# Patient Record
Sex: FEMALE | Race: WHITE | Employment: OTHER | URBAN - NONMETROPOLITAN AREA
[De-identification: names, ages, dates, MRNs, and addresses within clinical notes are randomized per-mention and may not be internally consistent; named-entity substitution may affect disease eponyms.]

---

## 2017-02-09 ENCOUNTER — HOSPITAL ENCOUNTER (OUTPATIENT)
Dept: LAB | Age: 82
Discharge: HOME | End: 2017-02-09
Attending: FAMILY MEDICINE | Admitting: FAMILY MEDICINE

## 2017-02-09 LAB
ALBUMIN SERPL-MCNC: 3.8 GM/DL (ref 3.4–5)
ALP BLD-CCNC: 58 IU/L (ref 40–129)
ALT SERPL-CCNC: 25 U/L (ref 10–40)
ANION GAP SERPL CALCULATED.3IONS-SCNC: 8 MMOL/L (ref 4–16)
AST SERPL-CCNC: 26 IU/L (ref 15–37)
BASOPHILS ABSOLUTE: 0.1 K/CU MM
BASOPHILS RELATIVE PERCENT: 0.6 % (ref 0–1)
BILIRUB SERPL-MCNC: 0.3 MG/DL (ref 0–1)
BILIRUBIN DIRECT: 0.2 MG/DL (ref 0–0.3)
BILIRUBIN, INDIRECT: 0.1 MG/DL (ref 0–0.7)
BUN BLDV-MCNC: 17 MG/DL (ref 6–23)
CALCIUM SERPL-MCNC: 9.5 MG/DL (ref 8.3–10.6)
CHLORIDE BLD-SCNC: 102 MMOL/L (ref 99–110)
CHOLESTEROL: 255 MG/DL
CO2: 30 MMOL/L (ref 21–32)
CREAT SERPL-MCNC: 1.1 MG/DL (ref 0.6–1.1)
CREATININE URINE: 137.2 MG/DL (ref 28–217)
DIFFERENTIAL TYPE: ABNORMAL
EOSINOPHILS ABSOLUTE: 0.3 K/CU MM
EOSINOPHILS RELATIVE PERCENT: 2.6 % (ref 0–3)
ESTIMATED AVERAGE GLUCOSE: 151 MG/DL
GFR AFRICAN AMERICAN: 58 ML/MIN/1.73M2
GFR NON-AFRICAN AMERICAN: 48 ML/MIN/1.73M2
GLUCOSE BLD-MCNC: 141 MG/DL (ref 70–140)
HBA1C MFR BLD: 6.9 % (ref 4.2–6.3)
HCT VFR BLD CALC: 41.3 % (ref 37–47)
HDLC SERPL-MCNC: 43 MG/DL
HEMOGLOBIN: 13 GM/DL (ref 12.5–16)
IMMATURE NEUTROPHIL %: 0.3 % (ref 0–0.43)
LDL CHOLESTEROL DIRECT: 144 MG/DL
LYMPHOCYTES ABSOLUTE: 2.1 K/CU MM
LYMPHOCYTES RELATIVE PERCENT: 21.5 % (ref 24–44)
MCH RBC QN AUTO: 29.1 PG (ref 27–31)
MCHC RBC AUTO-ENTMCNC: 31.5 % (ref 32–36)
MCV RBC AUTO: 92.4 FL (ref 78–100)
MICROALBUMIN/CREAT 24H UR: 5.1 MG/DL
MICROALBUMIN/CREAT UR-RTO: 37.2 MG/G CREAT (ref 0–30)
MONOCYTES ABSOLUTE: 0.6 K/CU MM
MONOCYTES RELATIVE PERCENT: 5.6 % (ref 0–4)
PDW BLD-RTO: 13.5 % (ref 11.7–14.9)
PLATELET # BLD: 191 K/CU MM (ref 140–440)
PMV BLD AUTO: 10.6 FL (ref 7.5–11.1)
POTASSIUM SERPL-SCNC: 4.2 MMOL/L (ref 3.5–5.1)
RBC # BLD: 4.47 M/CU MM (ref 4.2–5.4)
SEGMENTED NEUTROPHILS ABSOLUTE COUNT: 6.8 K/CU MM
SEGMENTED NEUTROPHILS RELATIVE PERCENT: 69.4 % (ref 36–66)
SODIUM BLD-SCNC: 140 MMOL/L (ref 135–145)
T3 FREE: 2.6 PG/ML (ref 2.3–4.2)
T4 FREE: 1.48 NG/DL (ref 0.9–1.8)
TOTAL IMMATURE NEUTOROPHIL: 0.03 K/CU MM
TOTAL PROTEIN: 7 GM/DL (ref 6.4–8.2)
TRIGL SERPL-MCNC: 413 MG/DL
TSH HIGH SENSITIVITY: 0.41 UIU/ML (ref 0.27–4.2)
WBC # BLD: 9.8 K/CU MM (ref 4–10.5)

## 2017-03-10 ENCOUNTER — HOSPITAL ENCOUNTER (OUTPATIENT)
Dept: LAB | Age: 82
Discharge: HOME | End: 2017-03-10
Attending: FAMILY MEDICINE | Admitting: FAMILY MEDICINE

## 2017-03-10 LAB
ALBUMIN SERPL-MCNC: 3.8 GM/DL (ref 3.4–5)
ALP BLD-CCNC: 53 IU/L (ref 40–129)
ALT SERPL-CCNC: 17 U/L (ref 10–40)
AMYLASE: 56 U/L (ref 25–115)
ANION GAP SERPL CALCULATED.3IONS-SCNC: 4 MMOL/L (ref 4–16)
AST SERPL-CCNC: 20 IU/L (ref 15–37)
BACTERIA: ABNORMAL /HPF
BASOPHILS ABSOLUTE: 0.1 K/CU MM
BASOPHILS RELATIVE PERCENT: 0.5 % (ref 0–1)
BILIRUB SERPL-MCNC: 0.3 MG/DL (ref 0–1)
BILIRUBIN DIRECT: 0.2 MG/DL (ref 0–0.3)
BILIRUBIN URINE: NEGATIVE MG/DL
BILIRUBIN, INDIRECT: 0.1 MG/DL (ref 0–0.7)
BLOOD, URINE: NEGATIVE
BUN BLDV-MCNC: 18 MG/DL (ref 6–23)
CALCIUM SERPL-MCNC: 9.1 MG/DL (ref 8.3–10.6)
CAST TYPE: ABNORMAL /HPF
CHLORIDE BLD-SCNC: 97 MMOL/L (ref 99–110)
CLARITY: CLEAR
CO2: 35 MMOL/L (ref 21–32)
COLOR: YELLOW
CREAT SERPL-MCNC: 1 MG/DL (ref 0.6–1.1)
CRYSTAL TYPE: ABNORMAL /HPF
DIFFERENTIAL TYPE: ABNORMAL
EOSINOPHILS ABSOLUTE: 0.2 K/CU MM
EOSINOPHILS RELATIVE PERCENT: 2.2 % (ref 0–3)
EPITHELIAL CELLS, UA: ABNORMAL /HPF
ESTIMATED AVERAGE GLUCOSE: 166 MG/DL
GFR AFRICAN AMERICAN: >60 ML/MIN/1.73M2
GFR NON-AFRICAN AMERICAN: 53 ML/MIN/1.73M2
GLUCOSE BLD-MCNC: 183 MG/DL (ref 70–140)
GLUCOSE, URINE: NEGATIVE MG/DL
HBA1C MFR BLD: 7.4 % (ref 4.2–6.3)
HCT VFR BLD CALC: 41.2 % (ref 37–47)
HEMOGLOBIN: 12.8 GM/DL (ref 12.5–16)
IMMATURE NEUTROPHIL %: 0.4 % (ref 0–0.43)
KETONES, URINE: NEGATIVE MG/DL
LEUKOCYTE ESTERASE, URINE: NEGATIVE
LIPASE: 146 IU/L (ref 13–60)
LYMPHOCYTES ABSOLUTE: 1.7 K/CU MM
LYMPHOCYTES RELATIVE PERCENT: 16.6 % (ref 24–44)
MCH RBC QN AUTO: 28.9 PG (ref 27–31)
MCHC RBC AUTO-ENTMCNC: 31.1 % (ref 32–36)
MCV RBC AUTO: 93 FL (ref 78–100)
MONOCYTES ABSOLUTE: 0.7 K/CU MM
MONOCYTES RELATIVE PERCENT: 6.6 % (ref 0–4)
MUCUS: NEGATIVE HPF
NITRITE URINE, QUANTITATIVE: NEGATIVE
PDW BLD-RTO: 14.7 % (ref 11.7–14.9)
PH, URINE: 5.5 (ref 5–8)
PLATELET # BLD: 181 K/CU MM (ref 140–440)
PMV BLD AUTO: 11.2 FL (ref 7.5–11.1)
POTASSIUM SERPL-SCNC: 4.3 MMOL/L (ref 3.5–5.1)
PROTEIN UA: NEGATIVE MG/DL
RBC # BLD: 4.43 M/CU MM (ref 4.2–5.4)
RBC URINE: ABNORMAL /HPF (ref 0–6)
SEGMENTED NEUTROPHILS ABSOLUTE COUNT: 7.4 K/CU MM
SEGMENTED NEUTROPHILS RELATIVE PERCENT: 73.7 % (ref 36–66)
SODIUM BLD-SCNC: 136 MMOL/L (ref 135–145)
SPECIFIC GRAVITY UA: 1 (ref 1–1.03)
TOTAL IMMATURE NEUTOROPHIL: 0.04 K/CU MM
TOTAL PROTEIN: 7.2 GM/DL (ref 6.4–8.2)
UROBILINOGEN, URINE: 0.2 EU/DL (ref 0.2–1)
VOLUME, (UVOL): 12 ML (ref 10–12)
WBC # BLD: 10 K/CU MM (ref 4–10.5)
WBC UA: ABNORMAL /HPF (ref 0–5)

## 2017-03-12 LAB
CULTURE: NORMAL
REPORT STATUS: NORMAL
REQUEST PROBLEM: NORMAL
SPECIMEN: NORMAL
TOTAL COLONY COUNT: NORMAL

## 2017-03-22 ENCOUNTER — OFFICE VISIT (OUTPATIENT)
Dept: CARDIOLOGY CLINIC | Age: 82
End: 2017-03-22

## 2017-03-22 ENCOUNTER — TELEPHONE (OUTPATIENT)
Dept: CARDIOLOGY CLINIC | Age: 82
End: 2017-03-22

## 2017-03-22 VITALS
WEIGHT: 176 LBS | SYSTOLIC BLOOD PRESSURE: 160 MMHG | HEART RATE: 68 BPM | BODY MASS INDEX: 34.55 KG/M2 | DIASTOLIC BLOOD PRESSURE: 78 MMHG | HEIGHT: 60 IN

## 2017-03-22 DIAGNOSIS — E78.5 DYSLIPIDEMIA: ICD-10-CM

## 2017-03-22 DIAGNOSIS — I10 ESSENTIAL HYPERTENSION: ICD-10-CM

## 2017-03-22 DIAGNOSIS — R06.02 SHORTNESS OF BREATH: Primary | ICD-10-CM

## 2017-03-22 PROCEDURE — 99214 OFFICE O/P EST MOD 30 MIN: CPT | Performed by: INTERNAL MEDICINE

## 2017-03-22 RX ORDER — METRONIDAZOLE 7.5 MG/G
GEL VAGINAL
Refills: 1 | COMMUNITY
Start: 2017-02-14 | End: 2021-04-06

## 2017-03-22 RX ORDER — LEVOTHYROXINE SODIUM 137 UG/1
137 TABLET ORAL DAILY
Refills: 3 | Status: ON HOLD | COMMUNITY
Start: 2017-03-13

## 2017-03-22 RX ORDER — FLUTICASONE FUROATE AND VILANTEROL 200; 25 UG/1; UG/1
POWDER RESPIRATORY (INHALATION)
COMMUNITY
End: 2017-08-23 | Stop reason: ALTCHOICE

## 2017-03-22 RX ORDER — ICOSAPENT ETHYL 1000 MG/1
2 CAPSULE ORAL 2 TIMES DAILY
Qty: 60 CAPSULE | Refills: 3 | Status: SHIPPED | OUTPATIENT
Start: 2017-03-22 | End: 2017-07-11 | Stop reason: SDUPTHER

## 2017-03-22 RX ORDER — ATORVASTATIN CALCIUM 80 MG/1
80 TABLET, FILM COATED ORAL NIGHTLY
COMMUNITY
End: 2017-08-23

## 2017-03-22 RX ORDER — ATORVASTATIN CALCIUM 80 MG/1
10 TABLET, FILM COATED ORAL DAILY
Qty: 90 TABLET | Refills: 3 | Status: SHIPPED | OUTPATIENT
Start: 2017-03-22 | End: 2017-03-22 | Stop reason: DRUGHIGH

## 2017-03-22 RX ORDER — HYDROCHLOROTHIAZIDE 25 MG/1
25 TABLET ORAL DAILY
Qty: 30 TABLET | Refills: 3 | Status: SHIPPED | OUTPATIENT
Start: 2017-03-22 | End: 2017-08-22 | Stop reason: SDUPTHER

## 2017-03-29 ENCOUNTER — PROCEDURE VISIT (OUTPATIENT)
Dept: CARDIOLOGY CLINIC | Age: 82
End: 2017-03-29

## 2017-03-29 DIAGNOSIS — Z13.6 SCREENING FOR AAA (ABDOMINAL AORTIC ANEURYSM): Primary | ICD-10-CM

## 2017-03-29 DIAGNOSIS — R06.02 SOB (SHORTNESS OF BREATH): Primary | ICD-10-CM

## 2017-03-29 DIAGNOSIS — R60.9 EDEMA, UNSPECIFIED TYPE: Primary | ICD-10-CM

## 2017-03-29 DIAGNOSIS — I10 ESSENTIAL HYPERTENSION: ICD-10-CM

## 2017-03-29 DIAGNOSIS — R06.02 SHORTNESS OF BREATH: ICD-10-CM

## 2017-03-29 LAB
LV EF: 58 %
LVEF MODALITY: NORMAL

## 2017-03-29 PROCEDURE — 93306 TTE W/DOPPLER COMPLETE: CPT | Performed by: INTERNAL MEDICINE

## 2017-03-30 ENCOUNTER — PROCEDURE VISIT (OUTPATIENT)
Dept: CARDIOLOGY CLINIC | Age: 82
End: 2017-03-30

## 2017-03-30 DIAGNOSIS — I10 ESSENTIAL HYPERTENSION: ICD-10-CM

## 2017-03-30 DIAGNOSIS — R06.02 SHORTNESS OF BREATH: ICD-10-CM

## 2017-03-30 LAB
LV EF: 88 %
LVEF MODALITY: NORMAL

## 2017-03-30 PROCEDURE — 93017 CV STRESS TEST TRACING ONLY: CPT | Performed by: INTERNAL MEDICINE

## 2017-03-30 PROCEDURE — 93018 CV STRESS TEST I&R ONLY: CPT | Performed by: INTERNAL MEDICINE

## 2017-03-30 PROCEDURE — A9500 TC99M SESTAMIBI: HCPCS | Performed by: INTERNAL MEDICINE

## 2017-03-30 PROCEDURE — 78452 HT MUSCLE IMAGE SPECT MULT: CPT | Performed by: INTERNAL MEDICINE

## 2017-03-30 PROCEDURE — 93016 CV STRESS TEST SUPVJ ONLY: CPT | Performed by: INTERNAL MEDICINE

## 2017-03-31 ENCOUNTER — TELEPHONE (OUTPATIENT)
Dept: CARDIOLOGY CLINIC | Age: 82
End: 2017-03-31

## 2017-04-03 ENCOUNTER — TELEPHONE (OUTPATIENT)
Dept: CARDIOLOGY CLINIC | Age: 82
End: 2017-04-03

## 2017-05-01 ENCOUNTER — TELEPHONE (OUTPATIENT)
Dept: CARDIOLOGY CLINIC | Age: 82
End: 2017-05-01

## 2017-06-21 ENCOUNTER — TELEPHONE (OUTPATIENT)
Dept: CARDIOLOGY CLINIC | Age: 82
End: 2017-06-21

## 2017-06-21 ENCOUNTER — OFFICE VISIT (OUTPATIENT)
Dept: CARDIOLOGY CLINIC | Age: 82
End: 2017-06-21

## 2017-06-21 VITALS
SYSTOLIC BLOOD PRESSURE: 134 MMHG | HEART RATE: 68 BPM | WEIGHT: 172 LBS | BODY MASS INDEX: 33.77 KG/M2 | HEIGHT: 60 IN | RESPIRATION RATE: 16 BRPM | DIASTOLIC BLOOD PRESSURE: 66 MMHG

## 2017-06-21 DIAGNOSIS — E78.5 DYSLIPIDEMIA: Primary | ICD-10-CM

## 2017-06-21 PROCEDURE — 99214 OFFICE O/P EST MOD 30 MIN: CPT | Performed by: INTERNAL MEDICINE

## 2017-06-21 RX ORDER — DIPHENHYDRAMINE HCL 25 MG
25 CAPSULE ORAL NIGHTLY PRN
COMMUNITY
End: 2021-04-06

## 2017-06-21 RX ORDER — ROPINIROLE 2 MG/1
3 TABLET, FILM COATED ORAL NIGHTLY
Status: ON HOLD | COMMUNITY
End: 2018-03-15 | Stop reason: ALTCHOICE

## 2017-06-22 ENCOUNTER — TELEPHONE (OUTPATIENT)
Dept: CARDIOLOGY CLINIC | Age: 82
End: 2017-06-22

## 2017-06-22 RX ORDER — ICOSAPENT ETHYL 1000 MG/1
2 CAPSULE ORAL 2 TIMES DAILY
Qty: 64 CAPSULE | Refills: 0 | COMMUNITY
Start: 2017-06-22 | End: 2017-08-23 | Stop reason: SDUPTHER

## 2017-07-11 DIAGNOSIS — E78.5 DYSLIPIDEMIA: ICD-10-CM

## 2017-07-12 RX ORDER — ICOSAPENT ETHYL 1000 MG/1
2 CAPSULE ORAL 2 TIMES DAILY
Qty: 88 CAPSULE | Refills: 0 | COMMUNITY
Start: 2017-07-12 | End: 2017-11-29

## 2017-08-15 ENCOUNTER — HOSPITAL ENCOUNTER (OUTPATIENT)
Dept: LAB | Age: 82
Discharge: HOME | End: 2017-08-15
Attending: INTERNAL MEDICINE | Admitting: INTERNAL MEDICINE

## 2017-08-15 LAB
ALBUMIN SERPL-MCNC: 4.1 GM/DL (ref 3.4–5)
ALP BLD-CCNC: 50 IU/L (ref 40–129)
ALT SERPL-CCNC: 26 U/L (ref 10–40)
AST SERPL-CCNC: 27 IU/L (ref 15–37)
BILIRUB SERPL-MCNC: 0.5 MG/DL (ref 0–1)
BILIRUBIN DIRECT: 0.2 MG/DL (ref 0–0.3)
BILIRUBIN, INDIRECT: 0.3 MG/DL (ref 0–0.7)
CHOLESTEROL, FASTING: 140 MG/DL
HDLC SERPL-MCNC: 39 MG/DL
LDL CHOLESTEROL DIRECT: 61 MG/DL
TOTAL PROTEIN: 7.4 GM/DL (ref 6.4–8.2)
TRIGLYCERIDE, FASTING: 218 MG/DL

## 2017-08-22 DIAGNOSIS — I10 ESSENTIAL HYPERTENSION: ICD-10-CM

## 2017-08-22 DIAGNOSIS — R06.02 SHORTNESS OF BREATH: ICD-10-CM

## 2017-08-22 RX ORDER — HYDROCHLOROTHIAZIDE 25 MG/1
25 TABLET ORAL DAILY
Qty: 30 TABLET | Refills: 11 | Status: SHIPPED | OUTPATIENT
Start: 2017-08-22 | End: 2018-09-20 | Stop reason: SDUPTHER

## 2017-08-23 ENCOUNTER — OFFICE VISIT (OUTPATIENT)
Dept: CARDIOLOGY CLINIC | Age: 82
End: 2017-08-23

## 2017-08-23 VITALS
HEIGHT: 60 IN | RESPIRATION RATE: 14 BRPM | SYSTOLIC BLOOD PRESSURE: 126 MMHG | HEART RATE: 56 BPM | BODY MASS INDEX: 33.96 KG/M2 | DIASTOLIC BLOOD PRESSURE: 66 MMHG | WEIGHT: 173 LBS

## 2017-08-23 DIAGNOSIS — E78.1 PURE HYPERGLYCERIDEMIA: Primary | ICD-10-CM

## 2017-08-23 PROCEDURE — 99214 OFFICE O/P EST MOD 30 MIN: CPT | Performed by: INTERNAL MEDICINE

## 2017-08-23 RX ORDER — ATORVASTATIN CALCIUM 80 MG/1
10 TABLET, FILM COATED ORAL DAILY
Qty: 90 TABLET | Refills: 3 | Status: ON HOLD | OUTPATIENT
Start: 2017-08-23 | End: 2018-03-15 | Stop reason: CLARIF

## 2017-08-23 RX ORDER — GEMFIBROZIL 600 MG/1
600 TABLET, FILM COATED ORAL
Qty: 60 TABLET | Refills: 3 | Status: SHIPPED | OUTPATIENT
Start: 2017-08-23 | End: 2017-10-09 | Stop reason: SINTOL

## 2017-10-09 ENCOUNTER — TELEPHONE (OUTPATIENT)
Dept: CARDIOLOGY CLINIC | Age: 82
End: 2017-10-09

## 2017-10-09 RX ORDER — CHLORAL HYDRATE 500 MG
1000 CAPSULE ORAL NIGHTLY
COMMUNITY
End: 2017-11-29

## 2017-11-14 ENCOUNTER — HOSPITAL ENCOUNTER (OUTPATIENT)
Dept: OTHER | Age: 82
Discharge: HOME | End: 2017-11-14
Attending: NURSE PRACTITIONER | Admitting: NURSE PRACTITIONER

## 2017-11-14 ENCOUNTER — OFFICE VISIT (OUTPATIENT)
Dept: OTHER | Age: 82
End: 2017-11-14

## 2017-11-14 VITALS
WEIGHT: 162 LBS | BODY MASS INDEX: 31.64 KG/M2 | TEMPERATURE: 98 F | OXYGEN SATURATION: 93 % | SYSTOLIC BLOOD PRESSURE: 134 MMHG | HEART RATE: 60 BPM | DIASTOLIC BLOOD PRESSURE: 56 MMHG

## 2017-11-14 DIAGNOSIS — R05.9 COUGH: ICD-10-CM

## 2017-11-14 DIAGNOSIS — R09.82 POST-NASAL DRIP: ICD-10-CM

## 2017-11-14 DIAGNOSIS — J06.9 VIRAL UPPER RESPIRATORY TRACT INFECTION: Primary | ICD-10-CM

## 2017-11-14 PROCEDURE — 99202 OFFICE O/P NEW SF 15 MIN: CPT | Performed by: NURSE PRACTITIONER

## 2017-11-14 RX ORDER — FLUTICASONE PROPIONATE 50 MCG
2 SPRAY, SUSPENSION (ML) NASAL DAILY
Qty: 1 BOTTLE | Refills: 3 | Status: SHIPPED | OUTPATIENT
Start: 2017-11-14 | End: 2019-05-01 | Stop reason: ALTCHOICE

## 2017-11-14 ASSESSMENT — ENCOUNTER SYMPTOMS
STRIDOR: 0
SINUS PAIN: 0
WHEEZING: 1
CHOKING: 0
SWOLLEN GLANDS: 0
DIARRHEA: 0
NAUSEA: 0
RHINORRHEA: 1
CHEST TIGHTNESS: 0
SINUS PRESSURE: 0
ABDOMINAL PAIN: 0
SORE THROAT: 0
EYES NEGATIVE: 1
SHORTNESS OF BREATH: 1
VOMITING: 0
COUGH: 1

## 2017-11-14 NOTE — PATIENT INSTRUCTIONS
if:  · You have severe trouble breathing. Call your doctor now or seek immediate medical care if:  · You cough up blood. · You have new or worse trouble breathing. · You have a new or higher fever. · You have a new rash. Watch closely for changes in your health, and be sure to contact your doctor if:  · You cough more deeply or more often, especially if you notice more mucus or a change in the color of your mucus. · You have new symptoms, such as a sore throat, an earache, or sinus pain. · You do not get better as expected. Where can you learn more? Go to [email or web address] and sign in to your Send[employer] account. Enter D279 in the [employer] box to learn more about \"Cough: Care Instructions. \"     If you do not have an account, please click on the \"Sign Up Now\" link. Current as of: March 25, 2017  Content Version: 11.3  © 0133-5042 [employer]. Care instructions adapted under license by [facility] ([facility]). If you have questions about a medical condition or this instruction, always ask your healthcare professional. [place] [address] any warranty or liability for your use of this information. Patient Education        Saline Nasal Washes: Care Instructions  Your Care Instructions  Saline nasal washes help keep the nasal passages open by washing out thick or dried mucus. This simple remedy can help relieve symptoms of allergies, sinusitis, and colds. It also can make the nose feel more comfortable by keeping the mucous membranes moist. You may notice a little burning sensation in your nose the first few times you use the solution, but this usually gets better in a few days. Follow-up care is a key part of your treatment and safety. Be sure to make and go to all appointments, and call your doctor if you are having problems. It's also a good idea to know your test results and keep a list of the medicines you take.   How can you care for yourself at may suggest using a corticosteroid nasal spray for your allergy symptoms or sinus problems. These sprays reduce the swelling inside the nose and sinuses. Unlike decongestant nasal sprays, steroid sprays won't lead to more swelling when you stop taking them. These sprays start working in a few days, but it may take several weeks before you get the full effect. Most side effects are minor. The most common complaint is a burning feeling in the nose right after the spray is used. Some people get nosebleeds. Follow-up care is a key part of your treatment and safety. Be sure to make and go to all appointments, and call your doctor if you are having problems. It's also a good idea to know your test results and keep a list of the medicines you take. How can you care for yourself at home? Here are some tips for using these sprays:  · You may need to prime the sprayer before you use it. This means spraying it into the air a few times to make sure you get the right amount of medicine. Follow the directions on the label. · Blow your nose before you spray. This will help clear out your nostrils. · Gently sniff the medicine into your nose as you spray. Don't snort, or the medicine will go all the way into your throat where it won't do much good. · Aim the nozzle straight toward the outer wall of your nostril. This will help keep the medicine from irritating the inner walls of your nose, especially your septum (the wall that separates your left and right nostrils). · Don't blow your nose for 10 minutes or so after you spray. And try not to sneeze. · Be safe with medicines. Use this medicine exactly as prescribed. Call your doctor if you think you are having a problem with your medicine. · Clean your sprayer once a week. Read the label to learn how. When should you call for help? Watch closely for changes in your health, and be sure to contact your doctor if you have any problems. Where can you learn more?   Go to

## 2017-11-14 NOTE — PROGRESS NOTES
Pt takes 2 tabs, daily      losartan (COZAAR) 100 MG tablet Take 100 mg by mouth daily      cloNIDine (CATAPRES) 0.2 MG tablet Take 0.2 mg by mouth 2 times daily      insulin glargine (LANTUS) 100 UNIT/ML injection vial Inject into the skin nightly 12-20 units depending on blood sugar      Multiple Minerals-Vitamins (CALCIUM-MAGNESIUM-ZINC-D3 PO) Take by mouth      metoprolol (LOPRESSOR) 50 MG tablet Take 50 mg by mouth 2 times daily 3 pills in the AM : 150 mg dose  2 pills in the PM: 100 mg dose       No current facility-administered medications on file prior to visit. Past Medical, Family, and Social History have been reviewed today. Review of Systems   Constitutional: Positive for appetite change (decreased) and fatigue. Negative for chills, diaphoresis, fever and weight loss. HENT: Positive for congestion, postnasal drip and rhinorrhea. Negative for ear pain, sinus pain, sinus pressure, sneezing and sore throat. Eyes: Negative. Respiratory: Positive for cough, shortness of breath and wheezing. Negative for choking, chest tightness and stridor. Cardiovascular: Negative for chest pain and palpitations. Gastrointestinal: Negative for abdominal pain, diarrhea, nausea and vomiting. Genitourinary: Negative for dysuria. Musculoskeletal: Negative for neck pain. Skin: Negative for rash. Neurological: Negative for dizziness, light-headedness and headaches (resolved). OBJECTIVE:     BP (!) 134/56 (Site: Right Arm, Position: Sitting, Cuff Size: Large Adult)   Pulse 60   Temp 98 °F (36.7 °C) (Oral)   Wt 162 lb (73.5 kg)   SpO2 93%   BMI 31.64 kg/m²     Physical Exam   Constitutional: She is oriented to person, place, and time. She appears well-developed and well-nourished. HENT:   Head: Normocephalic. Right Ear: External ear normal.   Left Ear: External ear normal.   Nose: Mucosal edema and rhinorrhea present.  Right sinus exhibits no maxillary sinus tenderness and no frontal sinus tenderness. Left sinus exhibits no maxillary sinus tenderness and no frontal sinus tenderness. Mouth/Throat: Uvula is midline and mucous membranes are normal.       Eyes: Conjunctivae are normal. Pupils are equal, round, and reactive to light. Cardiovascular: Normal rate, regular rhythm and normal heart sounds. Pulmonary/Chest: Effort normal and breath sounds normal.   Abdominal: Bowel sounds are normal.   Lymphadenopathy:     She has no cervical adenopathy. Neurological: She is alert and oriented to person, place, and time. Skin: Skin is warm and dry. No results found for requested labs within last 30 days. Hemoglobin A1C (%)   Date Value   03/10/2017 7.4 (H)       Lab Results   Component Value Date    WBC 10.0 03/10/2017    WBC 9.8 02/09/2017    WBC 9.2 12/09/2016    HGB 12.8 03/10/2017    HGB 13.0 02/09/2017    HGB 11.8 12/09/2016    HCT 41.2 03/10/2017    HCT 41.3 02/09/2017    HCT 35.7 12/09/2016    MCV 93.0 03/10/2017    MCV 92.4 02/09/2017    MCV 97.3 12/09/2016     03/10/2017     02/09/2017     12/09/2016    SEGSABS 7.4 03/10/2017    SEGSABS 6.8 02/09/2017    SEGSABS 4.6 12/09/2016    LYMPHSABS 1.7 03/10/2017    MONOSABS 0.7 03/10/2017    EOSABS 0.2 03/10/2017    BASOSABS 0.1 03/10/2017     Lab Results   Component Value Date    TSHHS 0.414 02/09/2017     Lab Results   Component Value Date    LABALBU 4.1 08/15/2017    BILITOT 0.5 08/15/2017    BILIDIR 0.2 08/15/2017    IBILI 0.3 08/15/2017    AST 27 08/15/2017    ALT 26 08/15/2017    ALKPHOS 50 08/15/2017      ASSESSMENT AND PLAN:     1. Viral upper respiratory tract infection  - Encourage clear fluids without caffeine  - Smaller, more frequent meals to ensure hydration.   - Saline nasal spray, cool mist humidifier, prop head at night for congestion.   - Tylenol as needed for fever, pain. - Counseled on signs of increased work of breathing.   - RTO if sxs increase or no improvement.    - fluticasone (FLONASE) 50 MCG/ACT nasal spray; 2 sprays by Nasal route daily  Dispense: 1 Bottle; Refill: 3  - Pseudoephedrine-DM-GG 30- MG CAPS; Take 1 tablet by mouth 2 times daily as needed (cough or congestion)  Dispense: 10 capsule; Refill: 0    2. Cough  - As above  - Recommended picking up pulse oximeter    3. Post-nasal drip  - As above  - fluticasone (FLONASE) 50 MCG/ACT nasal spray; 2 sprays by Nasal route daily  Dispense: 1 Bottle; Refill: 3      Return if symptoms worsen or fail to improve.

## 2017-11-16 ENCOUNTER — TELEPHONE (OUTPATIENT)
Dept: OTHER | Age: 82
End: 2017-11-16

## 2017-11-29 ENCOUNTER — OFFICE VISIT (OUTPATIENT)
Dept: CARDIOLOGY CLINIC | Age: 82
End: 2017-11-29

## 2017-11-29 VITALS
SYSTOLIC BLOOD PRESSURE: 124 MMHG | DIASTOLIC BLOOD PRESSURE: 62 MMHG | WEIGHT: 161 LBS | BODY MASS INDEX: 31.61 KG/M2 | HEIGHT: 60 IN | HEART RATE: 64 BPM

## 2017-11-29 DIAGNOSIS — I10 ESSENTIAL HYPERTENSION: Primary | ICD-10-CM

## 2017-11-29 PROCEDURE — 4040F PNEUMOC VAC/ADMIN/RCVD: CPT | Performed by: INTERNAL MEDICINE

## 2017-11-29 PROCEDURE — 1036F TOBACCO NON-USER: CPT | Performed by: INTERNAL MEDICINE

## 2017-11-29 PROCEDURE — G8400 PT W/DXA NO RESULTS DOC: HCPCS | Performed by: INTERNAL MEDICINE

## 2017-11-29 PROCEDURE — G8482 FLU IMMUNIZE ORDER/ADMIN: HCPCS | Performed by: INTERNAL MEDICINE

## 2017-11-29 PROCEDURE — 99214 OFFICE O/P EST MOD 30 MIN: CPT | Performed by: INTERNAL MEDICINE

## 2017-11-29 PROCEDURE — G8428 CUR MEDS NOT DOCUMENT: HCPCS | Performed by: INTERNAL MEDICINE

## 2017-11-29 PROCEDURE — 1123F ACP DISCUSS/DSCN MKR DOCD: CPT | Performed by: INTERNAL MEDICINE

## 2017-11-29 PROCEDURE — 1090F PRES/ABSN URINE INCON ASSESS: CPT | Performed by: INTERNAL MEDICINE

## 2017-11-29 PROCEDURE — G8417 CALC BMI ABV UP PARAM F/U: HCPCS | Performed by: INTERNAL MEDICINE

## 2017-11-29 NOTE — PROGRESS NOTES
Macarena Stovall MD        OFFICE  FOLLOWUP NOTE    Chief complaints: patient is here for management of DM, HTN, DYSLPIDEMIA, leg swelling      Subjective: patient feels better, no chest pain, no shortness of breath, no dizziness, no palpitations    HPI Cecelia Segal is a 80 y. o.year old who  has a past medical history of Diabetes mellitus ([address] H/O cardiovascular stress test; H/O Doppler ultrasound (Abdomimal Aorta); H/O echocardiogram; Hx of echocardiogram; Hyperlipidemia; Hypertension; Sleep apnea; and Thyroid disease. and presents for management of DM, HTN, DYSLPIDEMIA, leg swelling which are well controlled,she is recovering from bronchitis, patient is not taking vascepa as it was expensive, she is taking lipitor      Current Outpatient Prescriptions   Medication Sig Dispense Refill    Insulin Glargine (BASAGLAR KWIKPEN SC) Inject 15 Units into the skin nightly      fluticasone (FLONASE) 50 MCG/ACT nasal spray 2 sprays by Nasal route daily 1 Bottle 3    albuterol sulfate HFA (PROVENTIL HFA) 108 (90 Base) MCG/ACT inhaler Inhale 2 puffs into the lungs every 4 hours as needed for Wheezing or Shortness of Breath With spacer (and mask if indicated). Thanks.  1 Inhaler 1    naproxen (NAPROSYN) 500 MG tablet Take 500 mg by mouth 2 times daily as needed for Pain      gabapentin (NEURONTIN) 100 MG capsule Take 100 mg by mouth nightly as needed      atorvastatin (LIPITOR) 80 MG tablet Take 0.5 tablets by mouth daily 90 tablet 3    hydrochlorothiazide (HYDRODIURIL) 25 MG tablet Take 1 tablet by mouth daily 30 tablet 11    Icosapent Ethyl (VASCEPA) 1 g CAPS capsule Take 2 capsules by mouth 2 times daily 88 capsule 0    rOPINIRole (REQUIP) 2 MG tablet Take 3 mg by mouth nightly       diphenhydrAMINE (BENADRYL) 25 MG capsule Take 25 mg by mouth nightly as needed for Itching      metroNIDAZOLE (METROGEL) 0.75 % vaginal gel   1    levothyroxine (SYNTHROID) 137 MCG tablet daily   3    Insulin Pen Needle (PEN NEEDLES 31GX5/16\") 31G X 8 MM MISC       losartan (COZAAR) 100 MG tablet Take 100 mg by mouth daily      cloNIDine (CATAPRES) 0.2 MG tablet Take 0.2 mg by mouth 2 times daily      insulin glargine (LANTUS) 100 UNIT/ML injection vial Inject into the skin nightly 12-20 units depending on blood sugar      metoprolol (LOPRESSOR) 50 MG tablet Take 50 mg by mouth 2 times daily 3 pills in the AM : 150 mg dose  2 pills in the PM: 100 mg dose       No current facility-administered medications for this visit. Allergies: Lopid [gemfibrozil]; Bystolic [nebivolol hcl]; Cefdinir; Coreg [carvedilol]; Crestor [rosuvastatin]; Fenofibrate; Glucophage [metformin]; Metronidazole; Norvasc [amlodipine besylate]; Floyce Nance [olmesartan-amlodipine-hctz]; and Zocor [simvastatin]  Past Medical History:   Diagnosis Date    Diabetes mellitus ([facility] [address]     H/O cardiovascular stress test 03/30/2017    normal,EF 88%.  H/O Doppler ultrasound (Abdomimal Aorta) 03/29/2017    No evidence of abdominal aortic aneurysm.  H/O echocardiogram 7/2/14    Normal left ventricular size, wall motion and systolic function. Mildle elevated pulmonary artery systolic pressure. Mild MR and TR and trace AR EF 55 to 60%    Hx of echocardiogram 03/29/2017    EF 55-60%. Grade I diastolic dysfunction. Mildly dilated left atrium. No evidence of pericardial effusion.      Hyperlipidemia     Hypertension     Sleep apnea     Thyroid disease      Past Surgical History:   Procedure Laterality Date    CATARACT REMOVAL      CHOLECYSTECTOMY      DILATION AND CURETTAGE OF UTERUS      OTHER SURGICAL HISTORY      eye lift     Family History   Problem Relation Age of Onset   Ilana Liang Pacemaker Sister     Arrhythmia Sister      Social History   Substance Use Topics    Smoking status: Never Smoker    Smokeless tobacco: Never Used    Alcohol use No      [unfilled]  Review of Systems:   · Constitutional: No Fever or Weight Loss   · Eyes: No Decreased Vision  · ENT: No Headaches, Hearing Loss or Vertigo  · Cardiovascular: No chest pain, dyspnea on exertion, palpitations or loss of consciousness  · Respiratory: No cough or wheezing    · Gastrointestinal: No abdominal pain, appetite loss, blood in stools, constipation, diarrhea or heartburn  · Genitourinary: No dysuria, trouble voiding, or hematuria  · Musculoskeletal:  No gait disturbance, weakness or joint complaints  · Integumentary: No rash or pruritis  · Neurological: No TIA or stroke symptoms  · Psychiatric: No anxiety or depression  · Endocrine: No malaise, fatigue or temperature intolerance  · Hematologic/Lymphatic: No bleeding problems, blood clots or swollen lymph nodes  · Allergic/Immunologic: No nasal congestion or hives  All systems negative except as marked. Objective:  /62 (Site: Right Arm)   Pulse 64   Ht 5' (1.524 m)   Wt 161 lb (73 kg)   BMI 31.44 kg/m²   Wt Readings from Last 3 Encounters:   11/29/17 161 lb (73 kg)   11/14/17 162 lb (73.5 kg)   11/11/17 165 lb (74.8 kg)     Body mass index is 31.44 kg/m². GENERAL - Alert, oriented, pleasant, in no apparent distress,normal grooming  HEENT  pupils are reactive to light and accomodation, cornea intact, conjunctive normal color, ears are normal in exam,throat exam in normal, teeth, gum and palate are normal, oral mucosa is normal without any notation of pallor or cyanosis  Neck - Supple. No jugular venous distention noted. No carotid bruits, no apical -carotid delay  Respiratory:  Normal breath sounds, No respiratory distress, No wheezing, No chest tenderness. ,no use of accessory muscles, diaphragm movement is normal  Cardiovascular: (PMI) apex non displaced,no lifts no thrills, no s3,no s4, Normal heart rate, Normal rhythm, No murmurs, No rubs, No gallops.  Carotid arteries pulse and amplitude are normal no bruit, no abdominal bruit noted ( normal abdominal aorta ausculation), femoral arteries pulse and amplitude are normal no bruit, pedal pulses are normal  Femoral pulses have normal amplitude, no bruits   Extremities - No cyanosis, clubbing, or significant edema, no varicose veins    Abdomen  No masses, tenderness, or organomegaly, no hepato-splenomegally, no bruits  Musculoskeletal  No significant edema, no kyphosis or scoliosis, no deformity in any extremity noted, muscle strength and tone are normal  Skin: no ulcer,no scar,no stasis dermatitis   Neurologic  alert oriented times 3,Cranial nerves II through XII are grossly intact. There were no gross focal neurologic abnormalities. All sensory and motor nerves examined and were normal  Psychiatric: normal mood and affect    Lab Results   Component Value Date    CKTOTAL 74 02/11/2011    CKMB 1.5 02/11/2011    TROPONINI 0.012 02/11/2011     BNP:    Lab Results   Component Value Date    BNP 75.3 02/11/2011     PT/INR:  No results found for: PTINR  Lab Results   Component Value Date    LABA1C 7.4 (H) 03/10/2017    LABA1C 6.9 (H) 02/09/2017     Lab Results   Component Value Date    CHOL 255 (H) 02/09/2017    TRIG 413 (H) 02/09/2017    HDL 39 (L) 08/15/2017    LDLDIRECT 61 08/15/2017     Lab Results   Component Value Date    ALT 26 08/15/2017    AST 27 08/15/2017     TSH:  No results found for: TSH    Impression:  Liu Mendoza is a 80 y. o.year old who  has a past medical history of Diabetes mellitus ([facility] [address] H/O cardiovascular stress test; H/O Doppler ultrasound (Abdomimal Aorta); H/O echocardiogram; Hx of echocardiogram; Hyperlipidemia; Hypertension; Sleep apnea; and Thyroid disease. and presents with     Plan:  1. Leg swelling:resolved, normal echo and PA pressures, normal venous doppler except for mildly increased venous reflux on GSV, WILL RECOMMEND compression socks and leg elevation. 2. Shortness of breath: resolved, normal  stress test and echo  3. DM: stable continue insulin  4.  Dyslipidemia: controlled on vascepa, latest lipid check was promising, since samples of vasepa is out, will prescribe lopid, patient

## 2017-12-06 ENCOUNTER — HOSPITAL ENCOUNTER (OUTPATIENT)
Dept: LAB | Age: 82
Discharge: HOME | End: 2017-12-06
Attending: FAMILY MEDICINE | Admitting: FAMILY MEDICINE

## 2017-12-06 LAB
ALBUMIN SERPL-MCNC: 3.6 GM/DL (ref 3.4–5)
ALP BLD-CCNC: 49 IU/L (ref 40–129)
ALT SERPL-CCNC: 25 U/L (ref 10–40)
ANION GAP SERPL CALCULATED.3IONS-SCNC: 13 MMOL/L (ref 4–16)
AST SERPL-CCNC: 25 IU/L (ref 15–37)
BILIRUB SERPL-MCNC: 0.5 MG/DL (ref 0–1)
BILIRUBIN DIRECT: 0.2 MG/DL (ref 0–0.3)
BILIRUBIN, INDIRECT: 0.3 MG/DL (ref 0–0.7)
BUN BLDV-MCNC: 25 MG/DL (ref 6–23)
CALCIUM SERPL-MCNC: 8.9 MG/DL (ref 8.3–10.6)
CHLORIDE BLD-SCNC: 100 MMOL/L (ref 99–110)
CHOLESTEROL, FASTING: 122 MG/DL
CO2: 26 MMOL/L (ref 21–32)
CREAT SERPL-MCNC: 1.1 MG/DL (ref 0.6–1.1)
CREATININE URINE: 100 MG/DL (ref 28–217)
ESTIMATED AVERAGE GLUCOSE: 160 MG/DL
GFR AFRICAN AMERICAN: 57 ML/MIN/1.73M2
GFR NON-AFRICAN AMERICAN: 47 ML/MIN/1.73M2
GLUCOSE FASTING: 121 MG/DL (ref 70–99)
HBA1C MFR BLD: 7.2 % (ref 4.2–6.3)
HDLC SERPL-MCNC: 41 MG/DL
LDL CHOLESTEROL DIRECT: 53 MG/DL
MICROALBUMIN/CREAT 24H UR: 1.6 MG/DL
MICROALBUMIN/CREAT UR-RTO: 16 MG/G CREAT (ref 0–30)
POTASSIUM SERPL-SCNC: 4 MMOL/L (ref 3.5–5.1)
SODIUM BLD-SCNC: 139 MMOL/L (ref 135–145)
T3 FREE: 2.3 PG/ML (ref 2.3–4.2)
T4 FREE: 1.15 NG/DL (ref 0.9–1.8)
TOTAL PROTEIN: 7.1 GM/DL (ref 6.4–8.2)
TRIGLYCERIDE, FASTING: 167 MG/DL
TSH HIGH SENSITIVITY: 2.26 UIU/ML (ref 0.27–4.2)

## 2018-03-14 PROBLEM — Z79.4 LONG-TERM INSULIN USE IN TYPE 2 DIABETES (HCC): Chronic | Status: ACTIVE | Noted: 2018-03-14

## 2018-03-14 PROBLEM — E11.9 LONG-TERM INSULIN USE IN TYPE 2 DIABETES (HCC): Chronic | Status: ACTIVE | Noted: 2018-03-14

## 2018-03-14 PROBLEM — I10 ESSENTIAL HYPERTENSION: Chronic | Status: ACTIVE | Noted: 2018-03-14

## 2018-03-14 PROBLEM — R07.2 PRECORDIAL CHEST PAIN: Status: ACTIVE | Noted: 2018-03-14

## 2018-03-15 PROBLEM — R07.2 PRECORDIAL CHEST PAIN: Status: RESOLVED | Noted: 2018-03-14 | Resolved: 2018-03-15

## 2018-03-22 ENCOUNTER — HOSPITAL ENCOUNTER (OUTPATIENT)
Dept: CARDIAC REHAB | Age: 83
Discharge: HOME | End: 2018-03-22
Attending: FAMILY MEDICINE | Admitting: FAMILY MEDICINE

## 2018-03-22 LAB
LV EF: 70 %
LVEF MODALITY: NORMAL

## 2018-03-22 RX ADMIN — Medication 10 MILLICURIE: at 11:00

## 2018-03-22 RX ADMIN — Medication 30 MILLICURIE: at 12:25

## 2018-03-28 ENCOUNTER — OFFICE VISIT (OUTPATIENT)
Dept: CARDIOLOGY CLINIC | Age: 83
End: 2018-03-28

## 2018-03-28 VITALS — SYSTOLIC BLOOD PRESSURE: 118 MMHG | DIASTOLIC BLOOD PRESSURE: 72 MMHG | HEART RATE: 60 BPM

## 2018-03-28 DIAGNOSIS — I10 ESSENTIAL HYPERTENSION: Primary | ICD-10-CM

## 2018-03-28 PROCEDURE — 1090F PRES/ABSN URINE INCON ASSESS: CPT | Performed by: INTERNAL MEDICINE

## 2018-03-28 PROCEDURE — G8427 DOCREV CUR MEDS BY ELIG CLIN: HCPCS | Performed by: INTERNAL MEDICINE

## 2018-03-28 PROCEDURE — 4040F PNEUMOC VAC/ADMIN/RCVD: CPT | Performed by: INTERNAL MEDICINE

## 2018-03-28 PROCEDURE — G8482 FLU IMMUNIZE ORDER/ADMIN: HCPCS | Performed by: INTERNAL MEDICINE

## 2018-03-28 PROCEDURE — 1036F TOBACCO NON-USER: CPT | Performed by: INTERNAL MEDICINE

## 2018-03-28 PROCEDURE — 1123F ACP DISCUSS/DSCN MKR DOCD: CPT | Performed by: INTERNAL MEDICINE

## 2018-03-28 PROCEDURE — 99214 OFFICE O/P EST MOD 30 MIN: CPT | Performed by: INTERNAL MEDICINE

## 2018-03-28 PROCEDURE — G8400 PT W/DXA NO RESULTS DOC: HCPCS | Performed by: INTERNAL MEDICINE

## 2018-03-28 PROCEDURE — G8417 CALC BMI ABV UP PARAM F/U: HCPCS | Performed by: INTERNAL MEDICINE

## 2018-03-28 NOTE — PROGRESS NOTES
tenderness, or organomegaly, no hepato-splenomegally, no bruits  Musculoskeletal  No significant edema, no kyphosis or scoliosis, no deformity in any extremity noted, muscle strength and tone are normal  Skin: no ulcer,no scar,no stasis dermatitis   Neurologic  alert oriented times 3,Cranial nerves II through XII are grossly intact. There were no gross focal neurologic abnormalities. All sensory and motor nerves examined and were normal  Psychiatric: normal mood and affect    Lab Results   Component Value Date    CKTOTAL 74 02/11/2011    CKMB 1.5 02/11/2011    TROPONINI 0.012 02/11/2011     BNP:    Lab Results   Component Value Date    BNP 75.3 02/11/2011     PT/INR:  No results found for: PTINR  Lab Results   Component Value Date    LABA1C 7.0 (H) 03/14/2018    LABA1C 7.2 (H) 12/06/2017     Lab Results   Component Value Date    CHOL 255 (H) 02/09/2017    TRIG 413 (H) 02/09/2017    HDL 41 12/06/2017    LDLDIRECT 53 12/06/2017     Lab Results   Component Value Date    ALT 31 03/14/2018    AST 31 03/14/2018     TSH:  No results found for: TSH    Impression:  Dayna Garza is a 80 y. o.year old who  has a past medical history of Diabetes mellitus ([address] H/O cardiovascular stress test; H/O Doppler ultrasound (Abdomimal Aorta); H/O echocardiogram; Hx of echocardiogram; Hyperlipidemia; Hypertension; Sleep apnea; and Thyroid disease. and presents with     Plan:  1. Leg swelling:resolved, normal echo and PA pressures, normal venous doppler except for mildly increased venous reflux on GSV, WILL RECOMMEND compression socks and leg elevation. 2. Shortness of breath: resolved, normal  stress test and echo  3. DM: stable continue insulin  4. Dyslipidemia: controlled on vascepa, latest lipid check was promising, since samples of vasepa is out, will prescribe lopid, patient takes 40 mg po lipitor, continue it  5. HTN:stable, continue HCTZ 25mg po daily, continue metoprolol, clonidine and losartan  6.  Health maintenance: exerise and diet  All labs, medications and tests reviewed, continue all other medications of all above medical condition listed as is.     @[email or web address]@

## 2018-08-30 ENCOUNTER — TELEPHONE (OUTPATIENT)
Dept: CARDIOLOGY CLINIC | Age: 83
End: 2018-08-30

## 2018-09-20 DIAGNOSIS — R06.02 SHORTNESS OF BREATH: ICD-10-CM

## 2018-09-20 DIAGNOSIS — I10 ESSENTIAL HYPERTENSION: ICD-10-CM

## 2018-09-20 RX ORDER — HYDROCHLOROTHIAZIDE 25 MG/1
25 TABLET ORAL DAILY
Qty: 30 TABLET | Refills: 11 | Status: SHIPPED | OUTPATIENT
Start: 2018-09-20 | End: 2021-04-06

## 2018-10-31 ENCOUNTER — OFFICE VISIT (OUTPATIENT)
Dept: CARDIOLOGY CLINIC | Age: 83
End: 2018-10-31
Payer: MEDICARE

## 2018-10-31 VITALS
RESPIRATION RATE: 16 BRPM | BODY MASS INDEX: 30.23 KG/M2 | HEART RATE: 56 BPM | WEIGHT: 154 LBS | SYSTOLIC BLOOD PRESSURE: 138 MMHG | DIASTOLIC BLOOD PRESSURE: 70 MMHG | HEIGHT: 60 IN

## 2018-10-31 DIAGNOSIS — E08.00 DIABETES MELLITUS DUE TO UNDERLYING CONDITION WITH HYPEROSMOLARITY WITHOUT COMA, WITHOUT LONG-TERM CURRENT USE OF INSULIN (HCC): ICD-10-CM

## 2018-10-31 DIAGNOSIS — E78.5 HYPERLIPIDEMIA, UNSPECIFIED HYPERLIPIDEMIA TYPE: Primary | ICD-10-CM

## 2018-10-31 PROCEDURE — 99214 OFFICE O/P EST MOD 30 MIN: CPT | Performed by: INTERNAL MEDICINE

## 2018-10-31 PROCEDURE — G8427 DOCREV CUR MEDS BY ELIG CLIN: HCPCS | Performed by: INTERNAL MEDICINE

## 2018-10-31 PROCEDURE — 1123F ACP DISCUSS/DSCN MKR DOCD: CPT | Performed by: INTERNAL MEDICINE

## 2018-10-31 PROCEDURE — 1101F PT FALLS ASSESS-DOCD LE1/YR: CPT | Performed by: INTERNAL MEDICINE

## 2018-10-31 PROCEDURE — 1036F TOBACCO NON-USER: CPT | Performed by: INTERNAL MEDICINE

## 2018-10-31 PROCEDURE — G8417 CALC BMI ABV UP PARAM F/U: HCPCS | Performed by: INTERNAL MEDICINE

## 2018-10-31 PROCEDURE — 1090F PRES/ABSN URINE INCON ASSESS: CPT | Performed by: INTERNAL MEDICINE

## 2018-10-31 PROCEDURE — G8400 PT W/DXA NO RESULTS DOC: HCPCS | Performed by: INTERNAL MEDICINE

## 2018-10-31 PROCEDURE — 4040F PNEUMOC VAC/ADMIN/RCVD: CPT | Performed by: INTERNAL MEDICINE

## 2018-10-31 PROCEDURE — G8484 FLU IMMUNIZE NO ADMIN: HCPCS | Performed by: INTERNAL MEDICINE

## 2018-10-31 RX ORDER — BIOTIN 1 MG
TABLET ORAL
COMMUNITY
End: 2021-04-13

## 2018-10-31 RX ORDER — OXYBUTYNIN CHLORIDE 5 MG/1
5 TABLET ORAL 2 TIMES DAILY
Status: ON HOLD | COMMUNITY

## 2018-10-31 NOTE — PROGRESS NOTES
100 mg dose       No current facility-administered medications for this visit. Allergies: Lopid [gemfibrozil]; Bystolic [nebivolol hcl]; Cefdinir; Coreg [carvedilol]; Crestor [rosuvastatin]; Fenofibrate; Glucophage [metformin]; Metronidazole; Norvasc [amlodipine besylate]; Feliberto Stains [olmesartan-amlodipine-hctz]; and Zocor [simvastatin]  Past Medical History:   Diagnosis Date    Diabetes mellitus ([facility] [address]     H/O cardiovascular stress test 3/22/18,03/30/2017    ECG portion of the stress test is negative for ischemia EF >70% Normal stress myocardial perfusion.  H/O Doppler ultrasound (Abdomimal Aorta) 03/29/2017    No evidence of abdominal aortic aneurysm.  H/O echocardiogram 7/2/14    Normal left ventricular size, wall motion and systolic function. Mildle elevated pulmonary artery systolic pressure. Mild MR and TR and trace AR EF 55 to 60%    Hx of echocardiogram 03/29/2017    EF 55-60%. Grade I diastolic dysfunction. Mildly dilated left atrium. No evidence of pericardial effusion.      Hyperlipidemia     Hypertension     Sleep apnea     Thyroid disease      Past Surgical History:   Procedure Laterality Date    CATARACT REMOVAL      CHOLECYSTECTOMY      DILATION AND CURETTAGE OF UTERUS      OTHER SURGICAL HISTORY      eye lift     Family History   Problem Relation Age of Onset   Marylu Hamman Pacemaker Sister     Arrhythmia Sister      Social History   Substance Use Topics    Smoking status: Never Smoker    Smokeless tobacco: Never Used    Alcohol use 0.6 oz/week     1 Glasses of wine per week      Comment: rarely      [unfilled]  Review of Systems:   · Constitutional: No Fever or Weight Loss   · Eyes: No Decreased Vision  · ENT: No Headaches, Hearing Loss or Vertigo  · Cardiovascular: No chest pain, dyspnea on exertion, palpitations or loss of consciousness  · Respiratory: No cough or wheezing    · Gastrointestinal: No abdominal pain, appetite loss, blood in stools, constipation, diarrhea or

## 2018-12-12 ENCOUNTER — HOSPITAL ENCOUNTER (OUTPATIENT)
Dept: MAMMOGRAPHY | Age: 83
Discharge: HOME | End: 2018-12-12
Payer: MEDICARE

## 2018-12-12 DIAGNOSIS — Z12.31 SCREENING MAMMOGRAM, ENCOUNTER FOR: ICD-10-CM

## 2018-12-12 PROCEDURE — 77067 SCR MAMMO BI INCL CAD: CPT

## 2019-05-01 ENCOUNTER — OFFICE VISIT (OUTPATIENT)
Dept: CARDIOLOGY CLINIC | Age: 84
End: 2019-05-01
Payer: MEDICARE

## 2019-05-01 VITALS
SYSTOLIC BLOOD PRESSURE: 148 MMHG | RESPIRATION RATE: 16 BRPM | HEIGHT: 60 IN | BODY MASS INDEX: 31.02 KG/M2 | HEART RATE: 60 BPM | DIASTOLIC BLOOD PRESSURE: 74 MMHG | WEIGHT: 158 LBS

## 2019-05-01 DIAGNOSIS — E78.5 HYPERLIPIDEMIA, UNSPECIFIED HYPERLIPIDEMIA TYPE: Primary | ICD-10-CM

## 2019-05-01 PROCEDURE — 1090F PRES/ABSN URINE INCON ASSESS: CPT | Performed by: INTERNAL MEDICINE

## 2019-05-01 PROCEDURE — 1036F TOBACCO NON-USER: CPT | Performed by: INTERNAL MEDICINE

## 2019-05-01 PROCEDURE — G8400 PT W/DXA NO RESULTS DOC: HCPCS | Performed by: INTERNAL MEDICINE

## 2019-05-01 PROCEDURE — G8427 DOCREV CUR MEDS BY ELIG CLIN: HCPCS | Performed by: INTERNAL MEDICINE

## 2019-05-01 PROCEDURE — 1123F ACP DISCUSS/DSCN MKR DOCD: CPT | Performed by: INTERNAL MEDICINE

## 2019-05-01 PROCEDURE — 4040F PNEUMOC VAC/ADMIN/RCVD: CPT | Performed by: INTERNAL MEDICINE

## 2019-05-01 PROCEDURE — G8417 CALC BMI ABV UP PARAM F/U: HCPCS | Performed by: INTERNAL MEDICINE

## 2019-05-01 PROCEDURE — 99214 OFFICE O/P EST MOD 30 MIN: CPT | Performed by: INTERNAL MEDICINE

## 2019-05-01 RX ORDER — ESTRADIOL 0.1 MG/G
CREAM VAGINAL
Refills: 2 | COMMUNITY
Start: 2019-02-07 | End: 2021-04-06

## 2019-05-01 RX ORDER — INSULIN GLARGINE 100 [IU]/ML
INJECTION, SOLUTION SUBCUTANEOUS
Status: ON HOLD | COMMUNITY
Start: 2019-01-31

## 2019-05-01 NOTE — PROGRESS NOTES
Negra Stern MD        OFFICE  FOLLOWUP NOTE    Chief complaints: patient is here for management of DM, HTN, DYSLPIDEMIA, leg swelling, hypothyroidism      Subjective: patient feels better, no chest pain, no shortness of breath, no dizziness, no palpitations    MINO Segal is a 80 y. o.year old who  has a past medical history of Diabetes mellitus ([address] H/O cardiovascular stress test, H/O Doppler ultrasound (Abdomimal Aorta), H/O echocardiogram, Hx of echocardiogram, Hyperlipidemia, Hypertension, Sleep apnea, and Thyroid disease.  and presents for management of DM, HTN, DYSLPIDEMIA, leg swelling, hypothyroidism which are well controlled    She does not have PMD,    Current Outpatient Medications   Medication Sig Dispense Refill    LANTUS SOLOSTAR 100 UNIT/ML injection pen       Calcium-Magnesium-Vitamin D (CALCIUM 1200+D3 PO) Take by mouth daily      Biotin 1000 MCG TABS Take by mouth      Cholecalciferol (VITAMIN D PO) Take by mouth      oxybutynin (DITROPAN) 5 MG tablet Take 5 mg by mouth 2 times daily      hydrochlorothiazide (HYDRODIURIL) 25 MG tablet TAKE 1 TABLET BY MOUTH DAILY 30 tablet 11    atorvastatin (LIPITOR) 20 MG tablet Take 20 mg by mouth daily      rOPINIRole (REQUIP) 3 MG tablet Take 3 mg by mouth nightly      b complex vitamins capsule Take 1 capsule by mouth daily      gabapentin (NEURONTIN) 100 MG capsule Take 100 mg by mouth nightly as needed      diphenhydrAMINE (BENADRYL) 25 MG capsule Take 25 mg by mouth nightly as needed for Itching      levothyroxine (SYNTHROID) 137 MCG tablet Take 137 mcg by mouth daily   3    Insulin Pen Needle (PEN NEEDLES 31GX5/16\") 31G X 8 MM MISC       losartan (COZAAR) 100 MG tablet Take 100 mg by mouth daily      metoprolol (LOPRESSOR) 50 MG tablet 3 pills in the AM : 150 mg dose  2 pills in the PM: 100 mg dose      estradiol (ESTRACE) 0.1 MG/GM vaginal cream   2    vitamin E 1000 units capsule Take 1,000 Units by mouth daily pain, appetite loss, blood in stools, constipation, diarrhea or heartburn  · Genitourinary: No dysuria, trouble voiding, or hematuria  · Musculoskeletal:  No gait disturbance, weakness or joint complaints  · Integumentary: No rash or pruritis  · Neurological: No TIA or stroke symptoms  · Psychiatric: No anxiety or depression  · Endocrine: No malaise, fatigue or temperature intolerance  · Hematologic/Lymphatic: No bleeding problems, blood clots or swollen lymph nodes  · Allergic/Immunologic: No nasal congestion or hives  All systems negative except as marked. Objective:  BP (!) 148/74   Pulse 60   Resp 16   Ht 5' (1.524 m)   Wt 158 lb (71.7 kg)   BMI 30.86 kg/m²   Wt Readings from Last 3 Encounters:   05/01/19 158 lb (71.7 kg)   10/31/18 154 lb (69.9 kg)   03/14/18 156 lb (70.8 kg)     Body mass index is 30.86 kg/m². GENERAL - Alert, oriented, pleasant, in no apparent distress,normal grooming  HEENT - pupils are reactive to light and accomodation, cornea intact, conjunctive normal color, ears are normal in exam,throat exam in normal, teeth, gum and palate are normal, oral mucosa is normal without any notation of pallor or cyanosis  Neck - Supple. No jugular venous distention noted. No carotid bruits, no apical -carotid delay  Respiratory:  Normal breath sounds, No respiratory distress, No wheezing, No chest tenderness. ,no use of accessory muscles, diaphragm movement is normal  Cardiovascular: (PMI) apex non displaced,no lifts no thrills, no s3,no s4, Normal heart rate, Normal rhythm, No murmurs, No rubs, No gallops.  Carotid arteries pulse and amplitude are normal no bruit, no abdominal bruit noted ( normal abdominal aorta ausculation), femoral arteries pulse and amplitude are normal no bruit, pedal pulses are normal  Femoral pulses have normal amplitude, no bruits   Extremities - No cyanosis, clubbing, or significant edema, no varicose veins    Abdomen - No masses, tenderness, or organomegaly, no hepato-splenomegally, no bruits  Musculoskeletal - No significant edema, no kyphosis or scoliosis, no deformity in any extremity noted, muscle strength and tone are normal  Skin: no ulcer,no scar,no stasis dermatitis   Neurologic - alert oriented times 3,Cranial nerves II through XII are grossly intact. There were no gross focal neurologic abnormalities. All sensory and motor nerves examined and were normal  Psychiatric: normal mood and affect    Lab Results   Component Value Date    CKTOTAL 74 02/11/2011    CKMB 1.5 02/11/2011    TROPONINI 0.012 02/11/2011     BNP:    Lab Results   Component Value Date    BNP 75.3 02/11/2011     PT/INR:  No results found for: PTINR  Lab Results   Component Value Date    LABA1C 7.0 (H) 03/14/2018    LABA1C 7.2 (H) 12/06/2017     Lab Results   Component Value Date    CHOL 255 (H) 02/09/2017    TRIG 413 (H) 02/09/2017    HDL 41 12/06/2017    LDLDIRECT 53 12/06/2017     Lab Results   Component Value Date    ALT 31 03/14/2018    AST 31 03/14/2018     TSH:  No results found for: TSH    Impression:  Saima Davies is a 80 y. o.year old who  has a past medical history of Diabetes mellitus ([address] H/O cardiovascular stress test, H/O Doppler ultrasound (Abdomimal Aorta), H/O echocardiogram, Hx of echocardiogram, Hyperlipidemia, Hypertension, Sleep apnea, and Thyroid disease. and presents with     Plan:  1. DM: stable continue insulin  2. Dyslipidemia: check lipids, she used to be on  patient takes 40 mg po lipitor, continue it  3. HTN:stable, continue HCTZ 25mg po daily, continue metoprolol, clonidine and losartan  4. Leg swelling: resolved on compression socks  5.  Recommend to get a PMD

## 2019-05-02 ENCOUNTER — HOSPITAL ENCOUNTER (OUTPATIENT)
Age: 84
Discharge: HOME | End: 2019-05-02
Payer: MEDICARE

## 2019-05-02 DIAGNOSIS — E78.5 HYPERLIPIDEMIA, UNSPECIFIED HYPERLIPIDEMIA TYPE: ICD-10-CM

## 2019-05-02 LAB
ALBUMIN SERPL-MCNC: 4 GM/DL (ref 3.4–5)
ALP BLD-CCNC: 43 IU/L (ref 40–129)
ALT SERPL-CCNC: 25 U/L (ref 10–40)
AST SERPL-CCNC: 21 IU/L (ref 15–37)
BILIRUB SERPL-MCNC: 0.5 MG/DL (ref 0–1)
BILIRUBIN DIRECT: 0.2 MG/DL (ref 0–0.3)
BILIRUBIN, INDIRECT: 0.3 MG/DL (ref 0–0.7)
CHOLESTEROL: 138 MG/DL
HDLC SERPL-MCNC: 54 MG/DL
LDL CHOLESTEROL DIRECT: 78 MG/DL
TOTAL PROTEIN: 6.7 GM/DL (ref 6.4–8.2)
TRIGL SERPL-MCNC: 152 MG/DL

## 2019-05-02 PROCEDURE — 83721 ASSAY OF BLOOD LIPOPROTEIN: CPT

## 2019-05-02 PROCEDURE — 80076 HEPATIC FUNCTION PANEL: CPT

## 2019-05-02 PROCEDURE — 36415 COLL VENOUS BLD VENIPUNCTURE: CPT

## 2019-05-02 PROCEDURE — 80061 LIPID PANEL: CPT

## 2019-11-06 ENCOUNTER — OFFICE VISIT (OUTPATIENT)
Dept: CARDIOLOGY CLINIC | Age: 84
End: 2019-11-06
Payer: MEDICARE

## 2019-11-06 VITALS
SYSTOLIC BLOOD PRESSURE: 122 MMHG | WEIGHT: 153.6 LBS | HEART RATE: 53 BPM | BODY MASS INDEX: 30.15 KG/M2 | DIASTOLIC BLOOD PRESSURE: 78 MMHG | HEIGHT: 60 IN

## 2019-11-06 DIAGNOSIS — I10 ESSENTIAL HYPERTENSION: Primary | ICD-10-CM

## 2019-11-06 PROCEDURE — 1123F ACP DISCUSS/DSCN MKR DOCD: CPT | Performed by: INTERNAL MEDICINE

## 2019-11-06 PROCEDURE — 4040F PNEUMOC VAC/ADMIN/RCVD: CPT | Performed by: INTERNAL MEDICINE

## 2019-11-06 PROCEDURE — G8400 PT W/DXA NO RESULTS DOC: HCPCS | Performed by: INTERNAL MEDICINE

## 2019-11-06 PROCEDURE — 1090F PRES/ABSN URINE INCON ASSESS: CPT | Performed by: INTERNAL MEDICINE

## 2019-11-06 PROCEDURE — 99214 OFFICE O/P EST MOD 30 MIN: CPT | Performed by: INTERNAL MEDICINE

## 2019-11-06 PROCEDURE — G8417 CALC BMI ABV UP PARAM F/U: HCPCS | Performed by: INTERNAL MEDICINE

## 2019-11-06 PROCEDURE — 1036F TOBACCO NON-USER: CPT | Performed by: INTERNAL MEDICINE

## 2019-11-06 PROCEDURE — G8427 DOCREV CUR MEDS BY ELIG CLIN: HCPCS | Performed by: INTERNAL MEDICINE

## 2019-11-06 PROCEDURE — G8482 FLU IMMUNIZE ORDER/ADMIN: HCPCS | Performed by: INTERNAL MEDICINE

## 2019-12-23 ENCOUNTER — HOSPITAL ENCOUNTER (OUTPATIENT)
Dept: MAMMOGRAPHY | Age: 84
Discharge: HOME | End: 2019-12-23
Payer: MEDICARE

## 2019-12-23 DIAGNOSIS — Z12.31 VISIT FOR SCREENING MAMMOGRAM: ICD-10-CM

## 2019-12-23 DIAGNOSIS — M81.0 SENILE OSTEOPOROSIS: ICD-10-CM

## 2019-12-23 PROCEDURE — 77067 SCR MAMMO BI INCL CAD: CPT

## 2019-12-23 PROCEDURE — 77080 DXA BONE DENSITY AXIAL: CPT

## 2020-11-11 ENCOUNTER — TELEMEDICINE (OUTPATIENT)
Dept: CARDIOLOGY CLINIC | Age: 85
End: 2020-11-11
Payer: MEDICARE

## 2020-11-11 PROCEDURE — 99441 PR PHYS/QHP TELEPHONE EVALUATION 5-10 MIN: CPT | Performed by: INTERNAL MEDICINE

## 2020-11-11 RX ORDER — UREA 10 %
100 LOTION (ML) TOPICAL DAILY
Status: ON HOLD | COMMUNITY

## 2020-11-11 RX ORDER — VIT C/B6/B5/MAGNESIUM/HERB 173 50-5-6-5MG
CAPSULE ORAL
COMMUNITY
End: 2021-04-06

## 2020-11-11 NOTE — PROGRESS NOTES
Jennifer Williamson is a 80 y.o. female evaluated via telephone on 11/11/2020. Consent:  She and/or health care decision maker is aware that that she may receive a bill for this telephone service, depending on her insurance coverage, and has provided verbal consent to proceed: Yes      Documentation:  I communicated with the patient and/or health care decision maker about . Details of this discussion including any medical advice provided:       I affirm this is a Patient Initiated Episode with a Patient who has not had a related appointment within my department in the past 7 days or scheduled within the next 24 hours. Patient identification was verified at the start of the visit: Yes    Total Time: minutes: 5-10 minutes    Note: not billable if this call serves to triage the patient into an appointment for the relevant concern      [address] [place], [place] [address], MD    TELEHEALTH EVALUATION -- Audio/Visual (During [facility]-12 public health emergency)      Chief complaints: patient is here for management of DM, HTN, DYSLPIDEMIA, leg swelling, hypothyroidism    Subjective: patient feels better, no chest pain, no shortness of breath, no dizziness, no palpitations    HPI Rey Lechuga is a 80 y. o.year old who  has a past medical history of Diabetes mellitus ([address] H/O cardiovascular stress test, H/O Doppler ultrasound (Abdomimal Aorta), H/O echocardiogram, Hx of echocardiogram, Hyperlipidemia, Hypertension, Sleep apnea, and Thyroid disease.  and presents for management of DM, HTN, DYSLPIDEMIA, leg swelling, hypothyroidism which are well controlled      Current Outpatient Medications   Medication Sig Dispense Refill    Turmeric 500 MG CAPS Take by mouth      vitamin B-12 (CYANOCOBALAMIN) 100 MCG tablet Take 100 mcg by mouth daily      LANTUS SOLOSTAR 100 UNIT/ML injection pen       Calcium-Magnesium-Vitamin D (CALCIUM 1200+D3 PO) Take by mouth daily      Biotin 1000 MCG TABS Take by mouth      Cholecalciferol (VITAMIN D PO) Take by mouth      oxybutynin (DITROPAN) 5 MG tablet Take 5 mg by mouth 2 times daily      hydrochlorothiazide (HYDRODIURIL) 25 MG tablet TAKE 1 TABLET BY MOUTH DAILY 30 tablet 11    atorvastatin (LIPITOR) 20 MG tablet Take 20 mg by mouth daily      rOPINIRole (REQUIP) 3 MG tablet Take 3 mg by mouth nightly      b complex vitamins capsule Take 1 capsule by mouth daily      vitamin E 1000 units capsule Take 1,000 Units by mouth daily      gabapentin (NEURONTIN) 100 MG capsule Take 100 mg by mouth nightly as needed      levothyroxine (SYNTHROID) 137 MCG tablet Take 137 mcg by mouth daily   3    Insulin Pen Needle (PEN NEEDLES 31GX5/16\") 31G X 8 MM MISC       losartan (COZAAR) 100 MG tablet Take 100 mg by mouth daily      metoprolol (LOPRESSOR) 50 MG tablet 3 pills in the AM : 150 mg dose  2 pills in the PM: 100 mg dose      estradiol (ESTRACE) 0.1 MG/GM vaginal cream   2    diphenhydrAMINE (BENADRYL) 25 MG capsule Take 25 mg by mouth nightly as needed for Itching      metroNIDAZOLE (METROGEL) 0.75 % vaginal gel   1     No current facility-administered medications for this visit. Allergies: Lopid [gemfibrozil]; Bystolic [nebivolol hcl]; Cefdinir; Coreg [carvedilol]; Crestor [rosuvastatin]; Fenofibrate; Glucophage [metformin]; Metronidazole; Norvasc [amlodipine besylate]; Niesha Lowe [olmesartan-amlodipine-hctz]; and Zocor [simvastatin]  Past Medical History:   Diagnosis Date    Diabetes mellitus ([facility]     H/O cardiovascular stress test 3/22/18,03/30/2017    ECG portion of the stress test is negative for ischemia EF >70% Normal stress myocardial perfusion.  H/O Doppler ultrasound (Abdomimal Aorta) 03/29/2017    No evidence of abdominal aortic aneurysm.  H/O echocardiogram 7/2/14    Normal left ventricular size, wall motion and systolic function. Mildle elevated pulmonary artery systolic pressure.  Mild MR and TR and trace AR EF 55 to 60%    Hx of echocardiogram conducted with patient's (and/or legal guardian's) consent, to reduce the patient's risk of exposure to COVID-19 and provide necessary medical care. The patient (and/or legal guardian) has also been advised to contact this office for worsening conditions or problems, and seek emergency medical treatment and/or call 911 if deemed necessary. Services were provided through a video synchronous discussion virtually to substitute for in-person clinic visit. Patient and provider were located at their individual homes. 1.   I confirm that this visit was completed in a telehealth setting ,using synchronous audiovisual technology for real time patient interaction . The patient identity with name and date of birth was confirmed . This evaluation of patient was done by telehealth in the setting of [address] , which precluded assurance of safe in person visit at the time of service. The patient consented to and accepts potential risks associated with telemedical evaluation and care was taken to assess susanne presence of any medical issues that would be more  appropriate for expedited in -person care. Pursuant to the emergency declaration under the [facility], [facility] waiver authority and the [employer] Act, this Virtual  Visit was conducted, with patient's consent, to reduce the patient's risk of exposure to COVID-19 and provide continuity of care for an established patient. Services were provided through a video synchronous discussion virtually to substitute for in-person clinic visit. 2. I Affirm this is a Patient Initiated Episode with an Established Patient who has not had a related appointment within my department in the past 7 days or scheduled within the next 24 hours. --Bridgett Arthur MD on 11/11/2020 at 12:00 PM    An electronic signature was used to authenticate this note.

## 2021-01-20 ENCOUNTER — HOSPITAL ENCOUNTER (OUTPATIENT)
Dept: MAMMOGRAPHY | Age: 86
Discharge: HOME | End: 2021-01-20
Payer: MEDICARE

## 2021-01-20 DIAGNOSIS — Z12.31 ENCOUNTER FOR SCREENING MAMMOGRAM FOR MALIGNANT NEOPLASM OF BREAST: ICD-10-CM

## 2021-01-20 PROCEDURE — 77067 SCR MAMMO BI INCL CAD: CPT

## 2021-04-06 ENCOUNTER — TELEPHONE (OUTPATIENT)
Dept: CARDIOLOGY CLINIC | Age: 86
End: 2021-04-06

## 2021-04-06 ENCOUNTER — HOSPITAL ENCOUNTER (OUTPATIENT)
Age: 86
Discharge: HOME | End: 2021-04-06
Payer: MEDICARE

## 2021-04-06 ENCOUNTER — OFFICE VISIT (OUTPATIENT)
Dept: CARDIOLOGY CLINIC | Age: 86
End: 2021-04-06
Payer: MEDICARE

## 2021-04-06 ENCOUNTER — HOSPITAL ENCOUNTER (OUTPATIENT)
Dept: GENERAL RADIOLOGY | Age: 86
Discharge: HOME | End: 2021-04-06
Payer: MEDICARE

## 2021-04-06 VITALS
HEART RATE: 83 BPM | SYSTOLIC BLOOD PRESSURE: 130 MMHG | HEIGHT: 60 IN | BODY MASS INDEX: 31.73 KG/M2 | DIASTOLIC BLOOD PRESSURE: 78 MMHG | WEIGHT: 161.6 LBS

## 2021-04-06 DIAGNOSIS — R94.31 ABNORMAL EKG: ICD-10-CM

## 2021-04-06 DIAGNOSIS — Z01.818 PRE-OPERATIVE CLEARANCE: ICD-10-CM

## 2021-04-06 DIAGNOSIS — I10 ESSENTIAL HYPERTENSION: Primary | Chronic | ICD-10-CM

## 2021-04-06 DIAGNOSIS — I10 ESSENTIAL HYPERTENSION, MALIGNANT: ICD-10-CM

## 2021-04-06 DIAGNOSIS — E78.5 DYSLIPIDEMIA: ICD-10-CM

## 2021-04-06 PROCEDURE — G8427 DOCREV CUR MEDS BY ELIG CLIN: HCPCS | Performed by: NURSE PRACTITIONER

## 2021-04-06 PROCEDURE — 93000 ELECTROCARDIOGRAM COMPLETE: CPT | Performed by: NURSE PRACTITIONER

## 2021-04-06 PROCEDURE — 1123F ACP DISCUSS/DSCN MKR DOCD: CPT | Performed by: NURSE PRACTITIONER

## 2021-04-06 PROCEDURE — 4040F PNEUMOC VAC/ADMIN/RCVD: CPT | Performed by: NURSE PRACTITIONER

## 2021-04-06 PROCEDURE — 1090F PRES/ABSN URINE INCON ASSESS: CPT | Performed by: NURSE PRACTITIONER

## 2021-04-06 PROCEDURE — G8417 CALC BMI ABV UP PARAM F/U: HCPCS | Performed by: NURSE PRACTITIONER

## 2021-04-06 PROCEDURE — 99214 OFFICE O/P EST MOD 30 MIN: CPT | Performed by: NURSE PRACTITIONER

## 2021-04-06 PROCEDURE — 71046 X-RAY EXAM CHEST 2 VIEWS: CPT

## 2021-04-06 PROCEDURE — 1036F TOBACCO NON-USER: CPT | Performed by: NURSE PRACTITIONER

## 2021-04-06 RX ORDER — TIZANIDINE 2 MG/1
2 TABLET ORAL EVERY 6 HOURS PRN
Status: ON HOLD | COMMUNITY
End: 2022-05-22

## 2021-04-06 NOTE — LETTER
CLINICAL STAFF DOCUMENTATION    Miki thomson      Fritz Ramires  1934  [id number]    Have you had any Chest Pain that is not new? - No       DO EKG IF: Patient has a Heart Rate above 100 or below 40     CAD (Coronary Artery Disease) patient should have one on file every 6 months        Have you had any Shortness of Breath - No      Have you had any dizziness - No        Have you had any palpitations that are not new? - No        Do you have any edema - swelling in No          Do you have a surgery or procedure scheduled in the near future - Yes  If Yes- DO EKG  If Yes - Who is the surgery with? Raquel Gardner in [facility]?    Phone number of physician   Fax number of physician   Type of surgery carpal tunnel   GIVE THIS INFORMATION TO MADIE STOKES     Ask patient if they want to sign up for MyChart if they are not already signed up     Check to see if we have an E-MAIL on file for the patient     Check medication list thoroughly!!! AND RECONCILE OUTSIDE MEDICATIONS  If dose has changed change the entire order not just the [place] At check out add to every patient's \"wrap up\" the following dot phrase AFTERHOURSEDUCATION and ensure we explain this to our patients

## 2021-04-06 NOTE — PATIENT INSTRUCTIONS
Please hold on to these instructions the  will call you within 1-9 business days when we receive authorization from your insurance. Nuclear Stress Test    WHAT TO EXPECT:   ? You will need to confirm the test or it could be cancelled. ? This test will take approximately 2 hours: 1 hour in the AM &    1 hour in the PM. You will be given a time by the Technologist after the first part is completed to come back. ? You will be given a medication, through an IV in the hand, this will safely simulate exercise. This IV is also needed to inject the radioactive isotope unless you are able toe walk the treadmill. ? You will receive an injection in the AM & PM before the pictures. ? Using a special camera, you will have one set of pictures of your heart taken in the AM and a set of pictures in the PM.     PREPARATION FOR TEST:  ? Eat a light breakfast such as water or juice and toast.  ? If you are DIABETIC: Eat a normal breakfast with NO CAFFEINE and take your insulin as normal.   ? AVOID ALL FOODS & DRINKS containing CAFFEINE 12 HOURS PRIOR TO THE TEST: Including coffee, Tea, [place] and other soft drinks even those labeled  caffeine free or decaffeinated.  ? HOLD THESE MEDICATIONS Persantine & Theophylline (Theodur)  24 hours prior & bring your inhaler with you.    The physician will specify if the following Beta blockers need to be held for 24 hours prior to test: Lopressor (metoprolol)

## 2021-04-06 NOTE — ASSESSMENT & PLAN NOTE
-Previous EKG in 2018 showed normal sinus rhythm. 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  Will get stress test.

## 2021-04-06 NOTE — TELEPHONE ENCOUNTER
Cardiologist: Dr. Lucía Miller  Surgeon: Dr. Cordon December  Surgery: Right open carpal tunnel release- right long finger trigger release  Anesthesia: MAC  Date: 4/7/2021  FAX# [phone]  # [phone]    Last OV 4/6/2021 w/ Miki      Essential hypertension  -Stable, continue hydrochlorothiazide 25 mg daily, losartan 100 mg daily, Lopressor 50 mg twice daily     Dyslipidemia  --At or near goal Yes    -She is to continue current medications (Lipitor) Hepatic function panel WNL. No abdominal pain. No myalgias.      -The nature of cardiac risk has been fully discussed with this patient. I have made her aware of her LDL target goal given her cardiovascular risk analysis. I have discussed the appropriate diet. The need for lifelong compliance in order to reduce risk is stressed. A regular exercise program is recommended to help achieve and maintain normal body weight, fitness and improve lipid balance. A written copy of a low fat, low cholesterol diet has been given to the patient.         Abnormal EKG  -Previous EKG in 2018 showed normal sinus rhythm. 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 Will get stress test.      Pre-operative clearance  -new EKG changes, will get stress test.       NM-ordered.     NM- 3/28/2018    Echo- 3/29/2017

## 2021-04-06 NOTE — PROGRESS NOTES
[facility] [place] [facility]  [address]  Phone: [phone]    Fax [phone]                  Nagi García MD, Iza Almeida MD, Agustin Batres MD, Jodi Pallas, MD Rosilyn Kiang, MD Bettie Saltness, MD Dashawn Aquino, APRN      Dimitrios Hammer, APRN  Suraj Wong, APRN     Triny Guerin, APRN        Cardiology Progress Note      4/6/2021    RE: Cait Winston  (1934)                             Primary cardiologist: Dr. Manasa Villalobos       Subjective:  CC:   1. Essential hypertension    2. Dyslipidemia    3. Abnormal EKG    4. Pre-operative clearance        HPI: Cait Winston, who is a  80y.o. year old female with a past medical history as listed below. Patient presents to the office for follow up on abnormal EKG, HTN, pre-operative clearance, and hyperlipidemia. Patient is  an active female who walks regularly. Patient is  compliant with medications. Patient denies any chest pain, shortness of breath, dizziness, syncope, or palpitations. Past Medical History:   Diagnosis Date    Diabetes mellitus ([address]     H/O cardiovascular stress test 3/22/18,03/30/2017    ECG portion of the stress test is negative for ischemia EF >70% Normal stress myocardial perfusion.  H/O Doppler ultrasound (Abdomimal Aorta) 03/29/2017    No evidence of abdominal aortic aneurysm.  H/O echocardiogram 7/2/14    Normal left ventricular size, wall motion and systolic function. Mildle elevated pulmonary artery systolic pressure. Mild MR and TR and trace AR EF 55 to 60%    Hx of echocardiogram 03/29/2017    EF 55-60%. Grade I diastolic dysfunction. Mildly dilated left atrium. No evidence of pericardial effusion.      Hyperlipidemia     Hypertension     Sleep apnea     Thyroid disease        Current Outpatient Medications   Medication Sig Dispense Refill    tiZANidine (ZANAFLEX) 2 MG tablet Take 2 mg by mouth every 6 hours as needed      vitamin B-12 (CYANOCOBALAMIN) 100 MCG tablet Take 100 mcg by mouth daily      LANTUS SOLOSTAR 100 UNIT/ML injection pen       Calcium-Magnesium-Vitamin D (CALCIUM 1200+D3 PO) Take by mouth daily      Biotin 1000 MCG TABS Take by mouth      Cholecalciferol (VITAMIN D PO) Take by mouth      oxybutynin (DITROPAN) 5 MG tablet Take 5 mg by mouth 2 times daily      atorvastatin (LIPITOR) 20 MG tablet Take 20 mg by mouth daily      rOPINIRole (REQUIP) 3 MG tablet Take 3 mg by mouth nightly      b complex vitamins capsule Take 1 capsule by mouth daily      vitamin E 1000 units capsule Take 1,000 Units by mouth daily      gabapentin (NEURONTIN) 100 MG capsule Take 100 mg by mouth nightly as needed      levothyroxine (SYNTHROID) 137 MCG tablet Take 137 mcg by mouth daily   3    Insulin Pen Needle (PEN NEEDLES 31GX5/16\") 31G X 8 MM MISC       losartan (COZAAR) 100 MG tablet Take 100 mg by mouth daily      metoprolol (LOPRESSOR) 50 MG tablet 3 pills in the AM : 150 mg dose  2 pills in the PM: 100 mg dose       No current facility-administered medications for this visit. Review of Systems:  Review of Systems   All other systems reviewed and are negative. Objective:      Physical Exam:  /78   Pulse 83   Ht 5' (1.524 m)   Wt 161 lb 9.6 oz (73.3 kg)   BMI 31.56 kg/m²   Wt Readings from Last 3 Encounters:   04/06/21 161 lb 9.6 oz (73.3 kg)   11/06/19 153 lb 9.6 oz (69.7 kg)   05/01/19 158 lb (71.7 kg)     Body mass index is 31.56 kg/m². Physical exam:  Physical Exam   Constitutional: She is oriented to person, place, and time. She appears well-developed and well-nourished. HENT:   Head: Normocephalic. Eyes: Pupils are equal, round, and reactive to light. Conjunctivae are normal.   Neck: Normal range of motion. Cardiovascular: Normal rate and regular rhythm. Pulmonary/Chest: Effort normal and breath sounds normal.   Abdominal: Soft.  Bowel sounds are normal.   Musculoskeletal: Normal range of motion. Neurological: She is alert and oriented to person, place, and time. Skin: Skin is warm and dry. Vitals reviewed. DATA:  Lab Results   Component Value Date    CKTOTAL 74 02/11/2011    CKMB 1.5 02/11/2011    TROPONINI 0.012 02/11/2011     BNP:    Lab Results   Component Value Date    BNP 75.3 02/11/2011     PT/INR:  No results found for: PTINR  Lab Results   Component Value Date    LABA1C 7.0 (H) 03/14/2018    LABA1C 7.2 (H) 12/06/2017     Lab Results   Component Value Date    CHOL 138 05/02/2019    TRIG 152 (H) 05/02/2019    HDL 54 05/02/2019    LDLDIRECT 78 05/02/2019     Lab Results   Component Value Date    ALT 25 05/02/2019    AST 21 05/02/2019     TSH:  No results found for: TSH    Vitals:    04/06/21 0953   BP: 130/78   Pulse: 83       Echo:2017   Left ventricle is normal.   Ejection fraction is visually estimated at 55-60%. Grade I diastolic dysfunction. Mildly dilated left atrium. No evidence of any pericardial effusion. No evidence of pleural effusion. Stress Test:2018  No ischemia       The ASCVD Risk score (Jono De Luna., et al., 2013) failed to calculate for the following reasons: The 2013 ASCVD risk score is only valid for ages 36 to 78      Assessment/ Plan:     Essential hypertension  -Stable, continue hydrochlorothiazide 25 mg daily, losartan 100 mg daily, Lopressor 50 mg twice daily    Dyslipidemia  --At or near goal Yes    -She is to continue current medications (Lipitor) Hepatic function panel WNL. No abdominal pain. No myalgias.     -The nature of cardiac risk has been fully discussed with this patient. I have made her aware of her LDL target goal given her cardiovascular risk analysis. I have discussed the appropriate diet. The need for lifelong compliance in order to reduce risk is stressed. A regular exercise program is recommended to help achieve and maintain normal body weight, fitness and improve lipid balance.  A written copy of a low fat, low cholesterol diet has been given to the patient. Abnormal EKG  -Previous EKG in 2018 showed normal sinus rhythm. 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 Will get stress test.     Pre-operative clearance  -new EKG changes, will get stress test.     Carpal tunnel surgery will be done by Dr. Anshul Stewart       Patient seen, interviewed and examined. Testing was reviewed. Patient is encouraged to exercise even a brisk walk for 30 minutes at least 3 to 4 times a week. Lifestyle and risk factor modificatons discussed. Various goals are discussed and questions answered. Continue current medications. Appropriate prescriptions are addressed. Questions answered and patient verbalizes understanding. Call for any problems, questions, or concerns. Pt is to follow up in 1 months for Cardiac management    Electronically signed by Lyndsey Monteiro.  LAURE Reyez CNP on 4/6/2021 at 10:54 AM

## 2021-04-07 ENCOUNTER — PROCEDURE VISIT (OUTPATIENT)
Dept: CARDIOLOGY CLINIC | Age: 86
End: 2021-04-07
Payer: MEDICARE

## 2021-04-07 DIAGNOSIS — I10 ESSENTIAL HYPERTENSION: Chronic | ICD-10-CM

## 2021-04-07 DIAGNOSIS — R94.31 ABNORMAL EKG: ICD-10-CM

## 2021-04-07 DIAGNOSIS — Z01.818 PRE-OPERATIVE CLEARANCE: ICD-10-CM

## 2021-04-07 DIAGNOSIS — E78.5 DYSLIPIDEMIA: ICD-10-CM

## 2021-04-07 LAB
LV EF: 90 %
LVEF MODALITY: NORMAL

## 2021-04-07 PROCEDURE — 93017 CV STRESS TEST TRACING ONLY: CPT | Performed by: INTERNAL MEDICINE

## 2021-04-07 PROCEDURE — A9500 TC99M SESTAMIBI: HCPCS | Performed by: INTERNAL MEDICINE

## 2021-04-07 PROCEDURE — 78452 HT MUSCLE IMAGE SPECT MULT: CPT | Performed by: INTERNAL MEDICINE

## 2021-04-07 PROCEDURE — 93016 CV STRESS TEST SUPVJ ONLY: CPT | Performed by: INTERNAL MEDICINE

## 2021-04-07 PROCEDURE — 93018 CV STRESS TEST I&R ONLY: CPT | Performed by: INTERNAL MEDICINE

## 2021-04-09 ENCOUNTER — TELEPHONE (OUTPATIENT)
Dept: CARDIOLOGY CLINIC | Age: 86
End: 2021-04-09

## 2021-04-09 NOTE — TELEPHONE ENCOUNTER
Patient notified of results.  Appointment made to discuss 4/13/21 @10:40 pm     Conclusions   Marcia Gonzales physician Dr. Manju Arredondo .   Isabel Gain stress test, normal EDV, Myocardial perfusion scan shows small    size, moderate intensity, reversible perfusion defect in anterior wall.        Recommendation    office visit to discuss results        Signatures

## 2021-04-13 ENCOUNTER — OFFICE VISIT (OUTPATIENT)
Dept: CARDIOLOGY CLINIC | Age: 86
End: 2021-04-13
Payer: MEDICARE

## 2021-04-13 VITALS
HEIGHT: 60 IN | DIASTOLIC BLOOD PRESSURE: 82 MMHG | WEIGHT: 162.8 LBS | SYSTOLIC BLOOD PRESSURE: 136 MMHG | BODY MASS INDEX: 31.96 KG/M2 | HEART RATE: 72 BPM

## 2021-04-13 DIAGNOSIS — Z01.818 PRE-OPERATIVE CLEARANCE: Primary | ICD-10-CM

## 2021-04-13 PROCEDURE — 4040F PNEUMOC VAC/ADMIN/RCVD: CPT | Performed by: INTERNAL MEDICINE

## 2021-04-13 PROCEDURE — G8427 DOCREV CUR MEDS BY ELIG CLIN: HCPCS | Performed by: INTERNAL MEDICINE

## 2021-04-13 PROCEDURE — 1036F TOBACCO NON-USER: CPT | Performed by: INTERNAL MEDICINE

## 2021-04-13 PROCEDURE — G8417 CALC BMI ABV UP PARAM F/U: HCPCS | Performed by: INTERNAL MEDICINE

## 2021-04-13 PROCEDURE — 1123F ACP DISCUSS/DSCN MKR DOCD: CPT | Performed by: INTERNAL MEDICINE

## 2021-04-13 PROCEDURE — 99214 OFFICE O/P EST MOD 30 MIN: CPT | Performed by: INTERNAL MEDICINE

## 2021-04-13 PROCEDURE — 1090F PRES/ABSN URINE INCON ASSESS: CPT | Performed by: INTERNAL MEDICINE

## 2021-04-13 RX ORDER — LEVOCETIRIZINE DIHYDROCHLORIDE 5 MG/1
1 TABLET, FILM COATED ORAL DAILY
COMMUNITY
Start: 2021-03-27 | End: 2022-07-08

## 2021-04-13 RX ORDER — LANOLIN ALCOHOL/MO/W.PET/CERES
3 CREAM (GRAM) TOPICAL NIGHTLY PRN
COMMUNITY

## 2021-04-13 RX ORDER — ALBUTEROL SULFATE 90 UG/1
AEROSOL, METERED RESPIRATORY (INHALATION) PRN
COMMUNITY
Start: 2021-02-24 | End: 2022-07-08

## 2021-04-13 NOTE — PATIENT INSTRUCTIONS
**It is YOUR responsibilty to bring medication bottles and/or updated medication list to [address] [place] [address]. This will allow us to better serve you and all your healthcare needs**      Please be informed that if you contact our office outside of normal business hours the physician on call cannot help with any scheduling or rescheduling issues, procedure instruction questions or any type of medication issue. We advise you for any urgent/emergency that you go to the nearest emergency room!     PLEASE CALL OUR OFFICE DURING NORMAL BUSINESS HOURS    Monday - Friday   8 am to 5 pm    [place]: [address]: [phone]    [place]:  [phone]

## 2021-04-13 NOTE — PROGRESS NOTES
Joesph Gusman MD        OFFICE  FOLLOWUP NOTE    Chief complaints: patient is here for management of  DM, HTN, DYSLPIDEMIA, leg swelling, hypothyroidism      preop for wrist sx: stress test suggested anterior wall ischemia, which could be breast tissue artifact    Subjective: patient feels better, no chest pain, no shortness of breath, no dizziness, no palpitations    MINO Delvalle is a 80 y. o.year old who  has a past medical history of Diabetes mellitus ([address] H/O cardiovascular stress test, H/O cardiovascular stress test, H/O Doppler ultrasound (Abdomimal Aorta), H/O echocardiogram, Hx of echocardiogram, Hyperlipidemia, Hypertension, Sleep apnea, and Thyroid disease.  and presents for management of  DM, HTN, DYSLPIDEMIA, leg swelling, hypothyroidism which are well controlled    Her  is very upset that her wrist sx is being post poned,     Current Outpatient Medications   Medication Sig Dispense Refill    melatonin 3 MG TABS tablet Take 3 mg by mouth nightly as needed      levocetirizine (XYZAL) 5 MG tablet 1 tablet daily      magnesium oxide (MAG-OX) 400 (241.3 Mg) MG TABS tablet daily      albuterol sulfate  (90 Base) MCG/ACT inhaler as needed      tiZANidine (ZANAFLEX) 2 MG tablet Take 2 mg by mouth every 6 hours as needed      vitamin B-12 (CYANOCOBALAMIN) 100 MCG tablet Take 100 mcg by mouth daily      LANTUS SOLOSTAR 100 UNIT/ML injection pen       Calcium-Magnesium-Vitamin D (CALCIUM 1200+D3 PO) Take by mouth daily      Cholecalciferol (VITAMIN D PO) Take by mouth      oxybutynin (DITROPAN) 5 MG tablet Take 5 mg by mouth 2 times daily      atorvastatin (LIPITOR) 20 MG tablet Take 20 mg by mouth daily      rOPINIRole (REQUIP) 3 MG tablet Take 3 mg by mouth nightly      gabapentin (NEURONTIN) 100 MG capsule Take 100 mg by mouth nightly as needed      levothyroxine (SYNTHROID) 137 MCG tablet Take 137 mcg by mouth daily   3    losartan (COZAAR) 100 MG tablet Take 100 mg by mouth daily      metoprolol (LOPRESSOR) 50 MG tablet 3 pills in the AM : 150 mg dose  2 pills in the PM: 100 mg dose      Insulin Pen Needle (PEN NEEDLES 31GX5/16\") 31G X 8 MM MISC        No current facility-administered medications for this visit. Allergies: Lopid [gemfibrozil], Bystolic [nebivolol hcl], Cefdinir, Coreg [carvedilol], Crestor [rosuvastatin], Fenofibrate, Glucophage [metformin], Hydrochlorothiazide, Metronidazole, Norvasc [amlodipine besylate], Tribenzor [olmesartan-amlodipine-hctz], and Zocor [simvastatin]  Past Medical History:   Diagnosis Date    Diabetes mellitus ([address]    [facility] H/O cardiovascular stress test 3/22/18,03/30/2017    ECG portion of the stress test is negative for ischemia EF >70% Normal stress myocardial perfusion.  H/O cardiovascular stress test 04/07/2021    abnormal stress    H/O Doppler ultrasound (Abdomimal Aorta) 03/29/2017    No evidence of abdominal aortic aneurysm.  H/O echocardiogram 07/02/2014    Normal left ventricular size, wall motion and systolic function. [place] elevated pulmonary artery systolic pressure. Mild MR and TR and trace AR EF 55 to 60%    Hx of echocardiogram 03/29/2017    EF 55-60%. Grade I diastolic dysfunction. Mildly dilated left atrium. No evidence of pericardial effusion.      Hyperlipidemia     Hypertension     Sleep apnea     Thyroid disease      Past Surgical History:   Procedure Laterality Date    CATARACT REMOVAL      CHOLECYSTECTOMY      DILATION AND CURETTAGE OF UTERUS      OTHER SURGICAL HISTORY      eye lift     Family History   Problem Relation Age of Onset   [facility] Pacemaker Sister     Arrhythmia Sister      Social History     Tobacco Use    Smoking status: Never Smoker    Smokeless tobacco: Never Used   Substance Use Topics    Alcohol use: Not Currently     Alcohol/week: 1.0 standard drinks     Types: 1 Glasses of wine per week     Comment: rarely      [unfilled]  Review of Systems:   · Constitutional: No Fever or Weight Loss   · Eyes: No Decreased Vision  · ENT: No Headaches, Hearing Loss or Vertigo  · Cardiovascular: No chest pain, dyspnea on exertion, palpitations or loss of consciousness  · Respiratory: No cough or wheezing    · Gastrointestinal: No abdominal pain, appetite loss, blood in stools, constipation, diarrhea or heartburn  · Genitourinary: No dysuria, trouble voiding, or hematuria  · Musculoskeletal:  No gait disturbance, weakness or joint complaints  · Integumentary: No rash or pruritis  · Neurological: No TIA or stroke symptoms  · Psychiatric: No anxiety or depression  · Endocrine: No malaise, fatigue or temperature intolerance  · Hematologic/Lymphatic: No bleeding problems, blood clots or swollen lymph nodes  · Allergic/Immunologic: No nasal congestion or hives  All systems negative except as marked. Objective:  /82 (Site: Left Upper Arm, Position: Sitting, Cuff Size: Medium Adult)   Pulse 72   Ht 5' (1.524 m)   Wt 162 lb 12.8 oz (73.8 kg)   BMI 31.79 kg/m²   Wt Readings from Last 3 Encounters:   04/13/21 162 lb 12.8 oz (73.8 kg)   04/06/21 161 lb 9.6 oz (73.3 kg)   11/06/19 153 lb 9.6 oz (69.7 kg)     Body mass index is 31.79 kg/m². GENERAL - Alert, oriented, pleasant, in no apparent distress,normal grooming  HEENT - pupils are intact, cornea intact, conjunctive normal color, ears are normal in exam,  Neck - Supple. No jugular venous distention noted. No carotid bruits, no apical -carotid delay  Respiratory:  Normal breath sounds, No respiratory distress, No wheezing, No chest tenderness. ,no use of accessory muscles, diaphragm movement is normal  Cardiovascular: (PMI) apex non displaced,no lifts no thrills, no s3,no s4, Normal heart rate, Normal rhythm, No murmurs, No rubs, No gallops.  Carotid arteries pulse and amplitude are normal no bruit, no abdominal bruit noted ( normal abdominal aorta ausculation),   Extremities - No cyanosis, clubbing, or significant edema, no varicose veins    Abdomen - No masses, tenderness, or organomegaly, no hepato-splenomegally, no bruits  Musculoskeletal - No significant edema, no kyphosis or scoliosis, no deformity in any extremity noted, muscle strength and tone are normal  Skin: no ulcer,no scar,no stasis dermatitis   Neurologic - alert oriented times 3,Cranial nerves II through XII are grossly intact. There were no gross focal neurologic abnormalities. Psychiatric: normal mood and affect    Lab Results   Component Value Date    CKTOTAL 74 02/11/2011    CKMB 1.5 02/11/2011    TROPONINI 0.012 02/11/2011     BNP:    Lab Results   Component Value Date    BNP 75.3 02/11/2011     PT/INR:  No results found for: PTINR  Lab Results   Component Value Date    LABA1C 7.0 (H) 03/14/2018    LABA1C 7.2 (H) 12/06/2017     Lab Results   Component Value Date    CHOL 138 05/02/2019    TRIG 152 (H) 05/02/2019    HDL 54 05/02/2019    LDLDIRECT 78 05/02/2019     Lab Results   Component Value Date    ALT 25 05/02/2019    AST 21 05/02/2019     TSH:  No results found for: TSH    Impression:  Adelia is a 80 y. o.year old who  has a past medical history of Diabetes mellitus ([address] H/O cardiovascular stress test, H/O cardiovascular stress test, H/O Doppler ultrasound (Abdomimal Aorta), H/O echocardiogram, Hx of echocardiogram, Hyperlipidemia, Hypertension, Sleep apnea, and Thyroid disease. and presents with     Plan:  1. preop for carple tunnel, neck sx,had stress test which was abnormal which could be breast tissue artfact, will clear her for sx at this time. 2. DM: stable continue insulin  3. Dyslipidemia: checked lipids, patient takes 20 mg po lipitor now, its well controlled, continue it  4. HTN:stable, continue HCTZ 25mg po daily, continue metoprolol, clonidine and losartan  5. Leg swelling: resolved on compression socks, she uses once in a while  All labs, medications and tests reviewed, continue all other medications of all above medical condition listed as is.     [unfilled]

## 2021-04-13 NOTE — LETTER
Fredia Sam Dr. Elwood Essex  1934  [id number]    Have you had any Chest Pain that is not new? - No       Have you had any Shortness of Breath - Yes  If Yes - When on exertion    Have you had any dizziness - Yes, ongoing, occasional, unchanged  If Yes DO ORTHOSTATIC BP - when do you feel dizzy with position change   How long does it last .3  seconds     Have you had any palpitations that are not new? - No     Is the patient on any of the following medications - NONE  If Yes DO EKG - Needs done every 6 months    Do you have any edema - swelling in feet and ankles  If Yes - CHECK TO SEE IF THE EDEMA IS PITTING  How long have they been having edema - Years  If Yes - Have they worn compression stockings Yes  If they have worn compression stockings years Years    Vein \"LEG PROBLEM Questionnaire\"  1. Do you have prominent leg veins? No   2. Do you have any skin discoloration? No  3. Do you have any healed or active sores? No  4. Do you have swelling of the legs? No  5. Do you have a family history of varicose veins? No  6. Does your profession involve pro-longed        standing or heavy lifting? No  7. Have you been fighting overweight problems? Yes  8. Do you have restless legs? Yes  9. Do you have any night time cramps? No  10. Do you have any of the following in your legs:        NONE     When did you have your last labs drawn Maybe 2 mo ago  Where did you have them done Dr. Haris Orellana  What doctor ordered DR. Monroy    If we do not have these labs you are retrieve these labs for these providers! Do you have a surgery or procedure scheduled in the near future - Yes , 2 separate procedure  If Yes- DO EKG  If Yes - Who is the surgery with? Dr. Collette Bowers, MD  Phone number of physician [phone]  Fax number of physician ? Type of surgery Carpal tunnel and Right middle trigger finger release.     Dr. Genoveva Grijalva  [address] [facility] @ [employer] [facility] [facility].       Neck surgery  GIVE THIS INFORMATION TO MADIE STOKES     Ask patient if they want to sign up for MyChart if they are not already signed up     Check to see if we have an E-MAIL on file for the patient     Check medication list thoroughly!!! AND RECONCILE OUTSIDE MEDICATIONS  If dose has changed change the entire order not just the MG  BE SURE TO ASK PATIENT IF THEY NEED MEDICATION REFILLS     At check out add to every patient's \"wrap up\" the following dot phrase AFTERHOURSEDUCATION and ensure we explain this to our patients

## 2021-04-21 ENCOUNTER — TELEPHONE (OUTPATIENT)
Dept: CARDIOLOGY CLINIC | Age: 86
End: 2021-04-21

## 2021-04-26 ENCOUNTER — TELEPHONE (OUTPATIENT)
Dept: CARDIOLOGY CLINIC | Age: 86
End: 2021-04-26

## 2021-05-05 DIAGNOSIS — Z01.818 PRE-OP TESTING: Primary | ICD-10-CM

## 2021-05-06 PROBLEM — Z01.818 PRE-OPERATIVE CLEARANCE: Status: RESOLVED | Noted: 2021-04-06 | Resolved: 2021-05-06

## 2021-05-14 ENCOUNTER — HOSPITAL ENCOUNTER (OUTPATIENT)
Dept: GENERAL RADIOLOGY | Age: 86
Discharge: HOME | End: 2021-05-14
Payer: MEDICARE

## 2021-05-14 ENCOUNTER — HOSPITAL ENCOUNTER (OUTPATIENT)
Age: 86
Discharge: HOME | End: 2021-05-14
Payer: MEDICARE

## 2021-05-14 ENCOUNTER — HOSPITAL ENCOUNTER (OUTPATIENT)
Age: 86
Setting detail: SPECIMEN
Discharge: HOME | End: 2021-05-14
Payer: MEDICARE

## 2021-05-14 DIAGNOSIS — Z01.810 PRE-OPERATIVE CARDIOVASCULAR EXAMINATION: ICD-10-CM

## 2021-05-14 LAB
ABO/RH: NORMAL
ANION GAP SERPL CALCULATED.3IONS-SCNC: 3 MMOL/L (ref 4–16)
ANTIBODY SCREEN: NEGATIVE
APTT: 29.5 SECONDS (ref 25.1–37.1)
BUN BLDV-MCNC: 22 MG/DL (ref 6–23)
CALCIUM SERPL-MCNC: 9.4 MG/DL (ref 8.3–10.6)
CHLORIDE BLD-SCNC: 94 MMOL/L (ref 99–110)
CO2: 32 MMOL/L (ref 21–32)
COMMENT: NORMAL
CREAT SERPL-MCNC: 1 MG/DL (ref 0.6–1.1)
GFR AFRICAN AMERICAN: >60 ML/MIN/1.73M2
GFR NON-AFRICAN AMERICAN: 53 ML/MIN/1.73M2
GLUCOSE BLD-MCNC: 120 MG/DL (ref 70–99)
HCT VFR BLD CALC: 41.7 % (ref 37–47)
HEMOGLOBIN: 13.6 GM/DL (ref 12.5–16)
INR BLD: 1.09 INDEX
MCH RBC QN AUTO: 30.3 PG (ref 27–31)
MCHC RBC AUTO-ENTMCNC: 32.6 % (ref 32–36)
MCV RBC AUTO: 92.9 FL (ref 78–100)
PDW BLD-RTO: 12.6 % (ref 11.7–14.9)
PLATELET # BLD: 191 K/CU MM (ref 140–440)
PMV BLD AUTO: 10.2 FL (ref 7.5–11.1)
POTASSIUM SERPL-SCNC: 4.5 MMOL/L (ref 3.5–5.1)
PROTHROMBIN TIME: 12.4 SECONDS (ref 11.7–14.5)
RBC # BLD: 4.49 M/CU MM (ref 4.2–5.4)
SARS-COV-2: NOT DETECTED
SODIUM BLD-SCNC: 129 MMOL/L (ref 135–145)
SOURCE: NORMAL
WBC # BLD: 9 K/CU MM (ref 4–10.5)

## 2021-05-14 PROCEDURE — 36415 COLL VENOUS BLD VENIPUNCTURE: CPT

## 2021-05-14 PROCEDURE — 86850 RBC ANTIBODY SCREEN: CPT

## 2021-05-14 PROCEDURE — 86900 BLOOD TYPING SEROLOGIC ABO: CPT

## 2021-05-14 PROCEDURE — 85730 THROMBOPLASTIN TIME PARTIAL: CPT

## 2021-05-14 PROCEDURE — 86901 BLOOD TYPING SEROLOGIC RH(D): CPT

## 2021-05-14 PROCEDURE — 85027 COMPLETE CBC AUTOMATED: CPT

## 2021-05-14 PROCEDURE — U0005 INFEC AGEN DETEC AMPLI PROBE: HCPCS

## 2021-05-14 PROCEDURE — 80048 BASIC METABOLIC PNL TOTAL CA: CPT

## 2021-05-14 PROCEDURE — 85610 PROTHROMBIN TIME: CPT

## 2021-05-14 PROCEDURE — 71046 X-RAY EXAM CHEST 2 VIEWS: CPT

## 2021-05-14 PROCEDURE — U0003 INFECTIOUS AGENT DETECTION BY NUCLEIC ACID (DNA OR RNA); SEVERE ACUTE RESPIRATORY SYNDROME CORONAVIRUS 2 (SARS-COV-2) (CORONAVIRUS DISEASE [COVID-19]), AMPLIFIED PROBE TECHNIQUE, MAKING USE OF HIGH THROUGHPUT TECHNOLOGIES AS DESCRIBED BY CMS-2020-01-R: HCPCS

## 2021-05-18 ENCOUNTER — HOSPITAL ENCOUNTER (OUTPATIENT)
Dept: CARDIAC CATH/INVASIVE PROCEDURES | Age: 86
Discharge: HOME | End: 2021-05-18
Attending: INTERNAL MEDICINE | Admitting: INTERNAL MEDICINE
Payer: MEDICARE

## 2021-05-18 VITALS
DIASTOLIC BLOOD PRESSURE: 65 MMHG | OXYGEN SATURATION: 98 % | TEMPERATURE: 96.2 F | HEART RATE: 48 BPM | WEIGHT: 159 LBS | SYSTOLIC BLOOD PRESSURE: 157 MMHG | BODY MASS INDEX: 31.22 KG/M2 | RESPIRATION RATE: 15 BRPM | HEIGHT: 60 IN

## 2021-05-18 LAB — GLUCOSE BLD-MCNC: 94 MG/DL (ref 70–99)

## 2021-05-18 PROCEDURE — 93458 L HRT ARTERY/VENTRICLE ANGIO: CPT | Performed by: INTERNAL MEDICINE

## 2021-05-18 PROCEDURE — C1769 GUIDE WIRE: HCPCS

## 2021-05-18 PROCEDURE — C1894 INTRO/SHEATH, NON-LASER: HCPCS

## 2021-05-18 PROCEDURE — 6360000002 HC RX W HCPCS

## 2021-05-18 PROCEDURE — 82962 GLUCOSE BLOOD TEST: CPT

## 2021-05-18 PROCEDURE — 2580000003 HC RX 258: Performed by: INTERNAL MEDICINE

## 2021-05-18 PROCEDURE — 2709999900 HC NON-CHARGEABLE SUPPLY

## 2021-05-18 PROCEDURE — 6370000000 HC RX 637 (ALT 250 FOR IP): Performed by: INTERNAL MEDICINE

## 2021-05-18 PROCEDURE — 93458 L HRT ARTERY/VENTRICLE ANGIO: CPT

## 2021-05-18 RX ORDER — SODIUM CHLORIDE 9 MG/ML
INJECTION, SOLUTION INTRAVENOUS CONTINUOUS
Status: DISCONTINUED | OUTPATIENT
Start: 2021-05-18 | End: 2021-05-18 | Stop reason: HOSPADM

## 2021-05-18 RX ORDER — DIAZEPAM 5 MG/1
5 TABLET ORAL ONCE
Status: COMPLETED | OUTPATIENT
Start: 2021-05-18 | End: 2021-05-18

## 2021-05-18 RX ORDER — DIPHENHYDRAMINE HCL 25 MG
25 TABLET ORAL ONCE
Status: COMPLETED | OUTPATIENT
Start: 2021-05-18 | End: 2021-05-18

## 2021-05-18 RX ADMIN — DIAZEPAM 5 MG: 5 TABLET ORAL at 12:53

## 2021-05-18 RX ADMIN — SODIUM CHLORIDE: 9 INJECTION, SOLUTION INTRAVENOUS at 12:53

## 2021-05-18 RX ADMIN — DIPHENHYDRAMINE HYDROCHLORIDE 25 MG: 25 TABLET ORAL at 12:53

## 2021-05-18 NOTE — H&P
Chief complaints: patient is here for management of  DM, HTN, DYSLPIDEMIA, leg swelling, hypothyroidism        preop for wrist sx: stress test suggested anterior wall ischemia, which could be breast tissue artifact     Subjective: patient feels better, no chest pain, no shortness of breath, no dizziness, no palpitations     HPI Ren Harrell is a 80 y. o.year old who  has a past medical history of Diabetes mellitus ([address] H/O cardiovascular stress test, H/O cardiovascular stress test, H/O Doppler ultrasound (Abdomimal Aorta), H/O echocardiogram, Hx of echocardiogram, Hyperlipidemia, Hypertension, Sleep apnea, and Thyroid disease.  and presents for management of  DM, HTN, DYSLPIDEMIA, leg swelling, hypothyroidism which are well controlled     Her  is very upset that her wrist sx is being post poned,      Current Facility-Administered Medications          Current Outpatient Medications   Medication Sig Dispense Refill    melatonin 3 MG TABS tablet Take 3 mg by mouth nightly as needed        levocetirizine (XYZAL) 5 MG tablet 1 tablet daily        magnesium oxide (MAG-OX) 400 (241.3 Mg) MG TABS tablet daily        albuterol sulfate  (90 Base) MCG/ACT inhaler as needed        tiZANidine (ZANAFLEX) 2 MG tablet Take 2 mg by mouth every 6 hours as needed        vitamin B-12 (CYANOCOBALAMIN) 100 MCG tablet Take 100 mcg by mouth daily        LANTUS SOLOSTAR 100 UNIT/ML injection pen          Calcium-Magnesium-Vitamin D (CALCIUM 1200+D3 PO) Take by mouth daily        Cholecalciferol (VITAMIN D PO) Take by mouth        oxybutynin (DITROPAN) 5 MG tablet Take 5 mg by mouth 2 times daily        atorvastatin (LIPITOR) 20 MG tablet Take 20 mg by mouth daily        rOPINIRole (REQUIP) 3 MG tablet Take 3 mg by mouth nightly        gabapentin (NEURONTIN) 100 MG capsule Take 100 mg by mouth nightly as needed        levothyroxine (SYNTHROID) 137 MCG tablet Take 137 mcg by mouth daily    3    losartan (COZAAR) 100 MG Alcohol/week: 1.0 standard drinks       Types: 1 Glasses of wine per week       Comment: rarely      [unfilled]  Review of Systems:   · Constitutional: No Fever or Weight Loss   · Eyes: No Decreased Vision  · ENT: No Headaches, Hearing Loss or Vertigo  · Cardiovascular: No chest pain, dyspnea on exertion, palpitations or loss of consciousness  · Respiratory: No cough or wheezing    · Gastrointestinal: No abdominal pain, appetite loss, blood in stools, constipation, diarrhea or heartburn  · Genitourinary: No dysuria, trouble voiding, or hematuria  · Musculoskeletal:  No gait disturbance, weakness or joint complaints  · Integumentary: No rash or pruritis  · Neurological: No TIA or stroke symptoms  · Psychiatric: No anxiety or depression  · Endocrine: No malaise, fatigue or temperature intolerance  · Hematologic/Lymphatic: No bleeding problems, blood clots or swollen lymph nodes  · Allergic/Immunologic: No nasal congestion or hives  All systems negative except as marked. Objective:  /82 (Site: Left Upper Arm, Position: Sitting, Cuff Size: Medium Adult)   Pulse 72   Ht 5' (1.524 m)   Wt 162 lb 12.8 oz (73.8 kg)   BMI 31.79 kg/m²       Wt Readings from Last 3 Encounters:   04/13/21 162 lb 12.8 oz (73.8 kg)   04/06/21 161 lb 9.6 oz (73.3 kg)   11/06/19 153 lb 9.6 oz (69.7 kg)      Body mass index is 31.79 kg/m². GENERAL - Alert, oriented, pleasant, in no apparent distress,normal grooming  HEENT - pupils are intact, cornea intact, conjunctive normal color, ears are normal in exam,  Neck - Supple. No jugular venous distention noted. No carotid bruits, no apical -carotid delay  Respiratory:  Normal breath sounds, No respiratory distress, No wheezing, No chest tenderness. ,no use of accessory muscles, diaphragm movement is normal  Cardiovascular: (PMI) apex non displaced,no lifts no thrills, no s3,no s4, Normal heart rate, Normal rhythm, No murmurs, No rubs, No gallops.  Carotid arteries pulse and amplitude are normal no bruit, no abdominal bruit noted ( normal abdominal aorta ausculation),   Extremities - No cyanosis, clubbing, or significant edema, no varicose veins    Abdomen - No masses, tenderness, or organomegaly, no hepato-splenomegally, no bruits  Musculoskeletal - No significant edema, no kyphosis or scoliosis, no deformity in any extremity noted, muscle strength and tone are normal  Skin: no ulcer,no scar,no stasis dermatitis   Neurologic - alert oriented times 3,Cranial nerves II through XII are grossly intact. There were no gross focal neurologic abnormalities. Psychiatric: normal mood and affect           Lab Results   Component Value Date     CKTOTAL 74 02/11/2011     CKMB 1.5 02/11/2011     TROPONINI 0.012 02/11/2011      BNP:          Lab Results   Component Value Date     BNP 75.3 02/11/2011      PT/INR:  No results found for: PTINR        Lab Results   Component Value Date     LABA1C 7.0 (H) 03/14/2018     LABA1C 7.2 (H) 12/06/2017            Lab Results   Component Value Date     CHOL 138 05/02/2019     TRIG 152 (H) 05/02/2019     HDL 54 05/02/2019     LDLDIRECT 78 05/02/2019            Lab Results   Component Value Date     ALT 25 05/02/2019     AST 21 05/02/2019      TSH:  No results found for: TSH     Impression:  Tennille Mora is a 80 y. o.year old who  has a past medical history of Diabetes mellitus ([address] H/O cardiovascular stress test, H/O cardiovascular stress test, H/O Doppler ultrasound (Abdomimal Aorta), H/O echocardiogram, Hx of echocardiogram, Hyperlipidemia, Hypertension, Sleep apnea, and Thyroid disease. and presents with      Plan:  1. preop for carple tunnel, neck sx,had stress test which was abnormal which could be breast tissue artfact, will clear her for sx at this time. 2. DM: stable continue insulin  3. Dyslipidemia: checked lipids, patient takes 20 mg po lipitor now, its well controlled, continue it  4.  HTN:stable, continue HCTZ 25mg po daily, continue metoprolol, clonidine and losartan  5.  Leg swelling: resolved on compression socks, she uses once in a while  All labs, medications and tests reviewed, continue all other medications of all above

## 2021-05-18 NOTE — FLOWSHEET NOTE
TR Band removed at this time. Right radial site free of bleeding/hematoma. Tegaderm applied to site. Arm board remains secured with kerlex as reminder to pt to minimize use of right arm.

## 2021-05-27 ENCOUNTER — OFFICE VISIT (OUTPATIENT)
Dept: CARDIOLOGY CLINIC | Age: 86
End: 2021-05-27
Payer: MEDICARE

## 2021-05-27 VITALS
HEIGHT: 60 IN | BODY MASS INDEX: 31.53 KG/M2 | WEIGHT: 160.6 LBS | DIASTOLIC BLOOD PRESSURE: 86 MMHG | SYSTOLIC BLOOD PRESSURE: 138 MMHG | HEART RATE: 73 BPM

## 2021-05-27 DIAGNOSIS — Z01.818 PRE-OP TESTING: Primary | ICD-10-CM

## 2021-05-27 PROCEDURE — 1123F ACP DISCUSS/DSCN MKR DOCD: CPT | Performed by: INTERNAL MEDICINE

## 2021-05-27 PROCEDURE — 1090F PRES/ABSN URINE INCON ASSESS: CPT | Performed by: INTERNAL MEDICINE

## 2021-05-27 PROCEDURE — 99214 OFFICE O/P EST MOD 30 MIN: CPT | Performed by: INTERNAL MEDICINE

## 2021-05-27 PROCEDURE — 1036F TOBACCO NON-USER: CPT | Performed by: INTERNAL MEDICINE

## 2021-05-27 PROCEDURE — G8417 CALC BMI ABV UP PARAM F/U: HCPCS | Performed by: INTERNAL MEDICINE

## 2021-05-27 PROCEDURE — G8427 DOCREV CUR MEDS BY ELIG CLIN: HCPCS | Performed by: INTERNAL MEDICINE

## 2021-05-27 PROCEDURE — 4040F PNEUMOC VAC/ADMIN/RCVD: CPT | Performed by: INTERNAL MEDICINE

## 2021-05-27 NOTE — LETTER
Lisset Mayers Aw  1934  [id number]    Have you had any Chest Pain that is not new? - No       DO EKG IF: Patient has a Heart Rate above 100 or below 40     CAD (Coronary Artery Disease) patient should have one on file every 6 months        Have you had any Shortness of Breath - No    Have you had any dizziness - No       Sitting wait 5 minutes do supine (laying down) wait 5 minutes then do standing - log each in \"vitals\" area in Epic   Be sure to ask what symptoms they are having if they get dizzy while completing ortho stats such as: room spinning, nausea, etc.    Have you had any palpitations that are not new? - No    Do you have any edema - swelling in No        Vein \"LEG PROBLEM Questionnaire\"  1. Do you have prominent leg veins? No   2. Do you have any skin discoloration? No  3. Do you have any healed or active sores? No  4. Do you have swelling of the legs? No  5. Do you have a family history of varicose veins? No  6. Does your profession involve pro-longed        standing or heavy lifting? No  7. Have you been fighting overweight problems? No  8. Do you have restless legs? No  9. Do you have any night time cramps? No  10. Do you have any of the following in your legs:        NONE     When did you have your last labs draw  Where did you have them done   What doctor ordered     If we do not have these labs you are retrieve these labs for these providers!     Do you have a surgery or procedure scheduled in the near future - Yes   Carpal Tunnel

## 2021-05-27 NOTE — PROGRESS NOTES
Erin Matamoros MD        OFFICE  FOLLOWUP NOTE    Chief complaints: patient is here for management of DM, HTN, DYSLPIDEMIA, leg swelling, hypothyroidism  F/u on C, had small diagonal branch stenosis    Subjective: patient feels better, no chest pain, no shortness of breath, no dizziness, no palpitations    HPI Gumaro Stanley is a 80 y. o.year old who  has a past medical history of Diabetes mellitus ([address] H/O cardiovascular stress test, H/O cardiovascular stress test, H/O Doppler ultrasound (Abdomimal Aorta), H/O echocardiogram, Hx of echocardiogram, Hyperlipidemia, Hypertension, Sleep apnea, and Thyroid disease.  and presents for management of DM, HTN, DYSLPIDEMIA, leg swelling, hypothyroidism which are well controlled      Current Outpatient Medications   Medication Sig Dispense Refill    melatonin 3 MG TABS tablet Take 3 mg by mouth nightly as needed      levocetirizine (XYZAL) 5 MG tablet 1 tablet daily      magnesium oxide (MAG-OX) 400 (241.3 Mg) MG TABS tablet daily      albuterol sulfate  (90 Base) MCG/ACT inhaler as needed      tiZANidine (ZANAFLEX) 2 MG tablet Take 2 mg by mouth every 6 hours as needed      vitamin B-12 (CYANOCOBALAMIN) 100 MCG tablet Take 100 mcg by mouth daily      LANTUS SOLOSTAR 100 UNIT/ML injection pen       Calcium-Magnesium-Vitamin D (CALCIUM 1200+D3 PO) Take by mouth daily      Cholecalciferol (VITAMIN D PO) Take by mouth      oxybutynin (DITROPAN) 5 MG tablet Take 5 mg by mouth 2 times daily      atorvastatin (LIPITOR) 20 MG tablet Take 20 mg by mouth daily      rOPINIRole (REQUIP) 3 MG tablet Take 3 mg by mouth nightly      gabapentin (NEURONTIN) 100 MG capsule Take 100 mg by mouth nightly as needed      levothyroxine (SYNTHROID) 137 MCG tablet Take 137 mcg by mouth daily   3    Insulin Pen Needle (PEN NEEDLES 31GX5/16\") 31G X 8 MM MISC       losartan (COZAAR) 100 MG tablet Take 100 mg by mouth daily      metoprolol (LOPRESSOR) 50 MG tablet 3 pills in the AM : 150 mg dose  2 pills in the PM: 100 mg dose       No current facility-administered medications for this visit. Allergies: Lopid [gemfibrozil], Bystolic [nebivolol hcl], Cefdinir, Coreg [carvedilol], Crestor [rosuvastatin], Fenofibrate, Glucophage [metformin], Hydrochlorothiazide, Metronidazole, Norvasc [amlodipine besylate], Tribenzor [olmesartan-amlodipine-hctz], and Zocor [simvastatin]  Past Medical History:   Diagnosis Date    Diabetes mellitus ([facility] [address]    [place] H/O cardiovascular stress test 3/22/18,03/30/2017    ECG portion of the stress test is negative for ischemia EF >70% Normal stress myocardial perfusion.  H/O cardiovascular stress test 04/07/2021    abnormal stress    H/O Doppler ultrasound (Abdomimal Aorta) 03/29/2017    No evidence of abdominal aortic aneurysm.  H/O echocardiogram 07/02/2014    Normal left ventricular size, wall motion and systolic function. [place] elevated pulmonary artery systolic pressure. Mild MR and TR and trace AR EF 55 to 60%    Hx of echocardiogram 03/29/2017    EF 55-60%. Grade I diastolic dysfunction. Mildly dilated left atrium. No evidence of pericardial effusion.      Hyperlipidemia     Hypertension     Sleep apnea     Thyroid disease      Past Surgical History:   Procedure Laterality Date    CATARACT REMOVAL      CHOLECYSTECTOMY      DILATION AND CURETTAGE OF UTERUS      OTHER SURGICAL HISTORY      eye lift     Family History   Problem Relation Age of Onset   [place] Pacemaker Sister     Arrhythmia Sister      Social History     Tobacco Use    Smoking status: Never Smoker    Smokeless tobacco: Never Used   Substance Use Topics    Alcohol use: Not Currently     Alcohol/week: 1.0 standard drinks     Types: 1 Glasses of wine per week     Comment: rarely      [unfilled]  Review of Systems:   · Constitutional: No Fever or Weight Loss   · Eyes: No Decreased Vision  · ENT: No Headaches, Hearing Loss or Vertigo  · Cardiovascular: No chest pain, dyspnea on exertion, palpitations or loss of consciousness  · Respiratory: No cough or wheezing    · Gastrointestinal: No abdominal pain, appetite loss, blood in stools, constipation, diarrhea or heartburn  · Genitourinary: No dysuria, trouble voiding, or hematuria  · Musculoskeletal:  No gait disturbance, weakness or joint complaints  · Integumentary: No rash or pruritis  · Neurological: No TIA or stroke symptoms  · Psychiatric: No anxiety or depression  · Endocrine: No malaise, fatigue or temperature intolerance  · Hematologic/Lymphatic: No bleeding problems, blood clots or swollen lymph nodes  · Allergic/Immunologic: No nasal congestion or hives  All systems negative except as marked. Objective:  /86   Pulse 73   Ht 5' (1.524 m)   Wt 160 lb 9.6 oz (72.8 kg)   BMI 31.37 kg/m²   Wt Readings from Last 3 Encounters:   05/27/21 160 lb 9.6 oz (72.8 kg)   05/18/21 159 lb (72.1 kg)   04/13/21 162 lb 12.8 oz (73.8 kg)     Body mass index is 31.37 kg/m². GENERAL - Alert, oriented, pleasant, in no apparent distress,normal grooming  HEENT - pupils are intact, cornea intact, conjunctive normal color, ears are normal in exam,  Neck - Supple. No jugular venous distention noted. No carotid bruits, no apical -carotid delay  Respiratory:  Normal breath sounds, No respiratory distress, No wheezing, No chest tenderness. ,no use of accessory muscles, diaphragm movement is normal  Cardiovascular: (PMI) apex non displaced,no lifts no thrills, no s3,no s4, Normal heart rate, Normal rhythm, No murmurs, No rubs, No gallops.  Carotid arteries pulse and amplitude are normal no bruit, no abdominal bruit noted ( normal abdominal aorta ausculation),   Extremities - No cyanosis, clubbing, or significant edema, no varicose veins    Abdomen - No masses, tenderness, or organomegaly, no hepato-splenomegally, no bruits  Musculoskeletal - No significant edema, no kyphosis or scoliosis, no deformity in any extremity noted, muscle strength and tone are normal  Skin: no ulcer,no scar,no stasis dermatitis   Neurologic - alert oriented times 3,Cranial nerves II through XII are grossly intact. There were no gross focal neurologic abnormalities. Psychiatric: normal mood and affect    Lab Results   Component Value Date    CKTOTAL 74 02/11/2011    CKMB 1.5 02/11/2011    TROPONINI 0.012 02/11/2011     BNP:    Lab Results   Component Value Date    BNP 75.3 02/11/2011     PT/INR:  No results found for: PTINR  Lab Results   Component Value Date    LABA1C 7.0 (H) 03/14/2018    LABA1C 7.2 (H) 12/06/2017     Lab Results   Component Value Date    CHOL 138 05/02/2019    TRIG 152 (H) 05/02/2019    HDL 54 05/02/2019    LDLDIRECT 78 05/02/2019     Lab Results   Component Value Date    ALT 25 05/02/2019    AST 21 05/02/2019     TSH:  No results found for: TSH    Impression:  Adelia is a 80 y. o.year old who  has a past medical history of Diabetes mellitus ([address] H/O cardiovascular stress test, H/O cardiovascular stress test, H/O Doppler ultrasound (Abdomimal Aorta), H/O echocardiogram, Hx of echocardiogram, Hyperlipidemia, Hypertension, Sleep apnea, and Thyroid disease. and presents with     Plan:  1. preop for carple tunnel, neck sx,cleared for sx, had [facility] which showed normal LAD, has small diagonal branch stenosis which is medically treated,  2. DM: stable continue insulin  3. Dyslipidemia: checked lipids, patient takes 20 mg po lipitor now, its well controlled, continue it  4. HTN:stable, continue HCTZ 25mg po daily, continue metoprolol, clonidine and losartan  5. Leg swelling: resolved on compression socks, she uses once in a while  All labs, medications and tests reviewed, continue all other medications of all above medical condition listed as is.

## 2021-12-02 ENCOUNTER — TELEPHONE (OUTPATIENT)
Dept: CARDIOLOGY CLINIC | Age: 86
End: 2021-12-02

## 2021-12-02 ENCOUNTER — OFFICE VISIT (OUTPATIENT)
Dept: CARDIOLOGY CLINIC | Age: 86
End: 2021-12-02
Payer: MEDICARE

## 2021-12-02 VITALS
HEIGHT: 60 IN | BODY MASS INDEX: 29.57 KG/M2 | SYSTOLIC BLOOD PRESSURE: 138 MMHG | HEART RATE: 68 BPM | DIASTOLIC BLOOD PRESSURE: 78 MMHG | WEIGHT: 150.6 LBS

## 2021-12-02 DIAGNOSIS — I10 ESSENTIAL HYPERTENSION: Primary | ICD-10-CM

## 2021-12-02 PROCEDURE — G8484 FLU IMMUNIZE NO ADMIN: HCPCS | Performed by: INTERNAL MEDICINE

## 2021-12-02 PROCEDURE — 4040F PNEUMOC VAC/ADMIN/RCVD: CPT | Performed by: INTERNAL MEDICINE

## 2021-12-02 PROCEDURE — 1036F TOBACCO NON-USER: CPT | Performed by: INTERNAL MEDICINE

## 2021-12-02 PROCEDURE — 1090F PRES/ABSN URINE INCON ASSESS: CPT | Performed by: INTERNAL MEDICINE

## 2021-12-02 PROCEDURE — 99214 OFFICE O/P EST MOD 30 MIN: CPT | Performed by: INTERNAL MEDICINE

## 2021-12-02 PROCEDURE — G8427 DOCREV CUR MEDS BY ELIG CLIN: HCPCS | Performed by: INTERNAL MEDICINE

## 2021-12-02 PROCEDURE — 1123F ACP DISCUSS/DSCN MKR DOCD: CPT | Performed by: INTERNAL MEDICINE

## 2021-12-02 PROCEDURE — G8417 CALC BMI ABV UP PARAM F/U: HCPCS | Performed by: INTERNAL MEDICINE

## 2021-12-02 RX ORDER — LOPERAMIDE HYDROCHLORIDE 2 MG/1
2 TABLET ORAL PRN
Status: ON HOLD | COMMUNITY
End: 2022-06-23 | Stop reason: HOSPADM

## 2021-12-02 NOTE — LETTER
Dong Das  1934  [id number]    Have you had any Chest Pain that is not new? - No       DO EKG IF: Patient has a Heart Rate above 100 or below 40     CAD (Coronary Artery Disease) patient should have one on file every 6 months        Have you had any Shortness of Breath - No      Have you had any dizziness - No       Sitting wait 5 minutes do supine (laying down) wait 5 minutes then do standing - log each in \"vitals\" area in Epic   Be sure to ask what symptoms they are having if they get dizzy while completing ortho stats such as: room spinning, nausea, etc.    Have you had any palpitations that are not new?  - No    Do you have any edema - swelling in feet  And ankles  If Yes - CHECK TO SEE IF THE EDEMA IS PITTING  How long have they been having edema - Years  If Yes - Have they worn compression stockings Yes  If they have worn compression stockings 2 Years    Do you have a surgery or procedure scheduled in the near future - No

## 2021-12-02 NOTE — PROGRESS NOTES
Lillian Pantoja MD        OFFICE  FOLLOWUP NOTE    Chief complaints: patient is here for management of DM, HTN, DYSLPIDEMIA, leg swelling, hypothyroidism    Subjective: patient feels better, no chest pain, no shortness of breath, no dizziness, no palpitations    HPI Ebenezer Monreal is a 80 y. o.year old who  has a past medical history of Diabetes mellitus ([address] H/O cardiac catheterization, H/O cardiovascular stress test, H/O cardiovascular stress test, H/O Doppler ultrasound (Abdomimal Aorta), H/O echocardiogram, Hx of echocardiogram, Hyperlipidemia, Hypertension, Sleep apnea, and Thyroid disease.  and presents for management of DM, HTN, DYSLPIDEMIA, leg swelling, hypothyroidism which are well controlled      Current Outpatient Medications   Medication Sig Dispense Refill    loperamide (IMODIUM A-D) 2 MG tablet Take 2 mg by mouth as needed for Diarrhea      melatonin 3 MG TABS tablet Take 3 mg by mouth nightly as needed      levocetirizine (XYZAL) 5 MG tablet 1 tablet daily      magnesium oxide (MAG-OX) 400 (241.3 Mg) MG TABS tablet daily      albuterol sulfate  (90 Base) MCG/ACT inhaler as needed      vitamin B-12 (CYANOCOBALAMIN) 100 MCG tablet Take 100 mcg by mouth daily      LANTUS SOLOSTAR 100 UNIT/ML injection pen       Calcium-Magnesium-Vitamin D (CALCIUM 1200+D3 PO) Take by mouth daily      Cholecalciferol (VITAMIN D PO) Take by mouth      oxybutynin (DITROPAN) 5 MG tablet Take 5 mg by mouth 2 times daily      atorvastatin (LIPITOR) 20 MG tablet Take 20 mg by mouth daily      rOPINIRole (REQUIP) 3 MG tablet Take 3 mg by mouth nightly      levothyroxine (SYNTHROID) 137 MCG tablet Take 137 mcg by mouth daily   3    Insulin Pen Needle (PEN NEEDLES 31GX5/16\") 31G X 8 MM MISC       losartan (COZAAR) 100 MG tablet Take 100 mg by mouth daily      metoprolol (LOPRESSOR) 50 MG tablet 3 pills in the AM : 150 mg dose  2 pills in the PM: 100 mg dose      tiZANidine (ZANAFLEX) 2 MG tablet Take 2 mg by mouth every 6 hours as needed (Patient not taking: Reported on 12/2/2021)      gabapentin (NEURONTIN) 100 MG capsule Take 100 mg by mouth nightly as needed (Patient not taking: Reported on 12/2/2021)       No current facility-administered medications for this visit. Allergies: Lopid [gemfibrozil], Bystolic [nebivolol hcl], Cefdinir, Coreg [carvedilol], Crestor [rosuvastatin], Fenofibrate, Glucophage [metformin], Hydrochlorothiazide, Metronidazole, Norvasc [amlodipine besylate], Tribenzor [olmesartan-amlodipine-hctz], and Zocor [simvastatin]  Past Medical History:   Diagnosis Date    Diabetes mellitus ([facility] [address]     H/O cardiac catheterization 05/18/2021    no significant CAD in main vessels, has severe stenosis in small  branch diagonal vessel    H/O cardiovascular stress test 3/22/18,03/30/2017    ECG portion of the stress test is negative for ischemia EF >70% Normal stress myocardial perfusion.  H/O cardiovascular stress test 04/07/2021    abnormal stress    H/O Doppler ultrasound (Abdomimal Aorta) 03/29/2017    No evidence of abdominal aortic aneurysm.  H/O echocardiogram 07/02/2014    Normal left ventricular size, wall motion and systolic function. [place] elevated pulmonary artery systolic pressure. Mild MR and TR and trace AR EF 55 to 60%    Hx of echocardiogram 03/29/2017    EF 55-60%. Grade I diastolic dysfunction. Mildly dilated left atrium. No evidence of pericardial effusion.      Hyperlipidemia     Hypertension     Sleep apnea     Thyroid disease      Past Surgical History:   Procedure Laterality Date    CATARACT REMOVAL      CHOLECYSTECTOMY      DILATION AND CURETTAGE OF UTERUS      OTHER SURGICAL HISTORY      eye lift     Family History   Problem Relation Age of Onset   [place] Pacemaker Sister     Arrhythmia Sister      Social History     Tobacco Use    Smoking status: Never Smoker    Smokeless tobacco: Never Used   Substance Use Topics    Alcohol use: Not Currently Alcohol/week: 1.0 standard drink     Types: 1 Glasses of wine per week     Comment: rarely      [unfilled]  Review of Systems:   · Constitutional: No Fever or Weight Loss   · Eyes: No Decreased Vision  · ENT: No Headaches, Hearing Loss or Vertigo  · Cardiovascular: No chest pain, dyspnea on exertion, palpitations or loss of consciousness  · Respiratory: No cough or wheezing    · Gastrointestinal: No abdominal pain, appetite loss, blood in stools, constipation, diarrhea or heartburn  · Genitourinary: No dysuria, trouble voiding, or hematuria  · Musculoskeletal:  No gait disturbance, weakness or joint complaints  · Integumentary: No rash or pruritis  · Neurological: No TIA or stroke symptoms  · Psychiatric: No anxiety or depression  · Endocrine: No malaise, fatigue or temperature intolerance  · Hematologic/Lymphatic: No bleeding problems, blood clots or swollen lymph nodes  · Allergic/Immunologic: No nasal congestion or hives  All systems negative except as marked. Objective:  /78 (Site: Right Upper Arm, Position: Sitting, Cuff Size: Medium Adult)   Pulse 68   Ht 5' (1.524 m)   Wt 150 lb 9.6 oz (68.3 kg)   BMI 29.41 kg/m²   Wt Readings from Last 3 Encounters:   12/02/21 150 lb 9.6 oz (68.3 kg)   05/27/21 160 lb 9.6 oz (72.8 kg)   05/18/21 159 lb (72.1 kg)     Body mass index is 29.41 kg/m². GENERAL - Alert, oriented, pleasant, in no apparent distress,normal grooming  HEENT - pupils are intact, cornea intact, conjunctive normal color, ears are normal in exam,  Neck - Supple. No jugular venous distention noted. No carotid bruits, no apical -carotid delay  Respiratory:  Normal breath sounds, No respiratory distress, No wheezing, No chest tenderness. ,no use of accessory muscles, diaphragm movement is normal  Cardiovascular: (PMI) apex non displaced,no lifts no thrills, no s3,no s4, Normal heart rate, Normal rhythm, No murmurs, No rubs, No gallops.  Carotid arteries pulse and amplitude are normal no bruit, no abdominal bruit noted ( normal abdominal aorta ausculation),   Extremities - No cyanosis, clubbing, or significant edema, no varicose veins    Abdomen - No masses, tenderness, or organomegaly, no hepato-splenomegally, no bruits  Musculoskeletal - No significant edema, no kyphosis or scoliosis, no deformity in any extremity noted, muscle strength and tone are normal  Skin: no ulcer,no scar,no stasis dermatitis   Neurologic - alert oriented times 3,Cranial nerves II through XII are grossly intact. There were no gross focal neurologic abnormalities. Psychiatric: normal mood and affect    Lab Results   Component Value Date    CKTOTAL 74 02/11/2011    CKMB 1.5 02/11/2011    TROPONINI 0.012 02/11/2011     BNP:    Lab Results   Component Value Date    BNP 75.3 02/11/2011     PT/INR:  No results found for: PTINR  Lab Results   Component Value Date    LABA1C 7.0 (H) 03/14/2018    LABA1C 7.2 (H) 12/06/2017     Lab Results   Component Value Date    CHOL 138 05/02/2019    TRIG 152 (H) 05/02/2019    HDL 54 05/02/2019    LDLDIRECT 78 05/02/2019     Lab Results   Component Value Date    ALT 25 05/02/2019    AST 21 05/02/2019     TSH:  No results found for: TSH    Impression:  Kathryn Natarajan is a 80 y. o.year old who  has a past medical history of Diabetes mellitus ([address] H/O cardiac catheterization, H/O cardiovascular stress test, H/O cardiovascular stress test, H/O Doppler ultrasound (Abdomimal Aorta), H/O echocardiogram, Hx of echocardiogram, Hyperlipidemia, Hypertension, Sleep apnea, and Thyroid disease. and presents with     Plan:  1. Post op for carple tunnel,no cardiac complications, prior to the sx she had [facility] which showed normal LAD, has small diagonal branch stenosis which is medically treated,  2. DM: stable continue insulin  3. Dyslipidemia: checked lipids, patient takes 20 mg po lipitor now, its well controlled, continue it  4.  HTN:stable, off HCTZ 25mg po daily, continue metoprolol, off clonidine and continue losartan  5. Leg swelling: resolved on compression socks  6. She lost 10 lbs. All labs, medications and tests reviewed, continue all other medications of all above medical condition listed as is.     @[email or web address]@

## 2022-03-02 ENCOUNTER — HOSPITAL ENCOUNTER (OUTPATIENT)
Dept: MAMMOGRAPHY | Age: 87
Discharge: HOME | End: 2022-03-02
Payer: MEDICARE

## 2022-03-02 DIAGNOSIS — Z12.31 ENCOUNTER FOR SCREENING MAMMOGRAM FOR MALIGNANT NEOPLASM OF BREAST: ICD-10-CM

## 2022-03-02 PROCEDURE — 77067 SCR MAMMO BI INCL CAD: CPT

## 2022-05-17 ENCOUNTER — HOSPITAL ENCOUNTER (OUTPATIENT)
Dept: CT IMAGING | Age: 87
Discharge: HOME | End: 2022-05-17
Payer: MEDICARE

## 2022-05-17 DIAGNOSIS — K57.92 DIVERTICULITIS OF INTESTINE WITHOUT PERFORATION OR ABSCESS WITHOUT BLEEDING, UNSPECIFIED PART OF INTESTINAL TRACT: ICD-10-CM

## 2022-05-17 PROCEDURE — 74176 CT ABD & PELVIS W/O CONTRAST: CPT

## 2022-05-21 ENCOUNTER — APPOINTMENT (OUTPATIENT)
Dept: CT IMAGING | Age: 87
End: 2022-05-21
Payer: MEDICARE

## 2022-05-21 ENCOUNTER — HOSPITAL ENCOUNTER (EMERGENCY)
Age: 87
Discharge: ANOTHER HOSPITAL | End: 2022-05-22
Attending: EMERGENCY MEDICINE
Payer: MEDICARE

## 2022-05-21 DIAGNOSIS — K63.89 COLONIC MASS: Primary | ICD-10-CM

## 2022-05-21 DIAGNOSIS — R19.7 DIARRHEA, UNSPECIFIED TYPE: ICD-10-CM

## 2022-05-21 LAB
ALBUMIN SERPL-MCNC: 3.7 GM/DL (ref 3.4–5)
ALP BLD-CCNC: 48 IU/L (ref 40–129)
ALT SERPL-CCNC: 17 U/L (ref 10–40)
ANION GAP SERPL CALCULATED.3IONS-SCNC: 9 MMOL/L (ref 4–16)
AST SERPL-CCNC: 23 IU/L (ref 15–37)
BASOPHILS ABSOLUTE: 0 K/CU MM
BASOPHILS RELATIVE PERCENT: 0.3 % (ref 0–1)
BILIRUB SERPL-MCNC: 0.4 MG/DL (ref 0–1)
BUN BLDV-MCNC: 14 MG/DL (ref 6–23)
CALCIUM SERPL-MCNC: 9.5 MG/DL (ref 8.3–10.6)
CHLORIDE BLD-SCNC: 95 MMOL/L (ref 99–110)
CO2: 26 MMOL/L (ref 21–32)
CREAT SERPL-MCNC: 0.9 MG/DL (ref 0.6–1.1)
DIFFERENTIAL TYPE: ABNORMAL
EOSINOPHILS ABSOLUTE: 0.1 K/CU MM
EOSINOPHILS RELATIVE PERCENT: 1 % (ref 0–3)
GFR AFRICAN AMERICAN: >60 ML/MIN/1.73M2
GFR NON-AFRICAN AMERICAN: 59 ML/MIN/1.73M2
GLUCOSE BLD-MCNC: 132 MG/DL (ref 70–99)
HCT VFR BLD CALC: 37.4 % (ref 37–47)
HEMOGLOBIN: 12.2 GM/DL (ref 12.5–16)
IMMATURE NEUTROPHIL %: 0.3 % (ref 0–0.43)
LIPASE: 31 IU/L (ref 13–60)
LYMPHOCYTES ABSOLUTE: 1.6 K/CU MM
LYMPHOCYTES RELATIVE PERCENT: 12.8 % (ref 24–44)
MCH RBC QN AUTO: 29.2 PG (ref 27–31)
MCHC RBC AUTO-ENTMCNC: 32.6 % (ref 32–36)
MCV RBC AUTO: 89.5 FL (ref 78–100)
MONOCYTES ABSOLUTE: 0.7 K/CU MM
MONOCYTES RELATIVE PERCENT: 5.3 % (ref 0–4)
PDW BLD-RTO: 13 % (ref 11.7–14.9)
PLATELET # BLD: 252 K/CU MM (ref 140–440)
PMV BLD AUTO: 10.1 FL (ref 7.5–11.1)
POTASSIUM SERPL-SCNC: 5 MMOL/L (ref 3.5–5.1)
RBC # BLD: 4.18 M/CU MM (ref 4.2–5.4)
SEGMENTED NEUTROPHILS ABSOLUTE COUNT: 10.3 K/CU MM
SEGMENTED NEUTROPHILS RELATIVE PERCENT: 80.3 % (ref 36–66)
SODIUM BLD-SCNC: 130 MMOL/L (ref 135–145)
TOTAL IMMATURE NEUTOROPHIL: 0.04 K/CU MM
TOTAL PROTEIN: 6.8 GM/DL (ref 6.4–8.2)
WBC # BLD: 12.9 K/CU MM (ref 4–10.5)

## 2022-05-21 PROCEDURE — 83690 ASSAY OF LIPASE: CPT

## 2022-05-21 PROCEDURE — 99285 EMERGENCY DEPT VISIT HI MDM: CPT

## 2022-05-21 PROCEDURE — 6360000002 HC RX W HCPCS: Performed by: EMERGENCY MEDICINE

## 2022-05-21 PROCEDURE — 6360000004 HC RX CONTRAST MEDICATION: Performed by: EMERGENCY MEDICINE

## 2022-05-21 PROCEDURE — 74177 CT ABD & PELVIS W/CONTRAST: CPT

## 2022-05-21 PROCEDURE — 80053 COMPREHEN METABOLIC PANEL: CPT

## 2022-05-21 PROCEDURE — 96365 THER/PROPH/DIAG IV INF INIT: CPT

## 2022-05-21 PROCEDURE — 96375 TX/PRO/DX INJ NEW DRUG ADDON: CPT

## 2022-05-21 PROCEDURE — 85025 COMPLETE CBC W/AUTO DIFF WBC: CPT

## 2022-05-21 RX ORDER — MORPHINE SULFATE 2 MG/ML
2 INJECTION, SOLUTION INTRAMUSCULAR; INTRAVENOUS ONCE
Status: COMPLETED | OUTPATIENT
Start: 2022-05-21 | End: 2022-05-21

## 2022-05-21 RX ORDER — ONDANSETRON 2 MG/ML
4 INJECTION INTRAMUSCULAR; INTRAVENOUS ONCE
Status: COMPLETED | OUTPATIENT
Start: 2022-05-21 | End: 2022-05-21

## 2022-05-21 RX ORDER — PROMETHAZINE HYDROCHLORIDE 25 MG/1
25 TABLET ORAL EVERY 6 HOURS PRN
Status: ON HOLD | COMMUNITY
End: 2022-06-23 | Stop reason: HOSPADM

## 2022-05-21 RX ADMIN — IOPAMIDOL 100 ML: 755 INJECTION, SOLUTION INTRAVENOUS at 22:16

## 2022-05-21 RX ADMIN — MORPHINE SULFATE 2 MG: 2 INJECTION, SOLUTION INTRAMUSCULAR; INTRAVENOUS at 21:24

## 2022-05-21 RX ADMIN — ONDANSETRON 4 MG: 2 INJECTION INTRAMUSCULAR; INTRAVENOUS at 21:24

## 2022-05-21 ASSESSMENT — PAIN SCALES - GENERAL: PAINLEVEL_OUTOF10: 10

## 2022-05-22 ENCOUNTER — APPOINTMENT (OUTPATIENT)
Dept: ULTRASOUND IMAGING | Age: 87
DRG: 329 | End: 2022-05-22
Attending: INTERNAL MEDICINE
Payer: MEDICARE

## 2022-05-22 ENCOUNTER — HOSPITAL ENCOUNTER (INPATIENT)
Age: 87
LOS: 18 days | Discharge: NURSING, REHAB OR LONG-TERM CARE | DRG: 329 | End: 2022-06-09
Attending: INTERNAL MEDICINE | Admitting: STUDENT IN AN ORGANIZED HEALTH CARE EDUCATION/TRAINING PROGRAM
Payer: MEDICARE

## 2022-05-22 VITALS
SYSTOLIC BLOOD PRESSURE: 145 MMHG | TEMPERATURE: 98.2 F | OXYGEN SATURATION: 97 % | HEIGHT: 60 IN | DIASTOLIC BLOOD PRESSURE: 65 MMHG | WEIGHT: 145 LBS | HEART RATE: 72 BPM | RESPIRATION RATE: 18 BRPM | BODY MASS INDEX: 28.47 KG/M2

## 2022-05-22 DIAGNOSIS — R93.89 ABNORMAL FINDING ON IMAGING: ICD-10-CM

## 2022-05-22 DIAGNOSIS — C18.0 CECAL CANCER (HCC): ICD-10-CM

## 2022-05-22 PROBLEM — K63.89 CECUM MASS: Status: ACTIVE | Noted: 2022-05-22

## 2022-05-22 LAB
ALBUMIN SERPL-MCNC: 3.3 GM/DL (ref 3.4–5)
ALP BLD-CCNC: 43 IU/L (ref 40–128)
ALT SERPL-CCNC: 17 U/L (ref 10–40)
ANION GAP SERPL CALCULATED.3IONS-SCNC: 10 MMOL/L (ref 4–16)
AST SERPL-CCNC: 26 IU/L (ref 15–37)
BACTERIA: NEGATIVE /HPF
BASE EXCESS: 2 (ref 0–2.4)
BASOPHILS ABSOLUTE: 0 K/CU MM
BASOPHILS RELATIVE PERCENT: 0.5 % (ref 0–1)
BILIRUB SERPL-MCNC: 0.4 MG/DL (ref 0–1)
BILIRUBIN URINE: NEGATIVE MG/DL
BLOOD, URINE: NEGATIVE
BUN BLDV-MCNC: 11 MG/DL (ref 6–23)
CALCIUM SERPL-MCNC: 8.5 MG/DL (ref 8.3–10.6)
CARBON MONOXIDE, BLOOD: 2.1 % (ref 0–5)
CEA: 3.7 NG/ML
CHLORIDE BLD-SCNC: 98 MMOL/L (ref 99–110)
CLARITY: CLEAR
CO2 CONTENT: 25 MMOL/L (ref 19–24)
CO2: 23 MMOL/L (ref 21–32)
COLOR: YELLOW
COMMENT: ABNORMAL
CREAT SERPL-MCNC: 0.8 MG/DL (ref 0.6–1.1)
DIFFERENTIAL TYPE: ABNORMAL
EOSINOPHILS ABSOLUTE: 0.2 K/CU MM
EOSINOPHILS RELATIVE PERCENT: 2.3 % (ref 0–3)
ESTIMATED AVERAGE GLUCOSE: 123 MG/DL
GFR AFRICAN AMERICAN: >60 ML/MIN/1.73M2
GFR NON-AFRICAN AMERICAN: >60 ML/MIN/1.73M2
GLUCOSE BLD-MCNC: 103 MG/DL (ref 70–99)
GLUCOSE BLD-MCNC: 115 MG/DL (ref 70–99)
GLUCOSE BLD-MCNC: 134 MG/DL (ref 70–99)
GLUCOSE BLD-MCNC: 142 MG/DL (ref 70–99)
GLUCOSE BLD-MCNC: 52 MG/DL (ref 70–99)
GLUCOSE BLD-MCNC: 66 MG/DL (ref 70–99)
GLUCOSE BLD-MCNC: 73 MG/DL (ref 70–99)
GLUCOSE BLD-MCNC: 77 MG/DL (ref 70–99)
GLUCOSE BLD-MCNC: 78 MG/DL (ref 70–99)
GLUCOSE BLD-MCNC: 83 MG/DL (ref 70–99)
GLUCOSE BLD-MCNC: 93 MG/DL (ref 70–99)
GLUCOSE, URINE: NEGATIVE MG/DL
HBA1C MFR BLD: 5.9 % (ref 4.2–6.3)
HCO3 ARTERIAL: 23.7 MMOL/L (ref 18–23)
HCT VFR BLD CALC: 36.9 % (ref 37–47)
HEMOGLOBIN: 11.8 GM/DL (ref 12.5–16)
IMMATURE NEUTROPHIL %: 0.4 % (ref 0–0.43)
KETONES, URINE: NEGATIVE MG/DL
LEUKOCYTE ESTERASE, URINE: NEGATIVE
LYMPHOCYTES ABSOLUTE: 2 K/CU MM
LYMPHOCYTES RELATIVE PERCENT: 24.1 % (ref 24–44)
MCH RBC QN AUTO: 29.4 PG (ref 27–31)
MCHC RBC AUTO-ENTMCNC: 32 % (ref 32–36)
MCV RBC AUTO: 91.8 FL (ref 78–100)
METHEMOGLOBIN ARTERIAL: 1 %
MONOCYTES ABSOLUTE: 0.7 K/CU MM
MONOCYTES RELATIVE PERCENT: 8.3 % (ref 0–4)
NITRITE URINE, QUANTITATIVE: NEGATIVE
NUCLEATED RBC %: 0 %
O2 SATURATION: 94.1 % (ref 96–97)
PCO2 ARTERIAL: 42 MMHG (ref 32–45)
PDW BLD-RTO: 13 % (ref 11.7–14.9)
PH BLOOD: 7.36 (ref 7.34–7.45)
PH, URINE: 6.5 (ref 5–8)
PLATELET # BLD: 202 K/CU MM (ref 140–440)
PMV BLD AUTO: 10.3 FL (ref 7.5–11.1)
PO2 ARTERIAL: 78 MMHG (ref 75–100)
POTASSIUM SERPL-SCNC: 4.5 MMOL/L (ref 3.5–5.1)
PROTEIN UA: NEGATIVE MG/DL
RBC # BLD: 4.02 M/CU MM (ref 4.2–5.4)
RBC URINE: NORMAL /HPF (ref 0–6)
SEGMENTED NEUTROPHILS ABSOLUTE COUNT: 5.3 K/CU MM
SEGMENTED NEUTROPHILS RELATIVE PERCENT: 64.4 % (ref 36–66)
SODIUM BLD-SCNC: 131 MMOL/L (ref 135–145)
SPECIFIC GRAVITY UA: <1.005 (ref 1–1.03)
SQUAMOUS EPITHELIAL: <1 /HPF
TOTAL IMMATURE NEUTOROPHIL: 0.03 K/CU MM
TOTAL NUCLEATED RBC: 0 K/CU MM
TOTAL PROTEIN: 5.5 GM/DL (ref 6.4–8.2)
TRICHOMONAS: NORMAL /HPF
UROBILINOGEN, URINE: 0.2 MG/DL (ref 0.2–1)
WBC # BLD: 8.2 K/CU MM (ref 4–10.5)
WBC UA: 2 /HPF (ref 0–5)

## 2022-05-22 PROCEDURE — 99222 1ST HOSP IP/OBS MODERATE 55: CPT | Performed by: INTERNAL MEDICINE

## 2022-05-22 PROCEDURE — 82378 CARCINOEMBRYONIC ANTIGEN: CPT

## 2022-05-22 PROCEDURE — 6370000000 HC RX 637 (ALT 250 FOR IP): Performed by: STUDENT IN AN ORGANIZED HEALTH CARE EDUCATION/TRAINING PROGRAM

## 2022-05-22 PROCEDURE — 93975 VASCULAR STUDY: CPT

## 2022-05-22 PROCEDURE — 99222 1ST HOSP IP/OBS MODERATE 55: CPT | Performed by: SURGERY

## 2022-05-22 PROCEDURE — 76856 US EXAM PELVIC COMPLETE: CPT

## 2022-05-22 PROCEDURE — 81001 URINALYSIS AUTO W/SCOPE: CPT

## 2022-05-22 PROCEDURE — 87507 IADNA-DNA/RNA PROBE TQ 12-25: CPT

## 2022-05-22 PROCEDURE — 1200000000 HC SEMI PRIVATE

## 2022-05-22 PROCEDURE — 85025 COMPLETE CBC W/AUTO DIFF WBC: CPT

## 2022-05-22 PROCEDURE — 94761 N-INVAS EAR/PLS OXIMETRY MLT: CPT

## 2022-05-22 PROCEDURE — 83036 HEMOGLOBIN GLYCOSYLATED A1C: CPT

## 2022-05-22 PROCEDURE — 6360000002 HC RX W HCPCS: Performed by: STUDENT IN AN ORGANIZED HEALTH CARE EDUCATION/TRAINING PROGRAM

## 2022-05-22 PROCEDURE — 2580000003 HC RX 258: Performed by: EMERGENCY MEDICINE

## 2022-05-22 PROCEDURE — 2580000003 HC RX 258: Performed by: INTERNAL MEDICINE

## 2022-05-22 PROCEDURE — 80053 COMPREHEN METABOLIC PANEL: CPT

## 2022-05-22 PROCEDURE — 82962 GLUCOSE BLOOD TEST: CPT

## 2022-05-22 PROCEDURE — 2580000003 HC RX 258: Performed by: STUDENT IN AN ORGANIZED HEALTH CARE EDUCATION/TRAINING PROGRAM

## 2022-05-22 PROCEDURE — 82803 BLOOD GASES ANY COMBINATION: CPT

## 2022-05-22 PROCEDURE — 36415 COLL VENOUS BLD VENIPUNCTURE: CPT

## 2022-05-22 PROCEDURE — 6360000002 HC RX W HCPCS: Performed by: EMERGENCY MEDICINE

## 2022-05-22 PROCEDURE — 36600 WITHDRAWAL OF ARTERIAL BLOOD: CPT

## 2022-05-22 RX ORDER — ACETAMINOPHEN 650 MG/1
650 SUPPOSITORY RECTAL EVERY 6 HOURS PRN
Status: DISCONTINUED | OUTPATIENT
Start: 2022-05-22 | End: 2022-06-09 | Stop reason: HOSPADM

## 2022-05-22 RX ORDER — CETIRIZINE HYDROCHLORIDE 10 MG/1
10 TABLET ORAL DAILY
Status: DISCONTINUED | OUTPATIENT
Start: 2022-05-22 | End: 2022-06-09 | Stop reason: HOSPADM

## 2022-05-22 RX ORDER — ALBUTEROL SULFATE 90 UG/1
2 AEROSOL, METERED RESPIRATORY (INHALATION) EVERY 6 HOURS PRN
Status: DISCONTINUED | OUTPATIENT
Start: 2022-05-22 | End: 2022-06-09 | Stop reason: HOSPADM

## 2022-05-22 RX ORDER — ONDANSETRON 2 MG/ML
4 INJECTION INTRAMUSCULAR; INTRAVENOUS EVERY 6 HOURS PRN
Status: DISCONTINUED | OUTPATIENT
Start: 2022-05-22 | End: 2022-06-09 | Stop reason: HOSPADM

## 2022-05-22 RX ORDER — ONDANSETRON 4 MG/1
4 TABLET, ORALLY DISINTEGRATING ORAL EVERY 8 HOURS PRN
Status: DISCONTINUED | OUTPATIENT
Start: 2022-05-22 | End: 2022-06-09 | Stop reason: HOSPADM

## 2022-05-22 RX ORDER — UBIDECARENONE 75 MG
100 CAPSULE ORAL DAILY
Status: DISCONTINUED | OUTPATIENT
Start: 2022-05-22 | End: 2022-06-09 | Stop reason: HOSPADM

## 2022-05-22 RX ORDER — POLYETHYLENE GLYCOL 3350 17 G/17G
17 POWDER, FOR SOLUTION ORAL DAILY PRN
Status: DISCONTINUED | OUTPATIENT
Start: 2022-05-22 | End: 2022-06-09 | Stop reason: HOSPADM

## 2022-05-22 RX ORDER — OXYBUTYNIN CHLORIDE 5 MG/1
5 TABLET ORAL 2 TIMES DAILY
Status: DISCONTINUED | OUTPATIENT
Start: 2022-05-22 | End: 2022-06-09 | Stop reason: HOSPADM

## 2022-05-22 RX ORDER — METOPROLOL TARTRATE 50 MG/1
100 TABLET, FILM COATED ORAL NIGHTLY
Status: DISCONTINUED | OUTPATIENT
Start: 2022-05-22 | End: 2022-06-09 | Stop reason: HOSPADM

## 2022-05-22 RX ORDER — ATORVASTATIN CALCIUM 10 MG/1
20 TABLET, FILM COATED ORAL DAILY
Status: DISCONTINUED | OUTPATIENT
Start: 2022-05-22 | End: 2022-06-09 | Stop reason: HOSPADM

## 2022-05-22 RX ORDER — LANOLIN ALCOHOL/MO/W.PET/CERES
200 CREAM (GRAM) TOPICAL DAILY
Status: DISCONTINUED | OUTPATIENT
Start: 2022-05-22 | End: 2022-05-25

## 2022-05-22 RX ORDER — VITAMIN B COMPLEX
1000 TABLET ORAL DAILY
Status: DISCONTINUED | OUTPATIENT
Start: 2022-05-22 | End: 2022-06-09 | Stop reason: HOSPADM

## 2022-05-22 RX ORDER — METOPROLOL TARTRATE 50 MG/1
150 TABLET, FILM COATED ORAL DAILY
Status: DISCONTINUED | OUTPATIENT
Start: 2022-05-22 | End: 2022-06-09 | Stop reason: HOSPADM

## 2022-05-22 RX ORDER — SODIUM CHLORIDE 9 MG/ML
INJECTION, SOLUTION INTRAVENOUS CONTINUOUS
Status: DISCONTINUED | OUTPATIENT
Start: 2022-05-22 | End: 2022-06-03

## 2022-05-22 RX ORDER — INSULIN LISPRO 100 [IU]/ML
0-6 INJECTION, SOLUTION INTRAVENOUS; SUBCUTANEOUS EVERY 4 HOURS
Status: DISCONTINUED | OUTPATIENT
Start: 2022-05-22 | End: 2022-06-09 | Stop reason: HOSPADM

## 2022-05-22 RX ORDER — SODIUM CHLORIDE 0.9 % (FLUSH) 0.9 %
5-40 SYRINGE (ML) INJECTION EVERY 12 HOURS SCHEDULED
Status: DISCONTINUED | OUTPATIENT
Start: 2022-05-22 | End: 2022-06-09 | Stop reason: HOSPADM

## 2022-05-22 RX ORDER — ROPINIROLE 1 MG/1
3 TABLET, FILM COATED ORAL NIGHTLY
Status: DISCONTINUED | OUTPATIENT
Start: 2022-05-22 | End: 2022-06-09 | Stop reason: HOSPADM

## 2022-05-22 RX ORDER — HEPARIN SODIUM 5000 [USP'U]/ML
5000 INJECTION, SOLUTION INTRAVENOUS; SUBCUTANEOUS EVERY 8 HOURS SCHEDULED
Status: DISCONTINUED | OUTPATIENT
Start: 2022-05-22 | End: 2022-05-24

## 2022-05-22 RX ORDER — INSULIN GLARGINE 100 [IU]/ML
15 INJECTION, SOLUTION SUBCUTANEOUS NIGHTLY
Status: DISCONTINUED | OUTPATIENT
Start: 2022-05-22 | End: 2022-05-31

## 2022-05-22 RX ORDER — ACETAMINOPHEN 325 MG/1
650 TABLET ORAL EVERY 6 HOURS PRN
Status: DISCONTINUED | OUTPATIENT
Start: 2022-05-22 | End: 2022-06-09 | Stop reason: HOSPADM

## 2022-05-22 RX ORDER — LOSARTAN POTASSIUM 100 MG/1
100 TABLET ORAL DAILY
Status: DISCONTINUED | OUTPATIENT
Start: 2022-05-22 | End: 2022-06-09 | Stop reason: HOSPADM

## 2022-05-22 RX ORDER — SODIUM CHLORIDE 9 MG/ML
INJECTION, SOLUTION INTRAVENOUS CONTINUOUS
Status: ACTIVE | OUTPATIENT
Start: 2022-05-22 | End: 2022-05-22

## 2022-05-22 RX ORDER — DEXTROSE MONOHYDRATE 50 MG/ML
100 INJECTION, SOLUTION INTRAVENOUS PRN
Status: DISCONTINUED | OUTPATIENT
Start: 2022-05-22 | End: 2022-06-09 | Stop reason: HOSPADM

## 2022-05-22 RX ORDER — SODIUM CHLORIDE 0.9 % (FLUSH) 0.9 %
5-40 SYRINGE (ML) INJECTION PRN
Status: DISCONTINUED | OUTPATIENT
Start: 2022-05-22 | End: 2022-06-09 | Stop reason: HOSPADM

## 2022-05-22 RX ORDER — SODIUM CHLORIDE 9 MG/ML
INJECTION, SOLUTION INTRAVENOUS PRN
Status: DISCONTINUED | OUTPATIENT
Start: 2022-05-22 | End: 2022-06-09 | Stop reason: HOSPADM

## 2022-05-22 RX ADMIN — OXYBUTYNIN CHLORIDE 5 MG: 5 TABLET ORAL at 09:00

## 2022-05-22 RX ADMIN — Medication 200 MG: at 09:00

## 2022-05-22 RX ADMIN — PIPERACILLIN AND TAZOBACTAM 4500 MG: 4; .5 INJECTION, POWDER, FOR SOLUTION INTRAVENOUS at 08:15

## 2022-05-22 RX ADMIN — LEVOTHYROXINE SODIUM 137 MCG: 25 TABLET ORAL at 06:18

## 2022-05-22 RX ADMIN — PIPERACILLIN AND TAZOBACTAM 4500 MG: 4; .5 INJECTION, POWDER, FOR SOLUTION INTRAVENOUS at 15:41

## 2022-05-22 RX ADMIN — SODIUM CHLORIDE, PRESERVATIVE FREE 10 ML: 5 INJECTION INTRAVENOUS at 04:18

## 2022-05-22 RX ADMIN — OXYBUTYNIN CHLORIDE 5 MG: 5 TABLET ORAL at 21:32

## 2022-05-22 RX ADMIN — Medication 1000 UNITS: at 09:00

## 2022-05-22 RX ADMIN — METOPROLOL TARTRATE 150 MG: 50 TABLET, FILM COATED ORAL at 08:59

## 2022-05-22 RX ADMIN — SODIUM CHLORIDE: 9 INJECTION, SOLUTION INTRAVENOUS at 18:24

## 2022-05-22 RX ADMIN — DEXTROSE MONOHYDRATE 125 ML: 100 INJECTION, SOLUTION INTRAVENOUS at 21:41

## 2022-05-22 RX ADMIN — LOSARTAN POTASSIUM 100 MG: 100 TABLET, FILM COATED ORAL at 09:00

## 2022-05-22 RX ADMIN — ROPINIROLE HYDROCHLORIDE 3 MG: 1 TABLET, FILM COATED ORAL at 21:32

## 2022-05-22 RX ADMIN — DEXTROSE MONOHYDRATE 125 ML: 100 INJECTION, SOLUTION INTRAVENOUS at 21:28

## 2022-05-22 RX ADMIN — Medication 100 MCG: at 09:00

## 2022-05-22 RX ADMIN — ATORVASTATIN CALCIUM 20 MG: 10 TABLET, FILM COATED ORAL at 08:59

## 2022-05-22 RX ADMIN — HEPARIN SODIUM 5000 UNITS: 5000 INJECTION INTRAVENOUS; SUBCUTANEOUS at 21:33

## 2022-05-22 RX ADMIN — HEPARIN SODIUM 5000 UNITS: 5000 INJECTION INTRAVENOUS; SUBCUTANEOUS at 14:14

## 2022-05-22 RX ADMIN — SODIUM CHLORIDE: 9 INJECTION, SOLUTION INTRAVENOUS at 04:16

## 2022-05-22 RX ADMIN — METOPROLOL TARTRATE 100 MG: 50 TABLET, FILM COATED ORAL at 21:32

## 2022-05-22 RX ADMIN — HEPARIN SODIUM 5000 UNITS: 5000 INJECTION INTRAVENOUS; SUBCUTANEOUS at 06:19

## 2022-05-22 RX ADMIN — CETIRIZINE HYDROCHLORIDE 10 MG: 10 TABLET, FILM COATED ORAL at 09:00

## 2022-05-22 RX ADMIN — PIPERACILLIN AND TAZOBACTAM 4500 MG: 4; .5 INJECTION, POWDER, FOR SOLUTION INTRAVENOUS at 00:00

## 2022-05-22 RX ADMIN — DEXTROSE MONOHYDRATE 100 ML/HR: 50 INJECTION, SOLUTION INTRAVENOUS at 21:56

## 2022-05-22 ASSESSMENT — ENCOUNTER SYMPTOMS
SORE THROAT: 0
ABDOMINAL DISTENTION: 1
EYE DISCHARGE: 0
RESPIRATORY NEGATIVE: 1
NAUSEA: 1
ANAL BLEEDING: 0
BLOOD IN STOOL: 0
ABDOMINAL PAIN: 1
CHEST TIGHTNESS: 0
RHINORRHEA: 0
ABDOMINAL PAIN: 0
EYE REDNESS: 0
ABDOMINAL PAIN: 1
DIARRHEA: 1
BACK PAIN: 0
VOMITING: 1
WHEEZING: 0
EYES NEGATIVE: 1
CONSTIPATION: 0
SHORTNESS OF BREATH: 0
COUGH: 0
COLOR CHANGE: 0

## 2022-05-22 ASSESSMENT — PAIN SCALES - GENERAL: PAINLEVEL_OUTOF10: 3

## 2022-05-22 NOTE — ED PROVIDER NOTES
The history is provided by the patient and a relative. Abdominal Pain    Patient reports emerged part with chief complaint of ongoing abdominal pain. The patient states that today she has become more bloated and she has been having intermittent episodes of loose watery diarrhea. The patient has just completed a course of antibiotics which included Cipro and Flagyl for diverticulitis. The patient had a confirmed diverticulitis on the CT scan without IV contrast.  The patient had completed the course of antibiotics but she continued to have this bloating sensation and then today the diarrhea started. Patient describes her abdominal pain as a bloating cramping type sensation which has been gradually worsening throughout the day. The family was brought her into the emergency department in order to be evaluated. Patient has been able to drink but she has not been eating very much because she has not felt hungry. The patient has not had any fevers or chills she has had nausea but no vomiting  Review of Systems   Constitutional: Negative. HENT: Negative. Eyes: Negative. Respiratory: Negative. Cardiovascular: Negative. Gastrointestinal: Positive for abdominal pain. Genitourinary: Negative. Musculoskeletal: Negative. Skin: Negative. Neurological: Negative. All other systems reviewed and are negative.       Family History   Problem Relation Age of Onset   Colón Pacemaker Sister    Colón Arrhythmia Sister     Breast Cancer Sister         pre menopausal    Ovarian Cancer Neg Hx      Social History     Socioeconomic History    Marital status:      Spouse name: Not on file    Number of children: Not on file    Years of education: Not on file    Highest education level: Not on file   Occupational History    Not on file   Tobacco Use    Smoking status: Never Smoker    Smokeless tobacco: Never Used   Vaping Use    Vaping Use: Never used   Substance and Sexual Activity    Alcohol use: Not Currently     Alcohol/week: 1.0 standard drink     Types: 1 Glasses of wine per week     Comment: rarely    Drug use: No    Sexual activity: Not on file     Comment:    Other Topics Concern    Not on file   Social History Narrative    Not on file     Social Determinants of Health     Financial Resource Strain:     Difficulty of Paying Living Expenses: Not on file   Food Insecurity:     Worried About Running Out of Food in the Last Year: Not on file    Varsha of Food in the Last Year: Not on file   Transportation Needs:     Lack of Transportation (Medical): Not on file    Lack of Transportation (Non-Medical):  Not on file   Physical Activity:     Days of Exercise per Week: Not on file    Minutes of Exercise per Session: Not on file   Stress:     Feeling of Stress : Not on file   Social Connections:     Frequency of Communication with Friends and Family: Not on file    Frequency of Social Gatherings with Friends and Family: Not on file    Attends Hoahaoism Services: Not on file    Active Member of [address] [place] [address] or Organizations: Not on file    Attends Club or Organization Meetings: Not on file    Marital Status: Not on file   Intimate Partner Violence:     Fear of Current or Ex-Partner: Not on file    Emotionally Abused: Not on file    Physically Abused: Not on file    Sexually Abused: Not on file   Housing Stability:     Unable to Pay for Housing in the Last Year: Not on file    Number of [place] in the Last Year: Not on file    Unstable Housing in the Last Year: Not on file     Past Surgical History:   Procedure Laterality Date    BREAST BIOPSY Right     approx age 76, benign    CATARACT REMOVAL      CHOLECYSTECTOMY      DILATION AND CURETTAGE OF UTERUS      OTHER SURGICAL HISTORY      eye lift     Past Medical History:   Diagnosis Date    Diabetes mellitus ([facility] [address]     H/O cardiac catheterization 05/18/2021    no significant CAD in main vessels, has severe stenosis in small branch diagonal vessel    H/O cardiovascular stress test 3/22/18,03/30/2017    ECG portion of the stress test is negative for ischemia EF >70% Normal stress myocardial perfusion.  H/O cardiovascular stress test 04/07/2021    abnormal stress    H/O Doppler ultrasound (Abdomimal Aorta) 03/29/2017    No evidence of abdominal aortic aneurysm.  H/O echocardiogram 07/02/2014    Normal left ventricular size, wall motion and systolic function. [place]le elevated pulmonary artery systolic pressure. Mild MR and TR and trace AR EF 55 to 60%    Hx of echocardiogram 03/29/2017    EF 55-60%. Grade I diastolic dysfunction. Mildly dilated left atrium. No evidence of pericardial effusion.  Hyperlipidemia     Hypertension     Sleep apnea     Thyroid disease      Allergies   Allergen Reactions    Lopid [Gemfibrozil] Shortness Of Breath and Other (See Comments)     Cough    Bystolic [Nebivolol Hcl]     Cefdinir     Coreg [Carvedilol]     Crestor [Rosuvastatin]     Fenofibrate     Glucophage [Metformin]     Hydrochlorothiazide Other (See Comments)    Metronidazole     Norvasc [Amlodipine Besylate]     Tribenzor [Olmesartan-Amlodipine-Hctz]      Pt states that her chest felt funny and achy when she took the medication    Zocor [Simvastatin]      Prior to Admission medications    Medication Sig Start Date End Date Taking?  Authorizing Provider   promethazine (PHENERGAN) 25 MG tablet Take 25 mg by mouth every 6 hours as needed for Nausea   Yes Historical Provider, MD   loperamide (IMODIUM A-D) 2 MG tablet Take 2 mg by mouth as needed for Diarrhea    Historical Provider, MD   melatonin 3 MG TABS tablet Take 3 mg by mouth nightly as needed    Historical Provider, MD   levocetirizine (XYZAL) 5 MG tablet 1 tablet daily 3/27/21   Historical Provider, MD   magnesium oxide (MAG-OX) 400 (241.3 Mg) MG TABS tablet daily 2/24/21   Historical Provider, MD   albuterol sulfate  (90 Base) MCG/ACT inhaler as needed 2/24/21 Historical Provider, MD   tiZANidine (ZANAFLEX) 2 MG tablet Take 2 mg by mouth every 6 hours as needed  Patient not taking: Reported on 12/2/2021    Historical Provider, MD   vitamin B-12 (CYANOCOBALAMIN) 100 MCG tablet Take 100 mcg by mouth daily    Historical Provider, MD   LANRITAUS SOLOSTAR 100 UNIT/ML injection pen  1/31/19   Historical Provider, MD   Calcium-Magnesium-Vitamin D (CALCIUM 1200+D3 PO) Take by mouth daily    Historical Provider, MD   Cholecalciferol (VITAMIN D PO) Take by mouth    Historical Provider, MD   oxybutynin (DITROPAN) 5 MG tablet Take 5 mg by mouth 2 times daily    Historical Provider, MD   atorvastatin (LIPITOR) 20 MG tablet Take 20 mg by mouth daily    Historical Provider, MD   rOPINIRole (REQUIP) 3 MG tablet Take 3 mg by mouth nightly    Historical Provider, MD   gabapentin (NEURONTIN) 100 MG capsule Take 100 mg by mouth nightly as needed  Patient not taking: Reported on 12/2/2021    Historical Provider, MD   levothyroxine (SYNTHROID) 137 MCG tablet Take 137 mcg by mouth daily  3/13/17   Historical Provider, MD   Insulin Pen Needle (PEN NEEDLES 31GX5/16\") 31G X 8 MM MISC  3/11/16   Historical Provider, MD   losartan (COZAAR) 100 MG tablet Take 100 mg by mouth daily    Historical Provider, MD   metoprolol (LOPRESSOR) 50 MG tablet 3 pills in the AM : 150 mg dose  2 pills in the PM: 100 mg dose    Historical Provider, MD       BP (!) 145/65   Pulse 72   Temp 98.2 °F (36.8 °C) (Oral)   Resp 18   Ht 5' (1.524 m)   Wt 145 lb (65.8 kg)   SpO2 99%   BMI 28.32 kg/m²     Physical Exam  Vitals and nursing note reviewed. Constitutional:       Appearance: She is well-developed. HENT:      Head: Normocephalic and atraumatic. Right Ear: External ear normal.      Left Ear: External ear normal.      Nose: Nose normal.   Eyes:      Conjunctiva/sclera: Conjunctivae normal.      Pupils: Pupils are equal, round, and reactive to light.    Cardiovascular:      Rate and Rhythm: Normal rate and regular rhythm. Heart sounds: Normal heart sounds. Pulmonary:      Effort: Pulmonary effort is normal.      Breath sounds: Normal breath sounds. Abdominal:      General: Bowel sounds are normal. There is distension. Palpations: Abdomen is soft. Tenderness: There is generalized abdominal tenderness. Musculoskeletal:         General: Normal range of motion. Cervical back: Normal range of motion and neck supple. Skin:     General: Skin is warm and dry. Neurological:      Mental Status: She is alert and oriented to person, place, and time. GCS: GCS eye subscore is 4. GCS verbal subscore is 5. GCS motor subscore is 6. Psychiatric:         Behavior: Behavior normal.         Thought Content: Thought content normal.         Judgment: Judgment normal.         MDM:    Labs Reviewed   CBC WITH AUTO DIFFERENTIAL - Abnormal; Notable for the following components:       Result Value    WBC 12.9 (*)     RBC 4.18 (*)     Hemoglobin 12.2 (*)     Segs Relative 80.3 (*)     Lymphocytes % 12.8 (*)     Monocytes % 5.3 (*)     All other components within normal limits   COMPREHENSIVE METABOLIC PANEL - Abnormal; Notable for the following components:    Sodium 130 (*)     Chloride 95 (*)     Glucose 132 (*)     GFR Non- 59 (*)     All other components within normal limits   LIPASE   URINALYSIS       CT ABDOMEN PELVIS W IV CONTRAST   Preliminary Result   1. Diffuse small bowel fluid-filled distention likely secondary to a mass at   the ileocecal valve. A pill is also lodged within the ileocecal valve which   also may be causing a component of obstruction. 2. Cystic lesion associated with the right adnexa measuring 2.5 x 1.7 cm. Finding is of unclear etiology and clinical significance. Differential   considerations include abscess from prior diverticulitis, metastatic deposit,   or ovarian cyst.   3. Severe atherosclerosis.               Patient CT scan with IV contrast shows a diffuse small bowel which is fluid-filled there is some distention. There was identified what appears to be a mass at the ileocecal valve. There was also an incidental finding of a pill which seems to be lodged at the ileocecal valve as well. This could also be contributing to a component of a very mild obstruction. In addition, there was a cystic lesion associated within the right adnexa since the patient had been treated for diverticulitis this could represent an abscess formation from the previous diverticulitis it could also be a metastatic deposit from what appears to be the mass at the ileocecal valve or it could be an ovarian cyst..  In any event the patient was given Zosyn for possible infection. She does have a mildly elevated white blood cell count of 12.5 with a left shift. Remainder the patient's blood work is unremarkable. I have called and discussed the patient's case with the hospitalist Dr. Sage Gonzalez who has accepted the patient in transfer. In addition, I have contacted and discussed patient's case with Dr. Tejas De Leon who is on-call for surgery. Patient most likely will need to undergo colonoscopy and may have gastrointestinal consultation from this inpatient standpoint. Patient overall is hemodynamically stable      Final Impression    1. Colonic mass    2.  Diarrhea, unspecified type              [address] Sherwin Montaño DO  05/22/22 [id number]

## 2022-05-22 NOTE — PROGRESS NOTES
Patient hard to rouse, Pulse is 57,  Diastolic bp 47, PS Dr Katiana Garcia, New orders for 0.9 NS to restart at 75ml/hr, ABG X one.

## 2022-05-22 NOTE — CONSULTS
[address] [facility]    PATIENT: Ledy Hernandez 1934, 80 y.o., female  MRN: [id number]    Physician: Mauricio Treviño MD    Date: 5/22/22    Reason for Evaluation & Chief Complaint:  Cecal mass    Requesting Provider: Joesph Bryant DO    History Obtained From:  patient, electronic medical record    HISTORY OF PRESENT ILLNESS:    Autumn Wills is a 80 y.o. female presenting with abdominal pain, nausea, and vomiting. She was transferred from [facility] ED with imaging concerning for a cecal mass. She has been treated recently for abdominal pain and suspected diverticulitis, but was not improving after getting antibiotics which prompted her to return to the ED. She denies any personal or family history of colon cancer. Her sister did have breast cancer in her 42's. She has had colonoscopies in the past which were normal, but thinks her last one was more than 10 years ago. She denies any previous problems with ovarian cysts or adnexal lesions. Location: right abdomen  Quality, Severity: moderate  · Pain is described as aching, dull and pressure-like  Timing, Duration: a few weeks or more, but worse in the last few days  Context, Modifying Factors: denies  Associated Signs & Symptoms: nausea and vomited once, diarrhea      Past Medical History:    Past Medical History:   Diagnosis Date    Diabetes mellitus ([facility] [address]     H/O cardiac catheterization 05/18/2021    no significant CAD in main vessels, has severe stenosis in small  branch diagonal vessel    H/O cardiovascular stress test 3/22/18,03/30/2017    ECG portion of the stress test is negative for ischemia EF >70% Normal stress myocardial perfusion.  H/O cardiovascular stress test 04/07/2021    abnormal stress    H/O Doppler ultrasound (Abdomimal Aorta) 03/29/2017    No evidence of abdominal aortic aneurysm.     H/O echocardiogram 07/02/2014    Normal left ventricular size, wall motion and systolic function. Mildle elevated pulmonary artery systolic pressure. Mild MR and TR and trace AR EF 55 to 60%    Hx of echocardiogram 03/29/2017    EF 55-60%. Grade I diastolic dysfunction. Mildly dilated left atrium. No evidence of pericardial effusion.      Hyperlipidemia     Hypertension     Sleep apnea     Thyroid disease        Past Surgical History:    Past Surgical History:   Procedure Laterality Date    BREAST BIOPSY Right     approx age 76, benign    CATARACT REMOVAL      CHOLECYSTECTOMY      DILATION AND CURETTAGE OF UTERUS      OTHER SURGICAL HISTORY      eye lift       Current Medications:   Current Facility-Administered Medications   Medication Dose Route Frequency Provider Last Rate Last Admin    albuterol sulfate  (90 Base) MCG/ACT inhaler 2 puff  2 puff Inhalation Q6H PRN Marissa Cargo, DO        atorvastatin (LIPITOR) tablet 20 mg  20 mg Oral Daily Marissa Cargo, DO        Vitamin D (CHOLECALCIFEROL) tablet 1,000 Units  1,000 Units Oral Daily Marissa Cargo, DO        insulin glargine (LANTUS) injection vial 15 Units  15 Units SubCUTAneous Nightly Alfred Manuteressa, DO        cetirizine (ZYRTEC) tablet 10 mg  10 mg Oral Daily Marissa Cargo, DO        levothyroxine (SYNTHROID) tablet 137 mcg  137 mcg Oral Daily Marissa Cargo, DO   137 mcg at 05/22/22 0618    losartan (COZAAR) tablet 100 mg  100 mg Oral Daily Marissa Cargo, DO        magnesium oxide (MAG-OX) tablet 200 mg  200 mg Oral Daily Marissa Cargo, DO        metoprolol tartrate (LOPRESSOR) tablet 150 mg  150 mg Oral Daily Marissa Cargo, DO        metoprolol tartrate (LOPRESSOR) tablet 100 mg  100 mg Oral Nightly Marissa Cargo, DO        oxybutynin (DITROPAN) tablet 5 mg  5 mg Oral BID Marissa Cargo, DO        rOPINIRole (REQUIP) tablet 3 mg  3 mg Oral Nightly Alfred Manuele, DO        vitamin B-12 (CYANOCOBALAMIN) tablet 100 mcg  100 mcg Oral Daily Marissa Cargo, DO        glucose chewable tablet 16 g  4 tablet Oral PRN Charls , DO        dextrose bolus 10% 125 mL  125 mL IntraVENous PRN Charls , DO        Or    dextrose bolus 10% 250 mL  250 mL IntraVENous PRN Charls , DO        glucagon (rDNA) injection 1 mg  1 mg IntraMUSCular PRN Charls , DO        dextrose 5 % solution  100 mL/hr IntraVENous PRN Charls , DO        insulin lispro (HUMALOG) injection vial 0-6 Units  0-6 Units SubCUTAneous Q4H Charls , DO        sodium chloride flush 0.9 % injection 5-40 mL  5-40 mL IntraVENous 2 times per day Charls , DO        sodium chloride flush 0.9 % injection 5-40 mL  5-40 mL IntraVENous PRN Alfred Manuele, DO   10 mL at 05/22/22 0418    0.9 % sodium chloride infusion   IntraVENous PRN Charls , DO        ondansetron (ZOFRAN-ODT) disintegrating tablet 4 mg  4 mg Oral Q8H PRN Charls , DO        Or    ondansetron (ZOFRAN) injection 4 mg  4 mg IntraVENous Q6H PRN Charls , DO        polyethylene glycol (GLYCOLAX) packet 17 g  17 g Oral Daily PRN Charls , DO        acetaminophen (TYLENOL) tablet 650 mg  650 mg Oral Q6H PRN Charls , DO        Or    acetaminophen (TYLENOL) suppository 650 mg  650 mg Rectal Q6H PRN Charls , DO        piperacillin-tazobactam (ZOSYN) 4,500 mg in dextrose 5 % 100 mL IVPB (mini-bag)  4,500 mg IntraVENous Q8H Alfred Rojas, DO 25 mL/hr at 05/22/22 0815 4,500 mg at 05/22/22 0815    0.9 % sodium chloride infusion   IntraVENous Continuous Charls , DO 75 mL/hr at 05/22/22 0416 New Bag at 05/22/22 0416    heparin (porcine) injection 5,000 Units  5,000 Units SubCUTAneous 3 times per day Charls , DO   5,000 Units at 05/22/22 6919       Allergies:  Lopid [gemfibrozil], Bystolic [nebivolol hcl], Cefdinir, Coreg [carvedilol], Crestor [rosuvastatin], Fenofibrate, Glucophage [metformin], Hydrochlorothiazide, Metronidazole, Norvasc [amlodipine besylate], Tribenzor [olmesartan-amlodipine-hctz], and Zocor [simvastatin]    Social History:   Social History     Socioeconomic History    Marital status:      Spouse name: None    Number of children: None    Years of education: None    Highest education level: None   Occupational History    None   Tobacco Use    Smoking status: Never Smoker    Smokeless tobacco: Never Used   Vaping Use    Vaping Use: Never used   Substance and Sexual Activity    Alcohol use: Not Currently     Alcohol/week: 1.0 standard drink     Types: 1 Glasses of wine per week     Comment: rarely    Drug use: No    Sexual activity: None     Comment:    Other Topics Concern    None   Social History Narrative    None     Social Determinants of Health     Financial Resource Strain:     Difficulty of Paying Living Expenses: Not on file   Food Insecurity:     Worried About Running Out of Food in the Last Year: Not on file    Varsha of Food in the Last Year: Not on file   Transportation Needs:     Lack of Transportation (Medical): Not on file    Lack of Transportation (Non-Medical):  Not on file   Physical Activity:     Days of Exercise per Week: Not on file    Minutes of Exercise per Session: Not on file   Stress:     Feeling of Stress : Not on file   Social Connections:     Frequency of Communication with Friends and Family: Not on file    Frequency of Social Gatherings with Friends and Family: Not on file    Attends [employer] Services: Not on file    Active Member of [address] [place] [address] or Organizations: Not on file    Attends Club or Organization Meetings: Not on file    Marital Status: Not on file   Intimate Partner Violence:     Fear of Current or Ex-Partner: Not on file    Emotionally Abused: Not on file    Physically Abused: Not on file    Sexually Abused: Not on file   Housing Stability:     Unable to Pay for Housing in the Last Year: Not on file    Number of [place] in the Last Year: Not on file    Unstable Housing in the Last Year: Not on file       Family History:   Family History   Problem Relation Age of Onset   Dossie Sis Pacemaker Sister     Arrhythmia Sister     Breast Cancer Sister         pre menopausal    Ovarian Cancer Neg Hx        REVIEW OF SYSTEMS:    Review of Systems   Constitutional: Negative for chills and fever. HENT: Negative for congestion and sore throat. Eyes: Negative for discharge and redness. Respiratory: Negative for chest tightness and shortness of breath. Cardiovascular: Negative for chest pain and palpitations. Gastrointestinal: Positive for abdominal distention, abdominal pain, diarrhea, nausea and vomiting. Negative for constipation. Genitourinary: Negative for dysuria and flank pain. Musculoskeletal: Positive for arthralgias. Negative for myalgias. Skin: Negative for color change and rash. Neurological: Negative for dizziness and numbness. Psychiatric/Behavioral: Negative for confusion. The patient is not nervous/anxious. I have reviewed the patient's information pertinent to this visit, including medical history, family history, social history and review of systems. PHYSICAL EXAM:    Vitals:    05/22/22 0218   BP: (!) 123/99   Pulse: 56   Resp: 17   Temp: 98 °F (36.7 °C)   TempSrc: Oral   SpO2: 96%   Weight: 146 lb 6.2 oz (66.4 kg)   Height: 5' (1.524 m)     Physical Exam  Constitutional:       General: She is not in acute distress. Appearance: She is well-developed. She is not diaphoretic. HENT:      Head: Normocephalic and atraumatic. Mouth/Throat:      Mouth: Mucous membranes are dry. Eyes:      General:         Right eye: No discharge. Left eye: No discharge. Pupils: Pupils are equal, round, and reactive to light. Neck:      Trachea: No tracheal deviation. Cardiovascular:      Rate and Rhythm: Normal rate and regular rhythm. Pulmonary:      Effort: Pulmonary effort is normal. No respiratory distress. Breath sounds: No wheezing.    Abdominal:      General: There is no distension. Palpations: Abdomen is soft. Tenderness: There is abdominal tenderness (right abdomen). There is no guarding or rebound. Musculoskeletal:         General: No tenderness or deformity. Cervical back: Neck supple. Skin:     General: Skin is warm and dry. Findings: No rash. Neurological:      Mental Status: She is alert and oriented to person, place, and time. Psychiatric:         Behavior: Behavior normal.         DATA:    Lab Results   Component Value Date    WBC 12.9 (H) 05/21/2022    HGB 12.2 (L) 05/21/2022    HCT 37.4 05/21/2022     05/21/2022     (L) 05/21/2022    K 5.0 05/21/2022    CL 95 (L) 05/21/2022    CO2 26 05/21/2022    BUN 14 05/21/2022    CREATININE 0.9 05/21/2022    GLUCOSE 132 (H) 05/21/2022    CALCIUM 9.5 05/21/2022    PROT 6.8 05/21/2022    BILITOT 0.4 05/21/2022    AST 23 05/21/2022    ALT 17 05/21/2022    ALKPHOS 48 05/21/2022    AMYLASE 56 03/10/2017    LIPASE 31 05/21/2022    INR 1.09 05/14/2021    GLUF 121 (H) 12/06/2017    LABA1C 7.0 (H) 03/14/2018       Imaging:   CT ABDOMEN PELVIS WO CONTRAST Additional Contrast? None    Result Date: 5/18/2022  EXAMINATION: CT OF THE ABDOMEN AND PELVIS WITHOUT CONTRAST 5/17/2022 10:12 am TECHNIQUE: CT of the abdomen and pelvis was performed without the administration of intravenous contrast. Multiplanar reformatted images are provided for review. Automated exposure control, iterative reconstruction, and/or weight based adjustment of the mA/kV was utilized to reduce the radiation dose to as low as reasonably achievable. COMPARISON: 04/27/2009. HISTORY: ORDERING SYSTEM PROVIDED HISTORY: Diverticulitis of intestine without perforation or abscess without bleeding, unspecified part of intestinal tract TECHNOLOGIST PROVIDED HISTORY: Additional Contrast?->None FINDINGS: Lower Chest: The lung bases are clear. Organs: The gallbladder is not seen and likely has been surgically removed.  The liver, spleen, pancreas and adrenal glands appear unremarkable for a non contrasted study. The kidneys demonstrate no calcifications. No hydronephrosis is seen. No ureteral or bladder calculi are seen. GI/Bowel: Evaluation of the bowel is limited as no enteric contrast was given. No dilated loops of bowel are seen. No appreciable diverticular disease is seen. The appendix is not dilated. Pelvis: No pelvic masses or fluid collections are seen. The uterus appears unremarkable. Peritoneum/Retroperitoneum: The abdominal aorta is not aneurysmal.  There are shotty mesenteric and retroperitoneal lymph nodes but no lymphadenopathy is seen. There is hazy appearance to the mesentery adjacent to small bowel loops in the right lower quadrant. Bones/Soft Tissues: No acute bony abnormalities are noted. There are degenerative changes involving the spine. 1. Hazy appearance to the mesentery adjacent to small bowel loops in the right lower quadrant. Question if this may be secondary to an adjacent enteritis. There are no CT findings to suggest appendicitis or diverticulitis. 2. No obstructive uropathy. CT ABDOMEN PELVIS W IV CONTRAST    Result Date: 5/22/2022  EXAMINATION: CT OF THE ABDOMEN AND PELVIS WITH CONTRAST 5/21/2022 10:08 pm TECHNIQUE: CT of the abdomen and pelvis was performed with the administration of intravenous contrast. Multiplanar reformatted images are provided for review. Automated exposure control, iterative reconstruction, and/or weight based adjustment of the mA/kV was utilized to reduce the radiation dose to as low as reasonably achievable. COMPARISON: May 17, 2022. HISTORY: ORDERING SYSTEM PROVIDED HISTORY: LLQ pain. recently treated for diverticulitis TECHNOLOGIST PROVIDED HISTORY: IV contrast only. Thank you. Reason for exam:->LLQ pain. recently treated for diverticulitis Reason for exam:->IV contrast only. Thank you. Reason for Exam: LLQ ABD PAIN FINDINGS: Lower Chest: Clear lung bases.  Organs: Status post cholecystectomy. No intra or extrahepatic biliary dilatation. The liver parenchyma, spleen, pancreas, adrenal glands, and kidneys demonstrate no acute abnormality. Bilateral ureters are normal in course and caliber. No ureteral calculi. GI/Bowel: The stomach is normal.  The small bowel is fluid-filled and distended throughout its course measuring up to 2.9 cm. No discrete transition point is seen. At the cecum and ileocecal valve, there is abnormal wall thickening with likely an underlying mass measuring approximately 5.2 x 2.9 cm (series 2, image 8). The remaining colon is mostly decompressed. Diverticulosis is seen. Pelvis: Normal bladder. Uterus is unremarkable. Cystic lesion is seen in the region of the right adnexa measuring 2.5 x 1.7 cm. Left ovary is normal. Peritoneum/Retroperitoneum: No free fluid or free air. Small amount of free fluid is seen in the right hemiabdomen. No free air. No pathologic lymphadenopathy. Aorta and its branches are patent and normal in course. Severe atherosclerosis is seen. Bones/Soft Tissues: No acute or aggressive osseous lesion. 1. Diffuse small bowel fluid-filled distention likely secondary to a mass at the ileocecal valve. A pill is also lodged within the ileocecal valve which also may be causing a component of obstruction. 2. Cystic lesion associated with the right adnexa measuring 2.5 x 1.7 cm. Finding is of unclear etiology and clinical significance. Differential considerations include abscess from prior diverticulitis, metastatic deposit, or ovarian cyst. 3. Severe atherosclerosis. Pertinent laboratory and imaging studies were personally reviewed if available.     IMPRESSION:    Shira Cobos is a 80 y.o. 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 PLAN:  · Discussed findings and options with Rubén Mina and her family at bedside. 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 Will order a CEA. · NPO for now. If vomiting, may need NG tube. If she is not obstructed, will continue with GI evaluation. If she develops more signs of high grade bowel obstruction, we may proceed with operative intervention sooner. Will monitor her progress. · Cystic lesion associated with the right adnexa measuring 2.5 x 1.7 cm - transvaginal US ordered.    · Will continue to follow  · Thank you for the consultation and the opportunity to care for Rubén Mina    Electronically signed by Gonzalo Zazueta MD, 5/22/2022, 8:30 AM

## 2022-05-22 NOTE — H&P
History and Physical      Name:  Dick Fink /Age/Sex: 1934  (80 y.o. female)   MRN & CSN:  [id number] & [id number] Encounter Date/Time: 2022 2:29 AM EDT   Location:  [id number] PCP: Stefani Aj DO       Hospital Day: 1    Assessment and Plan:   Dick Fink is a 80 y.o. female with a pmh of CKD stage IIIa, diverticulitis, IDDM 2 with peripheral neuropathy, HTN, HLD, G1DD, KEVIN, hypothyroidism, and vitamin B12 deficiency, who presents with Cecum mass    Hospital Problems           Last Modified POA    * (Principal) Cecum mass 2022 Yes        Cecal mass  Partial obstruction of ileocecal valve  Abdominal pain  Admit to inpatient services  CT abdomen pelvis with IV contrast from OSH ED: Diffuse small bowel fluid-filled distention likely 2/2 a mass at the ileocecal valve. A pill was also lodged within the ileocecal valve which also may be causing a component of obstruction. Recent diverticulitis with completion of antibiotics 3 days ago with continued abdominal pain  OSH ED started Zosyn, we can continue this  N.p.o. except sips with meds  OSH ED consulted general surgery, appreciate recommendations  Consult GI, appreciate recommendations. Likely needs endoscopy  GI PCR  IVF with NS at 75 mL an hour for 10 hours  A.m. labs    CKD stage IIIa  Hyponatremia  Patient with apparent CKD stage IIIa. Received contrast at OSH ED. Creatinine stable. Sodium 130. Received IVF at OSH ED and we will continue with IVF as above  Consideration for nephrology consult if no improvement with IVF or development of DOUGLAS on CKD. IDDM 2 with peripheral neuropathy  Continue home basal insulin, Diet NPO Exceptions are: Sips of Water with Meds diet, low SSI + BG checks ACHS, hypoglycemic protocol, and target -180    Right adnexal lesion  On CT at OSH ED cystic lesion associated with the right adnexa measuring 2.5 x 1.7 cm was noted. Unclear etiology and clinical significance.   Differential includes abscess from prior diverticulitis, metastatic deposit, or ovarian cyst  General surgery consult as above  Recommend continued surveillance    Other chronic medical conditions:   HTN  HLD  G1DD  KEVIN  Hypothyroidism  Vitamin B12 deficiency    Diet No diet orders on file   DVT Prophylaxis [] Lovenox, []  Heparin, [] SCDs, [] Ambulation,  [] Eliquis, [] Xarelto   Code Status Prior     History from:     patient    History of Present Illness:     Chief Complaint: Cecum albaro Wood is a 80 y.o. female with pmh of CKD stage IIIa, diverticulitis, IDDM 2 with peripheral neuropathy, HTN, HLD, G1DD, KEVIN, hypothyroidism, and vitamin B12 deficiency, who presents with complaints of abdominal pain. The pain is described as \"tight\", and at maximal intensity is 10/10 in intensity. Pain is located in the diffusely without radiation. Onset was 1 month ago. Last two days increase in symptoms. Symptoms have been gradually worsening since. Aggravating factors: none. Alleviating factors: none. Associated symptoms: anorexia, chills, diarrhea, nausea and vomiting. Patient denies fevers, melena, or hematochezia. Patient recently completed antibiotics 3 days ago for diverticulitis. Otherwise patient denies headache, chest pain, shortness of breath, cough, hematemesis, dysuria, frequency, or urgency. Discussed case with OSH ED provider. Review of Systems:   Review of Systems   Constitutional: Positive for appetite change and chills. Negative for fatigue and fever. HENT: Negative for congestion, rhinorrhea and sore throat. Eyes: Negative for visual disturbance. Respiratory: Negative for cough, chest tightness, shortness of breath and wheezing. Cardiovascular: Negative for chest pain and palpitations. Gastrointestinal: Positive for diarrhea, nausea and vomiting. Negative for abdominal pain, anal bleeding and blood in stool. Genitourinary: Negative for dysuria, frequency and urgency.    Musculoskeletal: Negative for arthralgias and back pain. Skin: Negative for color change, pallor and rash. Neurological: Negative for dizziness, seizures, syncope, speech difficulty, weakness, numbness and headaches. Psychiatric/Behavioral: Negative for agitation and suicidal ideas. Objective:   No intake or output data in the 24 hours ending 05/22/22 0229   Vitals:   Vitals:    05/22/22 0218   BP: (!) 123/99   Pulse: 56   Resp: 17   Temp: 98 °F (36.7 °C)   SpO2: 96%   Weight: 146 lb 6.2 oz (66.4 kg)   Height: 5' (1.524 m)       Medications Prior to Admission     Prior to Admission medications    Medication Sig Start Date End Date Taking?  Authorizing Provider   promethazine (PHENERGAN) 25 MG tablet Take 25 mg by mouth every 6 hours as needed for Nausea    Historical Provider, MD   loperamide (IMODIUM A-D) 2 MG tablet Take 2 mg by mouth as needed for Diarrhea    Historical Provider, MD   melatonin 3 MG TABS tablet Take 3 mg by mouth nightly as needed    Historical Provider, MD   levocetirizine (XYZAL) 5 MG tablet 1 tablet daily 3/27/21   Historical Provider, MD   magnesium oxide (MAG-OX) 400 (241.3 Mg) MG TABS tablet daily 2/24/21   Historical Provider, MD   albuterol sulfate  (90 Base) MCG/ACT inhaler as needed 2/24/21   Historical Provider, MD   tiZANidine (ZANAFLEX) 2 MG tablet Take 2 mg by mouth every 6 hours as needed  Patient not taking: Reported on 12/2/2021    Historical Provider, MD   vitamin B-12 (CYANOCOBALAMIN) 100 MCG tablet Take 100 mcg by mouth daily    Historical Provider, MD   LANTUS SOLOSTAR 100 UNIT/ML injection pen  1/31/19   Historical Provider, MD   Calcium-Magnesium-Vitamin D (CALCIUM 1200+D3 PO) Take by mouth daily    Historical Provider, MD   Cholecalciferol (VITAMIN D PO) Take by mouth    Historical Provider, MD   oxybutynin (DITROPAN) 5 MG tablet Take 5 mg by mouth 2 times daily    Historical Provider, MD   atorvastatin (LIPITOR) 20 MG tablet Take 20 mg by mouth daily    Historical Provider, MD   rOPINIRole (REQUIP) 3 MG tablet Take 3 mg by mouth nightly    Historical Provider, MD   gabapentin (NEURONTIN) 100 MG capsule Take 100 mg by mouth nightly as needed  Patient not taking: Reported on 12/2/2021    Historical Provider, MD   levothyroxine (SYNTHROID) 137 MCG tablet Take 137 mcg by mouth daily  3/13/17   Historical Provider, MD   Insulin Pen Needle (PEN NEEDLES 31GX5/16\") 31G X 8 MM MISC  3/11/16   Historical Provider, MD   losartan (COZAAR) 100 MG tablet Take 100 mg by mouth daily    Historical Provider, MD   metoprolol (LOPRESSOR) 50 MG tablet 3 pills in the AM : 150 mg dose  2 pills in the PM: 100 mg dose    Historical Provider, MD       Physical Exam:    Physical Exam  Vitals and nursing note reviewed. Constitutional:       General: She is awake. She is not in acute distress. Appearance: Normal appearance. She is overweight. She is not ill-appearing, toxic-appearing or diaphoretic. Interventions: She is not intubated. HENT:      Head: Atraumatic. Right Ear: External ear normal.      Left Ear: External ear normal.      Nose: Nose normal. No rhinorrhea. Mouth/Throat:      Mouth: Mucous membranes are moist.   Eyes:      Conjunctiva/sclera: Conjunctivae normal.   Cardiovascular:      Rate and Rhythm: Normal rate and regular rhythm. Pulses: Normal pulses. Pulmonary:      Effort: Pulmonary effort is normal. No tachypnea or respiratory distress. She is not intubated. Breath sounds: No wheezing, rhonchi or rales. Abdominal:      General: Bowel sounds are normal. There is no distension. Palpations: Abdomen is soft. Tenderness: There is generalized abdominal tenderness and tenderness in the right lower quadrant, suprapubic area and left lower quadrant. There is no guarding or rebound. Musculoskeletal:         General: Normal range of motion. Cervical back: Neck supple. Right lower leg: No edema. Left lower leg: No edema.    Skin: General: Skin is warm and dry. Capillary Refill: Capillary refill takes less than 2 seconds. Neurological:      Mental Status: She is alert and oriented to person, place, and time. Mental status is at baseline. Cranial Nerves: No dysarthria or facial asymmetry. Motor: No tremor or seizure activity. Psychiatric:         Attention and Perception: She is attentive. Mood and Affect: Mood is not anxious. Speech: She is communicative. Speech is not slurred. Behavior: Behavior is cooperative. Past Medical History:   PMHx   Past Medical History:   Diagnosis Date    Diabetes mellitus ([facility] [address]     H/O cardiac catheterization 05/18/2021    no significant CAD in main vessels, has severe stenosis in small  branch diagonal vessel    H/O cardiovascular stress test 3/22/18,03/30/2017    ECG portion of the stress test is negative for ischemia EF >70% Normal stress myocardial perfusion.  H/O cardiovascular stress test 04/07/2021    abnormal stress    H/O Doppler ultrasound (Abdomimal Aorta) 03/29/2017    No evidence of abdominal aortic aneurysm.  H/O echocardiogram 07/02/2014    Normal left ventricular size, wall motion and systolic function. Mildle elevated pulmonary artery systolic pressure. Mild MR and TR and trace AR EF 55 to 60%    Hx of echocardiogram 03/29/2017    EF 55-60%. Grade I diastolic dysfunction. Mildly dilated left atrium. No evidence of pericardial effusion.  Hyperlipidemia     Hypertension     Sleep apnea     Thyroid disease      PSHX:  has a past surgical history that includes Cholecystectomy; Dilation and curettage of uterus; Cataract removal; other surgical history; and Breast biopsy (Right). Allergies:    Allergies   Allergen Reactions    Lopid [Gemfibrozil] Shortness Of Breath and Other (See Comments)     Cough    Bystolic [Nebivolol Hcl]     Cefdinir     Coreg [Carvedilol]     Crestor [Rosuvastatin]     Fenofibrate     Glucophage [Metformin]     Hydrochlorothiazide Other (See Comments)    Metronidazole     Norvasc [Amlodipine Besylate]     Tribenzor [Olmesartan-Amlodipine-Hctz]      Pt states that her chest felt funny and achy when she took the medication    Zocor [Simvastatin]      Fam HX: Reviewed family history includes Arrhythmia in her sister; Breast Cancer in her sister; Pacemaker in her sister. Soc HX:   Social History     Socioeconomic History    Marital status:      Spouse name: Not on file    Number of children: Not on file    Years of education: Not on file    Highest education level: Not on file   Occupational History    Not on file   Tobacco Use    Smoking status: Never Smoker    Smokeless tobacco: Never Used   Vaping Use    Vaping Use: Never used   Substance and Sexual Activity    Alcohol use: Not Currently     Alcohol/week: 1.0 standard drink     Types: 1 Glasses of wine per week     Comment: rarely    Drug use: No    Sexual activity: Not on file     Comment:    Other Topics Concern    Not on file   Social History Narrative    Not on file     Social Determinants of Health     Financial Resource Strain:     Difficulty of Paying Living Expenses: Not on file   Food Insecurity:     Worried About Running Out of Food in the Last Year: Not on file    Varsha of Food in the Last Year: Not on file   Transportation Needs:     Lack of Transportation (Medical): Not on file    Lack of Transportation (Non-Medical):  Not on file   Physical Activity:     Days of Exercise per Week: Not on file    Minutes of Exercise per Session: Not on file   Stress:     Feeling of Stress : Not on file   Social Connections:     Frequency of Communication with Friends and Family: Not on file    Frequency of Social Gatherings with Friends and Family: Not on file    Attends Jehovah's witness Services: Not on file    Active Member of Clubs or Organizations: Not on file    Attends Club or Organization Meetings: Not on file   [place] Marital Status: Not on file   Intimate Partner Violence:     Fear of Current or Ex-Partner: Not on file    Emotionally Abused: Not on file    Physically Abused: Not on file    Sexually Abused: Not on file   Housing Stability:     Unable to Pay for Housing in the Last Year: Not on file    Number of [place] in the Last Year: Not on file    Unstable Housing in the Last Year: Not on file       Medications:   Medications:    Infusions:   PRN Meds:     Labs      CBC:   Recent Labs     05/21/22  2100   WBC 12.9*   HGB 12.2*        BMP:    Recent Labs     05/21/22  2100   *   K 5.0   CL 95*   CO2 26   BUN 14   CREATININE 0.9   GLUCOSE 132*     Hepatic:   Recent Labs     05/21/22  2100   AST 23   ALT 17   BILITOT 0.4   ALKPHOS 48     Lipids:   Lab Results   Component Value Date    CHOL 138 05/02/2019    HDL 54 05/02/2019    TRIG 152 05/02/2019     Hemoglobin A1C:   Lab Results   Component Value Date    LABA1C 7.0 03/14/2018     TSH: No results found for: TSH  Troponin:   Lab Results   Component Value Date    TROPONINT <0.010 03/14/2018    TROPONINT <0.010 03/14/2018    TROPONINT <0.010 03/14/2018     Lactic Acid: No results for input(s): LACTA in the last 72 hours. BNP: No results for input(s): PROBNP in the last 72 hours.   UA:  Lab Results   Component Value Date    NITRU NEGATIVE 03/10/2017    COLORU YELLOW 03/10/2017    WBCUA 0 TO 2 03/10/2017    RBCUA NO CELLS SEEN 03/10/2017    MUCUS NEGATIVE 03/10/2017    BACTERIA RARE 03/10/2017    CLARITYU CLEAR 03/10/2017    SPECGRAV 1.005 03/10/2017    LEUKOCYTESUR NEGATIVE 03/10/2017    UROBILINOGEN 0.2 03/10/2017    BILIRUBINUR NEGATIVE 03/10/2017    BLOODU NEGATIVE 03/10/2017    KETUA NEGATIVE 03/10/2017     Urine Cultures: No results found for: Toshia Fluke  Blood Cultures: No results found for: BC  No results found for: BLOODCULT2  Organism:   Lab Results   Component Value Date    [facility] ENC 03/05/2015    [facility] ECOL 03/05/2015       Imaging/Diagnostics Last 24 Hours   CT ABDOMEN PELVIS W IV CONTRAST    Result Date: 5/21/2022  1. Diffuse small bowel fluid-filled distention likely secondary to a mass at the ileocecal valve. A pill is also lodged within the ileocecal valve which also may be causing a component of obstruction. 2. Cystic lesion associated with the right adnexa measuring 2.5 x 1.7 cm. Finding is of unclear etiology and clinical significance. Differential considerations include abscess from prior diverticulitis, metastatic deposit, or ovarian cyst. 3. Severe atherosclerosis. Personally reviewed lab work and imaging. This dictation was created with voice recognition software. While attempts have been made to review the dictation as it is transcribed, on occasion the spoken word can be misinterpreted by the technology leading to omissions or inappropriate words, phrases or sentences.      Electronically signed by Elizabeth Dugan DO on 5/22/2022 at 2:29 AM

## 2022-05-22 NOTE — PLAN OF CARE
Problem: Discharge Planning  Goal: Discharge to home or other facility with appropriate resources  Outcome: Progressing  Flowsheets (Taken 5/22/2022 0218 by Miguel Brooks RN)  Discharge to home or other facility with appropriate resources:   Identify barriers to discharge with patient and caregiver   Arrange for needed discharge resources and transportation as appropriate     Problem: Safety - Adult  Goal: Free from fall injury  Outcome: Progressing

## 2022-05-22 NOTE — CONSULTS
Department of Internal Medicine  Gastroenterology Consult Note  Foreign Ram MD      Reason for Consult: Cecal mass. Primary Care Physician:  Stefani Aj DO    History Obtained From:  patient, electronic medical record    HISTORY OF PRESENT ILLNESS:              The patient is a 80 y.o.  female who presented with abdominal distension, nausea, vomiting. She had recently been treated for diverticulitis, with some resolution of her symptoms on cipro and flagyl. Symptoms recurred and she went to the ED where a ct of the abdomen and pelvis showed diffuse fluid filled distention in small bowel secondary to a mass at the ileocecal valve. ? Was raised about a pill being lodged at the ileocecal valve. She was placed on IV antibiotics and her family reports that the vomiting has stopped and her abdominal distension is less. Past Medical History:        Diagnosis Date    Diabetes mellitus ([address]     H/O cardiac catheterization 05/18/2021    no significant CAD in main vessels, has severe stenosis in small  branch diagonal vessel    H/O cardiovascular stress test 3/22/18,03/30/2017    ECG portion of the stress test is negative for ischemia EF >70% Normal stress myocardial perfusion.  H/O cardiovascular stress test 04/07/2021    abnormal stress    H/O Doppler ultrasound (Abdomimal Aorta) 03/29/2017    No evidence of abdominal aortic aneurysm.  H/O echocardiogram 07/02/2014    Normal left ventricular size, wall motion and systolic function. Mildle elevated pulmonary artery systolic pressure. Mild MR and TR and trace AR EF 55 to 60%    Hx of echocardiogram 03/29/2017    EF 55-60%. Grade I diastolic dysfunction. Mildly dilated left atrium. No evidence of pericardial effusion.      Hyperlipidemia     Hypertension     Sleep apnea     Thyroid disease        Past Surgical History:        Procedure Laterality Date    BREAST BIOPSY Right     approx age 76, benign    CATARACT REMOVAL      CHOLECYSTECTOMY      DILATION AND CURETTAGE OF UTERUS      OTHER SURGICAL HISTORY      eye lift       Medications Prior to Admission:    Prior to Admission medications    Medication Sig Start Date End Date Taking? Authorizing Provider   promethazine (PHENERGAN) 25 MG tablet Take 25 mg by mouth every 6 hours as needed for Nausea    Historical Provider, MD   loperamide (IMODIUM A-D) 2 MG tablet Take 2 mg by mouth as needed for Diarrhea    Historical Provider, MD   melatonin 3 MG TABS tablet Take 3 mg by mouth nightly as needed    Historical Provider, MD   levocetirizine (XYZAL) 5 MG tablet 1 tablet daily 3/27/21   Historical Provider, MD   magnesium oxide (MAG-OX) 400 (241.3 Mg) MG TABS tablet daily 2/24/21   Historical Provider, MD   albuterol sulfate  (90 Base) MCG/ACT inhaler as needed 2/24/21   Historical Provider, MD   vitamin B-12 (CYANOCOBALAMIN) 100 MCG tablet Take 100 mcg by mouth daily    Historical Provider, MD   LANT SOLOSTAR 100 UNIT/ML injection pen  1/31/19   Historical Provider, MD   Calcium-Magnesium-Vitamin D (CALCIUM 1200+D3 PO) Take by mouth daily    Historical Provider, MD   Cholecalciferol (VITAMIN D PO) Take by mouth    Historical Provider, MD   oxybutynin (DITROPAN) 5 MG tablet Take 5 mg by mouth 2 times daily    Historical Provider, MD   atorvastatin (LIPITOR) 20 MG tablet Take 20 mg by mouth daily    Historical Provider, MD   rOPINIRole (REQUIP) 3 MG tablet Take 3 mg by mouth nightly    Historical Provider, MD   levothyroxine (SYNTHROID) 137 MCG tablet Take 137 mcg by mouth daily  3/13/17   Historical Provider, MD   Insulin Pen Needle (PEN NEEDLES 31GX5/16\") 31G X 8 MM MISC  3/11/16   Historical Provider, MD   losartan (COZAAR) 100 MG tablet Take 100 mg by mouth daily    Historical Provider, MD   metoprolol (LOPRESSOR) 50 MG tablet 3 pills in the AM : 150 mg dose  2 pills in the PM: 100 mg dose    Historical Provider, MD       Allergies:     Allergies   Allergen Reactions    Lopid [Gemfibrozil] Shortness Of Breath and Other (See Comments)     Cough    Bystolic [Nebivolol Hcl]     Cefdinir     Coreg [Carvedilol]     Crestor [Rosuvastatin]     Fenofibrate     Glucophage [Metformin]     Hydrochlorothiazide Other (See Comments)    Metronidazole     Norvasc [Amlodipine Besylate]     Tribenzor [Olmesartan-Amlodipine-Hctz]      Pt states that her chest felt funny and achy when she took the medication    Zocor [Simvastatin]    . Social History:    TOBACCO:  No  ETOH:  No    Family History:   Family History   Problem Relation Age of Onset   [facility] Pacemaker Sister     Arrhythmia Sister     Breast Cancer Sister         pre menopausal    Ovarian Cancer Neg Hx        REVIEW OF SYSTEMS: (POSITIVES WILL BE UNDERLINED)  CONSTITUTIONAL:    Weight change,fatigue, fever, chills  EYES:  Diplopia, change in vision  EARS:  hearing loss, tinnitus, vertigo  NOSE:   epistaxis  MOUTH/THROAT:     hoarseness, sore throat. RESPIRATORY:  SOB,  cough, sputum, hemoptysis  CARDIOVASCULAR : chest pain,palpitations, dyspnea exertion, orthopnea, paroxysmal nocturnal dyspnea, pedal edema. GASTROINTESTINAL:  See HPI  GENITOURINARY:   dysuria, hematuria, . HEMATOLOGIC/LYMPHATIC:   Anemia, bleeding tendencies.   MUSCULOSKELETAL:    myalgias,  joint pain  NEUROLOGICAL:   Loss of Consciousness, paresthesias, anesthesias, focal weakness  SKIN :   History of dermatitis, rashes  PSYCHIATRIC:  depression, , anxiety past psychosis  ENDOCRINE:  History of diabetes, thyroid disease  ALL/IMM : allergies, rashes    PHYSICAL EXAM:    Vitals:  BP (!) 143/60   Pulse 54   Temp 97.7 °F (36.5 °C) (Oral)   Resp 18   Ht 5' (1.524 m)   Wt 146 lb 6.2 oz (66.4 kg)   SpO2 99%   BMI 28.59 kg/m²   CONSTITUTIONAL: alert, cooperative, no apparent distress,   EYES:  pupils equal, round and reactive to light and sclera clear  ENT:  normocepalic, without obvious abnormality  NECK:  supple, symmetrical, trachea midline  HEMATOLOGIC/LYMPHATICS:  no cervical lymphadenopathy and no supraclavicular lymphadenopathy  LUNGS:  clear to auscultation  CARDIOVASCULAR:  regular rate and rhythm and no murmur noted  ABDOMEN: Slightly distended, non tender hypoactive bowel sounds. . No organomegaly or masses  NEUROLOGIC: no focal deficit detected  SKIN:  no lesions  EXTREMITIES: no clubbing, cyanosis, or edema    IMPRESSION:  1) Suspect mass is inflammatory/infectious, sbo seems to be resolving on antibiotics. Given age of patient and recent hx of diverticulitis colonoscopy is contraindicated for the next several weeks and there would be a substantially higher rate of perforation. RECOMMENDATIONS:  1) Continue IV antibiotics, keep npo  2) Surgical consult.          Electronically signed by Timmy Saunders MD on 5/22/2022 at 12:23 PM

## 2022-05-23 LAB
GLUCOSE BLD-MCNC: 100 MG/DL (ref 70–99)
GLUCOSE BLD-MCNC: 130 MG/DL (ref 70–99)
GLUCOSE BLD-MCNC: 147 MG/DL (ref 70–99)
GLUCOSE BLD-MCNC: 64 MG/DL (ref 70–99)
GLUCOSE BLD-MCNC: 73 MG/DL (ref 70–99)
GLUCOSE BLD-MCNC: 81 MG/DL (ref 70–99)
GLUCOSE BLD-MCNC: 85 MG/DL (ref 70–99)
GLUCOSE BLD-MCNC: 92 MG/DL (ref 70–99)
GLUCOSE BLD-MCNC: 94 MG/DL (ref 70–99)
GLUCOSE BLD-MCNC: 96 MG/DL (ref 70–99)
GLUCOSE BLD-MCNC: 97 MG/DL (ref 70–99)

## 2022-05-23 PROCEDURE — 99232 SBSQ HOSP IP/OBS MODERATE 35: CPT | Performed by: SURGERY

## 2022-05-23 PROCEDURE — 82962 GLUCOSE BLOOD TEST: CPT

## 2022-05-23 PROCEDURE — 2580000003 HC RX 258: Performed by: STUDENT IN AN ORGANIZED HEALTH CARE EDUCATION/TRAINING PROGRAM

## 2022-05-23 PROCEDURE — 6370000000 HC RX 637 (ALT 250 FOR IP): Performed by: STUDENT IN AN ORGANIZED HEALTH CARE EDUCATION/TRAINING PROGRAM

## 2022-05-23 PROCEDURE — 6360000002 HC RX W HCPCS: Performed by: STUDENT IN AN ORGANIZED HEALTH CARE EDUCATION/TRAINING PROGRAM

## 2022-05-23 PROCEDURE — 94761 N-INVAS EAR/PLS OXIMETRY MLT: CPT

## 2022-05-23 PROCEDURE — 2580000003 HC RX 258: Performed by: INTERNAL MEDICINE

## 2022-05-23 PROCEDURE — 1200000000 HC SEMI PRIVATE

## 2022-05-23 RX ADMIN — OXYBUTYNIN CHLORIDE 5 MG: 5 TABLET ORAL at 09:34

## 2022-05-23 RX ADMIN — OXYBUTYNIN CHLORIDE 5 MG: 5 TABLET ORAL at 21:17

## 2022-05-23 RX ADMIN — PIPERACILLIN AND TAZOBACTAM 4500 MG: 4; .5 INJECTION, POWDER, FOR SOLUTION INTRAVENOUS at 00:19

## 2022-05-23 RX ADMIN — PIPERACILLIN AND TAZOBACTAM 4500 MG: 4; .5 INJECTION, POWDER, FOR SOLUTION INTRAVENOUS at 09:43

## 2022-05-23 RX ADMIN — SODIUM CHLORIDE, PRESERVATIVE FREE 10 ML: 5 INJECTION INTRAVENOUS at 09:38

## 2022-05-23 RX ADMIN — INSULIN LISPRO 1 UNITS: 100 INJECTION, SOLUTION INTRAVENOUS; SUBCUTANEOUS at 21:17

## 2022-05-23 RX ADMIN — PIPERACILLIN AND TAZOBACTAM 4500 MG: 4; .5 INJECTION, POWDER, FOR SOLUTION INTRAVENOUS at 16:27

## 2022-05-23 RX ADMIN — Medication 200 MG: at 09:35

## 2022-05-23 RX ADMIN — LEVOTHYROXINE SODIUM 137 MCG: 25 TABLET ORAL at 05:31

## 2022-05-23 RX ADMIN — LOSARTAN POTASSIUM 100 MG: 100 TABLET, FILM COATED ORAL at 09:34

## 2022-05-23 RX ADMIN — HEPARIN SODIUM 5000 UNITS: 5000 INJECTION INTRAVENOUS; SUBCUTANEOUS at 05:31

## 2022-05-23 RX ADMIN — SODIUM CHLORIDE: 9 INJECTION, SOLUTION INTRAVENOUS at 21:37

## 2022-05-23 RX ADMIN — ROPINIROLE HYDROCHLORIDE 3 MG: 1 TABLET, FILM COATED ORAL at 21:16

## 2022-05-23 RX ADMIN — CETIRIZINE HYDROCHLORIDE 10 MG: 10 TABLET, FILM COATED ORAL at 09:37

## 2022-05-23 RX ADMIN — ATORVASTATIN CALCIUM 20 MG: 10 TABLET, FILM COATED ORAL at 09:35

## 2022-05-23 RX ADMIN — HEPARIN SODIUM 5000 UNITS: 5000 INJECTION INTRAVENOUS; SUBCUTANEOUS at 21:15

## 2022-05-23 RX ADMIN — INSULIN GLARGINE 15 UNITS: 100 INJECTION, SOLUTION SUBCUTANEOUS at 21:28

## 2022-05-23 RX ADMIN — HEPARIN SODIUM 5000 UNITS: 5000 INJECTION INTRAVENOUS; SUBCUTANEOUS at 14:03

## 2022-05-23 RX ADMIN — Medication 100 MCG: at 09:37

## 2022-05-23 RX ADMIN — Medication 1000 UNITS: at 09:36

## 2022-05-23 ASSESSMENT — PAIN SCALES - GENERAL: PAINLEVEL_OUTOF10: 3

## 2022-05-23 NOTE — PROGRESS NOTES
[facility] HOSPITALIST PROGRESS NOTE      PCP: Massiel Morrell DO    Date of Admission: 5/22/2022    Subjective: Feels hungry     Brief Hospital summary patient is an 80-year-old female with history of CKD stage IIIa, diabetes mellitus, hypothyroidism, sleep apnea and vitamin B12 deficiency who was admitted with abdominal pain. CT abdomen at outside hospital showed diffuse small bowel distention with apparent large ileocecal valve. Recently was treated with diverticulitis  Zosyn continued, surgery and GI consulted    Vitals signs:  Afebrile, heart rate 50s to 70s range, blood pressure 135/48, on room air    Medications: Lipitor, sertraline, heparin, Lantus, levothyroxine, losartan, metoprolol, Zosyn, ropinirole  , Normal saline at 75 mL/h    Antibiotics: Zosyn day 2    Fluid status: Not documented    Labs:   Labs from yesterday reviewed, creatinine normal, leukocytosis improved    Imaging:   CT abdomen  Diffuse small bowel fluid-filled distention likely secondary to a mass at   the ileocecal valve.  A pill is also lodged within the ileocecal valve which   also may be causing a component of obstruction. 2. Cystic lesion associated with the right adnexa measuring 2.5 x 1.7 cm. Finding is of unclear etiology and clinical significance.  Differential   considerations include abscess from prior diverticulitis, metastatic deposit,   or ovarian cyst.   3. Severe atherosclerosis. Ultrasound abdomen showed 2.7 cm complex right adnexal mass, differential includes complex ovarian cyst versus tubo-ovarian abscess versus pericolonic abscess.         Assessment/Plan:     Cecal mass  Partial small bowel obstruction:  Admitted with abdominal pain, CT abdomen showed small bowel obstruction with a likely cecal mass  Ultrasound pelvis shows right adnexal mass, complex ovarian cyst versus tubo-ovarian abscess versus pericolonic abscess  MRI abdomen ordered  Continue Zosyn  Appreciate GI and surgery consultation  Gentle hydration  Currently n.p.o. Advance diet per surgery    Hyponatremia  Sodium 130 on admission,  Improved with hydration  Continue gentle hydration      Diabetes mellitus type 2  Glucose 73-1 42 range, sliding scale insulin      Restless leg syndrome  Continue ropinirole    Hypothyroidism  Continue Synthroid    Essential hypertension  Continue losartan and metoprolol, as needed hydralazine    Mood disorder  Continue sertraline    Hyperlipidemia  Statin      DVT prophlaxis:   Heparin      Physical Exam Performed:       BP (!) 135/48   Pulse 78   Temp 98.8 °F (37.1 °C) (Oral)   Resp 16   Ht 5' (1.524 m)   Wt 147 lb 11.3 oz (67 kg)   SpO2 96%   BMI 28.85 kg/m²     Physical Exam  Constitutional:       General: She is not in acute distress. Appearance: Normal appearance. HENT:      Head: Normocephalic and atraumatic. Right Ear: External ear normal.      Left Ear: External ear normal.   Eyes:      Extraocular Movements: Extraocular movements intact. Pupils: Pupils are equal, round, and reactive to light. Cardiovascular:      Rate and Rhythm: Normal rate and regular rhythm. Heart sounds: No murmur heard. Pulmonary:      Effort: Pulmonary effort is normal. No respiratory distress. Breath sounds: Normal breath sounds. No wheezing. Abdominal:      General: Bowel sounds are normal. There is no distension. Palpations: Abdomen is soft. Tenderness: There is no abdominal tenderness. There is no guarding. Musculoskeletal:         General: No swelling. Cervical back: Normal range of motion. Skin:     General: Skin is warm. Neurological:      General: No focal deficit present. Mental Status: She is alert and oriented to person, place, and time. Cranial Nerves: No cranial nerve deficit.    Psychiatric:         Mood and Affect: Mood normal.         Labs:   Recent Labs     05/21/22  2100 05/22/22  0908   WBC 12.9* 8.2   HGB 12.2* 11.8*   HCT 37.4 36.9*    202     Recent Labs     05/21/22  2100 05/22/22  0908   * 131*   K 5.0 4.5   CL 95* 98*   CO2 26 23   BUN 14 11   CREATININE 0.9 0.8   CALCIUM 9.5 8.5     Recent Labs     05/21/22  2100 05/22/22  0908   AST 23 26   ALT 17 17   BILITOT 0.4 0.4   ALKPHOS 48 43     No results for input(s): INR in the last 72 hours. No results for input(s): Monty Dilan in the last 72 hours. Urinalysis:      Lab Results   Component Value Date    NITRU NEGATIVE 05/22/2022    WBCUA 2 05/22/2022    BACTERIA NEGATIVE 05/22/2022    RBCUA NONE SEEN 05/22/2022    BLOODU NEGATIVE 05/22/2022    SPECGRAV <1.005 05/22/2022       Radiology:  US PELVIS COMPLETE   Preliminary Result   1. 2.7 cm complex hypoechoic lesion in the right adnexa/ovary correlating to   recent CT. Differential considerations include a complex cystic ovarian   lesion, tubo-ovarian abscess or pericolonic abscess given adjacent colon. Recommend a short-term follow-up to ensure complete resolution. If findings   persist, a follow-up MRI may be warranted. 2. Nonvisualization of the left ovary or endometrium. 3. Probable 2.7 cm intramural fibroid. US DUP ABD PEL RETRO SCROT COMPLETE   Preliminary Result   1. 2.7 cm complex hypoechoic lesion in the right adnexa/ovary correlating to   recent CT. Differential considerations include a complex cystic ovarian   lesion, tubo-ovarian abscess or pericolonic abscess given adjacent colon. Recommend a short-term follow-up to ensure complete resolution. If findings   persist, a follow-up MRI may be warranted. 2. Nonvisualization of the left ovary or endometrium. 3. Probable 2.7 cm intramural fibroid.          MRI PELVIS W CONTRAST    (Results Pending)   MRI ABDOMEN W CONTRAST    (Results Pending)           Lucía Rubin MD  5/23/2022 7:33 AM

## 2022-05-23 NOTE — CARE COORDINATION
Met c pt to initiate discharge planning. Pt states she lives at home with spouse, remains ind with ADLs including driving. Pt has pcp/ active insurance and uses no DME. Pt states she has good support available if needed- goal is to return home and does not anticipate needs at this time. Encouraged pt to walk/ sit in chair as much as possible to maintain strength and ability. Advised CM available if needs arise.

## 2022-05-23 NOTE — PROGRESS NOTES
[facility]    PATIENT: Reg Alcala, 80 y.o., female, MRN: [id number]    Hospital Day:  LOS: 1 day     Reg Alcala is a 80 y.o. female with abdominal pain, imaging concerning for a mass at the ileocecal valve, and a right adnexal lesion              Subjective:  Chief Complaint: abdominal pain  Pain: controlled  BM: yes   Diet: ADULT DIET; Clear Liquid  Activity: as tolerated    Stable overnight. Her pain is some better, but still sore. Denies N/V. Having BMs    Objective:    Vitals: BP (!) 136/52   Pulse 56   Temp 98.1 °F (36.7 °C)   Resp 16   Ht 5' (1.524 m)   Wt 147 lb 11.3 oz (67 kg)   SpO2 97%   BMI 28.85 kg/m²   Vital Signs (Last 24 Hours)  Temp  Av.1 °F (36.7 °C)  Min: 97.3 °F (36.3 °C)  Max: 98.8 °F (37.1 °C)  Pulse  Av.3  Min: 51  Max: 78  BP  Min: 131/39  Max: 152/48  Resp  Av  Min: 16  Max: 16  SpO2  Av.8 %  Min: 96 %  Max: 98 %  Wt Readings from Last 3 Encounters:   22 147 lb 11.3 oz (67 kg)   22 145 lb (65.8 kg)   21 150 lb 9.6 oz (68.3 kg)       I/O: No intake/output data recorded.     IV Fluids: dextrose Last Rate: 100 mL/hr (22)    sodium chloride    sodium chloride Last Rate: 75 mL/hr at 22 1824    Scheduled Meds: atorvastatin, 20 mg, Oral, Daily    Vitamin D, 1,000 Units, Oral, Daily    insulin glargine, 15 Units, SubCUTAneous, Nightly    cetirizine, 10 mg, Oral, Daily    levothyroxine, 137 mcg, Oral, Daily    losartan, 100 mg, Oral, Daily    magnesium oxide, 200 mg, Oral, Daily    metoprolol tartrate, 150 mg, Oral, Daily    metoprolol tartrate, 100 mg, Oral, Nightly    oxybutynin, 5 mg, Oral, BID    rOPINIRole, 3 mg, Oral, Nightly    vitamin B-12, 100 mcg, Oral, Daily    insulin lispro, 0-6 Units, SubCUTAneous, Q4H    sodium chloride flush, 5-40 mL, IntraVENous, 2 times per day    piperacillin-tazobactam, 4,500 mg, IntraVENous, Q8H    heparin (porcine), 5,000 Units, SubCUTAneous, 3 times per day    Physical Exam:  General Appearance:   Alert, cooperative, no distress    Head:   Normocephalic, atraumatic    Lungs:    Equal chest rise, respirations unlabored   Heart:   Regular rate and rhythm    Abdomen:    Soft, mildly tender lower abdomen, no rebound or guarding    Extremities:  No cyanosis or edema    Neurologic:  Nonfocal, grossly intact        Labs/Imaging Results:   Recent Results (from the past 24 hour(s))   POCT Glucose    Collection Time: 05/22/22  5:11 PM   Result Value Ref Range    POC Glucose 78 70 - 99 MG/DL   Blood gas, arterial    Collection Time: 05/22/22  6:30 PM   Result Value Ref Range    pH, Bld 7.36 7.34 - 7.45    pCO2, Arterial 42.0 32 - 45 MMHG    pO2, Arterial 78 75 - 100 MMHG    Base Excess 2 0 - 2.4    HCO3, Arterial 23.7 (H) 18 - 23 MMOL/L    CO2 Content 25.0 (H) 19 - 24 MMOL/L    O2 Sat 94.1 (L) 96 - 97 %    Carbon Monoxide, Blood 2.1 0 - 5 %    Methemoglobin, Arterial 1.0 <1.5 %    Comment ROOM AIR    POCT Glucose    Collection Time: 05/22/22  9:11 PM   Result Value Ref Range    POC Glucose 52 (L) 70 - 99 MG/DL   POCT Glucose    Collection Time: 05/22/22  9:39 PM   Result Value Ref Range    POC Glucose 66 (L) 70 - 99 MG/DL   POCT Glucose    Collection Time: 05/22/22  9:51 PM   Result Value Ref Range    POC Glucose 83 70 - 99 MG/DL   POCT Glucose    Collection Time: 05/22/22 10:22 PM   Result Value Ref Range    POC Glucose 142 (H) 70 - 99 MG/DL   POCT Glucose    Collection Time: 05/22/22 11:35 PM   Result Value Ref Range    POC Glucose 134 (H) 70 - 99 MG/DL   POCT Glucose    Collection Time: 05/23/22 12:27 AM   Result Value Ref Range    POC Glucose 130 (H) 70 - 99 MG/DL   POCT Glucose    Collection Time: 05/23/22  1:49 AM   Result Value Ref Range    POC Glucose 100 (H) 70 - 99 MG/DL   POCT Glucose    Collection Time: 05/23/22  2:51 AM   Result Value Ref Range    POC Glucose 81 70 - 99 MG/DL   POCT Glucose    Collection Time: 05/23/22  4:07 AM Result Value Ref Range    POC Glucose 97 70 - 99 MG/DL   POCT Glucose    Collection Time: 05/23/22  5:35 AM   Result Value Ref Range    POC Glucose 94 70 - 99 MG/DL   POCT Glucose    Collection Time: 05/23/22  6:34 AM   Result Value Ref Range    POC Glucose 64 (L) 70 - 99 MG/DL   POCT Glucose    Collection Time: 05/23/22  6:39 AM   Result Value Ref Range    POC Glucose 73 70 - 99 MG/DL   POCT Glucose    Collection Time: 05/23/22  7:51 AM   Result Value Ref Range    POC Glucose 85 70 - 99 MG/DL   POCT Glucose    Collection Time: 05/23/22 11:46 AM   Result Value Ref Range    POC Glucose 92 70 - 99 MG/DL       Assessment:  Shira Cobos is a 80 y.o. female with abdominal pain, imaging concerning for a mass at the ileocecal valve, and a right adnexal lesion     Principal Problem:    Cecum mass  Resolved Problems:    * No resolved hospital problems. *      Plan:  · Discussed findings and options with Shira Cobos and her family at bedside. 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 Will order a CEA - mildly elevated at 3.5. · Currently does not appear to be obstructed. Will monitor her progress.    · Cystic lesion associated with the right adnexa measuring 2.5 x 1.7 cm - MRI of the abdomen and pelvis pending  · Appreciate GI recommendations - tentatively colonoscopy later this week   · Will continue to follow  · Thank you for the consultation and the opportunity to care for Shira Cobos      Electronically signed: Jose M Cortes MD 5/23/2022 3:19 PM

## 2022-05-23 NOTE — PROGRESS NOTES
[facility]        Progress Note       2022  12:12 PM    Patient:    Reg Alcala  : 1934   80 y.o. MRN: [id number]  Admitted: 2022  2:15 AM ATT: [place] MD Temo   [id number]  AdmitDx: Cecum mass [K63.89]  PCP: Jerica Aj DO    SUBJECTIVE:  Chart reviewed, events noted  Patient feeling better. Syill some generalized abdominal pain, but improved. Liquid BM this morning. NPO. No nausea or vomiting  in the past 24 hours. Family at bedside.    ROS:  The positive ROS will be identified in bold     CONSTITUTIONAL:  Neg  Weight loss, fatigue, fever  MOUTH/THROAT:  Neg  Bleeding gums, hoarseness or sore throat  RESPIRATORY:   Neg SOB, wheeze, cough, hemoptysis or bronchitis  CARDIOVASCULAR:  Neg Chest pain, palpitations, dyspnea on exertion, edema  GASTROINTESTINAL:  SEE HPI  HEMATOLOGIC/LYMPHATIC:  Neg  Anemia, bleeding tendency  MUSCULOSKELETAL: Neg  New myalgias, joint pain, swelling or stiffness  NEUROLOGICAL:  Neg  Loss of Consciousness, memory loss, forgetfulness, periods of confusion, difficulty concentrating, seizures, insomnia, aphasia    SKIN:  Neg No itching, rashes, or sores  PSYCHIATRIC:  Neg Depression, personality changes, anxiety    OBJECTIVE:      BP (!) 131/39   Pulse 51   Temp 97.3 °F (36.3 °C) (Oral)   Resp 16   Ht 5' (1.524 m)   Wt 147 lb 11.3 oz (67 kg)   SpO2 96%   BMI 28.85 kg/m²     NAD, appears comfortable, sitting up in bed  Lips and mucous membranes pink and moist  RRR, Nl s1s2  Lungs CTA bilaterally, respirations even and unlabored   Abdomen soft, ND, generalized tenderness, no HSM, bowel sounds normal  Skin pink, warm, dry and CR brisk   No edema bilateral lower extremities   AAOx3      CBC: Recent Labs     22  2100 22  0908   WBC 12.9* 8.2   HGB 12.2* 11.8*    202     BMP:    Recent Labs     22  2100 22  0908   * 131*   K 5.0 4.5   CL 95* 98*   CO2 26 23   BUN 14 11   CREATININE 0.9 0.8   GLUCOSE 132* 93     Magnesium: No results found for: MG  Hepatic:   Recent Labs     05/21/22  2100 05/22/22  0908   AST 23 26   ALT 17 17   BILITOT 0.4 0.4   ALKPHOS 48 43     INR: No results for input(s): INR in the last 72 hours. No intake or output data in the 24 hours ending 05/23/22 1212      Medications    Scheduled Medications:    atorvastatin  20 mg Oral Daily    Vitamin D  1,000 Units Oral Daily    insulin glargine  15 Units SubCUTAneous Nightly    cetirizine  10 mg Oral Daily    levothyroxine  137 mcg Oral Daily    losartan  100 mg Oral Daily    magnesium oxide  200 mg Oral Daily    metoprolol tartrate  150 mg Oral Daily    metoprolol tartrate  100 mg Oral Nightly    oxybutynin  5 mg Oral BID    rOPINIRole  3 mg Oral Nightly    vitamin B-12  100 mcg Oral Daily    insulin lispro  0-6 Units SubCUTAneous Q4H    sodium chloride flush  5-40 mL IntraVENous 2 times per day    piperacillin-tazobactam  4,500 mg IntraVENous Q8H    heparin (porcine)  5,000 Units SubCUTAneous 3 times per day     PRN Medications: albuterol sulfate HFA, glucose, dextrose bolus **OR** dextrose bolus, glucagon (rDNA), dextrose, sodium chloride flush, sodium chloride, ondansetron **OR** ondansetron, polyethylene glycol, acetaminophen **OR** acetaminophen  Infusions:    dextrose 100 mL/hr (05/22/22 2156)    sodium chloride      sodium chloride 75 mL/hr at 05/22/22 1824         Patient Active Problem List   Diagnosis Code    Long-term insulin use in type 2 diabetes (HCC) E11.9, Z79.4    Essential hypertension I10    Dyslipidemia E78.5    Abnormal EKG R94.31    Abnormal stress test R94.39    Cecum mass K63.89        ASSESSMENT AND RECOMMENDATIONS:   Ct abdomen:  1. Diffuse small bowel fluid-filled distention likely secondary to a mass at   the ileocecal valve.  A pill is also lodged within the ileocecal valve which   also may be causing a component of obstruction.    2. Cystic lesion associated with the right adnexa measuring 2.5 x 1.7 cm. Finding is of unclear etiology and clinical significance.  Differential   considerations include abscess from prior diverticulitis, metastatic deposit,   or ovarian cyst.   3. Severe atherosclerosis.         Cecal mass:  From imaging  Recent diverticulitis, just finished antibiotics 5/16/2022  Abdominal pain improved, nausea and vomiting resolved     Recommend:   Start clear liquid diet today  KUB tomorrow, check on  Possible obstruction component from pill  Plan for Colonoscopy Wednesday or Thursday to evaluate cecal mass, and obtain biopsy    Discussed plan of care with patient and family     Patient clinical, biochemical, and radiological information discussed with Dr. Sue Powell. He agrees with the assessment and plan. Suzanne Barone, LAURE - CNP, CNP  5/23/2022  12:12 PM     Abnormal CT  Cecal mass  Subacute bowel obstruction improving  Will plan colonoscopy soon      I have seen and examined this patient personally, and independently of the nurse practitioner. The plan was developed mutually at the time of the visit with the patient. Nicanor Coto and myself have spoken with patient, nursing staff and provided written and verbal instructions .     The above note has been reviewed and I agree with the Assessment,  Diagnosis, and Treatment plan as suggested by [facility]

## 2022-05-24 ENCOUNTER — ANESTHESIA EVENT (OUTPATIENT)
Dept: ENDOSCOPY | Age: 87
DRG: 329 | End: 2022-05-24
Payer: MEDICARE

## 2022-05-24 LAB
ALBUMIN SERPL-MCNC: 3.3 GM/DL (ref 3.4–5)
ALP BLD-CCNC: 42 IU/L (ref 40–128)
ALT SERPL-CCNC: 12 U/L (ref 10–40)
ANION GAP SERPL CALCULATED.3IONS-SCNC: 12 MMOL/L (ref 4–16)
AST SERPL-CCNC: 18 IU/L (ref 15–37)
BILIRUB SERPL-MCNC: 0.3 MG/DL (ref 0–1)
BUN BLDV-MCNC: 5 MG/DL (ref 6–23)
CALCIUM SERPL-MCNC: 8.5 MG/DL (ref 8.3–10.6)
CHLORIDE BLD-SCNC: 102 MMOL/L (ref 99–110)
CO2: 21 MMOL/L (ref 21–32)
CREAT SERPL-MCNC: 0.7 MG/DL (ref 0.6–1.1)
GFR AFRICAN AMERICAN: >60 ML/MIN/1.73M2
GFR NON-AFRICAN AMERICAN: >60 ML/MIN/1.73M2
GLUCOSE BLD-MCNC: 102 MG/DL (ref 70–99)
GLUCOSE BLD-MCNC: 108 MG/DL (ref 70–99)
GLUCOSE BLD-MCNC: 78 MG/DL (ref 70–99)
GLUCOSE BLD-MCNC: 84 MG/DL (ref 70–99)
GLUCOSE BLD-MCNC: 84 MG/DL (ref 70–99)
GLUCOSE BLD-MCNC: 93 MG/DL (ref 70–99)
GLUCOSE BLD-MCNC: 95 MG/DL (ref 70–99)
GLUCOSE BLD-MCNC: 98 MG/DL (ref 70–99)
HCT VFR BLD CALC: 36.6 % (ref 37–47)
HEMOGLOBIN: 11.7 GM/DL (ref 12.5–16)
MCH RBC QN AUTO: 29.3 PG (ref 27–31)
MCHC RBC AUTO-ENTMCNC: 32 % (ref 32–36)
MCV RBC AUTO: 91.5 FL (ref 78–100)
PDW BLD-RTO: 12.8 % (ref 11.7–14.9)
PLATELET # BLD: 206 K/CU MM (ref 140–440)
PMV BLD AUTO: 10 FL (ref 7.5–11.1)
POTASSIUM SERPL-SCNC: 3.8 MMOL/L (ref 3.5–5.1)
RBC # BLD: 4 M/CU MM (ref 4.2–5.4)
SODIUM BLD-SCNC: 135 MMOL/L (ref 135–145)
TOTAL PROTEIN: 5.5 GM/DL (ref 6.4–8.2)
WBC # BLD: 5.5 K/CU MM (ref 4–10.5)

## 2022-05-24 PROCEDURE — 82962 GLUCOSE BLOOD TEST: CPT

## 2022-05-24 PROCEDURE — 85027 COMPLETE CBC AUTOMATED: CPT

## 2022-05-24 PROCEDURE — 2580000003 HC RX 258: Performed by: STUDENT IN AN ORGANIZED HEALTH CARE EDUCATION/TRAINING PROGRAM

## 2022-05-24 PROCEDURE — 6360000002 HC RX W HCPCS: Performed by: HOSPITALIST

## 2022-05-24 PROCEDURE — 6360000002 HC RX W HCPCS: Performed by: STUDENT IN AN ORGANIZED HEALTH CARE EDUCATION/TRAINING PROGRAM

## 2022-05-24 PROCEDURE — 36415 COLL VENOUS BLD VENIPUNCTURE: CPT

## 2022-05-24 PROCEDURE — 2580000003 HC RX 258: Performed by: INTERNAL MEDICINE

## 2022-05-24 PROCEDURE — 99232 SBSQ HOSP IP/OBS MODERATE 35: CPT | Performed by: SURGERY

## 2022-05-24 PROCEDURE — 94761 N-INVAS EAR/PLS OXIMETRY MLT: CPT

## 2022-05-24 PROCEDURE — 6370000000 HC RX 637 (ALT 250 FOR IP): Performed by: STUDENT IN AN ORGANIZED HEALTH CARE EDUCATION/TRAINING PROGRAM

## 2022-05-24 PROCEDURE — 6370000000 HC RX 637 (ALT 250 FOR IP): Performed by: NURSE PRACTITIONER

## 2022-05-24 PROCEDURE — 1200000000 HC SEMI PRIVATE

## 2022-05-24 PROCEDURE — 80053 COMPREHEN METABOLIC PANEL: CPT

## 2022-05-24 RX ORDER — ENOXAPARIN SODIUM 100 MG/ML
40 INJECTION SUBCUTANEOUS DAILY
Status: DISCONTINUED | OUTPATIENT
Start: 2022-05-24 | End: 2022-05-27

## 2022-05-24 RX ORDER — PROMETHAZINE HYDROCHLORIDE 12.5 MG/1
12.5 TABLET ORAL EVERY 6 HOURS PRN
Status: DISCONTINUED | OUTPATIENT
Start: 2022-05-24 | End: 2022-06-09 | Stop reason: HOSPADM

## 2022-05-24 RX ADMIN — OXYBUTYNIN CHLORIDE 5 MG: 5 TABLET ORAL at 20:49

## 2022-05-24 RX ADMIN — BISACODYL 10 MG: 5 TABLET, COATED ORAL at 13:50

## 2022-05-24 RX ADMIN — ATORVASTATIN CALCIUM 20 MG: 10 TABLET, FILM COATED ORAL at 09:01

## 2022-05-24 RX ADMIN — BISACODYL 10 MG: 5 TABLET, COATED ORAL at 16:44

## 2022-05-24 RX ADMIN — PIPERACILLIN AND TAZOBACTAM 4500 MG: 4; .5 INJECTION, POWDER, FOR SOLUTION INTRAVENOUS at 09:00

## 2022-05-24 RX ADMIN — PIPERACILLIN AND TAZOBACTAM 4500 MG: 4; .5 INJECTION, POWDER, FOR SOLUTION INTRAVENOUS at 16:48

## 2022-05-24 RX ADMIN — PROMETHAZINE HYDROCHLORIDE 12.5 MG: 12.5 TABLET ORAL at 09:01

## 2022-05-24 RX ADMIN — HEPARIN SODIUM 5000 UNITS: 5000 INJECTION INTRAVENOUS; SUBCUTANEOUS at 05:35

## 2022-05-24 RX ADMIN — PIPERACILLIN AND TAZOBACTAM 4500 MG: 4; .5 INJECTION, POWDER, FOR SOLUTION INTRAVENOUS at 00:23

## 2022-05-24 RX ADMIN — LOSARTAN POTASSIUM 100 MG: 100 TABLET, FILM COATED ORAL at 09:01

## 2022-05-24 RX ADMIN — OXYBUTYNIN CHLORIDE 5 MG: 5 TABLET ORAL at 09:01

## 2022-05-24 RX ADMIN — Medication 100 MCG: at 09:03

## 2022-05-24 RX ADMIN — SODIUM CHLORIDE: 9 INJECTION, SOLUTION INTRAVENOUS at 13:56

## 2022-05-24 RX ADMIN — CETIRIZINE HYDROCHLORIDE 10 MG: 10 TABLET, FILM COATED ORAL at 09:01

## 2022-05-24 RX ADMIN — BISACODYL 10 MG: 5 TABLET, COATED ORAL at 09:01

## 2022-05-24 RX ADMIN — METOPROLOL TARTRATE 150 MG: 50 TABLET, FILM COATED ORAL at 09:01

## 2022-05-24 RX ADMIN — ENOXAPARIN SODIUM 40 MG: 100 INJECTION SUBCUTANEOUS at 09:02

## 2022-05-24 RX ADMIN — Medication 1000 UNITS: at 09:02

## 2022-05-24 RX ADMIN — ROPINIROLE HYDROCHLORIDE 3 MG: 1 TABLET, FILM COATED ORAL at 20:49

## 2022-05-24 RX ADMIN — LEVOTHYROXINE SODIUM 137 MCG: 25 TABLET ORAL at 05:35

## 2022-05-24 RX ADMIN — POLYETHYLENE GLYCOL 3350, SODIUM SULFATE ANHYDROUS, SODIUM BICARBONATE, SODIUM CHLORIDE, POTASSIUM CHLORIDE 4000 ML: 236; 22.74; 6.74; 5.86; 2.97 POWDER, FOR SOLUTION ORAL at 12:03

## 2022-05-24 RX ADMIN — Medication 200 MG: at 09:01

## 2022-05-24 RX ADMIN — METOPROLOL TARTRATE 100 MG: 50 TABLET, FILM COATED ORAL at 20:49

## 2022-05-24 ASSESSMENT — PAIN SCALES - GENERAL: PAINLEVEL_OUTOF10: 0

## 2022-05-24 NOTE — PROGRESS NOTES
[facility]    PATIENT: Shira Cobos, 80 y.o., female, MRN: [id number]    Hospital Day:  LOS: 2 days     Shira Cobos is a 80 y.o. female with abdominal pain, imaging concerning for a mass at the ileocecal valve, and a right adnexal lesion              Subjective:  Chief Complaint: abdominal pain  Pain: controlled  BM: yes   Diet: ADULT DIET; Clear Liquid  Activity: as tolerated    Stable overnight. Her pain is some better, but still sore. Denies N/V.  Having BMs    Objective:    Vitals: BP (!) 140/56   Pulse 62   Temp 98 °F (36.7 °C) (Oral)   Resp 16   Ht 5' (1.524 m)   Wt 143 lb (64.9 kg)   SpO2 98%   BMI 27.93 kg/m²   Vital Signs (Last 24 Hours)  Temp  Av °F (36.7 °C)  Min: 97.3 °F (36.3 °C)  Max: 98.4 °F (36.9 °C)  Pulse  Av.4  Min: 51  Max: 62  BP  Min: 131/39  Max: 140/56  Resp  Av.7  Min: 16  Max: 18  SpO2  Av.5 %  Min: 96 %  Max: 99 %  Wt Readings from Last 3 Encounters:   22 143 lb (64.9 kg)   22 145 lb (65.8 kg)   21 150 lb 9.6 oz (68.3 kg)       I/O:  1901 -  0700  In: 60 [P.O.:60]  Out: -     IV Fluids: dextrose Last Rate: 100 mL/hr (22)    sodium chloride    sodium chloride Last Rate: 75 mL/hr at 22    Scheduled Meds: atorvastatin, 20 mg, Oral, Daily    Vitamin D, 1,000 Units, Oral, Daily    insulin glargine, 15 Units, SubCUTAneous, Nightly    cetirizine, 10 mg, Oral, Daily    levothyroxine, 137 mcg, Oral, Daily    losartan, 100 mg, Oral, Daily    magnesium oxide, 200 mg, Oral, Daily    metoprolol tartrate, 150 mg, Oral, Daily    metoprolol tartrate, 100 mg, Oral, Nightly    oxybutynin, 5 mg, Oral, BID    rOPINIRole, 3 mg, Oral, Nightly    vitamin B-12, 100 mcg, Oral, Daily    insulin lispro, 0-6 Units, SubCUTAneous, Q4H    sodium chloride flush, 5-40 mL, IntraVENous, 2 times per day    piperacillin-tazobactam, 4,500 mg, IntraVENous, Q8H    heparin (porcine), 5,000 Units, SubCUTAneous, 3 times per day    Physical Exam:  General Appearance:   Alert, cooperative, no distress    Head:   Normocephalic, atraumatic    Lungs:    Equal chest rise, respirations unlabored   Heart:   Regular rate and rhythm    Abdomen:    Soft, mildly tender lower abdomen, no rebound or guarding    Extremities:  No cyanosis or edema    Neurologic:  Nonfocal, grossly intact        Labs/Imaging Results:   Recent Results (from the past 24 hour(s))   POCT Glucose    Collection Time: 05/23/22  7:51 AM   Result Value Ref Range    POC Glucose 85 70 - 99 MG/DL   POCT Glucose    Collection Time: 05/23/22 11:46 AM   Result Value Ref Range    POC Glucose 92 70 - 99 MG/DL   POCT Glucose    Collection Time: 05/23/22  5:33 PM   Result Value Ref Range    POC Glucose 96 70 - 99 MG/DL   POCT Glucose    Collection Time: 05/23/22  9:19 PM   Result Value Ref Range    POC Glucose 147 (H) 70 - 99 MG/DL   POCT Glucose    Collection Time: 05/24/22 12:22 AM   Result Value Ref Range    POC Glucose 95 70 - 99 MG/DL   POCT Glucose    Collection Time: 05/24/22  4:08 AM   Result Value Ref Range    POC Glucose 93 70 - 99 MG/DL       Assessment:  Stacie Garcia is a 80 y.o. female with abdominal pain, imaging concerning for a mass at the ileocecal valve, and a right adnexal lesion     Principal Problem:    Cecum mass  Resolved Problems:    * No resolved hospital problems. *      Plan:  · Discussed findings and options with Stacie Garcia and her family at bedside. · Possible ileocecal mass - appreciate GI recommendations, planning colonoscopy to help confirm tissue diagnosis. · CEA - mildly elevated at 3.5. Notified patient, could be reactive or due to neoplasm  · Currently does not appear to be obstructed. Will monitor her progress.    · Cystic lesion associated with the right adnexa measuring 2.5 x 1.7 cm - MRI of the abdomen and pelvis pending  · Will continue to follow  · Thank you for the consultation and the opportunity to care for Rhonda Jewell      Electronically signed: Ashley Cronin MD 5/24/2022 7:26 AM

## 2022-05-24 NOTE — PROGRESS NOTES
[facility] HOSPITALIST PROGRESS NOTE      PCP: Nathan Velasquez DO    Date of Admission: 5/22/2022    Subjective: Feels [facility] summary patient is an 59-year-old female with history of CKD stage IIIa, diabetes mellitus, hypothyroidism, sleep apnea and vitamin B12 deficiency who was admitted with abdominal pain. CT abdomen at outside hospital showed diffuse small bowel distention with apparent large ileocecal valve. Recently was treated with diverticulitis  Zosyn continued, surgery and GI consulted   MRI abdomen ordered    Vitals signs:   Afebrile, heart rate 60s range, blood pressure 140/56, on room air    Medications: Lipitor, sertraline, heparin, Lantus, sliding scale insulin, levothyroxine, losartan, mag oxide, metoprolol, Zosyn, ropinirole, normal saline at 75 mill per hour    Antibiotics: Zosyn day 3    Fluid status: Urine output not documented    Labs: Sodium 135, potassium 3.  8, creatinine 0.7, LFTs normal  11.7, platelet 090    Imaging:   CT abdomen  Diffuse small bowel fluid-filled distention likely secondary to a mass at   the ileocecal valve.  A pill is also lodged within the ileocecal valve which   also may be causing a component of obstruction. 2. Cystic lesion associated with the right adnexa measuring 2.5 x 1.7 cm. Finding is of unclear etiology and clinical significance.  Differential   considerations include abscess from prior diverticulitis, metastatic deposit,   or ovarian cyst.   3. Severe atherosclerosis. Ultrasound abdomen showed 2.7 cm complex right adnexal mass, differential includes complex ovarian cyst versus tubo-ovarian abscess versus pericolonic abscess.         Assessment/Plan:     Cecal mass  Partial small bowel obstruction:  Admitted with abdominal pain, CT abdomen showed small bowel obstruction with a likely cecal mass  Ultrasound pelvis shows right adnexal mass, complex ovarian cyst versus tubo-ovarian abscess versus pericolonic abscess  MRI abdomen pending  Continue Zosyn  Appreciate GI and surgery consultation  Gentle hydration  Currently n.p.o. Advance diet per surgery  Colonoscopy planned for tomorrow  Bowel prep today  Hyponatremia  Sodium 130 on admission,  Improving with hydration  Sodium trending    Diabetes mellitus type 2  Glucose 93- 147 range, sliding scale insulin  Hold Lantus    Restless leg syndrome  Continue ropinirole    Hypothyroidism  Continue Synthroid    Essential hypertension  Continue losartan and metoprolol, as needed hydralazine    Mood disorder  Continue sertraline    Hyperlipidemia  Statin      DVT prophlaxis:   Switch heparin to Lovenox      Physical Exam Performed:       BP (!) 140/56   Pulse 62   Temp 98 °F (36.7 °C) (Oral)   Resp 16   Ht 5' (1.524 m)   Wt 143 lb (64.9 kg)   SpO2 98%   BMI 27.93 kg/m²     Physical Exam  Constitutional:       General: She is not in acute distress. Appearance: Normal appearance. HENT:      Head: Normocephalic and atraumatic. Right Ear: External ear normal.      Left Ear: External ear normal.   Eyes:      Extraocular Movements: Extraocular movements intact. Pupils: Pupils are equal, round, and reactive to light. Cardiovascular:      Rate and Rhythm: Normal rate and regular rhythm. Heart sounds: No murmur heard. Pulmonary:      Effort: Pulmonary effort is normal. No respiratory distress. Breath sounds: Normal breath sounds. No wheezing. Abdominal:      General: Bowel sounds are normal. There is no distension. Palpations: Abdomen is soft. Tenderness: There is no abdominal tenderness. There is no guarding. Musculoskeletal:         General: No swelling. Cervical back: Normal range of motion. Skin:     General: Skin is warm. Neurological:      General: No focal deficit present. Mental Status: She is alert and oriented to person, place, and time. Cranial Nerves: No cranial nerve deficit.    Psychiatric: Mood and Affect: Mood normal.         Labs:   Recent Labs     05/21/22  2100 05/22/22  0908   WBC 12.9* 8.2   HGB 12.2* 11.8*   HCT 37.4 36.9*    202     Recent Labs     05/21/22  2100 05/22/22  0908   * 131*   K 5.0 4.5   CL 95* 98*   CO2 26 23   BUN 14 11   CREATININE 0.9 0.8   CALCIUM 9.5 8.5     Recent Labs     05/21/22  2100 05/22/22  0908   AST 23 26   ALT 17 17   BILITOT 0.4 0.4   ALKPHOS 48 43     No results for input(s): INR in the last 72 hours. No results for input(s): Les Bladimir in the last 72 hours. Urinalysis:      Lab Results   Component Value Date    NITRU NEGATIVE 05/22/2022    WBCUA 2 05/22/2022    BACTERIA NEGATIVE 05/22/2022    RBCUA NONE SEEN 05/22/2022    BLOODU NEGATIVE 05/22/2022    SPECGRAV <1.005 05/22/2022       Radiology:  US PELVIS COMPLETE   Final Result   1. 2.7 cm complex hypoechoic lesion in the right adnexa/ovary correlating to   recent CT. Differential considerations include a complex cystic ovarian   lesion, tubo-ovarian abscess or pericolonic abscess given adjacent colon. Recommend a short-term follow-up to ensure complete resolution. If findings   persist, a follow-up MRI may be warranted. 2. Nonvisualization of the left ovary or endometrium. 3. Probable 2.7 cm intramural fibroid. US DUP ABD PEL RETRO SCROT COMPLETE   Final Result   1. 2.7 cm complex hypoechoic lesion in the right adnexa/ovary correlating to   recent CT. Differential considerations include a complex cystic ovarian   lesion, tubo-ovarian abscess or pericolonic abscess given adjacent colon. Recommend a short-term follow-up to ensure complete resolution. If findings   persist, a follow-up MRI may be warranted. 2. Nonvisualization of the left ovary or endometrium. 3. Probable 2.7 cm intramural fibroid.          MRI PELVIS W CONTRAST    (Results Pending)   MRI ABDOMEN W CONTRAST    (Results Pending)           Catalina Lindsay MD  5/24/2022 7:50 AM

## 2022-05-24 NOTE — PROGRESS NOTES
[facility] Gastroenterology        Progress Note       2022  7:54 AM    Patient:    Nithya Holman  : 1934   80 y.o. MRN: [id number]  Admitted: 2022  2:15 AM ATT: Verna Simmons MD   [id number]  AdmitDx: Cecum mass [K63.89]  PCP: Jaclyn Aj DO    SUBJECTIVE:  Chart reviewed, events noted  Patient feeling well. No complaints. Small BM yesterday. No N/V . Chronic abd pain, dull. Family at bedside.     ROS:  The positive ROS will be identified in bold     CONSTITUTIONAL:  Neg  Weight loss, fatigue, fever  MOUTH/THROAT:  Neg  Bleeding gums, hoarseness or sore throat  RESPIRATORY:   Neg SOB, wheeze, cough, hemoptysis or bronchitis  CARDIOVASCULAR:  Neg Chest pain, palpitations, dyspnea on exertion, edema  GASTROINTESTINAL:  SEE HPI  HEMATOLOGIC/LYMPHATIC:  Neg  Anemia, bleeding tendency  MUSCULOSKELETAL: Neg  New myalgias, joint pain, swelling or stiffness  NEUROLOGICAL:  Neg  Loss of Consciousness, memory loss, forgetfulness, periods of confusion, difficulty concentrating, seizures, insomnia, aphasia    SKIN:  Neg No itching, rashes, or sores  PSYCHIATRIC:  Neg Depression, personality changes, anxiety    OBJECTIVE:      BP (!) 140/56   Pulse 62   Temp 98 °F (36.7 °C) (Oral)   Resp 16   Ht 5' (1.524 m)   Wt 143 lb (64.9 kg)   SpO2 98%   BMI 27.93 kg/m²     NAD, appears comfortable  Lips and mucous membranes pink and moist  RRR, Nl s1s2, no murmurs, no JVD  Lungs CTA bilaterally, respirations even and unlabored   Abdomen soft, ND, NT, bowel sounds normal  Skin pink, warm and dry  No edema bilateral lower extremities   AAOx3    CBC: Recent Labs     22  2100 22  0908   WBC 12.9* 8.2   HGB 12.2* 11.8*    202     BMP:    Recent Labs     22  2100 22  0908   * 131*   K 5.0 4.5   CL 95* 98*   CO2 26 23   BUN 14 11   CREATININE 0.9 0.8   GLUCOSE 132* 93     Magnesium: No results found for: MG  Hepatic:   Recent Labs     05/21/22  2100 05/22/22  0908   AST 23 26   ALT 17 17   BILITOT 0.4 0.4   ALKPHOS 48 43     INR: No results for input(s): INR in the last 72 hours. Intake/Output Summary (Last 24 hours) at 5/24/2022 0753  Last data filed at 5/23/2022 2133  Gross per 24 hour   Intake 60 ml   Output --   Net 60 ml         Medications    Scheduled Medications:    atorvastatin  20 mg Oral Daily    Vitamin D  1,000 Units Oral Daily    insulin glargine  15 Units SubCUTAneous Nightly    cetirizine  10 mg Oral Daily    levothyroxine  137 mcg Oral Daily    losartan  100 mg Oral Daily    magnesium oxide  200 mg Oral Daily    metoprolol tartrate  150 mg Oral Daily    metoprolol tartrate  100 mg Oral Nightly    oxybutynin  5 mg Oral BID    rOPINIRole  3 mg Oral Nightly    vitamin B-12  100 mcg Oral Daily    insulin lispro  0-6 Units SubCUTAneous Q4H    sodium chloride flush  5-40 mL IntraVENous 2 times per day    piperacillin-tazobactam  4,500 mg IntraVENous Q8H    heparin (porcine)  5,000 Units SubCUTAneous 3 times per day     PRN Medications: albuterol sulfate HFA, glucose, dextrose bolus **OR** dextrose bolus, glucagon (rDNA), dextrose, sodium chloride flush, sodium chloride, ondansetron **OR** ondansetron, polyethylene glycol, acetaminophen **OR** acetaminophen  Infusions:    dextrose 100 mL/hr (05/22/22 2156)    sodium chloride      sodium chloride 75 mL/hr at 05/23/22 2137           Patient Active Problem List   Diagnosis Code    Long-term insulin use in type 2 diabetes (HCC) E11.9, Z79.4    Essential hypertension I10    Dyslipidemia E78.5    Abnormal EKG R94.31    Abnormal stress test R94.39    Cecum mass K63.89        ASSESSMENT AND RECOMMENDATIONS:   Ct abdomen:  1. Diffuse small bowel fluid-filled distention likely secondary to a mass at   the ileocecal valve.  A pill is also lodged within the ileocecal valve which   also may be causing a component of obstruction.    2. Cystic lesion associated with the right adnexa measuring 2.5 x 1.7 cm. Finding is of unclear etiology and clinical significance.  Differential   considerations include abscess from prior diverticulitis, metastatic deposit,   or ovarian cyst.   3. Severe atherosclerosis.        Cecal mass:  Recent diverticulitis, just finished antibiotics 5/16/2022  CEA 3.7  Abdominal pain improved, nausea and vomiting resolved      Recommend:   Clear liquid diet today  Start Bowel Prep now,m  NPO after midnight   C SCOPE WED AM, Consent in chart   Antiemetics as needed     Discussed plan of care with patient and family, RN     Patient clinical, biochemical, and radiological information discussed with Dr. Sue Powell. He agrees with the assessment and plan. LAURE Sheppard - CNP, CNP  5/24/2022  7:54 AM     Abdomen soft  Subacute bowel obstruction resolving  Will plan for colonoscopy a.m. I have seen and examined this patient personally, and independently of the nurse practitioner. The plan was developed mutually at the time of the visit with the patient. Kory Whitmore and myself have spoken with patient, nursing staff and provided written and verbal instructions .     The above note has been reviewed and I agree with the Assessment,  Diagnosis, and Treatment plan as suggested by Kory Whitmore CNP      [address] [facility] gastroenterology

## 2022-05-24 NOTE — ANESTHESIA PRE PROCEDURE
Department of Anesthesiology  Preprocedure Note       Name:  Fay Tipton   Age:  80 y.o.  :  1934                                          MRN:  [id number]         Date:  2022      Surgeon: Sarah Lau):  Chryl Fothergill, MD    Procedure: Procedure(s):  COLONOSCOPY DIAGNOSTIC    Medications prior to admission:   Prior to Admission medications    Medication Sig Start Date End Date Taking?  Authorizing Provider   promethazine (PHENERGAN) 25 MG tablet Take 25 mg by mouth every 6 hours as needed for Nausea    Historical Provider, MD   loperamide (IMODIUM A-D) 2 MG tablet Take 2 mg by mouth as needed for Diarrhea    Historical Provider, MD   melatonin 3 MG TABS tablet Take 3 mg by mouth nightly as needed    Historical Provider, MD   levocetirizine (XYZAL) 5 MG tablet 1 tablet daily 3/27/21   Historical Provider, MD   magnesium oxide (MAG-OX) 400 (241.3 Mg) MG TABS tablet daily 21   Historical Provider, MD   albuterol sulfate  (90 Base) MCG/ACT inhaler as needed 21   Historical Provider, MD   vitamin B-12 (CYANOCOBALAMIN) 100 MCG tablet Take 100 mcg by mouth daily    Historical Provider, MD   LANAMY SOLOSTAR 100 UNIT/ML injection pen  19   Historical Provider, MD   Calcium-Magnesium-Vitamin D (CALCIUM 1200+D3 PO) Take by mouth daily    Historical Provider, MD   Cholecalciferol (VITAMIN D PO) Take by mouth    Historical Provider, MD   oxybutynin (DITROPAN) 5 MG tablet Take 5 mg by mouth 2 times daily    Historical Provider, MD   atorvastatin (LIPITOR) 20 MG tablet Take 20 mg by mouth daily    Historical Provider, MD   rOPINIRole (REQUIP) 3 MG tablet Take 3 mg by mouth nightly    Historical Provider, MD   levothyroxine (SYNTHROID) 137 MCG tablet Take 137 mcg by mouth daily  3/13/17   Historical Provider, MD   Insulin Pen Needle (PEN NEEDLES 31GX5/16\") 31G X 8 MM MISC  3/11/16   Historical Provider, MD   losartan (COZAAR) 100 MG tablet Take 100 mg by mouth daily    Historical Provider, MD metoprolol (LOPRESSOR) 50 MG tablet 3 pills in the AM : 150 mg dose  2 pills in the PM: 100 mg dose    Historical Provider, MD       Current medications:    Current Facility-Administered Medications   Medication Dose Route Frequency Provider Last Rate Last Admin    enoxaparin (LOVENOX) injection 40 mg  40 mg SubCUTAneous Daily Ariel Michelle MD   40 mg at 05/24/22 0902    polyethylene glycol (GoLYTELY) solution 4,000 mL  4,000 mL Oral Once Osie Jensen, APRN - CNP        bisacodyl (DULCOLAX) EC tablet 10 mg  10 mg Oral TID Osie Jensen, APRN - CNP   10 mg at 05/24/22 0901    promethazine (PHENERGAN) tablet 12.5 mg  12.5 mg Oral Q6H PRN Osie Jensen, APRN - CNP   12.5 mg at 05/24/22 0901    albuterol sulfate  (90 Base) MCG/ACT inhaler 2 puff  2 puff Inhalation Q6H PRN Kaylen Earline, DO        atorvastatin (LIPITOR) tablet 20 mg  20 mg Oral Daily Kaylen Earline, DO   20 mg at 05/24/22 0901    Vitamin D (CHOLECALCIFEROL) tablet 1,000 Units  1,000 Units Oral Daily Kaylen Earline, DO   1,000 Units at 05/24/22 0902    [Held by provider] insulin glargine (LANTUS) injection vial 15 Units  15 Units SubCUTAneous Nightly Kaylen Earline, DO   15 Units at 05/23/22 2128    cetirizine (ZYRTEC) tablet 10 mg  10 mg Oral Daily Kaylen Earline, DO   10 mg at 05/24/22 0901    levothyroxine (SYNTHROID) tablet 137 mcg  137 mcg Oral Daily Kaylen Earline, DO   137 mcg at 05/24/22 0535    losartan (COZAAR) tablet 100 mg  100 mg Oral Daily Kaylen Earline, DO   100 mg at 05/24/22 0901    magnesium oxide (MAG-OX) tablet 200 mg  200 mg Oral Daily Kaylen Earline, DO   200 mg at 05/24/22 0901    metoprolol tartrate (LOPRESSOR) tablet 150 mg  150 mg Oral Daily Kaylen Earline, DO   150 mg at 05/24/22 0901    metoprolol tartrate (LOPRESSOR) tablet 100 mg  100 mg Oral Nightly Kaylen Earline, DO   100 mg at 05/22/22 2132    oxybutynin (DITROPAN) tablet 5 mg  5 mg Oral BID Kaylen Patten DO   5 mg at 05/24/22 0901    rOPINIRole (REQUIP) tablet 3 mg  3 mg Oral Nightly [facility], DO   3 mg at 05/23/22 2116    vitamin B-12 (CYANOCOBALAMIN) tablet 100 mcg  100 mcg Oral Daily [facility], DO   100 mcg at 05/24/22 9798    glucose chewable tablet 16 g  4 tablet Oral PRN [facility], DO        dextrose bolus 10% 125 mL  125 mL IntraVENous PRN [facility], DO   Stopped at 05/22/22 2152    Or    dextrose bolus 10% 250 mL  250 mL IntraVENous PRN [facility], DO        glucagon (rDNA) injection 1 mg  1 mg IntraMUSCular PRN [facility], DO        dextrose 5 % solution  100 [facility]hr [facility]  mL/hr at 05/22/22 2156 100 mL/hr at 05/22/22 2156    insulin lispro (HUMALOG) injection vial 0-6 Units  0-6 Units SubCUTAneous Q4H [facility], DO   1 Units at 05/23/22 2117    sodium chloride flush 0.9 % injection 5-40 mL  5-40 mL IntraVENous 2 times per day [facility], DO   10 mL at 05/23/22 3655    sodium chloride flush 0.9 % injection 5-40 mL  5-40 mL [facility]   10 mL at 05/22/22 0418    0.9 % sodium chloride infusion   IntraVENous [facility], DO        ondansetron (ZOFRAN-ODT) disintegrating tablet 4 mg  4 mg Oral Q8H [facility], DO        Or    [facility]ansetron [facility] injection 4 mg  4 mg IntraVENous Q6H PRN [facility], DO        polyethylene glycol (GLYCOLAX) packet 17 g  17 g Oral Daily [facility]        acetaminophen (TYLENOL) tablet 650 mg  650 mg Oral Q6H [facility], DO        Or    acetaminophen (TYLENOL) suppository 650 mg  650 mg Rectal Q6H [facility], [facility]        piperacillin-tazobactam (ZOSYN) 4,500 mg in dextrose 5 % 100 mL IVPB (mini-bag)  4,500 mg IntraVENous Q8H Alfred Briceño, DO 25 mL/hr at 05/24/22 0900 4,500 mg at 05/24/22 0900    0.9 % sodium chloride infusion   IntraVENous Continuous Jose Mejía MD 75 mL/hr at 05/23/22 2137 New Bag at 05/23/22 2137       Allergies:     Allergies   Allergen Reactions    Lopid [Gemfibrozil] Shortness Of Breath and Other (See Comments)     Cough    Bystolic [Nebivolol Hcl]     Cefdinir     Coreg [Carvedilol]     Crestor [Rosuvastatin]     Fenofibrate     Glucophage [Metformin]     Hydrochlorothiazide Other (See Comments)    Metronidazole     Norvasc [Amlodipine Besylate]     Tribenzor [Olmesartan-Amlodipine-Hctz]      Pt states that her chest felt funny and achy when she took the medication    Zocor [Simvastatin]        Problem List:    Patient Active Problem List   Diagnosis Code    Long-term insulin use in type 2 diabetes ([facility] E11.9, Z79.4    Essential hypertension I10    Dyslipidemia E78.5    Abnormal EKG R94.31    Abnormal stress test R94.39    Cecum mass K63.89       Past Medical History:        Diagnosis Date    [facility]     H/O cardiac catheterization 05/18/2021    no significant CAD in main vessels, has severe stenosis in small  branch diagonal vessel    H/O cardiovascular stress test 3/22/18,03/30/2017    ECG portion of the stress test is negative for ischemia EF >70% Normal stress myocardial perfusion.  H/O cardiovascular stress test 04/07/2021    abnormal stress    H/O Doppler ultrasound (Abdomimal Aorta) 03/29/2017    No evidence of abdominal aortic aneurysm.  H/O echocardiogram 07/02/2014    Normal left ventricular size, wall motion and systolic function. Mildle elevated pulmonary artery systolic pressure. Mild MR and TR and trace AR EF 55 to 60%    Hx of echocardiogram 03/29/2017    EF 55-60%. Grade I diastolic dysfunction. Mildly dilated left atrium. No evidence of pericardial effusion.      Hyperlipidemia     Hypertension     Sleep apnea     Thyroid disease        Past Surgical History:        Procedure Laterality Date    BREAST BIOPSY Right     approx age 76, benign    CATARACT REMOVAL      CHOLECYSTECTOMY      DILATION AND CURETTAGE OF UTERUS      OTHER SURGICAL HISTORY      eye lift       Social History:    Social History     Tobacco Use    Smoking status: Never Smoker    Smokeless tobacco: Never Used   Substance Use Topics    Alcohol use: Not Currently     Alcohol/week: 1.0 standard drink     Types: 1 Glasses of wine per week     Comment: rarely                                Counseling given: Not Answered      Vital Signs (Current):   Vitals:    05/23/22 1947 05/23/22 2116 05/24/22 0137 05/24/22 0845   BP: (!) 138/44 (!) 136/44 (!) 140/56 (!) 166/73   Pulse: 61 62 62 70   Resp: 18  16 16   Temp: 36.9 °C (98.4 °F)  36.7 °C (98 °F) 36.4 °C (97.5 °F)   TempSrc: Oral  Oral Oral   SpO2: 99%  98% 98%   Weight: 151 lb 10.8 oz (68.8 kg)  143 lb (64.9 kg)    Height:                                                  BP Readings from Last 3 Encounters:   05/24/22 (!) 166/73   05/21/22 (!) 145/65   12/02/21 138/78       NPO Status:                                                                                 BMI:   Wt Readings from Last 3 Encounters:   05/24/22 143 lb (64.9 kg)   05/21/22 145 lb (65.8 kg)   12/02/21 150 lb 9.6 oz (68.3 kg)     Body mass index is 27.93 kg/m².     CBC:   Lab Results   Component Value Date    WBC 5.5 05/24/2022    RBC 4.00 05/24/2022    HGB 11.7 05/24/2022    HCT 36.6 05/24/2022    MCV 91.5 05/24/2022    RDW 12.8 05/24/2022     05/24/2022       CMP:   Lab Results   Component Value Date     05/22/2022    K 4.5 05/22/2022    K 4.4 03/15/2018    CL 98 05/22/2022    CO2 23 05/22/2022    BUN 11 05/22/2022    CREATININE 0.8 05/22/2022    GFRAA >60 05/22/2022    LABGLOM >60 05/22/2022    GLUCOSE 93 05/22/2022    PROT 5.5 05/22/2022    CALCIUM 8.5 05/22/2022    BILITOT 0.4 05/22/2022    ALKPHOS 43 05/22/2022    AST 26 05/22/2022    ALT 17 05/22/2022       POC Tests:   Recent Labs     05/24/22  0743   POCGLU 78       Coags:   Lab Results   Component Value Date    PROTIME 12.4 05/14/2021    INR 1.09 05/14/2021    APTT 29.5 05/14/2021       HCG (If Applicable): No results found for: Day Yee, HCG, HCGQUANT     ABGs:   Lab Results   Component Value Date    PO2ART 78 05/22/2022    LDC5SAR 42.0 05/22/2022    WTF4BFV 23.7 05/22/2022        Type & Screen (If Applicable):  No results found for: LABABO, LABRH    Drug/Infectious Status (If Applicable):  No results found for: HIV, HEPCAB    COVID-19 Screening (If Applicable):   Lab Results   Component Value Date    COVID19 NOT DETECTED 05/14/2021           Anesthesia Evaluation  Patient summary reviewed no history of anesthetic complications:   Airway: Mallampati: II  TM distance: >3 FB   Neck ROM: full  Mouth opening: > = 3 FB   Dental:    (+) upper dentures      Pulmonary:   (+) sleep apnea:                             Cardiovascular:  Exercise tolerance: poor (<4 METS),   (+) hypertension:, CAD:, hyperlipidemia      ECG reviewed        Stress test reviewed       Beta Blocker:  Dose within 24 Hrs      ROS comment: Abnormal stress test followed by heart cath:      Conclusions      Procedure Summary   no significant CAD in main vessels, has severe stenosis in small  branch diagonal vessel   estimated blood loss 3 cc      Recommendations   optimize medication and cleared for sx on moderate risk      Signatures      --------------------------------------------------------   Electronically signed by Ammon Kathleen MD (Performing Physician) on 06/07/2021 at 10:03     Neuro/Psych:   Negative Neuro/Psych ROS              GI/Hepatic/Renal:   (+) bowel prep,           Endo/Other:    (+) DiabetesType II DM, poorly controlled, using insulin, blood dyscrasia: anemia:., .                 Abdominal:             Vascular:   + PVD, aortic or cerebral, . Other Findings:           Anesthesia Plan      MAC     ASA 3       Induction: intravenous. Anesthetic plan and risks discussed with patient. Plan discussed with CRNA. Pre Anesthesia Evaluation complete.  Anesthesia plan, risks, benefits, alternatives, and personal involved discussed with patient. Patients and/or legal guardian verbalized an understanding  and agreed to proceed.   Missy Gonzalez,   5/25/2022

## 2022-05-25 ENCOUNTER — ANESTHESIA (OUTPATIENT)
Dept: ENDOSCOPY | Age: 87
DRG: 329 | End: 2022-05-25
Payer: MEDICARE

## 2022-05-25 LAB
GLUCOSE BLD-MCNC: 104 MG/DL (ref 70–99)
GLUCOSE BLD-MCNC: 108 MG/DL (ref 70–99)
GLUCOSE BLD-MCNC: 109 MG/DL (ref 70–99)
GLUCOSE BLD-MCNC: 115 MG/DL (ref 70–99)

## 2022-05-25 PROCEDURE — 1200000000 HC SEMI PRIVATE

## 2022-05-25 PROCEDURE — 3700000000 HC ANESTHESIA ATTENDED CARE: Performed by: INTERNAL MEDICINE

## 2022-05-25 PROCEDURE — 6370000000 HC RX 637 (ALT 250 FOR IP): Performed by: INTERNAL MEDICINE

## 2022-05-25 PROCEDURE — 94761 N-INVAS EAR/PLS OXIMETRY MLT: CPT

## 2022-05-25 PROCEDURE — 6360000002 HC RX W HCPCS: Performed by: STUDENT IN AN ORGANIZED HEALTH CARE EDUCATION/TRAINING PROGRAM

## 2022-05-25 PROCEDURE — 3609010600 HC COLONOSCOPY POLYPECTOMY SNARE/COLD BIOPSY: Performed by: INTERNAL MEDICINE

## 2022-05-25 PROCEDURE — 2580000003 HC RX 258: Performed by: STUDENT IN AN ORGANIZED HEALTH CARE EDUCATION/TRAINING PROGRAM

## 2022-05-25 PROCEDURE — 99232 SBSQ HOSP IP/OBS MODERATE 35: CPT | Performed by: SURGERY

## 2022-05-25 PROCEDURE — 3700000001 HC ADD 15 MINUTES (ANESTHESIA): Performed by: INTERNAL MEDICINE

## 2022-05-25 PROCEDURE — 88305 TISSUE EXAM BY PATHOLOGIST: CPT

## 2022-05-25 PROCEDURE — 2580000003 HC RX 258: Performed by: NURSE ANESTHETIST, CERTIFIED REGISTERED

## 2022-05-25 PROCEDURE — 2500000003 HC RX 250 WO HCPCS: Performed by: NURSE ANESTHETIST, CERTIFIED REGISTERED

## 2022-05-25 PROCEDURE — 82962 GLUCOSE BLOOD TEST: CPT

## 2022-05-25 PROCEDURE — 0DJD8ZZ INSPECTION OF LOWER INTESTINAL TRACT, VIA NATURAL OR ARTIFICIAL OPENING ENDOSCOPIC: ICD-10-PCS | Performed by: INTERNAL MEDICINE

## 2022-05-25 PROCEDURE — 2709999900 HC NON-CHARGEABLE SUPPLY: Performed by: INTERNAL MEDICINE

## 2022-05-25 PROCEDURE — 6360000002 HC RX W HCPCS: Performed by: INTERNAL MEDICINE

## 2022-05-25 PROCEDURE — 6360000002 HC RX W HCPCS: Performed by: NURSE ANESTHETIST, CERTIFIED REGISTERED

## 2022-05-25 PROCEDURE — 2580000003 HC RX 258: Performed by: INTERNAL MEDICINE

## 2022-05-25 RX ORDER — SODIUM CHLORIDE, SODIUM LACTATE, POTASSIUM CHLORIDE, CALCIUM CHLORIDE 600; 310; 30; 20 MG/100ML; MG/100ML; MG/100ML; MG/100ML
INJECTION, SOLUTION INTRAVENOUS CONTINUOUS PRN
Status: DISCONTINUED | OUTPATIENT
Start: 2022-05-25 | End: 2022-05-25 | Stop reason: SDUPTHER

## 2022-05-25 RX ORDER — ZOLPIDEM TARTRATE 5 MG/1
5 TABLET ORAL NIGHTLY PRN
Status: DISCONTINUED | OUTPATIENT
Start: 2022-05-25 | End: 2022-06-09 | Stop reason: HOSPADM

## 2022-05-25 RX ORDER — LIDOCAINE HYDROCHLORIDE 20 MG/ML
INJECTION, SOLUTION EPIDURAL; INFILTRATION; INTRACAUDAL; PERINEURAL PRN
Status: DISCONTINUED | OUTPATIENT
Start: 2022-05-25 | End: 2022-05-25 | Stop reason: SDUPTHER

## 2022-05-25 RX ORDER — PROPOFOL 10 MG/ML
INJECTION, EMULSION INTRAVENOUS PRN
Status: DISCONTINUED | OUTPATIENT
Start: 2022-05-25 | End: 2022-05-25 | Stop reason: SDUPTHER

## 2022-05-25 RX ADMIN — LIDOCAINE HYDROCHLORIDE 100 MG: 20 INJECTION, SOLUTION EPIDURAL; INFILTRATION; INTRACAUDAL; PERINEURAL at 12:58

## 2022-05-25 RX ADMIN — ONDANSETRON 4 MG: 2 INJECTION INTRAMUSCULAR; INTRAVENOUS at 00:01

## 2022-05-25 RX ADMIN — ONDANSETRON 4 MG: 2 INJECTION INTRAMUSCULAR; INTRAVENOUS at 10:33

## 2022-05-25 RX ADMIN — PROPOFOL 60 MG: 10 INJECTION, EMULSION INTRAVENOUS at 12:58

## 2022-05-25 RX ADMIN — MAGNESIUM CITRATE 296 ML: 1.75 LIQUID ORAL at 08:03

## 2022-05-25 RX ADMIN — SODIUM CHLORIDE: 9 INJECTION, SOLUTION INTRAVENOUS at 10:35

## 2022-05-25 RX ADMIN — ZOLPIDEM TARTRATE 5 MG: 5 TABLET ORAL at 21:37

## 2022-05-25 RX ADMIN — PROPOFOL 40 MG: 10 INJECTION, EMULSION INTRAVENOUS at 13:00

## 2022-05-25 RX ADMIN — PIPERACILLIN AND TAZOBACTAM 4500 MG: 4; .5 INJECTION, POWDER, FOR SOLUTION INTRAVENOUS at 00:07

## 2022-05-25 RX ADMIN — METOPROLOL TARTRATE 100 MG: 50 TABLET, FILM COATED ORAL at 21:11

## 2022-05-25 RX ADMIN — PHENYLEPHRINE HYDROCHLORIDE 100 MCG: 10 INJECTION INTRAVENOUS at 13:13

## 2022-05-25 RX ADMIN — PROPOFOL 40 MG: 10 INJECTION, EMULSION INTRAVENOUS at 13:03

## 2022-05-25 RX ADMIN — PROPOFOL 60 MG: 10 INJECTION, EMULSION INTRAVENOUS at 13:04

## 2022-05-25 RX ADMIN — OXYBUTYNIN CHLORIDE 5 MG: 5 TABLET ORAL at 21:11

## 2022-05-25 RX ADMIN — SODIUM CHLORIDE, PRESERVATIVE FREE 10 ML: 5 INJECTION INTRAVENOUS at 09:00

## 2022-05-25 RX ADMIN — PROPOFOL 20 MG: 10 INJECTION, EMULSION INTRAVENOUS at 13:06

## 2022-05-25 RX ADMIN — SODIUM CHLORIDE, POTASSIUM CHLORIDE, SODIUM LACTATE AND CALCIUM CHLORIDE: 600; 310; 30; 20 INJECTION, SOLUTION INTRAVENOUS at 12:47

## 2022-05-25 RX ADMIN — ROPINIROLE HYDROCHLORIDE 3 MG: 1 TABLET, FILM COATED ORAL at 21:11

## 2022-05-25 RX ADMIN — PIPERACILLIN AND TAZOBACTAM 4500 MG: 4; .5 INJECTION, POWDER, FOR SOLUTION INTRAVENOUS at 10:36

## 2022-05-25 RX ADMIN — PIPERACILLIN AND TAZOBACTAM 4500 MG: 4; .5 INJECTION, POWDER, FOR SOLUTION INTRAVENOUS at 16:40

## 2022-05-25 ASSESSMENT — PAIN - FUNCTIONAL ASSESSMENT: PAIN_FUNCTIONAL_ASSESSMENT: 0-10

## 2022-05-25 ASSESSMENT — PAIN DESCRIPTION - DESCRIPTORS: DESCRIPTORS: NAGGING

## 2022-05-25 NOTE — OP NOTE
Operative Note      Patient: Elder Tabares  YOB: 1934  MRN: [id number]    Date of Procedure: 5/25/2022    Pre-Op Diagnosis: ABNORMAL IMAGING    [facility]      BRIEF OP REPORT:    Impression:    1) large mass extending from the ileocecal valve into the cecum mass originating at ileocecal valve appears to be more tubulovillous , mass in the cecum appears to be more firm fixed ulcerated consistent with malignancy biopsies done   2) mild to moderate sigmoid diverticulosis   3) grade 2 hemorrhoids\  Incidental lipoma in transverse colon        Suggest:   1) soft diet   2) await pathology, will need resection of the colon, if malignant also consult oncology   3) discussed findings with family and patient     Full EGD/COLONOSCOPY report available by going to \"chart review\" then \"procedures\" then  \"EGD/Colonoscopy\"  then \"View Endoscopy Report\"   Electronically signed by Carolann Perea MD on 5/25/2022 at 1:24 PM

## 2022-05-25 NOTE — ANESTHESIA POSTPROCEDURE EVALUATION
Department of Anesthesiology  Postprocedure Note    Patient: Philly Leiva  MRN: [id number]  YOB: 1934  Date of evaluation: 5/25/2022  Time:  1:30 PM     Procedure Summary     Date: 05/25/22 Room / Location: [id number] [place] [address]    Anesthesia Start: 1247 Anesthesia Stop: 1330    Procedure: COLONOSCOPY POLYPECTOMY SNARE/COLD BIOPSY (N/A ) Diagnosis:       Abnormal finding on imaging      (ABNORMAL IMAGING)    Surgeons: Gregorio Rust MD Responsible Provider: Dorie Moreno MD    Anesthesia Type: MAC ASA Status: 3          Anesthesia Type: MAC    Evgeny Phase I:      Evgeny Phase II:      Last vitals: Reviewed and per EMR flowsheets.        Anesthesia Post Evaluation    Patient participation: complete - patient participated  Level of consciousness: responsive to verbal stimuli  Pain score: 0  Airway patency: patent  Nausea & Vomiting: no vomiting and no nausea  Complications: no  Cardiovascular status: blood pressure returned to baseline and hemodynamically stable  Respiratory status: nonlabored ventilation, spontaneous ventilation and room air  Hydration status: stable

## 2022-05-25 NOTE — PROGRESS NOTES
[facility] HOSPITALIST PROGRESS NOTE      PCP: Felix Serrano DO    Date of Admission: 5/22/2022    Subjective: N.p.o. overnight      Brief Hospital summary patient is an 51-year-old female with history of CKD stage IIIa, diabetes mellitus, hypothyroidism, sleep apnea and vitamin B12 deficiency who was admitted with abdominal pain. CT abdomen at outside hospital showed diffuse small bowel distention with apparent large ileocecal valve. Recently was treated with diverticulitis  Zosyn continued, surgery and GI consulted   MRI abdomen pending  N.p.o.,    Vitals signs:   Afebrile, heart rate 50s to 60s, blood pressure 152/58, on room air    Medications: Lipitor, sertraline, heparin, Lantus, sliding scale insulin, levothyroxine, losartan, mag oxide, metoprolol, Zosyn, ropinirole, normal saline at 75 mill per hour    Antibiotics: Zosyn day 4    Fluid status: Urine output not documented    Labs: No labs to follow    Imaging:   CT abdomen  Diffuse small bowel fluid-filled distention likely secondary to a mass at   the ileocecal valve.  A pill is also lodged within the ileocecal valve which   also may be causing a component of obstruction. 2. Cystic lesion associated with the right adnexa measuring 2.5 x 1.7 cm. Finding is of unclear etiology and clinical significance.  Differential   considerations include abscess from prior diverticulitis, metastatic deposit,   or ovarian cyst.   3. Severe atherosclerosis. Ultrasound abdomen showed 2.7 cm complex right adnexal mass, differential includes complex ovarian cyst versus tubo-ovarian abscess versus pericolonic abscess.         Assessment/Plan:     Cecal mass  Partial small bowel obstruction:  Admitted with abdominal pain, CT abdomen showed small bowel obstruction with a likely cecal mass  Ultrasound pelvis shows right adnexal mass, complex ovarian cyst versus tubo-ovarian abscess versus pericolonic abscess  MRI abdomen pending  Continue Zosyn  Appreciate GI and surgery consultation  Gentle hydration  Currently n.p.o. Advance diet per surgery  Colonoscopy planned for today  Advance diet postprocedure  MRI still not performed      Hyponatremia  Sodium 130 on admission,  Improving with hydration  Sodium trending    Diabetes mellitus type 2  Glucose 98- 108 range, sliding scale insulin  Continue to hold Lantus    Restless leg syndrome  Continue ropinirole    Hypothyroidism  Continue Synthroid    Essential hypertension  Continue losartan and metoprolol, as needed hydralazine    Mood disorder  Continue sertraline    Hyperlipidemia  Statin      DVT prophlaxis:   Lovenox    Encourage ambulation    Physical Exam Performed:       BP (!) 152/58   Pulse 59   Temp 97.9 °F (36.6 °C) (Oral)   Resp 16   Ht 5' (1.524 m)   Wt 141 lb (64 kg)   SpO2 99%   BMI 27.54 kg/m²     Physical Exam  Constitutional:       General: She is not in acute distress. Appearance: Normal appearance. HENT:      Head: Normocephalic and atraumatic. Right Ear: External ear normal.      Left Ear: External ear normal.   Eyes:      Extraocular Movements: Extraocular movements intact. Pupils: Pupils are equal, round, and reactive to light. Cardiovascular:      Rate and Rhythm: Normal rate and regular rhythm. Heart sounds: No murmur heard. Pulmonary:      Effort: Pulmonary effort is normal. No respiratory distress. Breath sounds: Normal breath sounds. No wheezing. Abdominal:      General: Bowel sounds are normal. There is no distension. Palpations: Abdomen is soft. Tenderness: There is no abdominal tenderness. There is no guarding. Musculoskeletal:         General: No swelling. Cervical back: Normal range of motion. Skin:     General: Skin is warm. Neurological:      General: No focal deficit present. Mental Status: She is alert and oriented to person, place, and time. Cranial Nerves: No cranial nerve deficit. Psychiatric:         Mood and Affect: Mood normal.         Labs:   Recent Labs     05/22/22  0908 05/24/22  0818   WBC 8.2 5.5   HGB 11.8* 11.7*   HCT 36.9* 36.6*    206     Recent Labs     05/22/22  0908 05/24/22  0818   * 135   K 4.5 3.8   CL 98* 102   CO2 23 21   BUN 11 5*   CREATININE 0.8 0.7   CALCIUM 8.5 8.5     Recent Labs     05/22/22  0908 05/24/22  0818   AST 26 18   ALT 17 12   BILITOT 0.4 0.3   ALKPHOS 43 42     No results for input(s): INR in the last 72 hours. No results for input(s): Dayna Lager in the last 72 hours. Urinalysis:      Lab Results   Component Value Date    NITRU NEGATIVE 05/22/2022    WBCUA 2 05/22/2022    BACTERIA NEGATIVE 05/22/2022    RBCUA NONE SEEN 05/22/2022    BLOODU NEGATIVE 05/22/2022    SPECGRAV <1.005 05/22/2022       Radiology:  US PELVIS COMPLETE   Final Result   1. 2.7 cm complex hypoechoic lesion in the right adnexa/ovary correlating to   recent CT. Differential considerations include a complex cystic ovarian   lesion, tubo-ovarian abscess or pericolonic abscess given adjacent colon. Recommend a short-term follow-up to ensure complete resolution. If findings   persist, a follow-up MRI may be warranted. 2. Nonvisualization of the left ovary or endometrium. 3. Probable 2.7 cm intramural fibroid. US DUP ABD PEL RETRO SCROT COMPLETE   Final Result   1. 2.7 cm complex hypoechoic lesion in the right adnexa/ovary correlating to   recent CT. Differential considerations include a complex cystic ovarian   lesion, tubo-ovarian abscess or pericolonic abscess given adjacent colon. Recommend a short-term follow-up to ensure complete resolution. If findings   persist, a follow-up MRI may be warranted. 2. Nonvisualization of the left ovary or endometrium. 3. Probable 2.7 cm intramural fibroid.          MRI PELVIS W CONTRAST    (Results Pending)   MRI ABDOMEN W CONTRAST    (Results Pending)           Andrés Barahona MD  5/25/2022 5:56 AM

## 2022-05-25 NOTE — PROGRESS NOTES
[facility] Gastroenterology    I have examined the patient before the procedure and there is no change in the history and physical exam recorded by me previously. I have reviewed with the patient and/or family the risks, benefits, and alternatives to the procedure.     LAURE Shi CNP, CNP  [facility] Gastroenterology  5/25/2022  8:01 AM

## 2022-05-25 NOTE — PROGRESS NOTES
[facility]    PATIENT: Boo Talamantes, 80 y.o., female, MRN: [id number]    Hospital Day:  LOS: 3 days     Boo Talamantes is a 80 y.o. female with abdominal pain, imaging concerning for a mass at the ileocecal valve, and a right adnexal lesion              Subjective:  Chief Complaint: abdominal pain  Pain: controlled  BM: yes   Diet: ADULT DIET; Easy to Chew  Activity: as tolerated    Stable overnight. Had her colonoscopy today which showed a mass at the cecum and the ileocecal valve. Biopsies pending.      Objective:    Vitals: BP (!) 127/54   Pulse 86   Temp 98.1 °F (36.7 °C) (Axillary)   Resp 16   Ht 5' (1.524 m)   Wt 141 lb (64 kg)   SpO2 92%   BMI 27.54 kg/m²   Vital Signs (Last 24 Hours)  Temp  Av °F (36.7 °C)  Min: 97.9 °F (36.6 °C)  Max: 98.2 °F (36.8 °C)  Pulse  Av.6  Min: 55  Max: 86  BP  Min: 127/54  Max: 152/58  Resp  Av  Min: 16  Max: 16  SpO2  Av.2 %  Min: 92 %  Max: 100 %  Wt Readings from Last 3 Encounters:   22 141 lb (64 kg)   22 145 lb (65.8 kg)   21 150 lb 9.6 oz (68.3 kg)       I/O:  1901 -  0700  In: 1630 [P.O.:1630]  Out: -     IV Fluids: dextrose Last Rate: 100 mL/hr (22 2156)    sodium chloride Last Rate: Stopped (22 1247)    sodium chloride Last Rate: 75 mL/hr at 22 1356    Scheduled Meds:   polyethylene glycol, 4,000 mL, Oral, Once    enoxaparin, 40 mg, SubCUTAneous, Daily    bisacodyl, 10 mg, Oral, TID    atorvastatin, 20 mg, Oral, Daily    Vitamin D, 1,000 Units, Oral, Daily    [Held by provider] insulin glargine, 15 Units, SubCUTAneous, Nightly    cetirizine, 10 mg, Oral, Daily    levothyroxine, 137 mcg, Oral, Daily    losartan, 100 mg, Oral, Daily    metoprolol tartrate, 150 mg, Oral, Daily    metoprolol tartrate, 100 mg, Oral, Nightly    oxybutynin, 5 mg, Oral, BID    rOPINIRole, 3 mg, Oral, Nightly    vitamin B-12, 100 mcg, Oral, Daily   insulin lispro, 0-6 Units, SubCUTAneous, Q4H    sodium chloride flush, 5-40 mL, IntraVENous, 2 times per day    piperacillin-tazobactam, 4,500 mg, IntraVENous, Q8H    Physical Exam:  General Appearance:   Alert, cooperative, no distress    Head:   Normocephalic, atraumatic    Lungs:    Equal chest rise, respirations unlabored   Heart:   Regular rate and rhythm    Abdomen:    Soft, mildly tender lower abdomen, no rebound or guarding    Extremities:  No cyanosis or edema    Neurologic:  Nonfocal, grossly intact        Labs/Imaging Results:   Recent Results (from the past 24 hour(s))   POCT Glucose    Collection Time: 05/24/22  4:48 PM   Result Value Ref Range    POC Glucose 102 (H) 70 - 99 MG/DL   POCT Glucose    Collection Time: 05/24/22  8:57 PM   Result Value Ref Range    POC Glucose 84 70 - 99 MG/DL   POCT Glucose    Collection Time: 05/24/22 11:52 PM   Result Value Ref Range    POC Glucose 98 70 - 99 MG/DL   POCT Glucose    Collection Time: 05/25/22  4:56 AM   Result Value Ref Range    POC Glucose 108 (H) 70 - 99 MG/DL   POCT Glucose    Collection Time: 05/25/22  7:33 AM   Result Value Ref Range    POC Glucose 104 (H) 70 - 99 MG/DL       Assessment:  Dejuan Paige is a 80 y.o. female with abdominal pain, imaging and colonoscopy with a mass at the ileocecal valve, and a right adnexal lesion     Principal Problem:    Cecum mass  Resolved Problems:    * No resolved hospital problems. *      Plan:  · Discussed findings and options with Dejuan Paige and her family at bedside. · Ileocecal mass - appreciate GI recommendations, pathology pending   · CEA - mildly elevated at 3.5. Notified patient, could be reactive or due to neoplasm  · Currently does not appear to be obstructed. Will monitor her progress.    · Cystic lesion associated with the right adnexa measuring 2.5 x 1.7 cm - MRI of the abdomen and pelvis pending  · If a preliminary diagnosis can be obtained on her pathology and an MRI performed in time, will potentially do a robotic assisted hemicolectomy on Friday. Will consult Oncology as well. If the workup cannot be completed in time then we will hold off on surgery until next week, but will plan on surgery prior to discharge since she was having obstructive symptoms upon presentation.   · Will continue to follow  · Thank you for the consultation and the opportunity to care for Sherryle Alken      Electronically signed: Marlen Reeves MD 5/25/2022 4:37 PM

## 2022-05-26 ENCOUNTER — ANESTHESIA EVENT (OUTPATIENT)
Dept: OPERATING ROOM | Age: 87
DRG: 329 | End: 2022-05-26
Payer: MEDICARE

## 2022-05-26 ENCOUNTER — APPOINTMENT (OUTPATIENT)
Dept: MRI IMAGING | Age: 87
DRG: 329 | End: 2022-05-26
Attending: INTERNAL MEDICINE
Payer: MEDICARE

## 2022-05-26 LAB
GLUCOSE BLD-MCNC: 107 MG/DL (ref 70–99)
GLUCOSE BLD-MCNC: 111 MG/DL (ref 70–99)
GLUCOSE BLD-MCNC: 151 MG/DL (ref 70–99)
GLUCOSE BLD-MCNC: 83 MG/DL (ref 70–99)
GLUCOSE BLD-MCNC: 92 MG/DL (ref 70–99)
GLUCOSE BLD-MCNC: 96 MG/DL (ref 70–99)

## 2022-05-26 PROCEDURE — 6360000002 HC RX W HCPCS: Performed by: INTERNAL MEDICINE

## 2022-05-26 PROCEDURE — 2580000003 HC RX 258: Performed by: INTERNAL MEDICINE

## 2022-05-26 PROCEDURE — 6370000000 HC RX 637 (ALT 250 FOR IP): Performed by: INTERNAL MEDICINE

## 2022-05-26 PROCEDURE — A9579 GAD-BASE MR CONTRAST NOS,1ML: HCPCS | Performed by: INTERNAL MEDICINE

## 2022-05-26 PROCEDURE — 1200000000 HC SEMI PRIVATE

## 2022-05-26 PROCEDURE — 99222 1ST HOSP IP/OBS MODERATE 55: CPT | Performed by: INTERNAL MEDICINE

## 2022-05-26 PROCEDURE — 82962 GLUCOSE BLOOD TEST: CPT

## 2022-05-26 PROCEDURE — 94761 N-INVAS EAR/PLS OXIMETRY MLT: CPT

## 2022-05-26 PROCEDURE — 6360000004 HC RX CONTRAST MEDICATION: Performed by: INTERNAL MEDICINE

## 2022-05-26 PROCEDURE — 72197 MRI PELVIS W/O & W/DYE: CPT

## 2022-05-26 RX ORDER — 0.9 % SODIUM CHLORIDE 0.9 %
500 INTRAVENOUS SOLUTION INTRAVENOUS ONCE
Status: DISCONTINUED | OUTPATIENT
Start: 2022-05-26 | End: 2022-06-03

## 2022-05-26 RX ADMIN — LEVOTHYROXINE SODIUM 137 MCG: 25 TABLET ORAL at 08:06

## 2022-05-26 RX ADMIN — CETIRIZINE HYDROCHLORIDE 10 MG: 10 TABLET, FILM COATED ORAL at 08:06

## 2022-05-26 RX ADMIN — Medication 100 MCG: at 08:07

## 2022-05-26 RX ADMIN — BISACODYL 10 MG: 5 TABLET, COATED ORAL at 17:31

## 2022-05-26 RX ADMIN — ENOXAPARIN SODIUM 40 MG: 100 INJECTION SUBCUTANEOUS at 08:06

## 2022-05-26 RX ADMIN — ROPINIROLE HYDROCHLORIDE 3 MG: 1 TABLET, FILM COATED ORAL at 20:40

## 2022-05-26 RX ADMIN — GADOTERIDOL 15 ML: 279.3 INJECTION, SOLUTION INTRAVENOUS at 14:25

## 2022-05-26 RX ADMIN — ZOLPIDEM TARTRATE 5 MG: 5 TABLET ORAL at 22:18

## 2022-05-26 RX ADMIN — OXYBUTYNIN CHLORIDE 5 MG: 5 TABLET ORAL at 08:07

## 2022-05-26 RX ADMIN — SODIUM CHLORIDE: 9 INJECTION, SOLUTION INTRAVENOUS at 17:29

## 2022-05-26 RX ADMIN — OXYBUTYNIN CHLORIDE 5 MG: 5 TABLET ORAL at 20:41

## 2022-05-26 RX ADMIN — LOSARTAN POTASSIUM 100 MG: 100 TABLET, FILM COATED ORAL at 08:07

## 2022-05-26 RX ADMIN — PIPERACILLIN AND TAZOBACTAM 4500 MG: 4; .5 INJECTION, POWDER, FOR SOLUTION INTRAVENOUS at 17:30

## 2022-05-26 RX ADMIN — SODIUM CHLORIDE: 9 INJECTION, SOLUTION INTRAVENOUS at 15:35

## 2022-05-26 RX ADMIN — SODIUM CHLORIDE: 9 INJECTION, SOLUTION INTRAVENOUS at 05:28

## 2022-05-26 RX ADMIN — METOPROLOL TARTRATE 100 MG: 50 TABLET, FILM COATED ORAL at 20:40

## 2022-05-26 RX ADMIN — BISACODYL 10 MG: 5 TABLET, COATED ORAL at 08:06

## 2022-05-26 RX ADMIN — METOPROLOL TARTRATE 150 MG: 50 TABLET, FILM COATED ORAL at 08:07

## 2022-05-26 RX ADMIN — Medication 1000 UNITS: at 08:06

## 2022-05-26 RX ADMIN — ATORVASTATIN CALCIUM 20 MG: 10 TABLET, FILM COATED ORAL at 08:06

## 2022-05-26 RX ADMIN — BISACODYL 10 MG: 5 TABLET, COATED ORAL at 12:22

## 2022-05-26 RX ADMIN — PIPERACILLIN AND TAZOBACTAM 4500 MG: 4; .5 INJECTION, POWDER, FOR SOLUTION INTRAVENOUS at 00:20

## 2022-05-26 RX ADMIN — PIPERACILLIN AND TAZOBACTAM 4500 MG: 4; .5 INJECTION, POWDER, FOR SOLUTION INTRAVENOUS at 08:13

## 2022-05-26 ASSESSMENT — PAIN SCALES - GENERAL
PAINLEVEL_OUTOF10: 0
PAINLEVEL_OUTOF10: 0

## 2022-05-26 NOTE — PROGRESS NOTES
[facility]    PATIENT: Jena Garcia, 80 y.o., female, MRN: [id number]    Hospital Day:  LOS: 4 days     Jena Gacria is a 80 y.o. female with abdominal pain, imaging concerning for a mass at the ileocecal valve, and a right adnexal lesion              Subjective:  Chief Complaint: abdominal pain, decreased  Pain: controlled  BM: yes   Diet: ADULT DIET; Clear Liquid  Activity: as tolerated    Stable overnight. Had her colonoscopy yesterday which showed a mass at the cecum and the ileocecal valve. Biopsies now returned showing adenocarcinoma. Objective:    Vitals: BP (!) 136/54   Pulse 60   Temp 97.3 °F (36.3 °C) (Oral)   Resp 16   Ht 5' (1.524 m)   Wt 125 lb 11.2 oz (57 kg)   SpO2 100%   BMI 24.55 kg/m²   Vital Signs (Last 24 Hours)  Temp  Av.1 °F (36.7 °C)  Min: 97.3 °F (36.3 °C)  Max: 98.9 °F (37.2 °C)  Pulse  Av.4  Min: 55  Max: 86  BP  Min: 127/54  Max: 150/57  Resp  Av.2  Min: 16  Max: 17  SpO2  Av %  Min: 92 %  Max: 100 %  Wt Readings from Last 3 Encounters:   22 125 lb 11.2 oz (57 kg)   22 145 lb (65.8 kg)   21 150 lb 9.6 oz (68.3 kg)       I/O:  1901 -  0700  In: 1020 [P.O.:720;  I.V.:300]  Out: 1 [Urine:1]    IV Fluids: dextrose Last Rate: 100 mL/hr (22 2156)    sodium chloride Last Rate: Stopped (22 1247)    sodium chloride Last Rate: 75 mL/hr at 22 0528    Scheduled Meds:   polyethylene glycol, 4,000 mL, Oral, Once    enoxaparin, 40 mg, SubCUTAneous, Daily    bisacodyl, 10 mg, Oral, TID    atorvastatin, 20 mg, Oral, Daily    Vitamin D, 1,000 Units, Oral, Daily    [Held by provider] insulin glargine, 15 Units, SubCUTAneous, Nightly    cetirizine, 10 mg, Oral, Daily    levothyroxine, 137 mcg, Oral, Daily    losartan, 100 mg, Oral, Daily    metoprolol tartrate, 150 mg, Oral, Daily    metoprolol tartrate, 100 mg, Oral, Nightly    oxybutynin, 5 mg, Oral, BID   rOPINIRole, 3 mg, Oral, Nightly    vitamin B-12, 100 mcg, Oral, Daily    insulin lispro, 0-6 Units, SubCUTAneous, Q4H    sodium chloride flush, 5-40 mL, IntraVENous, 2 times per day    piperacillin-tazobactam, 4,500 mg, IntraVENous, Q8H    Physical Exam:  General Appearance:   Alert, cooperative, no distress    Head:   Normocephalic, atraumatic    Lungs:    Equal chest rise, respirations unlabored   Heart:   Regular rate and rhythm    Abdomen:    Soft, mildly tender lower abdomen, no rebound or guarding    Extremities:  No cyanosis or edema    Neurologic:  Nonfocal, grossly intact        Labs/Imaging Results:   Recent Results (from the past 24 hour(s))   POCT Glucose    Collection Time: 05/25/22  4:47 PM   Result Value Ref Range    POC Glucose 109 (H) 70 - 99 MG/DL   POCT Glucose    Collection Time: 05/25/22  7:40 PM   Result Value Ref Range    POC Glucose 115 (H) 70 - 99 MG/DL   POCT Glucose    Collection Time: 05/26/22  4:26 AM   Result Value Ref Range    POC Glucose 107 (H) 70 - 99 MG/DL       Assessment:  Mere Doyle is a 80 y.o. female with abdominal pain, imaging and colonoscopy with a mass at the ileocecal valve, and a right adnexal lesion     Principal Problem:    Cecum mass  Resolved Problems:    * No resolved hospital problems. *      Plan:  · Discussed findings and options with Mere Doyle   · Ileocecal mass - appreciate GI recommendations, pathology shows adenocarcinoma  · Appreciate Oncology's recommendations  · CEA - mildly elevated at 3.5. · Currently does not appear to be obstructed. Will monitor her progress. · Cystic lesion associated with the right adnexa measuring 2.5 x 1.7 cm - MRI of the abdomen and pelvis pending - if benign in appearance, will plan to proceed with right hemicolectomy tomorrow  · If a preliminary diagnosis can be obtained on her pathology and an MRI performed in time, will plan for a robotic assisted right hemicolectomy on Friday.    · If the workup cannot be completed in time then we will hold off on surgery until next week, but will plan on surgery prior to discharge since she was having obstructive symptoms upon presentation. · Will continue to follow  · The risks, benefits, potential complications and possible alternatives of the procedure were discussed in detail, including complications of but not limited to infection, bleeding, anesthesia-related complications, death, conversion to open, missed pathology or positive margins, need for an ostomy, or need for additional interventions. All questions were answered. The patient wished to proceed and consent was documented in the medical record.    · Thank you for the consultation and the opportunity to care for Nithya Holman    Electronically signed: Elisha Ramos MD 5/26/2022 7:34 AM

## 2022-05-26 NOTE — PROGRESS NOTES
[facility] [place] Gastroenterology        Progress Note       2022  1:18 PM    Patient:    Britney Wood  : 1934   80 y.o. MRN: [id number]  Admitted: 2022  2:15 AM ATT: Wilman Valenzuela MD   [id number]  AdmitDx: Cecum mass [K63.89]  PCP: Racheal Aj DO    SUBJECTIVE:  Chart reviewed, events noted  Patient feeling well. No complaints. No BM this am. No family at bedside. No N/V or adb pain. Pt resting.      ROS:  The positive ROS will be identified in bold     CONSTITUTIONAL:  Neg  Weight loss, fatigue, fever  MOUTH/THROAT:  Neg  Bleeding gums, hoarseness or sore throat  RESPIRATORY:   Neg SOB, wheeze, cough, hemoptysis or bronchitis  CARDIOVASCULAR:  Neg Chest pain, palpitations, dyspnea on exertion, edema  GASTROINTESTINAL:  SEE HPI  HEMATOLOGIC/LYMPHATIC:  Neg  Anemia, bleeding tendency  MUSCULOSKELETAL: Neg  New myalgias, joint pain, swelling or stiffness  NEUROLOGICAL:  Neg  Loss of Consciousness, memory loss, forgetfulness, periods of confusion, difficulty concentrating, seizures, insomnia, aphasia    SKIN:  Neg No itching, rashes, or sores  PSYCHIATRIC:  Neg Depression, personality changes, anxiety    OBJECTIVE:      BP (!) 154/49   Pulse 56   Temp 97.4 °F (36.3 °C) (Oral)   Resp 18   Ht 5' (1.524 m)   Wt 125 lb 11.2 oz (57 kg)   SpO2 94%   BMI 24.55 kg/m²     NAD, appears comfortable in bed   Lips and mucous membranes pink and moist  RRR, Nl s1s2, no murmurs, no JVD  Lungs CTA bilaterally, respirations even and unlabored   Abdomen soft, ND, NT, bowel sounds normal  Skin pink, warm and dry  No edema bilateral lower extremities   AAOx3    CBC: Recent Labs     22  0818   WBC 5.5   HGB 11.7*        BMP:    Recent Labs     22  0818      K 3.8      CO2 21   BUN 5*   CREATININE 0.7   GLUCOSE 84     Magnesium: No results found for: MG  Hepatic:   Recent Labs     2218   AST 18 ALT 12   BILITOT 0.3   ALKPHOS 42     INR: No results for input(s): INR in the last 72 hours. Intake/Output Summary (Last 24 hours) at 5/26/2022 1318  Last data filed at 5/26/2022 0326  Gross per 24 hour   Intake --   Output 1 ml   Net -1 ml         Medications    Scheduled Medications:    polyethylene glycol  4,000 mL Oral Once    enoxaparin  40 mg SubCUTAneous Daily    bisacodyl  10 mg Oral TID    atorvastatin  20 mg Oral Daily    Vitamin D  1,000 Units Oral Daily    [Held by provider] insulin glargine  15 Units SubCUTAneous Nightly    cetirizine  10 mg Oral Daily    levothyroxine  137 mcg Oral Daily    losartan  100 mg Oral Daily    metoprolol tartrate  150 mg Oral Daily    metoprolol tartrate  100 mg Oral Nightly    oxybutynin  5 mg Oral BID    rOPINIRole  3 mg Oral Nightly    vitamin B-12  100 mcg Oral Daily    insulin lispro  0-6 Units SubCUTAneous Q4H    sodium chloride flush  5-40 mL IntraVENous 2 times per day    piperacillin-tazobactam  4,500 mg IntraVENous Q8H     PRN Medications: zolpidem, promethazine, albuterol sulfate HFA, glucose, dextrose bolus **OR** dextrose bolus, glucagon (rDNA), dextrose, sodium chloride flush, sodium chloride, ondansetron **OR** ondansetron, polyethylene glycol, acetaminophen **OR** acetaminophen  Infusions:    dextrose 100 mL/hr (05/22/22 2156)    sodium chloride Stopped (05/25/22 1247)    sodium chloride 75 mL/hr at 05/26/22 0528           Patient Active Problem List   Diagnosis Code    Long-term insulin use in type 2 diabetes ([facility] E11.9, Z79.4    Essential hypertension I10    Dyslipidemia E78.5    Abnormal EKG R94.31    Abnormal stress test R94.39    Cecum mass K63.89             ASSESSMENT AND RECOMMENDATIONS:   Ct abdomen:  1. Diffuse small bowel fluid-filled distention likely secondary to a mass at   the ileocecal valve.  A pill is also lodged within the ileocecal valve which   also may be causing a component of obstruction.    2. Cystic lesion associated with the right adnexa measuring 2.5 x 1.7 cm. Finding is of unclear etiology and clinical significance.  Differential   considerations include abscess from prior diverticulitis, metastatic deposit,   or ovarian cyst.   3. Severe atherosclerosis.        S/P C SCOPE 5/25/22  Impression:                 1) large mass extending from the ileocecal valve into the cecum mass originating at ileocecal valve appears to be more tubulovillous , mass in the cecum appears to be more firm fixed ulcerated consistent with malignancy biopsies done                2) mild to moderate sigmoid diverticulosis                3) grade 2 hemorrhoids\  Incidental lipoma in transverse colon    BIOPSY INVASIVE MODERATELY DIFFERENTIATED ADENOCARCINOMA     CEA 3.7  Abdominal pain improved, nausea and vomiting resolved      Recommend:   Oncology following  Diet per surgery       Will sign off. Call if needed. Discussed plan of care with patient and MRI     Patient clinical, biochemical, and radiological information discussed with Dr. Juan Holloway. He agrees with the assessment and plan. LAURE Rodrigues CNP, CNP  5/26/2022  1:18 PM     Biopsy consistent with adenocarcinoma  For surgery as per Dr. Vince Morgan also on board  Continue present management  Recommend colonoscopy as outpatient in 1 year  Will sign off call if needed      I have seen and examined this patient personally, and independently of the nurse practitioner. The plan was developed mutually at the time of the visit with the patient. Damaris Godoy and myself have spoken with patient, nursing staff and provided written and verbal instructions .     The above note has been reviewed and I agree with the Assessment,  Diagnosis, and Treatment plan as suggested by Damaris Godoy CNP      [address] [place] [address] gastroenterology

## 2022-05-26 NOTE — PLAN OF CARE
Problem: Discharge Planning  Goal: Discharge to home or other facility with appropriate resources  5/26/2022 0949 by Precious Goltz, RN  Outcome: Progressing  5/25/2022 2309 by Rosemary Lee LPN  Outcome: Progressing  Flowsheets (Taken 5/25/2022 2307)  Discharge to home or other facility with appropriate resources:   Arrange for needed discharge resources and transportation as appropriate   Identify discharge learning needs (meds, wound care, etc)   Refer to discharge planning if patient needs post-hospital services based on physician order or complex needs related to functional status, cognitive ability or social support system     Problem: Safety - Adult  Goal: Free from fall injury  5/26/2022 0949 by Precious Goltz, RN  Outcome: Progressing  5/25/2022 2309 by Rosemary Lee LPN  Outcome: Progressing     Problem: ABCDS Injury Assessment  Goal: Absence of physical injury  5/26/2022 0949 by Precious Goltz, RN  Outcome: Progressing  5/25/2022 2309 by Rosemary Lee LPN  Outcome: Progressing     Problem: Chronic Conditions and Co-morbidities  Goal: Patient's chronic conditions and co-morbidity symptoms are monitored and maintained or improved  5/26/2022 0949 by Precious Goltz, RN  Outcome: Progressing  5/25/2022 2309 by Rosemary Lee LPN  Outcome: Progressing     Problem: Pain  Goal: Verbalizes/displays adequate comfort level or baseline comfort level  5/26/2022 0949 by Precious Goltz, RN  Outcome: Progressing  5/25/2022 2309 by Rosemary Lee LPN  Outcome: Progressing

## 2022-05-26 NOTE — CONSULTS
ONCOLOGY HEMATOLOGY CARE ([facility])  CONSULTATION REPORT    REASON FOR CONSULT  To evaluate the patient with ileocecal mass and right adnexa cystic lesion. CHIEF COMPLAINT    Abdominal pain. HISTORY OF PRESENT ILLNESS   Naeem Menezes is a 80 y.o. female with medical history significant for CKD stage IIIa, diverticulitis, IDDM 2 with peripheral neuropathy, HTN, HLD, G1DD, KEVIN, hypothyroidism, and vitamin B12 deficiency, presented on 5/27/22 with complaints of abdominal pain. She has been treated recently for abdominal pain and suspected diverticulitis, but was not improving after getting antibiotics which prompted her to return to the ED. She denies any personal or family history of colon cancer. Her sister had breast cancer in her 42's. She has had colonoscopies in the past which were normal, but thinks her last one was more than 10 years ago. She denies any previous problems with ovarian cysts or adnexal lesions. She underwent colonoscopy on 5/25/22 and it showed large mass extending from the ileocecal valve into the cecum mass originating at ileocecal valve appears to be more tubulovillous, mass in the cecum appears to be more firm fixed ulcerated consistent with malignancy. Biopsies were obtained. Mild to moderate sigmoid diverticulosis and grade 2 hemorrhoids and incidental lipoma in transverse colon    MRI of pelvis was scheduled to evaluate her right adnexa cyst. I was called to evaluate her. She has lost about 25 lbs over last 2 - 3 months. Her CEA was 3.5. PAST MEDICAL HISTORY    Past Medical History:   Diagnosis Date    Diabetes mellitus ([address]     H/O cardiac catheterization 05/18/2021    no significant CAD in main vessels, has severe stenosis in small  branch diagonal vessel    H/O cardiovascular stress test 3/22/18,03/30/2017    ECG portion of the stress test is negative for ischemia EF >70% Normal stress myocardial perfusion.      H/O cardiovascular stress test 04/07/2021 abnormal stress    H/O Doppler ultrasound (Abdomimal Aorta) 03/29/2017    No evidence of abdominal aortic aneurysm.  H/O echocardiogram 07/02/2014    Normal left ventricular size, wall motion and systolic function. Mildle elevated pulmonary artery systolic pressure. Mild MR and TR and trace AR EF 55 to 60%    Hx of echocardiogram 03/29/2017    EF 55-60%. Grade I diastolic dysfunction. Mildly dilated left atrium. No evidence of pericardial effusion.      Hyperlipidemia     Hypertension     Sleep apnea     Thyroid disease        SURGICAL HISTORY    Past Surgical History:   Procedure Laterality Date    BREAST BIOPSY Right     approx age 76, benign    CATARACT REMOVAL      CHOLECYSTECTOMY      COLONOSCOPY N/A 5/25/2022    COLONOSCOPY POLYPECTOMY SNARE/COLD BIOPSY performed by Stephan Abel MD at [place] [id number]      OTHER SURGICAL HISTORY      eye lift       FAMILY HISTORY    Family History   Problem Relation Age of Onset   Dat Babb Pacemaker Sister    Dat Babb Arrhythmia Sister     Breast Cancer Sister         pre menopausal    Ovarian Cancer Neg Hx        SOCIAL HISTORY    Social History     Socioeconomic History    Marital status:      Spouse name: None    Number of children: None    Years of education: None    Highest education level: None   Occupational History    None   Tobacco Use    Smoking status: Never Smoker    Smokeless tobacco: Never Used   Vaping Use    Vaping Use: Never used   Substance and Sexual Activity    Alcohol use: Not Currently     Alcohol/week: 1.0 standard drink     Types: 1 Glasses of wine per week     Comment: rarely    Drug use: No    Sexual activity: None     Comment:    Other Topics Concern    None   Social History Narrative    None     Social Determinants of Health     Financial Resource Strain:     Difficulty of Paying Living Expenses: Not on file   Food Insecurity:     Worried About Running Out of Food in the Last Year: Not on file    Ran Out of Food in the Last Year: Not on file   Transportation Needs:     Lack of Transportation (Medical): Not on file    Lack of Transportation (Non-Medical): Not on file   Physical Activity:     Days of Exercise per Week: Not on file    Minutes of Exercise per Session: Not on file   Stress:     Feeling of Stress : Not on file   Social Connections:     Frequency of Communication with Friends and Family: Not on file    Frequency of Social Gatherings with Friends and Family: Not on file    Attends Christianity Services: Not on file    Active Member of [address] [place] [address] or Organizations: Not on file    Attends Club or Organization Meetings: Not on file    Marital Status: Not on file   Intimate Partner Violence:     Fear of Current or Ex-Partner: Not on file    Emotionally Abused: Not on file    Physically Abused: Not on file    Sexually Abused: Not on file   Housing Stability:     Unable to Pay for Housing in the Last Year: Not on file    Number of [place] in the Last Year: Not on file    Unstable Housing in the Last Year: Not on file       REVIEW OF SYSTEMS    Constitutional:  Denies fever, chills, loss of appetite, weight loss, tiredness, fatigue or weakness   HEENT:  Denies swelling of neck glands  Respiratory:  Denies cough, shortness of breath or hemoptysis  Cardiovascular:  Denies chest pain, palpitations or swelling   GI:  Denies abdominal pain, nausea, vomiting, constipation or diarrhea   Musculoskeletal:  Denies back pain   Skin:  Denies rash   Neurologic:  Denies headache, focal weakness or sensory changes   All systems negative except as marked.      PHYSICAL EXAM    Vitals: BP (!) 136/54   Pulse 60   Temp 97.3 °F (36.3 °C) (Oral)   Resp 16   Ht 5' (1.524 m)   Wt 125 lb 11.2 oz (57 kg)   SpO2 100%   BMI 24.55 kg/m²   CONSTITUTIONAL: awake, alert, cooperative, no apparent distress   EYES: pupils equal, round and reactive to light, sclera clear and conjunctiva normal  ENT: Normocephalic, without obvious abnormality, atraumatic  NECK: supple, symmetrical, no jugular venous distension and no carotid bruits   HEMATOLOGIC/LYMPHATIC: no cervical, supraclavicular or axillary lymphadenopathy   LUNGS: VBS, no wheezes, no crackles, no rhonchi, no increased work of breathing and clear to auscultation   CARDIOVASCULAR: regular rate and rhythm, normal S1 and S2, no murmur noted  ABDOMEN: normal bowel sounds x 4, soft, non-distended, non-tender, no masses palpated, no hepatosplenomgaly   MUSCULOSKELETAL: full range of motion noted, tone is normal  NEUROLOGIC: awake, alert, oriented to name, place and time. Motor skills grossly intact. SKIN: Normal skin color, texture, turgor and no jaundice. Skin appears intact   EXTREMITIES: no LE edema, no cyanosis, no leg swelling, no clubbing    LABORATORY RESULTS  CBC:   Recent Labs     05/24/22  0818   WBC 5.5   HGB 11.7*        BMP:    Recent Labs     05/24/22  0818      K 3.8      CO2 21   BUN 5*   CREATININE 0.7   GLUCOSE 84     Hepatic:   Recent Labs     05/24/22  0818   AST 18   ALT 12   BILITOT 0.3   ALKPHOS 42     INR: No results for input(s): INR in the last 72 hours. RADIOLOGY REPORTS  CT scan of the abdomen & pelvis 5/21/22  1. Diffuse small bowel fluid-filled distention likely secondary to a mass at   the ileocecal valve.  A pill is also lodged within the ileocecal valve which   also may be causing a component of obstruction. 2. Cystic lesion associated with the right adnexa measuring 2.5 x 1.7 cm. Finding is of unclear etiology and clinical significance.  Differential   considerations include abscess from prior diverticulitis, metastatic deposit,   or ovarian cyst.   3. Severe atherosclerosis.      US pelvis  1. 2.7 cm complex hypoechoic lesion in the right adnexa/ovary correlating to   recent CT.  Differential considerations include a complex cystic ovarian   lesion, tubo-ovarian abscess or pericolonic abscess given adjacent colon. Recommend a short-term follow-up to ensure complete resolution.  If findings   persist, a follow-up MRI may be warranted. 2. Nonvisualization of the left ovary or endometrium. 3. Probable 2.7 cm intramural fibroid. ASSESSMENT  Ileocecal mass  Right adnexa cystic lesion    RECOMMENDATION  I reviewed with her findings on CT scan, US pelvis, colonoscopy and labs. Cecal lesion is concerning for malignant process and right adnexa cystic lesion needs further evaluation with MRI. Will follow up with pathology and MRI result. I agree with Dr. Santa Fuentes and she will need surgery during this admission. We will follow the patient. Thank you for allowing me to participate in the care of this very pleasant patient.

## 2022-05-26 NOTE — PROGRESS NOTES
PT brought back to floor by Marlyn Irving RN and Thuy Harrison RN after RR in MRI. Pt alert and oriented. Pt states she \"felt weak after gettting MRI and her legs buckled\". No complaints of pain or distress. Stated she did experience pain during MRI, but none at this time. Family currently at bedside.

## 2022-05-26 NOTE — PLAN OF CARE
Problem: Discharge Planning  Goal: Discharge to home or other facility with appropriate resources  Outcome: Progressing  Flowsheets (Taken 5/25/2022 5424)  Discharge to home or other facility with appropriate resources:   Arrange for needed discharge resources and transportation as appropriate   Identify discharge learning needs (meds, wound care, etc)   Refer to discharge planning if patient needs post-hospital services based on physician order or complex needs related to functional status, cognitive ability or social support system     Problem: Safety - Adult  Goal: Free from fall injury  Outcome: Progressing     Problem: ABCDS Injury Assessment  Goal: Absence of physical injury  Outcome: Progressing     Problem: Chronic Conditions and Co-morbidities  Goal: Patient's chronic conditions and co-morbidity symptoms are monitored and maintained or improved  Outcome: Progressing     Problem: Pain  Goal: Verbalizes/displays adequate comfort level or baseline comfort level  Outcome: Progressing

## 2022-05-26 NOTE — CARE COORDINATION
Discussed pt in IDR with nursing and Dr. Krystal Vásquez, plan for surgery in am for hemicolectomy. CM to follow up post op to determine if discharge needs develop.

## 2022-05-26 NOTE — PROGRESS NOTES
[facility] HOSPITALIST PROGRESS NOTE      PCP: Nathan Velasquez DO    Date of Admission: 5/22/2022    Subjective: Colonoscopy performed yesterday  Continues to have abdominal pain     Brief Hospital summary patient is an 59-year-old female with history of CKD stage IIIa, diabetes mellitus, hypothyroidism, sleep apnea and vitamin B12 deficiency who was admitted with abdominal pain. CT abdomen at outside hospital showed diffuse small bowel distention with apparent large ileocecal valve. Recently was treated with diverticulitis  Zosyn continued, surgery and GI consulted   MRI abdomen pending  N.p.o.,  Colonoscopy showed cecal lesion concerning for malignant process, biopsies obtained, oncology consulted    Vitals signs:   Afebrile, heart rate 50s to 60s range, blood pressure 154/49, on room air    Medications: Lipitor, sertraline, heparin, Lantus, sliding scale insulin, levothyroxine, losartan, mag oxide, metoprolol, Zosyn, ropinirole, normal saline at 75 mill per hour    Antibiotics: Zosyn day 5    Fluid status: Urine output not documented    Labs: No labs today    Imaging:   CT abdomen  Diffuse small bowel fluid-filled distention likely secondary to a mass at   the ileocecal valve.  A pill is also lodged within the ileocecal valve which   also may be causing a component of obstruction. 2. Cystic lesion associated with the right adnexa measuring 2.5 x 1.7 cm. Finding is of unclear etiology and clinical significance.  Differential   considerations include abscess from prior diverticulitis, metastatic deposit,   or ovarian cyst.   3. Severe atherosclerosis. Ultrasound abdomen showed 2.7 cm complex right adnexal mass, differential includes complex ovarian cyst versus tubo-ovarian abscess versus pericolonic abscess.         Assessment/Plan:     Cecal mass  Partial small bowel obstruction:  Admitted with abdominal pain, CT abdomen showed small bowel obstruction with a likely cecal mass  Ultrasound pelvis shows right adnexal mass, complex ovarian cyst versus tubo-ovarian abscess versus pericolonic abscess  MRI abdomen pending  Continue Zosyn  Appreciate GI and surgery consultation  Gentle hydration  Currently n.p.o. Colonoscopy showed large mass extending from ileocecal valve into cecum consistent with tubulovillous, biopsies obtained  MRI still not performed  Plan is for robotic assisted hemicolectomy tomorrow if MRI performed  Oncology consulted    Zosyn can be stopped if okay with surgery    Hyponatremia  Sodium 130 on admission,  Improving with hydration  Sodium trending    Diabetes mellitus type 2  Glucose 83-1 15 range, sliding scale insulin  Continue to hold Lantus    Restless leg syndrome  Continue ropinirole    Hypothyroidism  Continue Synthroid    Essential hypertension  Continue losartan and metoprolol, as needed hydralazine    Mood disorder  Continue sertraline    Hyperlipidemia  Statin      DVT prophlaxis:   Lovenox    Encourage ambulation    Physical Exam Performed:       BP (!) 154/49   Pulse 56   Temp 97.4 °F (36.3 °C) (Oral)   Resp 18   Ht 5' (1.524 m)   Wt 125 lb 11.2 oz (57 kg)   SpO2 94%   BMI 24.55 kg/m²     Physical Exam  Constitutional:       General: She is not in acute distress. Appearance: Normal appearance. HENT:      Head: Normocephalic and atraumatic. Right Ear: External ear normal.      Left Ear: External ear normal.   Eyes:      Extraocular Movements: Extraocular movements intact. Pupils: Pupils are equal, round, and reactive to light. Cardiovascular:      Rate and Rhythm: Normal rate and regular rhythm. Heart sounds: No murmur heard. Pulmonary:      Effort: Pulmonary effort is normal. No respiratory distress. Breath sounds: Normal breath sounds. No wheezing. Abdominal:      General: Bowel sounds are normal. There is no distension. Palpations: Abdomen is soft. Tenderness: There is no abdominal tenderness. There is no guarding. Musculoskeletal:         General: No swelling. Cervical back: Normal range of motion. Skin:     General: Skin is warm. Neurological:      General: No focal deficit present. Mental Status: She is alert and oriented to person, place, and time. Cranial Nerves: No cranial nerve deficit. Psychiatric:         Mood and Affect: Mood normal.         Labs:   Recent Labs     05/24/22 0818   WBC 5.5   HGB 11.7*   HCT 36.6*        Recent Labs     05/24/22 0818      K 3.8      CO2 21   BUN 5*   CREATININE 0.7   CALCIUM 8.5     Recent Labs     05/24/22 0818   AST 18   ALT 12   BILITOT 0.3   ALKPHOS 42     No results for input(s): INR in the last 72 hours. No results for input(s): Marylu Johnson in the last 72 hours. Urinalysis:      Lab Results   Component Value Date    NITRU NEGATIVE 05/22/2022    WBCUA 2 05/22/2022    BACTERIA NEGATIVE 05/22/2022    RBCUA NONE SEEN 05/22/2022    BLOODU NEGATIVE 05/22/2022    SPECGRAV <1.005 05/22/2022       Radiology:  US PELVIS COMPLETE   Final Result   1. 2.7 cm complex hypoechoic lesion in the right adnexa/ovary correlating to   recent CT. Differential considerations include a complex cystic ovarian   lesion, tubo-ovarian abscess or pericolonic abscess given adjacent colon. Recommend a short-term follow-up to ensure complete resolution. If findings   persist, a follow-up MRI may be warranted. 2. Nonvisualization of the left ovary or endometrium. 3. Probable 2.7 cm intramural fibroid. US DUP ABD PEL RETRO SCROT COMPLETE   Final Result   1. 2.7 cm complex hypoechoic lesion in the right adnexa/ovary correlating to   recent CT. Differential considerations include a complex cystic ovarian   lesion, tubo-ovarian abscess or pericolonic abscess given adjacent colon. Recommend a short-term follow-up to ensure complete resolution. If findings   persist, a follow-up MRI may be warranted.    2. Nonvisualization of the left ovary or endometrium. 3. Probable 2.7 cm intramural fibroid.          MRI PELVIS W CONTRAST    (Results Pending)   MRI ABDOMEN W CONTRAST    (Results Pending)           Mckay James MD  5/26/2022 9:52 AM

## 2022-05-26 NOTE — PROGRESS NOTES
Pt states has not slept since Saturday. Perfect served Anthony Hayes MD and asked about something to help her sleep. Anthony Hayes MD ordered Ambien. Will continue to monitor.

## 2022-05-26 NOTE — ANESTHESIA PRE PROCEDURE
Department of Anesthesiology  Preprocedure Note       Name:  Ainsley Rose   Age:  80 y.o.  :  1934                                          MRN:  [id number]         Date:  2022      Surgeon: Jeremi Camacho):  Jackelyn Dye MD    Procedure: Procedure(s):  BOWEL RESECTION HEMICOLECTOMY LAPAROSCOPIC ROBOTIC XI    Medications prior to admission:   Prior to Admission medications    Medication Sig Start Date End Date Taking?  Authorizing Provider   promethazine (PHENERGAN) 25 MG tablet Take 25 mg by mouth every 6 hours as needed for Nausea    Historical Provider, MD   loperamide (IMODIUM A-D) 2 MG tablet Take 2 mg by mouth as needed for Diarrhea    Historical Provider, MD   melatonin 3 MG TABS tablet Take 3 mg by mouth nightly as needed    Historical Provider, MD   levocetirizine (XYZAL) 5 MG tablet 1 tablet daily 3/27/21   Historical Provider, MD   magnesium oxide (MAG-OX) 400 (241.3 Mg) MG TABS tablet daily 21   Historical Provider, MD   albuterol sulfate  (90 Base) MCG/ACT inhaler as needed 21   Historical Provider, MD   vitamin B-12 (CYANOCOBALAMIN) 100 MCG tablet Take 100 mcg by mouth daily    Historical Provider, MD   LANTUS SOLOSTAR 100 UNIT/ML injection pen  19   Historical Provider, MD   Calcium-Magnesium-Vitamin D (CALCIUM 1200+D3 PO) Take by mouth daily    Historical Provider, MD   Cholecalciferol (VITAMIN D PO) Take by mouth    Historical Provider, MD   oxybutynin (DITROPAN) 5 MG tablet Take 5 mg by mouth 2 times daily    Historical Provider, MD   atorvastatin (LIPITOR) 20 MG tablet Take 20 mg by mouth daily    Historical Provider, MD   rOPINIRole (REQUIP) 3 MG tablet Take 3 mg by mouth nightly    Historical Provider, MD   levothyroxine (SYNTHROID) 137 MCG tablet Take 137 mcg by mouth daily  3/13/17   Historical Provider, MD   Insulin Pen Needle (PEN NEEDLES 31GX5/16\") 31G X 8 MM MISC  3/11/16   Historical Provider, MD   losartan (COZAAR) 100 MG tablet Take 100 mg by mouth daily    Historical Provider, MD   metoprolol (LOPRESSOR) 50 MG tablet 3 pills in the AM : 150 mg dose  2 pills in the PM: 100 mg dose    Historical Provider, MD       Current medications:    Current Facility-Administered Medications   Medication Dose Route Frequency Provider Last Rate Last Admin    polyethylene glycol (GoLYTELY) solution 4,000 mL  4,000 mL Oral Once Anjel Song MD        zolpidem (AMBIEN) tablet 5 mg  5 mg Oral Nightly PRN Mahogany Mendoza MD   5 mg at 05/25/22 2137    enoxaparin (LOVENOX) injection 40 mg  40 mg SubCUTAneous Daily Anjel Song MD   40 mg at 05/26/22 0806    bisacodyl (DULCOLAX) EC tablet 10 mg  10 mg Oral TID Anjel Song MD   10 mg at 05/26/22 0806    promethazine (PHENERGAN) tablet 12.5 mg  12.5 mg Oral Q6H PRN Anjel Song MD   12.5 mg at 05/24/22 0901    albuterol sulfate  (90 Base) MCG/ACT inhaler 2 puff  2 puff Inhalation Q6H PRN Anjel Song MD        atorvastatin (LIPITOR) tablet 20 mg  20 mg Oral Daily Anjel Song MD   20 mg at 05/26/22 0806    Vitamin D (CHOLECALCIFEROL) tablet 1,000 Units  1,000 Units Oral Daily Anjel Song MD   1,000 Units at 05/26/22 0806    [Held by provider] insulin glargine (LANTUS) injection vial 15 Units  15 Units SubCUTAneous Nightly Yaima Pack DO   15 Units at 05/23/22 2128    cetirizine (ZYRTEC) tablet 10 mg  10 mg Oral Daily Anjel Song MD   10 mg at 05/26/22 0806    levothyroxine (SYNTHROID) tablet 137 mcg  137 mcg Oral Daily Alicja Martinez MD   137 mcg at 05/26/22 0806    losartan (COZAAR) tablet 100 mg  100 mg Oral Daily Alicja Martinez MD   100 mg at 05/26/22 0807    metoprolol tartrate (LOPRESSOR) tablet 150 mg  150 mg Oral Daily Anjel Song MD   150 mg at 05/26/22 0807    metoprolol tartrate (LOPRESSOR) tablet 100 mg  100 mg Oral Nightly Alicja Martinez MD   100 mg at 05/25/22 2111    oxybutynin (DITROPAN) tablet 5 mg  5 mg Oral BID Anjel Song MD   5 mg at 05/26/22 0807    rOPINIRole (REQUIP) tablet 3 mg  3 mg Oral Nightly Stoney Robles MD   3 mg at 05/25/22 2111    vitamin B-12 (CYANOCOBALAMIN) tablet 100 mcg  100 mcg Oral Daily Stoney Robles MD   100 mcg at 05/26/22 0807    glucose chewable tablet 16 g  4 tablet Oral PRN Stoney Robles MD        dextrose bolus 10% 125 mL  125 mL IntraVENous TONY Robles MD   Stopped at 05/22/22 2152    Or    dextrose bolus 10% 250 mL  250 mL IntraVENous PRN Stoney Robles MD        glucagon (rDNA) injection 1 mg  1 mg IntraMUSCular PRN Stoney Robles MD        dextrose 5 % solution  100 mL/hr IntraVENous PRN Stoney Robles  mL/hr at 05/22/22 2156 100 mL/hr at 05/22/22 2156    insulin lispro (HUMALOG) injection vial 0-6 Units  0-6 Units SubCUTAneous Q4H Stoney Robles MD   1 Units at 05/23/22 2117    sodium chloride flush 0.9 % injection 5-40 mL  5-40 mL IntraVENous 2 times per day Stoney Robles MD   10 mL at 05/25/22 0900    sodium chloride flush 0.9 % injection 5-40 mL  5-40 mL IntraVENous PRN Stoney Robles MD   10 mL at 05/22/22 0418    0.9 % sodium chloride infusion   IntraVENous TONY Robles MD   Stopped at 05/25/22 1247    ondansetron (ZOFRAN-ODT) disintegrating tablet 4 mg  4 mg Oral Q8H PRN Stoney Robles MD        Or    ondansetron (ZOFRAN) injection 4 mg  4 mg IntraVENous Q6H PRN Stoney Robles MD   4 mg at 05/25/22 1033    polyethylene glycol (GLYCOLAX) packet 17 g  17 g Oral Daily PRN Stoney Robles MD        acetaminophen (TYLENOL) tablet 650 mg  650 mg Oral Q6H PRN Stoney Robles MD        Or    acetaminophen (TYLENOL) suppository 650 mg  650 mg Rectal Q6H PRN Stoney Robles MD        piperacillin-tazobactam (ZOSYN) 4,500 mg in dextrose 5 % 100 mL IVPB (mini-bag)  4,500 mg IntraVENous Q8H Stoney Robles MD 25 mL/hr at 05/26/22 0813 4,500 mg at 05/26/22 0813    0.9 % sodium chloride infusion   IntraVENous Continuous Alicja Martinez MD 75 mL/hr at 05/26/22 0528 New Bag at 05/26/22 6434       Allergies:     Allergies   Allergen Reactions    Lopid [Gemfibrozil] Shortness Of Breath and Other (See Comments)     Cough    Bystolic [Nebivolol Hcl]     Cefdinir     Coreg [Carvedilol]     Crestor [Rosuvastatin]     Fenofibrate     Glucophage [Metformin]     Hydrochlorothiazide Other (See Comments)    Metronidazole     Norvasc [Amlodipine Besylate]     Tribenzor [Olmesartan-Amlodipine-Hctz]      Pt states that her chest felt funny and achy when she took the medication    Zocor [Simvastatin]        Problem List:    Patient Active Problem List   Diagnosis Code    Long-term insulin use in type 2 diabetes ([facility] [address] E11.9, Z79.4    Essential hypertension I10    Dyslipidemia E78.5    Abnormal EKG R94.31    Abnormal stress test R94.39    Cecum mass K63.89       Past Medical History:        Diagnosis Date    Diabetes mellitus ([facility] [address]     H/O cardiac catheterization 05/18/2021    no significant CAD in main vessels, has severe stenosis in small  branch diagonal vessel    H/O cardiovascular stress test 3/22/18,03/30/2017    ECG portion of the stress test is negative for ischemia EF >70% Normal stress myocardial perfusion.  H/O cardiovascular stress test 04/07/2021    abnormal stress    H/O Doppler ultrasound (Abdomimal Aorta) 03/29/2017    No evidence of abdominal aortic aneurysm.  H/O echocardiogram 07/02/2014    Normal left ventricular size, wall motion and systolic function. [place] elevated pulmonary artery systolic pressure. Mild MR and TR and trace AR EF 55 to 60%    Hx of echocardiogram 03/29/2017    EF 55-60%. Grade I diastolic dysfunction. Mildly dilated left atrium. No evidence of pericardial effusion.      Hyperlipidemia     Hypertension     Sleep apnea     Thyroid disease        Past Surgical History:        Procedure Laterality Date    BREAST BIOPSY Right     approx age 76, benign    CATARACT REMOVAL      CHOLECYSTECTOMY      COLONOSCOPY N/A 5/25/2022    COLONOSCOPY POLYPECTOMY SNARE/COLD BIOPSY performed by Deepika Hendricks MD at [facility] CURETTAGE OF UTERUS      OTHER SURGICAL HISTORY      eye lift       Social History:    Social History     Tobacco Use    Smoking status: Never Smoker    Smokeless tobacco: Never Used   Substance Use Topics    Alcohol use: Not Currently     Alcohol/week: 1.0 standard drink     Types: 1 Glasses of wine per week     Comment: rarely                                Counseling given: Not Answered      Vital Signs (Current):   Vitals:    05/25/22 1359 05/25/22 1952 05/26/22 0200 05/26/22 0803   BP: (!) 127/54 (!) 130/50 (!) 136/54 (!) 154/49   Pulse: 86 84 60 56   Resp: 16 17 16 18   Temp: 36.7 °C (98.1 °F) 37.2 °C (98.9 °F) 36.3 °C (97.3 °F) 36.3 °C (97.4 °F)   TempSrc: Axillary Oral Oral Oral   SpO2: 92% 96% 100% 94%   Weight:   125 lb 11.2 oz (57 kg)    Height:                                                  BP Readings from Last 3 Encounters:   05/26/22 (!) 154/49   05/21/22 (!) 145/65   12/02/21 138/78       NPO Status: Time of last liquid consumption: 1130 (prep)                        Time of last solid consumption: 0800                        Date of last liquid consumption: 05/25/22                        Date of last solid food consumption: 05/24/22    BMI:   Wt Readings from Last 3 Encounters:   05/26/22 125 lb 11.2 oz (57 kg)   05/21/22 145 lb (65.8 kg)   12/02/21 150 lb 9.6 oz (68.3 kg)     Body mass index is 24.55 kg/m².     CBC:   Lab Results   Component Value Date    WBC 5.5 05/24/2022    RBC 4.00 05/24/2022    HGB 11.7 05/24/2022    HCT 36.6 05/24/2022    MCV 91.5 05/24/2022    RDW 12.8 05/24/2022     05/24/2022       CMP:   Lab Results   Component Value Date     05/24/2022    K 3.8 05/24/2022    K 4.4 03/15/2018     05/24/2022    CO2 21 05/24/2022    BUN 5 05/24/2022    CREATININE 0.7 05/24/2022    GFRAA >60 05/24/2022    LABGLOM >60 05/24/2022    GLUCOSE 84 05/24/2022    PROT 5.5 05/24/2022    CALCIUM 8.5 05/24/2022    BILITOT 0.3 05/24/2022    ALKPHOS 42 05/24/2022    AST 18 05/24/2022    ALT 12 05/24/2022       POC Tests:   Recent Labs     05/26/22  0750   POCGLU 83       Coags:   Lab Results   Component Value Date    PROTIME 12.4 05/14/2021    INR 1.09 05/14/2021    APTT 29.5 05/14/2021       HCG (If Applicable): No results found for: PREGTESTUR, PREGSERUM, HCG, HCGQUANT     ABGs:   Lab Results   Component Value Date    PO2ART 78 05/22/2022    GIS4NSU 42.0 05/22/2022    ZFF0TQK 23.7 05/22/2022        Type & Screen (If Applicable):  No results found for: LABABO, LABRH    Drug/Infectious Status (If Applicable):  No results found for: HIV, HEPCAB    COVID-19 Screening (If Applicable):   Lab Results   Component Value Date    COVID19 NOT DETECTED 05/14/2021           Anesthesia Evaluation  Patient summary reviewed  Airway: Mallampati: II  TM distance: >3 FB   Neck ROM: full  Mouth opening: > = 3 FB   Dental:    (+) edentulous      Pulmonary:normal exam    (+) sleep apnea:                             Cardiovascular:    (+) hypertension:, hyperlipidemia               ROS comment: [place] 5/18/2021:     Procedure Summary   no significant CAD in main vessels, has severe stenosis in small   branch diagonal vessel   estimated blood loss 3 cc      Recommendations   optimize medication and cleared for sx on moderate risk    echo     Neuro/Psych:   Negative Neuro/Psych ROS              GI/Hepatic/Renal:            ROS comment: Cecal cancer. Endo/Other:    (+) DiabetesType II DM, , malignancy/cancer. Abdominal:             Vascular: negative vascular ROS. Other Findings:           Anesthesia Plan      general and regional     ASA 3       Induction: intravenous. MIPS: Postoperative opioids intended. Anesthetic plan and risks discussed with patient. Plan discussed with CRNA. Attending anesthesiologist reviewed and agrees with Pre Eval content                LAURE Perales - CRNA   5/26/2022         Pre Anesthesia Assessment complete.  Chart reviewed on 5/26/2022

## 2022-05-27 ENCOUNTER — ANESTHESIA (OUTPATIENT)
Dept: OPERATING ROOM | Age: 87
DRG: 329 | End: 2022-05-27
Payer: MEDICARE

## 2022-05-27 LAB
ANION GAP SERPL CALCULATED.3IONS-SCNC: 11 MMOL/L (ref 4–16)
BUN BLDV-MCNC: 5 MG/DL (ref 6–23)
CALCIUM SERPL-MCNC: 8.3 MG/DL (ref 8.3–10.6)
CHLORIDE BLD-SCNC: 100 MMOL/L (ref 99–110)
CO2: 21 MMOL/L (ref 21–32)
CREAT SERPL-MCNC: 0.7 MG/DL (ref 0.6–1.1)
EKG ATRIAL RATE: 79 BPM
EKG DIAGNOSIS: NORMAL
EKG Q-T INTERVAL: 438 MS
EKG QRS DURATION: 130 MS
EKG QTC CALCULATION (BAZETT): 475 MS
EKG R AXIS: -8 DEGREES
EKG T AXIS: -2 DEGREES
EKG VENTRICULAR RATE: 71 BPM
GFR AFRICAN AMERICAN: >60 ML/MIN/1.73M2
GFR NON-AFRICAN AMERICAN: >60 ML/MIN/1.73M2
GLUCOSE BLD-MCNC: 108 MG/DL (ref 70–99)
GLUCOSE BLD-MCNC: 126 MG/DL (ref 70–99)
GLUCOSE BLD-MCNC: 195 MG/DL (ref 70–99)
HCT VFR BLD CALC: 34 % (ref 37–47)
HEMOGLOBIN: 10.9 GM/DL (ref 12.5–16)
MCH RBC QN AUTO: 29.1 PG (ref 27–31)
MCHC RBC AUTO-ENTMCNC: 32.1 % (ref 32–36)
MCV RBC AUTO: 90.7 FL (ref 78–100)
PDW BLD-RTO: 12.9 % (ref 11.7–14.9)
PLATELET # BLD: 185 K/CU MM (ref 140–440)
PMV BLD AUTO: 10.3 FL (ref 7.5–11.1)
POTASSIUM SERPL-SCNC: 4 MMOL/L (ref 3.5–5.1)
RBC # BLD: 3.75 M/CU MM (ref 4.2–5.4)
SODIUM BLD-SCNC: 132 MMOL/L (ref 135–145)
WBC # BLD: 7.4 K/CU MM (ref 4–10.5)

## 2022-05-27 PROCEDURE — 36415 COLL VENOUS BLD VENIPUNCTURE: CPT

## 2022-05-27 PROCEDURE — 6360000002 HC RX W HCPCS: Performed by: NURSE ANESTHETIST, CERTIFIED REGISTERED

## 2022-05-27 PROCEDURE — 6360000002 HC RX W HCPCS: Performed by: ANESTHESIOLOGY

## 2022-05-27 PROCEDURE — 64488 TAP BLOCK BI INJECTION: CPT | Performed by: ANESTHESIOLOGY

## 2022-05-27 PROCEDURE — 2580000003 HC RX 258: Performed by: SURGERY

## 2022-05-27 PROCEDURE — 93005 ELECTROCARDIOGRAM TRACING: CPT | Performed by: NURSE ANESTHETIST, CERTIFIED REGISTERED

## 2022-05-27 PROCEDURE — 6360000002 HC RX W HCPCS: Performed by: SURGERY

## 2022-05-27 PROCEDURE — S2900 ROBOTIC SURGICAL SYSTEM: HCPCS | Performed by: SURGERY

## 2022-05-27 PROCEDURE — 2580000003 HC RX 258: Performed by: INTERNAL MEDICINE

## 2022-05-27 PROCEDURE — 2500000003 HC RX 250 WO HCPCS: Performed by: NURSE ANESTHETIST, CERTIFIED REGISTERED

## 2022-05-27 PROCEDURE — 6370000000 HC RX 637 (ALT 250 FOR IP): Performed by: SURGERY

## 2022-05-27 PROCEDURE — 82962 GLUCOSE BLOOD TEST: CPT

## 2022-05-27 PROCEDURE — 88341 IMHCHEM/IMCYTCHM EA ADD ANTB: CPT

## 2022-05-27 PROCEDURE — 7100000000 HC PACU RECOVERY - FIRST 15 MIN: Performed by: SURGERY

## 2022-05-27 PROCEDURE — 88309 TISSUE EXAM BY PATHOLOGIST: CPT

## 2022-05-27 PROCEDURE — 80048 BASIC METABOLIC PNL TOTAL CA: CPT

## 2022-05-27 PROCEDURE — 93010 ELECTROCARDIOGRAM REPORT: CPT | Performed by: INTERNAL MEDICINE

## 2022-05-27 PROCEDURE — 2720000010 HC SURG SUPPLY STERILE: Performed by: SURGERY

## 2022-05-27 PROCEDURE — 2709999900 HC NON-CHARGEABLE SUPPLY: Performed by: SURGERY

## 2022-05-27 PROCEDURE — 3600000019 HC SURGERY ROBOT ADDTL 15MIN: Performed by: SURGERY

## 2022-05-27 PROCEDURE — 2700000000 HC OXYGEN THERAPY PER DAY

## 2022-05-27 PROCEDURE — P9045 ALBUMIN (HUMAN), 5%, 250 ML: HCPCS | Performed by: NURSE ANESTHETIST, CERTIFIED REGISTERED

## 2022-05-27 PROCEDURE — 94761 N-INVAS EAR/PLS OXIMETRY MLT: CPT

## 2022-05-27 PROCEDURE — 1200000000 HC SEMI PRIVATE

## 2022-05-27 PROCEDURE — 2580000003 HC RX 258: Performed by: ANESTHESIOLOGY

## 2022-05-27 PROCEDURE — 3600000009 HC SURGERY ROBOT BASE: Performed by: SURGERY

## 2022-05-27 PROCEDURE — 8E0W4CZ ROBOTIC ASSISTED PROCEDURE OF TRUNK REGION, PERCUTANEOUS ENDOSCOPIC APPROACH: ICD-10-PCS | Performed by: STUDENT IN AN ORGANIZED HEALTH CARE EDUCATION/TRAINING PROGRAM

## 2022-05-27 PROCEDURE — 2500000003 HC RX 250 WO HCPCS: Performed by: SURGERY

## 2022-05-27 PROCEDURE — 3E0T3BZ INTRODUCTION OF ANESTHETIC AGENT INTO PERIPHERAL NERVES AND PLEXI, PERCUTANEOUS APPROACH: ICD-10-PCS | Performed by: STUDENT IN AN ORGANIZED HEALTH CARE EDUCATION/TRAINING PROGRAM

## 2022-05-27 PROCEDURE — 0DTF4ZZ RESECTION OF RIGHT LARGE INTESTINE, PERCUTANEOUS ENDOSCOPIC APPROACH: ICD-10-PCS | Performed by: STUDENT IN AN ORGANIZED HEALTH CARE EDUCATION/TRAINING PROGRAM

## 2022-05-27 PROCEDURE — 7100000001 HC PACU RECOVERY - ADDTL 15 MIN: Performed by: SURGERY

## 2022-05-27 PROCEDURE — 6360000002 HC RX W HCPCS: Performed by: INTERNAL MEDICINE

## 2022-05-27 PROCEDURE — 88342 IMHCHEM/IMCYTCHM 1ST ANTB: CPT

## 2022-05-27 PROCEDURE — 3700000000 HC ANESTHESIA ATTENDED CARE: Performed by: SURGERY

## 2022-05-27 PROCEDURE — 3700000001 HC ADD 15 MINUTES (ANESTHESIA): Performed by: SURGERY

## 2022-05-27 PROCEDURE — 44160 REMOVAL OF COLON: CPT | Performed by: SURGERY

## 2022-05-27 PROCEDURE — 85027 COMPLETE CBC AUTOMATED: CPT

## 2022-05-27 RX ORDER — LIDOCAINE HYDROCHLORIDE 20 MG/ML
INJECTION, SOLUTION EPIDURAL; INFILTRATION; INTRACAUDAL; PERINEURAL PRN
Status: DISCONTINUED | OUTPATIENT
Start: 2022-05-27 | End: 2022-05-27 | Stop reason: SDUPTHER

## 2022-05-27 RX ORDER — ROPIVACAINE HYDROCHLORIDE 2 MG/ML
INJECTION, SOLUTION EPIDURAL; INFILTRATION; PERINEURAL
Status: COMPLETED | OUTPATIENT
Start: 2022-05-27 | End: 2022-05-27

## 2022-05-27 RX ORDER — MORPHINE SULFATE 2 MG/ML
2 INJECTION, SOLUTION INTRAMUSCULAR; INTRAVENOUS
Status: DISCONTINUED | OUTPATIENT
Start: 2022-05-27 | End: 2022-06-09 | Stop reason: HOSPADM

## 2022-05-27 RX ORDER — OXYCODONE HYDROCHLORIDE 5 MG/1
5 TABLET ORAL EVERY 4 HOURS PRN
Status: DISCONTINUED | OUTPATIENT
Start: 2022-05-27 | End: 2022-06-09 | Stop reason: HOSPADM

## 2022-05-27 RX ORDER — DEXAMETHASONE SODIUM PHOSPHATE 4 MG/ML
INJECTION, SOLUTION INTRA-ARTICULAR; INTRALESIONAL; INTRAMUSCULAR; INTRAVENOUS; SOFT TISSUE PRN
Status: DISCONTINUED | OUTPATIENT
Start: 2022-05-27 | End: 2022-05-27 | Stop reason: SDUPTHER

## 2022-05-27 RX ORDER — OXYCODONE HYDROCHLORIDE 10 MG/1
10 TABLET ORAL EVERY 4 HOURS PRN
Status: DISCONTINUED | OUTPATIENT
Start: 2022-05-27 | End: 2022-06-09 | Stop reason: HOSPADM

## 2022-05-27 RX ORDER — ONDANSETRON 2 MG/ML
4 INJECTION INTRAMUSCULAR; INTRAVENOUS EVERY 6 HOURS PRN
Status: DISCONTINUED | OUTPATIENT
Start: 2022-05-27 | End: 2022-06-09 | Stop reason: HOSPADM

## 2022-05-27 RX ORDER — LEVOFLOXACIN 5 MG/ML
500 INJECTION, SOLUTION INTRAVENOUS
Status: COMPLETED | OUTPATIENT
Start: 2022-05-27 | End: 2022-05-27

## 2022-05-27 RX ORDER — ONDANSETRON 4 MG/1
4 TABLET, ORALLY DISINTEGRATING ORAL EVERY 8 HOURS PRN
Status: DISCONTINUED | OUTPATIENT
Start: 2022-05-27 | End: 2022-06-09 | Stop reason: HOSPADM

## 2022-05-27 RX ORDER — PHENYLEPHRINE HYDROCHLORIDE 10 MG/ML
INJECTION INTRAVENOUS PRN
Status: DISCONTINUED | OUTPATIENT
Start: 2022-05-27 | End: 2022-05-27 | Stop reason: SDUPTHER

## 2022-05-27 RX ORDER — MORPHINE SULFATE 4 MG/ML
4 INJECTION, SOLUTION INTRAMUSCULAR; INTRAVENOUS
Status: DISCONTINUED | OUTPATIENT
Start: 2022-05-27 | End: 2022-06-09 | Stop reason: HOSPADM

## 2022-05-27 RX ORDER — IPRATROPIUM BROMIDE AND ALBUTEROL SULFATE 2.5; .5 MG/3ML; MG/3ML
1 SOLUTION RESPIRATORY (INHALATION)
Status: DISCONTINUED | OUTPATIENT
Start: 2022-05-27 | End: 2022-05-27 | Stop reason: HOSPADM

## 2022-05-27 RX ORDER — LORAZEPAM 2 MG/ML
0.5 INJECTION INTRAMUSCULAR
Status: DISCONTINUED | OUTPATIENT
Start: 2022-05-27 | End: 2022-05-27 | Stop reason: HOSPADM

## 2022-05-27 RX ORDER — ROCURONIUM BROMIDE 10 MG/ML
INJECTION, SOLUTION INTRAVENOUS PRN
Status: DISCONTINUED | OUTPATIENT
Start: 2022-05-27 | End: 2022-05-27 | Stop reason: SDUPTHER

## 2022-05-27 RX ORDER — METRONIDAZOLE 500 MG/100ML
500 INJECTION, SOLUTION INTRAVENOUS
Status: COMPLETED | OUTPATIENT
Start: 2022-05-27 | End: 2022-05-27

## 2022-05-27 RX ORDER — SODIUM CHLORIDE 0.9 % (FLUSH) 0.9 %
5-40 SYRINGE (ML) INJECTION PRN
Status: DISCONTINUED | OUTPATIENT
Start: 2022-05-27 | End: 2022-05-27 | Stop reason: HOSPADM

## 2022-05-27 RX ORDER — FENTANYL CITRATE 50 UG/ML
INJECTION, SOLUTION INTRAMUSCULAR; INTRAVENOUS PRN
Status: DISCONTINUED | OUTPATIENT
Start: 2022-05-27 | End: 2022-05-27 | Stop reason: SDUPTHER

## 2022-05-27 RX ORDER — PROCHLORPERAZINE EDISYLATE 5 MG/ML
5 INJECTION INTRAMUSCULAR; INTRAVENOUS
Status: DISCONTINUED | OUTPATIENT
Start: 2022-05-27 | End: 2022-05-27 | Stop reason: HOSPADM

## 2022-05-27 RX ORDER — PROPOFOL 10 MG/ML
INJECTION, EMULSION INTRAVENOUS PRN
Status: DISCONTINUED | OUTPATIENT
Start: 2022-05-27 | End: 2022-05-27 | Stop reason: SDUPTHER

## 2022-05-27 RX ORDER — HYDRALAZINE HYDROCHLORIDE 20 MG/ML
10 INJECTION INTRAMUSCULAR; INTRAVENOUS
Status: DISCONTINUED | OUTPATIENT
Start: 2022-05-27 | End: 2022-05-27 | Stop reason: HOSPADM

## 2022-05-27 RX ORDER — SODIUM CHLORIDE, SODIUM LACTATE, POTASSIUM CHLORIDE, CALCIUM CHLORIDE 600; 310; 30; 20 MG/100ML; MG/100ML; MG/100ML; MG/100ML
INJECTION, SOLUTION INTRAVENOUS CONTINUOUS
Status: DISCONTINUED | OUTPATIENT
Start: 2022-05-27 | End: 2022-05-27 | Stop reason: HOSPADM

## 2022-05-27 RX ORDER — SODIUM CHLORIDE, SODIUM LACTATE, POTASSIUM CHLORIDE, CALCIUM CHLORIDE 600; 310; 30; 20 MG/100ML; MG/100ML; MG/100ML; MG/100ML
INJECTION, SOLUTION INTRAVENOUS CONTINUOUS
Status: DISCONTINUED | OUTPATIENT
Start: 2022-05-27 | End: 2022-05-27

## 2022-05-27 RX ORDER — MEPERIDINE HYDROCHLORIDE 25 MG/ML
12.5 INJECTION INTRAMUSCULAR; INTRAVENOUS; SUBCUTANEOUS ONCE
Status: DISCONTINUED | OUTPATIENT
Start: 2022-05-27 | End: 2022-05-27 | Stop reason: HOSPADM

## 2022-05-27 RX ORDER — SODIUM CHLORIDE 9 MG/ML
25 INJECTION, SOLUTION INTRAVENOUS PRN
Status: DISCONTINUED | OUTPATIENT
Start: 2022-05-27 | End: 2022-05-27 | Stop reason: HOSPADM

## 2022-05-27 RX ORDER — ONDANSETRON 2 MG/ML
INJECTION INTRAMUSCULAR; INTRAVENOUS PRN
Status: DISCONTINUED | OUTPATIENT
Start: 2022-05-27 | End: 2022-05-27 | Stop reason: SDUPTHER

## 2022-05-27 RX ORDER — ENOXAPARIN SODIUM 100 MG/ML
40 INJECTION SUBCUTANEOUS DAILY
Status: DISCONTINUED | OUTPATIENT
Start: 2022-05-28 | End: 2022-06-09 | Stop reason: HOSPADM

## 2022-05-27 RX ORDER — DIPHENHYDRAMINE HYDROCHLORIDE 50 MG/ML
12.5 INJECTION INTRAMUSCULAR; INTRAVENOUS
Status: DISCONTINUED | OUTPATIENT
Start: 2022-05-27 | End: 2022-05-27 | Stop reason: HOSPADM

## 2022-05-27 RX ORDER — ROPIVACAINE HYDROCHLORIDE 2 MG/ML
INJECTION, SOLUTION EPIDURAL; INFILTRATION; PERINEURAL
Status: DISCONTINUED | OUTPATIENT
Start: 2022-05-27 | End: 2022-05-27

## 2022-05-27 RX ORDER — ALBUMIN, HUMAN INJ 5% 5 %
SOLUTION INTRAVENOUS PRN
Status: DISCONTINUED | OUTPATIENT
Start: 2022-05-27 | End: 2022-05-27 | Stop reason: SDUPTHER

## 2022-05-27 RX ORDER — SODIUM CHLORIDE 9 MG/ML
INJECTION, SOLUTION INTRAVENOUS PRN
Status: DISCONTINUED | OUTPATIENT
Start: 2022-05-27 | End: 2022-05-27 | Stop reason: HOSPADM

## 2022-05-27 RX ORDER — DEXTROSE MONOHYDRATE 50 MG/ML
100 INJECTION, SOLUTION INTRAVENOUS PRN
Status: DISCONTINUED | OUTPATIENT
Start: 2022-05-27 | End: 2022-05-27 | Stop reason: HOSPADM

## 2022-05-27 RX ORDER — FENTANYL CITRATE 50 UG/ML
50 INJECTION, SOLUTION INTRAMUSCULAR; INTRAVENOUS EVERY 5 MIN PRN
Status: DISCONTINUED | OUTPATIENT
Start: 2022-05-27 | End: 2022-05-27 | Stop reason: HOSPADM

## 2022-05-27 RX ORDER — SODIUM CHLORIDE 0.9 % (FLUSH) 0.9 %
5-40 SYRINGE (ML) INJECTION EVERY 12 HOURS SCHEDULED
Status: DISCONTINUED | OUTPATIENT
Start: 2022-05-27 | End: 2022-05-27 | Stop reason: HOSPADM

## 2022-05-27 RX ORDER — OXYCODONE HYDROCHLORIDE 5 MG/1
5 TABLET ORAL
Status: DISCONTINUED | OUTPATIENT
Start: 2022-05-27 | End: 2022-05-27 | Stop reason: HOSPADM

## 2022-05-27 RX ORDER — ONDANSETRON 2 MG/ML
4 INJECTION INTRAMUSCULAR; INTRAVENOUS
Status: DISCONTINUED | OUTPATIENT
Start: 2022-05-27 | End: 2022-05-27 | Stop reason: HOSPADM

## 2022-05-27 RX ADMIN — ROPIVACAINE HYDROCHLORIDE 60 ML: 2 INJECTION, SOLUTION EPIDURAL; INFILTRATION at 13:25

## 2022-05-27 RX ADMIN — ROCURONIUM BROMIDE 10 MG: 50 INJECTION, SOLUTION INTRAVENOUS at 10:04

## 2022-05-27 RX ADMIN — ROCURONIUM BROMIDE 10 MG: 50 INJECTION, SOLUTION INTRAVENOUS at 12:13

## 2022-05-27 RX ADMIN — MORPHINE SULFATE 4 MG: 4 INJECTION, SOLUTION INTRAMUSCULAR; INTRAVENOUS at 18:23

## 2022-05-27 RX ADMIN — FENTANYL CITRATE 50 MCG: 50 INJECTION, SOLUTION INTRAMUSCULAR; INTRAVENOUS at 14:24

## 2022-05-27 RX ADMIN — ROCURONIUM BROMIDE 10 MG: 50 INJECTION, SOLUTION INTRAVENOUS at 08:41

## 2022-05-27 RX ADMIN — FENTANYL CITRATE 50 MCG: 50 INJECTION, SOLUTION INTRAMUSCULAR; INTRAVENOUS at 08:15

## 2022-05-27 RX ADMIN — PHENYLEPHRINE HYDROCHLORIDE 100 MCG: 10 INJECTION INTRAVENOUS at 08:21

## 2022-05-27 RX ADMIN — ROCURONIUM BROMIDE 10 MG: 50 INJECTION, SOLUTION INTRAVENOUS at 10:42

## 2022-05-27 RX ADMIN — OXYBUTYNIN CHLORIDE 5 MG: 5 TABLET ORAL at 20:03

## 2022-05-27 RX ADMIN — MORPHINE SULFATE 4 MG: 4 INJECTION, SOLUTION INTRAMUSCULAR; INTRAVENOUS at 21:53

## 2022-05-27 RX ADMIN — SODIUM CHLORIDE, POTASSIUM CHLORIDE, SODIUM LACTATE AND CALCIUM CHLORIDE: 600; 310; 30; 20 INJECTION, SOLUTION INTRAVENOUS at 15:18

## 2022-05-27 RX ADMIN — METOPROLOL TARTRATE 100 MG: 50 TABLET, FILM COATED ORAL at 19:55

## 2022-05-27 RX ADMIN — PROPOFOL 40 MG: 10 INJECTION, EMULSION INTRAVENOUS at 07:56

## 2022-05-27 RX ADMIN — ROCURONIUM BROMIDE 50 MG: 50 INJECTION, SOLUTION INTRAVENOUS at 07:56

## 2022-05-27 RX ADMIN — SODIUM CHLORIDE, POTASSIUM CHLORIDE, SODIUM LACTATE AND CALCIUM CHLORIDE: 600; 310; 30; 20 INJECTION, SOLUTION INTRAVENOUS at 08:44

## 2022-05-27 RX ADMIN — ZOLPIDEM TARTRATE 5 MG: 5 TABLET ORAL at 20:03

## 2022-05-27 RX ADMIN — ONDANSETRON 4 MG: 2 INJECTION INTRAMUSCULAR; INTRAVENOUS at 13:40

## 2022-05-27 RX ADMIN — SODIUM CHLORIDE: 9 INJECTION, SOLUTION INTRAVENOUS at 14:36

## 2022-05-27 RX ADMIN — LIDOCAINE HYDROCHLORIDE 40 MG: 20 INJECTION, SOLUTION EPIDURAL; INFILTRATION; INTRACAUDAL at 07:56

## 2022-05-27 RX ADMIN — PIPERACILLIN AND TAZOBACTAM 4500 MG: 4; .5 INJECTION, POWDER, FOR SOLUTION INTRAVENOUS at 16:33

## 2022-05-27 RX ADMIN — PIPERACILLIN AND TAZOBACTAM 4500 MG: 4; .5 INJECTION, POWDER, FOR SOLUTION INTRAVENOUS at 01:52

## 2022-05-27 RX ADMIN — LEVOFLOXACIN 500 MG: 5 INJECTION, SOLUTION INTRAVENOUS at 07:23

## 2022-05-27 RX ADMIN — SODIUM CHLORIDE: 9 INJECTION, SOLUTION INTRAVENOUS at 16:32

## 2022-05-27 RX ADMIN — SUGAMMADEX 400 MG: 100 INJECTION, SOLUTION INTRAVENOUS at 13:40

## 2022-05-27 RX ADMIN — FENTANYL CITRATE 25 MCG: 50 INJECTION, SOLUTION INTRAMUSCULAR; INTRAVENOUS at 13:40

## 2022-05-27 RX ADMIN — ALBUMIN (HUMAN) 12.5 G: 12.5 INJECTION, SOLUTION INTRAVENOUS at 08:15

## 2022-05-27 RX ADMIN — ROCURONIUM BROMIDE 10 MG: 50 INJECTION, SOLUTION INTRAVENOUS at 12:29

## 2022-05-27 RX ADMIN — SODIUM CHLORIDE: 9 INJECTION, SOLUTION INTRAVENOUS at 07:47

## 2022-05-27 RX ADMIN — ALBUMIN (HUMAN) 12.5 G: 12.5 INJECTION, SOLUTION INTRAVENOUS at 08:28

## 2022-05-27 RX ADMIN — ROPINIROLE HYDROCHLORIDE 3 MG: 1 TABLET, FILM COATED ORAL at 20:03

## 2022-05-27 RX ADMIN — ROCURONIUM BROMIDE 10 MG: 50 INJECTION, SOLUTION INTRAVENOUS at 09:41

## 2022-05-27 RX ADMIN — SODIUM CHLORIDE, POTASSIUM CHLORIDE, SODIUM LACTATE AND CALCIUM CHLORIDE: 600; 310; 30; 20 INJECTION, SOLUTION INTRAVENOUS at 07:47

## 2022-05-27 RX ADMIN — PHENYLEPHRINE HYDROCHLORIDE 50 MCG: 10 INJECTION INTRAVENOUS at 12:48

## 2022-05-27 RX ADMIN — DEXAMETHASONE SODIUM PHOSPHATE 4 MG: 4 INJECTION, SOLUTION INTRAMUSCULAR; INTRAVENOUS at 08:10

## 2022-05-27 RX ADMIN — SODIUM CHLORIDE: 9 INJECTION, SOLUTION INTRAVENOUS at 13:04

## 2022-05-27 RX ADMIN — ROCURONIUM BROMIDE 10 MG: 50 INJECTION, SOLUTION INTRAVENOUS at 11:12

## 2022-05-27 RX ADMIN — METRONIDAZOLE 500 MG: 500 INJECTION, SOLUTION INTRAVENOUS at 07:24

## 2022-05-27 RX ADMIN — PHENYLEPHRINE HYDROCHLORIDE 50 MCG: 10 INJECTION INTRAVENOUS at 13:09

## 2022-05-27 RX ADMIN — FENTANYL CITRATE 25 MCG: 50 INJECTION, SOLUTION INTRAMUSCULAR; INTRAVENOUS at 13:00

## 2022-05-27 RX ADMIN — PHENYLEPHRINE HYDROCHLORIDE 50 MCG: 10 INJECTION INTRAVENOUS at 13:07

## 2022-05-27 ASSESSMENT — PAIN DESCRIPTION - DESCRIPTORS
DESCRIPTORS: DISCOMFORT
DESCRIPTORS: ACHING;NAGGING;PENETRATING
DESCRIPTORS: ACHING;CRAMPING

## 2022-05-27 ASSESSMENT — PAIN - FUNCTIONAL ASSESSMENT: PAIN_FUNCTIONAL_ASSESSMENT: 0-10

## 2022-05-27 ASSESSMENT — PAIN SCALES - GENERAL
PAINLEVEL_OUTOF10: 8
PAINLEVEL_OUTOF10: 7
PAINLEVEL_OUTOF10: 3
PAINLEVEL_OUTOF10: 6

## 2022-05-27 ASSESSMENT — PAIN DESCRIPTION - ONSET: ONSET: ON-GOING

## 2022-05-27 ASSESSMENT — PAIN DESCRIPTION - PAIN TYPE: TYPE: SURGICAL PAIN

## 2022-05-27 ASSESSMENT — PAIN DESCRIPTION - LOCATION: LOCATION: ABDOMEN

## 2022-05-27 ASSESSMENT — PAIN DESCRIPTION - FREQUENCY: FREQUENCY: CONTINUOUS

## 2022-05-27 NOTE — PROGRESS NOTES
[facility] HOSPITALIST PROGRESS NOTE      PCP: Lilli Crump DO    Date of Admission: 5/22/2022    Subjective: N.p.o. overnight, OR today    Brief Hospital summary patient is an 27-year-old female with history of CKD stage IIIa, diabetes mellitus, hypothyroidism, sleep apnea and vitamin B12 deficiency who was admitted with abdominal pain. CT abdomen at outside hospital showed diffuse small bowel distention with apparent large ileocecal valve. Recently was treated with diverticulitis  Zosyn continued, surgery and GI consulted   MRI abdomen pending  N.p.o.,  Colonoscopy showed cecal lesion concerning for malignant process, biopsies obtained, oncology consulted  5/26 was briefly unresponsive, rapid response called, no confusion, easily arousable    Vitals signs:   Afebrile, heart rate 50s to 60s range, blood pressure 132/55, on room air    Medications: Lipitor, sertraline, heparin, Lantus, sliding scale insulin, levothyroxine, losartan, mag oxide, metoprolol, Zosyn, ropinirole, normal saline at 75 mill per hour    Antibiotics: Zosyn day 6     Fluid status: Urine output not documented    Labs: Na 132, Cr 0.7   WBC 7.4, Hb 10.9,     Imaging:     MRI abdomen showed cystic lesion in right adnexa unchanged from prior  Ileocecal valve mass mostly concerning for malignancy        Assessment/Plan:     IV cecal mass  Partial small bowel obstruction:  Admitted with abdominal pain, CT abdomen showed small bowel obstruction with a likely cecal mass  Ultrasound pelvis shows right adnexal mass, complex ovarian cyst versus tubo-ovarian abscess versus pericolonic abscess  MRI abdomen pending  Continue Zosyn  Appreciate GI and surgery consultation  Gentle hydration  Currently n.p.o.     Colonoscopy showed large mass extending from ileocecal valve into cecum consistent with tubulovillous, biopsies obtained  MRI shows ileocecal valve mass    N.p.o. overnight, hemicolectomy planned for today  Further management per surgery    Right adnexal cyst: Unchanged from prior CT, solid mass  Repeat CT/MRI in 6 months    Hyponatremia  Sodium 130 on admission,  Improving with hydration  Sodium trending    Diabetes mellitus type 2  Glucose 96-range, sliding scale insulin  Continue to hold Lantus    Restless leg syndrome  Continue ropinirole    Hypothyroidism  Continue Synthroid    Essential hypertension  Continue losartan and metoprolol, as needed hydralazine    Mood disorder  Continue sertraline    Hyperlipidemia  Statin      DVT prophlaxis:   Lovenox    Encourage ambulation    Physical Exam Performed:       BP (!) 132/55   Pulse 60   Temp 97.8 °F (36.6 °C) (Temporal)   Resp 16   Ht 5' (1.524 m)   Wt 125 lb 11.2 oz (57 kg)   SpO2 97%   BMI 24.55 kg/m²     Physical Exam  Constitutional:       General: She is not in acute distress. Appearance: Normal appearance. HENT:      Head: Normocephalic and atraumatic. Right Ear: External ear normal.      Left Ear: External ear normal.   Eyes:      Extraocular Movements: Extraocular movements intact. Pupils: Pupils are equal, round, and reactive to light. Cardiovascular:      Rate and Rhythm: Normal rate and regular rhythm. Heart sounds: No murmur heard. Pulmonary:      Effort: Pulmonary effort is normal. No respiratory distress. Breath sounds: Normal breath sounds. No wheezing. Abdominal:      General: Bowel sounds are normal. There is no distension. Palpations: Abdomen is soft. Tenderness: There is no abdominal tenderness. There is no guarding. Musculoskeletal:         General: No swelling. Cervical back: Normal range of motion. Skin:     General: Skin is warm. Neurological:      General: No focal deficit present. Mental Status: She is alert and oriented to person, place, and time. Cranial Nerves: No cranial nerve deficit.    Psychiatric:         Mood and Affect: Mood normal.         Labs:   Recent Labs     05/27/22  0507   WBC 7. 4   HGB 10.9*   HCT 34.0*        Recent Labs     05/27/22  0507   *   K 4.0      CO2 21   BUN 5*   CREATININE 0.7   CALCIUM 8.3     No results for input(s): AST, ALT, BILIDIR, BILITOT, ALKPHOS in the last 72 hours. No results for input(s): INR in the last 72 hours. No results for input(s): Towana Ball in the last 72 hours. Urinalysis:      Lab Results   Component Value Date    NITRU NEGATIVE 05/22/2022    WBCUA 2 05/22/2022    BACTERIA NEGATIVE 05/22/2022    RBCUA NONE SEEN 05/22/2022    BLOODU NEGATIVE 05/22/2022    SPECGRAV <1.005 05/22/2022       Radiology:  MRI PELVIS W WO CONTRAST   Final Result   1. Cystic lesion in the right adnexa appears unchanged from prior CT. No   internal enhancement is identified to suggest a solid mass. Overall,   findings are favored to represent a complex ovarian cyst over abscess for   which follow-up imaging with CT or MRI in 6 months is recommended. 2. 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 US PELVIS COMPLETE   Final Result   1. 2.7 cm complex hypoechoic lesion in the right adnexa/ovary correlating to   recent CT. Differential considerations include a complex cystic ovarian   lesion, tubo-ovarian abscess or pericolonic abscess given adjacent colon. Recommend a short-term follow-up to ensure complete resolution. If findings   persist, a follow-up MRI may be warranted. 2. Nonvisualization of the left ovary or endometrium. 3. Probable 2.7 cm intramural fibroid. US DUP ABD PEL RETRO SCROT COMPLETE   Final Result   1. 2.7 cm complex hypoechoic lesion in the right adnexa/ovary correlating to   recent CT. Differential considerations include a complex cystic ovarian   lesion, tubo-ovarian abscess or pericolonic abscess given adjacent colon. Recommend a short-term follow-up to ensure complete resolution. If findings   persist, a follow-up MRI may be warranted.    2. Nonvisualization of the left ovary or endometrium. 3. Probable 2.7 cm intramural fibroid.                  Elie Mondragon MD  5/27/2022 8:57 AM

## 2022-05-27 NOTE — OP NOTE
GENERAL SURGERY OPERATIVE NOTE  [facility]    PATIENT: Dejuan Paige 1934   MRN: [id number]    DATE: 5/27/2022    SURGEON: Shelby Morin MD    CASE ID: [id number]     PROCEDURE(S) PERFORMED:      BOWEL RESECTION RIGHT HEMICOLECTOMY LAPAROSCOPIC ROBOTIC XI: 07634 (CPT®)    PREOPERATIVE DIAGNOSIS:  Cecal cancer ([address] [C18.0]    POSTOPERATIVE DIAGNOSIS:   same    INDICATIONS: Dejuan Paige is a 80 y.o. female presenting with newly diagnosed ileocecal adenocarcinoma for which robotic assisted possible open right hemicolectomy has been discussed. The risks, benefits, potential complications and possible alternatives of the procedure were discussed in detail, including complications of but not limited to infection, bleeding, anesthesia-related complications, death, injury to surrounding structures, pain, missed pathology, open surgery, positive margins, recurrent cancer, poor healing, or need for additional interventions. All questions were answered. The patient wished to proceed and consent was documented in the medical record. FINDINGS:   Ileocecal cancer, no evidence of metastatic disease    ANESTHESIA:   General endotracheal anesthesia  Anesthesiologist: Dennie Stains, MD  CRNA: Mandy Gitelman, APRN - CRNA; LAURE Cleary - ANITA  SRNA: Marc Sage RN    STAFF:   First Assistant: Dean Iyer RN  Relief Scrub: Jer Rios  Scrub Person First: Elie Feng RN    ESTIMATED BLOOD LOSS: less than 50 ml    SPECIMEN(S):   ID Type Source Tests Collected by Time Destination   A : RIGHT COLON  Tissue Colon SURGICAL PATHOLOGY Camille Walker MD 5/27/2022 1244        DRAINS & IMPLANTS:  * No implants in log *     COMPLICATIONS: none    DISPOSITION: PACU - hemodynamically stable. CONDITION: stable     PROCEDURE IN DETAIL:  Prior to beginning the procedure, informed consent was obtained and consent was documented in the medical record.  The patient was brought to the operating room and positioned supine on the operating table. Anesthesia was initiated and a time out was performed in which all were in agreement. Appropriate perioperative antibiotics were administered and the patient was prepped and draped in the usual sterile fashion. A Veress needle was used to enter the abdomen in the left upper quadrant without apparent injury. Aspiration produced no fluid, and saline dropped easily into the abdomen through the needle. Pneumoperitoneum was then achieved to 15mmHg. A 5mm Visiport trocar was used to enter the abdomen in the left upper quadrant. There was no apparent injury. Subsequent trocar sites were placed under laparoscopic visualization extending in a line towards the right lower quadrant, using 8mm trocars and exchanging the left upper quadrant trocar for a 12mm trocar. An 8mm robotic assist trocar was placed inferior to this line in the left lower quadrant. The patient was positioned in steep Trendelenburg with the right side up, and the omentum and the transverse colon were reflected cephalad. The Zimoryi XI robot was docked and instruments were placed. The abdomen was inspected. There were no lesions noted in the liver dome, no carcinomatosis, or other evidence of metastatic disease. The right colon was mobilized medial to lateral using the the robotic scissors. The ileocolic vessels were tented up and dissected proximally to the root of the mesentery, then clipped with hemolock clips and divided proximally to facilitate an oncological resection. Then, the mesentery was divided all the way to the terminal ileum, and toward the hepatic flexure of the right colon. The hepatic flexure was mobilized from the cephalad attachments, the hepatocolic ligament, and then once this was all free the colon was divided distal to the hepatic flexure through the transverse colon using a Blue load robotic stapler.  The terminal ileum was likewise divided with a Blue robotic stapler load. The specimen was left in the abdomen, and intracorporeal anastomosis was initiated, using 3-0 silk sutures to approximate the terminal ileum to the transverse colon in isoperistaltic fashion. The patient had a short abdomen and although trocar placement was good to facilitate dissection of the right colon, there was less available range of motion for the anastomosis. Adjustments were made, but because it was difficult to progress without frequent adjustments to the robotic arms, the decision was made to make an upper midline incision for removal of the specimen which would also allow for open ileocolic anastomosis. The right colon specimen was removed and sent for pathology. The anastomosis was then completed using a 55mm Blue load MATTHIEU stapler, and closing the common channel with 3-0 Vicryl running suture and then 3-0 silk interrupted Lembert sutures. The mesenteric defect was not closed. The abdomen was then irrigated with sterile water and suctioned dry. Good hemostasis was noted. A 15F Russell drain was brought into the abdomen and secured at the skin with silk suture, then positioned adjacent to the anastomosis in the upper abdomen. The 12mm trocar site fascia was closed with a figure of eight 0-Vicryl suture. The fascia was closed with #1 Stratafix suture. The subcutaneous tissues were irrigated and dried. The skin was closed with staples. The procedure was concluded. The skin was washed and dried and a sterile dressing applied. The patient tolerated the procedure well with no apparent complications and was transferred to PACU - hemodynamically stable. Counts were reported correct at the end of the case. I was present and primarily performed all critical portions of the case.       Electronically signed:   Anthony Garcia MD 5/27/2022 1:20 PM

## 2022-05-27 NOTE — PROGRESS NOTES
1350: Patient arrived to PACU from OR. Monitors applied, alarms on. Surgical dressing are dry and intact. Report obtained from [facility], Ahsan Colbert and [address] [place] [address].  1400: Patient repositioned in bed. 1430: [place] removed, pt tolerated well. 1445: [facility].   1458: Patient transferred to room 4112.

## 2022-05-27 NOTE — ANESTHESIA PROCEDURE NOTES
Peripheral Block    Patient location during procedure: OR  Start time: 5/27/2022 1:25 PM  End time: 5/27/2022 1:35 PM  Staffing  Performed: resident/CRNA and other anesthesia staff   Resident/CRNA: LAURE Guerra - CRNA  Other anesthesia staff: Marixa Tracy RN  Preanesthetic Checklist  Completed: patient identified, IV checked, site marked, risks and benefits discussed, surgical/procedural consents, equipment checked, pre-op evaluation, timeout performed, anesthesia consent given, oxygen available, monitors applied/VS acknowledged, fire risk safety assessment completed and verbalized and blood product R/B/A discussed and consented  Peripheral Block  Patient position: supine  Prep: ChloraPrep  Block type: TAP  Laterality: bilateral  Injection technique: single-shot  Guidance: ultrasound guided  Provider prep: mask and sterile gloves  Needle  Needle type: insulated echogenic nerve stimulator needle   Needle gauge: 22 G  Needle length: 10 cm  Needle localization: ultrasound guidance  Assessment  Injection assessment: negative aspiration for heme and local visualized surrounding nerve on ultrasound  Slow fractionated injection: yes  Hemodynamics: stablepermanent images obtainedOutcomes: uncomplicated and patient tolerated procedure well  Additional Notes  8 mg decadron added to ropivicaine, 4mg decadron to each side   Medications Administered  Ropivacaine (NAROPIN) injection 0.2%, 60 mL  Reason for block: post-op pain management and at surgeon's request

## 2022-05-27 NOTE — ANESTHESIA PROCEDURE NOTES
Arterial Line:    An arterial line was placed using ultrasound guidance, in the OR for the following indication(s): continuous blood pressure monitoring and blood sampling needed. A 20 gauge (size), (length), Arrow (type) catheter was placed, into the left radial artery, secured by tape and Tegaderm.   Anesthesia type: General  Resident/CRNA: Rhonda Prajapati APRN - CRNA  Other anesthesia staff: Myla Reyes RN  Preanesthetic Checklist  Completed: patient identified, IV checked, site marked, risks and benefits discussed, surgical/procedural consents, equipment checked, pre-op evaluation, timeout performed, anesthesia consent given, oxygen available, monitors applied/VS acknowledged, fire risk safety assessment completed and verbalized and blood product R/B/A discussed and consented

## 2022-05-27 NOTE — PLAN OF CARE
Problem: Discharge Planning  Goal: Discharge to home or other facility with appropriate resources  Outcome: Progressing     Problem: Safety - Adult  Goal: Free from fall injury  Outcome: Progressing     Problem: ABCDS Injury Assessment  Goal: Absence of physical injury  Outcome: Progressing     Problem: Chronic Conditions and Co-morbidities  Goal: Patient's chronic conditions and co-morbidity symptoms are monitored and maintained or improved  Outcome: Progressing     Problem: Pain  Goal: Verbalizes/displays adequate comfort level or baseline comfort level  Outcome: Progressing

## 2022-05-27 NOTE — PROGRESS NOTES
Dr. Ignacio Spann called and wanted this nurse to inform the patient that her MRI results came back and the spot near her ovary came back benign. Dr. Cheyanne Singh ok to go through with the colon surgery. Pt informed and voiced understanding.

## 2022-05-27 NOTE — BRIEF OP NOTE
GENERAL SURGERY BRIEF OPERATIVE NOTE  [facility]    PATIENT: James Bañuelos 1934   MRN: [id number]    DATE: 5/27/2022    SURGEON: Chaz Lui MD    CASE ID: [id number]     PROCEDURE(S) PERFORMED:      BOWEL RESECTION RIGHT HEMICOLECTOMY LAPAROSCOPIC ROBOTIC XI: 81737 (CPT®)    PREOPERATIVE DIAGNOSIS:  Cecal cancer ([address] [C18.0]    POSTOPERATIVE DIAGNOSIS:   same    INDICATIONS: James Bañuelos is a 80 y.o. female presenting with newly diagnosed ileocecal adenocarcinoma for which robotic assisted possible open right hemicolectomy has been discussed. The risks, benefits, potential complications and possible alternatives of the procedure were discussed in detail, including complications of but not limited to infection, bleeding, anesthesia-related complications, death, injury to surrounding structures, pain, missed pathology, open surgery, positive margins, recurrent cancer, poor healing, or need for additional interventions. All questions were answered. The patient wished to proceed and consent was documented in the medical record. FINDINGS:   Ileocecal cancer, no evidence of metastatic disease    ANESTHESIA:   General endotracheal anesthesia  Anesthesiologist: Guillermina Perrin MD  CRNA: LAURE Benavidez CRNA; LAURE Castro CRNA  SRNA: Johana Moses RN    STAFF:   First Assistant: Aliya Waller; Malia Hurley RN  Relief Scrub: Alice Rios  Scrub Person First: Antonino Santacruz RN    ESTIMATED BLOOD LOSS: less than 50 ml    SPECIMEN(S):   ID Type Source Tests Collected by Time Destination   A : RIGHT COLON  Tissue Colon SURGICAL PATHOLOGY Alexandria Unger MD 5/27/2022 1244        DRAINS & IMPLANTS:  * No implants in log *     COMPLICATIONS: none    DISPOSITION: PACU - hemodynamically stable.      CONDITION: stable     Electronically signed:   Chaz Lui MD 5/27/2022 1:20 PM

## 2022-05-27 NOTE — ANESTHESIA POSTPROCEDURE EVALUATION
Department of Anesthesiology  Postprocedure Note    Patient: Catina Cheng  MRN: [id number]  YOB: 1934  Date of evaluation: 5/27/2022  Time:  2:01 PM     Procedure Summary     Date: 05/27/22 Room / Location: [place] [address] / [facility]    Anesthesia Start: 1393 Anesthesia Stop: 7950    Procedure: BOWEL RESECTION RIGHT HEMICOLECTOMY LAPAROSCOPIC ROBOTIC XI (N/A Abdomen) Diagnosis:       Cecal cancer ([address]      (Cecal cancer ([facility] [address] [C18.0])    Surgeons: Sheyla Fernandez MD Responsible Provider: Barbara Anglin MD    Anesthesia Type: General, general, regional ASA Status: 3          Anesthesia Type: General, general, regional    Evgeny Phase I:      Evgeny Phase II:      Last vitals: Reviewed and per EMR flowsheets.        Anesthesia Post Evaluation    Patient location during evaluation: PACU  Patient participation: complete - patient participated  Level of consciousness: responsive to verbal stimuli  Pain score: 0  Airway patency: patent  Nausea & Vomiting: no nausea and no vomiting  Complications: no  Cardiovascular status: blood pressure returned to baseline and hemodynamically stable  Respiratory status: acceptable, spontaneous ventilation, nonlabored ventilation and nasal cannula  Hydration status: stable  Multimodal analgesia pain management approach

## 2022-05-28 LAB
ANION GAP SERPL CALCULATED.3IONS-SCNC: 9 MMOL/L (ref 4–16)
BUN BLDV-MCNC: 6 MG/DL (ref 6–23)
CALCIUM SERPL-MCNC: 7.8 MG/DL (ref 8.3–10.6)
CHLORIDE BLD-SCNC: 101 MMOL/L (ref 99–110)
CO2: 21 MMOL/L (ref 21–32)
CREAT SERPL-MCNC: 0.7 MG/DL (ref 0.6–1.1)
GFR AFRICAN AMERICAN: >60 ML/MIN/1.73M2
GFR NON-AFRICAN AMERICAN: >60 ML/MIN/1.73M2
GLUCOSE BLD-MCNC: 104 MG/DL (ref 70–99)
GLUCOSE BLD-MCNC: 105 MG/DL (ref 70–99)
GLUCOSE BLD-MCNC: 146 MG/DL (ref 70–99)
GLUCOSE BLD-MCNC: 93 MG/DL (ref 70–99)
GLUCOSE BLD-MCNC: 95 MG/DL (ref 70–99)
HCT VFR BLD CALC: 31 % (ref 37–47)
HEMOGLOBIN: 9.9 GM/DL (ref 12.5–16)
MCH RBC QN AUTO: 29.2 PG (ref 27–31)
MCHC RBC AUTO-ENTMCNC: 31.9 % (ref 32–36)
MCV RBC AUTO: 91.4 FL (ref 78–100)
PDW BLD-RTO: 13.1 % (ref 11.7–14.9)
PLATELET # BLD: 165 K/CU MM (ref 140–440)
PMV BLD AUTO: 10.3 FL (ref 7.5–11.1)
POTASSIUM SERPL-SCNC: 4.2 MMOL/L (ref 3.5–5.1)
RBC # BLD: 3.39 M/CU MM (ref 4.2–5.4)
SODIUM BLD-SCNC: 131 MMOL/L (ref 135–145)
WBC # BLD: 7.1 K/CU MM (ref 4–10.5)

## 2022-05-28 PROCEDURE — 6360000002 HC RX W HCPCS: Performed by: SURGERY

## 2022-05-28 PROCEDURE — 2580000003 HC RX 258: Performed by: SURGERY

## 2022-05-28 PROCEDURE — 94761 N-INVAS EAR/PLS OXIMETRY MLT: CPT

## 2022-05-28 PROCEDURE — 1200000000 HC SEMI PRIVATE

## 2022-05-28 PROCEDURE — 85027 COMPLETE CBC AUTOMATED: CPT

## 2022-05-28 PROCEDURE — 6370000000 HC RX 637 (ALT 250 FOR IP): Performed by: SURGERY

## 2022-05-28 PROCEDURE — 99024 POSTOP FOLLOW-UP VISIT: CPT | Performed by: SURGERY

## 2022-05-28 PROCEDURE — 36415 COLL VENOUS BLD VENIPUNCTURE: CPT

## 2022-05-28 PROCEDURE — 82962 GLUCOSE BLOOD TEST: CPT

## 2022-05-28 PROCEDURE — 80048 BASIC METABOLIC PNL TOTAL CA: CPT

## 2022-05-28 RX ADMIN — PIPERACILLIN AND TAZOBACTAM 4500 MG: 4; .5 INJECTION, POWDER, FOR SOLUTION INTRAVENOUS at 18:53

## 2022-05-28 RX ADMIN — PIPERACILLIN AND TAZOBACTAM 4500 MG: 4; .5 INJECTION, POWDER, FOR SOLUTION INTRAVENOUS at 01:06

## 2022-05-28 RX ADMIN — CETIRIZINE HYDROCHLORIDE 10 MG: 10 TABLET, FILM COATED ORAL at 10:00

## 2022-05-28 RX ADMIN — OXYCODONE HYDROCHLORIDE 10 MG: 10 TABLET ORAL at 21:49

## 2022-05-28 RX ADMIN — SODIUM CHLORIDE, PRESERVATIVE FREE 10 ML: 5 INJECTION INTRAVENOUS at 10:05

## 2022-05-28 RX ADMIN — METOPROLOL TARTRATE 150 MG: 50 TABLET, FILM COATED ORAL at 09:59

## 2022-05-28 RX ADMIN — METOPROLOL TARTRATE 100 MG: 50 TABLET, FILM COATED ORAL at 21:49

## 2022-05-28 RX ADMIN — OXYCODONE HYDROCHLORIDE 10 MG: 10 TABLET ORAL at 18:11

## 2022-05-28 RX ADMIN — ONDANSETRON 4 MG: 2 INJECTION INTRAMUSCULAR; INTRAVENOUS at 21:50

## 2022-05-28 RX ADMIN — ENOXAPARIN SODIUM 40 MG: 100 INJECTION SUBCUTANEOUS at 10:00

## 2022-05-28 RX ADMIN — LOSARTAN POTASSIUM 100 MG: 100 TABLET, FILM COATED ORAL at 10:00

## 2022-05-28 RX ADMIN — OXYCODONE HYDROCHLORIDE 10 MG: 10 TABLET ORAL at 02:34

## 2022-05-28 RX ADMIN — ROPINIROLE HYDROCHLORIDE 3 MG: 1 TABLET, FILM COATED ORAL at 21:50

## 2022-05-28 RX ADMIN — PIPERACILLIN AND TAZOBACTAM 4500 MG: 4; .5 INJECTION, POWDER, FOR SOLUTION INTRAVENOUS at 10:05

## 2022-05-28 RX ADMIN — OXYCODONE HYDROCHLORIDE 10 MG: 10 TABLET ORAL at 13:35

## 2022-05-28 RX ADMIN — LEVOTHYROXINE SODIUM 137 MCG: 25 TABLET ORAL at 09:59

## 2022-05-28 RX ADMIN — SODIUM CHLORIDE, PRESERVATIVE FREE 10 ML: 5 INJECTION INTRAVENOUS at 21:50

## 2022-05-28 RX ADMIN — OXYBUTYNIN CHLORIDE 5 MG: 5 TABLET ORAL at 21:49

## 2022-05-28 RX ADMIN — Medication 100 MCG: at 10:00

## 2022-05-28 RX ADMIN — OXYBUTYNIN CHLORIDE 5 MG: 5 TABLET ORAL at 09:59

## 2022-05-28 RX ADMIN — Medication 1000 UNITS: at 10:00

## 2022-05-28 RX ADMIN — ATORVASTATIN CALCIUM 20 MG: 10 TABLET, FILM COATED ORAL at 10:00

## 2022-05-28 ASSESSMENT — PAIN DESCRIPTION - LOCATION
LOCATION: ABDOMEN
LOCATION: ABDOMEN

## 2022-05-28 ASSESSMENT — PAIN SCALES - GENERAL
PAINLEVEL_OUTOF10: 9
PAINLEVEL_OUTOF10: 0
PAINLEVEL_OUTOF10: 4
PAINLEVEL_OUTOF10: 4
PAINLEVEL_OUTOF10: 6

## 2022-05-28 ASSESSMENT — PAIN DESCRIPTION - DESCRIPTORS: DESCRIPTORS: ACHING;DULL

## 2022-05-28 NOTE — PROGRESS NOTES
GENERAL SURGERY PROGRESS NOTE    Philly Leiva is a 80 y.o. female POD 1 from robotic assisted right hemicolectomy for ileocecal mass. Subjective:  Doing ok today. Reports being sore. Pain adequately performed. Making urine. Denies nausea. No flatus or BM. Objective:    Vitals: VITALS:  BP (!) 138/37   Pulse 58   Temp 97.3 °F (36.3 °C) (Oral)   Resp 16   Ht 5' (1.524 m)   Wt 125 lb 11.2 oz (57 kg)   SpO2 95%   BMI 24.55 kg/m²     I/O: 05/27 0701 - 05/28 0700  In: 3100 [I.V.:2600]  Out: 1140 [Urine:900; Drains:40]    Labs/Imaging Results:   Lab Results   Component Value Date     05/28/2022    K 4.2 05/28/2022    K 4.4 03/15/2018     05/28/2022    CO2 21 05/28/2022    BUN 6 05/28/2022    CREATININE 0.7 05/28/2022    GLUCOSE 146 05/28/2022    CALCIUM 7.8 05/28/2022      Lab Results   Component Value Date    WBC 7.1 05/28/2022    HGB 9.9 (L) 05/28/2022    HCT 31.0 (L) 05/28/2022    MCV 91.4 05/28/2022     05/28/2022         IV Fluids: dextrose Last Rate: 100 mL/hr (05/22/22 2156)    sodium chloride Last Rate: Stopped (05/25/22 1247)    sodium chloride Last Rate: 75 mL/hr at 05/27/22 1632    Scheduled Meds:   enoxaparin, 40 mg, SubCUTAneous, Daily    sodium chloride, 500 mL, IntraVENous, Once    atorvastatin, 20 mg, Oral, Daily    Vitamin D, 1,000 Units, Oral, Daily    [Held by provider] insulin glargine, 15 Units, SubCUTAneous, Nightly    cetirizine, 10 mg, Oral, Daily    levothyroxine, 137 mcg, Oral, Daily    losartan, 100 mg, Oral, Daily    metoprolol tartrate, 150 mg, Oral, Daily    metoprolol tartrate, 100 mg, Oral, Nightly    oxybutynin, 5 mg, Oral, BID    rOPINIRole, 3 mg, Oral, Nightly    vitamin B-12, 100 mcg, Oral, Daily    insulin lispro, 0-6 Units, SubCUTAneous, Q4H    sodium chloride flush, 5-40 mL, IntraVENous, 2 times per day    piperacillin-tazobactam, 4,500 mg, IntraVENous, Q8H    Physical Exam:  General: A&O x 3, no distress.    HEENT: Anicteric sclerae, MMM. Extremities: No edema bilat LE. Abdomen: Soft, appropriately tender, nondistended. Assessment and Plan:  80 y.o. female s/p robotic assisted right colectomy. Doing ok.     Patient Active Problem List:     Long-term insulin use in type 2 diabetes ([facility] [address]     Essential hypertension     Dyslipidemia     Abnormal EKG     Abnormal stress test     Cecum mass      - will allow full liquid diet (does not like broth) but encouraged small amounts only and only if hungry  - continue lara today, will remove tomorrow AM  - out of bed, IS, ambulate as able  - await return of bowel function    Vandana Loyd MD

## 2022-05-28 NOTE — PROGRESS NOTES
V2.0  [facility] Hospitalist Progress Note      Name:  Catina Cheng /Age/Sex: 1934  (1 y.o. female)   MRN & CSN:  [id number] & [id number] Encounter Date/Time: 2022 3:40 PM EDT    Location:  5336/6814-[id number] PCP: Jeanine Aj DO       Hospital Day: 7    Assessment and Plan:   Catina Cheng is a 80 y.o. female with pmh of diabetes who presents with Cecum mass      Plan:   Cecal Mass: Status post robotic assisted right hemicolectomy patient states pain controlled denies nausea surgery input continue further recommendations  Diabetes type 2: Continue current regiment  Chronic kidney disease stage III: Continue to monitor    Diet ADULT DIET; Full Liquid  ADULT ORAL NUTRITION SUPPLEMENT; AM Snack, HS Snack; Standard High Calorie/High Protein Oral Supplement   DVT Prophylaxis [] Lovenox, []  Heparin, [] SCDs, [] Ambulation,  [] Eliquis, [] Xarelto  [] Coumadin   Code Status Full Code   Disposition From: Home  Expected Disposition: Home  Estimated Date of Discharge: TBD  Patient requires continued admission due to surgical needs   Surrogate Decision Maker/ POA      Subjective:     Chief Complaint: No chief complaint on file. Catina Cheng is a 80 y.o. female who presents with abdominal pain she was recently treated for diverticulitis she was treated with Zosyn surgery and GI were consulted patient had a colonoscopy that showed a cecal lesion concerning for malignant process. Patient is currently status post right hemicolectomy patient had a brief episode of unresponsiveness on 26 May rapid response was called patient was easily arousable         Review of Systems:    Review of Systems        Objective:        Intake/Output Summary (Last 24 hours) at 2022 1540  Last data filed at 2022 1007  Gross per 24 hour   Intake --   Output 1060 ml   Net -1060 ml        Vitals:   Vitals:    22 1458   BP: 134/67   Pulse: 80   Resp: 18   Temp: 98.4 °F (36.9 °C)   SpO2: 97%       Physical Exam: General: NAD  Eyes: EOMI  ENT: neck supple  Cardiovascular: Regular rate. Respiratory: Clear to auscultation  Gastrointestinal: Soft, appropriate tenderness  Genitourinary: no suprapubic tenderness  Musculoskeletal: No edema  Skin: warm, dry  Neuro: Alert. Psych: Mood appropriate.      Medications:   Medications:    enoxaparin  40 mg SubCUTAneous Daily    sodium chloride  500 mL IntraVENous Once    atorvastatin  20 mg Oral Daily    Vitamin D  1,000 Units Oral Daily    [Held by provider] insulin glargine  15 Units SubCUTAneous Nightly    cetirizine  10 mg Oral Daily    levothyroxine  137 mcg Oral Daily    losartan  100 mg Oral Daily    metoprolol tartrate  150 mg Oral Daily    metoprolol tartrate  100 mg Oral Nightly    oxybutynin  5 mg Oral BID    rOPINIRole  3 mg Oral Nightly    vitamin B-12  100 mcg Oral Daily    insulin lispro  0-6 Units SubCUTAneous Q4H    sodium chloride flush  5-40 mL IntraVENous 2 times per day    piperacillin-tazobactam  4,500 mg IntraVENous Q8H      Infusions:    dextrose 100 mL/hr (05/22/22 2156)    sodium chloride Stopped (05/25/22 1247)    sodium chloride 75 mL/hr at 05/27/22 1632     PRN Meds: ondansetron, 4 mg, Q8H PRN   Or  ondansetron, 4 mg, Q6H PRN  oxyCODONE, 5 mg, Q4H PRN   Or  oxyCODONE, 10 mg, Q4H PRN  morphine, 2 mg, Q2H PRN   Or  morphine, 4 mg, Q2H PRN  zolpidem, 5 mg, Nightly PRN  promethazine, 12.5 mg, Q6H PRN  albuterol sulfate HFA, 2 puff, Q6H PRN  glucose, 4 tablet, PRN  dextrose bolus, 125 mL, PRN   Or  dextrose bolus, 250 mL, PRN  glucagon (rDNA), 1 mg, PRN  dextrose, 100 mL/hr, PRN  sodium chloride flush, 5-40 mL, PRN  sodium chloride, , PRN  ondansetron, 4 mg, Q8H PRN   Or  ondansetron, 4 mg, Q6H PRN  polyethylene glycol, 17 g, Daily PRN  acetaminophen, 650 mg, Q6H PRN   Or  acetaminophen, 650 mg, Q6H PRN        Labs      Recent Results (from the past 24 hour(s))   POCT Glucose    Collection Time: 05/27/22  5:00 PM   Result Value Ref Range POC Glucose 195 (H) 70 - 99 MG/DL   Basic Metabolic Panel    Collection Time: 05/28/22 12:15 AM   Result Value Ref Range    Sodium 131 (L) 135 - 145 MMOL/L    Potassium 4.2 3.5 - 5.1 MMOL/L    Chloride 101 99 - 110 mMol/L    CO2 21 21 - 32 MMOL/L    Anion Gap 9 4 - 16    BUN 6 6 - 23 MG/DL    CREATININE 0.7 0.6 - 1.1 MG/DL    Glucose 146 (H) 70 - 99 MG/DL    Calcium 7.8 (L) 8.3 - 10.6 MG/DL    GFR Non-African American >60 >60 mL/min/1.73m2    GFR African American >60 >60 mL/min/1.73m2   CBC    Collection Time: 05/28/22 12:15 AM   Result Value Ref Range    WBC 7.1 4.0 - 10.5 K/CU MM    RBC 3.39 (L) 4.2 - 5.4 M/CU MM    Hemoglobin 9.9 (L) 12.5 - 16.0 GM/DL    Hematocrit 31.0 (L) 37 - 47 %    MCV 91.4 78 - 100 FL    MCH 29.2 27 - 31 PG    MCHC 31.9 (L) 32.0 - 36.0 %    RDW 13.1 11.7 - 14.9 %    Platelets 026 024 - 621 K/CU MM    MPV 10.3 7.5 - 11.1 FL   POCT Glucose    Collection Time: 05/28/22  7:46 AM   Result Value Ref Range    POC Glucose 104 (H) 70 - 99 MG/DL   POCT Glucose    Collection Time: 05/28/22 11:43 AM   Result Value Ref Range    POC Glucose 93 70 - 99 MG/DL        Imaging/Diagnostics Last 24 Hours   No results found.     Electronically signed by Vannesa Crawford MD on 5/28/2022 at 3:40 PM

## 2022-05-29 ENCOUNTER — APPOINTMENT (OUTPATIENT)
Dept: GENERAL RADIOLOGY | Age: 87
DRG: 329 | End: 2022-05-29
Attending: INTERNAL MEDICINE
Payer: MEDICARE

## 2022-05-29 LAB
GLUCOSE BLD-MCNC: 107 MG/DL (ref 70–99)
GLUCOSE BLD-MCNC: 120 MG/DL (ref 70–99)
GLUCOSE BLD-MCNC: 124 MG/DL (ref 70–99)
GLUCOSE BLD-MCNC: 137 MG/DL (ref 70–99)
GLUCOSE BLD-MCNC: 75 MG/DL (ref 70–99)
GLUCOSE BLD-MCNC: 96 MG/DL (ref 70–99)

## 2022-05-29 PROCEDURE — 99232 SBSQ HOSP IP/OBS MODERATE 35: CPT | Performed by: INTERNAL MEDICINE

## 2022-05-29 PROCEDURE — 94761 N-INVAS EAR/PLS OXIMETRY MLT: CPT

## 2022-05-29 PROCEDURE — 6360000002 HC RX W HCPCS: Performed by: SURGERY

## 2022-05-29 PROCEDURE — 6370000000 HC RX 637 (ALT 250 FOR IP): Performed by: SURGERY

## 2022-05-29 PROCEDURE — 2580000003 HC RX 258: Performed by: SURGERY

## 2022-05-29 PROCEDURE — 82962 GLUCOSE BLOOD TEST: CPT

## 2022-05-29 PROCEDURE — 99024 POSTOP FOLLOW-UP VISIT: CPT | Performed by: SURGERY

## 2022-05-29 PROCEDURE — 1200000000 HC SEMI PRIVATE

## 2022-05-29 PROCEDURE — 74018 RADEX ABDOMEN 1 VIEW: CPT

## 2022-05-29 PROCEDURE — 51798 US URINE CAPACITY MEASURE: CPT

## 2022-05-29 RX ORDER — LIDOCAINE HYDROCHLORIDE 20 MG/ML
JELLY TOPICAL ONCE
Status: DISCONTINUED | OUTPATIENT
Start: 2022-05-29 | End: 2022-06-09 | Stop reason: HOSPADM

## 2022-05-29 RX ORDER — OXYMETAZOLINE HYDROCHLORIDE 0.05 G/100ML
2 SPRAY NASAL ONCE
Status: DISPENSED | OUTPATIENT
Start: 2022-05-29 | End: 2022-06-01

## 2022-05-29 RX ADMIN — METOPROLOL TARTRATE 100 MG: 50 TABLET, FILM COATED ORAL at 22:10

## 2022-05-29 RX ADMIN — LOSARTAN POTASSIUM 100 MG: 100 TABLET, FILM COATED ORAL at 08:19

## 2022-05-29 RX ADMIN — ROPINIROLE HYDROCHLORIDE 3 MG: 1 TABLET, FILM COATED ORAL at 22:10

## 2022-05-29 RX ADMIN — METOPROLOL TARTRATE 150 MG: 50 TABLET, FILM COATED ORAL at 08:20

## 2022-05-29 RX ADMIN — MORPHINE SULFATE 2 MG: 2 INJECTION, SOLUTION INTRAMUSCULAR; INTRAVENOUS at 16:30

## 2022-05-29 RX ADMIN — PROMETHAZINE HYDROCHLORIDE 12.5 MG: 12.5 TABLET ORAL at 09:13

## 2022-05-29 RX ADMIN — ENOXAPARIN SODIUM 40 MG: 100 INJECTION SUBCUTANEOUS at 08:20

## 2022-05-29 RX ADMIN — Medication 1000 UNITS: at 08:19

## 2022-05-29 RX ADMIN — CETIRIZINE HYDROCHLORIDE 10 MG: 10 TABLET, FILM COATED ORAL at 08:19

## 2022-05-29 RX ADMIN — ONDANSETRON 4 MG: 2 INJECTION INTRAMUSCULAR; INTRAVENOUS at 03:46

## 2022-05-29 RX ADMIN — SODIUM CHLORIDE, PRESERVATIVE FREE 10 ML: 5 INJECTION INTRAVENOUS at 08:20

## 2022-05-29 RX ADMIN — PROMETHAZINE HYDROCHLORIDE 12.5 MG: 12.5 TABLET ORAL at 01:14

## 2022-05-29 RX ADMIN — Medication 100 MCG: at 08:19

## 2022-05-29 RX ADMIN — SODIUM CHLORIDE: 9 INJECTION, SOLUTION INTRAVENOUS at 08:30

## 2022-05-29 RX ADMIN — OXYBUTYNIN CHLORIDE 5 MG: 5 TABLET ORAL at 22:10

## 2022-05-29 RX ADMIN — ATORVASTATIN CALCIUM 20 MG: 10 TABLET, FILM COATED ORAL at 08:19

## 2022-05-29 RX ADMIN — OXYBUTYNIN CHLORIDE 5 MG: 5 TABLET ORAL at 08:20

## 2022-05-29 ASSESSMENT — PAIN SCALES - GENERAL
PAINLEVEL_OUTOF10: 0
PAINLEVEL_OUTOF10: 6
PAINLEVEL_OUTOF10: 5
PAINLEVEL_OUTOF10: 6

## 2022-05-29 ASSESSMENT — PAIN - FUNCTIONAL ASSESSMENT: PAIN_FUNCTIONAL_ASSESSMENT: PREVENTS OR INTERFERES SOME ACTIVE ACTIVITIES AND ADLS

## 2022-05-29 ASSESSMENT — PAIN DESCRIPTION - PAIN TYPE: TYPE: SURGICAL PAIN

## 2022-05-29 ASSESSMENT — PAIN DESCRIPTION - DESCRIPTORS: DESCRIPTORS: ACHING

## 2022-05-29 ASSESSMENT — PAIN DESCRIPTION - LOCATION: LOCATION: ABDOMEN

## 2022-05-29 NOTE — PROGRESS NOTES
GENERAL SURGERY PROGRESS NOTE    James Bañuelos is a 80 y.o. female POD 2 from robotic assisted right hemicolectomy for ileocecal mass. Subjective:  Nausea and emesis overnight. Pain 5/10. Denies other complaints. No bowel function. Objective:    Vitals: VITALS:  BP (!) 188/43   Pulse 63   Temp 98.4 °F (36.9 °C) (Oral)   Resp 16   Ht 5' (1.524 m)   Wt 125 lb 11.2 oz (57 kg)   SpO2 98%   BMI 24.55 kg/m²     I/O: 05/28 0701 - 05/29 0700  In: -   Out: 1960 [Urine:1500; Drains:460]    Labs/Imaging Results:   Lab Results   Component Value Date     05/28/2022    K 4.2 05/28/2022    K 4.4 03/15/2018     05/28/2022    CO2 21 05/28/2022    BUN 6 05/28/2022    CREATININE 0.7 05/28/2022    GLUCOSE 146 05/28/2022    CALCIUM 7.8 05/28/2022      Lab Results   Component Value Date    WBC 7.1 05/28/2022    HGB 9.9 (L) 05/28/2022    HCT 31.0 (L) 05/28/2022    MCV 91.4 05/28/2022     05/28/2022         IV Fluids: dextrose Last Rate: 100 mL/hr (05/22/22 2156)    sodium chloride Last Rate: Stopped (05/25/22 1247)    sodium chloride Last Rate: 75 mL/hr at 05/29/22 0830    Scheduled Meds:   oxymetazoline, 2 spray, Each Nostril, Once    lidocaine, , Topical, Once    enoxaparin, 40 mg, SubCUTAneous, Daily    sodium chloride, 500 mL, IntraVENous, Once    atorvastatin, 20 mg, Oral, Daily    Vitamin D, 1,000 Units, Oral, Daily    [Held by provider] insulin glargine, 15 Units, SubCUTAneous, Nightly    cetirizine, 10 mg, Oral, Daily    levothyroxine, 137 mcg, Oral, Daily    losartan, 100 mg, Oral, Daily    metoprolol tartrate, 150 mg, Oral, Daily    metoprolol tartrate, 100 mg, Oral, Nightly    oxybutynin, 5 mg, Oral, BID    rOPINIRole, 3 mg, Oral, Nightly    vitamin B-12, 100 mcg, Oral, Daily    insulin lispro, 0-6 Units, SubCUTAneous, Q4H    sodium chloride flush, 5-40 mL, IntraVENous, 2 times per day    Physical Exam:  General: A&O x 3, no distress.    HEENT: Anicteric sclerae, MMM. Extremities: No edema bilat LE. Abdomen: Soft, appropriately tender, nondistended. Incisions intact with staples      Assessment and Plan:  80 y.o. female s/p robotic assisted right colectomy. Has developed nausea, vomiting.     Patient Active Problem List:     Long-term insulin use in type 2 diabetes ([facility] [address]     Essential hypertension     Dyslipidemia     Abnormal EKG     Abnormal stress test     Cecum mass      - will place NG and leave to LIWS  - out of bed, IS, ambulate as able  - await return of bowel function    Daniel Hsu MD

## 2022-05-29 NOTE — PROGRESS NOTES
per 24 hour   Intake --   Output 1000 ml   Net -1000 ml        Vitals:   Vitals:    05/29/22 1436   BP: (!) 154/59   Pulse: 70   Resp: 18   Temp: 98.1 °F (36.7 °C)   SpO2: 100%       Physical Exam:     General: NAD  Eyes: EOMI  ENT: neck supple  Cardiovascular: Regular rate. Respiratory: Clear to auscultation  Gastrointestinal: Soft, tenderness to palpation  Genitourinary: Arreguin  Musculoskeletal: No edema  Skin: warm, dry  Neuro: Alert. Psych: Mood appropriate.      Medications:   Medications:    oxymetazoline  2 spray Each Nostril Once    lidocaine   Topical Once    enoxaparin  40 mg SubCUTAneous Daily    sodium chloride  500 mL IntraVENous Once    atorvastatin  20 mg Oral Daily    Vitamin D  1,000 Units Oral Daily    [Held by provider] insulin glargine  15 Units SubCUTAneous Nightly    cetirizine  10 mg Oral Daily    levothyroxine  137 mcg Oral Daily    losartan  100 mg Oral Daily    metoprolol tartrate  150 mg Oral Daily    metoprolol tartrate  100 mg Oral Nightly    oxybutynin  5 mg Oral BID    rOPINIRole  3 mg Oral Nightly    vitamin B-12  100 mcg Oral Daily    insulin lispro  0-6 Units SubCUTAneous Q4H    sodium chloride flush  5-40 mL IntraVENous 2 times per day      Infusions:    dextrose 100 mL/hr (05/22/22 2156)    sodium chloride Stopped (05/25/22 1247)    sodium chloride 75 mL/hr at 05/29/22 0830     PRN Meds: benzocaine-menthol, 1 lozenge, PRN  phenol, 1 spray, Q2H PRN  ondansetron, 4 mg, Q8H PRN   Or  ondansetron, 4 mg, Q6H PRN  oxyCODONE, 5 mg, Q4H PRN   Or  oxyCODONE, 10 mg, Q4H PRN  morphine, 2 mg, Q2H PRN   Or  morphine, 4 mg, Q2H PRN  zolpidem, 5 mg, Nightly PRN  promethazine, 12.5 mg, Q6H PRN  albuterol sulfate HFA, 2 puff, Q6H PRN  glucose, 4 tablet, PRN  dextrose bolus, 125 mL, PRN   Or  dextrose bolus, 250 mL, PRN  glucagon (rDNA), 1 mg, PRN  dextrose, 100 mL/hr, PRN  sodium chloride flush, 5-40 mL, PRN  sodium chloride, , PRN  ondansetron, 4 mg, Q8H PRN   Or  ondansetron, 4 mg, Q6H PRN  polyethylene glycol, 17 g, Daily PRN  acetaminophen, 650 mg, Q6H PRN   Or  acetaminophen, 650 mg, Q6H PRN        Labs      Recent Results (from the past 24 hour(s))   POCT Glucose    Collection Time: 05/28/22  7:00 PM   Result Value Ref Range    POC Glucose 95 70 - 99 MG/DL   POCT Glucose    Collection Time: 05/28/22  9:45 PM   Result Value Ref Range    POC Glucose 105 (H) 70 - 99 MG/DL   POCT Glucose    Collection Time: 05/29/22 12:06 AM   Result Value Ref Range    POC Glucose 124 (H) 70 - 99 MG/DL   POCT Glucose    Collection Time: 05/29/22  3:47 AM   Result Value Ref Range    POC Glucose 120 (H) 70 - 99 MG/DL   POCT Glucose    Collection Time: 05/29/22  8:35 AM   Result Value Ref Range    POC Glucose 137 (H) 70 - 99 MG/DL   POCT Glucose    Collection Time: 05/29/22 12:35 PM   Result Value Ref Range    POC Glucose 107 (H) 70 - 99 MG/DL        Imaging/Diagnostics Last 24 Hours   XR ABDOMEN FOR NG/OG/NE TUBE PLACEMENT    Result Date: 5/29/2022  EXAMINATION: ONE SUPINE XRAY VIEW(S) OF THE ABDOMEN 5/29/2022 10:13 am COMPARISON: None. HISTORY: ORDERING SYSTEM PROVIDED HISTORY: Confirmation of course of NG/OG/NE tube and location of tip of tube TECHNOLOGIST PROVIDED HISTORY: Reason for exam:->Confirmation of course of NG/OG/NE tube and location of tip of tube Portable? ->Yes Reason for Exam: Confirmation of course of NG/OG/NE tube and location of tip of tube FINDINGS: There is a gastric tube in place, distal tip in the stomach. There is some moderate distension of small bowel loops in keeping with an ileus or small-bowel obstruction. Tip of gastric tube within the stomach.        Electronically signed by Rony Donald MD on 5/29/2022 at 3:36 PM

## 2022-05-29 NOTE — PLAN OF CARE
Problem: Discharge Planning  Goal: Discharge to home or other facility with appropriate resources  Outcome: Progressing  Flowsheets (Taken 5/29/2022 5421)  Discharge to home or other facility with appropriate resources: Identify barriers to discharge with patient and caregiver     Problem: Safety - Adult  Goal: Free from fall injury  Outcome: Progressing     Problem: ABCDS Injury Assessment  Goal: Absence of physical injury  Outcome: Progressing     Problem: Chronic Conditions and Co-morbidities  Goal: Patient's chronic conditions and co-morbidity symptoms are monitored and maintained or improved  Outcome: Progressing  Flowsheets (Taken 5/29/2022 0812)  Care Plan - Patient's Chronic Conditions and Co-Morbidity Symptoms are Monitored and Maintained or Improved: Monitor and assess patient's chronic conditions and comorbid symptoms for stability, deterioration, or improvement     Problem: Pain  Goal: Verbalizes/displays adequate comfort level or baseline comfort level  Outcome: Progressing  Flowsheets (Taken 5/29/2022 0809)  Verbalizes/displays adequate comfort level or baseline comfort level: Encourage patient to monitor pain and request assistance

## 2022-05-30 LAB
ANION GAP SERPL CALCULATED.3IONS-SCNC: 12 MMOL/L (ref 4–16)
BASOPHILS ABSOLUTE: 0 K/CU MM
BASOPHILS RELATIVE PERCENT: 0.4 % (ref 0–1)
BUN BLDV-MCNC: 12 MG/DL (ref 6–23)
CALCIUM SERPL-MCNC: 8.1 MG/DL (ref 8.3–10.6)
CHLORIDE BLD-SCNC: 100 MMOL/L (ref 99–110)
CO2: 25 MMOL/L (ref 21–32)
CREAT SERPL-MCNC: 0.6 MG/DL (ref 0.6–1.1)
DIFFERENTIAL TYPE: ABNORMAL
EOSINOPHILS ABSOLUTE: 0.1 K/CU MM
EOSINOPHILS RELATIVE PERCENT: 0.6 % (ref 0–3)
GFR AFRICAN AMERICAN: >60 ML/MIN/1.73M2
GFR NON-AFRICAN AMERICAN: >60 ML/MIN/1.73M2
GLUCOSE BLD-MCNC: 100 MG/DL (ref 70–99)
GLUCOSE BLD-MCNC: 114 MG/DL (ref 70–99)
GLUCOSE BLD-MCNC: 78 MG/DL (ref 70–99)
GLUCOSE BLD-MCNC: 85 MG/DL (ref 70–99)
GLUCOSE BLD-MCNC: 86 MG/DL (ref 70–99)
GLUCOSE BLD-MCNC: 87 MG/DL (ref 70–99)
GLUCOSE BLD-MCNC: 88 MG/DL (ref 70–99)
GLUCOSE BLD-MCNC: 89 MG/DL (ref 70–99)
GLUCOSE BLD-MCNC: 93 MG/DL (ref 70–99)
GLUCOSE BLD-MCNC: 98 MG/DL (ref 70–99)
GLUCOSE BLD-MCNC: 99 MG/DL (ref 70–99)
HCT VFR BLD CALC: 34.5 % (ref 37–47)
HEMOGLOBIN: 10.8 GM/DL (ref 12.5–16)
IMMATURE NEUTROPHIL %: 0.4 % (ref 0–0.43)
LYMPHOCYTES ABSOLUTE: 1.2 K/CU MM
LYMPHOCYTES RELATIVE PERCENT: 11.8 % (ref 24–44)
MCH RBC QN AUTO: 28.8 PG (ref 27–31)
MCHC RBC AUTO-ENTMCNC: 31.3 % (ref 32–36)
MCV RBC AUTO: 92 FL (ref 78–100)
MONOCYTES ABSOLUTE: 0.7 K/CU MM
MONOCYTES RELATIVE PERCENT: 7.3 % (ref 0–4)
NUCLEATED RBC %: 0 %
PDW BLD-RTO: 13.2 % (ref 11.7–14.9)
PLATELET # BLD: 224 K/CU MM (ref 140–440)
PMV BLD AUTO: 10.8 FL (ref 7.5–11.1)
POTASSIUM SERPL-SCNC: 3.7 MMOL/L (ref 3.5–5.1)
RBC # BLD: 3.75 M/CU MM (ref 4.2–5.4)
SEGMENTED NEUTROPHILS ABSOLUTE COUNT: 8 K/CU MM
SEGMENTED NEUTROPHILS RELATIVE PERCENT: 79.5 % (ref 36–66)
SODIUM BLD-SCNC: 137 MMOL/L (ref 135–145)
TOTAL IMMATURE NEUTOROPHIL: 0.04 K/CU MM
TOTAL NUCLEATED RBC: 0 K/CU MM
WBC # BLD: 10 K/CU MM (ref 4–10.5)

## 2022-05-30 PROCEDURE — 6360000002 HC RX W HCPCS: Performed by: SURGERY

## 2022-05-30 PROCEDURE — 99232 SBSQ HOSP IP/OBS MODERATE 35: CPT | Performed by: INTERNAL MEDICINE

## 2022-05-30 PROCEDURE — 94761 N-INVAS EAR/PLS OXIMETRY MLT: CPT

## 2022-05-30 PROCEDURE — 2580000003 HC RX 258: Performed by: SURGERY

## 2022-05-30 PROCEDURE — 51702 INSERT TEMP BLADDER CATH: CPT

## 2022-05-30 PROCEDURE — 36415 COLL VENOUS BLD VENIPUNCTURE: CPT

## 2022-05-30 PROCEDURE — 99024 POSTOP FOLLOW-UP VISIT: CPT | Performed by: SURGERY

## 2022-05-30 PROCEDURE — 85025 COMPLETE CBC W/AUTO DIFF WBC: CPT

## 2022-05-30 PROCEDURE — 80048 BASIC METABOLIC PNL TOTAL CA: CPT

## 2022-05-30 PROCEDURE — 82962 GLUCOSE BLOOD TEST: CPT

## 2022-05-30 PROCEDURE — 6370000000 HC RX 637 (ALT 250 FOR IP): Performed by: SURGERY

## 2022-05-30 PROCEDURE — 1200000000 HC SEMI PRIVATE

## 2022-05-30 RX ADMIN — LEVOTHYROXINE SODIUM 137 MCG: 25 TABLET ORAL at 06:10

## 2022-05-30 RX ADMIN — MORPHINE SULFATE 2 MG: 2 INJECTION, SOLUTION INTRAMUSCULAR; INTRAVENOUS at 09:26

## 2022-05-30 RX ADMIN — SODIUM CHLORIDE: 9 INJECTION, SOLUTION INTRAVENOUS at 01:15

## 2022-05-30 RX ADMIN — SODIUM CHLORIDE: 9 INJECTION, SOLUTION INTRAVENOUS at 18:42

## 2022-05-30 RX ADMIN — METOPROLOL TARTRATE 100 MG: 50 TABLET, FILM COATED ORAL at 21:45

## 2022-05-30 RX ADMIN — ENOXAPARIN SODIUM 40 MG: 100 INJECTION SUBCUTANEOUS at 09:26

## 2022-05-30 RX ADMIN — Medication 100 MCG: at 09:31

## 2022-05-30 RX ADMIN — CETIRIZINE HYDROCHLORIDE 10 MG: 10 TABLET, FILM COATED ORAL at 09:31

## 2022-05-30 RX ADMIN — OXYBUTYNIN CHLORIDE 5 MG: 5 TABLET ORAL at 09:31

## 2022-05-30 RX ADMIN — ROPINIROLE HYDROCHLORIDE 3 MG: 1 TABLET, FILM COATED ORAL at 21:45

## 2022-05-30 RX ADMIN — METOPROLOL TARTRATE 150 MG: 50 TABLET, FILM COATED ORAL at 09:31

## 2022-05-30 RX ADMIN — ATORVASTATIN CALCIUM 20 MG: 10 TABLET, FILM COATED ORAL at 09:31

## 2022-05-30 RX ADMIN — ONDANSETRON 4 MG: 2 INJECTION INTRAMUSCULAR; INTRAVENOUS at 09:25

## 2022-05-30 RX ADMIN — LOSARTAN POTASSIUM 100 MG: 100 TABLET, FILM COATED ORAL at 09:31

## 2022-05-30 RX ADMIN — OXYBUTYNIN CHLORIDE 5 MG: 5 TABLET ORAL at 21:45

## 2022-05-30 RX ADMIN — ONDANSETRON 4 MG: 2 INJECTION INTRAMUSCULAR; INTRAVENOUS at 15:20

## 2022-05-30 RX ADMIN — SODIUM CHLORIDE, PRESERVATIVE FREE 10 ML: 5 INJECTION INTRAVENOUS at 09:31

## 2022-05-30 RX ADMIN — SODIUM CHLORIDE, PRESERVATIVE FREE 10 ML: 5 INJECTION INTRAVENOUS at 21:46

## 2022-05-30 RX ADMIN — Medication 1000 UNITS: at 09:31

## 2022-05-30 ASSESSMENT — PAIN SCALES - GENERAL
PAINLEVEL_OUTOF10: 6

## 2022-05-30 ASSESSMENT — PAIN DESCRIPTION - LOCATION: LOCATION: ABDOMEN

## 2022-05-30 NOTE — PROGRESS NOTES
ONCOLOGY HEMATOLOGY CARE ([facility])  PROGRESS NOTE      Patient was seen and examined today. Not in any acute distress and no overnight events. 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 MRI pelvis showed complex ovarian cyst without internal enhancement to suggest solid mass. Radiologist recommend f/u imaging with CT or MRI in 6 months. She is POD 1 and she tolerated surgery well. Comfortably lying in bed. No new complaints. PHYSICAL EXAM    Vitals: BP (!) 154/59   Pulse 70   Temp 98.1 °F (36.7 °C) (Oral)   Resp 18   Ht 5' (1.524 m)   Wt 125 lb 11.2 oz (57 kg)   SpO2 100%   BMI 24.55 kg/m²   CONSTITUTIONAL: awake, alert, cooperative, no apparent distress   EYES: pupils equal, round and reactive to light, sclera clear and conjunctiva normal  ENT: Normocephalic, without obvious abnormality, atraumatic  NECK: supple, symmetrical, no jugular venous distension and no carotid bruits   HEMATOLOGIC/LYMPHATIC: no cervical, supraclavicular or axillary lymphadenopathy   LUNGS: VBS, no wheezes, no crackles, no rhonchi, no increased work of breathing and clear to auscultation   CARDIOVASCULAR: regular rate and rhythm, normal S1 and S2, no murmur noted  ABDOMEN: surgical wound on dressing, mild tenderness to palpation,   MUSCULOSKELETAL: full range of motion noted, tone is normal  NEUROLOGIC: awake, alert, oriented to name, place and time. Motor skills grossly intact. SKIN: Normal skin color, texture, turgor and no jaundice.  Skin appears intact   EXTREMITIES: no LE edema, no cyanosis, no leg swelling, no clubbing     LABORATORY RESULTS  CBC: Recent Labs     05/27/22  0507 05/28/22  0015   WBC 7.4 7.1   HGB 10.9* 9.9*    165     BMP:    Recent Labs     05/27/22  0507 05/28/22  0015   * 131*   K 4.0 4.2    101   CO2 21 21   BUN 5* 6   CREATININE 0.7 0.7   GLUCOSE 126* 146*     Hepatic: No results for input(s): AST, ALT, ALB, BILITOT, ALKPHOS in the last 72 hours. INR: No results for input(s): INR in the last 72 hours. ASSESSMENT  Cecal adenocarcinoma  Right adnexa complex cystic lesion     RECOMMENDATION  Reviewed pathology from cecal mass and MRI pelvis result. Will consider repeat CT or MRI in 6 months for ovarian complex cyst.     Will follow up with pathology. Based on her pathologic staging, we will determine if she will need adjuvant therapy.      We will follow the patient. Thank you We will continue to follow the patient. Thank you.

## 2022-05-30 NOTE — PROGRESS NOTES
GENERAL SURGERY PROGRESS NOTE    Naeem Menezes is a 80 y.o. female POD 3 from robotic assisted right hemicolectomy for ileocecal mass. Subjective:  NG in place with bilious output. Pain improving. No flatus or BM. Objective:    Vitals: VITALS:  BP (!) 141/44   Pulse 61   Temp 97.5 °F (36.4 °C) (Oral)   Resp 16   Ht 5' (1.524 m)   Wt 128 lb 14.4 oz (58.5 kg)   SpO2 97%   BMI 25.17 kg/m²     I/O: 05/29 0701 - 05/30 0700  In: -   Out: 2330 [Urine:800; Drains:330]    Labs/Imaging Results:   Lab Results   Component Value Date     05/30/2022    K 3.7 05/30/2022    K 4.4 03/15/2018     05/30/2022    CO2 25 05/30/2022    BUN 12 05/30/2022    CREATININE 0.6 05/30/2022    GLUCOSE 99 05/30/2022    CALCIUM 8.1 05/30/2022      Lab Results   Component Value Date    WBC 10.0 05/30/2022    HGB 10.8 (L) 05/30/2022    HCT 34.5 (L) 05/30/2022    MCV 92.0 05/30/2022     05/30/2022         IV Fluids: dextrose Last Rate: 100 mL/hr (05/22/22 2156)    sodium chloride Last Rate: Stopped (05/25/22 1247)    sodium chloride Last Rate: 75 mL/hr at 05/30/22 0115    Scheduled Meds:   oxymetazoline, 2 spray, Each Nostril, Once    lidocaine, , Topical, Once    enoxaparin, 40 mg, SubCUTAneous, Daily    sodium chloride, 500 mL, IntraVENous, Once    atorvastatin, 20 mg, Oral, Daily    Vitamin D, 1,000 Units, Oral, Daily    [Held by provider] insulin glargine, 15 Units, SubCUTAneous, Nightly    cetirizine, 10 mg, Oral, Daily    levothyroxine, 137 mcg, Oral, Daily    losartan, 100 mg, Oral, Daily    metoprolol tartrate, 150 mg, Oral, Daily    metoprolol tartrate, 100 mg, Oral, Nightly    oxybutynin, 5 mg, Oral, BID    rOPINIRole, 3 mg, Oral, Nightly    vitamin B-12, 100 mcg, Oral, Daily    insulin lispro, 0-6 Units, SubCUTAneous, Q4H    sodium chloride flush, 5-40 mL, IntraVENous, 2 times per day    Physical Exam:  General: A&O x 3, no distress.    HEENT: Anicteric sclerae, MMM.  Extremities: No edema bilat LE. Abdomen: Soft, appropriately tender, mildly distended. Incisions intact with staples    NG- 1200cc bilious  CLAUDIO 330cc serous    Assessment and Plan:  80 y.o. female s/p robotic assisted right colectomy. Doing ok. Fatigued. Patient Active Problem List:     Long-term insulin use in type 2 diabetes ([facility] [address]     Essential hypertension     Dyslipidemia     Abnormal EKG     Abnormal stress test     Cecum mass      - continue to LIWS, ice chips/popcicles ok.   Cough drops prn  - out of bed, IS, ambulate as able  - await return of bowel function    Brittni Martinez MD

## 2022-05-30 NOTE — PROGRESS NOTES
INTERNAL MED PROGRESS NOTE           Subjective: Patient reports feeling the same as yesterday, no improvement and no worsening of symptoms. NG tube placed yesterday due to symptoms nausea and vomiting.  and daughter at bedside      Assessment/Plan:       -- Cecal mass: POD #2 s/p robotic right colectomy. Postop management per surgery. -- Nausea and vomiting: developed yesterday, NG tube placed in hopes to LIWS per surgery's recommendation. Continue IV patient. Will need alternate means of nutrition with NG tube stays longer than 48 hours. 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 Being followed by oncology. --DM type II: Continue subcu insulin regimen including long-acting and short acting insulin as ordered, continue SSI protocol for more adequate glycemic control.    --HTN, controlled, cont home meds as ordered     --HLD: Continue current statin therapy        DVT prophylaxis: Heparin/Lovenox      Deondre Serrano MD  5/30/2022 @ 12:58 PM           Objective: Intake/Output Summary (Last 24 hours) at 5/30/2022 1258  Last data filed at 5/30/2022 1025  Gross per 24 hour   Intake --   Output 1700 ml   Net -1700 ml        Vitals:   Vitals:    05/30/22 0956   BP:    Pulse:    Resp: 15   Temp:    SpO2:        Physical Exam:        Constitutional: Well developed, Well nourished, NAD  Neurologic: Alert & oriented x 3, No focal deficits noted. CNs II-XII grossly intact and symmetrical  Psychiatric: Affect normal, Mood normal.  HENT: Normocephalic, Atraumatic,  Eyes: PERRLA, EOMI, No pallor/scleral icterus. Neck: Normal range of motion, No tenderness, Supple, no bruit  Lymphatic: No cervical lymphadenopathy noted. Cardiovascular: Regular rate and rhythm, normal S1-S2, No murmurs, gallops or rubs.    Thorax & Lungs: CTA bilaterally, No respiratory distress, No wheezing   Abdomen: Soft, BS +ve, no tenderness, no rebound or guarding  Skin: Warm, Dry, No erythema, No rash. Back: No tenderness, No CVA tenderness. Extremities: No edema, No tenderness  Musculoskeletal:. No major deformities noted.                Labs:   Reviewed, as follows:  Recent Results (from the past 24 hour(s))   POCT Glucose    Collection Time: 05/29/22  4:35 PM   Result Value Ref Range    POC Glucose 96 70 - 99 MG/DL   POCT Glucose    Collection Time: 05/29/22  8:26 PM   Result Value Ref Range    POC Glucose 75 70 - 99 MG/DL   POCT Glucose    Collection Time: 05/30/22 12:05 AM   Result Value Ref Range    POC Glucose 88 70 - 99 MG/DL   CBC with Auto Differential    Collection Time: 05/30/22  1:36 AM   Result Value Ref Range    WBC 10.0 4.0 - 10.5 K/CU MM    RBC 3.75 (L) 4.2 - 5.4 M/CU MM    Hemoglobin 10.8 (L) 12.5 - 16.0 GM/DL    Hematocrit 34.5 (L) 37 - 47 %    MCV 92.0 78 - 100 FL    MCH 28.8 27 - 31 PG    MCHC 31.3 (L) 32.0 - 36.0 %    RDW 13.2 11.7 - 14.9 %    Platelets 741 211 - 240 K/CU MM    MPV 10.8 7.5 - 11.1 FL    Differential Type AUTOMATED DIFFERENTIAL     Segs Relative 79.5 (H) 36 - 66 %    Lymphocytes % 11.8 (L) 24 - 44 %    Monocytes % 7.3 (H) 0 - 4 %    Eosinophils % 0.6 0 - 3 %    Basophils % 0.4 0 - 1 %    Segs Absolute 8.0 K/CU MM    Lymphocytes Absolute 1.2 K/CU MM    Monocytes Absolute 0.7 K/CU MM    Eosinophils Absolute 0.1 K/CU MM    Basophils Absolute 0.0 K/CU MM    Nucleated RBC % 0.0 %    Total Nucleated RBC 0.0 K/CU MM    Total Immature Neutrophil 0.04 K/CU MM    Immature Neutrophil % 0.4 0 - 0.43 %   Basic Metabolic Panel    Collection Time: 05/30/22  1:36 AM   Result Value Ref Range    Sodium 137 135 - 145 MMOL/L    Potassium 3.7 3.5 - 5.1 MMOL/L    Chloride 100 99 - 110 mMol/L    CO2 25 21 - 32 MMOL/L    Anion Gap 12 4 - 16    BUN 12 6 - 23 MG/DL    CREATININE 0.6 0.6 - 1.1 MG/DL    Glucose 99 70 - 99 MG/DL    Calcium 8.1 (L) 8.3 - 10.6 MG/DL    GFR Non-African American >60 >60 mL/min/1.73m2    GFR African American >60 >60 mL/min/1.73m2   POCT Glucose    Collection Time: 05/30/22  3:38 AM   Result Value Ref Range    POC Glucose 114 (H) 70 - 99 MG/DL   POCT Glucose    Collection Time: 05/30/22  6:26 AM   Result Value Ref Range    POC Glucose 100 (H) 70 - 99 MG/DL   POCT Glucose    Collection Time: 05/30/22 11:28 AM   Result Value Ref Range    POC Glucose 87 70 - 99 MG/DL          Body mass index is 25.17 kg/m².  Patient will follow up with PCP for further management as indicated unless otherwise specifically noted above        Deondre Serrano MD  5/30/2022 @ 12:58 PM

## 2022-05-31 ENCOUNTER — APPOINTMENT (OUTPATIENT)
Dept: GENERAL RADIOLOGY | Age: 87
DRG: 329 | End: 2022-05-31
Attending: INTERNAL MEDICINE
Payer: MEDICARE

## 2022-05-31 PROBLEM — E43 SEVERE MALNUTRITION (HCC): Status: ACTIVE | Noted: 2022-05-31

## 2022-05-31 LAB
GLUCOSE BLD-MCNC: 103 MG/DL (ref 70–99)
GLUCOSE BLD-MCNC: 122 MG/DL (ref 70–99)
GLUCOSE BLD-MCNC: 184 MG/DL (ref 70–99)
GLUCOSE BLD-MCNC: 203 MG/DL (ref 70–99)
GLUCOSE BLD-MCNC: 66 MG/DL (ref 70–99)
GLUCOSE BLD-MCNC: 68 MG/DL (ref 70–99)
GLUCOSE BLD-MCNC: 79 MG/DL (ref 70–99)
GLUCOSE BLD-MCNC: 85 MG/DL (ref 70–99)

## 2022-05-31 PROCEDURE — APPNB15 APP NON BILLABLE TIME 0-15 MINS: Performed by: NURSE PRACTITIONER

## 2022-05-31 PROCEDURE — 97535 SELF CARE MNGMENT TRAINING: CPT

## 2022-05-31 PROCEDURE — 2580000003 HC RX 258: Performed by: SURGERY

## 2022-05-31 PROCEDURE — 99232 SBSQ HOSP IP/OBS MODERATE 35: CPT | Performed by: INTERNAL MEDICINE

## 2022-05-31 PROCEDURE — 6370000000 HC RX 637 (ALT 250 FOR IP): Performed by: SURGERY

## 2022-05-31 PROCEDURE — 97530 THERAPEUTIC ACTIVITIES: CPT

## 2022-05-31 PROCEDURE — 82962 GLUCOSE BLOOD TEST: CPT

## 2022-05-31 PROCEDURE — 2500000003 HC RX 250 WO HCPCS: Performed by: SURGERY

## 2022-05-31 PROCEDURE — 6360000002 HC RX W HCPCS: Performed by: SURGERY

## 2022-05-31 PROCEDURE — C1769 GUIDE WIRE: HCPCS

## 2022-05-31 PROCEDURE — 97166 OT EVAL MOD COMPLEX 45 MIN: CPT

## 2022-05-31 PROCEDURE — C1751 CATH, INF, PER/CENT/MIDLINE: HCPCS

## 2022-05-31 PROCEDURE — 94761 N-INVAS EAR/PLS OXIMETRY MLT: CPT

## 2022-05-31 PROCEDURE — 74018 RADEX ABDOMEN 1 VIEW: CPT

## 2022-05-31 PROCEDURE — 99024 POSTOP FOLLOW-UP VISIT: CPT | Performed by: NURSE PRACTITIONER

## 2022-05-31 PROCEDURE — 94150 VITAL CAPACITY TEST: CPT

## 2022-05-31 PROCEDURE — 76937 US GUIDE VASCULAR ACCESS: CPT

## 2022-05-31 PROCEDURE — 1200000000 HC SEMI PRIVATE

## 2022-05-31 PROCEDURE — 05HY33Z INSERTION OF INFUSION DEVICE INTO UPPER VEIN, PERCUTANEOUS APPROACH: ICD-10-PCS | Performed by: STUDENT IN AN ORGANIZED HEALTH CARE EDUCATION/TRAINING PROGRAM

## 2022-05-31 PROCEDURE — 36569 INSJ PICC 5 YR+ W/O IMAGING: CPT

## 2022-05-31 RX ORDER — SODIUM CHLORIDE 0.9 % (FLUSH) 0.9 %
5-40 SYRINGE (ML) INJECTION EVERY 12 HOURS SCHEDULED
Status: DISCONTINUED | OUTPATIENT
Start: 2022-05-31 | End: 2022-06-09 | Stop reason: HOSPADM

## 2022-05-31 RX ORDER — SODIUM CHLORIDE 0.9 % (FLUSH) 0.9 %
5-40 SYRINGE (ML) INJECTION PRN
Status: DISCONTINUED | OUTPATIENT
Start: 2022-05-31 | End: 2022-06-09 | Stop reason: HOSPADM

## 2022-05-31 RX ORDER — SODIUM CHLORIDE 9 MG/ML
INJECTION, SOLUTION INTRAVENOUS PRN
Status: DISCONTINUED | OUTPATIENT
Start: 2022-05-31 | End: 2022-06-09 | Stop reason: HOSPADM

## 2022-05-31 RX ORDER — LIDOCAINE HYDROCHLORIDE 10 MG/ML
5 INJECTION, SOLUTION EPIDURAL; INFILTRATION; INTRACAUDAL; PERINEURAL ONCE
Status: DISCONTINUED | OUTPATIENT
Start: 2022-05-31 | End: 2022-06-09

## 2022-05-31 RX ADMIN — ASCORBIC ACID, VITAMIN A PALMITATE, CHOLECALCIFEROL, THIAMINE HYDROCHLORIDE, RIBOFLAVIN-5 PHOSPHATE SODIUM, PYRIDOXINE HYDROCHLORIDE, NIACINAMIDE, DEXPANTHENOL, ALPHA-TOCOPHEROL ACETATE, VITAMIN K1, FOLIC ACID, BIOTIN, CYANOCOBALAMIN: 200; 3300; 200; 6; 3.6; 6; 40; 15; 10; 150; 600; 60; 5 INJECTION, SOLUTION INTRAVENOUS at 18:09

## 2022-05-31 RX ADMIN — SOYBEAN OIL 250 ML: 20 INJECTION, SOLUTION INTRAVENOUS at 18:20

## 2022-05-31 RX ADMIN — INSULIN LISPRO 2 UNITS: 100 INJECTION, SOLUTION INTRAVENOUS; SUBCUTANEOUS at 21:37

## 2022-05-31 RX ADMIN — ROPINIROLE HYDROCHLORIDE 3 MG: 1 TABLET, FILM COATED ORAL at 21:41

## 2022-05-31 RX ADMIN — LOSARTAN POTASSIUM 100 MG: 100 TABLET, FILM COATED ORAL at 10:32

## 2022-05-31 RX ADMIN — ATORVASTATIN CALCIUM 20 MG: 10 TABLET, FILM COATED ORAL at 10:32

## 2022-05-31 RX ADMIN — SODIUM CHLORIDE, PRESERVATIVE FREE 10 ML: 5 INJECTION INTRAVENOUS at 10:33

## 2022-05-31 RX ADMIN — DEXTROSE MONOHYDRATE 100 ML/HR: 50 INJECTION, SOLUTION INTRAVENOUS at 17:14

## 2022-05-31 RX ADMIN — Medication 1 SPRAY: at 09:40

## 2022-05-31 RX ADMIN — METOPROLOL TARTRATE 100 MG: 50 TABLET, FILM COATED ORAL at 21:45

## 2022-05-31 RX ADMIN — OXYBUTYNIN CHLORIDE 5 MG: 5 TABLET ORAL at 21:41

## 2022-05-31 RX ADMIN — SODIUM CHLORIDE: 9 INJECTION, SOLUTION INTRAVENOUS at 23:40

## 2022-05-31 RX ADMIN — Medication 100 MCG: at 10:32

## 2022-05-31 RX ADMIN — ENOXAPARIN SODIUM 40 MG: 100 INJECTION SUBCUTANEOUS at 10:32

## 2022-05-31 RX ADMIN — DEXTROSE MONOHYDRATE 250 ML: 100 INJECTION, SOLUTION INTRAVENOUS at 18:01

## 2022-05-31 RX ADMIN — CETIRIZINE HYDROCHLORIDE 10 MG: 10 TABLET, FILM COATED ORAL at 10:32

## 2022-05-31 RX ADMIN — Medication 1000 UNITS: at 10:32

## 2022-05-31 RX ADMIN — OXYBUTYNIN CHLORIDE 5 MG: 5 TABLET ORAL at 10:32

## 2022-05-31 RX ADMIN — METOPROLOL TARTRATE 150 MG: 50 TABLET, FILM COATED ORAL at 10:31

## 2022-05-31 RX ADMIN — LEVOTHYROXINE SODIUM 137 MCG: 25 TABLET ORAL at 06:45

## 2022-05-31 RX ADMIN — GLUCAGON HYDROCHLORIDE 1 MG: KIT at 17:10

## 2022-05-31 RX ADMIN — MORPHINE SULFATE 2 MG: 2 INJECTION, SOLUTION INTRAMUSCULAR; INTRAVENOUS at 11:33

## 2022-05-31 RX ADMIN — SODIUM CHLORIDE: 9 INJECTION, SOLUTION INTRAVENOUS at 08:09

## 2022-05-31 RX ADMIN — MORPHINE SULFATE 2 MG: 2 INJECTION, SOLUTION INTRAMUSCULAR; INTRAVENOUS at 14:06

## 2022-05-31 ASSESSMENT — PAIN SCALES - GENERAL
PAINLEVEL_OUTOF10: 3
PAINLEVEL_OUTOF10: 3
PAINLEVEL_OUTOF10: 2
PAINLEVEL_OUTOF10: 4

## 2022-05-31 ASSESSMENT — PAIN DESCRIPTION - LOCATION
LOCATION: ABDOMEN
LOCATION: ABDOMEN

## 2022-05-31 ASSESSMENT — PAIN DESCRIPTION - DESCRIPTORS: DESCRIPTORS: ACHING

## 2022-05-31 ASSESSMENT — PAIN DESCRIPTION - PAIN TYPE
TYPE: SURGICAL PAIN
TYPE: SURGICAL PAIN

## 2022-05-31 ASSESSMENT — PAIN DESCRIPTION - ORIENTATION: ORIENTATION: MID

## 2022-05-31 NOTE — CARE COORDINATION
LSW spoke with pt and family regarding OT recommendation for ARU. Need Pt eval as well. Pt and family are agreeable to ARU. Pt has [facility] and will need precert. LSW spoke pt regarding Plan B if insurance denies ARU. Pt and family stated that their plan B will be home with [facility] and family support. Jorge Hernandez called Cookie with [facility] and gave referral.

## 2022-05-31 NOTE — CONSULTS
PICC Consult complete. Indication for line: TPN. Education regarding PICC insertion discussed and risks and benefits assessed with patient. Patient verbalized understanding. Consent signed by patient. Time out done @ 1400. PICC inserted in right upper extremity basilic without difficulty per protocol. Placement verified via [id number] monitoring system. Good blood return and flushes easily on both ports. Patient tolerated well. Education pamphlets left in chart  Ultrasound photos of vein diameter and doppler signature placed in chart. Please consult IV/PICC team if patient's needs change. PICC OK to use.

## 2022-05-31 NOTE — PROGRESS NOTES
GENERAL SURGERY PROGRESS NOTE    Sherryle Alken is a 80 y.o. female POD 3 from robotic assisted right hemicolectomy for ileocecal mass. Subjective:  NG in place with bilious output. Pain improving. No flatus or BM, states has started to feel some movement. Objective:    Vitals: VITALS:  /69   Pulse 67   Temp 98 °F (36.7 °C) (Oral)   Resp 16   Ht 5' (1.524 m)   Wt 128 lb 14.4 oz (58.5 kg)   SpO2 96%   BMI 25.17 kg/m²     I/O: 05/30 0701 - 05/31 0700  In: -   Out: 2780 [HFVVQ:9788; Drains:255]    Labs/Imaging Results:   Lab Results   Component Value Date     05/30/2022    K 3.7 05/30/2022    K 4.4 03/15/2018     05/30/2022    CO2 25 05/30/2022    BUN 12 05/30/2022    CREATININE 0.6 05/30/2022    GLUCOSE 99 05/30/2022    CALCIUM 8.1 05/30/2022      Lab Results   Component Value Date    WBC 10.0 05/30/2022    HGB 10.8 (L) 05/30/2022    HCT 34.5 (L) 05/30/2022    MCV 92.0 05/30/2022     05/30/2022         IV Fluids: dextrose Last Rate: 100 mL/hr (05/22/22 2156)    sodium chloride Last Rate: Stopped (05/25/22 1247)    sodium chloride Last Rate: 75 mL/hr at 05/31/22 0809    Scheduled Meds:   oxymetazoline, 2 spray, Each Nostril, Once    lidocaine, , Topical, Once    enoxaparin, 40 mg, SubCUTAneous, Daily    sodium chloride, 500 mL, IntraVENous, Once    atorvastatin, 20 mg, Oral, Daily    Vitamin D, 1,000 Units, Oral, Daily    [Held by provider] insulin glargine, 15 Units, SubCUTAneous, Nightly    cetirizine, 10 mg, Oral, Daily    levothyroxine, 137 mcg, Oral, Daily    losartan, 100 mg, Oral, Daily    metoprolol tartrate, 150 mg, Oral, Daily    metoprolol tartrate, 100 mg, Oral, Nightly    oxybutynin, 5 mg, Oral, BID    rOPINIRole, 3 mg, Oral, Nightly    vitamin B-12, 100 mcg, Oral, Daily    insulin lispro, 0-6 Units, SubCUTAneous, Q4H    sodium chloride flush, 5-40 mL, IntraVENous, 2 times per day    Physical Exam:  General: A&O x 3, no distress. HEENT: Anicteric sclerae, MMM. Extremities: No edema bilat LE. Abdomen: Soft, appropriately tender, mildly distended. Incisions intact with staples    NG- 1235cc bilious  CLAUDIO 195cc serous    Assessment and Plan:  80 y.o. female s/p robotic assisted right colectomy. Doing ok. Fatigued. Patient Active Problem List:     Long-term insulin use in type 2 diabetes ([facility] [address]     Essential hypertension     Dyslipidemia     Abnormal EKG     Abnormal stress test     Cecum mass      - continue to LIWS, ice chips/popcicles ok.   Cough drops/chloraseptic spray prn  - out of bed, IS, ambulate as able  - await return of bowel function    Rip Pedroza, APRN - CNP

## 2022-05-31 NOTE — PROGRESS NOTES
ONCOLOGY HEMATOLOGY CARE ([facility])  PROGRESS NOTE      Patient was seen and examined today. Not in any acute distress and no overnight events. 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 MRI pelvis showed complex ovarian cyst without internal enhancement to suggest solid mass. Radiologist recommend f/u imaging with CT or MRI in 6 months. She is POD 1 and she tolerated surgery well. Comfortably lying in bed. No new complaints. PHYSICAL EXAM    Vitals: BP (!) 141/44   Pulse 61   Temp 97.5 °F (36.4 °C) (Oral)   Resp 16   Ht 5' (1.524 m)   Wt 128 lb 14.4 oz (58.5 kg)   SpO2 97%   BMI 25.17 kg/m²   CONSTITUTIONAL: awake, alert, cooperative, no apparent distress   EYES: pupils equal, round and reactive to light, sclera clear and conjunctiva normal  ENT: Normocephalic, without obvious abnormality, atraumatic  NECK: supple, symmetrical, no jugular venous distension and no carotid bruits   HEMATOLOGIC/LYMPHATIC: no cervical, supraclavicular or axillary lymphadenopathy   LUNGS: VBS, no wheezes, no crackles, no rhonchi, no increased work of breathing and clear to auscultation   CARDIOVASCULAR: regular rate and rhythm, normal S1 and S2, no murmur noted  ABDOMEN: surgical wound on dressing, mild tenderness to palpation,   MUSCULOSKELETAL: full range of motion noted, tone is normal  NEUROLOGIC: awake, alert, oriented to name, place and time. Motor skills grossly intact. SKIN: Normal skin color, texture, turgor and no jaundice.  Skin appears intact   EXTREMITIES: no LE edema, no clubbing, no cyanosis, no leg swelling,      LABORATORY RESULTS  CBC:   Recent Labs     05/28/22  0015 05/30/22  0136   WBC 7.1 10.0   HGB 9.9* 10.8*    224     BMP:    Recent Labs     05/28/22  0015 05/30/22  0136   * 137   K 4.2 3.7    100   CO2 21 25   BUN 6 12   CREATININE 0.7 0.6   GLUCOSE 146* 99     Hepatic: No results for input(s): AST, ALT, ALB, BILITOT, ALKPHOS in the last 72 hours. INR: No results for input(s): INR in the last 72 hours. ASSESSMENT  Cecal adenocarcinoma  Right adnexa complex cystic lesion     RECOMMENDATION  Reviewed pathology from cecal mass and MRI pelvis result. Will consider repeat CT or MRI in 6 months for ovarian complex cyst.     Will follow up with pathology from surgical specimen. Based on her pathologic staging, we will determine if she will need adjuvant therapy. Still has some nausea this morning. On ice chips/popcicles now. Awaiting return of bowel function.      We will follow the patient. Thank you We will continue to follow the patient. Thank you.

## 2022-05-31 NOTE — PROGRESS NOTES
Occupational Therapy  [facility] ACUTE CARE OCCUPATIONAL THERAPY EVALUATION  Catina Cheng, 1934, [id number]/[id number], 5/31/2022    History  [place]:  Diagnoses of Abnormal finding on imaging and Cecal cancer ([facility] [address] were pertinent to this visit. Patient  has a past medical history of Diabetes mellitus ([facility] [address] H/O cardiac catheterization, H/O cardiovascular stress test, H/O cardiovascular stress test, H/O Doppler ultrasound (Abdomimal Aorta), H/O echocardiogram, Hx of echocardiogram, Hyperlipidemia, Hypertension, Sleep apnea, and Thyroid disease. Patient  has a past surgical history that includes Cholecystectomy; Dilation and curettage of uterus; Cataract removal; other surgical history; Breast biopsy (Right); Colonoscopy (N/A, 5/25/2022); and hemicolectomy (N/A, 5/27/2022). Subjective:  Patient states: \"When it rains it pours, I guess\". Pain:  2.5/10, general constant pain in abdomen r/t surgical site.   Pain intervention: Repositioned, increased movement     Communication with other providers:  Handoff to RN  Restrictions: General Precautions, Fall Risk, NG Tube, J Tube, abdominal precautions     Home Setup/Prior level of function  Social/Functional History  Lives With: Spouse  Type of Home: House  Home Layout: One level  Home Access: Stairs to enter with rails  Entrance Stairs - Number of Steps: 2 to porch, 1 into house  Entrance Stairs - Rails: Both  Bathroom Shower/Tub: Walk-in shower  Bathroom Toilet: Standard  Bathroom Equipment: Built-in shower seat  Bathroom Accessibility: Accessible  Home Equipment: Grab bars (does not use)  Has the patient had two or more falls in the past year or any fall with injury in the past year?: Yes (Getting out of reclining chair at night)  ADL Assistance: [address]: Independent  Homemaking Responsibilities: Yes  Ambulation Assistance: Independent  Transfer Assistance: Independent  Active : Yes    Examination of body systems (includes supine: DNT  · Sitting balance: [place] moving to SBA  (x1 posterior LOB d/t lightheadedness, after 30 seconds ankle pumps to correct pt no longer lightheaded)  · Standing balance: modA moving to [place] (pt required min tactile and verbal cues to correct retropulsive lean)  · Functional Mobility: modA moving to [place] ( x6 side steps from EOB to reclining chair w/ RW w/ mod v/c for proper use of RW)  · Toilet Transfers: DNT    Pt educated on proper hand placement for safe functional transfers and proper use of rolling walker for functional mobility. Pt and pt family's educated on role of occupational therapy. AM-[facility] Daily Activity Inpatient   How much help for putting on and taking off regular lower body clothing?: Total  How much help for Bathing?: A Lot  How much help for Toileting?: Total  How much help for putting on and taking off regular upper body clothing?: A Lot  How much help for taking care of personal grooming?: A Little  How much help for eating meals?: None  [facility] Inpatient Daily Activity Raw Score: 13  AM-[facility] Inpatient ADL T-Scale Score : 32.03  ADL Inpatient CMS 0-100% Score: 63.03  ADL Inpatient CMS G-Code Modifier : CL    Treatment:  Self Care Training:   Cues were given for safety, sequence, UE/LE placement, visual cues, and balance. Activities performed today included LE/UE dressing, toileting, LE/UE bathing, and grooming. Therapeutic Activity Training:   Therapeutic activity training was instructed today. Cues were given for safety, sequence, UE/LE placement, awareness, and balance. Activities performed today included sup-sit, sit-stand, stand-sit, and functional mobility/transfers. Safety: patient left in chair with chair alarm, call light within reach, RN notified, gait belt used. Assessment:  Pt is a 81 y/o female admitted from home for cecum mass. Pt at baseline is indpendent for ADLs, for high level IADLs, and for functional transfers/mobility.  Pt currently presents w/ deficits in ADL and high level IADL independence, functional activity tolerance, ROM, strength, fatigue, dynamic sitting and standing balance and tolerance and functional transfers. Pt would benefit from continued acute care OT services w/ discharge to ARU  Complexity: Moderate  Prognosis: Good, no significant barriers to participation at this time. Plan  Times per Week: 4x+  Times per Day: Daily  Current Treatment Recommendations: Strengthening,ROM,Balance training,Functional mobility training,Endurance training,Pain management,Safety education & training,Patient/Caregiver education & training,Equipment evaluation, education, & procurement,Positioning,Home management training,Self-Care / ADL     Equipment: defer    Goals:  Pt goal: go home  Time Frame for STGs: discharge  Goal 1: Pt will perform UE ADLs independently   Goal 2: Pt will perform LE ADLs w/ AD SBA  Goal 3: Pt will perform toileting SBA   Goal 4: Pt will perform functional mobility/ transfer w/ AD [place]  Goal 5: Pt will perform therex/theract in order to increase functional activity tolerance  Goal 6: Pt will perform therex/theract sitting EOB in order to increase dynamic sitting balance    Treatment plan:  Pt will perform therex/theract in order to increase functional activity tolerance in preparation for ADL participation.      Recommendations for NURSING activity: Up to chair for all 3 meals and up to bedside commode for all toileting needs    Time:   Time in: 0928  Time out: 1028  Timed treatment minutes:45  Total time: 60    Electronically signed by:    Miguel Amin S/OT  5/31/2022, 11:37 AM

## 2022-05-31 NOTE — PROGRESS NOTES
Comprehensive Nutrition Assessment    Type and Reason for Visit:  Initial    Nutrition Recommendations/Plan:   1. Recommend initiating PN to d/t continued inadequate oral intake and malnutrition  2. PN recommendation: Clinimix 5/15 @ 60 ml/hr which will provide ~1308 kcal (average lipid and non lipid days) and 72 g protein  3. Will closely monitor nutrition status, poc     Malnutrition Assessment:  Malnutrition Status:  Severe malnutrition (05/31/22 0931)    Context:  Acute Illness       Nutrition Assessment:    Pt admitted for cecum mass, PMH: HTN, HLD, DM, Thyroid Disease, POD #4 s/p robotic assisted right hemicolectomy for ileocecal mass, pt currently NPO, +NG to suction, no BM or flatus noted, appears that pt has been without adequate nutrition ~7 days, perfectserved surgery regarding nutrition plan, will closely monitor at high nutrition risk    Nutrition Related Findings:      Wound Type: Surgical Incision       Current Nutrition Intake & Therapies:    Average Meal Intake: NPO  Average Supplements Intake: NPO  Diet NPO Exceptions are: Sips of Water with Meds, Popsicles, Ice Chips    Anthropometric Measures:  Height: 5' (152.4 cm)  Ideal Body Weight (IBW): 100 lbs (45 kg)    Admission Body Weight: 151 lb 10.8 oz (68.8 kg) (first meausred weight)  Current Body Weight: 128 lb 15.5 oz (58.5 kg), 129 % IBW. Weight Source: Bed Scale  Current BMI (kg/m2): 25.2  Weight Adjustment For: No Adjustment  BMI Categories: Overweight (BMI 25.0-29. 9)    Estimated Daily Nutrient Needs:  Energy Requirements Based On: Kcal/kg  Weight Used for Energy Requirements: Current  Energy (kcal/day): 3865-6265 (24-27 kcal/kg)  Weight Used for Protein Requirements: Current  Protein (g/day): 64-76 (1.1-1.3 g/kg)  Method Used for Fluid Requirements: 1 ml/kcal    Nutrition Diagnosis:   · Severe malnutrition,In context of acute illness or injury related to inadequate protein-energy intake as evidenced by weight loss greater than or equal to 2% in 1 week, pt meeting less than 50% of estimated energy needs for greater than 5 days    Nutrition Interventions:   Food and/or Nutrient Delivery: Start Parenteral Nutrition  Nutrition Education/Counseling: No recommendation at this time  Coordination of Nutrition Care: Continue to monitor while inpatient  Plan of Care discussed with: perfectserved surgery regarding nutrition plan  Goals:  Goals: Initiate nutrition support,by next RD assessment     Nutrition Monitoring and Evaluation:   Behavioral-Environmental Outcomes: None Identified  Food/Nutrient Intake Outcomes: Diet Advancement/Tolerance,Parenteral Nutrition Intake/Tolerance  Physical Signs/Symptoms Outcomes: Biochemical Data,GI Status,Hemodynamic Status,Fluid Status or Edema,Weight,Skin    Discharge Planning:     Too soon to determine     Carissa Kelley [address] [place] [address],   Contact: [phone]

## 2022-06-01 LAB
ALBUMIN SERPL-MCNC: 2.7 GM/DL (ref 3.4–5)
ALP BLD-CCNC: 34 IU/L (ref 40–128)
ALT SERPL-CCNC: 10 U/L (ref 10–40)
ANION GAP SERPL CALCULATED.3IONS-SCNC: 10 MMOL/L (ref 4–16)
AST SERPL-CCNC: 14 IU/L (ref 15–37)
BILIRUB SERPL-MCNC: 0.3 MG/DL (ref 0–1)
BUN BLDV-MCNC: 14 MG/DL (ref 6–23)
CALCIUM SERPL-MCNC: 7.9 MG/DL (ref 8.3–10.6)
CHLORIDE BLD-SCNC: 100 MMOL/L (ref 99–110)
CO2: 25 MMOL/L (ref 21–32)
CREAT SERPL-MCNC: 0.5 MG/DL (ref 0.6–1.1)
GFR AFRICAN AMERICAN: >60 ML/MIN/1.73M2
GFR NON-AFRICAN AMERICAN: >60 ML/MIN/1.73M2
GLUCOSE BLD-MCNC: 121 MG/DL (ref 70–99)
GLUCOSE BLD-MCNC: 126 MG/DL (ref 70–99)
GLUCOSE BLD-MCNC: 130 MG/DL (ref 70–99)
GLUCOSE BLD-MCNC: 142 MG/DL (ref 70–99)
GLUCOSE BLD-MCNC: 143 MG/DL (ref 70–99)
GLUCOSE BLD-MCNC: 183 MG/DL (ref 70–99)
GLUCOSE BLD-MCNC: 186 MG/DL (ref 70–99)
HCT VFR BLD CALC: 33.8 % (ref 37–47)
HEMOGLOBIN: 10.8 GM/DL (ref 12.5–16)
MAGNESIUM: 1.4 MG/DL (ref 1.8–2.4)
MCH RBC QN AUTO: 28.8 PG (ref 27–31)
MCHC RBC AUTO-ENTMCNC: 32 % (ref 32–36)
MCV RBC AUTO: 90.1 FL (ref 78–100)
PDW BLD-RTO: 13.1 % (ref 11.7–14.9)
PHOSPHORUS: 2.1 MG/DL (ref 2.5–4.9)
PLATELET # BLD: 214 K/CU MM (ref 140–440)
PMV BLD AUTO: 10.6 FL (ref 7.5–11.1)
POTASSIUM SERPL-SCNC: 3 MMOL/L (ref 3.5–5.1)
RBC # BLD: 3.75 M/CU MM (ref 4.2–5.4)
SODIUM BLD-SCNC: 135 MMOL/L (ref 135–145)
TOTAL PROTEIN: 4.6 GM/DL (ref 6.4–8.2)
TRIGL SERPL-MCNC: 161 MG/DL
WBC # BLD: 5.5 K/CU MM (ref 4–10.5)

## 2022-06-01 PROCEDURE — 82962 GLUCOSE BLOOD TEST: CPT

## 2022-06-01 PROCEDURE — 6370000000 HC RX 637 (ALT 250 FOR IP): Performed by: SURGERY

## 2022-06-01 PROCEDURE — 97116 GAIT TRAINING THERAPY: CPT

## 2022-06-01 PROCEDURE — 97530 THERAPEUTIC ACTIVITIES: CPT

## 2022-06-01 PROCEDURE — 2500000003 HC RX 250 WO HCPCS: Performed by: SURGERY

## 2022-06-01 PROCEDURE — 97162 PT EVAL MOD COMPLEX 30 MIN: CPT

## 2022-06-01 PROCEDURE — 83735 ASSAY OF MAGNESIUM: CPT

## 2022-06-01 PROCEDURE — 6360000002 HC RX W HCPCS: Performed by: NURSE PRACTITIONER

## 2022-06-01 PROCEDURE — 84478 ASSAY OF TRIGLYCERIDES: CPT

## 2022-06-01 PROCEDURE — 36592 COLLECT BLOOD FROM PICC: CPT

## 2022-06-01 PROCEDURE — 85027 COMPLETE CBC AUTOMATED: CPT

## 2022-06-01 PROCEDURE — 84100 ASSAY OF PHOSPHORUS: CPT

## 2022-06-01 PROCEDURE — 1200000000 HC SEMI PRIVATE

## 2022-06-01 PROCEDURE — 2580000003 HC RX 258: Performed by: SURGERY

## 2022-06-01 PROCEDURE — 94150 VITAL CAPACITY TEST: CPT

## 2022-06-01 PROCEDURE — 99024 POSTOP FOLLOW-UP VISIT: CPT | Performed by: SURGERY

## 2022-06-01 PROCEDURE — 97535 SELF CARE MNGMENT TRAINING: CPT

## 2022-06-01 PROCEDURE — 97110 THERAPEUTIC EXERCISES: CPT

## 2022-06-01 PROCEDURE — 6360000002 HC RX W HCPCS: Performed by: INTERNAL MEDICINE

## 2022-06-01 PROCEDURE — 94761 N-INVAS EAR/PLS OXIMETRY MLT: CPT

## 2022-06-01 PROCEDURE — 80053 COMPREHEN METABOLIC PANEL: CPT

## 2022-06-01 PROCEDURE — 99232 SBSQ HOSP IP/OBS MODERATE 35: CPT | Performed by: INTERNAL MEDICINE

## 2022-06-01 PROCEDURE — 6360000002 HC RX W HCPCS: Performed by: SURGERY

## 2022-06-01 PROCEDURE — 36415 COLL VENOUS BLD VENIPUNCTURE: CPT

## 2022-06-01 RX ORDER — POTASSIUM CHLORIDE 7.45 MG/ML
10 INJECTION INTRAVENOUS ONCE
Status: COMPLETED | OUTPATIENT
Start: 2022-06-01 | End: 2022-06-01

## 2022-06-01 RX ORDER — POTASSIUM CHLORIDE 7.45 MG/ML
40 INJECTION INTRAVENOUS ONCE
Status: COMPLETED | OUTPATIENT
Start: 2022-06-01 | End: 2022-06-01

## 2022-06-01 RX ORDER — MAGNESIUM SULFATE IN WATER 40 MG/ML
2000 INJECTION, SOLUTION INTRAVENOUS ONCE
Status: COMPLETED | OUTPATIENT
Start: 2022-06-01 | End: 2022-06-01

## 2022-06-01 RX ADMIN — POTASSIUM CHLORIDE 10 MEQ: 7.46 INJECTION, SOLUTION INTRAVENOUS at 13:40

## 2022-06-01 RX ADMIN — INSULIN LISPRO 1 UNITS: 100 INJECTION, SOLUTION INTRAVENOUS; SUBCUTANEOUS at 00:50

## 2022-06-01 RX ADMIN — Medication 1000 UNITS: at 10:45

## 2022-06-01 RX ADMIN — LEVOTHYROXINE SODIUM 137 MCG: 25 TABLET ORAL at 06:12

## 2022-06-01 RX ADMIN — POTASSIUM CHLORIDE 10 MEQ: 7.46 INJECTION, SOLUTION INTRAVENOUS at 16:14

## 2022-06-01 RX ADMIN — OXYBUTYNIN CHLORIDE 5 MG: 5 TABLET ORAL at 10:45

## 2022-06-01 RX ADMIN — POTASSIUM CHLORIDE 10 MEQ: 7.46 INJECTION, SOLUTION INTRAVENOUS at 10:47

## 2022-06-01 RX ADMIN — Medication 1 SPRAY: at 10:42

## 2022-06-01 RX ADMIN — LOSARTAN POTASSIUM 100 MG: 100 TABLET, FILM COATED ORAL at 10:45

## 2022-06-01 RX ADMIN — OXYBUTYNIN CHLORIDE 5 MG: 5 TABLET ORAL at 21:24

## 2022-06-01 RX ADMIN — Medication 1 SPRAY: at 17:37

## 2022-06-01 RX ADMIN — CETIRIZINE HYDROCHLORIDE 10 MG: 10 TABLET, FILM COATED ORAL at 10:45

## 2022-06-01 RX ADMIN — SODIUM CHLORIDE, PRESERVATIVE FREE 10 ML: 5 INJECTION INTRAVENOUS at 21:32

## 2022-06-01 RX ADMIN — POTASSIUM CHLORIDE 40 MEQ: 7.46 INJECTION, SOLUTION INTRAVENOUS at 06:20

## 2022-06-01 RX ADMIN — MAGNESIUM SULFATE HEPTAHYDRATE 2000 MG: 2 INJECTION, SOLUTION INTRAVENOUS at 17:31

## 2022-06-01 RX ADMIN — SODIUM CHLORIDE, PRESERVATIVE FREE 10 ML: 5 INJECTION INTRAVENOUS at 21:26

## 2022-06-01 RX ADMIN — ROPINIROLE HYDROCHLORIDE 3 MG: 1 TABLET, FILM COATED ORAL at 21:24

## 2022-06-01 RX ADMIN — ENOXAPARIN SODIUM 40 MG: 100 INJECTION SUBCUTANEOUS at 10:44

## 2022-06-01 RX ADMIN — ZOLPIDEM TARTRATE 5 MG: 5 TABLET ORAL at 23:54

## 2022-06-01 RX ADMIN — Medication 100 MCG: at 10:46

## 2022-06-01 RX ADMIN — METOPROLOL TARTRATE 150 MG: 50 TABLET, FILM COATED ORAL at 10:45

## 2022-06-01 RX ADMIN — INSULIN LISPRO 1 UNITS: 100 INJECTION, SOLUTION INTRAVENOUS; SUBCUTANEOUS at 21:26

## 2022-06-01 RX ADMIN — ATORVASTATIN CALCIUM 20 MG: 10 TABLET, FILM COATED ORAL at 10:45

## 2022-06-01 RX ADMIN — SODIUM CHLORIDE, PRESERVATIVE FREE 10 ML: 5 INJECTION INTRAVENOUS at 10:44

## 2022-06-01 RX ADMIN — MAGNESIUM SULFATE HEPTAHYDRATE: 500 INJECTION, SOLUTION INTRAMUSCULAR; INTRAVENOUS at 18:25

## 2022-06-01 ASSESSMENT — PAIN DESCRIPTION - LOCATION: LOCATION: THROAT

## 2022-06-01 ASSESSMENT — PAIN SCALES - GENERAL
PAINLEVEL_OUTOF10: 0
PAINLEVEL_OUTOF10: 0

## 2022-06-01 ASSESSMENT — PAIN - FUNCTIONAL ASSESSMENT: PAIN_FUNCTIONAL_ASSESSMENT: ACTIVITIES ARE NOT PREVENTED

## 2022-06-01 NOTE — PLAN OF CARE

## 2022-06-01 NOTE — PROGRESS NOTES
TPN LAB EVALUATION  Recent Labs     05/30/22  0136 05/30/22  0136 06/01/22  0434      < > 135   K 3.7   < > 3.0*      < > 100   CALCIUM 8.1*   < > 7.9*   MG  --   --  1.4*   PHOS  --   --  2.1*   GLUCOSE 99  --  130*    < > = values in this interval not displayed. Pharmacy to dose TPN per Dr. Daysi Armendariz Day # 2    Indication: severe malnutrition    Goal Per Nutrition Recommendations/Plan:   1. Recommend initiating PN to d/t continued inadequate oral intake and malnutrition  2. PN recommendation: Clinimix 5/15 @ 60 ml/hr which will provide ~1308 kcal (average lipid and non lipid days) and 72 g protein    Central line: Insert PICC ordered for 5/31/22    Diet: NPO    Maintenance Fluids: NS @ 75mL/hr     Glucose:  GLUCOSE LEVELS LAST 72 HOURS                  (last 7 readings)    Recent Labs     05/30/22  0136 05/30/22  0338 05/31/22  1824 05/31/22 2019 05/31/22  2248 06/01/22  0036 06/01/22  0310 06/01/22  0434 06/01/22  0655 06/01/22  1158   POCGLU  --    < > 103* 203* 184* 186* 126*  --  121* 143*   GLUCOSE 99  --   --   --   --   --   --  130*  --   --     < > = values in this interval not displayed.       Goal <180    3 readings above goal, 3 units SSI in past 24 hours   No units of insulin in TPN     TRIGLYCERIDES:  Triglycerides   Date Value Ref Range Status   06/01/2022 161 (H) <150 MG/DL Final   05/02/2019 152 (H) <150 MG/DL Final   02/09/2017 413 (H) <150 MG/DL Final     Triglycerides:   Goal < 400    TG @ 161 on 6/1/22   Patient is not currently receiving propofol   Continue standard lipid replacement at this time     LIVER FUNCTION LAB EVALUATION  Recent Labs     06/01/22  0434   AST 14*   ALT 10   ALKPHOS 34*   BILITOT 0.3     Hepatic Function:   AST/ ALT wnl   Bititotal < 3.0, Trace elements added to TPN    Renal Function and Electrolytes:   K - low, KCL 40mEq IVPB today   Mg - low, Magnesium 2g IVPB today   Phos - low, will add to TPN   Renal function stable at this time    Formulation: continue on TPN 5/15, increasing to goal rate of 60 ml/hr. Will add custom electrolytes to TPN. Component Order Dose Admin Dose   CLINIMIX 5/15 5 % Soln 2,000 mLs 2,000 mL   potassium phosphate 15 MMOLE/5ML Soln 30 mmol 30 mmol   potassium chloride 2 MEQ/ML Soln 20 mEq 20 mEq   magnesium sulfate 50 % Soln 1,000 mg 1,000 mg   adult multi-vitamin with vitamin k Inj 10 mLs 10 mL   trace minerals Cu-Mn-Se-Zn 785-06- MCG/ML Soln 1 mL 1 mL         Pharmacy will continue to follow and adjust as appropriate.    Thank you for the consult,    Lorraine Rossi RPh  6/1/2022  1:14 PM

## 2022-06-01 NOTE — PROGRESS NOTES
ONCOLOGY HEMATOLOGY CARE ([facility])  PROGRESS NOTE      Patient was seen and examined today. Not in any acute distress and no overnight events. 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 MRI pelvis showed complex ovarian cyst without internal enhancement to suggest solid mass. Radiologist recommend f/u imaging with CT or MRI in 6 months. She is s/p surgery on 5/27/22. Less nausea today. Comfortably lying in bed. No new complaints. PHYSICAL EXAM    Vitals: BP (!) 143/62   Pulse (!) 116   Temp 98.5 °F (36.9 °C) (Oral)   Resp 16   Ht 5' (1.524 m)   Wt 128 lb 14.4 oz (58.5 kg)   SpO2 96%   BMI 25.17 kg/m²   CONSTITUTIONAL: awake, alert, cooperative, no apparent distress   EYES: pupils equal, round and reactive to light, sclera clear and conjunctiva normal  ENT: Normocephalic, without obvious abnormality, atraumatic  NECK: supple, symmetrical, no jugular venous distension and no carotid bruits   HEMATOLOGIC/LYMPHATIC: no cervical, supraclavicular or axillary lymphadenopathy   LUNGS: VBS, no wheezes, no crackles, no rhonchi, no increased work of breathing and clear to auscultation   CARDIOVASCULAR: regular rate and rhythm, normal S1 and S2, no murmur noted  ABDOMEN: surgical wound on dressing, mild tenderness to palpation,   MUSCULOSKELETAL: full range of motion noted, tone is normal  NEUROLOGIC: awake, alert, oriented to name, place and time. Motor skills grossly intact. SKIN: Normal skin color, texture, turgor and no jaundice. Skin appears intact   EXTREMITIES: no LE edema, no leg swelling, no clubbing, no cyanosis,      LABORATORY RESULTS  CBC:   Recent Labs     05/30/22  0136   WBC 10.0   HGB 10.8*        BMP:    Recent Labs     05/30/22  0136      K 3.7      CO2 25   BUN 12   CREATININE 0.6   GLUCOSE 99     Hepatic: No results for input(s): AST, ALT, ALB, BILITOT, ALKPHOS in the last 72 hours.   INR: No results for input(s): INR in the last 72 hours. ASSESSMENT  Cecal adenocarcinoma  Right adnexa complex cystic lesion     RECOMMENDATION  Reviewed pathology from cecal mass and MRI pelvis result. Will consider repeat CT or MRI in 6 months for ovarian complex cyst.     Will follow up with pathology from surgical specimen. Based on her pathologic staging, we will determine if she will need adjuvant therapy. Still has some nausea this morning. On ice chips/popcicles now. Awaiting return of bowel function.      We will follow the patient. Thank you We will continue to follow the patient. Thank you.

## 2022-06-01 NOTE — CONSULTS
Endocrinology   Consult Note  5/22/2022  2:15 AM     Primary Care provider: Phil Christopher DO     Referring physician:  Jeffrey Varghese DO     Dear Doctor Andrea Haynes    Thank You for the Consult     Pt. Was Admitted for : Admitted on 5/22/2022 for cecal mass    Reason for Consult:  Hypoglycemia //Blood glucose control   Pt is on TPN     History Obtained From:  Patient/ EMR       HISTORY OF PRESENT ILLNESS:                The patient is a 80 y.o. female with significant past medical history of diabetes mellitus, peripheral neuropathy obstructive sleep apnea, hypertension, hyperlipidemia hypothyroidism vitamin B12 deficiency initially admitted for cecal mass CKD, diverticulosis, admitted to hospital for right cecal mass. Patient underwent right hemicolectomy using laparoscopic robotic technique. Patient developed hypoglycemia. Her blood glucose dropped to around 60 or less after being given glucagon injection decided IV dextrose infusion. I was  consulted for better control of blood glucose. ROS:   Pt's ROS done in detail. Abnormal ROS are noted in Medical and Surgical History Section below: Other Medical History:        Diagnosis Date    Diabetes mellitus ([facility] [address]     H/O cardiac catheterization 05/18/2021    no significant CAD in main vessels, has severe stenosis in small  branch diagonal vessel    H/O cardiovascular stress test 3/22/18,03/30/2017    ECG portion of the stress test is negative for ischemia EF >70% Normal stress myocardial perfusion.  H/O cardiovascular stress test 04/07/2021    abnormal stress    H/O Doppler ultrasound (Abdomimal Aorta) 03/29/2017    No evidence of abdominal aortic aneurysm.  H/O echocardiogram 07/02/2014    Normal left ventricular size, wall motion and systolic function. Mildle elevated pulmonary artery systolic pressure. Mild MR and TR and trace AR EF 55 to 60%    Hx of echocardiogram 03/29/2017    EF 55-60%. Grade I diastolic dysfunction.   Mildly dilated left atrium. No evidence of pericardial effusion.  Hyperlipidemia     Hypertension     Sleep apnea     Thyroid disease      Surgical History:        Procedure Laterality Date    BREAST BIOPSY Right     approx age 76, benign    CATARACT REMOVAL      CHOLECYSTECTOMY      COLONOSCOPY N/A 5/25/2022    COLONOSCOPY POLYPECTOMY SNARE/COLD BIOPSY performed by Noemi Campos MD at [facility] N/A 5/27/2022    BOWEL RESECTION RIGHT HEMICOLECTOMY LAPAROSCOPIC ROBOTIC XI performed by Maylin Caraballo MD at [place] [address].      eye lift       Allergies:  Lopid [gemfibrozil], Bystolic [nebivolol hcl], Cefdinir, Coreg [carvedilol], Crestor [rosuvastatin], Fenofibrate, Glucophage [metformin], Hydrochlorothiazide, Metronidazole, Norvasc [amlodipine besylate], Tribenzor [olmesartan-amlodipine-hctz], and Zocor [simvastatin]    Family History:       Problem Relation Age of Onset    Pacemaker Sister     Arrhythmia Sister     Breast Cancer Sister         pre menopausal    Ovarian Cancer Neg Hx      REVIEW OF SYSTEMS:  Review of System Done as noted above     PHYSICAL EXAM:      Vitals:    BP (!) 145/78   Pulse (!) 105   Temp 97.7 °F (36.5 °C) (Oral)   Resp 16   Ht 5' (1.524 m)   Wt 153 lb (69.4 kg)   SpO2 98%   BMI 29.88 kg/m²     CONSTITUTIONAL:  awake, alert, cooperative, appears stated age   EYES:  vision intact Fundoscopic Exam not performed   ENT:Normal  NECK:  Supple, No JVD. Thyroid Exam:Normal   LUNGS:  Has Vesicular Breath Sounds,   CARDIOVASCULAR:  Normal apical impulse, regular rate and rhythm, normal S1 and S2, no S3 or S4, and has no  murmur   ABDOMEN:  No scars, normal bowel sounds, soft, non-distended, non-tender, no masses palpated, no hepatolienomegaly  Musculoskeletal: Normal  Extremities: Normal, peripheral pulses normal, , has no edema   NEUROLOGIC:  Awake, alert, oriented to name, place and time.   Cranial nerves II-XII are grossly intact. Motor is  intact. Sensory is intact. ,  and gait is normal.    DATA:    CBC:   Recent Labs     05/30/22  0136 06/01/22  0434   WBC 10.0 5.5   HGB 10.8* 10.8*    214    CMP:  Recent Labs     05/30/22  0136 06/01/22  0434    135   K 3.7 3.0*    100   CO2 25 25   BUN 12 14   CREATININE 0.6 0.5*   CALCIUM 8.1* 7.9*   PROT  --  4.6*   LABALBU  --  2.7*   BILITOT  --  0.3   ALKPHOS  --  34*   AST  --  14*   ALT  --  10     Lipids:   Lab Results   Component Value Date    CHOL 138 05/02/2019    HDL 54 05/02/2019    TRIG 161 06/01/2022     Glucose:   Recent Labs     05/31/22  2248 06/01/22  0036 06/01/22  0310   POCGLU 184* 186* 126*     Hemoglobin A1C:   Lab Results   Component Value Date    LABA1C 5.9 05/22/2022     Free T4:   Lab Results   Component Value Date    T4FREE 1.15 12/06/2017     Free T3:   Lab Results   Component Value Date    FT3 2.3 12/06/2017     TSH High Sensitivity:   Lab Results   Component Value Date    TSHHS 2.260 12/06/2017       XR ABDOMEN FOR NG/OG/NE TUBE PLACEMENT   Final Result   The NG tube is in good position. Small bowel distension has significantly improved from 05/29/2022. Mild patchy left basilar airspace disease, possibly atelectasis. XR ABDOMEN FOR NG/OG/NE TUBE PLACEMENT   Final Result   Tip of gastric tube within the stomach. MRI PELVIS W WO CONTRAST   Final Result   1. Cystic lesion in the right adnexa appears unchanged from prior CT. No   internal enhancement is identified to suggest a solid mass. Overall,   findings are favored to represent a complex ovarian cyst over abscess for   which follow-up imaging with CT or MRI in 6 months is recommended. 2. 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 US PELVIS COMPLETE   Final Result   1. 2.7 cm complex hypoechoic lesion in the right adnexa/ovary correlating to   recent CT.   Differential considerations include a complex cystic ovarian   lesion, tubo-ovarian abscess or pericolonic abscess given adjacent colon. Recommend a short-term follow-up to ensure complete resolution. If findings   persist, a follow-up MRI may be warranted. 2. Nonvisualization of the left ovary or endometrium. 3. Probable 2.7 cm intramural fibroid. US DUP ABD PEL RETRO SCROT COMPLETE   Final Result   1. 2.7 cm complex hypoechoic lesion in the right adnexa/ovary correlating to   recent CT. Differential considerations include a complex cystic ovarian   lesion, tubo-ovarian abscess or pericolonic abscess given adjacent colon. Recommend a short-term follow-up to ensure complete resolution. If findings   persist, a follow-up MRI may be warranted. 2. Nonvisualization of the left ovary or endometrium. 3. Probable 2.7 cm intramural fibroid.                 Scheduled Medicines   Medications:    potassium chloride  40 mEq IntraVENous Once    fat emulsion  250 mL IntraVENous Once per day on Mon Tue Thu Fri    lidocaine PF  5 mL IntraDERmal Once    sodium chloride flush  5-40 mL IntraVENous 2 times per day    oxymetazoline  2 spray Each Nostril Once    lidocaine   Topical Once    enoxaparin  40 mg SubCUTAneous Daily    sodium chloride  500 mL IntraVENous Once    atorvastatin  20 mg Oral Daily    Vitamin D  1,000 Units Oral Daily    cetirizine  10 mg Oral Daily    levothyroxine  137 mcg Oral Daily    losartan  100 mg Oral Daily    metoprolol tartrate  150 mg Oral Daily    metoprolol tartrate  100 mg Oral Nightly    oxybutynin  5 mg Oral BID    rOPINIRole  3 mg Oral Nightly    vitamin B-12  100 mcg Oral Daily    insulin lispro  0-6 Units SubCUTAneous Q4H    sodium chloride flush  5-40 mL IntraVENous 2 times per day      Infusions:    PN-Adult Premix 5/15 - Central 30 mL/hr at 05/31/22 1809    sodium chloride      dextrose Stopped (05/31/22 1905)    sodium chloride Stopped (05/25/22 1247)    sodium chloride 75 mL/hr at 05/31/22 7274 IMPRESSION    Patient Active Problem List   Diagnosis    Long-term insulin use in type 2 diabetes ([facility] [address]    Essential hypertension    Dyslipidemia    Abnormal EKG    Abnormal stress test    Cecum mass    Severe malnutrition ([facility] [address]        right hemicolectomy with bowel resection on 5/27/2022       Carcinoma of colon/cecum      RECOMMENDATIONS:      1. Reviewed POC blood glucose . Labs and X ray results   2. Reviewed Home and Current Medicines   3. Will Start On  Correction bolus Humalog Insulin regime   4. Monitor Blood glucose frequently   5. Modify  the dose of Insulin  as needed        Will follow with you  Again thank you for sharing pt's care with me.      Truly yours,       Ladonna Pichardo MD

## 2022-06-01 NOTE — PROGRESS NOTES
INTERNAL MED PROGRESS NOTE           Subjective:     She has been having nausea and vomiting a few days ago  NG was placed on May 29    Her blood sugar was low this afternoon  After that TPN was started      Assessment/Plan:       --Cecal adenocarcinoma: POD #2 s/p laparoscopic robotic right hemicolectomy on 5/27/2022  -Postop management per Dr. Estuardo Barton     -- Nausea and vomiting: NG tube placed in hopes to LIWS per surgery's recommendation. -- Nutrition-TPN started on May 31    --Invasive moderately differentiated adenocarcinoma of the colon: Noted on pathology report. Being followed by oncology. --DM type II: Continue subcu insulin regimen including long-acting and short acting insulin as ordered, continue SSI protocol for more adequate glycemic control.  -May 31- Lantus 15 units at bedtime on MAR, but has not been receiving it at all. Discontinued Lantus.    --HTN, controlled, cont home meds as ordered     --HLD: Continue current statin therapy        DVT prophylaxis: Heparin/Lovenox      Massiel Putnam MD  5/31/2022 @ 10:51 PM           Objective: Intake/Output Summary (Last 24 hours) at 5/31/2022 2251  Last data filed at 5/31/2022 1829  Gross per 24 hour   Intake --   Output 2730 ml   Net -2730 ml        Vitals:   Vitals:    05/31/22 2143   BP: (!) 142/61   Pulse: (!) 105   Resp: 16   Temp: 98.5 °F (36.9 °C)   SpO2: 98%       Physical Exam:          Physical Exam  Constitutional:       Appearance: She is not diaphoretic. Eyes:      General:         Right eye: No discharge. Left eye: No discharge. Pulmonary:      Effort: Pulmonary effort is normal.   Skin:     Coloration: Skin is not jaundiced. Neurological:      Cranial Nerves: No cranial nerve deficit.                    Labs:   Reviewed, as follows:  Recent Results (from the past 24 hour(s))   POCT Glucose    Collection Time: 05/31/22 12:35 AM   Result Value Ref Range    POC Glucose 79 70 - 99 MG/DL   POCT Glucose    Collection Time: 05/31/22  4:14 AM   Result Value Ref Range    POC Glucose 122 (H) 70 - 99 MG/DL   POCT Glucose    Collection Time: 05/31/22 10:27 AM   Result Value Ref Range    POC Glucose 85 70 - 99 MG/DL   POCT Glucose    Collection Time: 05/31/22  4:56 PM   Result Value Ref Range    POC Glucose 68 (L) 70 - 99 MG/DL   POCT Glucose    Collection Time: 05/31/22  5:50 PM   Result Value Ref Range    POC Glucose 66 (L) 70 - 99 MG/DL   POCT Glucose    Collection Time: 05/31/22  6:24 PM   Result Value Ref Range    POC Glucose 103 (H) 70 - 99 MG/DL   POCT Glucose    Collection Time: 05/31/22  8:19 PM   Result Value Ref Range    POC Glucose 203 (H) 70 - 99 MG/DL          Body mass index is 25.17 kg/m².  Patient will follow up with PCP for further management as indicated unless otherwise specifically noted above        Coretta Blanco MD  5/31/2022 @ 10:51 PM

## 2022-06-01 NOTE — PROGRESS NOTES
GENERAL SURGERY PROGRESS NOTE    Philly Leiva is a 80 y.o. female 5 Days Post-Op  from robotic assisted right hemicolectomy for ileocecal mass. Subjective:  NG in place with thin bilious output, has been taking some ice chips. Pain improving. No BM, states has started to feel some movement and is having flatus. Mobilizing some.      NG output 1700ml/24h  CLAUDIO output 300ml/24h    Objective:    Vitals: VITALS:  BP (!) 145/78   Pulse (!) 105   Temp 97.7 °F (36.5 °C) (Oral)   Resp 16   Ht 5' (1.524 m)   Wt 153 lb (69.4 kg)   SpO2 98%   BMI 29.88 kg/m²     I/O: 05/31 0701 - 06/01 0700  In: -   Out: 3050 [Urine:1050; Drains:300]    Labs/Imaging Results:   Lab Results   Component Value Date     06/01/2022    K 3.0 06/01/2022    K 4.4 03/15/2018     06/01/2022    CO2 25 06/01/2022    BUN 14 06/01/2022    CREATININE 0.5 06/01/2022    GLUCOSE 130 06/01/2022    CALCIUM 7.9 06/01/2022      Lab Results   Component Value Date    WBC 5.5 06/01/2022    HGB 10.8 (L) 06/01/2022    HCT 33.8 (L) 06/01/2022    MCV 90.1 06/01/2022     06/01/2022         IV Fluids: PN-Adult Premix 5/15 - Central Last Rate: 30 mL/hr at 05/31/22 1809    sodium chloride    dextrose Last Rate: Stopped (05/31/22 1905)    sodium chloride Last Rate: Stopped (05/25/22 1247)    sodium chloride Last Rate: 75 mL/hr at 05/31/22 2340    Scheduled Meds:   potassium chloride, 10 mEq, IntraVENous, Once **FOLLOWED BY** potassium chloride, 10 mEq, IntraVENous, Once **FOLLOWED BY** potassium chloride, 10 mEq, IntraVENous, Once    magnesium sulfate, 2,000 mg, IntraVENous, Once    fat emulsion, 250 mL, IntraVENous, Once per day on Mon Tue Thu Fri    lidocaine PF, 5 mL, IntraDERmal, Once    sodium chloride flush, 5-40 mL, IntraVENous, 2 times per day    lidocaine, , Topical, Once    enoxaparin, 40 mg, SubCUTAneous, Daily    sodium chloride, 500 mL, IntraVENous, Once    atorvastatin, 20 mg, Oral, Daily    Vitamin D, 1,000 Units, Oral, Daily    cetirizine, 10 mg, Oral, Daily    levothyroxine, 137 mcg, Oral, Daily    losartan, 100 mg, Oral, Daily    metoprolol tartrate, 150 mg, Oral, Daily    metoprolol tartrate, 100 mg, Oral, Nightly    oxybutynin, 5 mg, Oral, BID    rOPINIRole, 3 mg, Oral, Nightly    vitamin B-12, 100 mcg, Oral, Daily    insulin lispro, 0-6 Units, SubCUTAneous, Q4H    sodium chloride flush, 5-40 mL, IntraVENous, 2 times per day    Physical Exam:  General: A&O x 3, no distress. HEENT: Anicteric sclerae, MMM. Extremities: No edema bilat LE. Abdomen: Soft, appropriately tender, mildly distended. Incisions intact with staples    NG- 1700cc bilious  CLAUDIO 300cc serous    Assessment and Plan:  80 y.o. female s/p robotic assisted right colectomy. Doing ok. Fatigued. Patient Active Problem List:     Long-term insulin use in type 2 diabetes ([facility] [address]     Essential hypertension     Dyslipidemia     Abnormal EKG     Abnormal stress test     Cecum mass      - Discussed findings and options with Annette Michel.    - Will clamp her NG tube for 6 hours, if tolerated then will discontinue and start clears  - Cough drops/chloraseptic spray prn  - out of bed, IS, ambulate as able  - await return of bowel function  - pathology pending    Curt Power MD

## 2022-06-01 NOTE — PROGRESS NOTES
ONCOLOGY HEMATOLOGY CARE ([facility])  PROGRESS NOTE      Patient was seen and examined today. Not in any acute distress and no overnight events. 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 MRI pelvis showed complex ovarian cyst without internal enhancement to suggest solid mass. Radiologist recommend f/u imaging with CT or MRI in 6 months. She is s/p surgery on 5/27/22. Less nausea today and started passing gas. Comfortably lying in bed. No new complaints. PHYSICAL EXAM    Vitals: BP (!) 145/78   Pulse (!) 105   Temp 97.7 °F (36.5 °C) (Oral)   Resp 16   Ht 5' (1.524 m)   Wt 153 lb (69.4 kg)   SpO2 98%   BMI 29.88 kg/m²   CONSTITUTIONAL: awake, alert, cooperative, no apparent distress   EYES: pupils equal, round and reactive to light, sclera clear and conjunctiva normal  ENT: Normocephalic, without obvious abnormality, atraumatic  NECK: supple, symmetrical, no jugular venous distension and no carotid bruits   HEMATOLOGIC/LYMPHATIC: no cervical, supraclavicular or axillary lymphadenopathy   LUNGS: VBS, no wheezes, no crackles, no rhonchi, no increased work of breathing and clear to auscultation   CARDIOVASCULAR: regular rate and rhythm, normal S1 and S2, no murmur noted  ABDOMEN: surgical wound on dressing, mild tenderness to palpation,   MUSCULOSKELETAL: full range of motion noted, tone is normal  NEUROLOGIC: awake, alert, oriented to name, place and time. Motor skills grossly intact. SKIN: Normal skin color, texture, turgor and no jaundice.  Skin appears intact   EXTREMITIES: no LE edema, no cyanosis, no leg swelling, no clubbing,      LABORATORY RESULTS  CBC:   Recent Labs     05/30/22  0136 06/01/22 0434   WBC 10.0 5.5   HGB 10.8* 10.8*    214     BMP:    Recent Labs     05/30/22 0136 06/01/22 0434    135   K 3.7 3.0*    100   CO2 25 25   BUN 12 14   CREATININE 0.6 0.5*   GLUCOSE 99 130*     Hepatic:   Recent Labs     06/01/22 0434 AST 14*   ALT 10   BILITOT 0.3   ALKPHOS 34*     INR: No results for input(s): INR in the last 72 hours. ASSESSMENT  Cecal adenocarcinoma  Right adnexa complex cystic lesion     RECOMMENDATION  Reviewed pathology from cecal mass and MRI pelvis result. Will consider repeat CT or MRI in 6 months for ovarian complex cyst.     Will follow up with pathology from surgical specimen. Based on her pathologic staging, we will determine if she will need adjuvant therapy. Less nausea and passing gas now. Encouraged ambulation. Potassium today was 3, Hb 10.8 and Cr 0.5. We will follow the patient. Thank you We will continue to follow the patient. Thank you.

## 2022-06-01 NOTE — PROGRESS NOTES
req min verbal/tactile cues for upright posture to self correct     Functional Mobility: 6' to recliner utilizing RW + verbal cues for hand/walker placement for safety awareness carryover. Activities performed today included bed mobility training, transfers, functional mobility  to increase strength, activity tolerance to facilitate IND c ADL tasks, func transfers / mobility with G safety awareness carryover    Therapeutic Exercise:  BUE strengthening ex targeting utilizing yellow theraband shoulder press, chest press, shoulder abd/add, shoulder horiz abd/add, bicep/triceps curls x10 reps. Demo SBA  +verbal/visual cues for pace and corrected techs. All therapeutic intervention performed c emphasis on BUE/BLE strengthening and endurance to  increase strength for functional tasks / transfers. Assessment / Impression:    Patient's tolerance of treatment: well  Adverse Reaction: none  Significant change in status and impact:  none  Barriers to improvement: strength, standing balance and activity tolerance    Safety  Patient educated on role of OT , benefits of OT and rationale for therapeutic intervention. Patient safely in recliner + alarm set at end of session, with call light/phone in reach, and nursing aware. Gait belt was used for func transfers / mobility.     Plan for Next Session:    Continue OT POC    Time in:  2171  Time out:  1351  Timed treatment minutes:  38  Total treatment time:  38      Electronically signed by:    KHURRAM Doe,   6/1/2022, 1:21 PM

## 2022-06-01 NOTE — CONSULTS
[address] [place] [address], 1934, 3010/3010-A, 6/1/2022    History  Augustine:  Diagnoses of Abnormal finding on imaging and Cecal cancer ([facility] [address] were pertinent to this visit. Patient  has a past medical history of Diabetes mellitus ([facility] [address] H/O cardiac catheterization, H/O cardiovascular stress test, H/O cardiovascular stress test, H/O Doppler ultrasound (Abdomimal Aorta), H/O echocardiogram, Hx of echocardiogram, Hyperlipidemia, Hypertension, Sleep apnea, and Thyroid disease. Patient  has a past surgical history that includes Cholecystectomy; Dilation and curettage of uterus; Cataract removal; other surgical history; Breast biopsy (Right); Colonoscopy (N/A, 5/25/2022); and hemicolectomy (N/A, 5/27/2022). Subjective:  Patient states:  \"I have a little energy left. \" (pt had just worked with [employer])    Pain:  Denies pain at rest.    Communication with other providers:  Handoff to RN  Restrictions: abdominal precautions, fall risk, lara, CLAUDIO drain, NG tube    Home Setup/Prior level of function  Social/Functional History  Lives With: Spouse  Type of Home: House  Home Layout: One level  Home Access: Stairs to enter with rails  Entrance Stairs - Number of Steps: 2 to porch, 1 into house  Entrance Stairs - Rails: Both  Bathroom Shower/Tub: Walk-in shower  Bathroom Toilet: Standard  Bathroom Equipment: Built-in shower seat  Bathroom Accessibility: Accessible  Home Equipment: Grab bars (does not use)  Has the patient had two or more falls in the past year or any fall with injury in the past year?: Yes (Getting out of reclining chair at night)  ADL Assistance: [facility]: Independent  Homemaking Responsibilities: Yes  Ambulation Assistance: Independent  Transfer Assistance: Independent  Active : Yes    Examination of body systems (includes body structures/functions, activity/participation limitations):  · Observation:  Pt up in upon arrival and agreeable to therapy  · Vision:  Wears glasses  · Hearing:  [facility]  · Cardiopulmonary:  No O2 needs  · Cognition: WFL, see OT/SLP note for further evaluation. Musculoskeletal  · ROM R/L:  WFL. · Strength R/L:  4/5, moderate impairment in function and endurance. · Neuro:  Impaired sensation in L hand/wrist secondary to carpal tunnel      Mobility:  · Rolling L/R:  NT, pt up in chair at beginning and end of session  · Supine to sit:  NT, pt up in chair at beginning and end of session  · Transfers: Pt attempted STS without assist from chair but unable to complete. Pt completed STS to/from chair mod A with cues for hand placement. 3 count utilized for initiation  · Sitting balance:  good. · Standing balance:  fair. · Gait: pt ambulated 8' with RW CGA with decreased amisha, slight forward flexed posture, and decreased step length bilaterally. Cues provided for standing posture and walker management. Chair follow provided for safety    [facility] 6 Clicks Inpatient Mobility:  AM-PAC Inpatient Mobility Raw Score : 14    Safety: patient left in chair with family in room, call light within reach, RN notified, gait belt used. Assessment:  Pt is an 80 y.o. female admitted to the hospital for a cecum mass. Pt underwent a robotic assisted right hemicolectomy for ileocecal mass on 5/27/2022. Pt is currently performing transfers mod A and ambulating 8' with RW CGA. Pt is presenting with decreased endurance, impaired balance, impaired strength, impaired transfers, impaired gait. Pt would benefit from continued acute care PT as well as SNF placement upon discharge to continue to address impairments. Complexity: moderate    Prognosis: Good, no significant barriers to participation at this time.      Plan: 3-5 times per week (4+)/week     Equipment: TBD at next level of care    Goals:  Short Term Goals  Time Frame for Short term goals: 1 week  Short term goal 1: Pt to complete all bed mobility min A  Short term goal 2: Pt to complete all STS transfers to/from bed, commode, and chair min A  Short term goal 3: Pt to ambulate 22' with LRAD CGA       Treatment plan:  Bed mobility, transfers, balance, gait, TA, TX    Recommendations for NURSING mobility: amb with gait belt and RW    Time:   Time in: 1354  Time out: 1410  Timed treatment minutes: 8  Total time: 16  Limited eval due to OT already having obtained PLOF/home set up    Electronically signed by:    Grady Elizalde PT  6/1/2022, 2:09 PM

## 2022-06-01 NOTE — PROGRESS NOTES
Comprehensive Nutrition Assessment    Type and Reason for Visit:  Reassess    Nutrition Recommendations/Plan:   1. Continue TPN as ordered. 2. Advance to PO diet as medically appropriate/per surgery      Malnutrition Assessment:  Malnutrition Status:  Severe malnutrition (05/31/22 0931)    Context:  Acute Illness     Findings of the 6 clinical characteristics of malnutrition:  Energy Intake:  50% or less of estimated energy requirements for 5 or more days  Weight Loss:  Greater than 2% over 1 week     Body Fat Loss:  Unable to assess     Muscle Mass Loss:  Unable to assess    Fluid Accumulation:  No significant fluid accumulation     Strength:  Not Performed    Nutrition Assessment:    Pt started on TPN, advancing to goal rate later today per review of medication orders. Pt with NGT to LIWS, noted plan for trial clamping. If successful, will trial clear liquids. RD will continue to follow at high nutrition risk. Nutrition Related Findings:    NGT with 1700 mL output yesterday Wound Type: Surgical Incision       Current Nutrition Intake & Therapies:    Average Meal Intake: NPO  Average Supplements Intake: NPO  Diet NPO Exceptions are: Sips of Water with Meds, Popsicles, Ice Chips  PN-Adult Premix 5/15 - Central  PN-Adult Premix 5/15 - Central  Current Parenteral Nutrition Orders:  · Type and Formula: Premix Central (5/15)   · Lipids: 250ml (4x/wk)  · Duration: Continuous  · Rate/Volume: 60/1440  · Current PN Order Provides: average of 1308 kcal and 72 grams of protein daily      Anthropometric Measures:  Height: 5' (152.4 cm)  Ideal Body Weight (IBW): 100 lbs (45 kg)    Admission Body Weight: 151 lb 10.8 oz (68.8 kg) (first meausred weight)  Current Body Weight: 128 lb 15.5 oz (58.5 kg), 129 % IBW. Weight Source: Bed Scale  Current BMI (kg/m2): 25.2        Weight Adjustment For: No Adjustment                 BMI Categories: Overweight (BMI 25.0-29. 9)    Estimated Daily Nutrient Needs:  Energy Requirements Based On: Kcal/kg  Weight Used for Energy Requirements: Current  Energy (kcal/day): 6533-1843 (24-27 kcal/kg)  Weight Used for Protein Requirements: Current  Protein (g/day): 64-76 (1.1-1.3 g/kg)    Nutrition Diagnosis:   · Severe malnutrition,In context of acute illness or injury related to inadequate protein-energy intake as evidenced by weight loss greater than or equal to 2% in 1 week (pt meeting less than 50% of estimated energy needs for greater than 5 days)      Nutrition Interventions:   Food and/or Nutrient Delivery: Continue Current Parenteral Nutrition,Start Oral Diet  Nutrition Education/Counseling: No recommendation at this time  Coordination of Nutrition Care: Continue to monitor while inpatient  Plan of Care discussed with: perfectserved surgery regarding nutrition plan    Goals:     Goals: Tolerate nutrition support at goal rate       Nutrition Monitoring and Evaluation:   Behavioral-Environmental Outcomes: None Identified  Food/Nutrient Intake Outcomes: Diet Advancement/Tolerance,Parenteral Nutrition Intake/Tolerance  Physical Signs/Symptoms Outcomes: Biochemical Data,GI Status,Hemodynamic Status,Fluid Status or Edema,Weight,Skin    Discharge Planning:     Too soon to determine     Mario Grace RD, AYDEE  Contact: [phone]

## 2022-06-02 LAB
ANION GAP SERPL CALCULATED.3IONS-SCNC: 9 MMOL/L (ref 4–16)
BUN BLDV-MCNC: 10 MG/DL (ref 6–23)
CALCIUM SERPL-MCNC: 8 MG/DL (ref 8.3–10.6)
CHLORIDE BLD-SCNC: 100 MMOL/L (ref 99–110)
CO2: 24 MMOL/L (ref 21–32)
CREAT SERPL-MCNC: 0.4 MG/DL (ref 0.6–1.1)
GFR AFRICAN AMERICAN: >60 ML/MIN/1.73M2
GFR NON-AFRICAN AMERICAN: >60 ML/MIN/1.73M2
GLUCOSE BLD-MCNC: 171 MG/DL (ref 70–99)
GLUCOSE BLD-MCNC: 173 MG/DL (ref 70–99)
GLUCOSE BLD-MCNC: 181 MG/DL (ref 70–99)
GLUCOSE BLD-MCNC: 181 MG/DL (ref 70–99)
GLUCOSE BLD-MCNC: 199 MG/DL (ref 70–99)
GLUCOSE BLD-MCNC: 200 MG/DL (ref 70–99)
GLUCOSE BLD-MCNC: 225 MG/DL (ref 70–99)
MAGNESIUM: 1.4 MG/DL (ref 1.8–2.4)
PHOSPHORUS: 2 MG/DL (ref 2.5–4.9)
POTASSIUM SERPL-SCNC: 3.3 MMOL/L (ref 3.5–5.1)
REASON FOR REJECTION: NORMAL
REJECTED TEST: NORMAL
SODIUM BLD-SCNC: 133 MMOL/L (ref 135–145)

## 2022-06-02 PROCEDURE — 2580000003 HC RX 258: Performed by: SURGERY

## 2022-06-02 PROCEDURE — 6370000000 HC RX 637 (ALT 250 FOR IP): Performed by: NURSE PRACTITIONER

## 2022-06-02 PROCEDURE — 1200000000 HC SEMI PRIVATE

## 2022-06-02 PROCEDURE — 84100 ASSAY OF PHOSPHORUS: CPT

## 2022-06-02 PROCEDURE — 83735 ASSAY OF MAGNESIUM: CPT

## 2022-06-02 PROCEDURE — 97530 THERAPEUTIC ACTIVITIES: CPT

## 2022-06-02 PROCEDURE — 2500000003 HC RX 250 WO HCPCS: Performed by: SURGERY

## 2022-06-02 PROCEDURE — 80048 BASIC METABOLIC PNL TOTAL CA: CPT

## 2022-06-02 PROCEDURE — 94761 N-INVAS EAR/PLS OXIMETRY MLT: CPT

## 2022-06-02 PROCEDURE — 97535 SELF CARE MNGMENT TRAINING: CPT

## 2022-06-02 PROCEDURE — 6370000000 HC RX 637 (ALT 250 FOR IP): Performed by: SURGERY

## 2022-06-02 PROCEDURE — 6370000000 HC RX 637 (ALT 250 FOR IP): Performed by: INTERNAL MEDICINE

## 2022-06-02 PROCEDURE — 6360000002 HC RX W HCPCS: Performed by: INTERNAL MEDICINE

## 2022-06-02 PROCEDURE — 97116 GAIT TRAINING THERAPY: CPT

## 2022-06-02 PROCEDURE — 6360000002 HC RX W HCPCS: Performed by: SURGERY

## 2022-06-02 PROCEDURE — 36592 COLLECT BLOOD FROM PICC: CPT

## 2022-06-02 PROCEDURE — 99232 SBSQ HOSP IP/OBS MODERATE 35: CPT | Performed by: INTERNAL MEDICINE

## 2022-06-02 PROCEDURE — 82962 GLUCOSE BLOOD TEST: CPT

## 2022-06-02 RX ORDER — MAGNESIUM SULFATE IN WATER 40 MG/ML
2000 INJECTION, SOLUTION INTRAVENOUS ONCE
Status: COMPLETED | OUTPATIENT
Start: 2022-06-02 | End: 2022-06-02

## 2022-06-02 RX ORDER — POTASSIUM CHLORIDE 20 MEQ/1
20 TABLET, EXTENDED RELEASE ORAL ONCE
Status: DISCONTINUED | OUTPATIENT
Start: 2022-06-02 | End: 2022-06-07

## 2022-06-02 RX ORDER — CALCIUM CARBONATE 200(500)MG
500 TABLET,CHEWABLE ORAL 3 TIMES DAILY PRN
Status: DISCONTINUED | OUTPATIENT
Start: 2022-06-02 | End: 2022-06-09 | Stop reason: HOSPADM

## 2022-06-02 RX ORDER — POTASSIUM BICARBONATE 25 MEQ/1
25 TABLET, EFFERVESCENT ORAL ONCE
Status: DISCONTINUED | OUTPATIENT
Start: 2022-06-02 | End: 2022-06-02

## 2022-06-02 RX ADMIN — SODIUM CHLORIDE: 9 INJECTION, SOLUTION INTRAVENOUS at 09:50

## 2022-06-02 RX ADMIN — Medication 1000 UNITS: at 09:32

## 2022-06-02 RX ADMIN — OXYBUTYNIN CHLORIDE 5 MG: 5 TABLET ORAL at 20:18

## 2022-06-02 RX ADMIN — INSULIN LISPRO 2 UNITS: 100 INJECTION, SOLUTION INTRAVENOUS; SUBCUTANEOUS at 04:49

## 2022-06-02 RX ADMIN — CALCIUM CARBONATE 500 MG: 500 TABLET, CHEWABLE ORAL at 01:24

## 2022-06-02 RX ADMIN — SODIUM CHLORIDE, PRESERVATIVE FREE 10 ML: 5 INJECTION INTRAVENOUS at 09:49

## 2022-06-02 RX ADMIN — METOPROLOL TARTRATE 100 MG: 50 TABLET, FILM COATED ORAL at 20:18

## 2022-06-02 RX ADMIN — SODIUM CHLORIDE, PRESERVATIVE FREE 10 ML: 5 INJECTION INTRAVENOUS at 20:18

## 2022-06-02 RX ADMIN — LOSARTAN POTASSIUM 100 MG: 100 TABLET, FILM COATED ORAL at 09:32

## 2022-06-02 RX ADMIN — ONDANSETRON 4 MG: 2 INJECTION INTRAMUSCULAR; INTRAVENOUS at 09:54

## 2022-06-02 RX ADMIN — PROMETHAZINE HYDROCHLORIDE 12.5 MG: 12.5 TABLET ORAL at 14:43

## 2022-06-02 RX ADMIN — INSULIN LISPRO 2 UNITS: 100 INJECTION, SOLUTION INTRAVENOUS; SUBCUTANEOUS at 20:18

## 2022-06-02 RX ADMIN — ATORVASTATIN CALCIUM 20 MG: 10 TABLET, FILM COATED ORAL at 09:31

## 2022-06-02 RX ADMIN — METOPROLOL TARTRATE 150 MG: 50 TABLET, FILM COATED ORAL at 09:31

## 2022-06-02 RX ADMIN — Medication 1 SPRAY: at 13:59

## 2022-06-02 RX ADMIN — MAGNESIUM SULFATE HEPTAHYDRATE 2000 MG: 2 INJECTION, SOLUTION INTRAVENOUS at 13:25

## 2022-06-02 RX ADMIN — SOYBEAN OIL 250 ML: 20 INJECTION, SOLUTION INTRAVENOUS at 18:08

## 2022-06-02 RX ADMIN — POTASSIUM CHLORIDE: 2 INJECTION, SOLUTION, CONCENTRATE INTRAVENOUS at 18:07

## 2022-06-02 RX ADMIN — OXYBUTYNIN CHLORIDE 5 MG: 5 TABLET ORAL at 09:31

## 2022-06-02 RX ADMIN — THIAMINE HYDROCHLORIDE 100 MG: 100 INJECTION, SOLUTION INTRAMUSCULAR; INTRAVENOUS at 18:14

## 2022-06-02 RX ADMIN — LEVOTHYROXINE SODIUM 137 MCG: 25 TABLET ORAL at 09:31

## 2022-06-02 RX ADMIN — ENOXAPARIN SODIUM 40 MG: 100 INJECTION SUBCUTANEOUS at 09:32

## 2022-06-02 RX ADMIN — Medication 100 MCG: at 09:31

## 2022-06-02 RX ADMIN — SODIUM CHLORIDE, PRESERVATIVE FREE 10 ML: 5 INJECTION INTRAVENOUS at 09:32

## 2022-06-02 RX ADMIN — CETIRIZINE HYDROCHLORIDE 10 MG: 10 TABLET, FILM COATED ORAL at 09:31

## 2022-06-02 RX ADMIN — ROPINIROLE HYDROCHLORIDE 3 MG: 1 TABLET, FILM COATED ORAL at 20:18

## 2022-06-02 RX ADMIN — POTASSIUM & SODIUM PHOSPHATES POWDER PACK 280-160-250 MG 250 MG: 280-160-250 PACK at 20:18

## 2022-06-02 RX ADMIN — SODIUM CHLORIDE, PRESERVATIVE FREE 10 ML: 5 INJECTION INTRAVENOUS at 20:19

## 2022-06-02 ASSESSMENT — PAIN DESCRIPTION - DESCRIPTORS: DESCRIPTORS: BURNING

## 2022-06-02 ASSESSMENT — PAIN SCALES - GENERAL
PAINLEVEL_OUTOF10: 0

## 2022-06-02 ASSESSMENT — PAIN DESCRIPTION - LOCATION: LOCATION: THROAT

## 2022-06-02 NOTE — CARE COORDINATION
Cookie with ARU will start the precert. PT declined to work with therapy today. Pt needs to work with therapy to get the precert. Precert pending for ARU.

## 2022-06-02 NOTE — PROGRESS NOTES
Physical Therapy  Name: Rhonda Jewell MRN: [id number] :   1934   Date:  2022   Admission Date: 2022 Room:  [address] [place] [address]   Restrictions/Precautions:        Abdominal precautions, Fall Risk, General Precautions, [place], CLAUDIO drain  Communication with other providers:  Cosme Renner RN called for patient medication administration. Patient reports that she stayed up all night in the recliner, afternoon till 10pm.   Subjective:  Patient states:  Patient willing to attempt therapy despite being moderately fatigued and not feeling well. Pain:   Location, Type, Intensity (0/10 to 10/10):  denies  Objective:    Observation:  Patient is upright in bed, HOB elevated. Patient experiences nausea and vomiting after a small intake of water. Transfers with line management of CLAUDIO drain, Catheter, IV  Supine to sit :HOB elevated fully, patient walks BLE around to EOB with Mod A at trunk to avoid abdominal straining. Sit to supine : Mod A x2 with cues for side lying to roll onto back to adhere to abdominal precautions  Scooting :Min A seated scoot to EOB  Sit to stand :Min A x2 from EOB and commode with cues for UE effort into transfer surface  Stand to sit :CGA x2 for safety  SPT: W/c <> commode, and w/c to EOB. Min A x2 for sequencing and hand placement     Gait:  Pt amb with RW for 6 ft with CGA assist, w/c chair follow. Gait demo's small stepping, forward flexed posture and close walker proximity. Patient is overcome by fatigue and request seated rest break, water given and patient has N/V for ~5'. RN called in to administer medication and assess the situation. Safety  Patient left safely in the bed, with call light/phone in reach with alarm applied. Gait belt and mask were used for transfers and gait. Assessment / Impression:   Despite patient not feeling medically well; patient participates with therapy this afternoon. She is open to all therapeutic activities. N/V mostly limited patient today.  Patient demo's good functionally mobility but poor medical tolerance. Recommend SNF once medically stable. Patient's tolerance of treatment:  Fair   Adverse Reaction: Nausea and vommitting  Significant change in status and impact:  none  Barriers to improvement:  Medical status  Plan for Next Session:    Will cont to work towards pt's goals per patient tolerance  Time in:  1426  Time out:  1449  Timed treatment minutes:  23  Total treatment time:  23  Previously filed items:  Social/Functional History  Lives With: Spouse  Type of Home: House  Home Layout: One level  Home Access: Stairs to enter with rails  Entrance Stairs - Number of Steps: 2 to porch, 1 into house  Entrance Stairs - Rails: Both  Bathroom Shower/Tub: Walk-in shower  Bathroom Toilet: Standard  Bathroom Equipment: Built-in shower seat  Bathroom Accessibility: Accessible  Home Equipment: Grab bars (does not use)  Has the patient had two or more falls in the past year or any fall with injury in the past year?: Yes (Getting out of reclining chair at night)  ADL Assistance: [address] [place] [address] Avenue: Independent  Homemaking Responsibilities: Yes  Ambulation Assistance: Independent  Transfer Assistance: Independent  Active : Yes  Short Term Goals  Time Frame for Short term goals: 1 week  Short term goal 1: Pt to complete all bed mobility min A  Short term goal 2: Pt to complete all STS transfers to/from bed, commode, and chair min A  Short term goal 3: Pt to ambulate 25' with LRAD CGA     Electronically signed by:     Melani Viveros PTA   6/2/2022, 3:06 PM

## 2022-06-02 NOTE — CARE COORDINATION
ARU pre-cert currently pending at this time. Pending ref #[id number]. Will continue to follow for determination.

## 2022-06-02 NOTE — PROGRESS NOTES
TPN LAB EVALUATION  Recent Labs     06/01/22  0434 06/01/22  0434 06/02/22  0940      < > 133*   K 3.0*   < > 3.3*      < > 100   CALCIUM 7.9*   < > 8.0*   MG 1.4*   < > 1.4*   PHOS 2.1*   < > 2.0*   GLUCOSE 130*  --  173*    < > = values in this interval not displayed. Pharmacy to dose TPN per Dr. Jef Gibson # 3    Indication: severe malnutrition    Goal Per Nutrition Recommendations/Plan:   1. Recommend initiating PN to d/t continued inadequate oral intake and malnutrition  2. PN recommendation: Clinimix 5/15 @ 60 ml/hr which will provide ~1308 kcal (average lipid and non lipid days) and 72 g protein    Central line: Insert PICC ordered for 5/31/22    Diet: Clear Liquid    Maintenance Fluids: NS @ 75mL/hr     Glucose:  GLUCOSE LEVELS LAST 72 HOURS                  (last 7 readings)    Recent Labs     06/01/22  0310 06/01/22  0434 06/01/22  0655 06/01/22  1158 06/01/22  1608 06/01/22 2023 06/02/22  0100 06/02/22  0442 06/02/22  0940   POCGLU   < >  --  121* 143* 142* 183* 199* 225* 171*   GLUCOSE  --  130*  --   --   --   --   --   --  173*    < > = values in this interval not displayed.       Goal <180    3 readings above goal, 3 units SSI in past 24 hours   No units of insulin in TPN     TRIGLYCERIDES:  Triglycerides   Date Value Ref Range Status   06/01/2022 161 (H) <150 MG/DL Final   05/02/2019 152 (H) <150 MG/DL Final   02/09/2017 413 (H) <150 MG/DL Final     Triglycerides:   Goal < 400    TG @ 161 on 6/1/22, at goal   Patient is not currently receiving propofol   Continue standard lipid replacement at this time     LIVER FUNCTION LAB EVALUATION  Recent Labs     06/01/22  0434   AST 14*   ALT 10   ALKPHOS 34*   BILITOT 0.3     Hepatic Function:   AST/ ALT wnl   Bititotal < 3.0, Trace elements added to TPN    Renal Function and Electrolytes:   K - low, K+ PO 25mEq today   Mg - low, Magnesium 2g IVPB today   Phos - low, Phos PO 250mg x 4 doses   Renal function stable at this time    Formulation: continue on TPN 5/15 with Custom Electrolytes at goal rate of 60 ml/hr. Possible refeeding syndrome:  Started on Thiamine 100mg IV daily x 3 days. Component Order Dose Admin Dose   CLINIMIX 5/15 5 % Soln 2,000 mLs 2,000 mL   potassium phosphate 15 MMOLE/5ML Soln 30 mmol 30 mmol   potassium chloride 2 MEQ/ML Soln 40 mEq 40 mEq   magnesium sulfate 50 % Soln 2,000 mg 2,000 mg   adult multi-vitamin with vitamin k Inj 10 mLs 10 mL   trace minerals Cu-Mn-Se-Zn 305-46- MCG/ML Soln 1 mL 1 mL       Pharmacy will continue to follow and adjust as appropriate.    Thank you for the consult,    Anamika Cristina, [address]  6/2/2022  1:01 PM

## 2022-06-02 NOTE — PROGRESS NOTES
GENERAL SURGERY PROGRESS NOTE    Ainsley Back is a 80 y.o. female 10 Days Post-Op  from robotic assisted right hemicolectomy for ileocecal mass. Subjective:  One emesis after getting the NG out yesterday, was ~50ml, but now is having BMs and feels better. Pain improving. Mobilizing some.      CLAUDIO output 120ml/24h    Objective:    Vitals: VITALS:  BP (!) 133/57   Pulse 72   Temp 97.2 °F (36.2 °C) (Oral)   Resp 17   Ht 5' (1.524 m)   Wt 153 lb (69.4 kg)   SpO2 98%   BMI 29.88 kg/m²     I/O: 06/01 0701 - 06/02 0700  In: 20 [I.V.:20]  Out: 890 [Urine:700; Drains:120]    Labs/Imaging Results:   Lab Results   Component Value Date     06/01/2022    K 3.0 06/01/2022    K 4.4 03/15/2018     06/01/2022    CO2 25 06/01/2022    BUN 14 06/01/2022    CREATININE 0.5 06/01/2022    GLUCOSE 130 06/01/2022    CALCIUM 7.9 06/01/2022      Lab Results   Component Value Date    WBC 5.5 06/01/2022    HGB 10.8 (L) 06/01/2022    HCT 33.8 (L) 06/01/2022    MCV 90.1 06/01/2022     06/01/2022         IV Fluids: PN-Adult Premix 5/15 - Central Last Rate: 60 mL/hr at 06/01/22 1825    sodium chloride    dextrose Last Rate: Stopped (05/31/22 1905)    sodium chloride Last Rate: Stopped (05/25/22 1247)    sodium chloride Last Rate: 75 mL/hr at 05/31/22 2340    Scheduled Meds: fat emulsion, 250 mL, IntraVENous, Once per day on Mon Tue Thu Fri    lidocaine PF, 5 mL, IntraDERmal, Once    sodium chloride flush, 5-40 mL, IntraVENous, 2 times per day    lidocaine, , Topical, Once    enoxaparin, 40 mg, SubCUTAneous, Daily    sodium chloride, 500 mL, IntraVENous, Once    atorvastatin, 20 mg, Oral, Daily    Vitamin D, 1,000 Units, Oral, Daily    cetirizine, 10 mg, Oral, Daily    levothyroxine, 137 mcg, Oral, Daily    losartan, 100 mg, Oral, Daily    metoprolol tartrate, 150 mg, Oral, Daily    metoprolol tartrate, 100 mg, Oral, Nightly    oxybutynin, 5 mg, Oral, BID    rOPINIRole, 3 mg, Oral, Nightly    vitamin B-12, 100 mcg, Oral, Daily    insulin lispro, 0-6 Units, SubCUTAneous, Q4H    sodium chloride flush, 5-40 mL, IntraVENous, 2 times per day    Physical Exam:  General: A&O x 3, no distress. HEENT: Anicteric sclerae, MMM. Extremities: No edema bilat LE. Abdomen: Soft, appropriately tender, mildly distended. Incisions intact with staples      Assessment and Plan:  80 y.o. female s/p robotic assisted right colectomy. Doing ok. Fatigued. Patient Active Problem List:     Long-term insulin use in type 2 diabetes ([facility] [address]     Essential hypertension     Dyslipidemia     Abnormal EKG     Abnormal stress test     Cecum mass      - Discussed findings and options with Stacie Garcia.    - Continue clears today  - Now having BMs  - Cough drops/chloraseptic spray prn  - out of bed, IS, ambulate as able  - await return of bowel function  - pathology now resulted, margins negative, 0/16 Cynthia Hammer MD

## 2022-06-02 NOTE — PROGRESS NOTES
Physician Progress Note      Brooke Romo  CSN #:                  [id number]  :                       1934  ADMIT DATE:       2022 2:15 AM  DISCH DATE:  RESPONDING  PROVIDER #:        Staci Kehr MD          QUERY TEXT:    Patient admitted with cecal adenocarcinoma: s/p laparoscopic robotic right   hemicolectomy on . If possible, please document in progress notes and   discharge summary if you are evaluating and /or treating any of the following: The medical record reflects the following:  Risk Factors: NPO  Clinical Indicators: Dietician consult: Severe malnutrition, in context of   acute illness or injury related to inadequate protein-energy intake as   evidenced by weight loss greater than or equal to 2% in 1 week (pt meeting   less than 50% of estimated energy needs for greater than 5 days)  Treatment: dietician consult, TPN, surgery consult, weights, I&O's    EMIR RestrepoN, RN, [facility]  Clinical   [phone]  Options provided:  -- Protein calorie malnutrition severe  -- Protein calorie malnutrition mild  -- Protein calorie malnutrition moderate  -- Other - I will add my own diagnosis  -- Disagree - Not applicable / Not valid  -- Disagree - Clinically unable to determine / Unknown  -- Refer to Clinical Documentation Reviewer    PROVIDER RESPONSE TEXT:    This patient has severe protein calorie malnutrition.     Query created by: Jamila Gallardo on 2022 12:31 PM      Electronically signed by:  Staci Kehr MD 2022 1:36 PM

## 2022-06-02 NOTE — PROGRESS NOTES
INTERNAL MED PROGRESS NOTE           Subjective:       June 1    The NG was removed today  She was sitting up in the chair and looked better today      May 31    She has been having nausea and vomiting a few days ago  NG was placed on May 29    Her blood sugar was low this afternoon  After that TPN was started      Assessment/Plan:       --Cecal adenocarcinoma: s/p laparoscopic robotic right hemicolectomy on 5/27/2022  -Postop management per Dr. 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 Being followed by oncology. --DM type II: Continue subcu insulin regimen including long-acting and short acting insulin as ordered, continue SSI protocol for more adequate glycemic control.  -May 31- Lantus 15 units at bedtime on MAR, but has not been receiving it at all. Discontinued Lantus.    --HTN, controlled, cont home meds as ordered     --HLD: Continue current statin therapy        DVT prophylaxis: Heparin/Lovenox      Nilo Junior MD  6/1/2022 @ 11:13 PM           Objective: Intake/Output Summary (Last 24 hours) at 6/1/2022 2313  Last data filed at 6/1/2022 2132  Gross per 24 hour   Intake 20 ml   Output 1410 ml   Net -1390 ml        Vitals:   Vitals:    06/01/22 2115   BP: 127/62   Pulse: 72   Resp:    Temp: 98.5 °F (36.9 °C)   SpO2: 98%       Physical Exam:          Physical Exam  Constitutional:       Appearance: She is not diaphoretic. Eyes:      General:         Right eye: No discharge. Left eye: No discharge. Pulmonary:      Effort: Pulmonary effort is normal.   Skin:     Coloration: Skin is not jaundiced. Neurological:      Cranial Nerves: No cranial nerve deficit.                    Labs:   Reviewed, as follows:  Recent Results (from the past 24 hour(s))   POCT Glucose Collection Time: 06/01/22 12:36 AM   Result Value Ref Range    POC Glucose 186 (H) 70 - 99 MG/DL   POCT Glucose    Collection Time: 06/01/22  3:10 AM   Result Value Ref Range    POC Glucose 126 (H) 70 - 99 MG/DL   CBC    Collection Time: 06/01/22  4:34 AM   Result Value Ref Range    WBC 5.5 4.0 - 10.5 K/CU MM    RBC 3.75 (L) 4.2 - 5.4 M/CU MM    Hemoglobin 10.8 (L) 12.5 - 16.0 GM/DL    Hematocrit 33.8 (L) 37 - 47 %    MCV 90.1 78 - 100 FL    MCH 28.8 27 - 31 PG    MCHC 32.0 32.0 - 36.0 %    RDW 13.1 11.7 - 14.9 %    Platelets 017 052 - 677 K/CU MM    MPV 10.6 7.5 - 11.1 FL   Comprehensive Metabolic Panel    Collection Time: 06/01/22  4:34 AM   Result Value Ref Range    Sodium 135 135 - 145 MMOL/L    Potassium 3.0 (LL) 3.5 - 5.1 MMOL/L    Chloride 100 99 - 110 mMol/L    CO2 25 21 - 32 MMOL/L    BUN 14 6 - 23 MG/DL    CREATININE 0.5 (L) 0.6 - 1.1 MG/DL    Glucose 130 (H) 70 - 99 MG/DL    Calcium 7.9 (L) 8.3 - 10.6 MG/DL    Albumin 2.7 (L) 3.4 - 5.0 GM/DL    Total Protein 4.6 (L) 6.4 - 8.2 GM/DL    Total Bilirubin 0.3 0.0 - 1.0 MG/DL    ALT 10 10 - 40 U/L    AST 14 (L) 15 - 37 IU/L    Alkaline Phosphatase 34 (L) 40 - 128 IU/L    GFR Non-African American >60 >60 mL/min/1.73m2    GFR African American >60 >60 mL/min/1.73m2    Anion Gap 10 4 - 16   Magnesium    Collection Time: 06/01/22  4:34 AM   Result Value Ref Range    Magnesium 1.4 (L) 1.8 - 2.4 mg/dl   Phosphorus    Collection Time: 06/01/22  4:34 AM   Result Value Ref Range    Phosphorus 2.1 (L) 2.5 - 4.9 MG/DL   Triglycerides    Collection Time: 06/01/22  4:34 AM   Result Value Ref Range    Triglycerides 161 (H) <150 MG/DL   POCT Glucose    Collection Time: 06/01/22  6:55 AM   Result Value Ref Range    POC Glucose 121 (H) 70 - 99 MG/DL   POCT Glucose    Collection Time: 06/01/22 11:58 AM   Result Value Ref Range    POC Glucose 143 (H) 70 - 99 MG/DL   POCT Glucose    Collection Time: 06/01/22  4:08 PM   Result Value Ref Range    POC Glucose 142 (H) 70 - 99 MG/DL POCT Glucose    Collection Time: 06/01/22  8:23 PM   Result Value Ref Range    POC Glucose 183 (H) 70 - 99 MG/DL          Body mass index is 29.88 kg/m².  Patient will follow up with PCP for further management as indicated unless otherwise specifically noted above        Stephan Clements MD  6/1/2022 @ 11:13 PM

## 2022-06-02 NOTE — PROGRESS NOTES
ONCOLOGY HEMATOLOGY CARE ([facility])  PROGRESS NOTE      Patient was seen and examined today. Not in any acute distress and no overnight events. 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 MRI pelvis showed complex ovarian cyst without internal enhancement to suggest solid mass. Radiologist recommend f/u imaging with CT or MRI in 6 months. She is s/p surgery on 5/27/22. Final pathology revealed that she has stage IIIB terminal ileum adenocarcinoma (pT3, pN1c). She is recovering gradually from surgery. No new complaints. PHYSICAL EXAM    Vitals: BP (!) 133/57   Pulse 72   Temp 97.2 °F (36.2 °C) (Oral)   Resp 17   Ht 5' (1.524 m)   Wt 153 lb (69.4 kg)   SpO2 98%   BMI 29.88 kg/m²   CONSTITUTIONAL: awake, alert, cooperative, no apparent distress   EYES: pupils equal, round and reactive to light, sclera clear and conjunctiva normal  ENT: Normocephalic, without obvious abnormality, atraumatic  NECK: supple, symmetrical, no jugular venous distension and no carotid bruits   HEMATOLOGIC/LYMPHATIC: no cervical, supraclavicular or axillary lymphadenopathy   LUNGS: VBS, no wheezes, no increased work of breathing, no crackles, no rhonchi, clear to auscultation   CARDIOVASCULAR: regular rate and rhythm, normal S1 and S2, no murmur noted  ABDOMEN: surgical wound on dressing, mild tenderness to palpation,   MUSCULOSKELETAL: full range of motion noted, tone is normal  NEUROLOGIC: awake, alert, oriented to name, place and time. Motor skills grossly intact. SKIN: Normal skin color, texture, turgor and no jaundice.  Skin appears intact   EXTREMITIES: no LE edema, no clubbing, no cyanosis, no leg swelling,      LABORATORY RESULTS  CBC:   Recent Labs     06/01/22 0434   WBC 5.5   HGB 10.8*        BMP:    Recent Labs     06/01/22 0434      K 3.0*      CO2 25   BUN 14   CREATININE 0.5*   GLUCOSE 130*     Hepatic:   Recent Labs     06/01/22 0434   AST 14* ALT 10   BILITOT 0.3   ALKPHOS 34*     INR: No results for input(s): INR in the last 72 hours. ASSESSMENT  Cecal adenocarcinoma  Right adnexa complex cystic lesion     RECOMMENDATION  Reviewed MRI pelvis result. Will consider repeat CT or MRI in 6 months for ovarian complex cyst.     Final pathology revealed that she has stage IIIB terminal ileum adenocarcinoma (pT3, pN1c). I reviewed final path report with her. I also let her know that we ideally recommend adjuvant chemotherapy with either CapeOx (capecitabine + oxaliplatin) or single agent capecitabine (in frail patient). She will discuss with her family and she will decide about adjuvant chemo later. She is recovering gradually daily. Encouraged ambulation. We will follow the patient. Thank you We will continue to follow the patient. Thank you.

## 2022-06-02 NOTE — PROGRESS NOTES
Occupational Therapy  Pt reports fatigue from sitting up in chair all day yesterday and politely declined therapy at this time, will re-attempt as schedule allows.      Mary ONEAL 2625 AM

## 2022-06-02 NOTE — PROGRESS NOTES
Occupational Therapy    Occupational Therapy Treatment Note    Name: Boo Talamantes MRN: [id number] :   1934   Date:  2022   Admission Date: 2022 Room:  3010/[facility]0-A     Primary Problem:  Diagnoses of Abnormal finding on imaging and Cecal cancer [facility]) were pertinent to this visit. Patient  has a past medical history of Diabetes mellitus ([address] H/O cardiac catheterization, H/O cardiovascular stress test, H/O cardiovascular stress test, H/O Doppler ultrasound (Abdomimal Aorta), H/O echocardiogram, Hx of echocardiogram, Hyperlipidemia, Hypertension, Sleep apnea, and Thyroid disease. Patient  has a past surgical history that includes Cholecystectomy; Dilation and curettage of uterus; Cataract removal; other surgical history; Breast biopsy (Right); Colonoscopy (N/A, 2022); and hemicolectomy (N/A, 2022). Restrictions/Precautions:           abdominal precautions, fall risk    Communication with other providers: RN Lawrence Blackburn with ALESIA Grullon for safety with functional transfers/mobility and d/t low activity tolerance. Subjective:  Patient states:  Pt agreeable to session, reports being nauseous. Pain:  Stomach, \"gas pains. \"     Objective:    Observation: Returned later in afternoon and pt willing to attempt session. Pt presented supine in bed, agreeable to session with encouragement. Pt's family present for session. Objective Measures: jane, CLAUDIO drain, IV,       Treatment, including education:  Self Care Training:   Cues were given for safety, sequence, UE/LE placement, visual cues, and balance. Activities performed today included dressing, toileting, hand hygiene, and grooming. Pt required Dep Assist to don bilat socks and later doff while supine in bed. Pt engaged in grooming task of brushing hair in semi-rico's with SBA. Toileting routine including step pivot transfer from w/c <> toilet with use of Bilat grab bars with Min-mod A X2 including line mgmt.    Toilet t/f with Mod A and use of grab bar, Dep for depend mgmt with Min A for standing balance, and Dep for barbara hygiene. Pt provided with cues for UE/safety throughout. Therapeutic Activity Training:   Therapeutic activity training was instructed today. Cues were given for safety, sequence, UE/LE placement, awareness, and balance. Activities performed today included bed mobility training, sup-sit, sit-stand. Supine <> EOB with Min-Mod A X2 with cues for technique. Sit to stand transfer from EOB with Min-Mod A X2 and from w/c with Min A utilizing bed rail to pull self up and step pivot onto EOB. Pt engaged in functional mobility for ~8' with use of RW with CGA-Min A X1-2 d/t retropulsion and pt requested to sit d/t onset of nausea. Pt required extended seated RB and had episode of emesis, RN aware and provided pt with medication. Pt requested to return supine. Min-Mod A X2 for EOB <> supine. Max A X2 to scoot up/repsoition in bed. Tasks done for increased safety/Ind with functional transfers and mobility needed for ADL routine. Patient educated on role of OT , benefits of OT and rationale for therapeutic intervention. Educated on need to be patient advocate, benefit of EOB activity. Patient left safely in bed at end of session, with call light in reach, alarm on and nursing aware. Gait belt was used for func transfers / mobility. Assessment / Impression:    Patient's tolerance of treatment: Poor  Adverse Reaction: nausea  Significant change in status and impact:  none  Barriers to improvement: Activity tolerance, strength      Plan for Next Session:    Cont OT POC     Time in:  1426  Time out:  1449  Timed treatment minutes:  23  Total treatment time:  23      Electronically signed by:     KHURRAM Hoang,   6/2/2022, 3:03 PM

## 2022-06-03 LAB
ANION GAP SERPL CALCULATED.3IONS-SCNC: 10 MMOL/L (ref 4–16)
BUN BLDV-MCNC: 10 MG/DL (ref 6–23)
CALCIUM SERPL-MCNC: 7.8 MG/DL (ref 8.3–10.6)
CHLORIDE BLD-SCNC: 100 MMOL/L (ref 99–110)
CO2: 22 MMOL/L (ref 21–32)
CREAT SERPL-MCNC: 0.4 MG/DL (ref 0.6–1.1)
GFR AFRICAN AMERICAN: >60 ML/MIN/1.73M2
GFR NON-AFRICAN AMERICAN: >60 ML/MIN/1.73M2
GLUCOSE BLD-MCNC: 160 MG/DL (ref 70–99)
GLUCOSE BLD-MCNC: 169 MG/DL (ref 70–99)
GLUCOSE BLD-MCNC: 170 MG/DL (ref 70–99)
GLUCOSE BLD-MCNC: 173 MG/DL (ref 70–99)
GLUCOSE BLD-MCNC: 178 MG/DL (ref 70–99)
GLUCOSE BLD-MCNC: 186 MG/DL (ref 70–99)
GLUCOSE BLD-MCNC: 187 MG/DL (ref 70–99)
MAGNESIUM: 1.8 MG/DL (ref 1.8–2.4)
PHOSPHORUS: 2.6 MG/DL (ref 2.5–4.9)
POTASSIUM SERPL-SCNC: 3.5 MMOL/L (ref 3.5–5.1)
SODIUM BLD-SCNC: 132 MMOL/L (ref 135–145)

## 2022-06-03 PROCEDURE — 6370000000 HC RX 637 (ALT 250 FOR IP): Performed by: SURGERY

## 2022-06-03 PROCEDURE — C9113 INJ PANTOPRAZOLE SODIUM, VIA: HCPCS | Performed by: NURSE PRACTITIONER

## 2022-06-03 PROCEDURE — 6370000000 HC RX 637 (ALT 250 FOR IP): Performed by: INTERNAL MEDICINE

## 2022-06-03 PROCEDURE — 97530 THERAPEUTIC ACTIVITIES: CPT

## 2022-06-03 PROCEDURE — 94761 N-INVAS EAR/PLS OXIMETRY MLT: CPT

## 2022-06-03 PROCEDURE — 99024 POSTOP FOLLOW-UP VISIT: CPT | Performed by: NURSE PRACTITIONER

## 2022-06-03 PROCEDURE — 6360000002 HC RX W HCPCS: Performed by: NURSE PRACTITIONER

## 2022-06-03 PROCEDURE — 2580000003 HC RX 258: Performed by: SURGERY

## 2022-06-03 PROCEDURE — 80048 BASIC METABOLIC PNL TOTAL CA: CPT

## 2022-06-03 PROCEDURE — 82962 GLUCOSE BLOOD TEST: CPT

## 2022-06-03 PROCEDURE — 6360000002 HC RX W HCPCS: Performed by: SURGERY

## 2022-06-03 PROCEDURE — 97535 SELF CARE MNGMENT TRAINING: CPT

## 2022-06-03 PROCEDURE — 99232 SBSQ HOSP IP/OBS MODERATE 35: CPT | Performed by: INTERNAL MEDICINE

## 2022-06-03 PROCEDURE — 1200000000 HC SEMI PRIVATE

## 2022-06-03 PROCEDURE — 2500000003 HC RX 250 WO HCPCS: Performed by: SURGERY

## 2022-06-03 PROCEDURE — 97116 GAIT TRAINING THERAPY: CPT

## 2022-06-03 PROCEDURE — 84100 ASSAY OF PHOSPHORUS: CPT

## 2022-06-03 PROCEDURE — 83735 ASSAY OF MAGNESIUM: CPT

## 2022-06-03 PROCEDURE — APPNB15 APP NON BILLABLE TIME 0-15 MINS: Performed by: NURSE PRACTITIONER

## 2022-06-03 RX ORDER — PANTOPRAZOLE SODIUM 40 MG/10ML
40 INJECTION, POWDER, LYOPHILIZED, FOR SOLUTION INTRAVENOUS DAILY
Status: DISCONTINUED | OUTPATIENT
Start: 2022-06-03 | End: 2022-06-07

## 2022-06-03 RX ADMIN — OXYBUTYNIN CHLORIDE 5 MG: 5 TABLET ORAL at 10:09

## 2022-06-03 RX ADMIN — OXYBUTYNIN CHLORIDE 5 MG: 5 TABLET ORAL at 21:10

## 2022-06-03 RX ADMIN — METOPROLOL TARTRATE 100 MG: 50 TABLET, FILM COATED ORAL at 21:10

## 2022-06-03 RX ADMIN — Medication 100 MCG: at 10:09

## 2022-06-03 RX ADMIN — POTASSIUM CHLORIDE: 2 INJECTION, SOLUTION, CONCENTRATE INTRAVENOUS at 18:35

## 2022-06-03 RX ADMIN — Medication 1 SPRAY: at 10:10

## 2022-06-03 RX ADMIN — CETIRIZINE HYDROCHLORIDE 10 MG: 10 TABLET, FILM COATED ORAL at 10:08

## 2022-06-03 RX ADMIN — SOYBEAN OIL 250 ML: 20 INJECTION, SOLUTION INTRAVENOUS at 18:34

## 2022-06-03 RX ADMIN — THIAMINE HYDROCHLORIDE 100 MG: 100 INJECTION, SOLUTION INTRAMUSCULAR; INTRAVENOUS at 18:37

## 2022-06-03 RX ADMIN — PANTOPRAZOLE SODIUM 40 MG: 40 INJECTION, POWDER, FOR SOLUTION INTRAVENOUS at 10:08

## 2022-06-03 RX ADMIN — SODIUM CHLORIDE, PRESERVATIVE FREE 20 ML: 5 INJECTION INTRAVENOUS at 10:09

## 2022-06-03 RX ADMIN — Medication 1000 UNITS: at 10:08

## 2022-06-03 RX ADMIN — INSULIN LISPRO 1 UNITS: 100 INJECTION, SOLUTION INTRAVENOUS; SUBCUTANEOUS at 21:09

## 2022-06-03 RX ADMIN — ATORVASTATIN CALCIUM 20 MG: 10 TABLET, FILM COATED ORAL at 10:08

## 2022-06-03 RX ADMIN — SODIUM CHLORIDE: 9 INJECTION, SOLUTION INTRAVENOUS at 18:36

## 2022-06-03 RX ADMIN — OXYCODONE HYDROCHLORIDE 5 MG: 5 TABLET ORAL at 12:01

## 2022-06-03 RX ADMIN — LEVOTHYROXINE SODIUM 137 MCG: 25 TABLET ORAL at 05:22

## 2022-06-03 RX ADMIN — ROPINIROLE HYDROCHLORIDE 3 MG: 1 TABLET, FILM COATED ORAL at 21:10

## 2022-06-03 RX ADMIN — INSULIN LISPRO 1 UNITS: 100 INJECTION, SOLUTION INTRAVENOUS; SUBCUTANEOUS at 04:47

## 2022-06-03 RX ADMIN — POTASSIUM & SODIUM PHOSPHATES POWDER PACK 280-160-250 MG 250 MG: 280-160-250 PACK at 10:08

## 2022-06-03 RX ADMIN — SODIUM CHLORIDE, PRESERVATIVE FREE 10 ML: 5 INJECTION INTRAVENOUS at 21:11

## 2022-06-03 RX ADMIN — OXYCODONE HYDROCHLORIDE 5 MG: 5 TABLET ORAL at 18:52

## 2022-06-03 RX ADMIN — INSULIN LISPRO 1 UNITS: 100 INJECTION, SOLUTION INTRAVENOUS; SUBCUTANEOUS at 00:21

## 2022-06-03 RX ADMIN — METOPROLOL TARTRATE 150 MG: 50 TABLET, FILM COATED ORAL at 10:09

## 2022-06-03 RX ADMIN — SODIUM CHLORIDE, PRESERVATIVE FREE 10 ML: 5 INJECTION INTRAVENOUS at 10:11

## 2022-06-03 RX ADMIN — ENOXAPARIN SODIUM 40 MG: 100 INJECTION SUBCUTANEOUS at 10:09

## 2022-06-03 RX ADMIN — LOSARTAN POTASSIUM 100 MG: 100 TABLET, FILM COATED ORAL at 10:08

## 2022-06-03 ASSESSMENT — PAIN DESCRIPTION - LOCATION: LOCATION: LEG;HIP

## 2022-06-03 ASSESSMENT — PAIN SCALES - GENERAL
PAINLEVEL_OUTOF10: 0
PAINLEVEL_OUTOF10: 6
PAINLEVEL_OUTOF10: 0
PAINLEVEL_OUTOF10: 5
PAINLEVEL_OUTOF10: 0
PAINLEVEL_OUTOF10: 2

## 2022-06-03 ASSESSMENT — PAIN - FUNCTIONAL ASSESSMENT: PAIN_FUNCTIONAL_ASSESSMENT: PREVENTS OR INTERFERES SOME ACTIVE ACTIVITIES AND ADLS

## 2022-06-03 ASSESSMENT — PAIN DESCRIPTION - ORIENTATION: ORIENTATION: RIGHT

## 2022-06-03 ASSESSMENT — PAIN DESCRIPTION - DESCRIPTORS: DESCRIPTORS: SHOOTING;ACHING

## 2022-06-03 NOTE — PROGRESS NOTES
Occupational Therapy    Occupational Therapy Treatment Note    Name: Shira Cobos MRN: [id number] :   1934   Date:  6/3/2022   Admission Date: 2022 Room:  [address] [place] [address]-A     Primary Problem:  Cecum Mass    Restrictions/Precautions: abdominal precautions, fall risk, CLAUDIO drain    Communication with other providers: Co tx with PT Simeon Kramer for safety, RN approved session. Subjective:  Patient states:  \" I think I am feeling better\"  Pain:   Location, Type, Intensity (0/10 to 10/10): Denies pain at this time. Objective:    Observation: Pt supine in bed upon arrival and agreeable to therapy session. Objective Measures:  Pt alert and oriented    Treatment, including education:  Therapeutic Activity Training:   Therapeutic activity training was instructed today. Cues were given for safety, sequence, UE/LE placement, awareness, and balance. Activities performed today included bed mobility training, sup-sit, sit-stand, SPT. Pt completed supine to sit with head of bed elevated and use of bed rails with SBA with increased time. Pt completed sit to stand from edge of bed with use of WW and MIN A with verbal cues for hand placement and completed functional mobility with WW and 1 seated rest break. Pt required MIN A for second sit to stand and completed mobility back to room. Self Care Training:   Cues were given for safety, sequence, UE/LE placement, visual cues, and balance. Activities performed today included dressing, toileting, hand hygiene, and grooming. Pt supine in bed and donned socks in bed with set up and MIN A with adapted figure four method. Pt completed required increased time to complete task. Pt seated edge of bed and completed grooming sitting edge of bed with SBA and completed grooming with to brush hair with set up.      Assessment / Impression:    Patient's tolerance of treatment: fair  Adverse Reaction: none  Significant change in status and impact:  None   Barriers to improvement: none    Goals:  Pt goal: go home  Time Frame for STGs: discharge  Goal 1: Pt will perform UE ADLs independently   Goal 2: Pt will perform LE ADLs w/ AD SBA-addressed  Goal 3: Pt will perform toileting SBA   Goal 4: Pt will perform functional mobility/ transfer w/ AD Azul-addressed  Goal 5: Pt will perform therex/theract in order to increase functional activity tolerance-addressed  Goal 6: Pt will perform therex/theract sitting EOB in order to increase dynamic sitting balance    Plan for Next Session:    Continue OT POC and progress seated and standing ADLs.     Time in:  1050  Time out:  1118  Timed treatment minutes:  28  Total treatment time:  28    Electronically signed by:    KHURRAM Barclay,   6/3/2022, 12:52 PM

## 2022-06-03 NOTE — PROGRESS NOTES
Physical Therapy Treatment Note  Name: Kristal Guo MRN: [id number] :   1934   Date:  6/3/2022   Admission Date: 2022 Room:  [address] [place], [place] [address]A     Restrictions/Precautions:          general precautions, fall risk    Communication with other providers:  RN, CERRATO    Subjective:  Patient states:  \"I think I'm feeling better\"  Pain:   Location, Type, Intensity (0/10 to 10/10): Denies pain this date    Objective:    Observation:  Pt supine in bed upon entry and agreeable to session    Treatment, including education/measures:    Bed mobility: Pt completed supine to sitting EOB SBA with increased time and HOB slightly raised    Transfers: Pt completed STS to/from EOB and chair min A with cues for hand placement    Gait: Pt ambulated 60' + 60' with RW and seated rest break in between bouts. Pt ambulated with decreased amisha, slight forward flexed posture, and chair follow for safety. Cues provided for walker management    Assessment / Impression:    Pt up in chair at end of session with needs in reach and alarm on. Due to pt's improved activity tolerance this date, recommend ARU placement upon discharge    Patient's tolerance of treatment:  fair   Adverse Reaction: n/a  Significant change in status and impact:  n/a  Barriers to improvement:  Decreased endurance, impaired transfers    Plan for Next Session:    Continue to address ambulation and transfer training in future sessions.     Time in:  1050  Time out:  1117  Timed treatment minutes:  27  Total treatment time:  27    Previously filed items:  Social/Functional History  Lives With: Spouse  Type of Home: House  Home Layout: One level  Home Access: Stairs to enter with rails  Entrance Stairs - Number of Steps: 2 to porch, 1 into house  Entrance Stairs - Rails: Both  Bathroom Shower/Tub: Walk-in shower  Bathroom Toilet: Standard  Bathroom Equipment: Built-in shower seat  Bathroom Accessibility: Accessible  Home Equipment: Grab bars (does not use)  Has the patient had two or more falls in the past year or any fall with injury in the past year?: Yes (Getting out of reclining chair at night)  ADL Assistance: [address]: Independent  Homemaking Responsibilities: Yes  Ambulation Assistance: Independent  Transfer Assistance: Independent  Active : Yes        Short Term Goals  Time Frame for Short term goals: 1 week  Short term goal 1: Pt to complete all bed mobility min A  Short term goal 2: Pt to complete all STS transfers to/from bed, commode, and chair min A  Short term goal 3: Pt to ambulate 25' with LRAD CGA    Electronically signed by:    Elton Pastrana, PT  6/3/2022, 12:23 PM

## 2022-06-03 NOTE — CONSULTS
Endocrinology   Consult Note  5/22/2022  2:15 AM     Primary Care provider: Krystal Hu DO     Referring physician:  Navin Norman DO     Dear Doctor Bibiana Cooper    Thank You for the Consult     Pt. Was Admitted for : Return 5/22/2022 for cecal mass    Reason for Consult:  Hypoglycemia //Blood glucose control   Pt is on TPN     History Obtained From:  Patient/ EMR       HISTORY OF PRESENT ILLNESS:                The patient is a 80 y.o. female with significant past medical history of diabetes mellitus, peripheral neuropathy obstructive sleep apnea, hypertension, hyperlipidemia hypothyroidism vitamin B12 deficiency initially admitted for cecal mass CKD, diverticulosis, admitted to hospital for right cecal mass. Patient underwent right hemicolectomy using laparoscopic robotic technique. Patient developed hypoglycemia. Her blood glucose dropped to around 60 or less after being given glucagon injection decided IV dextrose infusion. I was  consulted for better control of blood glucose. ROS:   Pt's ROS done in detail. Abnormal ROS are noted in Medical and Surgical History Section below: Other Medical History:        Diagnosis Date    Diabetes mellitus ([address]     H/O cardiac catheterization 05/18/2021    no significant CAD in main vessels, has severe stenosis in small  branch diagonal vessel    H/O cardiovascular stress test 3/22/18,03/30/2017    ECG portion of the stress test is negative for ischemia EF >70% Normal stress myocardial perfusion.  H/O cardiovascular stress test 04/07/2021    abnormal stress    H/O Doppler ultrasound (Abdomimal Aorta) 03/29/2017    No evidence of abdominal aortic aneurysm.  H/O echocardiogram 07/02/2014    Normal left ventricular size, wall motion and systolic function. Mildle elevated pulmonary artery systolic pressure. Mild MR and TR and trace AR EF 55 to 60%    Hx of echocardiogram 03/29/2017    EF 55-60%. Grade I diastolic dysfunction.   Mildly dilated left atrium. No evidence of pericardial effusion.  Hyperlipidemia     Hypertension     Sleep apnea     Thyroid disease      Surgical History:        Procedure Laterality Date    BREAST BIOPSY Right     approx age 76, benign    CATARACT REMOVAL      CHOLECYSTECTOMY      COLONOSCOPY N/A 5/25/2022    COLONOSCOPY POLYPECTOMY SNARE/COLD BIOPSY performed by Иван Ch MD at [facility] N/A 5/27/2022    BOWEL RESECTION RIGHT HEMICOLECTOMY LAPAROSCOPIC ROBOTIC XI performed by Camille Walker MD at [facility]      eye lift       Allergies:  Lopid [gemfibrozil], Bystolic [nebivolol hcl], Cefdinir, Coreg [carvedilol], Crestor [rosuvastatin], Fenofibrate, Glucophage [metformin], Hydrochlorothiazide, Metronidazole, Norvasc [amlodipine besylate], Tribenzor [olmesartan-amlodipine-hctz], and Zocor [simvastatin]    Family History:       Problem Relation Age of Onset    Pacemaker Sister     Arrhythmia Sister     Breast Cancer Sister         pre menopausal    Ovarian Cancer Neg Hx      REVIEW OF SYSTEMS:  Review of System Done as noted above     PHYSICAL EXAM:      Vitals:    BP (!) 110/52   Pulse 70   Temp 98.2 °F (36.8 °C) (Oral)   Resp 18   Ht 5' (1.524 m)   Wt 145 lb 1 oz (65.8 kg)   SpO2 95%   BMI 28.33 kg/m²     CONSTITUTIONAL:  awake, alert, cooperative, appears stated age   EYES:  vision intact Fundoscopic Exam not performed   ENT:Normal  NECK:  Supple, No JVD. Thyroid Exam:Normal   LUNGS:  Has Vesicular Breath Sounds,   CARDIOVASCULAR:  Normal apical impulse, regular rate and rhythm, normal S1 and S2, no S3 or S4, and has no  murmur   ABDOMEN:  No scars, normal bowel sounds, soft, non-distended, non-tender, no masses palpated, no hepatolienomegaly  Musculoskeletal: Normal  Extremities: Normal, peripheral pulses normal, , has no edema   NEUROLOGIC:  Awake, alert, oriented to name, place and time.   Cranial nerves II-XII are grossly intact. Motor is  intact. Sensory is intact. ,  and gait is normal.    DATA:    CBC:   Recent Labs     06/01/22  0434   WBC 5.5   HGB 10.8*       CMP:  Recent Labs     06/01/22  0434 06/02/22  0940 06/03/22  0525    133* 132*   K 3.0* 3.3* 3.5    100 100   CO2 25 24 22   BUN 14 10 10   CREATININE 0.5* 0.4* 0.4*   CALCIUM 7.9* 8.0* 7.8*   PROT 4.6*  --   --    LABALBU 2.7*  --   --    BILITOT 0.3  --   --    ALKPHOS 34*  --   --    AST 14*  --   --    ALT 10  --   --      Lipids:   Lab Results   Component Value Date    CHOL 138 05/02/2019    HDL 54 05/02/2019    TRIG 161 06/01/2022     Glucose:   Recent Labs     06/03/22  1001 06/03/22  1210 06/03/22  1639   POCGLU 169* 173* 160*     Hemoglobin A1C:   Lab Results   Component Value Date    LABA1C 5.9 05/22/2022     Free T4:   Lab Results   Component Value Date    T4FREE 1.15 12/06/2017     Free T3:   Lab Results   Component Value Date    FT3 2.3 12/06/2017     TSH High Sensitivity:   Lab Results   Component Value Date    TSHHS 2.260 12/06/2017       XR ABDOMEN FOR NG/OG/NE TUBE PLACEMENT   Final Result   The NG tube is in good position. Small bowel distension has significantly improved from 05/29/2022. Mild patchy left basilar airspace disease, possibly atelectasis. XR ABDOMEN FOR NG/OG/NE TUBE PLACEMENT   Final Result   Tip of gastric tube within the stomach. MRI PELVIS W WO CONTRAST   Final Result   1. Cystic lesion in the right adnexa appears unchanged from prior CT. No   internal enhancement is identified to suggest a solid mass. Overall,   findings are favored to represent a complex ovarian cyst over abscess for   which follow-up imaging with CT or MRI in 6 months is recommended. 2. 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 US PELVIS COMPLETE   Final Result   1. 2.7 cm complex hypoechoic lesion in the right adnexa/ovary correlating to   recent CT.   Differential considerations include a complex cystic ovarian   lesion, tubo-ovarian abscess or pericolonic abscess given adjacent colon. Recommend a short-term follow-up to ensure complete resolution. If findings   persist, a follow-up MRI may be warranted. 2. Nonvisualization of the left ovary or endometrium. 3. Probable 2.7 cm intramural fibroid. US DUP ABD PEL RETRO SCROT COMPLETE   Final Result   1. 2.7 cm complex hypoechoic lesion in the right adnexa/ovary correlating to   recent CT. Differential considerations include a complex cystic ovarian   lesion, tubo-ovarian abscess or pericolonic abscess given adjacent colon. Recommend a short-term follow-up to ensure complete resolution. If findings   persist, a follow-up MRI may be warranted. 2. Nonvisualization of the left ovary or endometrium. 3. Probable 2.7 cm intramural fibroid.                 Scheduled Medicines   Medications:    pantoprazole  40 mg IntraVENous Daily    thiamine (VITAMIN B1) IVPB  100 mg IntraVENous Q24H    potassium chloride  20 mEq Oral Once    fat emulsion  250 mL IntraVENous Once per day on Mon Tue Thu Fri    lidocaine PF  5 mL IntraDERmal Once    sodium chloride flush  5-40 mL IntraVENous 2 times per day    lidocaine   Topical Once    enoxaparin  40 mg SubCUTAneous Daily    sodium chloride  500 mL IntraVENous Once    atorvastatin  20 mg Oral Daily    Vitamin D  1,000 Units Oral Daily    cetirizine  10 mg Oral Daily    levothyroxine  137 mcg Oral Daily    losartan  100 mg Oral Daily    metoprolol tartrate  150 mg Oral Daily    metoprolol tartrate  100 mg Oral Nightly    oxybutynin  5 mg Oral BID    rOPINIRole  3 mg Oral Nightly    vitamin B-12  100 mcg Oral Daily    insulin lispro  0-6 Units SubCUTAneous Q4H    sodium chloride flush  5-40 mL IntraVENous 2 times per day      Infusions:    PN-Adult Premix 5/15 - Central 60 mL/hr at 06/03/22 1835    sodium chloride      dextrose Stopped (05/31/22 1905)    sodium chloride Stopped (05/25/22 1247)    sodium chloride 75 mL/hr at 06/03/22 1836         IMPRESSION    Patient Active Problem List   Diagnosis    Long-term insulin use in type 2 diabetes ([facility] [address]    Essential hypertension    Dyslipidemia    Abnormal EKG    Abnormal stress test    Cecum mass    Severe malnutrition (HCC)    Partial small bowel obstruction ([facility] [address]        right hemicolectomy with bowel resection on 5/27/2022      INVASIVE ADENOCARCINOMA, TERMINAL ILEUM      RECOMMENDATIONS:      1. Reviewed POC blood glucose . Labs and X ray results   2. Reviewed Home and Current Medicines   3. Will Start On  Correction bolus Humalog Insulin regime   4. Monitor Blood glucose frequently   5. Modify  the dose of Insulin  as needed        Will follow with you  Again thank you for sharing pt's care with me.      Truly yours,       Rivera Olsen MD

## 2022-06-03 NOTE — PROGRESS NOTES
Comprehensive Nutrition Assessment    Type and Reason for Visit:  Reassess    Nutrition Recommendations/Plan:   1. Continue current clear liquid diet, begin clear oral nutrition supplement tid  2. Advance as timely as tolerated to Full Liquid Diet  3. Please modify oral supplement order to low marylin high protein or standard high marylin, once diet advanced  4. Continue current CPN until Pt consistently consuming greater than at least 50% of at least Fulls     Malnutrition Assessment:  Malnutrition Status:  Severe malnutrition (05/31/22 0934)    Context:  Acute Illness     Findings of the 6 clinical characteristics of malnutrition:  Energy Intake:  50% or less of estimated energy requirements for 5 or more days  Weight Loss:  Greater than 2% over 1 week     Body Fat Loss:  Unable to assess     Muscle Mass Loss:  Unable to assess    Fluid Accumulation:  No significant fluid accumulation     Strength:  Not Performed    Nutrition Assessment:    Final pathology revealed that she has stage IIIB terminal ileum adenocarcinoma noted. To continue clears today, she does not like most of the options noted, will order trial of clear liquid supplement to munir some protein for optimal healing. If no vomiting may consider advancing to fulls to allow for more options. Tolerating CPN at goal. Now having BMs, 4 today noted. Will continue to follow as high nutrition risk. Nutrition Related Findings:    Glu 169, A1C 5.9 Wound Type: Surgical Incision       Current Nutrition Intake & Therapies:    Average Meal Intake: NPO  Average Supplements Intake: NPO  ADULT DIET;  Clear Liquid  PN-Adult Premix 5/15 - Central  PN-Adult Premix 5/15 - Central  Current Parenteral Nutrition Orders:  · Type and Formula: Premix Central (5/15)   · Lipids: 250ml (4x/wk)  · Duration: Continuous  · Rate/Volume: 60/1440  · Current PN Order Provides: average of 1308 kcal and 72 grams of protein daily  · Goal PN Orders Provides:   average of 1308 kcal and 72 grams of protein daily    Anthropometric Measures:  Height: 5' (152.4 cm)  Ideal Body Weight (IBW): 100 lbs (45 kg)    Admission Body Weight: 151 lb 10.8 oz (68.8 kg) (first meausred weight)  Current Body Weight: 145 lb 1 oz (65.8 kg), 129 % IBW. Weight Source: Bed Scale  Current BMI (kg/m2): 28.3  Usual Body Weight: 160 lb 10 oz (72.9 kg) (5/27/21)  % Weight Change (Calculated): -9.7  Weight Adjustment For: No Adjustment                 BMI Categories: Overweight (BMI 25.0-29. 9)    Estimated Daily Nutrient Needs:  Energy Requirements Based On: Kcal/kg  Weight Used for Energy Requirements: Current  Energy (kcal/day): 9388-5368 (24-27 kcal/kg)  Weight Used for Protein Requirements: Current  Protein (g/day): 64-76 (1.1-1.3 g/kg)  Method Used for Fluid Requirements: 1 ml/kcal  Fluid (ml/day):  9049-4555    Nutrition Diagnosis:   · Severe malnutrition,In context of acute illness or injury related to inadequate protein-energy intake as evidenced by weight loss greater than or equal to 2% in 1 week (pt meeting less than 50% of estimated energy needs for greater than 5 days)    Nutrition Interventions:   Food and/or Nutrient Delivery: Continue Current Diet,Start Oral Nutrition Supplement,Continue Current Parenteral Nutrition  Nutrition Education/Counseling: No recommendation at this time  Coordination of Nutrition Care: Continue to monitor while inpatient,Feeding Assistance/Environment Change  Plan of Care discussed with: perfectserved surgery regarding nutrition plan    Goals:  Previous Goal Met: Progressing toward Goal(s)  Goals: PO intake 50% or greater       Nutrition Monitoring and Evaluation:   Behavioral-Environmental Outcomes: None Identified  Food/Nutrient Intake Outcomes: Diet Advancement/Tolerance,Food and Nutrient Intake,Supplement Intake,Parenteral Nutrition Intake/Tolerance  Physical Signs/Symptoms Outcomes: Biochemical Data,GI Status,Hemodynamic Status,Fluid Status or Edema,Weight,Skin    Discharge Planning: Continue Oral Nutrition Supplement     Wilner Mustafa, [address] [place] [address], LD  Contact: [phone]

## 2022-06-03 NOTE — CONSULTS
Endocrinology   Consult Note  5/22/2022  2:15 AM     Primary Care provider: Jackeline Renee DO     Referring physician:  Rocio Cristina DO     Dear Doctor Ulysses Husky    Thank You for the Consult     Pt. Was Admitted for : Return 5/22/2022 for cecal mass    Reason for Consult:  Hypoglycemia //Blood glucose control   Pt is on TPN     History Obtained From:  Patient/ EMR       HISTORY OF PRESENT ILLNESS:                The patient is a 80 y.o. female with significant past medical history of diabetes mellitus, peripheral neuropathy obstructive sleep apnea, hypertension, hyperlipidemia hypothyroidism vitamin B12 deficiency initially admitted for cecal mass CKD, diverticulosis, admitted to hospital for right cecal mass. Patient underwent right hemicolectomy using laparoscopic robotic technique. Patient developed hypoglycemia. Her blood glucose dropped to around 60 or less after being given glucagon injection decided IV dextrose infusion. I was  consulted for better control of blood glucose. ROS:   Pt's ROS done in detail. Abnormal ROS are noted in Medical and Surgical History Section below: Other Medical History:        Diagnosis Date    Diabetes mellitus ([address]     H/O cardiac catheterization 05/18/2021    no significant CAD in main vessels, has severe stenosis in small  branch diagonal vessel    H/O cardiovascular stress test 3/22/18,03/30/2017    ECG portion of the stress test is negative for ischemia EF >70% Normal stress myocardial perfusion.  H/O cardiovascular stress test 04/07/2021    abnormal stress    H/O Doppler ultrasound (Abdomimal Aorta) 03/29/2017    No evidence of abdominal aortic aneurysm.  H/O echocardiogram 07/02/2014    Normal left ventricular size, wall motion and systolic function. Mildle elevated pulmonary artery systolic pressure. Mild MR and TR and trace AR EF 55 to 60%    Hx of echocardiogram 03/29/2017    EF 55-60%. Grade I diastolic dysfunction.   Mildly dilated left are grossly intact. Motor is  intact. Sensory is intact. ,  and gait is normal.    DATA:    CBC:   Recent Labs     06/01/22  0434   WBC 5.5   HGB 10.8*       CMP:  Recent Labs     06/01/22  0434 06/02/22  0940 06/03/22  0525    133* 132*   K 3.0* 3.3* 3.5    100 100   CO2 25 24 22   BUN 14 10 10   CREATININE 0.5* 0.4* 0.4*   CALCIUM 7.9* 8.0* 7.8*   PROT 4.6*  --   --    LABALBU 2.7*  --   --    BILITOT 0.3  --   --    ALKPHOS 34*  --   --    AST 14*  --   --    ALT 10  --   --      Lipids:   Lab Results   Component Value Date    CHOL 138 05/02/2019    HDL 54 05/02/2019    TRIG 161 06/01/2022     Glucose:   Recent Labs     06/03/22  1001 06/03/22  1210 06/03/22  1639   POCGLU 169* 173* 160*     Hemoglobin A1C:   Lab Results   Component Value Date    LABA1C 5.9 05/22/2022     Free T4:   Lab Results   Component Value Date    T4FREE 1.15 12/06/2017     Free T3:   Lab Results   Component Value Date    FT3 2.3 12/06/2017     TSH High Sensitivity:   Lab Results   Component Value Date    TSHHS 2.260 12/06/2017       XR ABDOMEN FOR NG/OG/NE TUBE PLACEMENT   Final Result   The NG tube is in good position. Small bowel distension has significantly improved from 05/29/2022. Mild patchy left basilar airspace disease, possibly atelectasis. XR ABDOMEN FOR NG/OG/NE TUBE PLACEMENT   Final Result   Tip of gastric tube within the stomach. MRI PELVIS W WO CONTRAST   Final Result   1. Cystic lesion in the right adnexa appears unchanged from prior CT. No   internal enhancement is identified to suggest a solid mass. Overall,   findings are favored to represent a complex ovarian cyst over abscess for   which follow-up imaging with CT or MRI in 6 months is recommended. 2. 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 US PELVIS COMPLETE   Final Result   1. 2.7 cm complex hypoechoic lesion in the right adnexa/ovary correlating to   recent CT.   Differential considerations include a complex cystic ovarian   lesion, tubo-ovarian abscess or pericolonic abscess given adjacent colon. Recommend a short-term follow-up to ensure complete resolution. If findings   persist, a follow-up MRI may be warranted. 2. Nonvisualization of the left ovary or endometrium. 3. Probable 2.7 cm intramural fibroid. US DUP ABD PEL RETRO SCROT COMPLETE   Final Result   1. 2.7 cm complex hypoechoic lesion in the right adnexa/ovary correlating to   recent CT. Differential considerations include a complex cystic ovarian   lesion, tubo-ovarian abscess or pericolonic abscess given adjacent colon. Recommend a short-term follow-up to ensure complete resolution. If findings   persist, a follow-up MRI may be warranted. 2. Nonvisualization of the left ovary or endometrium. 3. Probable 2.7 cm intramural fibroid.                 Scheduled Medicines   Medications:    pantoprazole  40 mg IntraVENous Daily    thiamine (VITAMIN B1) IVPB  100 mg IntraVENous Q24H    potassium chloride  20 mEq Oral Once    fat emulsion  250 mL IntraVENous Once per day on Mon Tue Thu Fri    lidocaine PF  5 mL IntraDERmal Once    sodium chloride flush  5-40 mL IntraVENous 2 times per day    lidocaine   Topical Once    enoxaparin  40 mg SubCUTAneous Daily    sodium chloride  500 mL IntraVENous Once    atorvastatin  20 mg Oral Daily    Vitamin D  1,000 Units Oral Daily    cetirizine  10 mg Oral Daily    levothyroxine  137 mcg Oral Daily    losartan  100 mg Oral Daily    metoprolol tartrate  150 mg Oral Daily    metoprolol tartrate  100 mg Oral Nightly    oxybutynin  5 mg Oral BID    rOPINIRole  3 mg Oral Nightly    vitamin B-12  100 mcg Oral Daily    insulin lispro  0-6 Units SubCUTAneous Q4H    sodium chloride flush  5-40 mL IntraVENous 2 times per day      Infusions:    PN-Adult Premix 5/15 - Central 60 mL/hr at 06/03/22 1835    sodium chloride      dextrose Stopped (05/31/22 1905)    sodium chloride Stopped (05/25/22 1247)    sodium chloride 75 mL/hr at 06/03/22 1836         IMPRESSION    Patient Active Problem List   Diagnosis    Long-term insulin use in type 2 diabetes ([facility] [address]    Essential hypertension    Dyslipidemia    Abnormal EKG    Abnormal stress test    Cecum mass    Severe malnutrition (HCC)    Partial small bowel obstruction ([facility] [address]        right hemicolectomy with bowel resection on 5/27/2022       INVASIVE ADENOCARCINOMA, TERMINAL ILEUM      RECOMMENDATIONS:      1. Reviewed POC blood glucose . Labs and X ray results   2. Reviewed Home and Current Medicines   3. Will Start On  Correction bolus Humalog Insulin regime   4. Monitor Blood glucose frequently   5. Modify  the dose of Insulin  as needed        Will follow with you  Again thank you for sharing pt's care with me.      Truly yours,       Teagan Melendez MD

## 2022-06-03 NOTE — PROGRESS NOTES
TPN LAB EVALUATION  Recent Labs     06/01/22  0434 06/01/22  0434 06/02/22  0940 06/02/22  0940 06/03/22  0525      < > 133*   < > 132*   K 3.0*   < > 3.3*   < > 3.5      < > 100   < > 100   CALCIUM 7.9*   < > 8.0*   < > 7.8*   MG 1.4*   < > 1.4*   < > 1.8   PHOS 2.1*   < > 2.0*   < > 2.6   GLUCOSE 130*  --  173*  --  186*    < > = values in this interval not displayed. Pharmacy to dose TPN per Dr. Jeffery Paz Day # 4    Indication: severe malnutrition    Goal Per Nutrition Recommendations/Plan:   1. Recommend initiating PN to d/t continued inadequate oral intake and malnutrition  2. PN recommendation: Clinimix 5/15 @ 60 ml/hr which will provide ~1308 kcal (average lipid and non lipid days) and 72 g protein    Central line: PICC double lumen    Diet: Clear Liquid    Maintenance Fluids: NS @ 75mL/hr     Glucose:  GLUCOSE LEVELS LAST 72 HOURS                  (last 7 readings)    Recent Labs     06/01/22  0434 06/01/22  0655 06/02/22  0442 06/02/22  0940 06/02/22  1327 06/02/22  1817 06/02/22 2015 06/03/22  0019 06/03/22  0446 06/03/22  0525   POCGLU  --    < > 225* 171* 181* 181* 200* 187* 178*  --    GLUCOSE 130*  --   --  173*  --   --   --   --   --  186*    < > = values in this interval not displayed.  Goal <180    5 readings above goal in the last 24 hours, most below 200   Low dose sliding scale insulin coverage   No insulin in the TPN formula. TRIGLYCERIDES:  Triglycerides   Date Value Ref Range Status   06/01/2022 161 (H) <150 MG/DL Final   05/02/2019 152 (H) <150 MG/DL Final   02/09/2017 413 (H) <150 MG/DL Final     Triglycerides:   Goal < 400    TG @ 161 on 6/1/22, at goal   Patient is not currently receiving propofol   Continue standard lipid replacement four times weekly.       LIVER FUNCTION LAB EVALUATION  Recent Labs     06/01/22  0434   AST 14*   ALT 10   ALKPHOS 34*   BILITOT 0.3     Hepatic Function:   WNL   Bititotal < 3.0, trace elements added to TPN      RENAL LAB EVALUATION  Estimated Creatinine Clearance: 84 mL/min (A) (based on SCr of 0.4 mg/dL (L)). Recent Labs     06/01/22  0434 06/02/22  0940 06/03/22  0525   BUN 14 10 10   CREATININE 0.5* 0.4* 0.4*     Renal Function and Electrolytes:   Na - slightly low, glucose levels slightly elevated. No sodium in the TPN at this time. Will look to add if trend continues.  K - WNL   Ca - WNL (corrected)   Mg - WNL   Phos - WNL   Renal trends remain at baseline. Formulation: continue on TPN 5/15 with Custom Electrolytes at goal rate of 60 ml/hr. Standard lipids four times weekly. Possible refeeding syndrome:  Started on Thiamine 100mg IV daily x 3 days. Component Order Dose Admin Dose   CLINIMIX 5/15 5 % Soln 2,000 mLs 2,000 mL   potassium phosphate 15 MMOLE/5ML Soln 30 mmol 30 mmol   potassium chloride 2 MEQ/ML Soln 40 mEq 40 mEq   magnesium sulfate 50 % Soln 2,000 mg 2,000 mg   adult multi-vitamin with vitamin k Inj 10 mLs 10 mL   trace minerals Cu-Mn-Se-Zn 012-75- MCG/ML Soln 1 mL 1 mL       Pharmacy will continue to follow and adjust as appropriate. Thank you for the consult,    Mauricio Murray.  Margarita Rico [facility]  8:03 AM  06/03/22

## 2022-06-03 NOTE — PROGRESS NOTES
INTERNAL MED PROGRESS NOTE           Subjective:       June 2    Informed she had 1 episode of vomiting today  Electrolytes were off today  TPN is infusing  She appears comfortable when I saw her      June 1    The NG was removed today  She was sitting up in the chair and looked better today      May 31    She has been having nausea and vomiting a few days ago  NG was placed on May 29    Her blood sugar was low this afternoon  After that TPN was started      Assessment/Plan:       -- 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 Will Sanderson     -- Nausea and vomiting: NG was removed on June 1    -- Nutrition-TPN started on May 31- continue TPN    --Invasive moderately differentiated adenocarcinoma of the colon: Noted on pathology report. Being followed by oncology. --DM type II: Continue subcu insulin regimen including long-acting and short acting insulin as ordered, continue SSI protocol for more adequate glycemic control.  -May 31- Lantus 15 units at bedtime on MAR, but has not been receiving it at all. Discontinued Lantus.    --HTN, controlled, cont home meds as ordered     --HLD: Continue continue statin therapy      DVT prophylaxis: Heparin/Lovenox      Chaz Thomas MD  6/2/2022 @ 8:09 PM           Objective: Intake/Output Summary (Last 24 hours) at 6/2/2022 2009  Last data filed at 6/2/2022 1817  Gross per 24 hour   Intake 20 ml   Output 1240 ml   Net -1220 ml        Vitals:   Vitals:    06/02/22 1945   BP: (!) 147/50   Pulse: 74   Resp: 18   Temp: 97.5 °F (36.4 °C)   SpO2: 96%       Physical Exam:          Physical Exam  Constitutional:       Appearance: She is not diaphoretic. Eyes:      General:         Right eye: No discharge. Left eye: No discharge. Pulmonary:      Effort: Pulmonary effort is normal.   Skin:     Coloration: Skin is not jaundiced. Neurological:      Cranial Nerves: No cranial nerve deficit. Labs:   Reviewed, as follows:  Recent Results (from the past 24 hour(s))   POCT Glucose    Collection Time: 06/01/22  8:23 PM   Result Value Ref Range    POC Glucose 183 (H) 70 - 99 MG/DL   POCT Glucose    Collection Time: 06/02/22  1:00 AM   Result Value Ref Range    POC Glucose 199 (H) 70 - 99 MG/DL   POCT Glucose    Collection Time: 06/02/22  4:42 AM   Result Value Ref Range    POC Glucose 225 (H) 70 - 99 MG/DL   SPECIMEN REJECTION    Collection Time: 06/02/22  8:00 AM   Result Value Ref Range    Rejected Test EBCG     Reason for Rejection UNABLE TO PERFORM TESTING:    Basic Metabolic Panel    Collection Time: 06/02/22  9:40 AM   Result Value Ref Range    Sodium 133 (L) 135 - 145 MMOL/L    Potassium 3.3 (L) 3.5 - 5.1 MMOL/L    Chloride 100 99 - 110 mMol/L    CO2 24 21 - 32 MMOL/L    Anion Gap 9 4 - 16    BUN 10 6 - 23 MG/DL    CREATININE 0.4 (L) 0.6 - 1.1 MG/DL    Glucose 173 (H) 70 - 99 MG/DL    Calcium 8.0 (L) 8.3 - 10.6 MG/DL    GFR Non-African American >60 >60 mL/min/1.73m2    GFR African American >60 >60 mL/min/1.73m2   Magnesium    Collection Time: 06/02/22  9:40 AM   Result Value Ref Range    Magnesium 1.4 (L) 1.8 - 2.4 mg/dl   Phosphorus    Collection Time: 06/02/22  9:40 AM   Result Value Ref Range    Phosphorus 2.0 (L) 2.5 - 4.9 MG/DL   POCT Glucose    Collection Time: 06/02/22  9:40 AM   Result Value Ref Range    POC Glucose 171 (H) 70 - 99 MG/DL   POCT Glucose    Collection Time: 06/02/22  1:27 PM   Result Value Ref Range    POC Glucose 181 (H) 70 - 99 MG/DL   POCT Glucose    Collection Time: 06/02/22  6:17 PM   Result Value Ref Range    POC Glucose 181 (H) 70 - 99 MG/DL          Body mass index is 29.88 kg/m².  Patient will follow up with PCP for further management as indicated unless otherwise specifically noted above        Coretta Blanco MD  6/2/2022 @ 8:09 PM

## 2022-06-03 NOTE — PROGRESS NOTES
Progress Note( Dr. Sarah Acevedo)  6/3/2022  Subjective:   Admit Date: 5/22/2022  PCP: Valencia Aj DO    Admitted For : Admitted on 5/22/2022 for cecal mass    Consulted For: Had hypoglycemic episode/better control of blood glucose    Interval History: Patient has been on TPN now    Denies any chest pains,   Denies SOB . Denies nausea or vomiting. Started on clear liquids t  No new bowel or bladder symptoms. Intake/Output Summary (Last 24 hours) at 6/3/2022 1945  Last data filed at 6/3/2022 1850  Gross per 24 hour   Intake --   Output 2950 ml   Net -2950 ml       DATA    CBC:   Recent Labs     06/01/22  0434   WBC 5.5   HGB 10.8*       CMP:  Recent Labs     06/01/22  0434 06/02/22  0940 06/03/22  0525    133* 132*   K 3.0* 3.3* 3.5    100 100   CO2 25 24 22   BUN 14 10 10   CREATININE 0.5* 0.4* 0.4*   CALCIUM 7.9* 8.0* 7.8*   PROT 4.6*  --   --    LABALBU 2.7*  --   --    BILITOT 0.3  --   --    ALKPHOS 34*  --   --    AST 14*  --   --    ALT 10  --   --      Lipids:   Lab Results   Component Value Date    CHOL 138 05/02/2019    HDL 54 05/02/2019    TRIG 161 06/01/2022     Glucose:  Recent Labs     06/03/22  1001 06/03/22  1210 06/03/22  1639   POCGLU 169* 173* 160*     [id number]:  Lab Results   Component Value Date    LABA1C 5.9 05/22/2022     High Sensitivity TSH:   Lab Results   Component Value Date    TSHHS 2.260 12/06/2017     Free T3:   Lab Results   Component Value Date    FT3 2.3 12/06/2017     Free T4:  Lab Results   Component Value Date    T4FREE 1.15 12/06/2017       XR ABDOMEN FOR NG/OG/NE TUBE PLACEMENT   Final Result   The NG tube is in good position. Small bowel distension has significantly improved from 05/29/2022. Mild patchy left basilar airspace disease, possibly atelectasis. XR ABDOMEN FOR NG/OG/NE TUBE PLACEMENT   Final Result   Tip of gastric tube within the stomach. MRI PELVIS W WO CONTRAST   Final Result   1.  Cystic lesion in the right adnexa appears unchanged from prior CT. No   internal enhancement is identified to suggest a solid mass. Overall,   findings are favored to represent a complex ovarian cyst over abscess for   which follow-up imaging with CT or MRI in 6 months is recommended. 2. 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 US PELVIS COMPLETE   Final Result   1. 2.7 cm complex hypoechoic lesion in the right adnexa/ovary correlating to   recent CT. Differential considerations include a complex cystic ovarian   lesion, tubo-ovarian abscess or pericolonic abscess given adjacent colon. Recommend a short-term follow-up to ensure complete resolution. If findings   persist, a follow-up MRI may be warranted. 2. Nonvisualization of the left ovary or endometrium. 3. Probable 2.7 cm intramural fibroid. US DUP ABD PEL RETRO SCROT COMPLETE   Final Result   1. 2.7 cm complex hypoechoic lesion in the right adnexa/ovary correlating to   recent CT. Differential considerations include a complex cystic ovarian   lesion, tubo-ovarian abscess or pericolonic abscess given adjacent colon. Recommend a short-term follow-up to ensure complete resolution. If findings   persist, a follow-up MRI may be warranted. 2. Nonvisualization of the left ovary or endometrium. 3. Probable 2.7 cm intramural fibroid.               Scheduled Medicines   Medications:    pantoprazole  40 mg IntraVENous Daily    thiamine (VITAMIN B1) IVPB  100 mg IntraVENous Q24H    potassium chloride  20 mEq Oral Once    fat emulsion  250 mL IntraVENous Once per day on Mon Tue Thu Fri    lidocaine PF  5 mL IntraDERmal Once    sodium chloride flush  5-40 mL IntraVENous 2 times per day    lidocaine   Topical Once    enoxaparin  40 mg SubCUTAneous Daily    sodium chloride  500 mL IntraVENous Once    atorvastatin  20 mg Oral Daily    Vitamin D  1,000 Units Oral Daily    cetirizine  10 mg Oral Daily    levothyroxine  137 mcg Oral Daily    losartan  100 mg Oral Daily    metoprolol tartrate  150 mg Oral Daily    metoprolol tartrate  100 mg Oral Nightly    oxybutynin  5 mg Oral BID    rOPINIRole  3 mg Oral Nightly    vitamin B-12  100 mcg Oral Daily    insulin lispro  0-6 Units SubCUTAneous Q4H    sodium chloride flush  5-40 mL IntraVENous 2 times per day      Infusions:    PN-Adult Premix 5/15 - Central 60 mL/hr at 06/03/22 1835    sodium chloride      dextrose Stopped (05/31/22 1905)    sodium chloride Stopped (05/25/22 1247)    sodium chloride 75 mL/hr at 06/03/22 1836         Objective:   Vitals: BP (!) 110/52   Pulse 70   Temp 98.2 °F (36.8 °C) (Oral)   Resp 18   Ht 5' (1.524 m)   Wt 145 lb 1 oz (65.8 kg)   SpO2 95%   BMI 28.33 kg/m²   General appearance: alert and cooperative with exam  Neck: no JVD or bruit  Thyroid : Normal lobes   Lungs: Has Vesicular Breath sounds   Heart:  regular rate and rhythm  Abdomen: soft, yes -tender; bowel sounds normal; no masses,  no organomegaly  Had right-sided hemicolectomy 5/27/2022  Musculoskeletal: Normal  Extremities: extremities normal, , no edema  Neurologic:  Awake, alert, oriented to name, place and time. Cranial nerves II-XII are grossly intact. Motor is  intact. Sensory pop with t.,  and gait is normal.    Assessment:     Patient Active Problem List:     Long-term insulin use in type 2 diabetes [facility])     Essential hypertension     Dyslipidemia     Abnormal EKG     Abnormal stress test     Cecum mass     Severe malnutrition (HCC)     Partial small bowel obstruction ([address]     Had hemicolectomy on 5/27/2022      INVASIVE ADENOCARCINOMA, TERMINAL ILEUM      Plan:     1. Reviewed POC blood glucose . Labs and X ray results   2. Reviewed Current Medicines   3. On  Correction bolus Humalog Insulin regime   4. Monitor Blood glucose frequently   5. Patient on TPN  6. Modified  the dose of Insulin/ other medicines as needed   7. Will follow     .      Coltno Saeed MD, MD

## 2022-06-03 NOTE — PROGRESS NOTES
ONCOLOGY HEMATOLOGY CARE ([facility])  PROGRESS NOTE      Patient was seen and examined today. Not in any acute distress and no overnight events. 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 MRI pelvis showed complex ovarian cyst without internal enhancement to suggest solid mass. Radiologist recommend f/u imaging with CT or MRI in 6 months. She is s/p surgery on 5/27/22. Final pathology revealed that she has stage IIIB terminal ileum adenocarcinoma (pT3, pN1c). She is recovering gradually from surgery. Has minimal nausea. No new complaints. PHYSICAL EXAM    Vitals: BP (!) 146/67   Pulse 74   Temp 97.7 °F (36.5 °C) (Oral)   Resp 17   Ht 5' (1.524 m)   Wt 145 lb 1 oz (65.8 kg)   SpO2 100%   BMI 28.33 kg/m²   CONSTITUTIONAL: awake, alert, cooperative, no apparent distress   EYES: pupils equal, round and reactive to light, sclera clear and conjunctiva normal  ENT: Normocephalic, without obvious abnormality, atraumatic  NECK: supple, symmetrical, no jugular venous distension and no carotid bruits   HEMATOLOGIC/LYMPHATIC: no cervical, supraclavicular or axillary lymphadenopathy   LUNGS: VBS, no wheezes, no increased work of breathing, no crackles, no rhonchi, clear to auscultation   CARDIOVASCULAR: regular rate and rhythm, normal S1 and S2, no murmur noted  ABDOMEN: surgical wound on dressing, mild tenderness to palpation,   MUSCULOSKELETAL: full range of motion noted, tone is normal  NEUROLOGIC: awake, alert, oriented to name, place and time. Motor skills grossly intact. SKIN: Normal skin color, texture, turgor and no jaundice.  Skin appears intact   EXTREMITIES: no LE edema, no leg swelling, no clubbing, no cyanosis,      LABORATORY RESULTS  CBC:   Recent Labs     06/01/22  0434   WBC 5.5   HGB 10.8*        BMP:    Recent Labs     06/01/22  0434 06/02/22  0940 06/03/22  0525    133* 132*   K 3.0* 3.3* 3.5    100 100   CO2 25 24 22   BUN 14 10 10   CREATININE 0.5* 0.4* 0.4*   GLUCOSE 130* 173* 186*     Hepatic:   Recent Labs     06/01/22  0434   AST 14*   ALT 10   BILITOT 0.3   ALKPHOS 34*     INR: No results for input(s): INR in the last 72 hours. ASSESSMENT  Cecal adenocarcinoma  Right adnexa complex cystic lesion     RECOMMENDATION  Reviewed MRI pelvis result. Will consider repeat CT or MRI in 6 months for ovarian complex cyst.     Final pathology revealed that she has stage IIIB terminal ileum adenocarcinoma (pT3, pN1c). I reviewed final path report with her. I also let her know that we ideally recommend adjuvant chemotherapy with either CapeOx (capecitabine + oxaliplatin) or single agent capecitabine (in frail patient). She is recovering gradually daily. Encouraged ambulation. Labs done on 6/3/22 were reviewed. We will follow the patient. Thank you We will continue to follow the patient. Thank you.

## 2022-06-03 NOTE — PLAN OF CARE
Problem: Discharge Planning  Goal: Discharge to home or other facility with appropriate resources  Outcome: Progressing     Problem: Safety - Adult  Goal: Free from fall injury  Outcome: Progressing     Problem: ABCDS Injury Assessment  Goal: Absence of physical injury  Outcome: Progressing     Problem: Chronic Conditions and Co-morbidities  Goal: Patient's chronic conditions and co-morbidity symptoms are monitored and maintained or improved  Outcome: Progressing     Problem: Pain  Goal: Verbalizes/displays adequate comfort level or baseline comfort level  Outcome: Progressing     Problem: Nutrition Deficit:  Goal: Optimize nutritional status  Outcome: Progressing  Flowsheets (Taken 6/3/2022 3143 by Carlos Alberto Soria, RD, LD)  Nutrient intake appropriate for improving, restoring, or maintaining nutritional needs:   Monitor oral intake, labs, and treatment plans   Recommend, monitor, and adjust tube feedings and TPN/PPN based on assessed needs

## 2022-06-03 NOTE — PROGRESS NOTES
adnexa appears unchanged from prior CT. No   internal enhancement is identified to suggest a solid mass. Overall,   findings are favored to represent a complex ovarian cyst over abscess for   which follow-up imaging with CT or MRI in 6 months is recommended. 2. 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 US PELVIS COMPLETE   Final Result   1. 2.7 cm complex hypoechoic lesion in the right adnexa/ovary correlating to   recent CT. Differential considerations include a complex cystic ovarian   lesion, tubo-ovarian abscess or pericolonic abscess given adjacent colon. Recommend a short-term follow-up to ensure complete resolution. If findings   persist, a follow-up MRI may be warranted. 2. Nonvisualization of the left ovary or endometrium. 3. Probable 2.7 cm intramural fibroid. US DUP ABD PEL RETRO SCROT COMPLETE   Final Result   1. 2.7 cm complex hypoechoic lesion in the right adnexa/ovary correlating to   recent CT. Differential considerations include a complex cystic ovarian   lesion, tubo-ovarian abscess or pericolonic abscess given adjacent colon. Recommend a short-term follow-up to ensure complete resolution. If findings   persist, a follow-up MRI may be warranted. 2. Nonvisualization of the left ovary or endometrium. 3. Probable 2.7 cm intramural fibroid.               Scheduled Medicines   Medications:    pantoprazole  40 mg IntraVENous Daily    thiamine (VITAMIN B1) IVPB  100 mg IntraVENous Q24H    potassium chloride  20 mEq Oral Once    fat emulsion  250 mL IntraVENous Once per day on Mon Tue Thu Fri    lidocaine PF  5 mL IntraDERmal Once    sodium chloride flush  5-40 mL IntraVENous 2 times per day    lidocaine   Topical Once    enoxaparin  40 mg SubCUTAneous Daily    sodium chloride  500 mL IntraVENous Once    atorvastatin  20 mg Oral Daily    Vitamin D  1,000 Units Oral Daily    cetirizine  10 mg Oral Daily    levothyroxine  137 mcg Oral Daily    losartan  100 mg Oral Daily    metoprolol tartrate  150 mg Oral Daily    metoprolol tartrate  100 mg Oral Nightly    oxybutynin  5 mg Oral BID    rOPINIRole  3 mg Oral Nightly    vitamin B-12  100 mcg Oral Daily    insulin lispro  0-6 Units SubCUTAneous Q4H    sodium chloride flush  5-40 mL IntraVENous 2 times per day      Infusions:    PN-Adult Premix 5/15 - Central 60 mL/hr at 06/03/22 1835    sodium chloride      dextrose Stopped (05/31/22 1905)    sodium chloride Stopped (05/25/22 1247)    sodium chloride 75 mL/hr at 06/03/22 1836         Objective:   Vitals: BP (!) 110/52   Pulse 70   Temp 98.2 °F (36.8 °C) (Oral)   Resp 18   Ht 5' (1.524 m)   Wt 145 lb 1 oz (65.8 kg)   SpO2 95%   BMI 28.33 kg/m²   General appearance: alert and cooperative with exam  Neck: no JVD or bruit  Thyroid : Normal lobes   Lungs: Has Vesicular Breath sounds   Heart:  regular rate and rhythm  Abdomen: soft, yes -tender; bowel sounds normal; no masses,  no organomegaly  Had right-sided hemicolectomy 5/27/2022  Musculoskeletal: Normal  Extremities: extremities normal, , no edema  Neurologic:  Awake, alert, oriented to name, place and time. Cranial nerves II-XII are grossly intact. Motor is  intact. Sensory pop with t.,  and gait is normal.    Assessment:     Patient Active Problem List:     Long-term insulin use in type 2 diabetes [facility])     Essential hypertension     Dyslipidemia     Abnormal EKG     Abnormal stress test     Cecum mass     Severe malnutrition (HCC)     Partial small bowel obstruction ([address]     Had hemicolectomy on 5/27/2022      INVASIVE ADENOCARCINOMA, TERMINAL ILEUM      Plan:     1. Reviewed POC blood glucose . Labs and X ray results   2. Reviewed Current Medicines   3. On  Correction bolus Humalog Insulin regime   4. Monitor Blood glucose frequently   5. Patient on TPN  6. Modified  the dose of Insulin/ other medicines as needed   7. Will follow     .      Eran Pemberton MD, MD

## 2022-06-03 NOTE — PROGRESS NOTES
GENERAL SURGERY PROGRESS NOTE    Chaka Chung is a 80 y.o. female 7 Days Post-Op  from robotic assisted right hemicolectomy for ileocecal mass. Subjective:  One episode of retching with some vomit, states feels like it's acid reflux. Pain improving. Mobilizing some.      CLAUDIO output 220ml/24h    Objective:    Vitals: VITALS:  BP (!) 146/67   Pulse 74   Temp 97.7 °F (36.5 °C) (Oral)   Resp 17   Ht 5' (1.524 m)   Wt 145 lb 1 oz (65.8 kg)   SpO2 100%   BMI 28.33 kg/m²     I/O: 06/02 0701 - 06/03 0700  In: -   Out: 2670 [Urine:2450; Drains:220]    Labs/Imaging Results:   Lab Results   Component Value Date     06/03/2022    K 3.5 06/03/2022    K 4.4 03/15/2018     06/03/2022    CO2 22 06/03/2022    BUN 10 06/03/2022    CREATININE 0.4 06/03/2022    GLUCOSE 186 06/03/2022    CALCIUM 7.8 06/03/2022      Lab Results   Component Value Date    WBC 5.5 06/01/2022    HGB 10.8 (L) 06/01/2022    HCT 33.8 (L) 06/01/2022    MCV 90.1 06/01/2022     06/01/2022         IV Fluids:   PN-Adult Premix 5/15 - Central    PN-Adult Premix 5/15 - Central Last Rate: 60 mL/hr at 06/02/22 1807    sodium chloride    dextrose Last Rate: Stopped (05/31/22 1905)    sodium chloride Last Rate: Stopped (05/25/22 1247)    sodium chloride Last Rate: 75 mL/hr at 06/02/22 0950    Scheduled Meds: pantoprazole, 40 mg, IntraVENous, Daily    potassium & sodium phosphates, 1 packet, Oral, 4x Daily    thiamine (VITAMIN B1) IVPB, 100 mg, IntraVENous, Q24H    potassium chloride, 20 mEq, Oral, Once    fat emulsion, 250 mL, IntraVENous, Once per day on Mon Tue Thu Fri    lidocaine PF, 5 mL, IntraDERmal, Once    sodium chloride flush, 5-40 mL, IntraVENous, 2 times per day    lidocaine, , Topical, Once    enoxaparin, 40 mg, SubCUTAneous, Daily    sodium chloride, 500 mL, IntraVENous, Once    atorvastatin, 20 mg, Oral, Daily    Vitamin D, 1,000 Units, Oral, Daily    cetirizine, 10 mg, Oral, Daily   levothyroxine, 137 mcg, Oral, Daily    losartan, 100 mg, Oral, Daily    metoprolol tartrate, 150 mg, Oral, Daily    metoprolol tartrate, 100 mg, Oral, Nightly    oxybutynin, 5 mg, Oral, BID    rOPINIRole, 3 mg, Oral, Nightly    vitamin B-12, 100 mcg, Oral, Daily    insulin lispro, 0-6 Units, SubCUTAneous, Q4H    sodium chloride flush, 5-40 mL, IntraVENous, 2 times per day    Physical Exam:  General: A&O x 3, no distress. HEENT: Anicteric sclerae, MMM. Extremities: No edema bilat LE. Abdomen: Soft, appropriately tender, mildly distended. Incisions intact with staples. Multiple skin tears from tape      Assessment and Plan:  80 y.o. female s/p robotic assisted right colectomy. Doing ok. Patient Active Problem List:     Long-term insulin use in type 2 diabetes ([facility] [address]     Essential hypertension     Dyslipidemia     Abnormal EKG     Abnormal stress test     Cecum mass    - Discussed findings and options with Dejuan Paige. - Continue clears today, she does not like most of the options. Will see how she does and if no vomiting may consider advancing to fulls to allow for more options. - Now having BMs, 4 today  - Cough drops/chloraseptic spray prn  - out of bed, IS, ambulate as able  - pathology now resulted, margins negative, 0/16 LN  -spoke with oncology, they are planning adjuvant chemo later. Patient is still undecided.     Jean Claude Decker, APRN - CNP

## 2022-06-03 NOTE — CARE COORDINATION
Chart reviewed. Cm notes that precert is pending ARU. CM is following. Pablo Renteria in ARU updated this CM is covering this patient today.

## 2022-06-04 LAB
ANION GAP SERPL CALCULATED.3IONS-SCNC: 8 MMOL/L (ref 4–16)
BUN BLDV-MCNC: 12 MG/DL (ref 6–23)
CALCIUM SERPL-MCNC: 7.6 MG/DL (ref 8.3–10.6)
CHLORIDE BLD-SCNC: 102 MMOL/L (ref 99–110)
CO2: 21 MMOL/L (ref 21–32)
CREAT SERPL-MCNC: 0.5 MG/DL (ref 0.6–1.1)
GFR AFRICAN AMERICAN: >60 ML/MIN/1.73M2
GFR NON-AFRICAN AMERICAN: >60 ML/MIN/1.73M2
GLUCOSE BLD-MCNC: 132 MG/DL (ref 70–99)
GLUCOSE BLD-MCNC: 140 MG/DL (ref 70–99)
GLUCOSE BLD-MCNC: 150 MG/DL (ref 70–99)
GLUCOSE BLD-MCNC: 155 MG/DL (ref 70–99)
GLUCOSE BLD-MCNC: 174 MG/DL (ref 70–99)
MAGNESIUM: 1.6 MG/DL (ref 1.8–2.4)
PHOSPHORUS: 2.9 MG/DL (ref 2.5–4.9)
POTASSIUM SERPL-SCNC: 3.9 MMOL/L (ref 3.5–5.1)
SODIUM BLD-SCNC: 131 MMOL/L (ref 135–145)

## 2022-06-04 PROCEDURE — 2500000003 HC RX 250 WO HCPCS: Performed by: SURGERY

## 2022-06-04 PROCEDURE — 1200000000 HC SEMI PRIVATE

## 2022-06-04 PROCEDURE — 6360000002 HC RX W HCPCS: Performed by: SURGERY

## 2022-06-04 PROCEDURE — 6360000002 HC RX W HCPCS: Performed by: NURSE PRACTITIONER

## 2022-06-04 PROCEDURE — 2580000003 HC RX 258: Performed by: SURGERY

## 2022-06-04 PROCEDURE — 6370000000 HC RX 637 (ALT 250 FOR IP): Performed by: SURGERY

## 2022-06-04 PROCEDURE — 80048 BASIC METABOLIC PNL TOTAL CA: CPT

## 2022-06-04 PROCEDURE — 97535 SELF CARE MNGMENT TRAINING: CPT

## 2022-06-04 PROCEDURE — 99024 POSTOP FOLLOW-UP VISIT: CPT | Performed by: NURSE PRACTITIONER

## 2022-06-04 PROCEDURE — 84100 ASSAY OF PHOSPHORUS: CPT

## 2022-06-04 PROCEDURE — APPNB30 APP NON BILLABLE TIME 0-30 MINS: Performed by: NURSE PRACTITIONER

## 2022-06-04 PROCEDURE — 97112 NEUROMUSCULAR REEDUCATION: CPT

## 2022-06-04 PROCEDURE — 83735 ASSAY OF MAGNESIUM: CPT

## 2022-06-04 PROCEDURE — 94761 N-INVAS EAR/PLS OXIMETRY MLT: CPT

## 2022-06-04 PROCEDURE — C9113 INJ PANTOPRAZOLE SODIUM, VIA: HCPCS | Performed by: NURSE PRACTITIONER

## 2022-06-04 PROCEDURE — 82962 GLUCOSE BLOOD TEST: CPT

## 2022-06-04 RX ORDER — MAGNESIUM SULFATE IN WATER 40 MG/ML
2000 INJECTION, SOLUTION INTRAVENOUS ONCE
Status: COMPLETED | OUTPATIENT
Start: 2022-06-04 | End: 2022-06-04

## 2022-06-04 RX ADMIN — ENOXAPARIN SODIUM 40 MG: 100 INJECTION SUBCUTANEOUS at 10:40

## 2022-06-04 RX ADMIN — ACETAMINOPHEN 650 MG: 325 TABLET ORAL at 12:50

## 2022-06-04 RX ADMIN — INSULIN LISPRO 1 UNITS: 100 INJECTION, SOLUTION INTRAVENOUS; SUBCUTANEOUS at 05:26

## 2022-06-04 RX ADMIN — OXYCODONE HYDROCHLORIDE 5 MG: 5 TABLET ORAL at 10:40

## 2022-06-04 RX ADMIN — SODIUM CHLORIDE, PRESERVATIVE FREE 10 ML: 5 INJECTION INTRAVENOUS at 10:42

## 2022-06-04 RX ADMIN — ONDANSETRON 4 MG: 2 INJECTION INTRAMUSCULAR; INTRAVENOUS at 20:34

## 2022-06-04 RX ADMIN — PANTOPRAZOLE SODIUM 40 MG: 40 INJECTION, POWDER, FOR SOLUTION INTRAVENOUS at 10:40

## 2022-06-04 RX ADMIN — SODIUM CHLORIDE, PRESERVATIVE FREE 10 ML: 5 INJECTION INTRAVENOUS at 10:41

## 2022-06-04 RX ADMIN — OXYBUTYNIN CHLORIDE 5 MG: 5 TABLET ORAL at 10:39

## 2022-06-04 RX ADMIN — LEVOTHYROXINE SODIUM 137 MCG: 25 TABLET ORAL at 06:31

## 2022-06-04 RX ADMIN — Medication 100 MCG: at 10:40

## 2022-06-04 RX ADMIN — MAGNESIUM SULFATE HEPTAHYDRATE 2000 MG: 2 INJECTION, SOLUTION INTRAVENOUS at 12:35

## 2022-06-04 RX ADMIN — OXYBUTYNIN CHLORIDE 5 MG: 5 TABLET ORAL at 20:50

## 2022-06-04 RX ADMIN — METOPROLOL TARTRATE 100 MG: 50 TABLET, FILM COATED ORAL at 20:50

## 2022-06-04 RX ADMIN — SODIUM CHLORIDE, PRESERVATIVE FREE 10 ML: 5 INJECTION INTRAVENOUS at 20:50

## 2022-06-04 RX ADMIN — Medication 1000 UNITS: at 10:39

## 2022-06-04 RX ADMIN — MORPHINE SULFATE 4 MG: 4 INJECTION, SOLUTION INTRAMUSCULAR; INTRAVENOUS at 20:43

## 2022-06-04 RX ADMIN — POTASSIUM CHLORIDE: 2 INJECTION, SOLUTION, CONCENTRATE INTRAVENOUS at 18:19

## 2022-06-04 RX ADMIN — INSULIN LISPRO 1 UNITS: 100 INJECTION, SOLUTION INTRAVENOUS; SUBCUTANEOUS at 12:34

## 2022-06-04 RX ADMIN — ROPINIROLE HYDROCHLORIDE 3 MG: 1 TABLET, FILM COATED ORAL at 20:50

## 2022-06-04 RX ADMIN — INSULIN LISPRO 1 UNITS: 100 INJECTION, SOLUTION INTRAVENOUS; SUBCUTANEOUS at 18:15

## 2022-06-04 RX ADMIN — ATORVASTATIN CALCIUM 20 MG: 10 TABLET, FILM COATED ORAL at 10:39

## 2022-06-04 RX ADMIN — THIAMINE HYDROCHLORIDE 100 MG: 100 INJECTION, SOLUTION INTRAMUSCULAR; INTRAVENOUS at 18:21

## 2022-06-04 RX ADMIN — CETIRIZINE HYDROCHLORIDE 10 MG: 10 TABLET, FILM COATED ORAL at 10:39

## 2022-06-04 ASSESSMENT — PAIN DESCRIPTION - LOCATION
LOCATION: HIP
LOCATION: HIP

## 2022-06-04 ASSESSMENT — PAIN SCALES - GENERAL
PAINLEVEL_OUTOF10: 8
PAINLEVEL_OUTOF10: 4
PAINLEVEL_OUTOF10: 3
PAINLEVEL_OUTOF10: 6

## 2022-06-04 ASSESSMENT — PAIN DESCRIPTION - ORIENTATION: ORIENTATION: RIGHT

## 2022-06-04 ASSESSMENT — PAIN DESCRIPTION - DESCRIPTORS: DESCRIPTORS: ACHING;CRAMPING

## 2022-06-04 NOTE — PROGRESS NOTES
GENERAL SURGERY PROGRESS NOTE    Silverio Jernigan is a 80 y.o. female 6 Days Post-Op  from robotic assisted right hemicolectomy for ileocecal mass. Subjective:  Nausea is okay. But still not taking in much PO. Pain improving. Mobilizing some.  Having sciatic nerve pain    CLAUDIO output 210ml/24h    Objective:    Vitals: VITALS:  BP (!) 119/45   Pulse 77   Temp 98 °F (36.7 °C) (Oral)   Resp 18   Ht 5' (1.524 m)   Wt 145 lb 1 oz (65.8 kg)   SpO2 100%   BMI 28.33 kg/m²     I/O: 06/03 0701 - 06/04 0700  In: 51701.8 [I.V.:48564.4]  Out: 2010 [Urine:1800; Drains:210]    Labs/Imaging Results:   Lab Results   Component Value Date     06/04/2022    K 3.9 06/04/2022    K 4.4 03/15/2018     06/04/2022    CO2 21 06/04/2022    BUN 12 06/04/2022    CREATININE 0.5 06/04/2022    GLUCOSE 174 06/04/2022    CALCIUM 7.6 06/04/2022      Lab Results   Component Value Date    WBC 5.5 06/01/2022    HGB 10.8 (L) 06/01/2022    HCT 33.8 (L) 06/01/2022    MCV 90.1 06/01/2022     06/01/2022         IV Fluids: PN-Adult Premix 5/15 - Central    PN-Adult Premix 5/15 - Central Last Rate: 60 mL/hr at 06/03/22 1835    sodium chloride    dextrose Last Rate: Stopped (05/31/22 1905)    sodium chloride Last Rate: Stopped (05/25/22 1247)    Scheduled Meds:   magnesium sulfate, 2,000 mg, IntraVENous, Once    pantoprazole, 40 mg, IntraVENous, Daily    thiamine (VITAMIN B1) IVPB, 100 mg, IntraVENous, Q24H    potassium chloride, 20 mEq, Oral, Once    fat emulsion, 250 mL, IntraVENous, Once per day on Mon Tue Thu Fri    lidocaine PF, 5 mL, IntraDERmal, Once    sodium chloride flush, 5-40 mL, IntraVENous, 2 times per day    lidocaine, , Topical, Once    enoxaparin, 40 mg, SubCUTAneous, Daily    atorvastatin, 20 mg, Oral, Daily    Vitamin D, 1,000 Units, Oral, Daily    cetirizine, 10 mg, Oral, Daily    levothyroxine, 137 mcg, Oral, Daily    losartan, 100 mg, Oral, Daily    metoprolol tartrate, 150 mg, Oral, Daily    metoprolol tartrate, 100 mg, Oral, Nightly    oxybutynin, 5 mg, Oral, BID    rOPINIRole, 3 mg, Oral, Nightly    vitamin B-12, 100 mcg, Oral, Daily    insulin lispro, 0-6 Units, SubCUTAneous, Q4H    sodium chloride flush, 5-40 mL, IntraVENous, 2 times per day    Physical Exam:  General: A&O x 3, no distress. HEENT: Anicteric sclerae, MMM. Extremities: No edema bilat LE. Abdomen: Soft, appropriately tender, mildly distended. Incisions intact with staples. Multiple skin tears from tape      Assessment and Plan:  80 y.o. female s/p robotic assisted right colectomy. Doing ok. Patient Active Problem List:     Long-term insulin use in type 2 diabetes ([facility] [address]     Essential hypertension     Dyslipidemia     Abnormal EKG     Abnormal stress test     Cecum mass    - Discussed findings and options with Sherryle Alken. - Continue fulls, she does not like most of the options. Will see how she does and if no vomiting may consider advancing to fulls to allow for more options.   - Continues to have BM  - Cough drops/chloraseptic spray prn  - out of bed, IS, ambulate as able  - pathology now resulted, margins negative, 0/16 LN  - Continue TPN until taking in more orally  - DC lara catheter      LAURE Tyson - CNP

## 2022-06-04 NOTE — PROGRESS NOTES
TPN LAB EVALUATION  Recent Labs     06/02/22  0940 06/02/22  0940 06/03/22  0525 06/03/22  0525 06/04/22  0330   *   < > 132*   < > 131*   K 3.3*   < > 3.5   < > 3.9      < > 100   < > 102   CALCIUM 8.0*   < > 7.8*   < > 7.6*   MG 1.4*   < > 1.8   < > 1.6*   PHOS 2.0*   < > 2.6   < > 2.9   GLUCOSE 173*  --  186*  --  174*    < > = values in this interval not displayed. Pharmacy to dose TPN per Dr. Paige Terry Day # 5    Indication: severe malnutrition    Goal Per Nutrition Recommendations/Plan:   1. Recommend initiating PN to d/t continued inadequate oral intake and malnutrition  2. PN recommendation: Clinimix 5/15 @ 60 ml/hr which will provide ~1308 kcal (average lipid and non lipid days) and 72 g protein    Central line: PICC double lumen    Diet: Clear Liquid    Maintenance Fluids: NS @ 75mL/hr (stopped last night)    Glucose:  GLUCOSE LEVELS LAST 72 HOURS                  (last 7 readings)    Recent Labs     06/02/22  0940 06/02/22  1327 06/03/22  0019 06/03/22  0446 06/03/22  0525 06/03/22  1001 06/03/22  1210 06/03/22  1639 06/03/22  2102 06/04/22  0330 06/04/22  0518   POCGLU 171*   < > 187* 178*  --  169* 173* 160* 170*  --  150*   GLUCOSE 173*  --   --   --  186*  --   --   --   --  174*  --     < > = values in this interval not displayed.  Goal <180    No readings above goal in the last 24 hours   Low dose sliding scale insulin coverage   No insulin in the TPN formula. TRIGLYCERIDES:  Triglycerides   Date Value Ref Range Status   06/01/2022 161 (H) <150 MG/DL Final   05/02/2019 152 (H) <150 MG/DL Final   02/09/2017 413 (H) <150 MG/DL Final     Triglycerides:   Goal < 400    TG @ 161 on 6/1/22, at goal   Patient is not currently receiving propofol   Continue standard lipid replacement four times weekly. LIVER FUNCTION LAB EVALUATION  No results for input(s): AST, ALT, ALKPHOS, BILITOT, INR in the last 72 hours.   Hepatic Function:   Previously WNL   Bititotal previously < 3.0, trace elements added to TPN      RENAL LAB EVALUATION  Estimated Creatinine Clearance: 67 mL/min (A) (based on SCr of 0.5 mg/dL (L)). Recent Labs     06/02/22  0940 06/03/22  0525 06/04/22  0330   BUN 10 10 12   CREATININE 0.4* 0.4* 0.5*     Renal Function and Electrolytes:   Na - slightly low, glucose levels slightly elevated. Will add sodium acetate 20meq to the TPN formula.  K - WNL   Ca - WNL (corrected)   Mg - low, supplement will mag sulfate 2gm ivpb x1 dose outside of TPN today.  Phos - WNL   Renal trends close to baseline. UOP ok. Formulation:   · continue on TPN 5/15 with Custom Electrolytes at goal rate of 60 ml/hr. · Standard lipids four times weekly. Component Order Dose Admin Dose   CLINIMIX 5/15 5 % Soln 2,000 mLs 2,000 mL   sodium acetate 2 MEQ/ML Soln 20 mEq 20 mEq   potassium phosphate 15 MMOLE/5ML Soln 30 mmol 30 mmol   potassium chloride 2 MEQ/ML Soln 40 mEq 40 mEq   magnesium sulfate 50 % Soln 2,000 mg 2,000 mg   adult multi-vitamin with vitamin k Inj 10 mLs 10 mL   trace minerals Cu-Mn-Se-Zn 978-26- MCG/ML Soln 1 mL        Pharmacy will continue to follow and adjust as appropriate. Thank you for the consult,    Александр Johnston.  Denice Ojeda, [facility]  11:32 AM  06/04/22

## 2022-06-04 NOTE — PROGRESS NOTES
Progress Note( Dr. Yari Chavez)  6/4/2022  Subjective:   Admit Date: 5/22/2022  PCP: Alberta Aj DO    Admitted For : Admitted on 5/22/2022 for cecal mass    Consulted For: Had hypoglycemic episode/better control of blood glucose    Interval History: Patient has been on TPN now    Denies any chest pains,   Denies SOB . Denies nausea or vomiting. Diet was advanced to full liquid   no new bowel or bladder symptoms. Intake/Output Summary (Last 24 hours) at 6/4/2022 0956  Last data filed at 6/4/2022 2786  Gross per 24 hour   Intake 04077.76 ml   Output 2010 ml   Net 53615.76 ml       DATA    CBC:   No results for input(s): WBC, HGB, PLT in the last 72 hours. CMP:  Recent Labs     06/02/22  0940 06/03/22  0525 06/04/22  0330   * 132* 131*   K 3.3* 3.5 3.9    100 102   CO2 24 22 21   BUN 10 10 12   CREATININE 0.4* 0.4* 0.5*   CALCIUM 8.0* 7.8* 7.6*     Lipids:   Lab Results   Component Value Date    CHOL 138 05/02/2019    HDL 54 05/02/2019    TRIG 161 06/01/2022     Glucose:  Recent Labs     06/03/22  1639 06/03/22  2102 06/04/22  0518   POCGLU 160* 170* 150*     [place]:  Lab Results   Component Value Date    LABA1C 5.9 05/22/2022     High Sensitivity TSH:   Lab Results   Component Value Date    TSHHS 2.260 12/06/2017     Free T3:   Lab Results   Component Value Date    FT3 2.3 12/06/2017     Free T4:  Lab Results   Component Value Date    T4FREE 1.15 12/06/2017       XR ABDOMEN FOR NG/OG/NE TUBE PLACEMENT   Final Result   The NG tube is in good position. Small bowel distension has significantly improved from 05/29/2022. Mild patchy left basilar airspace disease, possibly atelectasis. XR ABDOMEN FOR NG/OG/NE TUBE PLACEMENT   Final Result   Tip of gastric tube within the stomach. MRI PELVIS W WO CONTRAST   Final Result   1. Cystic lesion in the right adnexa appears unchanged from prior CT. No   internal enhancement is identified to suggest a solid mass.   Overall, findings are favored to represent a complex ovarian cyst over abscess for   which follow-up imaging with CT or MRI in 6 months is recommended. 2. 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 US PELVIS COMPLETE   Final Result   1. 2.7 cm complex hypoechoic lesion in the right adnexa/ovary correlating to   recent CT. Differential considerations include a complex cystic ovarian   lesion, tubo-ovarian abscess or pericolonic abscess given adjacent colon. Recommend a short-term follow-up to ensure complete resolution. If findings   persist, a follow-up MRI may be warranted. 2. Nonvisualization of the left ovary or endometrium. 3. Probable 2.7 cm intramural fibroid. US DUP ABD PEL RETRO SCROT COMPLETE   Final Result   1. 2.7 cm complex hypoechoic lesion in the right adnexa/ovary correlating to   recent CT. Differential considerations include a complex cystic ovarian   lesion, tubo-ovarian abscess or pericolonic abscess given adjacent colon. Recommend a short-term follow-up to ensure complete resolution. If findings   persist, a follow-up MRI may be warranted. 2. Nonvisualization of the left ovary or endometrium. 3. Probable 2.7 cm intramural fibroid.               Scheduled Medicines   Medications:    pantoprazole  40 mg IntraVENous Daily    thiamine (VITAMIN B1) IVPB  100 mg IntraVENous Q24H    potassium chloride  20 mEq Oral Once    fat emulsion  250 mL IntraVENous Once per day on Mon Tue Thu Fri    lidocaine PF  5 mL IntraDERmal Once    sodium chloride flush  5-40 mL IntraVENous 2 times per day    lidocaine   Topical Once    enoxaparin  40 mg SubCUTAneous Daily    atorvastatin  20 mg Oral Daily    Vitamin D  1,000 Units Oral Daily    cetirizine  10 mg Oral Daily    levothyroxine  137 mcg Oral Daily    losartan  100 mg Oral Daily    metoprolol tartrate  150 mg Oral Daily    metoprolol tartrate  100 mg Oral Nightly    oxybutynin  5 mg Oral BID    rOPINIRole  3 mg Oral Nightly    vitamin B-12  100 mcg Oral Daily    insulin lispro  0-6 Units SubCUTAneous Q4H    sodium chloride flush  5-40 mL IntraVENous 2 times per day      Infusions:    PN-Adult Premix 5/15 - Central 60 mL/hr at 06/03/22 1835    sodium chloride      dextrose Stopped (05/31/22 1905)    sodium chloride Stopped (05/25/22 1247)         Objective:   Vitals: BP (!) 115/54   Pulse 85   Temp 98.9 °F (37.2 °C) (Oral)   Resp 19   Ht 5' (1.524 m)   Wt 145 lb 1 oz (65.8 kg)   SpO2 100%   BMI 28.33 kg/m²   General appearance: alert and cooperative with exam  Neck: no JVD or bruit  Thyroid : Normal lobes   Lungs: Has Vesicular Breath sounds   Heart:  regular rate and rhythm  Abdomen: soft, yes -tender; bowel sounds normal; no masses,  no organomegaly  Had right-sided hemicolectomy 5/27/2022  Musculoskeletal: Normal  Extremities: extremities normal, , no edema  Neurologic:  Awake, alert, oriented to name, place and time. Cranial nerves II-XII are grossly intact. Motor is  intact. Sensory pop with t.,  and gait is normal.    Assessment:     Patient Active Problem List:     Long-term insulin use in type 2 diabetes [facility])     Essential hypertension     Dyslipidemia     Abnormal EKG     Abnormal stress test     Cecum mass     Severe malnutrition (HCC)     Partial small bowel obstruction ([address]     Had hemicolectomy on 5/27/2022      INVASIVE ADENOCARCINOMA, TERMINAL ILEUM      Plan:     1. Reviewed POC blood glucose . Labs and X ray results   2. Reviewed Current Medicines   3. On  Correction bolus Humalog Insulin regime   4. Monitor Blood glucose frequently   5. Patient on TPN  6. Modified  the dose of Insulin/ other medicines as needed   7. Will follow     .      Eran Pemberton MD, MD

## 2022-06-04 NOTE — PLAN OF CARE
Problem: Discharge Planning  Goal: Discharge to home or other facility with appropriate resources  6/4/2022 0343 by Jad London RN  Outcome: Progressing  6/3/2022 1551 by Louis Simon RN  Outcome: Progressing     Problem: Safety - Adult  Goal: Free from fall injury  6/4/2022 0343 by Jad London RN  Outcome: Progressing  6/3/2022 1551 by Louis Simon RN  Outcome: Progressing     Problem: ABCDS Injury Assessment  Goal: Absence of physical injury  6/4/2022 0343 by Jad London RN  Outcome: Progressing  6/3/2022 1551 by Louis Simon RN  Outcome: Progressing     Problem: Chronic Conditions and Co-morbidities  Goal: Patient's chronic conditions and co-morbidity symptoms are monitored and maintained or improved  6/4/2022 0343 by Jad London RN  Outcome: Progressing  6/3/2022 1551 by Louis Simon RN  Outcome: Progressing     Problem: Pain  Goal: Verbalizes/displays adequate comfort level or baseline comfort level  6/4/2022 0343 by Jad London RN  Outcome: Progressing  6/3/2022 1551 by Louis Simon RN  Outcome: Progressing     Problem: Nutrition Deficit:  Goal: Optimize nutritional status  6/4/2022 0343 by Jad London RN  Outcome: Progressing  6/3/2022 1551 by Louis Simon RN  Outcome: Progressing  Flowsheets (Taken 6/3/2022 0950 by Andreas Murray, RD, LD)  Nutrient intake appropriate for improving, restoring, or maintaining nutritional needs:   Monitor oral intake, labs, and treatment plans   Recommend, monitor, and adjust tube feedings and TPN/PPN based on assessed needs

## 2022-06-04 NOTE — PROGRESS NOTES
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 Jaspal Shaikh   -Oncology recommends adjuvant chemotherapy. Sarah Jones will decide about that. -- Nausea and vomiting: NG was removed on June 1    -- Nutrition-TPN started on May 31- continue TPN    --DM type II: Continue subcu insulin regimen including long-acting and short acting insulin as ordered, continue SSI protocol for more adequate glycemic control.  -May 31- Lantus 15 units at bedtime on MAR, but has not been receiving it at all. Discontinued Lantus.  -Mary 3-on insulin sliding scale    --HTN, controlled, cont home meds as ordered     --HLD: Continue continue statin therapy      DVT prophylaxis: Heparin/Lovenox      Aries Robles MD  6/3/2022 @ 9:37 PM           Objective: Intake/Output Summary (Last 24 hours) at 6/3/2022 2137  Last data filed at 6/3/2022 1850  Gross per 24 hour   Intake --   Output 2950 ml   Net -2950 ml        Vitals:   Vitals:    06/03/22 1852   BP:    Pulse:    Resp: 18   Temp:    SpO2:        Physical Exam:          Physical Exam  Constitutional:       Appearance: She is not diaphoretic. Eyes:      General:         Right eye: No discharge. Left eye: No discharge.    Pulmonary:      Effort: Pulmonary effort is normal.   Skin:     Coloration: Skin is not jaundiced. Neurological:      Cranial Nerves: No cranial nerve deficit. Labs:   Reviewed, as follows:  Recent Results (from the past 24 hour(s))   POCT Glucose    Collection Time: 06/03/22 12:19 AM   Result Value Ref Range    POC Glucose 187 (H) 70 - 99 MG/DL   POCT Glucose    Collection Time: 06/03/22  4:46 AM   Result Value Ref Range    POC Glucose 178 (H) 70 - 99 MG/DL   Basic Metabolic Panel    Collection Time: 06/03/22  5:25 AM   Result Value Ref Range    Sodium 132 (L) 135 - 145 MMOL/L    Potassium 3.5 3.5 - 5.1 MMOL/L    Chloride 100 99 - 110 mMol/L    CO2 22 21 - 32 MMOL/L    Anion Gap 10 4 - 16    BUN 10 6 - 23 MG/DL    CREATININE 0.4 (L) 0.6 - 1.1 MG/DL    Glucose 186 (H) 70 - 99 MG/DL    Calcium 7.8 (L) 8.3 - 10.6 MG/DL    GFR Non-African American >60 >60 mL/min/1.73m2    GFR African American >60 >60 mL/min/1.73m2   Magnesium    Collection Time: 06/03/22  5:25 AM   Result Value Ref Range    Magnesium 1.8 1.8 - 2.4 mg/dl   Phosphorus    Collection Time: 06/03/22  5:25 AM   Result Value Ref Range    Phosphorus 2.6 2.5 - 4.9 MG/DL   POCT Glucose    Collection Time: 06/03/22 10:01 AM   Result Value Ref Range    POC Glucose 169 (H) 70 - 99 MG/DL   POCT Glucose    Collection Time: 06/03/22 12:10 PM   Result Value Ref Range    POC Glucose 173 (H) 70 - 99 MG/DL   POCT Glucose    Collection Time: 06/03/22  4:39 PM   Result Value Ref Range    POC Glucose 160 (H) 70 - 99 MG/DL   POCT Glucose    Collection Time: 06/03/22  9:02 PM   Result Value Ref Range    POC Glucose 170 (H) 70 - 99 MG/DL          Body mass index is 28.33 kg/m².  Patient will follow up with PCP for further management as indicated unless otherwise specifically noted above        Derick Gill MD  6/3/2022 @ 9:37 PM

## 2022-06-04 NOTE — PROGRESS NOTES
Occupational Therapy  . Occupational Therapy Treatment Note    Name: Mere Doyle MRN: [id number] :   1934   Date:  2022   Admission Date: 2022 Room:  3010/3010-A      Pt would benefit from continued acute care OT services w/ discharge to ARU    Primary Problem:      Restrictions/Precautions:              Communication with other providers: nurse Dain Steinberg in room passing meds. Subjective:  Patient states:  My  had broken his hip. Pain:   Location, Type, Intensity (0/10 to 10/10):  initially no numerical rating, c/o sciatica pain once in recliner. Objective:    Observation: *patietn supine. Soiled linens from BM incontinence. Agreeable. C/o sciatica pain. attempted many positions in recliner for comfort. patient needing increased time to find comfort. Objective Measures:  Tele, IV, catheter    Treatment, including education:    ADL activity training was instructed today. Cues were given for safety, sequence, UE/LE placement, visual cues, and balance. Activities performed today included dressing, toileting, hand hygiene, and grooming. toileting- DEP. Soiled linens and diaper. Noted red buttocks and barbara area. LB dressing- Mod A to thread BLE into new depends, Max A for hike. DEP for socks. Grooming- SBA for hair brush while seated on recliner. Oral care- DEP for denture care, SBA for mouth wash. Facial hygiene- SBA    Therapeutic activity training was instructed today. Cues were given for safety, sequence, UE/LE placement, awareness, and balance. Activities performed today included bed mobility training, sup-sit, sit-stand, SPT. Rolling- L-R multiple trials for barbara care and linen change. Supine to EOB- Mod A With increased time and effort. Scooting hips forward- Min A With increased time and effort. Sitting balance- good. Stand to [facility]- 1st trial unsuccessful (noted air mattress ) 2nd trial- Min A. Standing balance- good with CGA.   patient able to take 4-5 steps to recliner CGA for safety. Sit to recliner- CGA plus cues to reach back for arm rest for safe and slow descent. patient able to scoot hips back in recliner. patient c/o sciatica pain on R side. repositioned many ways to find comfort. patient  unable to find comfort. 2nd session- request to get back to bed. Igor from recliner- 1st attempt unsuccessful. 2nd trial- Mod A. SPT- CGA for safety. Sit to EOB- CGA pus cues to reach back  Return supine- Max A for BLE. Patient rolled Min A into side lying. Pillows placed in between BLE for comfort and pressure relief. Nurse in at end of session. Cues were given for position, posture, kinesthetic sense, safety, recruitment, and rationale. Cues were verbal and/or tactile. Patient educated on role of OT , benefits of OT and rationale for therapeutic intervention. Benefit of OOB/EOB activities, benefit of participation with therapy, benefit of movement. All therapeutic intervention performed c emphasis on dynamic balance / standing tolerance to increase strength, endurance and activity tolerance for increased [place] c ADL tasks and func transfers / mobility. Patient left safely in bedside chair at end of session, with call light in reach, alarm on and nursing aware. Gait belt was used for func transfers / mobility. Assessment / Impression:    Patient's tolerance of treatment: well  Adverse Reaction: none  Significant change in status and impact:  nelson  Barriers to improvement: weakness.  pain      Plan for Next Session:    Continue with OT POC      Time in:  4030-2899  Time out:  6403-8730  Timed treatment minutes:  89  Total treatment time:  89      Electronically signed by:    KHURRAM Gomez COTA/L [id number]    6/4/2022, 11:54 AM

## 2022-06-05 LAB
BASOPHILS ABSOLUTE: 0 K/CU MM
BASOPHILS RELATIVE PERCENT: 0.3 % (ref 0–1)
DIFFERENTIAL TYPE: ABNORMAL
EOSINOPHILS ABSOLUTE: 0.2 K/CU MM
EOSINOPHILS RELATIVE PERCENT: 2.1 % (ref 0–3)
GLUCOSE BLD-MCNC: 123 MG/DL (ref 70–99)
GLUCOSE BLD-MCNC: 135 MG/DL (ref 70–99)
GLUCOSE BLD-MCNC: 157 MG/DL (ref 70–99)
GLUCOSE BLD-MCNC: 166 MG/DL (ref 70–99)
GLUCOSE BLD-MCNC: 176 MG/DL (ref 70–99)
GLUCOSE BLD-MCNC: 183 MG/DL (ref 70–99)
HCT VFR BLD CALC: 34.3 % (ref 37–47)
HEMOGLOBIN: 10.5 GM/DL (ref 12.5–16)
IMMATURE NEUTROPHIL %: 0.7 % (ref 0–0.43)
LYMPHOCYTES ABSOLUTE: 1.5 K/CU MM
LYMPHOCYTES RELATIVE PERCENT: 14.9 % (ref 24–44)
MCH RBC QN AUTO: 29 PG (ref 27–31)
MCHC RBC AUTO-ENTMCNC: 30.6 % (ref 32–36)
MCV RBC AUTO: 94.8 FL (ref 78–100)
MONOCYTES ABSOLUTE: 0.9 K/CU MM
MONOCYTES RELATIVE PERCENT: 9.4 % (ref 0–4)
NUCLEATED RBC %: 0 %
PDW BLD-RTO: 13.3 % (ref 11.7–14.9)
PLATELET # BLD: 239 K/CU MM (ref 140–440)
PMV BLD AUTO: 11.4 FL (ref 7.5–11.1)
RBC # BLD: 3.62 M/CU MM (ref 4.2–5.4)
SEGMENTED NEUTROPHILS ABSOLUTE COUNT: 7.1 K/CU MM
SEGMENTED NEUTROPHILS RELATIVE PERCENT: 72.6 % (ref 36–66)
TOTAL IMMATURE NEUTOROPHIL: 0.07 K/CU MM
TOTAL NUCLEATED RBC: 0 K/CU MM
WBC # BLD: 9.7 K/CU MM (ref 4–10.5)

## 2022-06-05 PROCEDURE — 6360000002 HC RX W HCPCS: Performed by: NURSE PRACTITIONER

## 2022-06-05 PROCEDURE — 6360000002 HC RX W HCPCS: Performed by: SURGERY

## 2022-06-05 PROCEDURE — 85025 COMPLETE CBC W/AUTO DIFF WBC: CPT

## 2022-06-05 PROCEDURE — APPNB30 APP NON BILLABLE TIME 0-30 MINS: Performed by: NURSE PRACTITIONER

## 2022-06-05 PROCEDURE — 6370000000 HC RX 637 (ALT 250 FOR IP): Performed by: SURGERY

## 2022-06-05 PROCEDURE — 1200000000 HC SEMI PRIVATE

## 2022-06-05 PROCEDURE — 2580000003 HC RX 258: Performed by: SURGERY

## 2022-06-05 PROCEDURE — 94761 N-INVAS EAR/PLS OXIMETRY MLT: CPT

## 2022-06-05 PROCEDURE — 99024 POSTOP FOLLOW-UP VISIT: CPT | Performed by: NURSE PRACTITIONER

## 2022-06-05 PROCEDURE — 6370000000 HC RX 637 (ALT 250 FOR IP): Performed by: NURSE PRACTITIONER

## 2022-06-05 PROCEDURE — 94150 VITAL CAPACITY TEST: CPT

## 2022-06-05 PROCEDURE — 2500000003 HC RX 250 WO HCPCS: Performed by: SURGERY

## 2022-06-05 PROCEDURE — C9113 INJ PANTOPRAZOLE SODIUM, VIA: HCPCS | Performed by: NURSE PRACTITIONER

## 2022-06-05 PROCEDURE — 36415 COLL VENOUS BLD VENIPUNCTURE: CPT

## 2022-06-05 PROCEDURE — 82962 GLUCOSE BLOOD TEST: CPT

## 2022-06-05 RX ORDER — SUCRALFATE 1 G/1
1 TABLET ORAL EVERY 6 HOURS SCHEDULED
Status: DISCONTINUED | OUTPATIENT
Start: 2022-06-05 | End: 2022-06-09 | Stop reason: HOSPADM

## 2022-06-05 RX ADMIN — ONDANSETRON 4 MG: 2 INJECTION INTRAMUSCULAR; INTRAVENOUS at 09:31

## 2022-06-05 RX ADMIN — INSULIN LISPRO 1 UNITS: 100 INJECTION, SOLUTION INTRAVENOUS; SUBCUTANEOUS at 01:36

## 2022-06-05 RX ADMIN — ENOXAPARIN SODIUM 40 MG: 100 INJECTION SUBCUTANEOUS at 09:21

## 2022-06-05 RX ADMIN — ROPINIROLE HYDROCHLORIDE 3 MG: 1 TABLET, FILM COATED ORAL at 20:37

## 2022-06-05 RX ADMIN — PANTOPRAZOLE SODIUM 40 MG: 40 INJECTION, POWDER, FOR SOLUTION INTRAVENOUS at 09:14

## 2022-06-05 RX ADMIN — ONDANSETRON 4 MG: 2 INJECTION INTRAMUSCULAR; INTRAVENOUS at 02:19

## 2022-06-05 RX ADMIN — POTASSIUM CHLORIDE: 2 INJECTION, SOLUTION, CONCENTRATE INTRAVENOUS at 17:37

## 2022-06-05 RX ADMIN — LEVOTHYROXINE SODIUM 137 MCG: 25 TABLET ORAL at 06:35

## 2022-06-05 RX ADMIN — INSULIN LISPRO 1 UNITS: 100 INJECTION, SOLUTION INTRAVENOUS; SUBCUTANEOUS at 05:45

## 2022-06-05 RX ADMIN — OXYBUTYNIN CHLORIDE 5 MG: 5 TABLET ORAL at 20:37

## 2022-06-05 RX ADMIN — INSULIN LISPRO 1 UNITS: 100 INJECTION, SOLUTION INTRAVENOUS; SUBCUTANEOUS at 09:17

## 2022-06-05 RX ADMIN — SODIUM CHLORIDE, PRESERVATIVE FREE 10 ML: 5 INJECTION INTRAVENOUS at 20:36

## 2022-06-05 RX ADMIN — SODIUM CHLORIDE, PRESERVATIVE FREE 10 ML: 5 INJECTION INTRAVENOUS at 09:23

## 2022-06-05 RX ADMIN — INSULIN LISPRO 1 UNITS: 100 INJECTION, SOLUTION INTRAVENOUS; SUBCUTANEOUS at 11:50

## 2022-06-05 RX ADMIN — OXYCODONE HYDROCHLORIDE 10 MG: 10 TABLET ORAL at 20:37

## 2022-06-05 RX ADMIN — MORPHINE SULFATE 2 MG: 2 INJECTION, SOLUTION INTRAMUSCULAR; INTRAVENOUS at 16:10

## 2022-06-05 RX ADMIN — SUCRALFATE 1 G: 1 TABLET ORAL at 16:08

## 2022-06-05 RX ADMIN — ONDANSETRON 4 MG: 2 INJECTION INTRAMUSCULAR; INTRAVENOUS at 20:34

## 2022-06-05 RX ADMIN — OXYCODONE HYDROCHLORIDE 10 MG: 10 TABLET ORAL at 09:45

## 2022-06-05 RX ADMIN — MORPHINE SULFATE 4 MG: 4 INJECTION, SOLUTION INTRAMUSCULAR; INTRAVENOUS at 02:12

## 2022-06-05 ASSESSMENT — PAIN SCALES - GENERAL
PAINLEVEL_OUTOF10: 5
PAINLEVEL_OUTOF10: 2
PAINLEVEL_OUTOF10: 10
PAINLEVEL_OUTOF10: 10

## 2022-06-05 ASSESSMENT — PAIN DESCRIPTION - LOCATION
LOCATION: ABDOMEN
LOCATION: ABDOMEN;HIP;LEG
LOCATION: ABDOMEN;HIP
LOCATION: LEG

## 2022-06-05 ASSESSMENT — PAIN DESCRIPTION - DESCRIPTORS
DESCRIPTORS: ACHING

## 2022-06-05 ASSESSMENT — PAIN DESCRIPTION - ORIENTATION
ORIENTATION: RIGHT

## 2022-06-05 NOTE — PROGRESS NOTES
GENERAL SURGERY PROGRESS NOTE    Mercedez Hay is a 80 y.o. female 5 Days Post-Op  from robotic assisted right hemicolectomy for ileocecal mass. Subjective:  Still with some nausea but still not taking in much PO. Pain improving. Mobilizing some. Sciatic nerve pain better today than yesterday.  Had small bowel movement today and passing gas, states that IV protonix is not helping her    CLAUDIO output 130ml/24h    Objective:    Vitals: VITALS:  BP (!) 106/40   Pulse 59   Temp 97.9 °F (36.6 °C) (Oral)   Resp 16   Ht 5' (1.524 m)   Wt 145 lb (65.8 kg)   SpO2 97%   BMI 28.32 kg/m²     I/O: 06/04 0701 - 06/05 0700  In: -   Out: 1130 [Urine:1000; Drains:130]    Labs/Imaging Results:   Lab Results   Component Value Date     06/04/2022    K 3.9 06/04/2022    K 4.4 03/15/2018     06/04/2022    CO2 21 06/04/2022    BUN 12 06/04/2022    CREATININE 0.5 06/04/2022    GLUCOSE 174 06/04/2022    CALCIUM 7.6 06/04/2022      Lab Results   Component Value Date    WBC 5.5 06/01/2022    HGB 10.8 (L) 06/01/2022    HCT 33.8 (L) 06/01/2022    MCV 90.1 06/01/2022     06/01/2022         IV Fluids:   PN-Adult Premix 5/15 - Central    PN-Adult Premix 5/15 - Central Last Rate: 60 mL/hr at 06/04/22 1819    sodium chloride    dextrose Last Rate: Stopped (05/31/22 1905)    sodium chloride Last Rate: Stopped (05/25/22 1247)    Scheduled Meds: sucralfate, 1 g, Oral, 4 times per day    pantoprazole, 40 mg, IntraVENous, Daily    potassium chloride, 20 mEq, Oral, Once    fat emulsion, 250 mL, IntraVENous, Once per day on Mon Tue Thu Fri    lidocaine PF, 5 mL, IntraDERmal, Once    sodium chloride flush, 5-40 mL, IntraVENous, 2 times per day    lidocaine, , Topical, Once    enoxaparin, 40 mg, SubCUTAneous, Daily    atorvastatin, 20 mg, Oral, Daily    Vitamin D, 1,000 Units, Oral, Daily    cetirizine, 10 mg, Oral, Daily    levothyroxine, 137 mcg, Oral, Daily    losartan, 100 mg, Oral, Daily   metoprolol tartrate, 150 mg, Oral, Daily    metoprolol tartrate, 100 mg, Oral, Nightly    oxybutynin, 5 mg, Oral, BID    rOPINIRole, 3 mg, Oral, Nightly    vitamin B-12, 100 mcg, Oral, Daily    insulin lispro, 0-6 Units, SubCUTAneous, Q4H    sodium chloride flush, 5-40 mL, IntraVENous, 2 times per day    Physical Exam:  General: A&O x 3, no distress. HEENT: Anicteric sclerae, MMM. Extremities: No edema bilat LE. Abdomen: Soft, appropriately tender, mildly distended. Incisions intact with staples. Multiple skin tears from tape. CLAUDIO drain in place    Assessment and Plan:  80 y.o. female s/p robotic assisted right colectomy. Doing ok. Patient Active Problem List:     Long-term insulin use in type 2 diabetes ([facility] [address]     Essential hypertension     Dyslipidemia     Abnormal EKG     Abnormal stress test     Cecum mass    - Discussed findings and options with Derotha Primrose. - Did okay on fulls. Will advance to soft diet. Struggling with nausea still but no vomiting.  She continues to pass gas and have BM  - Cough drops/chloraseptic spray prn  - out of bed, IS, ambulate as able  - pathology now resulted, margins negative, 0/16 LN  - Continue TPN until taking in more orally  - DC lara catheter  - Will add LAURE Varela - CNP

## 2022-06-05 NOTE — PROGRESS NOTES
Patient was nauseated all morning. She walked 100 feet in the afternoon and that was about all she could do. She did well.

## 2022-06-05 NOTE — PROGRESS NOTES
Progress Note( Dr. Arely Pulido)  6/5/2022  Subjective:   Admit Date: 5/22/2022  PCP: Stefani Aj DO    Admitted For : Admitted on 5/22/2022 for cecal mass    Consulted For: Had hypoglycemic episode/better control of blood glucose    Interval History: Patient has been on TPN now  Started on clear liquid diet patient complains of more nausea and possibly vomiting related this morning    Denies any chest pains,   Denies SOB . Planes of nausea but no vomiting yet    diet was advanced to full liquid unable to eat  no new bowel or bladder symptoms. Intake/Output Summary (Last 24 hours) at 6/5/2022 1246  Last data filed at 6/5/2022 1963  Gross per 24 hour   Intake --   Output 1180 ml   Net -1180 ml       DATA    CBC:   No results for input(s): WBC, HGB, PLT in the last 72 hours. CMP:  Recent Labs     06/03/22  0525 06/04/22  0330   * 131*   K 3.5 3.9    102   CO2 22 21   BUN 10 12   CREATININE 0.4* 0.5*   CALCIUM 7.8* 7.6*     Lipids:   Lab Results   Component Value Date    CHOL 138 05/02/2019    HDL 54 05/02/2019    TRIG 161 06/01/2022     Glucose:  Recent Labs     06/05/22  0454 06/05/22  0757 06/05/22  1126   POCGLU 183* 166* 176*     [id number]:  Lab Results   Component Value Date    LABA1C 5.9 05/22/2022     High Sensitivity TSH:   Lab Results   Component Value Date    TSHHS 2.260 12/06/2017     Free T3:   Lab Results   Component Value Date    FT3 2.3 12/06/2017     Free T4:  Lab Results   Component Value Date    T4FREE 1.15 12/06/2017       XR ABDOMEN FOR NG/OG/NE TUBE PLACEMENT   Final Result   The NG tube is in good position. Small bowel distension has significantly improved from 05/29/2022. Mild patchy left basilar airspace disease, possibly atelectasis. XR ABDOMEN FOR NG/OG/NE TUBE PLACEMENT   Final Result   Tip of gastric tube within the stomach. MRI PELVIS W WO CONTRAST   Final Result   1. Cystic lesion in the right adnexa appears unchanged from prior CT. No   internal enhancement is identified to suggest a solid mass. Overall,   findings are favored to represent a complex ovarian cyst over abscess for   which follow-up imaging with CT or MRI in 6 months is recommended. 2. 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 US PELVIS COMPLETE   Final Result   1. 2.7 cm complex hypoechoic lesion in the right adnexa/ovary correlating to   recent CT. Differential considerations include a complex cystic ovarian   lesion, tubo-ovarian abscess or pericolonic abscess given adjacent colon. Recommend a short-term follow-up to ensure complete resolution. If findings   persist, a follow-up MRI may be warranted. 2. Nonvisualization of the left ovary or endometrium. 3. Probable 2.7 cm intramural fibroid. US DUP ABD PEL RETRO SCROT COMPLETE   Final Result   1. 2.7 cm complex hypoechoic lesion in the right adnexa/ovary correlating to   recent CT. Differential considerations include a complex cystic ovarian   lesion, tubo-ovarian abscess or pericolonic abscess given adjacent colon. Recommend a short-term follow-up to ensure complete resolution. If findings   persist, a follow-up MRI may be warranted. 2. Nonvisualization of the left ovary or endometrium. 3. Probable 2.7 cm intramural fibroid.               Scheduled Medicines   Medications:    pantoprazole  40 mg IntraVENous Daily    potassium chloride  20 mEq Oral Once    fat emulsion  250 mL IntraVENous Once per day on Mon Tue Thu Fri    lidocaine PF  5 mL IntraDERmal Once    sodium chloride flush  5-40 mL IntraVENous 2 times per day    lidocaine   Topical Once    enoxaparin  40 mg SubCUTAneous Daily    atorvastatin  20 mg Oral Daily    Vitamin D  1,000 Units Oral Daily    cetirizine  10 mg Oral Daily    levothyroxine  137 mcg Oral Daily    losartan  100 mg Oral Daily    metoprolol tartrate  150 mg Oral Daily    metoprolol tartrate  100 mg Oral Nightly    oxybutynin  5 mg Oral BID  rOPINIRole  3 mg Oral Nightly    vitamin B-12  100 mcg Oral Daily    insulin lispro  0-6 Units SubCUTAneous Q4H    sodium chloride flush  5-40 mL IntraVENous 2 times per day      Infusions:    PN-Adult Premix 5/15 - Central      PN-Adult Premix 5/15 - Central 60 mL/hr at 06/04/22 1819    sodium chloride      dextrose Stopped (05/31/22 1905)    sodium chloride Stopped (05/25/22 1247)         Objective:   Vitals: BP (!) 106/40   Pulse 59   Temp 97.9 °F (36.6 °C) (Oral)   Resp 16   Ht 5' (1.524 m)   Wt 145 lb (65.8 kg)   SpO2 97%   BMI 28.32 kg/m²   General appearance: alert and cooperative with exam  Neck: no JVD or bruit  Thyroid : Normal lobes   Lungs: Has Vesicular Breath sounds   Heart:  regular rate and rhythm  Abdomen: soft, yes -tender; bowel sounds normal; no masses,  no organomegaly  Had right-sided hemicolectomy 5/27/2022  Musculoskeletal: Normal  Extremities: extremities normal, , no edema  Neurologic:  Awake, alert, oriented to name, place and time. Cranial nerves II-XII are grossly intact. Motor is  intact. Sensory pop with t.,  and gait is normal.    Assessment:     Patient Active Problem List:     Long-term insulin use in type 2 diabetes [facility])     Essential hypertension     Dyslipidemia     Abnormal EKG     Abnormal stress test     Cecum mass     Severe malnutrition ([facility])     Partial small bowel obstruction ([facility])     Had hemicolectomy on 5/27/2022      INVASIVE ADENOCARCINOMA, TERMINAL ILEUM      Plan:     1. Reviewed POC blood glucose . Labs and X ray results   2. Reviewed Current Medicines   3. On  Correction bolus Humalog Insulin regime   4. Monitor Blood glucose frequently   5. Patient on TPN  6. Modified  the dose of Insulin/ other medicines as needed   7. Will follow     .      Cyndi Damico MD, MD

## 2022-06-05 NOTE — PROGRESS NOTES
TPN LAB EVALUATION  Recent Labs     06/03/22  0525 06/03/22  0525 06/04/22  0330   *   < > 131*   K 3.5   < > 3.9      < > 102   CALCIUM 7.8*   < > 7.6*   MG 1.8   < > 1.6*   PHOS 2.6   < > 2.9   GLUCOSE 186*  --  174*    < > = values in this interval not displayed. Pharmacy to dose TPN per Dr. Hosea Sandoval Day # 6    Indication: severe malnutrition    Goal Per Nutrition Recommendations/Plan:   1. Recommend initiating PN to d/t continued inadequate oral intake and malnutrition  2. PN recommendation: Clinimix 5/15 @ 60 ml/hr which will provide ~1308 kcal (average lipid and non lipid days) and 72 g protein    Central line: PICC double lumen    Diet: Full Liquid    Maintenance Fluids: none    Glucose:  GLUCOSE LEVELS LAST 72 HOURS                  (last 7 readings)    Recent Labs     06/03/22  0525 06/03/22  1001 06/03/22  2102 06/04/22  0330 06/04/22  0518 06/04/22  1130 06/04/22  1636 06/04/22  2038 06/05/22  0032 06/05/22  0454 06/05/22  0757   POCGLU  --    < >   < >  --  150* 155* 140* 132* 157* 183* 166*   GLUCOSE 186*  --   --  174*  --   --   --   --   --   --   --     < > = values in this interval not displayed.  Goal <180    No readings above goal in the last 24 hours   Low dose sliding scale insulin coverage   No insulin in the TPN formula. TRIGLYCERIDES:  Triglycerides   Date Value Ref Range Status   06/01/2022 161 (H) <150 MG/DL Final   05/02/2019 152 (H) <150 MG/DL Final   02/09/2017 413 (H) <150 MG/DL Final     Triglycerides:   Goal < 400    TG @ 161 on 6/1/22, at goal   Patient is not currently receiving propofol   Continue standard lipid replacement four times weekly. LIVER FUNCTION LAB EVALUATION  No results for input(s): AST, ALT, ALKPHOS, BILITOT, INR in the last 72 hours.   Hepatic Function:   Previously WNL   Bititotal previously < 3.0, trace elements added to TPN      RENAL LAB EVALUATION  Estimated Creatinine Clearance: 67 mL/min (A) (based on SCr of 0.5 mg/dL (L)). Recent Labs     06/03/22  0525 06/04/22  0330   BUN 10 12   CREATININE 0.4* 0.5*     Renal Function and Electrolytes:   Na - previously low, sodium acetate added to the TPN formula   K - previuously WNL   Ca - previously WNL (corrected)   Mg - previously low, mag sulfate 2gm ivpb given yesterday   Phos - previously WNL   TPN labs ordered for M/W/F   Renal trends previosuly close to baseline. UOP good. Formulation:   · continue same TPN 5/15 with Custom Electrolytes at goal rate of 60 ml/hr. · Standard lipids four times weekly. Component Order Dose Admin Dose   CLINIMIX 5/15 5 % Soln 2,000 mLs 2,000 mL   sodium acetate 2 MEQ/ML Soln 20 mEq 20 mEq   potassium phosphate 15 MMOLE/5ML Soln 30 mmol 30 mmol   potassium chloride 2 MEQ/ML Soln 40 mEq 40 mEq   magnesium sulfate 50 % Soln 2,000 mg 2,000 mg   adult multi-vitamin with vitamin k Inj 10 mLs 10 mL   trace minerals Cu-Mn-Se-Zn 792-84- MCG/ML Soln 1 mL        Pharmacy will continue to follow and adjust as appropriate. Thank you for the consult,    Zach Arnold.  ANGELIA Santana [facility]  9:55 AM  06/05/22

## 2022-06-05 NOTE — PROGRESS NOTES
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 Elsa Nagel   -Oncology recommends adjuvant chemotherapy. Lucita Trujillo will decide about that. -- Nausea and vomiting: NG was removed on June 1    -- Nutrition-TPN started on May 31- continue TPN    --DM type II: Continue subcu insulin regimen including long-acting and short acting insulin as ordered, continue SSI protocol for more adequate glycemic control.  -May 31- Lantus 15 units at bedtime on MAR, but has not been receiving it at all. Discontinued Lantus.  -Mary 3-on insulin sliding scale-continue insulin    --HTN, controlled, cont home meds as ordered     --HLD: Continue continue statin therapy      DVT prophylaxis: Heparin/Lovenox      Vannesa Smith MD  6/4/2022 @ 8:33 PM           Objective: Intake/Output Summary (Last 24 hours) at 6/4/2022 2033  Last data filed at 6/4/2022 1828  Gross per 24 hour   Intake 71686.76 ml   Output 2160 ml   Net 92482.76 ml        Vitals:   Vitals:    06/04/22 1637   BP: (!) 121/44   Pulse: 66   Resp: 16   Temp:    SpO2: 98%       Physical Exam:          Physical Exam  Constitutional:       Appearance: She is not diaphoretic. Eyes:      General:         Right eye: No discharge. Left eye: No discharge.    Pulmonary:      Effort: Pulmonary effort is normal.   Skin:     Coloration: Skin is not jaundiced. Neurological:      Cranial Nerves: No cranial nerve deficit. Labs:   Reviewed, as follows:  Recent Results (from the past 24 hour(s))   POCT Glucose    Collection Time: 06/03/22  9:02 PM   Result Value Ref Range    POC Glucose 170 (H) 70 - 99 MG/DL   Basic Metabolic Panel    Collection Time: 06/04/22  3:30 AM   Result Value Ref Range    Sodium 131 (L) 135 - 145 MMOL/L    Potassium 3.9 3.5 - 5.1 MMOL/L    Chloride 102 99 - 110 mMol/L    CO2 21 21 - 32 MMOL/L    Anion Gap 8 4 - 16    BUN 12 6 - 23 MG/DL    CREATININE 0.5 (L) 0.6 - 1.1 MG/DL    Glucose 174 (H) 70 - 99 MG/DL    Calcium 7.6 (L) 8.3 - 10.6 MG/DL    GFR Non-African American >60 >60 mL/min/1.73m2    GFR African American >60 >60 mL/min/1.73m2   Magnesium    Collection Time: 06/04/22  3:30 AM   Result Value Ref Range    Magnesium 1.6 (L) 1.8 - 2.4 mg/dl   Phosphorus    Collection Time: 06/04/22  3:30 AM   Result Value Ref Range    Phosphorus 2.9 2.5 - 4.9 MG/DL   POCT Glucose    Collection Time: 06/04/22  5:18 AM   Result Value Ref Range    POC Glucose 150 (H) 70 - 99 MG/DL   POCT Glucose    Collection Time: 06/04/22 11:30 AM   Result Value Ref Range    POC Glucose 155 (H) 70 - 99 MG/DL   POCT Glucose    Collection Time: 06/04/22  4:36 PM   Result Value Ref Range    POC Glucose 140 (H) 70 - 99 MG/DL          Body mass index is 28.33 kg/m².  Patient will follow up with PCP for further management as indicated unless otherwise specifically noted above        Destin Lua MD  6/4/2022 @ 8:33 PM

## 2022-06-05 NOTE — PLAN OF CARE
Problem: Discharge Planning  Goal: Discharge to home or other facility with appropriate resources  6/5/2022 1620 by Gina Wing RN  Outcome: Progressing  6/5/2022 0423 by Rachna Morataya RN  Outcome: Progressing     Problem: Safety - Adult  Goal: Free from fall injury  6/5/2022 1620 by Gina Wing RN  Outcome: Progressing  6/5/2022 0423 by Rachna Morataya RN  Outcome: Progressing     Problem: ABCDS Injury Assessment  Goal: Absence of physical injury  6/5/2022 1620 by Gina Wing RN  Outcome: Progressing  6/5/2022 0423 by Rachna Morataya RN  Outcome: Progressing     Problem: Chronic Conditions and Co-morbidities  Goal: Patient's chronic conditions and co-morbidity symptoms are monitored and maintained or improved  6/5/2022 1620 by Gina Wing RN  Outcome: Progressing  6/5/2022 0423 by Rachna Morataya RN  Outcome: Progressing     Problem: Pain  Goal: Verbalizes/displays adequate comfort level or baseline comfort level  6/5/2022 1620 by Gina Wing RN  Outcome: Progressing  6/5/2022 0423 by Rachna Morataya RN  Outcome: Progressing     Problem: Nutrition Deficit:  Goal: Optimize nutritional status  6/5/2022 1620 by Gina Wing RN  Outcome: Progressing  6/5/2022 0423 by Rachna Morataya RN  Outcome: Progressing

## 2022-06-06 LAB
ANION GAP SERPL CALCULATED.3IONS-SCNC: 9 MMOL/L (ref 4–16)
ANION GAP SERPL CALCULATED.3IONS-SCNC: 9 MMOL/L (ref 4–16)
BASOPHILS ABSOLUTE: 0 K/CU MM
BASOPHILS RELATIVE PERCENT: 0.4 % (ref 0–1)
BUN BLDV-MCNC: 21 MG/DL (ref 6–23)
BUN BLDV-MCNC: 22 MG/DL (ref 6–23)
CALCIUM SERPL-MCNC: 8.3 MG/DL (ref 8.3–10.6)
CALCIUM SERPL-MCNC: 8.5 MG/DL (ref 8.3–10.6)
CHLORIDE BLD-SCNC: 100 MMOL/L (ref 99–110)
CHLORIDE BLD-SCNC: 99 MMOL/L (ref 99–110)
CHLORIDE URINE RANDOM: 93 MMOL/L (ref 43–210)
CO2: 17 MMOL/L (ref 21–32)
CO2: 18 MMOL/L (ref 21–32)
CREAT SERPL-MCNC: 0.6 MG/DL (ref 0.6–1.1)
CREAT SERPL-MCNC: 0.6 MG/DL (ref 0.6–1.1)
DIFFERENTIAL TYPE: ABNORMAL
EOSINOPHILS ABSOLUTE: 0.2 K/CU MM
EOSINOPHILS RELATIVE PERCENT: 2.4 % (ref 0–3)
GFR AFRICAN AMERICAN: >60 ML/MIN/1.73M2
GFR AFRICAN AMERICAN: >60 ML/MIN/1.73M2
GFR NON-AFRICAN AMERICAN: >60 ML/MIN/1.73M2
GFR NON-AFRICAN AMERICAN: >60 ML/MIN/1.73M2
GLUCOSE BLD-MCNC: 136 MG/DL (ref 70–99)
GLUCOSE BLD-MCNC: 150 MG/DL (ref 70–99)
GLUCOSE BLD-MCNC: 155 MG/DL (ref 70–99)
GLUCOSE BLD-MCNC: 172 MG/DL (ref 70–99)
GLUCOSE BLD-MCNC: 172 MG/DL (ref 70–99)
GLUCOSE BLD-MCNC: 173 MG/DL (ref 70–99)
GLUCOSE BLD-MCNC: 183 MG/DL (ref 70–99)
GLUCOSE BLD-MCNC: 185 MG/DL (ref 70–99)
HCT VFR BLD CALC: 33 % (ref 37–47)
HEMOGLOBIN: 10.5 GM/DL (ref 12.5–16)
IMMATURE NEUTROPHIL %: 0.5 % (ref 0–0.43)
LYMPHOCYTES ABSOLUTE: 1.2 K/CU MM
LYMPHOCYTES RELATIVE PERCENT: 14.8 % (ref 24–44)
MAGNESIUM: 1.6 MG/DL (ref 1.8–2.4)
MAGNESIUM: 1.7 MG/DL (ref 1.8–2.4)
MCH RBC QN AUTO: 28.7 PG (ref 27–31)
MCHC RBC AUTO-ENTMCNC: 31.8 % (ref 32–36)
MCV RBC AUTO: 90.2 FL (ref 78–100)
MONOCYTES ABSOLUTE: 0.7 K/CU MM
MONOCYTES RELATIVE PERCENT: 7.7 % (ref 0–4)
NUCLEATED RBC %: 0 %
OSMOLALITY: 285 MOS/L (ref 280–300)
PDW BLD-RTO: 13.2 % (ref 11.7–14.9)
PHOSPHORUS: 3.2 MG/DL (ref 2.5–4.9)
PHOSPHORUS: 3.2 MG/DL (ref 2.5–4.9)
PLATELET # BLD: 228 K/CU MM (ref 140–440)
PMV BLD AUTO: 11.1 FL (ref 7.5–11.1)
POTASSIUM SERPL-SCNC: 4.9 MMOL/L (ref 3.5–5.1)
POTASSIUM SERPL-SCNC: 4.9 MMOL/L (ref 3.5–5.1)
POTASSIUM, UR: 31 MMOL/L (ref 22–119)
RBC # BLD: 3.66 M/CU MM (ref 4.2–5.4)
SEGMENTED NEUTROPHILS ABSOLUTE COUNT: 6.2 K/CU MM
SEGMENTED NEUTROPHILS RELATIVE PERCENT: 74.2 % (ref 36–66)
SODIUM BLD-SCNC: 126 MMOL/L (ref 135–145)
SODIUM BLD-SCNC: 126 MMOL/L (ref 135–145)
SODIUM URINE: 61 MMOL/L (ref 35–167)
TOTAL IMMATURE NEUTOROPHIL: 0.04 K/CU MM
TOTAL NUCLEATED RBC: 0 K/CU MM
TSH HIGH SENSITIVITY: 2.01 UIU/ML (ref 0.27–4.2)
WBC # BLD: 8.4 K/CU MM (ref 4–10.5)

## 2022-06-06 PROCEDURE — 6360000002 HC RX W HCPCS: Performed by: SURGERY

## 2022-06-06 PROCEDURE — 2580000003 HC RX 258: Performed by: SURGERY

## 2022-06-06 PROCEDURE — 97530 THERAPEUTIC ACTIVITIES: CPT

## 2022-06-06 PROCEDURE — 97535 SELF CARE MNGMENT TRAINING: CPT

## 2022-06-06 PROCEDURE — 80048 BASIC METABOLIC PNL TOTAL CA: CPT

## 2022-06-06 PROCEDURE — 36415 COLL VENOUS BLD VENIPUNCTURE: CPT

## 2022-06-06 PROCEDURE — 94761 N-INVAS EAR/PLS OXIMETRY MLT: CPT

## 2022-06-06 PROCEDURE — 6370000000 HC RX 637 (ALT 250 FOR IP): Performed by: INTERNAL MEDICINE

## 2022-06-06 PROCEDURE — 99024 POSTOP FOLLOW-UP VISIT: CPT | Performed by: NURSE PRACTITIONER

## 2022-06-06 PROCEDURE — 82436 ASSAY OF URINE CHLORIDE: CPT

## 2022-06-06 PROCEDURE — APPNB15 APP NON BILLABLE TIME 0-15 MINS: Performed by: NURSE PRACTITIONER

## 2022-06-06 PROCEDURE — 6370000000 HC RX 637 (ALT 250 FOR IP): Performed by: NURSE PRACTITIONER

## 2022-06-06 PROCEDURE — 6360000002 HC RX W HCPCS: Performed by: NURSE PRACTITIONER

## 2022-06-06 PROCEDURE — 83935 ASSAY OF URINE OSMOLALITY: CPT

## 2022-06-06 PROCEDURE — 85025 COMPLETE CBC W/AUTO DIFF WBC: CPT

## 2022-06-06 PROCEDURE — C9113 INJ PANTOPRAZOLE SODIUM, VIA: HCPCS | Performed by: NURSE PRACTITIONER

## 2022-06-06 PROCEDURE — 84100 ASSAY OF PHOSPHORUS: CPT

## 2022-06-06 PROCEDURE — 84133 ASSAY OF URINE POTASSIUM: CPT

## 2022-06-06 PROCEDURE — 83930 ASSAY OF BLOOD OSMOLALITY: CPT

## 2022-06-06 PROCEDURE — 6370000000 HC RX 637 (ALT 250 FOR IP): Performed by: SURGERY

## 2022-06-06 PROCEDURE — 82962 GLUCOSE BLOOD TEST: CPT

## 2022-06-06 PROCEDURE — 2500000003 HC RX 250 WO HCPCS: Performed by: SURGERY

## 2022-06-06 PROCEDURE — 99232 SBSQ HOSP IP/OBS MODERATE 35: CPT | Performed by: INTERNAL MEDICINE

## 2022-06-06 PROCEDURE — 1200000000 HC SEMI PRIVATE

## 2022-06-06 PROCEDURE — 83735 ASSAY OF MAGNESIUM: CPT

## 2022-06-06 PROCEDURE — 84443 ASSAY THYROID STIM HORMONE: CPT

## 2022-06-06 PROCEDURE — 84300 ASSAY OF URINE SODIUM: CPT

## 2022-06-06 PROCEDURE — 97116 GAIT TRAINING THERAPY: CPT

## 2022-06-06 RX ORDER — MAGNESIUM SULFATE IN WATER 40 MG/ML
2000 INJECTION, SOLUTION INTRAVENOUS ONCE
Status: COMPLETED | OUTPATIENT
Start: 2022-06-06 | End: 2022-06-06

## 2022-06-06 RX ORDER — LIDOCAINE 4 G/G
1 PATCH TOPICAL DAILY
Status: DISCONTINUED | OUTPATIENT
Start: 2022-06-06 | End: 2022-06-09 | Stop reason: HOSPADM

## 2022-06-06 RX ADMIN — SODIUM CHLORIDE, PRESERVATIVE FREE 10 ML: 5 INJECTION INTRAVENOUS at 11:45

## 2022-06-06 RX ADMIN — PANTOPRAZOLE SODIUM 40 MG: 40 INJECTION, POWDER, FOR SOLUTION INTRAVENOUS at 11:41

## 2022-06-06 RX ADMIN — INSULIN LISPRO 1 UNITS: 100 INJECTION, SOLUTION INTRAVENOUS; SUBCUTANEOUS at 01:31

## 2022-06-06 RX ADMIN — ATORVASTATIN CALCIUM 20 MG: 10 TABLET, FILM COATED ORAL at 11:43

## 2022-06-06 RX ADMIN — SUCRALFATE 1 G: 1 TABLET ORAL at 11:42

## 2022-06-06 RX ADMIN — SUCRALFATE 1 G: 1 TABLET ORAL at 17:43

## 2022-06-06 RX ADMIN — LEVOTHYROXINE SODIUM 137 MCG: 25 TABLET ORAL at 06:07

## 2022-06-06 RX ADMIN — ONDANSETRON 4 MG: 2 INJECTION INTRAMUSCULAR; INTRAVENOUS at 09:45

## 2022-06-06 RX ADMIN — OXYCODONE HYDROCHLORIDE 10 MG: 10 TABLET ORAL at 06:09

## 2022-06-06 RX ADMIN — ONDANSETRON 4 MG: 2 INJECTION INTRAMUSCULAR; INTRAVENOUS at 17:42

## 2022-06-06 RX ADMIN — SOYBEAN OIL 250 ML: 20 INJECTION, SOLUTION INTRAVENOUS at 17:55

## 2022-06-06 RX ADMIN — MAGNESIUM SULFATE HEPTAHYDRATE 2000 MG: 2 INJECTION, SOLUTION INTRAVENOUS at 14:08

## 2022-06-06 RX ADMIN — SODIUM CHLORIDE, PRESERVATIVE FREE 10 ML: 5 INJECTION INTRAVENOUS at 21:59

## 2022-06-06 RX ADMIN — SODIUM CHLORIDE, PRESERVATIVE FREE 10 ML: 5 INJECTION INTRAVENOUS at 09:45

## 2022-06-06 RX ADMIN — SUCRALFATE 1 G: 1 TABLET ORAL at 06:07

## 2022-06-06 RX ADMIN — INSULIN LISPRO 1 UNITS: 100 INJECTION, SOLUTION INTRAVENOUS; SUBCUTANEOUS at 20:42

## 2022-06-06 RX ADMIN — Medication 1000 UNITS: at 11:42

## 2022-06-06 RX ADMIN — SUCRALFATE 1 G: 1 TABLET ORAL at 01:31

## 2022-06-06 RX ADMIN — Medication 100 MCG: at 11:43

## 2022-06-06 RX ADMIN — OXYBUTYNIN CHLORIDE 5 MG: 5 TABLET ORAL at 11:43

## 2022-06-06 RX ADMIN — ENOXAPARIN SODIUM 40 MG: 100 INJECTION SUBCUTANEOUS at 11:42

## 2022-06-06 RX ADMIN — MAGNESIUM SULFATE HEPTAHYDRATE: 500 INJECTION, SOLUTION INTRAMUSCULAR; INTRAVENOUS at 17:56

## 2022-06-06 RX ADMIN — CETIRIZINE HYDROCHLORIDE 10 MG: 10 TABLET, FILM COATED ORAL at 11:43

## 2022-06-06 ASSESSMENT — PAIN SCALES - GENERAL
PAINLEVEL_OUTOF10: 0
PAINLEVEL_OUTOF10: 8
PAINLEVEL_OUTOF10: 0

## 2022-06-06 ASSESSMENT — PAIN DESCRIPTION - ORIENTATION: ORIENTATION: RIGHT

## 2022-06-06 ASSESSMENT — PAIN DESCRIPTION - LOCATION: LOCATION: LEG

## 2022-06-06 NOTE — CARE COORDINATION
JANELLEW spoke with Cookie regarding precert. She stated that precert is still pending for ARU. LSW requested updated PT/OT notes WB note placed.

## 2022-06-06 NOTE — PROGRESS NOTES
Physical Therapy  Name: Joni Weems MRN: [id number] :   1934   Date:  2022   Admission Date: 2022 Room:  [address] [place] [address]   Restrictions/Precautions:        fall risk, abd surgery, CLAUDIO drain  Communication with other providers:  . CM needed updated PT notes  Subjective:  Patient states:  She is burping up a lot of acid, needed to change her depends and has to go to the bathroom. Requested to leave abd binder off d/t it being too tight and very uncomfortable  Pain:   Location, Type, Intensity (0/10 to 10/10):  3/10 abd  Objective:    Observation:  Pt was resting in the bed with a pan near her  Treatment, including education/measures:  Transfers   Rolling: min A of 1  Supine to sit :min A of 1 and time  Pt sat EOB for 6 min until her burping subsided  Scooting :SBA and VC's for hand placements  Sit to stand :mod A of 1 with time to prevent pain from toilet and bed  Pt voided, on the toilet and depends was changed with min set up and help for adjusting. Pt was able to wipe Ind  Stand to sit :min to mod A d/t chair height to toilet and chair  Gait:  Pt amb with RW for 10 ft x 2 with CGA  Gait Comments: with VC's' and TC's for B LE placement, walker placement and sequence throughout ambulation; with VC's and TC's to maintain upright posture in order to avoid COM shifting outside of VARUN; with VC's for PLB throughout ambulation  Left with CERRATO    Safety  Patient left safely in the chair, with call light/phone in reach with CERRATO in the room. Gait belt and was for transfers and gait.   Assessment / Impression:     Patient's tolerance of treatment:  Good, moved slowly d/t nausea, burping and abd pain   Adverse Reaction: none  Significant change in status and impact:  none  Barriers to improvement:  Endurance, nausea  Plan for Next Session:    Will cont to work towards pt's goals per her tolerance  Time in:  1030  Time out:  1108  Timed treatment minutes: 38    Total treatment time:  38  Previously filed

## 2022-06-06 NOTE — PROGRESS NOTES
Hospitalist    Currently on hold with insurance company for [employer]. ARU denied. Medical director recommends SNF.

## 2022-06-06 NOTE — PROGRESS NOTES
INTERNAL MED PROGRESS NOTE           Subjective:         June 5  -The TPN was not running when I saw her  -She reports she had passed gas today  -She denied complaints when I saw her    June 4  She was on TPN at 60 mL when I saw her  She was resting comfortably    June 2    Informed she had 1 episode of vomiting today  Electrolytes were off today  TPN is infusing  She appears comfortable when I saw her      June 1    The NG was removed today  She was sitting up in the chair and looked better today      May 31    She has been having nausea and vomiting a few days ago  NG was placed on May 29    Her blood sugar was low this afternoon  After that TPN was started      Assessment/Plan:       -- 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 Wortyh Fails   -Oncology recommends adjuvant chemotherapy. Jolanta Griffin will decide about that. -Await approval to go to acute rehab unit    -- Nausea and vomiting: NG was removed on June 1    -- Nutrition-TPN started on May 31- continue TPN    --DM type II: Continue subcu insulin regimen including long-acting and short acting insulin as ordered, continue SSI protocol for more adequate glycemic control.  -May 31- Lantus 15 units at bedtime on MAR, but has not been receiving it at all. Discontinued Lantus.  -Mary 3-on insulin sliding scale-continue insulin    --HTN, controlled, cont home meds as ordered     --HLD: Continue continue statin therapy      DVT prophylaxis: Heparin/Lovenox      Darrin Shaikh MD  6/5/2022 @ 9:04 PM           Objective:          Intake/Output Summary (Last 24 hours) at 6/5/2022 2104  Last data filed at 6/5/2022 1615  Gross per 24 hour   Intake --   Output 95 ml   Net -95 ml        Vitals:   Vitals:    06/05/22 2025   BP: (!) 118/47   Pulse: 75   Resp: 20   Temp: 97.8 °F (36.6 °C)   SpO2: 100%       Physical Exam:          Physical Exam  Constitutional:       Appearance: She is not diaphoretic. Eyes:      General:         Right eye: No discharge. Left eye: No discharge. Pulmonary:      Effort: Pulmonary effort is normal.   Skin:     Coloration: Skin is not jaundiced. Neurological:      Cranial Nerves: No cranial nerve deficit.                    Labs:   Reviewed, as follows:  Recent Results (from the past 24 hour(s))   POCT Glucose    Collection Time: 06/05/22 12:32 AM   Result Value Ref Range    POC Glucose 157 (H) 70 - 99 MG/DL   POCT Glucose    Collection Time: 06/05/22  4:54 AM   Result Value Ref Range    POC Glucose 183 (H) 70 - 99 MG/DL   POCT Glucose    Collection Time: 06/05/22  7:57 AM   Result Value Ref Range    POC Glucose 166 (H) 70 - 99 MG/DL   POCT Glucose    Collection Time: 06/05/22 11:26 AM   Result Value Ref Range    POC Glucose 176 (H) 70 - 99 MG/DL   CBC with Auto Differential    Collection Time: 06/05/22  2:08 PM   Result Value Ref Range    WBC 9.7 4.0 - 10.5 K/CU MM    RBC 3.62 (L) 4.2 - 5.4 M/CU MM    Hemoglobin 10.5 (L) 12.5 - 16.0 GM/DL    Hematocrit 34.3 (L) 37 - 47 %    MCV 94.8 78 - 100 FL    MCH 29.0 27 - 31 PG    MCHC 30.6 (L) 32.0 - 36.0 %    RDW 13.3 11.7 - 14.9 %    Platelets 259 319 - 302 K/CU MM    MPV 11.4 (H) 7.5 - 11.1 FL    Differential Type AUTOMATED DIFFERENTIAL     Segs Relative 72.6 (H) 36 - 66 %    Lymphocytes % 14.9 (L) 24 - 44 %    Monocytes % 9.4 (H) 0 - 4 %    Eosinophils % 2.1 0 - 3 %    Basophils % 0.3 0 - 1 %    Segs Absolute 7.1 K/CU MM    Lymphocytes Absolute 1.5 K/CU MM    Monocytes Absolute 0.9 K/CU MM    Eosinophils Absolute 0.2 K/CU MM    Basophils Absolute 0.0 K/CU MM    Nucleated RBC % 0.0 %    Total Nucleated RBC 0.0 K/CU MM    Total Immature Neutrophil 0.07 K/CU MM    Immature Neutrophil % 0.7 (H) 0 - 0.43 %   POCT Glucose    Collection Time: 06/05/22  4:48 PM   Result Value Ref Range    POC Glucose 123 (H) 70 - 99 MG/DL   POCT Glucose    Collection Time: 06/05/22  8:30 PM   Result Value Ref Range    POC Glucose 135 (H) 70 - 99 MG/DL          Body mass index is 28.32 kg/m².  Patient will follow up with PCP for further management as indicated unless otherwise specifically noted above        Thong Rodrigues MD  6/5/2022 @ 9:04 PM

## 2022-06-06 NOTE — PROGRESS NOTES
Occupational Therapy    Occupational Therapy Treatment Note    Name: Ced Dawn MRN: [id number] :   1934   Date:  2022   Admission Date: 2022 Room:  [address] [place] [address]-A     Primary Problem:  Cecum Mass    Restrictions/Precautions:           abdominal precautions, fall risk, CLAUDIO drain    Communication with other providers: PATRICIA Claire     Subjective:  Patient states:  Pt agreeable to session. Pain: sciatica 6/10 once seated in recliner chair     Objective:    Observation: Pt presented seated on toilet PTA present with pt. Objective Measures:  IV, CLAUDIO drain,     Treatment, including education:  Therapeutic Exercise:  Cues were given for technique, safety, recruitment, and rationale. Cues were verbal and/or tactile. While seated in recliner chair pt instructed through B UE strengthening for 3ex x10 reps with use of yellow theraband for shoulder flexion, horizontal abduction/adduction, and chest press with visual/verbal cues throughout to ensure proper technique. Self Care Training:   Cues were given for safety, sequence, UE/LE placement, visual cues, and balance. Activities performed today included toileting and grooming. Toilet transfer with Mod A with use of grab bar/RW with cues for UE placement. DEP for pull-up mgmt in standing with use of RW, pt completed hygiene previously with PTA. Grooming task of brushing hair with SPV while seated. Therapeutic Activity Training:   Therapeutic activity training was instructed today. Cues were given for safety, sequence, UE/LE placement, awareness, and balance. Activities performed today included STS transfer and functional mobility. Sit to stand transfer from recliner chair X2 trials with use of RW with Mod A and max cues for UE placement/technique for increased safety/Ind with functional transfers needed for ADL routine. Pt required seated RB between reps.      Functional mobility for 10' with use of RW with CGA and cues for safety with turning/walker mgmt for increased safety. Pt agreeable to sitting up in recliner chair. Handoff to RN. Patient educated on role of OT , benefits of OT and rationale for therapeutic intervention. Educated on need to be patient advocate, benefit of EOB activity. Patient left safely in recliner at end of session, with call light in reach, alarm on and nursing aware. Gait belt was used for func transfers / mobility. Assessment / Impression:    Patient's tolerance of treatment: Fair   Adverse Reaction: none  Significant change in status and impact:  none  Barriers to improvement: activity tolerance, nausea, strength       Plan for Next Session:    Cont OOB activity     Time in:  1100  Time out:  1126  Timed treatment minutes:  26  Total treatment time:  26      Electronically signed by:     KHURRAM German,   6/6/2022, 11:15 AM

## 2022-06-06 NOTE — PROGRESS NOTES
Pt c/o of nausea, and gas. States flavored medications make her feel more nausea. Will continue to monitor care.

## 2022-06-06 NOTE — CONSULTS
RENAL CONSULT FOR SODIUM 126  PT ON TPN  OBTAIN URINE OSMO, SERUM OSMO, URINE LYTES  MAKE TPN ISOTONIC STARTING TOMORROW, TOO LATE FOR TODAY  THANK YOU

## 2022-06-06 NOTE — CONSULTS
[address] [facility] [address], [address] [address]  Phone: [phone]  Office Hours: 8:30AM - 4:30PM  Monday - Friday     Nephrology Service Consultation    Patient:  Gisela Sandy  MRN: [id number]  Consulting physician:  Joe Mai MD  Reason for Consult:Hyponatremia  History Obtained From:  patient, electronic medical record  PCP: Sana Aj DO    HISTORY OF PRESENT ILLNESS:   The patient is a 80 y.o. female who presents with SBO- found to have ileoceacal mass  Has not been eating well since May- \"lost my appetite\"  Making urine  Try drinking Gatorade zero  On TPN- Na dropped to 126 hence Neph consult    Past Medical History:        Diagnosis Date    Diabetes mellitus ([facility]     H/O cardiac catheterization 05/18/2021    no significant CAD in main vessels, has severe stenosis in small  branch diagonal vessel    H/O cardiovascular stress test 3/22/18,03/30/2017    ECG portion of the stress test is negative for ischemia EF >70% Normal stress myocardial perfusion.  H/O cardiovascular stress test 04/07/2021    abnormal stress    H/O Doppler ultrasound (Abdomimal Aorta) 03/29/2017    No evidence of abdominal aortic aneurysm.  H/O echocardiogram 07/02/2014    Normal left ventricular size, wall motion and systolic function. Mildle elevated pulmonary artery systolic pressure. Mild MR and TR and trace AR EF 55 to 60%    Hx of echocardiogram 03/29/2017    EF 55-60%. Grade I diastolic dysfunction. Mildly dilated left atrium. No evidence of pericardial effusion.      Hyperlipidemia     Hypertension     Sleep apnea     Thyroid disease        Past Surgical History:        Procedure Laterality Date    BREAST BIOPSY Right     approx age 76, benign    CATARACT REMOVAL      CHOLECYSTECTOMY      COLONOSCOPY N/A 5/25/2022    COLONOSCOPY POLYPECTOMY SNARE/COLD BIOPSY performed by Stoney Robles MD at [facility] N/A 5/27/2022 BOWEL RESECTION RIGHT HEMICOLECTOMY LAPAROSCOPIC ROBOTIC XI performed by Daniel Chambers MD at [facility]      eye lift       Medications:   Scheduled Meds:   lidocaine  1 patch TransDERmal Daily    sucralfate  1 g Oral 4 times per day    pantoprazole  40 mg IntraVENous Daily    potassium chloride  20 mEq Oral Once    fat emulsion  250 mL IntraVENous Once per day on Mon Tue Thu Fri    lidocaine PF  5 mL IntraDERmal Once    sodium chloride flush  5-40 mL IntraVENous 2 times per day    lidocaine   Topical Once    enoxaparin  40 mg SubCUTAneous Daily    atorvastatin  20 mg Oral Daily    Vitamin D  1,000 Units Oral Daily    cetirizine  10 mg Oral Daily    levothyroxine  137 mcg Oral Daily    losartan  100 mg Oral Daily    metoprolol tartrate  150 mg Oral Daily    metoprolol tartrate  100 mg Oral Nightly    oxybutynin  5 mg Oral BID    rOPINIRole  3 mg Oral Nightly    vitamin B-12  100 mcg Oral Daily    insulin lispro  0-6 Units SubCUTAneous Q4H    sodium chloride flush  5-40 mL IntraVENous 2 times per day     Continuous Infusions:   PN-Adult Premix 5/15 - Central 60 mL/hr at 06/06/22 1756    sodium chloride      dextrose Stopped (05/31/22 1905)    sodium chloride Stopped (05/25/22 1247)     PRN Meds:.calcium carbonate, sodium chloride flush, sodium chloride, benzocaine-menthol, phenol, ondansetron **OR** ondansetron, oxyCODONE **OR** oxyCODONE, morphine **OR** morphine, zolpidem, promethazine, albuterol sulfate HFA, glucose, dextrose bolus **OR** dextrose bolus, glucagon (rDNA), dextrose, sodium chloride flush, sodium chloride, ondansetron **OR** ondansetron, polyethylene glycol, acetaminophen **OR** acetaminophen    Allergies:  Lopid [gemfibrozil], Bystolic [nebivolol hcl], Cefdinir, Coreg [carvedilol], Crestor [rosuvastatin], Fenofibrate, Glucophage [metformin], Hydrochlorothiazide, Metronidazole, Norvasc [amlodipine besylate], Tribenzor [olmesartan-amlodipine-hctz], and Zocor [simvastatin]    Social History:   TOBACCO:   reports that she has never smoked. She has never used smokeless tobacco.  ETOH:   reports previous alcohol use of about 1.0 standard drink of alcohol per week. OCCUPATION:      Family History:       Problem Relation Age of Onset   Colón Pacemaker Sister     Arrhythmia Sister     Breast Cancer Sister         pre menopausal    Ovarian Cancer Neg Hx        REVIEW OF SYSTEMS:  Negative except for weakness and anorexia    Physical Exam:    Vitals: BP (!) 104/50   Pulse 91   Temp 97 °F (36.1 °C) (Oral)   Resp 16   Ht 5' (1.524 m)   Wt 145 lb (65.8 kg)   SpO2 97%   BMI 28.32 kg/m²   General appearance: alert, appears stated age and cooperative  Skin: Skin color, texture, turgor normal. No rashes or lesions  HEENT: Head: Normal, normocephalic, atraumatic. Neck: no adenopathy, no carotid bruit, no JVD, supple, symmetrical, trachea midline and thyroid not enlarged, symmetric, no tenderness/mass/nodules  Lungs: clear to auscultation bilaterally  Heart: regular rate and rhythm, S1, S2 normal, no murmur, click, rub or gallop  Abdomen: soft, non-tender; bowel sounds normal; no masses,  no organomegaly  Extremities: extremities normal, atraumatic, no cyanosis or edema  Neurologic: Mental status: Alert, oriented, thought content appropriate    CBC:   Recent Labs     06/05/22  1408 06/06/22  1145   WBC 9.7 8.4   HGB 10.5* 10.5*    228     BMP:    Recent Labs     06/04/22  0330 06/06/22  1145   * 126*  126*   K 3.9 4.9  4.9    99  100   CO2 21 18*  17*   BUN 12 21  22   CREATININE 0.5* 0.6  0.6   GLUCOSE 174* 183*  185*     Troponin: No results for input(s): TROPONINI in the last 72 hours. BNP: No results for input(s): BNP in the last 72 hours. Lipids: No results for input(s): CHOL, HDL in the last 72 hours.     Invalid input(s): LDLCALCU  ABGs:   Lab Results   Component Value Date    PO2ART 78 05/22/2022 LLE8VXP 42.0 05/22/2022     -----------------------------------------------------------------      Assessment and Recommendations     Patient Active Problem List   Diagnosis Code    Long-term insulin use in type 2 diabetes ([facility] E11.9, Z79.4    Essential hypertension I10    Dyslipidemia E78.5    Abnormal EKG R94.31    Abnormal stress test R94.39    Cecum mass K63.89    Severe malnutrition (HCC) E43    Partial small bowel obstruction (HCC) K56.600     IMP  Asymptomatic subacute Hyponatremia  Volume depletion vs SIADH  Suggest  Urine osm  Urine lytes  Check BMP in am  May need to stop ARB as her BP is low Normal  Will follow  Yuki Brower MD, MD

## 2022-06-06 NOTE — CARE COORDINATION
LSW just received this message form [facility] stating they are requesting a peer to peer and it has to be completed by 12:30PM today. LSW PS Dr. Radha Us and informed of the request. Pedro Kemp gave the time the doctor the phone # to call [phone] option 5 and the deadline time the insurance gave this LSW of 12:30 PM today. 12:00 LSW received a call from Dr. Radha Us and he stated that [facility] has upheld the denial.  The feel pt need SNF and not ARU. Pt and family refusing SNF. [employer] stated they can try to complete a expedited appeal. LSW agreed. Cookie to talk with Dr. Radha Us.      Dr. Radha Us signed the appeal paperwork. [employer] has initiated the appeal process.

## 2022-06-06 NOTE — CARE COORDINATION
Dr. Salima Kelly performed P2P, denial still upheld. Dr. Salima Kelly wishing to pursue expedited appeal.  My coworker Mariela Torres received signed expedited appeal and sent via fax. [facility] expedited appeal is currently pending at this time. Will continue to follow for determination.

## 2022-06-06 NOTE — CARE COORDINATION
Per [facility] Medicare nurse pre-cert is still pending at this time. Will continue to follow for determination.

## 2022-06-06 NOTE — PROGRESS NOTES
GENERAL SURGERY PROGRESS NOTE    Silverio Jernigan is a 80 y.o. female 8 Days Post-Op  from robotic assisted right hemicolectomy for ileocecal mass. Subjective:  Still with some nausea but still not taking in much PO. Pain improving. Mobilizing some. States having bowel movements. Very frustrated with progress.   States that IV protonix and carafate tablet is not helping her    CLAUDIO output 95ml/24h    Objective:    Vitals: VITALS:  BP (!) 125/42   Pulse 83   Temp 97.5 °F (36.4 °C) (Oral)   Resp 18   Ht 5' (1.524 m)   Wt 145 lb (65.8 kg)   SpO2 97%   BMI 28.32 kg/m²     I/O: 06/05 0701 - 06/06 0700  In: -   Out: 95 [Drains:95]    Labs/Imaging Results:   Lab Results   Component Value Date     06/04/2022    K 3.9 06/04/2022    K 4.4 03/15/2018     06/04/2022    CO2 21 06/04/2022    BUN 12 06/04/2022    CREATININE 0.5 06/04/2022    GLUCOSE 174 06/04/2022    CALCIUM 7.6 06/04/2022      Lab Results   Component Value Date    WBC 9.7 06/05/2022    HGB 10.5 (L) 06/05/2022    HCT 34.3 (L) 06/05/2022    MCV 94.8 06/05/2022     06/05/2022         IV Fluids:   PN-Adult Premix 5/15 - Central Last Rate: 60 mL/hr at 06/05/22 1737    sodium chloride    dextrose Last Rate: Stopped (05/31/22 1905)    sodium chloride Last Rate: Stopped (05/25/22 1247)    Scheduled Meds: sucralfate, 1 g, Oral, 4 times per day    pantoprazole, 40 mg, IntraVENous, Daily    potassium chloride, 20 mEq, Oral, Once    fat emulsion, 250 mL, IntraVENous, Once per day on Mon Tue Thu Fri    lidocaine PF, 5 mL, IntraDERmal, Once    sodium chloride flush, 5-40 mL, IntraVENous, 2 times per day    lidocaine, , Topical, Once    enoxaparin, 40 mg, SubCUTAneous, Daily    atorvastatin, 20 mg, Oral, Daily    Vitamin D, 1,000 Units, Oral, Daily    cetirizine, 10 mg, Oral, Daily    levothyroxine, 137 mcg, Oral, Daily    losartan, 100 mg, Oral, Daily    metoprolol tartrate, 150 mg, Oral, Daily    metoprolol tartrate, 100 mg, Oral, Nightly    oxybutynin, 5 mg, Oral, BID    rOPINIRole, 3 mg, Oral, Nightly    vitamin B-12, 100 mcg, Oral, Daily    insulin lispro, 0-6 Units, SubCUTAneous, Q4H    sodium chloride flush, 5-40 mL, IntraVENous, 2 times per day    Physical Exam:  General: A&O x 3, no distress. HEENT: Anicteric sclerae, MMM. Extremities: No edema bilat LE. Abdomen: Soft, appropriately tender, mildly distended. Incisions intact with staples. Multiple skin tears from tape. CLAUDIO drain in place    Assessment and Plan:  80 y.o. female s/p robotic assisted right colectomy. Doing ok. Patient Active Problem List:     Long-term insulin use in type 2 diabetes ([facility] [address]     Essential hypertension     Dyslipidemia     Abnormal EKG     Abnormal stress test     Cecum mass    - Discussed findings and options with Angela Slaughter. - Advanced to soft diet but not eating much. States she had tomato soup and it made her feel sick. Encouraged to start on bland foods and avoid acidic foods.    - Struggling with nausea still but no vomiting. Will have them crush and dissolve carafate (suspension not available). If still no relief, may try Maalox.   She continues to pass gas and have BM.  - Cough drops/chloraseptic spray prn  - out of bed, IS, ambulate as able  - pathology now resulted, margins negative, 0/16 LN  - Continue TPN until taking in more orally    [facility], APRN - CNP

## 2022-06-06 NOTE — PROGRESS NOTES
TPN LAB EVALUATION  Recent Labs     06/04/22  0330 06/04/22  0330 06/06/22  1145   *   < > 126*  126*   K 3.9   < > 4.9  4.9      < > 99  100   CALCIUM 7.6*   < > 8.5  8.3   MG 1.6*   < > 1.6*  1.7*   PHOS 2.9   < > 3.2  3.2   GLUCOSE 174*  --  183*  185*    < > = values in this interval not displayed. Pharmacy to dose TPN per Dr. Kenn Spurling Day # 6    Indication: severe malnutrition    Goal Per Nutrition Recommendations/Plan:   1. Recommend initiating PN to d/t continued inadequate oral intake and malnutrition  2. PN recommendation: Clinimix 5/15 @ 60 ml/hr which will provide ~1308 kcal (average lipid and non lipid days) and 72 g protein    Central line: PICC double lumen    Diet: Full Liquid    Maintenance Fluids: none    Glucose:  GLUCOSE LEVELS LAST 72 HOURS                  (last 7 readings)    Recent Labs     06/04/22  0330 06/04/22  0518 06/05/22  1126 06/05/22  1648 06/05/22  2030 06/06/22  0130 06/06/22  0502 06/06/22  0611 06/06/22  1118 06/06/22  1145   POCGLU  --    < > 176* 123* 135* 150* 136* 155* 173*  --    GLUCOSE 174*  --   --   --   --   --   --   --   --  183*  185*    < > = values in this interval not displayed.  Goal <180    2 readings above goal in the last 24 hours   Low dose sliding scale insulin coverage   No insulin in the TPN formula. TRIGLYCERIDES:  Triglycerides   Date Value Ref Range Status   06/01/2022 161 (H) <150 MG/DL Final   05/02/2019 152 (H) <150 MG/DL Final   02/09/2017 413 (H) <150 MG/DL Final     Triglycerides:   Goal < 400    TG @ 161 on 6/1/22, at goal   Patient is not currently receiving propofol   Continue standard lipid replacement four times weekly. LIVER FUNCTION LAB EVALUATION  No results for input(s): AST, ALT, ALKPHOS, BILITOT, INR in the last 72 hours.   Hepatic Function:   Previously WNL   Bititotal previously < 3.0, trace elements added to TPN      RENAL LAB EVALUATION  Estimated Creatinine Clearance: 56 mL/min (based on SCr of 0.6 mg/dL). Recent Labs     06/04/22  0330 06/06/22  1145   BUN 12 21  22   CREATININE 0.5* 0.6  0.6     Renal Function and Electrolytes:   Na - low, sodium acetate increased from 20 to 60meq   K - WNL (large jump from 3.9 to 4.9 today) decrease Kphos from 30 to 10mmol and decrease KCL from 40 to 20meq   Ca - WNL   Mg - low, mag sulfate 2gm ivpb to be given outside of TPN today   Phos - WNL   TPN labs ordered for M/W/F   SCR with a slight upward trend to 0.6 (baseline 0.4-0.5). Limited UOP data. .    Formulation:   · continue same TPN 5/15 with Custom Electrolytes at goal rate of 60 ml/hr. · Standard lipids four times weekly. Component Order Dose Admin Dose   CLINIMIX 5/15 5 % Soln 2,000 mLs 2,000 mL   sodium acetate 2 MEQ/ML Soln 60 mEq 60 mEq   potassium phosphate 15 MMOLE/5ML Soln 10 mmol 10 mmol   potassium chloride 2 MEQ/ML Soln 20 mEq 20 mEq   magnesium sulfate 50 % Soln 2,000 mg 2,000 mg   adult multi-vitamin with vitamin k Inj 10 mLs 10 mL   trace minerals Cu-Mn-Se-Zn 019-06- MCG/ML Soln 1 mL        Pharmacy will continue to follow and adjust as appropriate. Thank you for the consult,    Ирина Quinonez.  Aliyah Del Valle, [address] [place] [address]  1:05 PM  06/06/22

## 2022-06-07 ENCOUNTER — APPOINTMENT (OUTPATIENT)
Dept: GENERAL RADIOLOGY | Age: 87
DRG: 329 | End: 2022-06-07
Attending: INTERNAL MEDICINE
Payer: MEDICARE

## 2022-06-07 LAB
AMMONIA: 22 UMOL/L (ref 11–51)
ANION GAP SERPL CALCULATED.3IONS-SCNC: 7 MMOL/L (ref 4–16)
BUN BLDV-MCNC: 23 MG/DL (ref 6–23)
CALCIUM SERPL-MCNC: 8.8 MG/DL (ref 8.3–10.6)
CHLORIDE BLD-SCNC: 96 MMOL/L (ref 99–110)
CO2: 21 MMOL/L (ref 21–32)
CREAT SERPL-MCNC: 0.6 MG/DL (ref 0.6–1.1)
GFR AFRICAN AMERICAN: >60 ML/MIN/1.73M2
GFR NON-AFRICAN AMERICAN: >60 ML/MIN/1.73M2
GLUCOSE BLD-MCNC: 120 MG/DL (ref 70–99)
GLUCOSE BLD-MCNC: 137 MG/DL (ref 70–99)
GLUCOSE BLD-MCNC: 143 MG/DL (ref 70–99)
GLUCOSE BLD-MCNC: 148 MG/DL (ref 70–99)
GLUCOSE BLD-MCNC: 157 MG/DL (ref 70–99)
GLUCOSE BLD-MCNC: 162 MG/DL (ref 70–99)
GLUCOSE BLD-MCNC: 168 MG/DL (ref 70–99)
GLUCOSE BLD-MCNC: 95 MG/DL (ref 70–99)
HCT VFR BLD CALC: 34.3 % (ref 37–47)
HEMOGLOBIN: 10.6 GM/DL (ref 12.5–16)
MCH RBC QN AUTO: 28.6 PG (ref 27–31)
MCHC RBC AUTO-ENTMCNC: 30.9 % (ref 32–36)
MCV RBC AUTO: 92.5 FL (ref 78–100)
OSMOLALITY URINE: 494 MOS/L (ref 292–1090)
PDW BLD-RTO: 13.2 % (ref 11.7–14.9)
PLATELET # BLD: 218 K/CU MM (ref 140–440)
PMV BLD AUTO: 11.2 FL (ref 7.5–11.1)
POTASSIUM SERPL-SCNC: 5 MMOL/L (ref 3.5–5.1)
RBC # BLD: 3.71 M/CU MM (ref 4.2–5.4)
SODIUM BLD-SCNC: 124 MMOL/L (ref 135–145)
SODIUM BLD-SCNC: 128 MMOL/L (ref 135–145)
WBC # BLD: 9.4 K/CU MM (ref 4–10.5)

## 2022-06-07 PROCEDURE — 6370000000 HC RX 637 (ALT 250 FOR IP): Performed by: SURGERY

## 2022-06-07 PROCEDURE — 94761 N-INVAS EAR/PLS OXIMETRY MLT: CPT

## 2022-06-07 PROCEDURE — 6360000002 HC RX W HCPCS: Performed by: SURGERY

## 2022-06-07 PROCEDURE — 6370000000 HC RX 637 (ALT 250 FOR IP): Performed by: NURSE PRACTITIONER

## 2022-06-07 PROCEDURE — 82962 GLUCOSE BLOOD TEST: CPT

## 2022-06-07 PROCEDURE — 2580000003 HC RX 258: Performed by: SURGERY

## 2022-06-07 PROCEDURE — C9113 INJ PANTOPRAZOLE SODIUM, VIA: HCPCS | Performed by: SURGERY

## 2022-06-07 PROCEDURE — 85027 COMPLETE CBC AUTOMATED: CPT

## 2022-06-07 PROCEDURE — 80048 BASIC METABOLIC PNL TOTAL CA: CPT

## 2022-06-07 PROCEDURE — 74240 X-RAY XM UPR GI TRC 1CNTRST: CPT

## 2022-06-07 PROCEDURE — 82607 VITAMIN B-12: CPT

## 2022-06-07 PROCEDURE — 72100 X-RAY EXAM L-S SPINE 2/3 VWS: CPT

## 2022-06-07 PROCEDURE — 2500000003 HC RX 250 WO HCPCS: Performed by: SURGERY

## 2022-06-07 PROCEDURE — 82140 ASSAY OF AMMONIA: CPT

## 2022-06-07 PROCEDURE — 97535 SELF CARE MNGMENT TRAINING: CPT

## 2022-06-07 PROCEDURE — 99024 POSTOP FOLLOW-UP VISIT: CPT | Performed by: SURGERY

## 2022-06-07 PROCEDURE — 36415 COLL VENOUS BLD VENIPUNCTURE: CPT

## 2022-06-07 PROCEDURE — 84295 ASSAY OF SERUM SODIUM: CPT

## 2022-06-07 PROCEDURE — 2580000003 HC RX 258: Performed by: INTERNAL MEDICINE

## 2022-06-07 PROCEDURE — 6370000000 HC RX 637 (ALT 250 FOR IP): Performed by: INTERNAL MEDICINE

## 2022-06-07 PROCEDURE — 1200000000 HC SEMI PRIVATE

## 2022-06-07 RX ORDER — PANTOPRAZOLE SODIUM 40 MG/10ML
40 INJECTION, POWDER, LYOPHILIZED, FOR SOLUTION INTRAVENOUS 2 TIMES DAILY
Status: DISCONTINUED | OUTPATIENT
Start: 2022-06-07 | End: 2022-06-09 | Stop reason: HOSPADM

## 2022-06-07 RX ORDER — ESCITALOPRAM OXALATE 10 MG/1
10 TABLET ORAL DAILY
Status: DISCONTINUED | OUTPATIENT
Start: 2022-06-07 | End: 2022-06-09 | Stop reason: HOSPADM

## 2022-06-07 RX ORDER — SODIUM CHLORIDE 9 MG/ML
INJECTION, SOLUTION INTRAVENOUS CONTINUOUS
Status: DISCONTINUED | OUTPATIENT
Start: 2022-06-07 | End: 2022-06-09

## 2022-06-07 RX ADMIN — SUCRALFATE 1 G: 1 TABLET ORAL at 23:55

## 2022-06-07 RX ADMIN — PROMETHAZINE HYDROCHLORIDE 12.5 MG: 12.5 TABLET ORAL at 19:56

## 2022-06-07 RX ADMIN — PANTOPRAZOLE SODIUM 40 MG: 40 INJECTION, POWDER, FOR SOLUTION INTRAVENOUS at 19:55

## 2022-06-07 RX ADMIN — Medication 1 SPRAY: at 09:45

## 2022-06-07 RX ADMIN — PANTOPRAZOLE SODIUM 40 MG: 40 INJECTION, POWDER, FOR SOLUTION INTRAVENOUS at 09:43

## 2022-06-07 RX ADMIN — SODIUM CHLORIDE, PRESERVATIVE FREE 10 ML: 5 INJECTION INTRAVENOUS at 09:44

## 2022-06-07 RX ADMIN — ONDANSETRON 4 MG: 2 INJECTION INTRAMUSCULAR; INTRAVENOUS at 05:41

## 2022-06-07 RX ADMIN — MAGNESIUM SULFATE HEPTAHYDRATE: 500 INJECTION, SOLUTION INTRAMUSCULAR; INTRAVENOUS at 18:27

## 2022-06-07 RX ADMIN — SODIUM CHLORIDE: 9 INJECTION, SOLUTION INTRAVENOUS at 09:59

## 2022-06-07 RX ADMIN — ENOXAPARIN SODIUM 40 MG: 100 INJECTION SUBCUTANEOUS at 09:44

## 2022-06-07 RX ADMIN — INSULIN LISPRO 1 UNITS: 100 INJECTION, SOLUTION INTRAVENOUS; SUBCUTANEOUS at 23:57

## 2022-06-07 RX ADMIN — LEVOTHYROXINE SODIUM 137 MCG: 25 TABLET ORAL at 05:42

## 2022-06-07 RX ADMIN — SODIUM CHLORIDE, PRESERVATIVE FREE 10 ML: 5 INJECTION INTRAVENOUS at 09:48

## 2022-06-07 RX ADMIN — SUCRALFATE 1 G: 1 TABLET ORAL at 05:42

## 2022-06-07 RX ADMIN — INSULIN LISPRO 1 UNITS: 100 INJECTION, SOLUTION INTRAVENOUS; SUBCUTANEOUS at 00:14

## 2022-06-07 RX ADMIN — SODIUM CHLORIDE, PRESERVATIVE FREE 10 ML: 5 INJECTION INTRAVENOUS at 19:58

## 2022-06-07 RX ADMIN — ONDANSETRON 4 MG: 2 INJECTION INTRAMUSCULAR; INTRAVENOUS at 22:24

## 2022-06-07 RX ADMIN — SOYBEAN OIL 250 ML: 20 INJECTION, SOLUTION INTRAVENOUS at 18:27

## 2022-06-07 RX ADMIN — SODIUM CHLORIDE, PRESERVATIVE FREE 10 ML: 5 INJECTION INTRAVENOUS at 19:56

## 2022-06-07 RX ADMIN — ONDANSETRON 4 MG: 2 INJECTION INTRAMUSCULAR; INTRAVENOUS at 16:14

## 2022-06-07 RX ADMIN — SUCRALFATE 1 G: 1 TABLET ORAL at 17:14

## 2022-06-07 RX ADMIN — SODIUM CHLORIDE: 9 INJECTION, SOLUTION INTRAVENOUS at 19:44

## 2022-06-07 ASSESSMENT — PAIN SCALES - GENERAL
PAINLEVEL_OUTOF10: 0

## 2022-06-07 NOTE — PROGRESS NOTES
V2.0  [facility] [place] Note      Name:  Stevie Garcia /Age/Sex: 1934  (35 y.o. female)   MRN & CSN:  [id number] & [id number] Encounter Date/Time: 2022 3:04 PM EDT    Location:  [address] [place] [address] PCP: Darell Cooper DO       Hospital Day: 17    Assessment and Plan:   Stevie Garcia is a 80 y.o. 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 Santa Fuentes   -Oncology recommends adjuvant chemotherapy with either CapeOx (capecitabine + oxaliplatin) or single agent capecitabine (in frail patient). Clinton will decide about that. -Await approval to go to acute rehab unit     2. Nausea and vomiting  - NG was removed on   -Continue symptomatic management     3. Nutrition  -TPN started on May 31  - continue TPN     4.Depressed mood  -Patient reports depression and is agreeable to starting medication  -Patient started on Celexa 10 mg daily on , continue     5. DM type II  - Continue subcu insulin regimen including long-acting and short acting insulin as ordered  -A1c is 5.9 this admit  -May 31- Lantus 15 units at bedtime on MAR, but has not been receiving it at all. Discontinued Lantus.  --on insulin sliding scale-continue insulin-continue     6. HTN  -Losartan 100 mg daily-continue  -Metoprolol tartrate 150 mg in the morning, and 100 mg at night-continue     7. HLD: Continue continue statin therapy  -atorvastatin 20 mg daily        DVT prophylaxis: Heparin/Lovenox           Diet ADULT DIET;  Full Liquid  PN-Adult Premix 5/15 - Central  PN-Adult Premix 5/15 - Central  ADULT ORAL NUTRITION SUPPLEMENT; Breakfast, Lunch, Dinner; Diabetic Oral Supplement   DVT Prophylaxis [] Lovenox, []  Heparin, [] SCDs, [] Ambulation,  [] Eliquis, [] Xarelto  [] Coumadin   Code Status Full Code   Disposition   Expected Disposition: Acute rehab unit  and -expedited appeal signed     Estimated Date of Discharge: TBD     Surrogate Decision Maker/ POA      Subjective:     Chief Complaint: No chief complaint on file. Patient seen and examined at bedside this morning. She reports that she is disappointed with everything that is going on and feels depressed. Reports some nausea and vomiting, burning sensation down her throat during swallowing. Denies chest pain, shortness of breath, fever or chills. Patient seen and examined at bedside this morning. Reports that she is disappointed with everything going on and she feels depressed. She reports some nausea and vomiting and burning sensation down her throat during swallowing. Objective: Intake/Output Summary (Last 24 hours) at 6/7/2022 1504  Last data filed at 6/7/2022 0939  Gross per 24 hour   Intake 240 ml   Output 110 ml   Net 130 ml        Vitals:   Vitals:    06/07/22 0939   BP: (!) 108/38   Pulse: 93   Resp: 18   Temp: 98.3 °F (36.8 °C)   SpO2: 96%       Physical Exam:     General: NAD  Eyes: EOMI  ENT: neck supple  Cardiovascular: Regular rate. Respiratory: Clear to auscultation  Gastrointestinal: Soft, non tender, abdominal sutures noted, no bleeding  Genitourinary: no suprapubic tenderness  Musculoskeletal: No edema  Skin: warm, dry  Neuro: Alert. Psych: Mood appropriate.      Medications:   Medications:    pantoprazole  40 mg IntraVENous BID    escitalopram  10 mg Oral Daily    lidocaine  1 patch TransDERmal Daily    sucralfate  1 g Oral 4 times per day    fat emulsion  250 mL IntraVENous Once per day on Mon Tue Thu Fri    lidocaine PF  5 mL IntraDERmal Once    sodium chloride flush  5-40 mL IntraVENous 2 times per day    lidocaine   Topical Once    enoxaparin  40 mg SubCUTAneous Daily    atorvastatin  20 mg Oral Daily    Vitamin D  1,000 Units Oral Daily    cetirizine  10 mg Oral Daily    levothyroxine  137 mcg Oral Daily    losartan  100 mg Oral Daily    metoprolol tartrate  150 mg Oral Daily    metoprolol tartrate  100 mg Oral Nightly    oxybutynin  5 mg Oral BID    rOPINIRole  3 mg Oral Nightly    vitamin B-12  100 mcg Oral Daily    insulin lispro  0-6 Units SubCUTAneous Q4H    sodium chloride flush  5-40 mL IntraVENous 2 times per day      Infusions:    sodium chloride 125 mL/hr at 06/07/22 0959    PN-Adult Premix 5/15 - Central      PN-Adult Premix 5/15 - Central 60 mL/hr at 06/06/22 1756    sodium chloride      dextrose Stopped (05/31/22 1905)    sodium chloride Stopped (05/25/22 1247)     PRN Meds: calcium carbonate, 500 mg, TID PRN  sodium chloride flush, 5-40 mL, PRN  sodium chloride, , PRN  benzocaine-menthol, 1 lozenge, PRN  phenol, 1 spray, Q2H PRN  ondansetron, 4 mg, Q8H PRN   Or  ondansetron, 4 mg, Q6H PRN  oxyCODONE, 5 mg, Q4H PRN   Or  oxyCODONE, 10 mg, Q4H PRN  morphine, 2 mg, Q2H PRN   Or  morphine, 4 mg, Q2H PRN  zolpidem, 5 mg, Nightly PRN  promethazine, 12.5 mg, Q6H PRN  albuterol sulfate HFA, 2 puff, Q6H PRN  glucose, 4 tablet, PRN  dextrose bolus, 125 mL, PRN   Or  dextrose bolus, 250 mL, PRN  glucagon (rDNA), 1 mg, PRN  dextrose, 100 mL/hr, PRN  sodium chloride flush, 5-40 mL, PRN  sodium chloride, , PRN  ondansetron, 4 mg, Q8H PRN   Or  ondansetron, 4 mg, Q6H PRN  polyethylene glycol, 17 g, Daily PRN  acetaminophen, 650 mg, Q6H PRN   Or  acetaminophen, 650 mg, Q6H PRN        Labs      Recent Results (from the past 24 hour(s))   POCT Glucose    Collection Time: 06/06/22  5:03 PM   Result Value Ref Range    POC Glucose 172 (H) 70 - 99 MG/DL   Electrolytes urine random    Collection Time: 06/06/22  7:45 PM   Result Value Ref Range    Sodium, Ur 61 35 - 167 MMOL/L    Potassium, Ur 31.0 22 - 119 MMOL/L    Chloride 93 43 - 210 MMOL/L   Osmolality, urine    Collection Time: 06/06/22  7:45 PM   Result Value Ref Range    Osmolality, Ur 494 292 - 1090 MOS/L   POCT Glucose    Collection Time: 06/06/22  8:41 PM   Result Value Ref Range    POC Glucose 172 (H) 70 - 99 MG/DL POCT Glucose    Collection Time: 06/07/22 12:12 AM   Result Value Ref Range    POC Glucose 157 (H) 70 - 99 MG/DL   POCT Glucose    Collection Time: 06/07/22  3:55 AM   Result Value Ref Range    POC Glucose 137 (H) 70 - 99 MG/DL   Basic Metabolic Panel    Collection Time: 06/07/22  6:47 AM   Result Value Ref Range    Sodium 124 (L) 135 - 145 MMOL/L    Potassium 5.0 3.5 - 5.1 MMOL/L    Chloride 96 (L) 99 - 110 mMol/L    CO2 21 21 - 32 MMOL/L    Anion Gap 7 4 - 16    BUN 23 6 - 23 MG/DL    CREATININE 0.6 0.6 - 1.1 MG/DL    Glucose 168 (H) 70 - 99 MG/DL    Calcium 8.8 8.3 - 10.6 MG/DL    GFR Non-African American >60 >60 mL/min/1.73m2    GFR African American >60 >60 mL/min/1.73m2   CBC    Collection Time: 06/07/22  6:47 AM   Result Value Ref Range    WBC 9.4 4.0 - 10.5 K/CU MM    RBC 3.71 (L) 4.2 - 5.4 M/CU MM    Hemoglobin 10.6 (L) 12.5 - 16.0 GM/DL    Hematocrit 34.3 (L) 37 - 47 %    MCV 92.5 78 - 100 FL    MCH 28.6 27 - 31 PG    MCHC 30.9 (L) 32.0 - 36.0 %    RDW 13.2 11.7 - 14.9 %    Platelets 704 695 - 415 K/CU MM    MPV 11.2 (H) 7.5 - 11.1 FL   Ammonia    Collection Time: 06/07/22  6:47 AM   Result Value Ref Range    Ammonia 22 11 - 51 UMOL/L   POCT Glucose    Collection Time: 06/07/22  8:04 AM   Result Value Ref Range    POC Glucose 162 (H) 70 - 99 MG/DL   POCT Glucose    Collection Time: 06/07/22 11:45 AM   Result Value Ref Range    POC Glucose 148 (H) 70 - 99 MG/DL        Imaging/Diagnostics Last 24 Hours   XR LUMBAR SPINE (2-3 VIEWS)    Result Date: 6/7/2022  EXAMINATION: THREE XRAY VIEWS OF THE LUMBAR SPINE 6/7/2022 8:15 am COMPARISON: 05/21/2022 HISTORY: ORDERING SYSTEM PROVIDED HISTORY: back pain TECHNOLOGIST PROVIDED HISTORY: Reason for exam:->back pain Reason for Exam: back pain FINDINGS: Lumbar vertebral body heights and alignment demonstrate no acute abnormality. Severe multilevel disc and facet degenerative changes. Sacroiliac joints are maintained. Catheter terminates in the right upper quadrant. Gas-filled distended small and large bowel are present with midline skin staples. Findings favor ileus. No acute abnormality in the lumbar spine. Findings favoring ileus.        Electronically signed by Norm Ward MD on 6/7/2022 at 3:05 PM

## 2022-06-07 NOTE — PROGRESS NOTES
GENERAL SURGERY PROGRESS NOTE    Christiano Bruce is a 80 y.o. female 6 Days Post-Op  from robotic assisted right hemicolectomy for ileocecal mass. Subjective:  Still with some nausea but still not taking in much PO. Pain improving. Mobilizing some. States having bowel movements. Very frustrated with progress.   States that IV protonix and carafate tablet is not helping her much    CLAUDIO output 50ml/24h    Objective:    Vitals: VITALS:  BP (!) 122/46   Pulse 85   Temp 97.8 °F (36.6 °C) (Oral)   Resp 16   Ht 5' (1.524 m)   Wt 145 lb (65.8 kg)   SpO2 97%   BMI 28.32 kg/m²     I/O: 06/06 0701 - 06/07 0700  In: 240 [P.O.:240]  Out: 50 [Drains:50]    Labs/Imaging Results:   Lab Results   Component Value Date     06/07/2022    K 5.0 06/07/2022    K 4.4 03/15/2018    CL 96 06/07/2022    CO2 21 06/07/2022    BUN 23 06/07/2022    CREATININE 0.6 06/07/2022    GLUCOSE 168 06/07/2022    CALCIUM 8.8 06/07/2022      Lab Results   Component Value Date    WBC 8.4 06/06/2022    HGB 10.5 (L) 06/06/2022    HCT 33.0 (L) 06/06/2022    MCV 90.2 06/06/2022     06/06/2022         IV Fluids:   PN-Adult Premix 5/15 - Central Last Rate: 60 mL/hr at 06/06/22 1756    sodium chloride    dextrose Last Rate: Stopped (05/31/22 1905)    sodium chloride Last Rate: Stopped (05/25/22 1247)    Scheduled Meds: lidocaine, 1 patch, TransDERmal, Daily    sucralfate, 1 g, Oral, 4 times per day    pantoprazole, 40 mg, IntraVENous, Daily    potassium chloride, 20 mEq, Oral, Once    fat emulsion, 250 mL, IntraVENous, Once per day on Mon Tue Thu Fri    lidocaine PF, 5 mL, IntraDERmal, Once    sodium chloride flush, 5-40 mL, IntraVENous, 2 times per day    lidocaine, , Topical, Once    enoxaparin, 40 mg, SubCUTAneous, Daily    atorvastatin, 20 mg, Oral, Daily    Vitamin D, 1,000 Units, Oral, Daily    cetirizine, 10 mg, Oral, Daily    levothyroxine, 137 mcg, Oral, Daily    losartan, 100 mg, Oral, Daily    metoprolol tartrate, 150 mg, Oral, Daily    metoprolol tartrate, 100 mg, Oral, Nightly    oxybutynin, 5 mg, Oral, BID    rOPINIRole, 3 mg, Oral, Nightly    vitamin B-12, 100 mcg, Oral, Daily    insulin lispro, 0-6 Units, SubCUTAneous, Q4H    sodium chloride flush, 5-40 mL, IntraVENous, 2 times per day    Physical Exam:  General: A&O x 3, no distress. HEENT: Anicteric sclerae, MMM. Extremities: No edema bilat LE. Abdomen: Soft, appropriately tender, mildly distended. Incisions intact with staples. Multiple skin tears from tape. CLAUDIO drain in place    Assessment and Plan:  80 y.o. female s/p robotic assisted right colectomy. Doing ok. Patient Active Problem List:     Long-term insulin use in type 2 diabetes ([facility] [address]     Essential hypertension     Dyslipidemia     Abnormal EKG     Abnormal stress test     Cecum mass    - Discussed findings and options with Silverio Jernigan. - Advanced to soft diet but not eating much. Encouraged to start on bland foods and avoid acidic foods. - Will get an UGI with SBFT to evaluate the reflux symptoms, rule out partial SBO. Continues to have BMs  - Struggling with nausea and reflux. Will have them crush and dissolve carafate (suspension not available). If still no relief, may try Maalox. She continues to pass gas and have BM.  Will increase PPI to BID  - Cough drops/chloraseptic spray prn  - out of bed, IS, ambulate as able  - pathology now resulted, margins negative, 0/16 Ainsley Bonilla MD

## 2022-06-07 NOTE — PROGRESS NOTES
INTERNAL MED PROGRESS NOTE           Subjective:         June 6  -Continues to be on TPN  -Denied complaints when I saw her    June 4  She was on TPN at 60 mL when I saw her  She was resting comfortably    June 2    Informed she had 1 episode of vomiting today  Electrolytes were off today  TPN is infusing  She appears comfortable when I saw her      June 1    The NG was removed today  She was sitting up in the chair and looked better today      May 31    She has been having nausea and vomiting a few days ago  NG was placed on May 29    Her blood sugar was low this afternoon  After that TPN was started      Assessment/Plan:         Patient is from home    Anticipated discharge: Acute rehab unit 9 June and 6-expedited appeal signed    Reason for continued hospitalization-appears medically stable for discharge- await rehab bed. 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 Maxwell Forde   -Oncology recommends adjuvant chemotherapy with either CapeOx (capecitabine + oxaliplatin) or single agent capecitabine (in frail patient),  . Lona Cheng will decide about that. -Await approval to go to acute rehab unit    -- Nausea and vomiting: NG was removed on June 1    -- Nutrition-TPN started on May 31- continue TPN    --Depressed mood-on June 6 I received a message from nursing that the patient is depressed because of her illness, and is not progressing well possibly due to changes in her mood. I was asked about considering a medication for her. Spoke with Lona Cheng. She agrees to try a medication. We will start Celexa 10 mg daily on June 6.    --DM type II: Continue subcu insulin regimen including long-acting and short acting insulin as ordered  -A1c is 5.9 this admit  -May 31- Lantus 15 units at bedtime on MAR, but has not been receiving it at all.   Discontinued Lantus.  -June 6-on insulin sliding scale-continue insulin-continue    --HTN  -Losartan 100 mg daily-continue  -Metoprolol tartrate 150 mg in the morning, and 100 mg at night-continue    --HLD: Continue continue statin therapy-atorvastatin 20 mg daily      DVT prophylaxis: Heparin/Lovenox      Urbano Shin MD  6/7/2022 @ 4:45 AM           Objective: Intake/Output Summary (Last 24 hours) at 6/7/2022 0445  Last data filed at 6/6/2022 1836  Gross per 24 hour   Intake 240 ml   Output 30 ml   Net 210 ml        Vitals:   Vitals:    06/07/22 0220   BP: (!) 122/46   Pulse: 85   Resp: 16   Temp: 97.8 °F (36.6 °C)   SpO2: 97%       Physical Exam:          Physical Exam  Constitutional:       Appearance: She is not diaphoretic. Eyes:      General:         Right eye: No discharge. Left eye: No discharge. Skin:     Coloration: Skin is not jaundiced. Neurological:      Cranial Nerves: No cranial nerve deficit.                    Labs:   Reviewed, as follows:  Recent Results (from the past 24 hour(s))   POCT Glucose    Collection Time: 06/06/22  5:02 AM   Result Value Ref Range    POC Glucose 136 (H) 70 - 99 MG/DL   POCT Glucose    Collection Time: 06/06/22  6:11 AM   Result Value Ref Range    POC Glucose 155 (H) 70 - 99 MG/DL   POCT Glucose    Collection Time: 06/06/22 11:18 AM   Result Value Ref Range    POC Glucose 173 (H) 70 - 99 MG/DL   Basic Metabolic Panel    Collection Time: 06/06/22 11:45 AM   Result Value Ref Range    Sodium 126 (L) 135 - 145 MMOL/L    Potassium 4.9 3.5 - 5.1 MMOL/L    Chloride 99 99 - 110 mMol/L    CO2 18 (L) 21 - 32 MMOL/L    Anion Gap 9 4 - 16    BUN 21 6 - 23 MG/DL    CREATININE 0.6 0.6 - 1.1 MG/DL    Glucose 183 (H) 70 - 99 MG/DL    Calcium 8.5 8.3 - 10.6 MG/DL    GFR Non-African American >60 >60 mL/min/1.73m2    GFR African American >60 >60 mL/min/1.73m2   Magnesium    Collection Time: 06/06/22 11:45 AM   Result Value Ref Range    Magnesium 1.6 (L) 1.8 - 2.4 mg/dl   Phosphorus    Collection Time: 06/06/22 11:45 AM   Result Value Ref Range    Phosphorus 3.2 2.5 - 4.9 MG/DL   Basic Metabolic Panel    Collection Time: 06/06/22 11:45 AM   Result Value Ref Range    Sodium 126 (L) 135 - 145 MMOL/L    Potassium 4.9 3.5 - 5.1 MMOL/L    Chloride 100 99 - 110 mMol/L    CO2 17 (L) 21 - 32 MMOL/L    Anion Gap 9 4 - 16    BUN 22 6 - 23 MG/DL    CREATININE 0.6 0.6 - 1.1 MG/DL    Glucose 185 (H) 70 - 99 MG/DL    Calcium 8.3 8.3 - 10.6 MG/DL    GFR Non-African American >60 >60 mL/min/1.73m2    GFR African American >60 >60 mL/min/1.73m2   Magnesium    Collection Time: 06/06/22 11:45 AM   Result Value Ref Range    Magnesium 1.7 (L) 1.8 - 2.4 mg/dl   Phosphorus    Collection Time: 06/06/22 11:45 AM   Result Value Ref Range    Phosphorus 3.2 2.5 - 4.9 MG/DL   CBC with Auto Differential    Collection Time: 06/06/22 11:45 AM   Result Value Ref Range    WBC 8.4 4.0 - 10.5 K/CU MM    RBC 3.66 (L) 4.2 - 5.4 M/CU MM    Hemoglobin 10.5 (L) 12.5 - 16.0 GM/DL    Hematocrit 33.0 (L) 37 - 47 %    MCV 90.2 78 - 100 FL    MCH 28.7 27 - 31 PG    MCHC 31.8 (L) 32.0 - 36.0 %    RDW 13.2 11.7 - 14.9 %    Platelets 428 371 - 477 K/CU MM    MPV 11.1 7.5 - 11.1 FL    Differential Type AUTOMATED DIFFERENTIAL     Segs Relative 74.2 (H) 36 - 66 %    Lymphocytes % 14.8 (L) 24 - 44 %    Monocytes % 7.7 (H) 0 - 4 %    Eosinophils % 2.4 0 - 3 %    Basophils % 0.4 0 - 1 %    Segs Absolute 6.2 K/CU MM    Lymphocytes Absolute 1.2 K/CU MM    Monocytes Absolute 0.7 K/CU MM    Eosinophils Absolute 0.2 K/CU MM    Basophils Absolute 0.0 K/CU MM    Nucleated RBC % 0.0 %    Total Nucleated RBC 0.0 K/CU MM    Total Immature Neutrophil 0.04 K/CU MM    Immature Neutrophil % 0.5 (H) 0 - 0.43 %   TSH    Collection Time: 06/06/22 11:45 AM   Result Value Ref Range    TSH, High Sensitivity 2.010 0.270 - 4.20 uIu/ml   Osmolality    Collection Time: 06/06/22 11:45 AM   Result Value Ref Range    Osmolality 285 280 - 300 MOS/L   POCT Glucose    Collection Time: 06/06/22  5:03 PM Result Value Ref Range    POC Glucose 172 (H) 70 - 99 MG/DL   Electrolytes urine random    Collection Time: 06/06/22  7:45 PM   Result Value Ref Range    Sodium, Ur 61 35 - 167 MMOL/L    Potassium, Ur 31.0 22 - 119 MMOL/L    Chloride 93 43 - 210 MMOL/L   Osmolality, urine    Collection Time: 06/06/22  7:45 PM   Result Value Ref Range    Osmolality, Ur 494 292 - 1090 MOS/L   POCT Glucose    Collection Time: 06/06/22  8:41 PM   Result Value Ref Range    POC Glucose 172 (H) 70 - 99 MG/DL   POCT Glucose    Collection Time: 06/07/22 12:12 AM   Result Value Ref Range    POC Glucose 157 (H) 70 - 99 MG/DL   POCT Glucose    Collection Time: 06/07/22  3:55 AM   Result Value Ref Range    POC Glucose 137 (H) 70 - 99 MG/DL          Body mass index is 28.32 kg/m².  Patient will follow up with PCP for further management as indicated unless otherwise specifically noted above        Jossie Friedman MD    Late entry for 6/6

## 2022-06-07 NOTE — PROGRESS NOTES
Occupational Therapy     Occupational Therapy Treatment Note     Name: Dejuan Paige MRN: [id number] :             1934   Date:  2022   Admission Date: 2022 Room:  [facility]      Primary Problem:  Cecum Mass     Restrictions/Precautions: abdominal precautions, fall risk, CLAUDIO drain     Communication with other providers: Co tx with PT Ron Ortiz for safety, RN approved session.     Subjective:  Patient states:  \"I don't I can do this, my sciatic is too painful\"  Pain:    Location, Type, Intensity (0/10 to 10/10):  complains on RLE pain, however does not rate.     Objective:    Observation: Pt supine in bed upon arrival and agreeable to therapy session. Objective Measures:  Pt alert and oriented     Treatment, including education:  Self Care Training:   Cues were given for safety, sequence, UE/LE placement, visual cues, and balance. Activities performed today included dressing, toileting, hand hygiene, and grooming.     Pt supine in bed and donned socks in bed with set up and MAX A with noted increased pain. Pt completed supine to sit with head of bed elevated and use of bed rails with MIN A. Pt completed sit to stand from edge of bed with use of WW and MIN A. Pt completed functional mobility from bed to bathroom with WW and CGA. Pt completed toilet transfer with MOD A and MOD A to sb undergarment. Pt seated for increased time. Pt noted to have + urination and + BM. Pt required MAX A to DEP A for pericare. Pt completed functional mobility to sink and completed hand hygiene with set up and CGA for balance. Pt completed functional mobility back to bed with CGA and returned supine in bed with MIN A.  Pt needs met and call light in reach.     Assessment / Impression:    Patient's tolerance of treatment: fair  Adverse Reaction: none  Significant change in status and impact:  None           Barriers to improvement: none     Goals:  Pt goal: go home  Time Frame for STGs: discharge  Goal 1: Pt will perform UE ADLs independently   Goal 2: Pt will perform LE ADLs w/ AD SBA-addressed  Goal 3: Pt will perform toileting SBA -addressed  Goal 4: Pt will perform functional mobility/ transfer w/ AD Azul-addressed  Goal 5: Pt will perform therex/theract in order to increase functional activity tolerance  Goal 6: Pt will perform therex/theract sitting EOB in order to increase dynamic sitting balance     Plan for Next Session:    Continue OT POC and progress seated and standing ADLs.      Time in:  0958  Time out:  1042  Timed treatment minutes:  44  Total treatment time: 44     Electronically signed by:    KHURRAM Brown,   6/7/2022, 10:52 PM

## 2022-06-07 NOTE — PROGRESS NOTES
Physical Therapy  1510Pt is off the floor for x-ray, will cont. Harish Sarabia.  Yassine Justice PTA

## 2022-06-07 NOTE — ADT AUTH CERT
Utilization Reviews         [facility]enterology [facility] - Care Day 16 (6/6/2022) by Bacilio Dorman RN       Review Status Review Entered   Completed 6/7/2022 08:52      Criteria Review      Care Day: 16 Care Date: 6/6/2022 Level of Care: Inpatient Floor    Guideline Day 3    Level Of Care    (X) * Activity level acceptable    6/7/2022 8:52 AM EDT by Rubén Menjivar      ambulate as able    ( ) * Complete discharge planning    Clinical Status    (X) * Hemodynamic stability    6/7/2022 8:52 AM EDT by Elizabeth Grant      /50  125/42  *stop ARB as her BP is low Normal    (X) * Abdominal status acceptable    6/7/2022 8:52 AM EDT by Rubén Menjivar      Abdomen: soft, non-tender; bowel sounds normal; no masses,  no organomegaly    ( ) * Pain and nausea absent or adequately managed    6/7/2022 8:52 AM EDT by Elizabeth Grant      PAIN: 8  oxyCODONE HCl (OXY-IR) immediate release tablet 10 mgx1  ondansetron (ZOFRAN) injection 4 mgx2    (X) * Temperature status acceptable    6/7/2022 8:52 AM EDT by Elizabeth Grant      T 97 (36.1)    (X) * Intestinal status acceptable    6/7/2022 8:52 AM EDT by Rubén Menjivar      s/p laparoscopic robotic right hemicolectomy on 5/27/2022    (X) * Hepatic and biliary abnormalities absent or acceptable    6/7/2022 8:52 AM EDT by Rubén Menjivar      no reported events of abnormalities    ( ) * General Discharge Criteria met    Interventions    ( ) * No inpatient interventions needed    6/7/2022 8:52 AM EDT by Rubén Menjivar      Continue TPN until taking in more orally  magnesium sulfate 2000 mg in 50 mL IVPB premix  pantoprazole (PROTONIX) injection 40 mg  ondansetron (ZOFRAN) injection 4 mg    (X) * Intake acceptable    6/7/2022 8:52 AM EDT by Rubén Menjivar      ADULT DIET; Full Liquid    * Milestone   Additional Notes   DATE:    6/6/22           Pertinent Updates:   Continue standard lipid replacement four times weekly.    Body mass index is 28.32 kg/m²   Pt c/o of nausea, and gas.  States flavored medications make her feel more nauseated but no vomiting. On protonix and carafate. On TPN now. On soft diet but not eating much. Still has heart burn.       Intake 240 ml   Output 30 ml   Net 210 ml       CLAUDIO output 95ml/24h           Vitals:   T 97 (36.1)    R 16   P 91    /50  125/42     SPO2  97  (Room air)    PAIN: 8   WT: 145 lb (65.8 kg)           Abnl/Pertinent Labs/Radiology/Diagnostic Studies:   Sodium: 126 (L)   CO2: 17 18  (L)   Magnesium:  1.6 1.7 (L)   GLUCOSE, FASTING,GF:  183 185 (H)   RBC: 3.66 (L)   Hemoglobin Quant: 10.5 (L)   Hematocrit: 33.0 (L)   MCHC: 31.8 (L)   Lymphocyte %: 14.8 (L)   Monocytes %: 7.7 (H)   Segs Relative: 74.2 (H)   Immature Neutrophil %: 0.5 (H)   POC Glucose:  136 150 155  172 173 (H)           Physical Exam:   Lungs: clear to auscultation bilaterally   Heart: regular rate and rhythm, S1, S2 normal, no murmur, click, rub or gallop   Abdomen: soft, non-tender; bowel sounds normal; no masses,  no organomegaly               MD Consults/Assessments & Plans:   NEPHRO:   RECOMMENDATION:   IMP   Asymptomatic subacute Hyponatremia   Volume depletion vs SIADH   Suggest   Urine osm   Urine lytes   Check BMP in am   May need to stop ARB as her BP is low Normal   RENAL CONSULT FOR SODIUM 126   PT ON TPN   OBTAIN URINE OSMO, SERUM OSMO, URINE LYTES   MAKE TPN ISOTONIC STARTING TOMORROW, TOO LATE FOR TODAY       IM:   A/P:   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    Await approval to go to acute rehab unit   Nausea and vomiting: NG was removed on June 1   Nutrition-TPN started on May 31- continue TPN   Depressed mood-on June 6 I received a message from nursing that the patient is depressed because of her illness, and is not progressing well possibly due to changes in her mood.  I was asked about considering a medication for her.    She agrees to try a medication.  We will start Celexa 10 mg daily on June 6. DM type II: Continue subcu insulin regimen including long-acting and short acting insulin as ordered, continue SSI protocol for more adequate glycemic control. May 31- Lantus 15 units at bedtime on MAR, but has not been receiving it at all.  Discontinued Lantus. Mary 3-on insulin sliding scale-continue insulin   HTN, controlled, cont home meds as ordered    HLD: Continue continue statin therapy           HEMA:   RECOMMENDATION   Reviewed MRI pelvis result. Will consider repeat CT or MRI in 6 months for ovarian complex cyst.    Final pathology revealed that she has stage IIIB terminal ileum adenocarcinoma (pT3, pN1c). Option of adjuvant chemotherapy with either CapeOx (capecitabine + oxaliplatin) or single agent capecitabine (in frail patient), were discussed with her before. Encouraged ambulation. Labs done on 6/6/22 were reviewed.    Nephrology is on the case for hyponatremia.        GS:   - Advanced to soft diet but not eating much.  States she had tomato soup and it made her feel sick.  Encouraged to start on bland foods and avoid acidic foods.     - Struggling with nausea still but no vomiting. Will have them crush and dissolve carafate (suspension not available).  If still no relief, may try Maalox.  She continues to pass gas and have BM.   - Cough drops/chloraseptic spray prn   - out of bed, IS, ambulate as able   - pathology now resulted, margins negative, 0/16 LN   - Continue TPN until taking in more orally           Medications:   atorvastatin (LIPITOR) tablet 20 mg   Dose: 20 mg   Freq: DAILY Route: PO   cetirizine (ZYRTEC) tablet 10 mg   Dose: 10 mg   Freq: DAILY Route: PO   enoxaparin (LOVENOX) injection 40 mg   Dose: 40 mg   Freq: DAILY Route: SC   fat emulsion 20 % infusion 250 mL   Dose: 250 mL   Freq:  Once per day on Mon Tue Thu Fri Route: IV   insulin lispro (HUMALOG) injection vial 0-6 Units   Dose: 0-6 Units   Freq: EVERY 4 HOURS Route: SC   levothyroxine (SYNTHROID) tablet 137 mcg   Dose: 137 mcg   Freq: DAILY Route: PO   lidocaine 4 % external patch 1 patch   Dose: 1 patch   Freq: DAILY Route: TD   magnesium sulfate 2000 mg in 50 mL IVPB premix   Dose: 2,000 mg   Freq: ONCE Route: IV   oxybutynin (DITROPAN) tablet 5 mg   Dose: 5 mg   Freq: 2 TIMES DAILY Route: PO   pantoprazole (PROTONIX) injection 40 mg   Dose: 40 mg   Freq: DAILY Route: IV   sucralfate (CARAFATE) tablet 1 g   Dose: 1 g   Freq: 4 times per day Route: PO   vitamin B-12 (CYANOCOBALAMIN) tablet 100 mcg   Dose: 100 mcg   Freq: DAILY Route: PO   Vitamin D (CHOLECALCIFEROL) tablet 1,000 Units   Dose: 1,000 Units   Freq: DAILY Route: PO   PN-Adult Premix 5/15 - Central   Rate: 60 mL/hr Freq: CONTINUOUS TPN Route: IV   ondansetron (ZOFRAN) injection 4 mgx2   Dose: 4 mg   Freq: EVERY 6 HOURS PRN Route: IV   oxyCODONE HCl (OXY-IR) immediate release tablet 10 mgx1   Dose: 10 mg   Freq: EVERY 4 HOURS PRN Route: PO       Orders:   ADULT DIET; Full Liquid    Commode at bedside            PT/OT/SLP/CM Assessments or Notes:   OT:   Assessment / Impression:     Patient's tolerance of treatment: Fair    Adverse Reaction: none   Significant change in status and impact:  none   Barriers to improvement: activity tolerance, nausea, strength        PT   Assessment / Impression:      Patient's tolerance of treatment:  Good, moved slowly d/t nausea, burping and abd pain      Adverse Reaction: none   Significant change in status and impact:  none   Barriers to improvement:  Endurance, nausea       CM:   12:00 LSW received a call from MD and he stated that [facility] has upheld the denial.  The feel pt need SNF and not ARU.  Pt and family refusing SNF.  Nurse stated to try to complete a expedited appeal. LSW agreed.

## 2022-06-07 NOTE — PROGRESS NOTES
TPN LAB EVALUATION  Recent Labs     06/06/22  1145 06/06/22  1145 06/07/22  0647   *  126*   < > 124*   K 4.9  4.9   < > 5.0   CL 99  100   < > 96*   CALCIUM 8.5  8.3   < > 8.8   MG 1.6*  1.7*  --   --    PHOS 3.2  3.2  --   --    GLUCOSE 183*  185*  --  168*    < > = values in this interval not displayed. Pharmacy to dose TPN per Dr. Worthy Fails - with input from Nephrology - Dr Norman Perera Day # 7    Indication: severe malnutrition    Goal Per Nutrition Recommendations/Plan:   1. Recommend initiating PN to d/t continued inadequate oral intake and malnutrition  2. PN recommendation: Clinimix 5/15 @ 60 ml/hr which will provide ~1308 kcal (average lipid and non lipid days) and 72 g protein    Central line: PICC double lumen    Diet: Full Liquid    Maintenance Fluids: none    Glucose:  GLUCOSE LEVELS LAST 72 HOURS                  (last 7 readings)    Recent Labs     06/06/22  0611 06/06/22  1118 06/06/22  1145 06/06/22  1703 06/06/22  2041 06/07/22  0012 06/07/22  0355 06/07/22  0647 06/07/22  0804   POCGLU 155* 173*  --  172* 172* 157* 137*  --  162*   GLUCOSE  --   --  183*  185*  --   --   --   --  168*  --       Goal <180    2 readings above goal in the last 24 hours   Low dose sliding scale insulin coverage   No insulin in the TPN formula. TRIGLYCERIDES:  Triglycerides   Date Value Ref Range Status   06/01/2022 161 (H) <150 MG/DL Final   05/02/2019 152 (H) <150 MG/DL Final   02/09/2017 413 (H) <150 MG/DL Final     Triglycerides:   Goal < 400    TG @ 161 on 6/1/22, at goal   Patient is not currently receiving propofol   Continue standard lipid replacement four times weekly. LIVER FUNCTION LAB EVALUATION  No results for input(s): AST, ALT, ALKPHOS, BILITOT, INR in the last 72 hours.   Hepatic Function:   Previously WNL   Bititotal previously < 3.0, trace elements added to TPN      RENAL LAB EVALUATION  Estimated Creatinine Clearance: 56 mL/min (based on SCr of 0.6 mg/dL). Recent Labs     06/06/22  1145 06/07/22  0647   BUN 21 22 23   CREATININE 0.6  0.6 0.6     Renal Function and Electrolytes:   Na - low, per Dr Yanelis Coleman -sodium acetate increased from  60meq to 150 mEq   K - WNL  (very close to high range= 5.0) keep Kphos same as 10mmol and decrease KCL from 20 to 5 mEq   Ca - WNL   Mg - low, increase to 3000 mg in TPN   Phos - WNL   TPN labs ordered for M/W/F   SCR with a slight upward trend to 0.6 (baseline 0.4-0.5). Limited UOP data. .    Formulation:   · continue same TPN 5/15 with Custom Electrolytes at goal rate of 60 ml/hr. · Standard lipids four times weekly. Components    Component Order Dose    CLINIMIX 5/15 5 % Soln 2,000 mLs    sodium acetate 2 MEQ/ML Soln 150 mEq    potassium phosphate 15 MMOLE/5ML Soln 10 mmol    potassium chloride 2 MEQ/ML Soln 5 mEq    magnesium sulfate 50 % Soln 3,000 mg    adult multi-vitamin with vitamin k Inj 10 mLs    trace minerals Cu-Mn-Se-Zn 981-48- MCG/ML Soln 1 mL        Pharmacy will continue to follow and adjust as appropriate.    Thank you for the consult,    Rocio Rodriguez, [facility]  9:31 AM  06/07/22

## 2022-06-07 NOTE — PROGRESS NOTES
Comprehensive Nutrition Assessment    Type and Reason for Visit:  Reassess    Nutrition Recommendations/Plan:   1. Continue current diet  2. Modify clear supplement to diabetic tid  3. Advance as timely as able to a Low Fiber Diet     Malnutrition Assessment:  Malnutrition Status:  Severe malnutrition (05/31/22 0931)    Context:  Acute Illness     Findings of the 6 clinical characteristics of malnutrition:  Energy Intake:  50% or less of estimated energy requirements for 5 or more days  Weight Loss:  Greater than 2% over 1 week     Body Fat Loss:  Unable to assess     Muscle Mass Loss:  Unable to assess    Fluid Accumulation:  No significant fluid accumulation     Strength:  Not Performed    Nutrition Assessment:    Pt sleeping during room visit. Tolerating Full Liquids and consumed greater than half yesterday per Flowsheets. Will modify clear supplement to standard now that diet advanced. CPN continues at goal. Pt making slow progress noted, +BM. Nutrition Related Findings:    Glu 162, A1C 5.9 Wound Type: Surgical Incision       Current Nutrition Intake & Therapies:    Average Meal Intake: 51-75%  Average Supplements Intake: Unable to assess  ADULT ORAL NUTRITION SUPPLEMENT; Breakfast, Lunch, Dinner; Clear Liquid Oral Supplement  ADULT DIET; Full Liquid  PN-Adult Premix 5/15 - Central  PN-Adult Premix 5/15 - Central  Current Parenteral Nutrition Orders:  · Type and Formula: Premix Central (5/15)   · Lipids: 250ml (4x/wk)  · Duration: Continuous  · Rate/Volume: 60/1440  · Current PN Order Provides: average of 1308 kcal and 72 grams of protein daily  · Goal PN Orders Provides:  average of 1308 kcal and 72 grams of protein daily    Anthropometric Measures:  Height: 5' (152.4 cm)  Ideal Body Weight (IBW): 100 lbs (45 kg)    Admission Body Weight: 151 lb 10.8 oz (68.8 kg) (first meausred weight)  Current Body Weight: 145 lb (65.8 kg), 129 % IBW.  Weight Source: Bed Scale  Current BMI (kg/m2): 28.3  Usual Body Weight: 160 lb 10 oz (72.9 kg) (5/27/21)  % Weight Change (Calculated): -9.7  Weight Adjustment For: No Adjustment                 BMI Categories: Overweight (BMI 25.0-29. 9)    Estimated Daily Nutrient Needs:  Energy Requirements Based On: Kcal/kg  Weight Used for Energy Requirements: Current  Energy (kcal/day): 0239-6021 (24-27 kcal/kg)  Weight Used for Protein Requirements: Current  Protein (g/day): 64-76 (1.1-1.3 g/kg)  Method Used for Fluid Requirements: 1 ml/kcal  Fluid (ml/day):  8219-4614    Nutrition Diagnosis:   · Severe malnutrition,In context of acute illness or injury related to inadequate protein-energy intake as evidenced by weight loss greater than or equal to 2% in 1 week (pt meeting less than 50% of estimated energy needs for greater than 5 days)    Nutrition Interventions:   Food and/or Nutrient Delivery: Continue Current Diet,Modify Oral Nutrition Supplement,Continue Current Parenteral Nutrition  Nutrition Education/Counseling: No recommendation at this time  Coordination of Nutrition Care: Continue to monitor while inpatient,Feeding Assistance/Environment Change  Plan of Care discussed with: perfectserved surgery regarding nutrition plan    Goals:  Previous Goal Met: Progressing toward Goal(s)  Goals: PO intake 50% or greater       Nutrition Monitoring and Evaluation:   Behavioral-Environmental Outcomes: None Identified  Food/Nutrient Intake Outcomes: Diet Advancement/Tolerance,Food and Nutrient Intake,Supplement Intake,Parenteral Nutrition Intake/Tolerance  Physical Signs/Symptoms Outcomes: Biochemical Data,GI Status,Hemodynamic Status,Fluid Status or Edema,Weight,Skin    Discharge Planning:    Continue Oral Nutrition Supplement     Penny Evans, [address] [place] [address],   Contact: [phone]

## 2022-06-07 NOTE — PROGRESS NOTES
Nephrology Progress Note        [address], [address] [address]  Phone: [phone]  Office Hours: 8:30AM - 4:30PM  Monday - Friday 6/7/2022 8:56 AM  Subjective:   Admit Date: 5/22/2022  PCP: DO Braeden Amaya History:   Doing ok this ma  Having some nausea    Diet: ADULT ORAL NUTRITION SUPPLEMENT; Breakfast, Lunch, Dinner; Clear Liquid Oral Supplement  ADULT DIET;  Full Liquid  PN-Adult Premix 5/15 - Central      Data:   Scheduled Meds:   lidocaine  1 patch TransDERmal Daily    sucralfate  1 g Oral 4 times per day    pantoprazole  40 mg IntraVENous Daily    potassium chloride  20 mEq Oral Once    fat emulsion  250 mL IntraVENous Once per day on Mon Tue Thu Fri    lidocaine PF  5 mL IntraDERmal Once    sodium chloride flush  5-40 mL IntraVENous 2 times per day    lidocaine   Topical Once    enoxaparin  40 mg SubCUTAneous Daily    atorvastatin  20 mg Oral Daily    Vitamin D  1,000 Units Oral Daily    cetirizine  10 mg Oral Daily    levothyroxine  137 mcg Oral Daily    losartan  100 mg Oral Daily    metoprolol tartrate  150 mg Oral Daily    metoprolol tartrate  100 mg Oral Nightly    oxybutynin  5 mg Oral BID    rOPINIRole  3 mg Oral Nightly    vitamin B-12  100 mcg Oral Daily    insulin lispro  0-6 Units SubCUTAneous Q4H    sodium chloride flush  5-40 mL IntraVENous 2 times per day     Continuous Infusions:   sodium chloride      PN-Adult Premix 5/15 - Central 60 mL/hr at 06/06/22 1756    sodium chloride      dextrose Stopped (05/31/22 1905)    sodium chloride Stopped (05/25/22 1247)     PRN Meds:calcium carbonate, sodium chloride flush, sodium chloride, benzocaine-menthol, phenol, ondansetron **OR** ondansetron, oxyCODONE **OR** oxyCODONE, morphine **OR** morphine, zolpidem, promethazine, albuterol sulfate HFA, glucose, dextrose bolus **OR** dextrose bolus, glucagon (rDNA), dextrose, sodium chloride flush, sodium chloride, ondansetron **OR** ondansetron, polyethylene glycol, acetaminophen **OR** acetaminophen  I/O last 3 completed shifts: In: 240 [P.O.:240]  Out: 80 [Drains:80]  No intake/output data recorded. Intake/Output Summary (Last 24 hours) at 6/7/2022 0856  Last data filed at 6/7/2022 0604  Gross per 24 hour   Intake 240 ml   Output 50 ml   Net 190 ml       CBC:   Recent Labs     06/05/22  1408 06/06/22  1145 06/07/22  0647   WBC 9.7 8.4 9.4   HGB 10.5* 10.5* 10.6*    228 218       BMP:    Recent Labs     06/06/22  1145 06/07/22  0647   *  126* 124*   K 4.9  4.9 5.0   CL 99  100 96*   CO2 18*  17* 21   BUN 21  22 23   CREATININE 0.6  0.6 0.6   GLUCOSE 183*  185* 168*         Objective:   Vitals: BP (!) 122/46   Pulse 85   Temp 97.8 °F (36.6 °C) (Oral)   Resp 16   Ht 5' (1.524 m)   Wt 145 lb (65.8 kg)   SpO2 97%   BMI 28.32 kg/m²   General appearance: alert and cooperative with exam, in no acute distress  HEENT: normocephalic, atraumatic,   Neck: supple, trachea midline  Lungs: breathing comfortably   Heart[de-identified] regular rate and rhythm, S1, S2 normal,  Abdomen: soft, ivette; non distended,   Extremities: extremities atraumatic, no cyanosis or edema  Neurologic: alert, oriented, follows commands, interactive    Assessment and Plan:         Patient Active Problem List    Diagnosis Date Noted    Partial small bowel obstruction (HCC)     Severe malnutrition ([facility] 05/31/2022    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 ///pharmacy will be informed  Start NS at 125ml/hr  Sodium level q8hrs for 3 occurrences    Thank you              Electronically signed by Lewis Guaman DO on 6/7/2022 at 8:56 AM    [facility]  FLORI Natasha Mohan, [address] [place] [address], [facility],  [place]  Formerly [facility], [facility]  PHONE: [phone]  FAX: [phone]

## 2022-06-07 NOTE — PROGRESS NOTES
Spoke with radiologist Dr. Mariaelena Simeon who recommends on holding AM medications until after UGI with SBFT.

## 2022-06-08 LAB
ALBUMIN SERPL-MCNC: 2.8 GM/DL (ref 3.4–5)
ALP BLD-CCNC: 50 IU/L (ref 40–128)
ALT SERPL-CCNC: 11 U/L (ref 10–40)
ANION GAP SERPL CALCULATED.3IONS-SCNC: 8 MMOL/L (ref 4–16)
AST SERPL-CCNC: 14 IU/L (ref 15–37)
BILIRUB SERPL-MCNC: 0.2 MG/DL (ref 0–1)
BUN BLDV-MCNC: 21 MG/DL (ref 6–23)
CALCIUM SERPL-MCNC: 8.4 MG/DL (ref 8.3–10.6)
CHLORIDE BLD-SCNC: 100 MMOL/L (ref 99–110)
CO2: 20 MMOL/L (ref 21–32)
CREAT SERPL-MCNC: 0.6 MG/DL (ref 0.6–1.1)
GFR AFRICAN AMERICAN: >60 ML/MIN/1.73M2
GFR NON-AFRICAN AMERICAN: >60 ML/MIN/1.73M2
GLUCOSE BLD-MCNC: 124 MG/DL (ref 70–99)
GLUCOSE BLD-MCNC: 127 MG/DL (ref 70–99)
GLUCOSE BLD-MCNC: 138 MG/DL (ref 70–99)
GLUCOSE BLD-MCNC: 140 MG/DL (ref 70–99)
GLUCOSE BLD-MCNC: 146 MG/DL (ref 70–99)
GLUCOSE BLD-MCNC: 188 MG/DL (ref 70–99)
HCT VFR BLD CALC: 31.8 % (ref 37–47)
HEMOGLOBIN: 10.1 GM/DL (ref 12.5–16)
MAGNESIUM: 1.7 MG/DL (ref 1.8–2.4)
MCH RBC QN AUTO: 29.1 PG (ref 27–31)
MCHC RBC AUTO-ENTMCNC: 31.8 % (ref 32–36)
MCV RBC AUTO: 91.6 FL (ref 78–100)
PDW BLD-RTO: 13.4 % (ref 11.7–14.9)
PHOSPHORUS: 3 MG/DL (ref 2.5–4.9)
PLATELET # BLD: 211 K/CU MM (ref 140–440)
PMV BLD AUTO: 11.1 FL (ref 7.5–11.1)
POTASSIUM SERPL-SCNC: 4.5 MMOL/L (ref 3.5–5.1)
RBC # BLD: 3.47 M/CU MM (ref 4.2–5.4)
SODIUM BLD-SCNC: 128 MMOL/L (ref 135–145)
TOTAL PROTEIN: 5.5 GM/DL (ref 6.4–8.2)
VITAMIN B-12: 1413 PG/ML (ref 211–911)
WBC # BLD: 6.5 K/CU MM (ref 4–10.5)

## 2022-06-08 PROCEDURE — 2500000003 HC RX 250 WO HCPCS: Performed by: INTERNAL MEDICINE

## 2022-06-08 PROCEDURE — 2580000003 HC RX 258: Performed by: SURGERY

## 2022-06-08 PROCEDURE — 6370000000 HC RX 637 (ALT 250 FOR IP): Performed by: STUDENT IN AN ORGANIZED HEALTH CARE EDUCATION/TRAINING PROGRAM

## 2022-06-08 PROCEDURE — 6360000002 HC RX W HCPCS: Performed by: SURGERY

## 2022-06-08 PROCEDURE — 80053 COMPREHEN METABOLIC PANEL: CPT

## 2022-06-08 PROCEDURE — 6370000000 HC RX 637 (ALT 250 FOR IP): Performed by: INTERNAL MEDICINE

## 2022-06-08 PROCEDURE — 6370000000 HC RX 637 (ALT 250 FOR IP): Performed by: SURGERY

## 2022-06-08 PROCEDURE — 1200000000 HC SEMI PRIVATE

## 2022-06-08 PROCEDURE — 94761 N-INVAS EAR/PLS OXIMETRY MLT: CPT

## 2022-06-08 PROCEDURE — 6370000000 HC RX 637 (ALT 250 FOR IP): Performed by: NURSE PRACTITIONER

## 2022-06-08 PROCEDURE — C9113 INJ PANTOPRAZOLE SODIUM, VIA: HCPCS | Performed by: SURGERY

## 2022-06-08 PROCEDURE — 84295 ASSAY OF SERUM SODIUM: CPT

## 2022-06-08 PROCEDURE — 2580000003 HC RX 258: Performed by: INTERNAL MEDICINE

## 2022-06-08 PROCEDURE — 99024 POSTOP FOLLOW-UP VISIT: CPT | Performed by: SURGERY

## 2022-06-08 PROCEDURE — 85027 COMPLETE CBC AUTOMATED: CPT

## 2022-06-08 PROCEDURE — 83735 ASSAY OF MAGNESIUM: CPT

## 2022-06-08 PROCEDURE — 80048 BASIC METABOLIC PNL TOTAL CA: CPT

## 2022-06-08 PROCEDURE — 84100 ASSAY OF PHOSPHORUS: CPT

## 2022-06-08 PROCEDURE — 36415 COLL VENOUS BLD VENIPUNCTURE: CPT

## 2022-06-08 RX ORDER — MAGNESIUM SULFATE 1 G/100ML
1000 INJECTION INTRAVENOUS ONCE
Status: COMPLETED | OUTPATIENT
Start: 2022-06-08 | End: 2022-06-08

## 2022-06-08 RX ADMIN — SODIUM CHLORIDE, PRESERVATIVE FREE 10 ML: 5 INJECTION INTRAVENOUS at 08:53

## 2022-06-08 RX ADMIN — SODIUM CHLORIDE, PRESERVATIVE FREE 10 ML: 5 INJECTION INTRAVENOUS at 08:51

## 2022-06-08 RX ADMIN — SODIUM CHLORIDE, PRESERVATIVE FREE 10 ML: 5 INJECTION INTRAVENOUS at 21:30

## 2022-06-08 RX ADMIN — ROPINIROLE HYDROCHLORIDE 3 MG: 1 TABLET, FILM COATED ORAL at 21:30

## 2022-06-08 RX ADMIN — OXYBUTYNIN CHLORIDE 5 MG: 5 TABLET ORAL at 08:54

## 2022-06-08 RX ADMIN — ONDANSETRON 4 MG: 2 INJECTION INTRAMUSCULAR; INTRAVENOUS at 08:50

## 2022-06-08 RX ADMIN — Medication 100 MCG: at 08:55

## 2022-06-08 RX ADMIN — PANTOPRAZOLE SODIUM 40 MG: 40 INJECTION, POWDER, FOR SOLUTION INTRAVENOUS at 21:30

## 2022-06-08 RX ADMIN — PANTOPRAZOLE SODIUM 40 MG: 40 INJECTION, POWDER, FOR SOLUTION INTRAVENOUS at 08:53

## 2022-06-08 RX ADMIN — MAGNESIUM SULFATE HEPTAHYDRATE: 500 INJECTION, SOLUTION INTRAMUSCULAR; INTRAVENOUS at 17:29

## 2022-06-08 RX ADMIN — ENOXAPARIN SODIUM 40 MG: 100 INJECTION SUBCUTANEOUS at 08:56

## 2022-06-08 RX ADMIN — OXYBUTYNIN CHLORIDE 5 MG: 5 TABLET ORAL at 21:30

## 2022-06-08 RX ADMIN — SUCRALFATE 1 G: 1 TABLET ORAL at 17:21

## 2022-06-08 RX ADMIN — SUCRALFATE 1 G: 1 TABLET ORAL at 05:29

## 2022-06-08 RX ADMIN — MAGNESIUM SULFATE IN DEXTROSE 1000 MG: 10 INJECTION, SOLUTION INTRAVENOUS at 08:48

## 2022-06-08 RX ADMIN — INSULIN LISPRO 1 UNITS: 100 INJECTION, SOLUTION INTRAVENOUS; SUBCUTANEOUS at 16:45

## 2022-06-08 RX ADMIN — SODIUM CHLORIDE 950 ML: 9 INJECTION, SOLUTION INTRAVENOUS at 21:38

## 2022-06-08 RX ADMIN — LOSARTAN POTASSIUM 100 MG: 100 TABLET, FILM COATED ORAL at 08:54

## 2022-06-08 RX ADMIN — Medication 1000 UNITS: at 08:54

## 2022-06-08 RX ADMIN — LEVOTHYROXINE SODIUM 137 MCG: 25 TABLET ORAL at 05:29

## 2022-06-08 RX ADMIN — SODIUM CHLORIDE: 9 INJECTION, SOLUTION INTRAVENOUS at 05:37

## 2022-06-08 RX ADMIN — SUCRALFATE 1 G: 1 TABLET ORAL at 12:17

## 2022-06-08 RX ADMIN — ESCITALOPRAM OXALATE 10 MG: 10 TABLET ORAL at 08:56

## 2022-06-08 RX ADMIN — ONDANSETRON 4 MG: 2 INJECTION INTRAMUSCULAR; INTRAVENOUS at 17:26

## 2022-06-08 RX ADMIN — METOPROLOL TARTRATE 100 MG: 50 TABLET, FILM COATED ORAL at 21:30

## 2022-06-08 RX ADMIN — ATORVASTATIN CALCIUM 20 MG: 10 TABLET, FILM COATED ORAL at 08:54

## 2022-06-08 RX ADMIN — CETIRIZINE HYDROCHLORIDE 10 MG: 10 TABLET, FILM COATED ORAL at 08:54

## 2022-06-08 RX ADMIN — INSULIN LISPRO 1 UNITS: 100 INJECTION, SOLUTION INTRAVENOUS; SUBCUTANEOUS at 08:48

## 2022-06-08 RX ADMIN — METOPROLOL TARTRATE 150 MG: 50 TABLET, FILM COATED ORAL at 08:54

## 2022-06-08 ASSESSMENT — PAIN SCALES - GENERAL: PAINLEVEL_OUTOF10: 0

## 2022-06-08 NOTE — PROGRESS NOTES
Disposition: Acute rehab unit 9 June and 6-expedited appeal signed     Estimated Date of Discharge: TBD     Surrogate Decision Maker/ POA      Subjective:           Patient seen and examined at bedside this morning. No acute overnight events reported. Patient reports she is doing fair and slept well last night. Patient reports that she had episodes of nausea and vomiting yesterday during the UGI procedure yesterday. Reports no nausea or vomiting today. Denies chest pain, shortness of breath, fever or chills. Bunny seen and examined at bedside this morning. Reports that she is disappointed with everything going on and she feels depressed. She reports some nausea and vomiting and burning sensation down her throat during swallowing. Objective: Intake/Output Summary (Last 24 hours) at 6/8/2022 1313  Last data filed at 6/8/2022 1223  Gross per 24 hour   Intake 500 ml   Output 550 ml   Net -50 ml        Vitals:   Vitals:    06/08/22 0832   BP: 128/60   Pulse: (!) 101   Resp: 16   Temp: 97.4 °F (36.3 °C)   SpO2: 94%       Physical Exam:     General: NAD  Eyes: EOMI  ENT: neck supple  Cardiovascular: Regular rate. Respiratory: Clear to auscultation  Gastrointestinal: Soft, non tender, abdominal sutures noted, no bleeding  Genitourinary: no suprapubic tenderness  Musculoskeletal: No edema  Skin: warm, dry  Neuro: Alert. Psych: Mood appropriate.      Medications:   Medications:    pantoprazole  40 mg IntraVENous BID    escitalopram  10 mg Oral Daily    lidocaine  1 patch TransDERmal Daily    sucralfate  1 g Oral 4 times per day    fat emulsion  250 mL IntraVENous Once per day on Mon Tue Thu Fri    lidocaine PF  5 mL IntraDERmal Once    sodium chloride flush  5-40 mL IntraVENous 2 times per day    lidocaine   Topical Once    enoxaparin  40 mg SubCUTAneous Daily    atorvastatin  20 mg Oral Daily    Vitamin D  1,000 Units Oral Daily    cetirizine  10 mg Oral Daily    levothyroxine  137 mcg Oral Daily    losartan  100 mg Oral Daily    metoprolol tartrate  150 mg Oral Daily    metoprolol tartrate  100 mg Oral Nightly    oxybutynin  5 mg Oral BID    rOPINIRole  3 mg Oral Nightly    vitamin B-12  100 mcg Oral Daily    insulin lispro  0-6 Units SubCUTAneous Q4H    sodium chloride flush  5-40 mL IntraVENous 2 times per day      Infusions:    PN-Adult Premix 5/15 - Central      sodium chloride 50 mL/hr at 06/08/22 1029    PN-Adult Premix 5/15 - Central 60 mL/hr at 06/07/22 1827    sodium chloride      dextrose Stopped (05/31/22 1905)    sodium chloride Stopped (05/25/22 1247)     PRN Meds: calcium carbonate, 500 mg, TID PRN  sodium chloride flush, 5-40 mL, PRN  sodium chloride, , PRN  benzocaine-menthol, 1 lozenge, PRN  phenol, 1 spray, Q2H PRN  ondansetron, 4 mg, Q8H PRN   Or  ondansetron, 4 mg, Q6H PRN  oxyCODONE, 5 mg, Q4H PRN   Or  oxyCODONE, 10 mg, Q4H PRN  morphine, 2 mg, Q2H PRN   Or  morphine, 4 mg, Q2H PRN  zolpidem, 5 mg, Nightly PRN  promethazine, 12.5 mg, Q6H PRN  albuterol sulfate HFA, 2 puff, Q6H PRN  glucose, 4 tablet, PRN  dextrose bolus, 125 mL, PRN   Or  dextrose bolus, 250 mL, PRN  glucagon (rDNA), 1 mg, PRN  dextrose, 100 mL/hr, PRN  sodium chloride flush, 5-40 mL, PRN  sodium chloride, , PRN  ondansetron, 4 mg, Q8H PRN   Or  ondansetron, 4 mg, Q6H PRN  polyethylene glycol, 17 g, Daily PRN  acetaminophen, 650 mg, Q6H PRN   Or  acetaminophen, 650 mg, Q6H PRN        Labs      Recent Results (from the past 24 hour(s))   Sodium    Collection Time: 06/07/22  2:30 PM   Result Value Ref Range    Sodium 128 (L) 135 - 145 MMOL/L   POCT Glucose    Collection Time: 06/07/22  4:12 PM   Result Value Ref Range    POC Glucose 95 70 - 99 MG/DL   POCT Glucose    Collection Time: 06/07/22  8:29 PM   Result Value Ref Range    POC Glucose 120 (H) 70 - 99 MG/DL   POCT Glucose    Collection Time: 06/07/22 11:56 PM   Result Value Ref Range    POC Glucose 143 (H) 70 - 99 MG/DL   POCT Glucose Collection Time: 06/08/22  4:09 AM   Result Value Ref Range    POC Glucose 138 (H) 70 - 99 MG/DL   CBC    Collection Time: 06/08/22  6:25 AM   Result Value Ref Range    WBC 6.5 4.0 - 10.5 K/CU MM    RBC 3.47 (L) 4.2 - 5.4 M/CU MM    Hemoglobin 10.1 (L) 12.5 - 16.0 GM/DL    Hematocrit 31.8 (L) 37 - 47 %    MCV 91.6 78 - 100 FL    MCH 29.1 27 - 31 PG    MCHC 31.8 (L) 32.0 - 36.0 %    RDW 13.4 11.7 - 14.9 %    Platelets 018 104 - 594 K/CU MM    MPV 11.1 7.5 - 11.1 FL   Comprehensive Metabolic Panel    Collection Time: 06/08/22  6:25 AM   Result Value Ref Range    Sodium 128 (L) 135 - 145 MMOL/L    Potassium 4.5 3.5 - 5.1 MMOL/L    Chloride 100 99 - 110 mMol/L    CO2 20 (L) 21 - 32 MMOL/L    BUN 21 6 - 23 MG/DL    CREATININE 0.6 0.6 - 1.1 MG/DL    Glucose 188 (H) 70 - 99 MG/DL    Calcium 8.4 8.3 - 10.6 MG/DL    Albumin 2.8 (L) 3.4 - 5.0 GM/DL    Total Protein 5.5 (L) 6.4 - 8.2 GM/DL    Total Bilirubin 0.2 0.0 - 1.0 MG/DL    ALT 11 10 - 40 U/L    AST 14 (L) 15 - 37 IU/L    Alkaline Phosphatase 50 40 - 128 IU/L    GFR Non-African American >60 >60 mL/min/1.73m2    GFR African American >60 >60 mL/min/1.73m2    Anion Gap 8 4 - 16   Magnesium    Collection Time: 06/08/22  6:25 AM   Result Value Ref Range    Magnesium 1.7 (L) 1.8 - 2.4 mg/dl   Phosphorus    Collection Time: 06/08/22  6:25 AM   Result Value Ref Range    Phosphorus 3.0 2.5 - 4.9 MG/DL   POCT Glucose    Collection Time: 06/08/22  8:28 AM   Result Value Ref Range    POC Glucose 140 (H) 70 - 99 MG/DL   POCT Glucose    Collection Time: 06/08/22 12:15 PM   Result Value Ref Range    POC Glucose 124 (H) 70 - 99 MG/DL        Imaging/Diagnostics Last 24 Hours   XR LUMBAR SPINE (2-3 VIEWS)    Result Date: 6/7/2022  EXAMINATION: THREE XRAY VIEWS OF THE LUMBAR SPINE 6/7/2022 8:15 am COMPARISON: 05/21/2022 HISTORY: ORDERING SYSTEM PROVIDED HISTORY: back pain TECHNOLOGIST PROVIDED HISTORY: Reason for exam:->back pain Reason for Exam: back pain FINDINGS: Lumbar vertebral body heights and alignment demonstrate no acute abnormality. Severe multilevel disc and facet degenerative changes. Sacroiliac joints are maintained. Catheter terminates in the right upper quadrant. Gas-filled distended small and large bowel are present with midline skin staples. Findings favor ileus. No acute abnormality in the lumbar spine. Findings favoring ileus.        Electronically signed by Anamika Flores MD on 6/8/2022 at 1:13 PM

## 2022-06-08 NOTE — PROGRESS NOTES
Progress Note( Dr. Jonathan Chin)    Subjective:   Admit Date: 5/22/2022  PCP: Abdelrahman Aj DO    Admitted For : Admitted on 5/22/2022 for cecal mass    Consulted For: Had hypoglycemic episode/better control of blood glucose    Interval History: Patient has been on TPN now  Started on clear liquid diet patient complains of more nausea and possibly vomiting related this morning    Denies any chest pains,   Denies SOB . Planes of nausea but no vomiting yet    diet was advanced to full liquid unable to eat  no new bowel or bladder symptoms. Intake/Output Summary (Last 24 hours) at 6/7/2022 2339  Last data filed at 6/7/2022 1936  Gross per 24 hour   Intake --   Output 560 ml   Net -560 ml       DATA    CBC:   Recent Labs     06/05/22  1408 06/06/22  1145 06/07/22 0647   WBC 9.7 8.4 9.4   HGB 10.5* 10.5* 10.6*    228 218    CMP:  Recent Labs     06/06/22  1145 06/07/22  0647 06/07/22  1430   *  126* 124* 128*   K 4.9  4.9 5.0  --    CL 99  100 96*  --    CO2 18*  17* 21  --    BUN 21 22 23  --    CREATININE 0.6  0.6 0.6  --    CALCIUM 8.5  8.3 8.8  --      Lipids:   Lab Results   Component Value Date    CHOL 138 05/02/2019    HDL 54 05/02/2019    TRIG 161 06/01/2022     Glucose:  Recent Labs     06/07/22  1145 06/07/22  1612 06/07/22 2029   POCGLU 148* 95 120*     [id number]:  Lab Results   Component Value Date    LABA1C 5.9 05/22/2022     High Sensitivity TSH:   Lab Results   Component Value Date    TSHHS 2.010 06/06/2022     Free T3:   Lab Results   Component Value Date    FT3 2.3 12/06/2017     Free T4:  Lab Results   Component Value Date    T4FREE 1.15 12/06/2017       FL UGI W SMALL BOWEL   Final Result   Limited single contrast upper GI reveals no discrete obstruction. Filling   defects within the stomach are nonspecific and may represent enteric   material.  Consider follow-up outpatient dedicated double-contrast upper GI   or endoscopy.       Multiple dilated loops of small bowel throughout the abdomen without discrete   obstruction. XR LUMBAR SPINE (2-3 VIEWS)   Final Result   No acute abnormality in the lumbar spine. Findings favoring ileus. XR ABDOMEN FOR NG/OG/NE TUBE PLACEMENT   Final Result   The NG tube is in good position. Small bowel distension has significantly improved from 05/29/2022. Mild patchy left basilar airspace disease, possibly atelectasis. XR ABDOMEN FOR NG/OG/NE TUBE PLACEMENT   Final Result   Tip of gastric tube within the stomach. MRI PELVIS W WO CONTRAST   Final Result   1. Cystic lesion in the right adnexa appears unchanged from prior CT. No   internal enhancement is identified to suggest a solid mass. Overall,   findings are favored to represent a complex ovarian cyst over abscess for   which follow-up imaging with CT or MRI in 6 months is recommended. 2. 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 US PELVIS COMPLETE   Final Result   1. 2.7 cm complex hypoechoic lesion in the right adnexa/ovary correlating to   recent CT. Differential considerations include a complex cystic ovarian   lesion, tubo-ovarian abscess or pericolonic abscess given adjacent colon. Recommend a short-term follow-up to ensure complete resolution. If findings   persist, a follow-up MRI may be warranted. 2. Nonvisualization of the left ovary or endometrium. 3. Probable 2.7 cm intramural fibroid. US DUP ABD PEL RETRO SCROT COMPLETE   Final Result   1. 2.7 cm complex hypoechoic lesion in the right adnexa/ovary correlating to   recent CT. Differential considerations include a complex cystic ovarian   lesion, tubo-ovarian abscess or pericolonic abscess given adjacent colon. Recommend a short-term follow-up to ensure complete resolution. If findings   persist, a follow-up MRI may be warranted. 2. Nonvisualization of the left ovary or endometrium. 3. Probable 2.7 cm intramural fibroid. Scheduled Medicines   Medications:    pantoprazole  40 mg IntraVENous BID    escitalopram  10 mg Oral Daily    lidocaine  1 patch TransDERmal Daily    sucralfate  1 g Oral 4 times per day    fat emulsion  250 mL IntraVENous Once per day on Mon Tue Thu Fri    lidocaine PF  5 mL IntraDERmal Once    sodium chloride flush  5-40 mL IntraVENous 2 times per day    lidocaine   Topical Once    enoxaparin  40 mg SubCUTAneous Daily    atorvastatin  20 mg Oral Daily    Vitamin D  1,000 Units Oral Daily    cetirizine  10 mg Oral Daily    levothyroxine  137 mcg Oral Daily    losartan  100 mg Oral Daily    metoprolol tartrate  150 mg Oral Daily    metoprolol tartrate  100 mg Oral Nightly    oxybutynin  5 mg Oral BID    rOPINIRole  3 mg Oral Nightly    vitamin B-12  100 mcg Oral Daily    insulin lispro  0-6 Units SubCUTAneous Q4H    sodium chloride flush  5-40 mL IntraVENous 2 times per day      Infusions:    sodium chloride 100 mL/hr at 06/07/22 1944    PN-Adult Premix 5/15 - Central 60 mL/hr at 06/07/22 1827    sodium chloride      dextrose Stopped (05/31/22 1905)    sodium chloride Stopped (05/25/22 1247)         Objective:   Vitals: /70   Pulse 82   Temp 98.2 °F (36.8 °C) (Axillary)   Resp 16   Ht 5' (1.524 m)   Wt 145 lb (65.8 kg)   SpO2 95%   BMI 28.32 kg/m²   General appearance: alert and cooperative with exam  Neck: no JVD or bruit  Thyroid : Normal lobes   Lungs: Has Vesicular Breath sounds   Heart:  regular rate and rhythm  Abdomen: soft, yes -tender; bowel sounds normal; no masses,  no organomegaly  Had right-sided hemicolectomy 5/27/2022  Musculoskeletal: Normal  Extremities: extremities normal, , no edema  Neurologic:  Awake, alert, oriented to name, place and time. Cranial nerves II-XII are grossly intact. Motor is  intact.   Sensory pop with t.,  and gait is normal.    Assessment:     Patient Active Problem List:     Long-term insulin use in type 2 diabetes ([facility] [address] Essential hypertension     Dyslipidemia     Abnormal EKG     Abnormal stress test     Cecum mass     Severe malnutrition (HCC)     Partial small bowel obstruction ([facility] [address]     Had hemicolectomy on 5/27/2022      INVASIVE ADENOCARCINOMA, TERMINAL ILEUM      Plan:     1. Reviewed POC blood glucose . Labs and X ray results   2. Reviewed Current Medicines   3. On  Correction bolus Humalog Insulin regime   4. Monitor Blood glucose frequently   5. Patient on TPN  6. Modified  the dose of Insulin/ other medicines as needed   7. Will follow     .      Teagan Melendez MD, MD no

## 2022-06-08 NOTE — CARE COORDINATION
Marnie Garcia spoke with the insurance company today and the appeal went through and the denial was overturned. Pt is approved to go to ARU. Pt will need Rapid COVID and the discharge to readmit orders will need to be completed. RN informed this LSW that the doctor wants to keep pt one more night. Possible discharge tomorrow. Marnie Garcia is aware.

## 2022-06-08 NOTE — CARE COORDINATION
Spoke with [facility] Medicare appeal dept provider line representative who states expedited appeal has been completed and they have overturned the denial. Patient is now approved for ARU. Binh Valorie #[id number]. Ref # for call is  [id number], spoke with Gabriel Navarrete the representative. Notified Wilbert Jaramillo. Per Odette Almendarez nurse states that patient is to get ultrasound of her upper extremity for possible DVT. Patient has 7 days to admit to ARU before a new pre-cert will need to be initiated. Patient will also need a negative rapid COVID test PTA to ARU.

## 2022-06-08 NOTE — PROGRESS NOTES
Call to patient's room by tech to look at patients left arm. BP cuff had been removed and patient's arm red, warm, some edema noted. No IV or lab draws to this area of patient's arm. Dr Vera Even notified.  CW

## 2022-06-08 NOTE — PROGRESS NOTES
GENERAL SURGERY PROGRESS NOTE    Catina Cheng is a 80 y.o. 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 Subjective:  She had an UGI/SBFT yesterday which showed some dilated small bowel but no obstruction. Today she feels better and has not had more reflux or vomiting. She has had multiple loose stools, one last night and 2 today. Pain is controlled. Mobilizing some. In better spirits today, eating soup.      CLAUDIO output 60ml/24h    Objective:    Vitals: VITALS:  /60   Pulse (!) 101   Temp 97.4 °F (36.3 °C) (Oral)   Resp 16   Ht 5' (1.524 m)   Wt 155 lb 11.2 oz (70.6 kg)   SpO2 94%   BMI 30.41 kg/m²     I/O: 06/07 0701 - 06/08 0700  In: -   Out: 510 [Urine:450; Drains:60]    Labs/Imaging Results:   Lab Results   Component Value Date     06/08/2022    K 4.5 06/08/2022    K 4.4 03/15/2018     06/08/2022    CO2 20 06/08/2022    BUN 21 06/08/2022    CREATININE 0.6 06/08/2022    GLUCOSE 188 06/08/2022    CALCIUM 8.4 06/08/2022      Lab Results   Component Value Date    WBC 6.5 06/08/2022    HGB 10.1 (L) 06/08/2022    HCT 31.8 (L) 06/08/2022    MCV 91.6 06/08/2022     06/08/2022         IV Fluids: PN-Adult Premix 5/15 - Central    sodium chloride Last Rate: 100 mL/hr at 06/08/22 0537    PN-Adult Premix 5/15 - Central Last Rate: 60 mL/hr at 06/07/22 1827    sodium chloride    dextrose Last Rate: Stopped (05/31/22 1905)    sodium chloride Last Rate: Stopped (05/25/22 1247)    Scheduled Meds:   magnesium sulfate, 1,000 mg, IntraVENous, Once    pantoprazole, 40 mg, IntraVENous, BID    escitalopram, 10 mg, Oral, Daily    lidocaine, 1 patch, TransDERmal, Daily    sucralfate, 1 g, Oral, 4 times per day    fat emulsion, 250 mL, IntraVENous, Once per day on Mon Tue Thu Fri    lidocaine PF, 5 mL, IntraDERmal, Once    sodium chloride flush, 5-40 mL, IntraVENous, 2 times per day    lidocaine, , Topical, Once    enoxaparin, 40 mg, SubCUTAneous, Daily    atorvastatin, 20 mg, Oral, Daily    Vitamin D, 1,000 Units, Oral, Daily    cetirizine, 10 mg, Oral, Daily    levothyroxine, 137 mcg, Oral, Daily    losartan, 100 mg, Oral, Daily    metoprolol tartrate, 150 mg, Oral, Daily    metoprolol tartrate, 100 mg, Oral, Nightly    oxybutynin, 5 mg, Oral, BID    rOPINIRole, 3 mg, Oral, Nightly    vitamin B-12, 100 mcg, Oral, Daily    insulin lispro, 0-6 Units, SubCUTAneous, Q4H    sodium chloride flush, 5-40 mL, IntraVENous, 2 times per day    Physical Exam:  General: A&O x 3, no distress. HEENT: Anicteric sclerae, MMM. Extremities: No edema bilat LE. Abdomen: Soft, appropriately tender, mildly distended. Incisions intact with staples. Multiple skin tears from tape. CLAUDIO drain in place, serous    Assessment and Plan:  80 y.o. female s/p robotic assisted right colectomy. Doing ok. Patient Active Problem List:     Long-term insulin use in type 2 diabetes ([facility] [address]     Essential hypertension     Dyslipidemia     Abnormal EKG     Abnormal stress test     Cecum mass    - Discussed findings and options with Reg Alcala. - Advanced to soft diet but not eating much. Encouraged to start on bland foods and avoid acidic foods.    - UGI with SBFT showed some dilation of small bowel but no obstruction. Continues to have BMs  - Struggling with nausea and reflux, better today. Continue to monitor. Will have them crush and dissolve carafate (suspension not available). Increased PPI to BID   - Cough drops/chloraseptic spray prn  - out of bed, IS, ambulate as able  - pathology now resulted, margins negative, 0/16 LN. Appreciate Oncology's recommendations. She is considering whether or not she would want to do chemo.      Pedro Olsen MD

## 2022-06-08 NOTE — PROGRESS NOTES
dextrose, sodium chloride flush, sodium chloride, ondansetron **OR** ondansetron, polyethylene glycol, acetaminophen **OR** acetaminophen  I/O last 3 completed shifts:  In: -   Out: 560 [Urine:450; Drains:110]  I/O this shift:  In: 260 [P.O.:240; I.V.:20]  Out: -     Intake/Output Summary (Last 24 hours) at 6/8/2022 1008  Last data filed at 6/8/2022 0928  Gross per 24 hour   Intake 260 ml   Output 450 ml   Net -190 ml       CBC:   Recent Labs     06/06/22  1145 06/07/22  0647 06/08/22  0625   WBC 8.4 9.4 6.5   HGB 10.5* 10.6* 10.1*    218 211       BMP:    Recent Labs     06/06/22  1145 06/06/22  1145 06/07/22  0647 06/07/22  1430 06/08/22  0625   *  126*   < > 124* 128* 128*   K 4.9  4.9  --  5.0  --  4.5   CL 99  100  --  96*  --  100   CO2 18*  17*  --  21  --  20*   BUN 21  22  --  23  --  21   CREATININE 0.6  0.6  --  0.6  --  0.6   GLUCOSE 183*  185*  --  168*  --  188*    < > = values in this interval not displayed.      Hepatic:   Recent Labs     06/08/22  0625   AST 14*   ALT 11   BILITOT 0.2   ALKPHOS 50         Objective:   Vitals: /60   Pulse (!) 101   Temp 97.4 °F (36.3 °C) (Oral)   Resp 16   Ht 5' (1.524 m)   Wt 155 lb 11.2 oz (70.6 kg)   SpO2 94%   BMI 30.41 kg/m²   General appearance: in no acute distress  HEENT: normocephalic, atraumatic,   Neck: supple, trachea midline  Lungs: breathing comfortably on room air  Heart[de-identified] regular rate and rhythm  Extremities: extremities atraumatic, no cyanosis or edema  Neurologic: alert    Assessment and Plan:     Patient Active Problem List    Diagnosis Date Noted    Partial small bowel obstruction (HCC)     Severe malnutrition ([address] 05/31/2022    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    Abnormal stress test     Dyslipidemia 04/06/2021    Abnormal EKG 04/06/2021    Long-term insulin use in type 2 diabetes ([facility] [address] 03/14/2018    Essential hypertension 03/14/2018     Sodium better at 128  Will decrease NS to 50ml/hr Electronically signed by Temitope Brennan DO on 6/8/2022 at 10:08 AM    [facility]  MD Cristo Lugo DO [address],  [address]  [place], [place]  PHONE: [phone]  FAX: [phone]

## 2022-06-08 NOTE — PROGRESS NOTES
Progress Note( Dr. Bill Reich)  6/7/2022  Subjective:   Admit Date: 5/22/2022  PCP: Temo Aj DO    Admitted For : Admitted on 5/22/2022 for cecal mass    Consulted For: Had hypoglycemic episode/better control of blood glucose    Interval History: Patient has been on TPN now  Started on clear liquid diet patient complains of more nausea and possibly vomiting related this morning    Denies any chest pains,   Denies SOB . Planes of nausea but no vomiting yet    diet was advanced to full liquid unable to eat  no new bowel or bladder symptoms. Intake/Output Summary (Last 24 hours) at 6/7/2022 2337  Last data filed at 6/7/2022 1936  Gross per 24 hour   Intake --   Output 560 ml   Net -560 ml       DATA    CBC:   Recent Labs     06/05/22  1408 06/06/22  1145 06/07/22  0647   WBC 9.7 8.4 9.4   HGB 10.5* 10.5* 10.6*    228 218    CMP:  Recent Labs     06/06/22  1145 06/07/22  0647 06/07/22  1430   *  126* 124* 128*   K 4.9  4.9 5.0  --    CL 99  100 96*  --    CO2 18*  17* 21  --    BUN 21 22 23  --    CREATININE 0.6  0.6 0.6  --    CALCIUM 8.5  8.3 8.8  --      Lipids:   Lab Results   Component Value Date    CHOL 138 05/02/2019    HDL 54 05/02/2019    TRIG 161 06/01/2022     Glucose:  Recent Labs     06/07/22  1145 06/07/22  1612 06/07/22 2029   POCGLU 148* 95 120*     FnyevmeovzY2G:  Lab Results   Component Value Date    LABA1C 5.9 05/22/2022     High Sensitivity TSH:   Lab Results   Component Value Date    TSHHS 2.010 06/06/2022     Free T3:   Lab Results   Component Value Date    FT3 2.3 12/06/2017     Free T4:  Lab Results   Component Value Date    T4FREE 1.15 12/06/2017       FL UGI W SMALL BOWEL   Final Result   Limited single contrast upper GI reveals no discrete obstruction. Filling   defects within the stomach are nonspecific and may represent enteric   material.  Consider follow-up outpatient dedicated double-contrast upper GI   or endoscopy.       Multiple dilated loops of small bowel throughout the abdomen without discrete   obstruction. XR LUMBAR SPINE (2-3 VIEWS)   Final Result   No acute abnormality in the lumbar spine. Findings favoring ileus. XR ABDOMEN FOR NG/OG/NE TUBE PLACEMENT   Final Result   The NG tube is in good position. Small bowel distension has significantly improved from 05/29/2022. Mild patchy left basilar airspace disease, possibly atelectasis. XR ABDOMEN FOR NG/OG/NE TUBE PLACEMENT   Final Result   Tip of gastric tube within the stomach. MRI PELVIS W WO CONTRAST   Final Result   1. Cystic lesion in the right adnexa appears unchanged from prior CT. No   internal enhancement is identified to suggest a solid mass. Overall,   findings are favored to represent a complex ovarian cyst over abscess for   which follow-up imaging with CT or MRI in 6 months is recommended. 2. 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 US PELVIS COMPLETE   Final Result   1. 2.7 cm complex hypoechoic lesion in the right adnexa/ovary correlating to   recent CT. Differential considerations include a complex cystic ovarian   lesion, tubo-ovarian abscess or pericolonic abscess given adjacent colon. Recommend a short-term follow-up to ensure complete resolution. If findings   persist, a follow-up MRI may be warranted. 2. Nonvisualization of the left ovary or endometrium. 3. Probable 2.7 cm intramural fibroid. US DUP ABD PEL RETRO SCROT COMPLETE   Final Result   1. 2.7 cm complex hypoechoic lesion in the right adnexa/ovary correlating to   recent CT. Differential considerations include a complex cystic ovarian   lesion, tubo-ovarian abscess or pericolonic abscess given adjacent colon. Recommend a short-term follow-up to ensure complete resolution. If findings   persist, a follow-up MRI may be warranted. 2. Nonvisualization of the left ovary or endometrium. 3. Probable 2.7 cm intramural fibroid. Essential hypertension     Dyslipidemia     Abnormal EKG     Abnormal stress test     Cecum mass     Severe malnutrition (HCC)     Partial small bowel obstruction ([facility] [address]     Had hemicolectomy on 5/27/2022      INVASIVE ADENOCARCINOMA, TERMINAL ILEUM      Plan:     1. Reviewed POC blood glucose . Labs and X ray results   2. Reviewed Current Medicines   3. On  Correction bolus Humalog Insulin regime   4. Monitor Blood glucose frequently   5. Patient on TPN  6. Modified  the dose of Insulin/ other medicines as needed   7. Going for xray of Lumbar spine   8. Will follow     .      Nighat Huffman MD, MD

## 2022-06-08 NOTE — PROGRESS NOTES
TPN LAB EVALUATION  Recent Labs     06/06/22  1145 06/06/22  1145 06/07/22  0647 06/07/22  1430 06/08/22  0625   *  126*   < > 124*   < > 128*   K 4.9  4.9   < > 5.0  --  4.5   CL 99  100   < > 96*  --  100   CALCIUM 8.5  8.3   < > 8.8  --  8.4   MG 1.6*  1.7*   < >  --   --  1.7*   PHOS 3.2  3.2   < >  --   --  3.0   GLUCOSE 183*  185*  --  168*  --  188*    < > = values in this interval not displayed. Pharmacy to dose TPN per Dr. Mimi Stewart - with input from Nephrology - Dr Hurst [place] # 8    Indication: severe malnutrition    Goal Per Nutrition Recommendations/Plan:   1. Recommend initiating PN to d/t continued inadequate oral intake and malnutrition  2. PN recommendation: Clinimix 5/15 @ 60 ml/hr which will provide ~1308 kcal (average lipid and non lipid days) and 72 g protein    Central line: PICC double lumen    Diet: Full Liquid    Maintenance Fluids: none    Glucose:  GLUCOSE LEVELS LAST 72 HOURS                  (last 7 readings)    Recent Labs     06/06/22  1145 06/06/22  1703 06/07/22  0355 06/07/22  0647 06/07/22  0804 06/07/22  1145 06/07/22  1612 06/07/22  2029 06/07/22  2356 06/08/22  0409 06/08/22  0625   POCGLU  --    < > 137*  --  162* 148* 95 120* 143* 138*  --    GLUCOSE 183*  185*  --   --  168*  --   --   --   --   --   --  188*    < > = values in this interval not displayed.  Goal <180    Slightly above goal   Low dose sliding scale insulin coverage   No insulin in the TPN formula. TRIGLYCERIDES:  Triglycerides   Date Value Ref Range Status   06/01/2022 161 (H) <150 MG/DL Final   05/02/2019 152 (H) <150 MG/DL Final   02/09/2017 413 (H) <150 MG/DL Final     Triglycerides:   Goal < 400    TG @ 161 on 6/1/22, at goal   Patient is not currently receiving propofol   Continue standard lipid replacement four times weekly.       LIVER FUNCTION LAB EVALUATION  Recent Labs     06/08/22  0625   AST 14*   ALT 11   ALKPHOS 50   BILITOT 0.2     Hepatic Function:   Previously WNL   Bititotal previously < 3.0, trace elements added to TPN      RENAL LAB EVALUATION  Estimated Creatinine Clearance: 56 mL/min (based on SCr of 0.6 mg/dL). Recent Labs     06/06/22  1145 06/07/22  0647 06/08/22  0625   BUN 21 22 23 21   CREATININE 0.6  0.6 0.6 0.6     Renal Function and Electrolytes:   Na - low, per Dr Nadeen Poe -sodium acetate increased to 230MEQ (Maximum/TPN)   K - WNL     Ca - WNL   Mg - low - recently increased to 3000mg in TPN, will give 1000mg IVPB x 1 today   Phos - WNL   TPN labs ordered for M/W/F      Formulation:   · continue same TPN 5/15 with Custom Electrolytes at goal rate of 60 ml/hr. · Standard lipids four times weekly. Components    Component Order Dose    CLINIMIX 5/15 5 % Soln 2,000 mLs    sodium acetate 2 MEQ/ML Soln 150 mEq    potassium phosphate 15 MMOLE/5ML Soln 10 mmol    potassium chloride 2 MEQ/ML Soln 5 mEq    magnesium sulfate 50 % Soln 3,000 mg    adult multi-vitamin with vitamin k Inj 10 mLs    trace minerals Cu-Mn-Se-Zn 093-31- MCG/ML Soln 1 mL        Pharmacy will continue to follow and adjust as appropriate.    Thank you for the consult,    Rebel Remy, [facility]  8:24 AM  06/08/22

## 2022-06-09 ENCOUNTER — APPOINTMENT (OUTPATIENT)
Dept: ULTRASOUND IMAGING | Age: 87
DRG: 329 | End: 2022-06-09
Attending: INTERNAL MEDICINE
Payer: MEDICARE

## 2022-06-09 ENCOUNTER — HOSPITAL ENCOUNTER (INPATIENT)
Age: 87
LOS: 14 days | Discharge: HOME HEALTH | DRG: 374 | End: 2022-06-23
Attending: PHYSICAL MEDICINE & REHABILITATION | Admitting: PHYSICAL MEDICINE & REHABILITATION
Payer: MEDICARE

## 2022-06-09 VITALS
WEIGHT: 152.7 LBS | OXYGEN SATURATION: 97 % | TEMPERATURE: 98.4 F | DIASTOLIC BLOOD PRESSURE: 45 MMHG | HEIGHT: 60 IN | SYSTOLIC BLOOD PRESSURE: 138 MMHG | RESPIRATION RATE: 16 BRPM | BODY MASS INDEX: 29.98 KG/M2 | HEART RATE: 57 BPM

## 2022-06-09 PROBLEM — C80.1 ADENOCARCINOMA (HCC): Status: ACTIVE | Noted: 2022-06-09

## 2022-06-09 LAB
ANION GAP SERPL CALCULATED.3IONS-SCNC: 7 MMOL/L (ref 4–16)
BACTERIA: ABNORMAL /HPF
BILIRUBIN URINE: NEGATIVE MG/DL
BLOOD, URINE: ABNORMAL
BUN BLDV-MCNC: 17 MG/DL (ref 6–23)
CALCIUM SERPL-MCNC: 7.9 MG/DL (ref 8.3–10.6)
CHLORIDE BLD-SCNC: 100 MMOL/L (ref 99–110)
CLARITY: CLEAR
CO2: 24 MMOL/L (ref 21–32)
COLOR: YELLOW
CREAT SERPL-MCNC: 0.5 MG/DL (ref 0.6–1.1)
GFR AFRICAN AMERICAN: >60 ML/MIN/1.73M2
GFR NON-AFRICAN AMERICAN: >60 ML/MIN/1.73M2
GLUCOSE BLD-MCNC: 124 MG/DL (ref 70–99)
GLUCOSE BLD-MCNC: 130 MG/DL (ref 70–99)
GLUCOSE BLD-MCNC: 131 MG/DL (ref 70–99)
GLUCOSE BLD-MCNC: 133 MG/DL (ref 70–99)
GLUCOSE BLD-MCNC: 159 MG/DL (ref 70–99)
GLUCOSE BLD-MCNC: 169 MG/DL (ref 70–99)
GLUCOSE BLD-MCNC: 174 MG/DL (ref 70–99)
GLUCOSE, URINE: NEGATIVE MG/DL
KETONES, URINE: NEGATIVE MG/DL
LEUKOCYTE ESTERASE, URINE: ABNORMAL
NITRITE URINE, QUANTITATIVE: NEGATIVE
PH, URINE: 6 (ref 5–8)
POTASSIUM SERPL-SCNC: 4.1 MMOL/L (ref 3.5–5.1)
PROTEIN UA: NEGATIVE MG/DL
RBC URINE: 1 /HPF (ref 0–6)
SARS-COV-2, NAAT: NOT DETECTED
SODIUM BLD-SCNC: 131 MMOL/L (ref 135–145)
SOURCE: NORMAL
SPECIFIC GRAVITY UA: 1.01 (ref 1–1.03)
SQUAMOUS EPITHELIAL: <1 /HPF
TRICHOMONAS: ABNORMAL /HPF
UROBILINOGEN, URINE: NORMAL MG/DL (ref 0.2–1)
WBC UA: 2 /HPF (ref 0–5)

## 2022-06-09 PROCEDURE — 97530 THERAPEUTIC ACTIVITIES: CPT

## 2022-06-09 PROCEDURE — 82962 GLUCOSE BLOOD TEST: CPT

## 2022-06-09 PROCEDURE — 94664 DEMO&/EVAL PT USE INHALER: CPT

## 2022-06-09 PROCEDURE — 97535 SELF CARE MNGMENT TRAINING: CPT

## 2022-06-09 PROCEDURE — 6370000000 HC RX 637 (ALT 250 FOR IP): Performed by: INTERNAL MEDICINE

## 2022-06-09 PROCEDURE — 2580000003 HC RX 258: Performed by: STUDENT IN AN ORGANIZED HEALTH CARE EDUCATION/TRAINING PROGRAM

## 2022-06-09 PROCEDURE — C9113 INJ PANTOPRAZOLE SODIUM, VIA: HCPCS | Performed by: STUDENT IN AN ORGANIZED HEALTH CARE EDUCATION/TRAINING PROGRAM

## 2022-06-09 PROCEDURE — 94761 N-INVAS EAR/PLS OXIMETRY MLT: CPT

## 2022-06-09 PROCEDURE — 6370000000 HC RX 637 (ALT 250 FOR IP): Performed by: SURGERY

## 2022-06-09 PROCEDURE — APPNB15 APP NON BILLABLE TIME 0-15 MINS: Performed by: NURSE PRACTITIONER

## 2022-06-09 PROCEDURE — 81001 URINALYSIS AUTO W/SCOPE: CPT

## 2022-06-09 PROCEDURE — 93971 EXTREMITY STUDY: CPT

## 2022-06-09 PROCEDURE — 99024 POSTOP FOLLOW-UP VISIT: CPT | Performed by: NURSE PRACTITIONER

## 2022-06-09 PROCEDURE — 99223 1ST HOSP IP/OBS HIGH 75: CPT | Performed by: PHYSICAL MEDICINE & REHABILITATION

## 2022-06-09 PROCEDURE — 87635 SARS-COV-2 COVID-19 AMP PRB: CPT

## 2022-06-09 PROCEDURE — 6370000000 HC RX 637 (ALT 250 FOR IP): Performed by: STUDENT IN AN ORGANIZED HEALTH CARE EDUCATION/TRAINING PROGRAM

## 2022-06-09 PROCEDURE — 6360000002 HC RX W HCPCS: Performed by: STUDENT IN AN ORGANIZED HEALTH CARE EDUCATION/TRAINING PROGRAM

## 2022-06-09 PROCEDURE — 2580000003 HC RX 258: Performed by: SURGERY

## 2022-06-09 PROCEDURE — 6360000002 HC RX W HCPCS: Performed by: SURGERY

## 2022-06-09 PROCEDURE — C9113 INJ PANTOPRAZOLE SODIUM, VIA: HCPCS | Performed by: SURGERY

## 2022-06-09 PROCEDURE — 2580000003 HC RX 258: Performed by: INTERNAL MEDICINE

## 2022-06-09 PROCEDURE — 1280000000 HC REHAB R&B

## 2022-06-09 PROCEDURE — 6370000000 HC RX 637 (ALT 250 FOR IP): Performed by: NURSE PRACTITIONER

## 2022-06-09 PROCEDURE — 36592 COLLECT BLOOD FROM PICC: CPT

## 2022-06-09 PROCEDURE — 80048 BASIC METABOLIC PNL TOTAL CA: CPT

## 2022-06-09 PROCEDURE — 94150 VITAL CAPACITY TEST: CPT

## 2022-06-09 RX ORDER — ALBUTEROL SULFATE 90 UG/1
2 AEROSOL, METERED RESPIRATORY (INHALATION) EVERY 6 HOURS PRN
Status: CANCELLED | OUTPATIENT
Start: 2022-06-09

## 2022-06-09 RX ORDER — ONDANSETRON 2 MG/ML
4 INJECTION INTRAMUSCULAR; INTRAVENOUS EVERY 6 HOURS PRN
Status: CANCELLED | OUTPATIENT
Start: 2022-06-09

## 2022-06-09 RX ORDER — VITAMIN B COMPLEX
1000 TABLET ORAL DAILY
Status: DISCONTINUED | OUTPATIENT
Start: 2022-06-10 | End: 2022-06-23 | Stop reason: HOSPADM

## 2022-06-09 RX ORDER — SODIUM CHLORIDE 0.9 % (FLUSH) 0.9 %
5-40 SYRINGE (ML) INJECTION EVERY 12 HOURS SCHEDULED
Status: CANCELLED | OUTPATIENT
Start: 2022-06-09

## 2022-06-09 RX ORDER — UBIDECARENONE 75 MG
100 CAPSULE ORAL DAILY
Status: DISCONTINUED | OUTPATIENT
Start: 2022-06-10 | End: 2022-06-23 | Stop reason: HOSPADM

## 2022-06-09 RX ORDER — PROMETHAZINE HYDROCHLORIDE 25 MG/1
12.5 TABLET ORAL EVERY 6 HOURS PRN
Status: CANCELLED | OUTPATIENT
Start: 2022-06-09

## 2022-06-09 RX ORDER — MORPHINE SULFATE 4 MG/ML
2 INJECTION, SOLUTION INTRAMUSCULAR; INTRAVENOUS
Status: DISCONTINUED | OUTPATIENT
Start: 2022-06-09 | End: 2022-06-09

## 2022-06-09 RX ORDER — LOSARTAN POTASSIUM 100 MG/1
100 TABLET ORAL DAILY
Status: CANCELLED | OUTPATIENT
Start: 2022-06-09

## 2022-06-09 RX ORDER — METOPROLOL TARTRATE 50 MG/1
150 TABLET, FILM COATED ORAL DAILY
Status: DISCONTINUED | OUTPATIENT
Start: 2022-06-10 | End: 2022-06-23 | Stop reason: HOSPADM

## 2022-06-09 RX ORDER — SODIUM CHLORIDE 0.9 % (FLUSH) 0.9 %
5-40 SYRINGE (ML) INJECTION PRN
Status: CANCELLED | OUTPATIENT
Start: 2022-06-09

## 2022-06-09 RX ORDER — ONDANSETRON 4 MG/1
4 TABLET, ORALLY DISINTEGRATING ORAL EVERY 8 HOURS PRN
Status: CANCELLED | OUTPATIENT
Start: 2022-06-09

## 2022-06-09 RX ORDER — POLYETHYLENE GLYCOL 3350 17 G/17G
17 POWDER, FOR SOLUTION ORAL DAILY PRN
Status: DISCONTINUED | OUTPATIENT
Start: 2022-06-09 | End: 2022-06-09

## 2022-06-09 RX ORDER — ROPINIROLE 1 MG/1
3 TABLET, FILM COATED ORAL NIGHTLY
Status: DISCONTINUED | OUTPATIENT
Start: 2022-06-09 | End: 2022-06-23 | Stop reason: HOSPADM

## 2022-06-09 RX ORDER — SODIUM CHLORIDE 0.9 % (FLUSH) 0.9 %
5-40 SYRINGE (ML) INJECTION PRN
Status: DISCONTINUED | OUTPATIENT
Start: 2022-06-09 | End: 2022-06-23 | Stop reason: HOSPADM

## 2022-06-09 RX ORDER — MORPHINE SULFATE 2 MG/ML
2 INJECTION, SOLUTION INTRAMUSCULAR; INTRAVENOUS
Status: CANCELLED | OUTPATIENT
Start: 2022-06-09

## 2022-06-09 RX ORDER — INSULIN LISPRO 100 [IU]/ML
0-6 INJECTION, SOLUTION INTRAVENOUS; SUBCUTANEOUS EVERY 4 HOURS
Status: CANCELLED | OUTPATIENT
Start: 2022-06-09

## 2022-06-09 RX ORDER — ONDANSETRON 4 MG/1
4 TABLET, ORALLY DISINTEGRATING ORAL EVERY 8 HOURS PRN
Status: DISCONTINUED | OUTPATIENT
Start: 2022-06-09 | End: 2022-06-23 | Stop reason: HOSPADM

## 2022-06-09 RX ORDER — ZOLPIDEM TARTRATE 5 MG/1
5 TABLET ORAL NIGHTLY PRN
Status: DISCONTINUED | OUTPATIENT
Start: 2022-06-09 | End: 2022-06-09

## 2022-06-09 RX ORDER — OXYBUTYNIN CHLORIDE 5 MG/1
5 TABLET ORAL 2 TIMES DAILY
Status: CANCELLED | OUTPATIENT
Start: 2022-06-09

## 2022-06-09 RX ORDER — METOPROLOL TARTRATE 50 MG/1
150 TABLET, FILM COATED ORAL DAILY
Status: CANCELLED | OUTPATIENT
Start: 2022-06-09

## 2022-06-09 RX ORDER — BISACODYL 10 MG
10 SUPPOSITORY, RECTAL RECTAL DAILY PRN
Status: DISCONTINUED | OUTPATIENT
Start: 2022-06-09 | End: 2022-06-23 | Stop reason: HOSPADM

## 2022-06-09 RX ORDER — SODIUM CHLORIDE 9 MG/ML
INJECTION, SOLUTION INTRAVENOUS CONTINUOUS
Status: CANCELLED | OUTPATIENT
Start: 2022-06-09

## 2022-06-09 RX ORDER — SUCRALFATE 1 G/1
1 TABLET ORAL EVERY 6 HOURS SCHEDULED
Status: DISCONTINUED | OUTPATIENT
Start: 2022-06-10 | End: 2022-06-23 | Stop reason: HOSPADM

## 2022-06-09 RX ORDER — SODIUM CHLORIDE 9 MG/ML
INJECTION, SOLUTION INTRAVENOUS PRN
Status: DISCONTINUED | OUTPATIENT
Start: 2022-06-09 | End: 2022-06-23 | Stop reason: HOSPADM

## 2022-06-09 RX ORDER — SODIUM CHLORIDE 9 MG/ML
INJECTION, SOLUTION INTRAVENOUS PRN
Status: CANCELLED | OUTPATIENT
Start: 2022-06-09

## 2022-06-09 RX ORDER — PANTOPRAZOLE SODIUM 40 MG/10ML
40 INJECTION, POWDER, LYOPHILIZED, FOR SOLUTION INTRAVENOUS 2 TIMES DAILY
Status: DISCONTINUED | OUTPATIENT
Start: 2022-06-09 | End: 2022-06-15

## 2022-06-09 RX ORDER — ATORVASTATIN CALCIUM 10 MG/1
20 TABLET, FILM COATED ORAL DAILY
Status: DISCONTINUED | OUTPATIENT
Start: 2022-06-10 | End: 2022-06-23 | Stop reason: HOSPADM

## 2022-06-09 RX ORDER — ONDANSETRON 2 MG/ML
4 INJECTION INTRAMUSCULAR; INTRAVENOUS EVERY 6 HOURS PRN
Status: DISCONTINUED | OUTPATIENT
Start: 2022-06-09 | End: 2022-06-15

## 2022-06-09 RX ORDER — PANTOPRAZOLE SODIUM 40 MG/10ML
40 INJECTION, POWDER, LYOPHILIZED, FOR SOLUTION INTRAVENOUS 2 TIMES DAILY
Status: CANCELLED | OUTPATIENT
Start: 2022-06-09

## 2022-06-09 RX ORDER — CALCIUM GLUCONATE 20 MG/ML
2000 INJECTION, SOLUTION INTRAVENOUS ONCE
Status: DISCONTINUED | OUTPATIENT
Start: 2022-06-09 | End: 2022-06-09 | Stop reason: SDUPTHER

## 2022-06-09 RX ORDER — CALCIUM CARBONATE 200(500)MG
500 TABLET,CHEWABLE ORAL 3 TIMES DAILY PRN
Status: CANCELLED | OUTPATIENT
Start: 2022-06-09

## 2022-06-09 RX ORDER — ONDANSETRON 4 MG/1
4 TABLET, ORALLY DISINTEGRATING ORAL EVERY 8 HOURS PRN
Status: DISCONTINUED | OUTPATIENT
Start: 2022-06-09 | End: 2022-06-09 | Stop reason: SDUPTHER

## 2022-06-09 RX ORDER — SODIUM CHLORIDE 0.9 % (FLUSH) 0.9 %
5-40 SYRINGE (ML) INJECTION EVERY 12 HOURS SCHEDULED
Status: DISCONTINUED | OUTPATIENT
Start: 2022-06-09 | End: 2022-06-21

## 2022-06-09 RX ORDER — LIDOCAINE 4 G/G
1 PATCH TOPICAL DAILY
Status: CANCELLED | OUTPATIENT
Start: 2022-06-09

## 2022-06-09 RX ORDER — ACETAMINOPHEN 650 MG/1
650 SUPPOSITORY RECTAL EVERY 6 HOURS PRN
Status: CANCELLED | OUTPATIENT
Start: 2022-06-09

## 2022-06-09 RX ORDER — ALBUTEROL SULFATE 90 UG/1
2 AEROSOL, METERED RESPIRATORY (INHALATION) EVERY 6 HOURS PRN
Status: DISCONTINUED | OUTPATIENT
Start: 2022-06-09 | End: 2022-06-23 | Stop reason: HOSPADM

## 2022-06-09 RX ORDER — ACETAMINOPHEN 325 MG/1
650 TABLET ORAL EVERY 4 HOURS PRN
Status: DISCONTINUED | OUTPATIENT
Start: 2022-06-09 | End: 2022-06-23 | Stop reason: HOSPADM

## 2022-06-09 RX ORDER — INSULIN LISPRO 100 [IU]/ML
0-6 INJECTION, SOLUTION INTRAVENOUS; SUBCUTANEOUS EVERY 4 HOURS
Status: DISCONTINUED | OUTPATIENT
Start: 2022-06-09 | End: 2022-06-10

## 2022-06-09 RX ORDER — LOSARTAN POTASSIUM 100 MG/1
100 TABLET ORAL DAILY
Status: DISCONTINUED | OUTPATIENT
Start: 2022-06-10 | End: 2022-06-11

## 2022-06-09 RX ORDER — ESCITALOPRAM OXALATE 10 MG/1
10 TABLET ORAL DAILY
Status: DISCONTINUED | OUTPATIENT
Start: 2022-06-10 | End: 2022-06-23 | Stop reason: HOSPADM

## 2022-06-09 RX ORDER — CETIRIZINE HYDROCHLORIDE 10 MG/1
10 TABLET ORAL DAILY
Status: CANCELLED | OUTPATIENT
Start: 2022-06-09

## 2022-06-09 RX ORDER — PROMETHAZINE HYDROCHLORIDE 25 MG/1
12.5 TABLET ORAL EVERY 6 HOURS PRN
Status: DISCONTINUED | OUTPATIENT
Start: 2022-06-09 | End: 2022-06-09

## 2022-06-09 RX ORDER — SUCRALFATE 1 G/1
1 TABLET ORAL EVERY 6 HOURS SCHEDULED
Status: CANCELLED | OUTPATIENT
Start: 2022-06-09

## 2022-06-09 RX ORDER — CALCIUM GLUCONATE 20 MG/ML
2000 INJECTION, SOLUTION INTRAVENOUS ONCE
Status: CANCELLED | OUTPATIENT
Start: 2022-06-09

## 2022-06-09 RX ORDER — ACETAMINOPHEN 650 MG/1
650 SUPPOSITORY RECTAL EVERY 6 HOURS PRN
Status: DISCONTINUED | OUTPATIENT
Start: 2022-06-09 | End: 2022-06-09

## 2022-06-09 RX ORDER — LANOLIN ALCOHOL/MO/W.PET/CERES
3 CREAM (GRAM) TOPICAL NIGHTLY PRN
Status: DISCONTINUED | OUTPATIENT
Start: 2022-06-09 | End: 2022-06-15

## 2022-06-09 RX ORDER — ACETAMINOPHEN 325 MG/1
650 TABLET ORAL EVERY 6 HOURS PRN
Status: CANCELLED | OUTPATIENT
Start: 2022-06-09

## 2022-06-09 RX ORDER — POLYETHYLENE GLYCOL 3350 17 G/17G
17 POWDER, FOR SOLUTION ORAL DAILY PRN
Status: CANCELLED | OUTPATIENT
Start: 2022-06-09

## 2022-06-09 RX ORDER — ONDANSETRON 2 MG/ML
4 INJECTION INTRAMUSCULAR; INTRAVENOUS EVERY 6 HOURS PRN
Status: DISCONTINUED | OUTPATIENT
Start: 2022-06-09 | End: 2022-06-09 | Stop reason: SDUPTHER

## 2022-06-09 RX ORDER — UBIDECARENONE 75 MG
100 CAPSULE ORAL DAILY
Status: CANCELLED | OUTPATIENT
Start: 2022-06-09

## 2022-06-09 RX ORDER — MORPHINE SULFATE 4 MG/ML
4 INJECTION, SOLUTION INTRAMUSCULAR; INTRAVENOUS
Status: DISCONTINUED | OUTPATIENT
Start: 2022-06-09 | End: 2022-06-09

## 2022-06-09 RX ORDER — ENOXAPARIN SODIUM 100 MG/ML
40 INJECTION SUBCUTANEOUS DAILY
Status: DISCONTINUED | OUTPATIENT
Start: 2022-06-10 | End: 2022-06-15

## 2022-06-09 RX ORDER — ENOXAPARIN SODIUM 100 MG/ML
40 INJECTION SUBCUTANEOUS DAILY
Status: CANCELLED | OUTPATIENT
Start: 2022-06-09

## 2022-06-09 RX ORDER — DEXTROSE MONOHYDRATE 50 MG/ML
100 INJECTION, SOLUTION INTRAVENOUS PRN
Status: CANCELLED | OUTPATIENT
Start: 2022-06-09

## 2022-06-09 RX ORDER — CALCIUM CARBONATE 200(500)MG
500 TABLET,CHEWABLE ORAL 3 TIMES DAILY PRN
Status: DISCONTINUED | OUTPATIENT
Start: 2022-06-09 | End: 2022-06-23 | Stop reason: HOSPADM

## 2022-06-09 RX ORDER — DEXTROSE MONOHYDRATE 50 MG/ML
100 INJECTION, SOLUTION INTRAVENOUS PRN
Status: DISCONTINUED | OUTPATIENT
Start: 2022-06-09 | End: 2022-06-23 | Stop reason: HOSPADM

## 2022-06-09 RX ORDER — ESCITALOPRAM OXALATE 10 MG/1
10 TABLET ORAL DAILY
Status: CANCELLED | OUTPATIENT
Start: 2022-06-09

## 2022-06-09 RX ORDER — ROPINIROLE 1 MG/1
3 TABLET, FILM COATED ORAL NIGHTLY
Status: CANCELLED | OUTPATIENT
Start: 2022-06-09

## 2022-06-09 RX ORDER — POLYETHYLENE GLYCOL 3350 17 G/17G
17 POWDER, FOR SOLUTION ORAL DAILY PRN
Status: DISCONTINUED | OUTPATIENT
Start: 2022-06-09 | End: 2022-06-23 | Stop reason: HOSPADM

## 2022-06-09 RX ORDER — CETIRIZINE HYDROCHLORIDE 10 MG/1
10 TABLET ORAL DAILY
Status: DISCONTINUED | OUTPATIENT
Start: 2022-06-10 | End: 2022-06-23 | Stop reason: HOSPADM

## 2022-06-09 RX ORDER — OXYCODONE HYDROCHLORIDE 10 MG/1
10 TABLET ORAL EVERY 4 HOURS PRN
Status: CANCELLED | OUTPATIENT
Start: 2022-06-09

## 2022-06-09 RX ORDER — MORPHINE SULFATE 4 MG/ML
4 INJECTION, SOLUTION INTRAMUSCULAR; INTRAVENOUS
Status: CANCELLED | OUTPATIENT
Start: 2022-06-09

## 2022-06-09 RX ORDER — ACETAMINOPHEN 325 MG/1
650 TABLET ORAL EVERY 6 HOURS PRN
Status: DISCONTINUED | OUTPATIENT
Start: 2022-06-09 | End: 2022-06-09

## 2022-06-09 RX ORDER — OXYCODONE HYDROCHLORIDE 5 MG/1
5 TABLET ORAL EVERY 4 HOURS PRN
Status: CANCELLED | OUTPATIENT
Start: 2022-06-09

## 2022-06-09 RX ORDER — ATORVASTATIN CALCIUM 10 MG/1
20 TABLET, FILM COATED ORAL DAILY
Status: CANCELLED | OUTPATIENT
Start: 2022-06-09

## 2022-06-09 RX ORDER — SODIUM CHLORIDE 9 MG/ML
INJECTION, SOLUTION INTRAVENOUS CONTINUOUS
Status: DISCONTINUED | OUTPATIENT
Start: 2022-06-09 | End: 2022-06-09 | Stop reason: HOSPADM

## 2022-06-09 RX ORDER — VITAMIN B COMPLEX
1000 TABLET ORAL DAILY
Status: CANCELLED | OUTPATIENT
Start: 2022-06-09

## 2022-06-09 RX ORDER — LIDOCAINE 4 G/G
1 PATCH TOPICAL DAILY
Status: DISCONTINUED | OUTPATIENT
Start: 2022-06-10 | End: 2022-06-23 | Stop reason: HOSPADM

## 2022-06-09 RX ORDER — ZOLPIDEM TARTRATE 5 MG/1
5 TABLET ORAL NIGHTLY PRN
Status: CANCELLED | OUTPATIENT
Start: 2022-06-09

## 2022-06-09 RX ORDER — METOPROLOL TARTRATE 50 MG/1
100 TABLET, FILM COATED ORAL NIGHTLY
Status: CANCELLED | OUTPATIENT
Start: 2022-06-09

## 2022-06-09 RX ORDER — METOPROLOL TARTRATE 50 MG/1
100 TABLET, FILM COATED ORAL NIGHTLY
Status: DISCONTINUED | OUTPATIENT
Start: 2022-06-09 | End: 2022-06-23 | Stop reason: HOSPADM

## 2022-06-09 RX ORDER — CALCIUM GLUCONATE 20 MG/ML
2000 INJECTION, SOLUTION INTRAVENOUS ONCE
Status: DISCONTINUED | OUTPATIENT
Start: 2022-06-09 | End: 2022-06-09

## 2022-06-09 RX ORDER — OXYCODONE HYDROCHLORIDE 10 MG/1
10 TABLET ORAL EVERY 4 HOURS PRN
Status: DISCONTINUED | OUTPATIENT
Start: 2022-06-09 | End: 2022-06-15

## 2022-06-09 RX ORDER — OXYCODONE HYDROCHLORIDE 5 MG/1
5 TABLET ORAL EVERY 4 HOURS PRN
Status: DISCONTINUED | OUTPATIENT
Start: 2022-06-09 | End: 2022-06-15

## 2022-06-09 RX ORDER — OXYBUTYNIN CHLORIDE 5 MG/1
5 TABLET ORAL 2 TIMES DAILY
Status: DISCONTINUED | OUTPATIENT
Start: 2022-06-09 | End: 2022-06-23 | Stop reason: HOSPADM

## 2022-06-09 RX ADMIN — PANTOPRAZOLE SODIUM 40 MG: 40 INJECTION, POWDER, FOR SOLUTION INTRAVENOUS at 20:40

## 2022-06-09 RX ADMIN — SUCRALFATE 1 G: 1 TABLET ORAL at 17:31

## 2022-06-09 RX ADMIN — ENOXAPARIN SODIUM 40 MG: 100 INJECTION SUBCUTANEOUS at 08:25

## 2022-06-09 RX ADMIN — ESCITALOPRAM OXALATE 10 MG: 10 TABLET ORAL at 08:27

## 2022-06-09 RX ADMIN — LEVOTHYROXINE SODIUM 137 MCG: 25 TABLET ORAL at 05:17

## 2022-06-09 RX ADMIN — ROPINIROLE HYDROCHLORIDE 3 MG: 1 TABLET, FILM COATED ORAL at 20:31

## 2022-06-09 RX ADMIN — INSULIN LISPRO 2 UNITS: 100 INJECTION, SOLUTION INTRAVENOUS; SUBCUTANEOUS at 08:13

## 2022-06-09 RX ADMIN — Medication 100 MCG: at 08:27

## 2022-06-09 RX ADMIN — SUCRALFATE 1 G: 1 TABLET ORAL at 00:47

## 2022-06-09 RX ADMIN — SODIUM CHLORIDE, PRESERVATIVE FREE 10 ML: 5 INJECTION INTRAVENOUS at 08:10

## 2022-06-09 RX ADMIN — INSULIN LISPRO 1 UNITS: 100 INJECTION, SOLUTION INTRAVENOUS; SUBCUTANEOUS at 05:17

## 2022-06-09 RX ADMIN — SUCRALFATE 1 G: 1 TABLET ORAL at 12:04

## 2022-06-09 RX ADMIN — METOPROLOL TARTRATE 100 MG: 50 TABLET, FILM COATED ORAL at 20:32

## 2022-06-09 RX ADMIN — ATORVASTATIN CALCIUM 20 MG: 10 TABLET, FILM COATED ORAL at 08:26

## 2022-06-09 RX ADMIN — INSULIN LISPRO 1 UNITS: 100 INJECTION, SOLUTION INTRAVENOUS; SUBCUTANEOUS at 12:04

## 2022-06-09 RX ADMIN — PANTOPRAZOLE SODIUM 40 MG: 40 INJECTION, POWDER, FOR SOLUTION INTRAVENOUS at 08:09

## 2022-06-09 RX ADMIN — Medication 1000 UNITS: at 08:27

## 2022-06-09 RX ADMIN — ONDANSETRON 4 MG: 2 INJECTION INTRAMUSCULAR; INTRAVENOUS at 08:09

## 2022-06-09 RX ADMIN — ZOLPIDEM TARTRATE 5 MG: 5 TABLET ORAL at 02:57

## 2022-06-09 RX ADMIN — OXYBUTYNIN CHLORIDE 5 MG: 5 TABLET ORAL at 20:32

## 2022-06-09 RX ADMIN — SODIUM CHLORIDE, PRESERVATIVE FREE 10 ML: 5 INJECTION INTRAVENOUS at 20:30

## 2022-06-09 RX ADMIN — SUCRALFATE 1 G: 1 TABLET ORAL at 05:17

## 2022-06-09 RX ADMIN — CETIRIZINE HYDROCHLORIDE 10 MG: 10 TABLET, FILM COATED ORAL at 08:27

## 2022-06-09 RX ADMIN — SODIUM CHLORIDE: 9 INJECTION, SOLUTION INTRAVENOUS at 16:21

## 2022-06-09 RX ADMIN — SODIUM CHLORIDE, PRESERVATIVE FREE 10 ML: 5 INJECTION INTRAVENOUS at 20:33

## 2022-06-09 RX ADMIN — OXYBUTYNIN CHLORIDE 5 MG: 5 TABLET ORAL at 08:27

## 2022-06-09 RX ADMIN — CALCIUM GLUCONATE 2000 MG: 20 INJECTION, SOLUTION INTRAVENOUS at 10:01

## 2022-06-09 RX ADMIN — LOSARTAN POTASSIUM 100 MG: 100 TABLET, FILM COATED ORAL at 08:27

## 2022-06-09 ASSESSMENT — PAIN SCALES - GENERAL: PAINLEVEL_OUTOF10: 0

## 2022-06-09 NOTE — PROGRESS NOTES
Called report to Andrew Hernández RN at [facility], answered all questions.  Patient will be transported to [facility]

## 2022-06-09 NOTE — PROGRESS NOTES
4 Eyes Skin Assessment     NAME:  Shira Cobos  YOB: 1934  MEDICAL RECORD NUMBER:  [id number]    The patient is being assess for pressure sores  I agree that 2 RN's have performed a thorough Head to Toe Skin Assessment on the patient. ALL assessment sites listed below have been assessed. Areas assessed by both nurses: coccyx, heels, elbows, abd folds and groin areas    Head, Face, Ears, Shoulders, Back, Chest, Arms, Elbows, Hands, Sacrum. Buttock, Coccyx, Ischium and Legs. Feet and Heels        Does the Patient have a Wound? No- soft heels present and redden inner fold of coccyx       Ravi Prevention initiated:  y   Wound Care Orders initiated:  y    Pressure Injury (Stage 3,4, Unstageable, DTI, NWPT, and Complex wounds) if present place consult order under : wound consult ordered for soft heels and reddened coccyx    New and Established Ostomies if present place consult order under : No      Nurse 1 eSignature: Kimber Singh  6/9/2022 1935    **SHARE this note so that the co-signing nurse is able to place an eSignature**    Nurse 2 eSignature: Electronically signed by Edson Medley LPN on 5/3/05 at 5:61 PM EDT

## 2022-06-09 NOTE — PROGRESS NOTES
Transported pt to ARU, oriented to room, pt Is back to baseline mental status.  She did stated she normally takes melatonin at bedtime not Ambien

## 2022-06-09 NOTE — PROGRESS NOTES
Family in room and concerned for UTI, patient still slightly confused. Dr Mayur andersen served, UA ordered.

## 2022-06-09 NOTE — PROGRESS NOTES
Physical Therapy  Per nurse ok to tx but request\" pure wick stay in place\". Pt had 6 visitors at this time and will try back as schedule allows.

## 2022-06-09 NOTE — PROGRESS NOTES
GENERAL SURGERY PROGRESS NOTE    Naeem Menezes is a 80 y.o. 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 Subjective:  She had an UGI/SBFT which showed some dilated small bowel but no obstruction. Today she feels better and has not had more reflux or vomiting. She has had multiple loose stools. Pain is controlled. Mobilizing some.  States she is eating well    CLAUDIO output 95ml/24h    Objective:    Vitals: VITALS:  BP (!) 101/47   Pulse 90   Temp 97.6 °F (36.4 °C) (Oral)   Resp 17   Ht 5' (1.524 m)   Wt 152 lb 11.2 oz (69.3 kg)   SpO2 99%   BMI 29.82 kg/m²     I/O: 06/08 0701 - 06/09 0700  In: 990 [P.O.:960; I.V.:30]  Out: 1495 [Urine:1400; Drains:95]    Labs/Imaging Results:   Lab Results   Component Value Date     06/09/2022    K 4.1 06/09/2022    K 4.4 03/15/2018     06/09/2022    CO2 24 06/09/2022    BUN 17 06/09/2022    CREATININE 0.5 06/09/2022    GLUCOSE 131 06/09/2022    CALCIUM 7.9 06/09/2022      Lab Results   Component Value Date    WBC 6.5 06/08/2022    HGB 10.1 (L) 06/08/2022    HCT 31.8 (L) 06/08/2022    MCV 91.6 06/08/2022     06/08/2022         IV Fluids:   PN-Adult Premix 5/15 - Central    PN-Adult Premix 5/15 - Central Last Rate: 60 mL/hr at 06/08/22 1729    sodium chloride Last Rate: 950 mL (06/08/22 2138)    sodium chloride    dextrose Last Rate: Stopped (05/31/22 1905)    sodium chloride Last Rate: Stopped (05/25/22 1247)    Scheduled Meds: calcium gluconate, 2,000 mg, IntraVENous, Once    pantoprazole, 40 mg, IntraVENous, BID    escitalopram, 10 mg, Oral, Daily    lidocaine, 1 patch, TransDERmal, Daily    sucralfate, 1 g, Oral, 4 times per day    fat emulsion, 250 mL, IntraVENous, Once per day on Mon Tue Thu Fri    lidocaine PF, 5 mL, IntraDERmal, Once    sodium chloride flush, 5-40 mL, IntraVENous, 2 times per day    lidocaine, , Topical, Once    enoxaparin, 40 mg, SubCUTAneous, Daily    atorvastatin, 20 mg, Oral, Daily    Vitamin D, 1,000 Units, Oral, Daily    cetirizine, 10 mg, Oral, Daily    levothyroxine, 137 mcg, Oral, Daily    losartan, 100 mg, Oral, Daily    metoprolol tartrate, 150 mg, Oral, Daily    metoprolol tartrate, 100 mg, Oral, Nightly    oxybutynin, 5 mg, Oral, BID    rOPINIRole, 3 mg, Oral, Nightly    vitamin B-12, 100 mcg, Oral, Daily    insulin lispro, 0-6 Units, SubCUTAneous, Q4H    sodium chloride flush, 5-40 mL, IntraVENous, 2 times per day    Physical Exam:  General: Patient is confused today, no distress. HEENT: Anicteric sclerae, MMM. Extremities: No edema bilat LE. Abdomen: Soft, appropriately tender, mildly distended. Incisions intact with staples. Multiple skin tears from tape. CLAUDIO drain in place, serous    Assessment and Plan:  80 y.o. female s/p robotic assisted right colectomy. Doing ok. Patient Active Problem List:     Long-term insulin use in type 2 diabetes ([facility] [address]     Essential hypertension     Dyslipidemia     Abnormal EKG     Abnormal stress test     Cecum mass    - Discussed findings and options with Kylie Gin. - Intake is improving, continue to encourage eating. Once she is eating better, will start to wean TPN.   - UGI with SBFT showed some dilation of small bowel but no obstruction. Continues to have BMs  - Nausea and reflux better. Continue to monitor. Will have them crush and dissolve carafate (suspension not available). Increased PPI to BID   - Patient seems confused this am, recently started on celexa. Will page hospitalist.   - out of bed, IS, ambulate as able  - pathology now resulted, margins negative, 0/16 LN. Appreciate Oncology's recommendations.  She is considering whether or not she would want to do chemo.   - ok for discharge to ARU from surgical standpoint when medically ready    LAURE Garay - CNP

## 2022-06-09 NOTE — PROGRESS NOTES
TPN changes:    Per pr note & consult note-  just now=       LAURE Hall - CNP [[id number]]   Department:[address] [facility][[id number]]      Order Specific Information   Order: Pharmacy to Dose: Other - See Comments: Wean TPN, thanks [Cu   stom:         Thus - change current tpn - starting now - 12:10 PM from rate of 60 to 30ml/ hr = for about 6 hrs-    Than DC tonight at 18:00.     -ANGELIA Price [facility] [place]

## 2022-06-09 NOTE — PROGRESS NOTES
TPN LAB EVALUATION  Recent Labs     06/07/22  0647 06/07/22  1430 06/08/22  0625 06/08/22  0625 06/09/22  0655   *   < > 128*   < > 131*   K 5.0  --  4.5   < > 4.1   CL 96*  --  100   < > 100   CALCIUM 8.8  --  8.4   < > 7.9*   MG  --   --  1.7*  --   --    PHOS  --   --  3.0  --   --    GLUCOSE 168*  --  188*  --  131*    < > = values in this interval not displayed. Pharmacy to dose TPN per Dr. Lakeshia Jones - with input from Nephrology - Dr Husam Mendoza Day # 9    Indication: severe malnutrition    Goal Per Nutrition Recommendations/Plan:   1. Recommend initiating PN to d/t continued inadequate oral intake and malnutrition  2. PN recommendation: Clinimix 5/15 @ 60 ml/hr which will provide ~1308 kcal (average lipid and non lipid days) and 72 g protein    Central line: PICC double lumen    Diet: Full Liquid    Maintenance Fluids: none    Glucose:  GLUCOSE LEVELS LAST 72 HOURS                  (last 7 readings)    Recent Labs     06/06/22  1145 06/06/22  1703 06/07/22  0647 06/07/22  0804 06/08/22  0409 06/08/22  0625 06/08/22  0828 06/08/22  1215 06/08/22  1607 06/08/22  2038 06/09/22  0047 06/09/22  0444 06/09/22  0655   POCGLU  --    < >  --    < > 138*  --  140* 124* 146* 127* 133* 174*  --    GLUCOSE 183*  185*  --  168*  --   --  188*  --   --   --   --   --   --  131*    < > = values in this interval not displayed.  Goal <180    Slightly above goal   Low dose sliding scale insulin coverage   No insulin in the TPN formula. TRIGLYCERIDES:  Triglycerides   Date Value Ref Range Status   06/01/2022 161 (H) <150 MG/DL Final   05/02/2019 152 (H) <150 MG/DL Final   02/09/2017 413 (H) <150 MG/DL Final     Triglycerides:   Goal < 400    TG @ 161 on 6/1/22, at goal   Patient is not currently receiving propofol   Continue standard lipid replacement four times weekly.       LIVER FUNCTION LAB EVALUATION  Recent Labs     06/08/22  0625   AST 14*   ALT 11   ALKPHOS 50   BILITOT 0.2     Hepatic Function:   Previously WNL   Bititotal previously < 3.0, trace elements added to TPN      RENAL LAB EVALUATION  Estimated Creatinine Clearance: 69 mL/min (A) (based on SCr of 0.5 mg/dL (L)). Recent Labs     06/07/22  0647 06/08/22  0625 06/09/22  0655   BUN 23 21 17   CREATININE 0.6 0.6 0.5*     Renal Function and Electrolytes:   Na - low, per Dr Deb Hurd -sodium acetate increased [ yesterday- 6/8]- to 230MEQ (Maximum/TPN)   K - WNL     Ca - slightly low - give Calc Gluc 2gm ivpb x 1 outside of TPN today.  Mg -  WNL   Phos - WNL   TPN labs ordered for M/W/F      Formulation:   · continue same TPN 5/15 with Custom Electrolytes at goal rate of 60 ml/hr. · Standard lipids four times weekly [m,t,t,f]      Components    Component Order Dose    CLINIMIX 5/15 5 % Soln 2,000 mLs    sodium acetate 2 MEQ/ML Soln 230 mEq    potassium phosphate 15 MMOLE/5ML Soln 10 mmol    potassium chloride 2 MEQ/ML Soln 5 mEq    magnesium sulfate 50 % Soln 3,000 mg    adult multi-vitamin with vitamin k Inj 10 mLs    trace minerals Cu-Mn-Se-Zn 975-37- MCG/ML Soln 1 mL        Pharmacy will continue to follow and adjust as appropriate.    Thank you for the consult,    Miki Ewing, [facility]  8:18 AM  06/09/22

## 2022-06-09 NOTE — DISCHARGE SUMMARY
V2.0  Discharge Summary    Name:  Silverio Jernigan /Age/Sex: 1934 (10 y.o. female)   Admit Date: 2022  Discharge Date: 22    MRN & CSN:  [id number] & [id number] Encounter Date and Time 22 9:50 AM EDT    Attending:  Rivera Narvaez MD Discharging Provider: Rivera Narvaez MD       Hospital Course:     Brief HPI: Silverio Jernigan is a 80 y.o. female with pmh of CKD stage IIIa, diverticulitis, IDDM 2 with peripheral neuropathy, HTN, HLD, G1DD, KEVIN, hypothyroidism, and vitamin B12 deficiency, who presented with complaints of abdominal pain. The pain is described as \"tight\", and at maximal intensity is 10/10 in intensity. Pain is located in the diffusely without radiation. Onset was 1 month ago. Last two days increase in symptoms. Symptoms have been gradually worsening since. Aggravating factors: none. Alleviating factors: none. Associated symptoms: anorexia, chills, diarrhea, nausea and vomiting. Patient denies fevers, melena, or hematochezia. Patient recently completed antibiotics 3 days ago for diverticulitis. Otherwise patient denies headache, chest pain, shortness of breath, cough, hematemesis, dysuria, frequency, or urgency. Brief Problem Based Course:        1. 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 Sesar Hernandez   -Oncology recommends adjuvant chemotherapy with either CapeOx (capecitabine + oxaliplatin) or single agent capecitabine (in frail patient). Shai Paige will decide about that. -Patient approved for placement to rehab. Discharge to ARU       2. Nausea and vomiting  - NG was removed on   -Continue symptomatic management     3. Nutrition  -TPN started on May 31  - continue TPN and wean as appropriate     4. Depressed mood  -Patient reports depression and is agreeable to starting medication  -Patient started on Celexa 10 mg daily on      5.  DM type II  - Continue subcu insulin regimen including long-acting and short acting insulin as ordered  -A1c is 5.9 this admit  -May 31- Lantus 15 units at bedtime on MAR, but has not been receiving it at all.  Discontinued Lantus.  -June 6-on insulin sliding scale-continue insulin-continue     6. HTN  -Losartan 100 mg daily-continue  -Metoprolol tartrate 150 mg in the morning, and 100 mg at night-continue     7. HLD: Continue continue statin therapy  -atorvastatin 20 mg daily     8. Delirium  -Patient reported to be confused alert this morning  -At bedside appears to be back to baseline and responding appropriately. Patient states she had an ''elderly moment'' and she did not sleep at all last night due to noise. Denies any UTI symptoms, alert and oriented x3 and is able to recognize family at bedside.  -Patient is on TPN and IV infusions all of which could be contributory to delirium  -Continue delirium preventive precautions including constant reorientation  -Wean off TPN as tolerated         DVT prophylaxis: Heparin/Lovenox    The patient expressed appropriate understanding of, and agreement with the discharge recommendations, medications, and plan.      Consults this admission:  IP CONSULT TO GI  IP CONSULT TO GENERAL SURGERY  IP CONSULT TO ONCOLOGY  IP CONSULT TO IV TEAM  IP CONSULT TO PHARMACY  IP CONSULT TO NEPHROLOGY  IP CONSULT TO GI  IP CONSULT TO GENERAL SURGERY  IP CONSULT TO PHARMACY    Discharge Diagnosis:   Cecum mass        Discharge Instruction:   Follow up appointments:   Primary care physician: Manju Aj DO within 2 weeks  Diet: TPN   Activity: activity as tolerated  Disposition: Discharged to:   []Home, []C, []SNF, [x]Acute Rehab, []Hospice   Condition on discharge: Stable  Labs and Tests to be Followed up as an outpatient by PCP or Specialist:     Discharge Medications:        Medication List      CONTINUE taking these medications    PEN NEEDLES 31GX5/16\" 31G X 8 MM Misc        STOP taking these medications    gabapentin 100 MG capsule  Commonly known as: NEURONTIN     tiZANidine 2 MG tablet  Commonly known as: Norberto Michael your doctor about these medications    albuterol sulfate  (90 Base) MCG/ACT inhaler  Commonly known as: PROVENTIL;VENTOLIN;PROAIR     atorvastatin 20 MG tablet  Commonly known as: LIPITOR     CALCIUM 1200+D3 PO     Lantus SoloStar 100 UNIT/ML injection pen  Generic drug: insulin glargine     levocetirizine 5 MG tablet  Commonly known as: XYZAL     levothyroxine 137 MCG tablet  Commonly known as: SYNTHROID     loperamide 2 MG tablet  Commonly known as: IMODIUM A-D     losartan 100 MG tablet  Commonly known as: COZAAR     magnesium oxide 400 (241.3 Mg) MG Tabs tablet  Commonly known as: MAG-OX     melatonin 3 mg Tabs tablet     metoprolol tartrate 50 MG tablet  Commonly known as: LOPRESSOR     oxybutynin 5 MG tablet  Commonly known as: DITROPAN     promethazine 25 MG tablet  Commonly known as: PHENERGAN     rOPINIRole 3 MG tablet  Commonly known as: REQUIP     vitamin B-12 100 MCG tablet  Commonly known as: CYANOCOBALAMIN     VITAMIN D PO           Objective Findings at Discharge:   BP (!) 101/47   Pulse 90   Temp 97.6 °F (36.4 °C) (Oral)   Resp 17   Ht 5' (1.524 m)   Wt 152 lb 11.2 oz (69.3 kg)   SpO2 99%   BMI 29.82 kg/m²       Physical Exam:   General: NAD  Eyes: EOMI  ENT: neck supple  Cardiovascular: Regular rate. Respiratory: Clear to auscultation  Gastrointestinal: Soft, non tender, abdominal sutures noted, no bleeding  Genitourinary: no suprapubic tenderness  Musculoskeletal: No edema  Skin: warm, dry  Neuro: Alert. Psych: Mood appropriate.          Labs and Imaging   XR LUMBAR SPINE (2-3 VIEWS)    Result Date: 6/7/2022  EXAMINATION: THREE XRAY VIEWS OF THE LUMBAR SPINE 6/7/2022 8:15 am COMPARISON: 05/21/2022 HISTORY: ORDERING SYSTEM PROVIDED HISTORY: back pain TECHNOLOGIST PROVIDED HISTORY: Reason for exam:->back pain Reason for Exam: back pain FINDINGS: Lumbar vertebral body heights and alignment demonstrate no acute abnormality. Severe multilevel disc and facet degenerative changes. Sacroiliac joints are maintained. Catheter terminates in the right upper quadrant. Gas-filled distended small and large bowel are present with midline skin staples. Findings favor ileus. No acute abnormality in the lumbar spine. Findings favoring ileus. FL UGI W SMALL BOWEL    Result Date: 6/7/2022  EXAMINATION: SINGLE CONTRAST UPPER GI SERIES 6/7/2022 HISTORY: ORDERING SYSTEM PROVIDED HISTORY: acid reflux, ?vomiting, s/p right hemicolectomy TECHNOLOGIST PROVIDED HISTORY: Reason for exam:->acid reflux, ?vomiting, s/p right hemicolectomy COMPARISON: 5/31/22 TECHNIQUE: Multiple single contrast images of the esophagus, gastroesophageal junction and stomach were obtained following the oral administration of water soluble contrast FLUOROSCOPY DOSE AND TYPE OR TIME AND EXPOSURES: Dose Area Product: 2724.86 uGy/m2 FINDINGS: Limited exam secondary to patient condition, status post right hemicolectomy. The patient was not NPO.  radiographs demonstrate multiple dilated loops of small bowel throughout the abdomen. Surgical drain in place. On the limited single contrast images, no evidence of esophageal stricture or esophageal obstruction. The esophagus demonstrates normal peristalsis under fluoroscopy. Filling defects are seen within the stomach, which are nonspecific and may represent enteric material.  Contrast promptly filled the small bowel. Persistent multiple dilated loops of small bowel throughout the abdomen. No visualized obstruction. The study was terminated at 90 minutes, after the x-ray tech, Kendra Higgins, spoke with Dr. Farzad Mireles who believed contrast had reached the large bowel. No discrete leak identified. Limited single contrast upper GI reveals no discrete obstruction.   Filling defects within the stomach are nonspecific and may represent enteric material.  Consider follow-up outpatient dedicated double-contrast upper GI or endoscopy. Multiple dilated loops of small bowel throughout the abdomen without discrete obstruction. VL DUP UPPER EXTREMITY VENOUS LEFT    Result Date: 6/9/2022  EXAMINATION: DUPLEX ULTRASOUND OF THE LEFT UPPER EXTREMITY FOR DVT, 6/9/2022 6:22 am TECHNIQUE: Duplex ultrasound using B-mode/gray scaled imaging and Doppler spectral analysis and color flow was obtained of the deep venous structures of the upper left extremity. COMPARISON: None. HISTORY: ORDERING SYSTEM PROVIDED HISTORY: dvt? TECHNOLOGIST PROVIDED HISTORY: Reason for exam:->dvt? Reason for Exam: Left arm pain and swelling FINDINGS: There is normal flow and compressibility of the visualized venous structures. There is no evidence of echogenic thrombus. The veins demonstrate good compressibility with normal color flow study and spectral analysis. No evidence of DVT. CBC:   Recent Labs     06/06/22  1145 06/07/22  0647 06/08/22  0625   WBC 8.4 9.4 6.5   HGB 10.5* 10.6* 10.1*    218 211     BMP:    Recent Labs     06/07/22  0647 06/07/22  0647 06/07/22  1430 06/08/22  0625 06/09/22  0655   *   < > 128* 128* 131*   K 5.0  --   --  4.5 4.1   CL 96*  --   --  100 100   CO2 21  --   --  20* 24   BUN 23  --   --  21 17   CREATININE 0.6  --   --  0.6 0.5*   GLUCOSE 168*  --   --  188* 131*    < > = values in this interval not displayed. Hepatic:   Recent Labs     06/08/22  0625   AST 14*   ALT 11   BILITOT 0.2   ALKPHOS 50     Lipids:   Lab Results   Component Value Date    CHOL 138 05/02/2019    HDL 54 05/02/2019    TRIG 161 06/01/2022     Hemoglobin A1C:   Lab Results   Component Value Date    LABA1C 5.9 05/22/2022     TSH: No results found for: TSH  Troponin:   Lab Results   Component Value Date    TROPONINT <0.010 03/14/2018    TROPONINT <0.010 03/14/2018    TROPONINT <0.010 03/14/2018     Lactic Acid: No results for input(s): LACTA in the last 72 hours.   BNP: No results for input(s): PROBNP in the last 72 hours.   UA:  Lab Results   Component Value Date    NITRU NEGATIVE 05/22/2022    COLORU YELLOW 05/22/2022    WBCUA 2 05/22/2022    RBCUA NONE SEEN 05/22/2022    MUCUS NEGATIVE 03/10/2017    TRICHOMONAS NONE SEEN 05/22/2022    BACTERIA NEGATIVE 05/22/2022    CLARITYU CLEAR 05/22/2022    SPECGRAV <1.005 05/22/2022    LEUKOCYTESUR NEGATIVE 05/22/2022    UROBILINOGEN 0.2 05/22/2022    BILIRUBINUR NEGATIVE 05/22/2022    BLOODU NEGATIVE 05/22/2022    KETUA NEGATIVE 05/22/2022     Urine Cultures: No results found for: Ron Conti  Blood Cultures: No results found for: BC  No results found for: BLOODCULT2  Organism:   Lab Results   Component Value Date    [facility] ENC 03/05/2015    [facility] ECOL 03/05/2015       Time Spent Discharging patient 35 minutes    Electronically signed by Héctor Bangura MD on 6/9/2022 at 9:50 AM

## 2022-06-09 NOTE — PLAN OF CARE
ARU Interdisciplinary Plan of Care (IPOC)  [facility] Dr. Ashley Wallis [facility] [place], [address]  [phone]  Fax: [phone]        Philly Leiva    : 1934  Acct #: [de-identified]  MRN: [id number]   PHYSICIAN:  Sukhjinder Pearson MD  Primary Active Problems:   Active Hospital Problems    Diagnosis Date Noted    Adenocarcinoma [facility]) [C80.1] 2022     Priority: Medium       Rehabilitation Diagnosis:     Other specified diseases of intestine [K63.89]  Adenocarcinoma ([facility] [address] [C80.1]          CARE PLAN     NURSING:  Sharifbob Cali while on this unit will:      Bowel and Bladder   [] Be continent of bowel and bladder      [] Have an adequate number of bowel movements   [] Urinate with no urinary retention >300ml in bladder   [] Bladder Scan: (details)   [] Complete bladder protocol with lraa removal   [] Initiate Bladder Program to toilet every ___ hours   [] Initiate Bowel Program to toilet every ___hours   [] Bladder training    [] Bowel training  Pulmonary   [] Maintain O2 SATs at 92% or greater  Pain Management   [] Have pain managed while on ARU        [] Be pain free by discharge    [] Medication Management and Education  Maintenance of Skin Integrity/Wound Management   [x] Have no skin breakdown while on ARU   [] Have improved skin integrity via wound measurements   [] Have no signs/symptoms of infection via infection protection and monitoring at the          wound site  Fall Prevention   [] Be free from injury during hospitalization via fall prevention measures     [] Disease management and Education  Precautions   [] Weight Bearing Precautions   [] Swallowing Precautions   [] Monitoring of Risks of Complications   [x] DVT Prophylaxis    [] Fluid/electrolyte/Nutrition Management    [] Complete education with patient/family with understanding demonstrated for          in-room safety with transfers to bed, toilet, wheelchair, shower as well as bathroom activities and hygiene. [] Adjustment   [] Other:   Nursing interventions may include bowel/bladder training, education for medical assistive devices, medication education, O2 saturation management, energy conservation, stress management techniques, fall prevention, alarms protocol, seating and positioning, skin/wound care, pressure relief instruction,dressing changes,  infection protection, DVT prophylaxis, and/or assistance with in room safety with transfers to bed, toilet, wheelchair, shower as well as bathroom activities and hygiene. Patient/caregiver education for:   [] Disease/sustained injury/management      [] Medication Use   [] Surgical intervention   [] Safety/Precautions   [] Body mechanics and or joint protection   [] Health maintenance         PHYSICAL THERAPY:  Goals:                               Long Term Goals  Time Frame for Long term goals : 7-10 days STG=LTG  Long term goal 1: Pt will perform bed mobility with mod I  Long term goal 2: pt will perform sit to stand, pivot and car transfers with mod I  Long term goal 3: pt will perform ambulation with 2ww 50' on levels with mod I, 150' on levels and 10' on unlevels with supervision  Long term goal 4: Pt will ascend/descend curb step and up to 12 steps with rail with supervision  Long term goal 5: Pt will retrieve light item with reacher and 2ww with mod I  These goals were reviewed with this patient at the time of assessment and Autumn Wills is in agreement. Plan of Care: Pt to be seen 5 days per week for a minimum of 60 minutes for 10 days. community reintegration,animal assisted therapy, and concurrent/group therapy.     PT IRF-CARSON scores and goals for initial assessment:   Bed Mobility:   Sit to Lying  Assistance Needed: Partial/moderate assistance  Comment: min assist  CARE Score: 3  Discharge Goal: Independent  Roll Left and Right  Assistance Needed: Partial/moderate assistance  Comment: min assist  CARE Score: 3  Discharge Goal: Independent  Lying to Sitting on Side of Bed  Assistance Needed: Partial/moderate assistance  Comment: mod assist with LEs  CARE Score: 3  Discharge Goal: Independent    Transfers:    Sit to Stand  Assistance Needed: Substantial/maximal assistance  Comment: max assist to 2ww  CARE Score: 2  Discharge Goal: Independent  Chair/Bed-to-Chair Transfer  Assistance Needed: Partial/moderate assistance  Comment: mod assist sit pivot  CARE Score: 3  Discharge Goal: Independent     Car Transfer  Assistance Needed: Partial/moderate assistance  Comment: min assist for LEs  CARE Score: 3  Discharge Goal: Independent    Ambulation:    Walking Ability  Does the Patient Walk?: Yes     Walk 10 Feet  Assistance Needed: Partial/moderate assistance  Comment: min assist with 2ww, leans to R with variable step length and width, discontinuous steps  CARE Score: 3  Discharge Goal: Independent     Walk 50 Feet with Two Turns  Comment: 42' max distance, min assist increasing to mod with increased lateral sway and path deviation  Reason if not Attempted: Not attempted due to medical condition or safety concerns  CARE Score: 88  Discharge Goal: Independent     Walk 150 Feet  Reason if not Attempted: Not attempted due to medical condition or safety concerns  CARE Score: 88  Discharge Goal: Supervision or touching assistance     Walking 10 Feet on Uneven Surfaces  Assistance Needed: Partial/moderate assistance  Comment: mod assist with 2ww, LOb to R x 3  CARE Score: 3  Discharge Goal: Supervision or touching assistance     1 Step (Curb)  Assistance Needed: Partial/moderate assistance  Comment: min assist with 100% cues  CARE Score: 3  Discharge Goal: Supervision or touching assistance     4 Steps  Comment: pt too fatigued to complete, may be able to complete next session  Discharge Goal: Supervision or touching assistance     12 Steps  Reason if not Attempted: Not attempted due to medical condition or safety concerns  CARE Score: 88  Discharge Goal: Supervision or touching assistance    Gait Deviations: []None []Slow amisha  [] Increased VARUN  [] Staggers []Deviated Path  [] Decreased step length  [] Decreased step height  []Decreased arm swing  [] Shuffles  [] Decreased head and trunk rotation  []other:        Wheelchair:  w/c Ability: Wheelchair Ability  Uses a Wheelchair and/or Scooter?: No                Balance:        Object: Picking Up Object  Assistance Needed: Partial/moderate assistance  Comment: min assist with 2ww and reacher  CARE Score: 3  Discharge Goal: Independent  OCCUPATIONAL THERAPY:  Goals:             Short Term Goals  Time Frame for Short term goals: STGs=LTGs :  Long Term Goals  Time Frame for Long term goals : 10-12 days or until d/c. Long Term Goal 1: Pt will complete grooming tasks c Mod I.  Long Term Goal 2: Pt will complete total body bathing c AE PRN and supervision. Long Term Goal 3: Pt will complete UB dressing c Mod I.  Long Term Goal 4: Pt will complete LB dressing c AE PRN and supervision. Long Term Goal 5: Pt will doff/don footwear c AE PRN and Mod I. Additional Goals?: Yes  Long Term Goal 6: Pt will complete toileting c supervision. Long Term Goal 7: Pt will perform functional transfers (bed, chair, toilet, shower) c DME PRN and supervision. Long Term Goal 8: Pt will perform therex/therax to facilitate an increase in strength/endurance/ax tolerance (c emphasis on static/dynamic standing balance/tolerance > 6 mins) c supervision. Long Term Goal 9: Pt will complete light home management tasks c supervision. :    These goals were reviewed with this patient at the time of assessment and Dick Fink is in agreement    Plan of Care:  Pt to be seen 5 days per week for a minimum of 60 minutes for 12 days.           Plan  Times per Day: Daily  Current Treatment Recommendations: Strengthening,Balance training,Functional mobility training,Endurance training,Safety education & training,Patient/Caregiver education & training,Equipment evaluation, education, & procurement,Positioning,Home management training,Self-Care / Glorious Mayer re-education,Coordination training         cognitive training, home management, energy conservation training, community reintegration, splint fabrication, patient/caregiver education and training, animal assisted therapy, and concurrent and/or group therapy. OT IRF-CARSON scores and goals for initial assessment:    ADLs:    Eating: Eating  Assistance Needed: Independent  Comment: Pt able to open packages/containers to self feed IND. CARE Score: 6  Discharge Goal: Independent       Oral Hygiene: Oral Hygiene  Assistance Needed: Independent  Comment: Pt completed oral care Mod I seated. CARE Score: 6  Discharge Goal: Independent    UB/LB Bathing: Shower/Bathe Self  Assistance Needed: Substantial/maximal assistance  Comment: Pt required assist with rear washing and Max A for balance to remain in stance. CARE Score: 2  Discharge Goal: Supervision or touching assistance    UB Dressing: Upper Body Dressing  Assistance Needed: Partial/moderate assistance  Comment: Pt required assist pulling shirt down in the back and adjusting sleeves. CARE Score: 3  Discharge Goal: Independent         LB Dressing: Lower Body Dressing  Assistance Needed: Substantial/maximal assistance  Comment: Pt required assist threading depends onto L foot, however Pt able to thread BLEs into pants. Pt required Max A as Pt required therapist pull pants up from below knee level while providing assist for balance to remain in stance. CARE Score: 2  Discharge Goal: Supervision or touching assistance    Donning and [place] Footwear: Putting On/Taking Off Footwear  Assistance Needed: Supervision or touching assistance  Comment: Pt able to doff/don socks using figure four method. Pt without shoes on ARU at this time. CARE Score: 4  Discharge Goal: Independent      Toileting:  Toileting Hygiene  Assistance needed: Substantial/maximal assistance  Comment: Pt lost balance during clothing management at toilet using grab bars, requiring Max A to correct and assistance managing depends. CARE Score: 2  Discharge Goal: Supervision or touching assistance      Toilet Transfers: Toilet Transfer  Assistance needed: Partial/moderate assistance  Comment: Mod A to perform stand pivot transfer using 2WW and grab bars d/t loss of balance. Pt required Max safety cues for correct positioning of hands/body/2WW. CARE Score: 3  Discharge Goal: Supervision or touching assistance      SPEECH THERAPY: (If ordered)  Plan of Care and Goals:   LTG                                                            LTG:                           Treatments may include speech/language/communication therapy, cognitive training, animal assisted therapy, group therapy, education, and/or dysphagia therapy based on the above goals. Co-treats where appropriate with PT or OT to facilitate patient goals in functional tasks. These goals were reviewed with this patient at the time of assessment and James Bañuelos is in agreement. CASE MANAGEMENT:  Goals:   Assist patient/family with discharge planning, patient/family counseling, assistance in obtaining recommended equipment and other services, and coordination with insurance during ARU stay. Patient Goals: Return to maximum level of independent function. Nutrition goal: Patient will tolerate diet to consume at least 50-75% at meals during stay    Activities Prior to Admit:   Homemaking Responsibilities: Yes  Active : Yes  Mode of Transportation: SUV  Occupation: Retired  Leisure & Hobbies: Pt enjoys square dancing and crafts. Intensity of Therapy  Leonadea Bañuelos will be seen a minimum of 3 hours of therapy per day/a minimum of 5 out of 7 days per week.     [] In this rare instance due to the nature of this patient's medical involvement, this patient will be seen 15 hours per week (900 minutes within a 7 day period). Treatments may include therapeutic exercises, gait training, neuromuscular re-ed, transfer training, community reintegration, bed mobility, w/c mobility and training, self care, home mgmt, cognitive training, energy conservation,dysphagia tx, speech/language/communication therapy, group therapy, and patient/family education. In addition, dietician/nutritionist may monitor calorie count as well as intake and collaboratively work with SLP on dietary upgrades. Neuropsychology/Psychology may evaluate and provide necessary support. Group therapy as appropriate to facilitate improved endurance, STR, COORD, function, safety, transfers, awareness and insight into deficits, problem solving, memory, and social interaction and engagement. Medical issues being managed closely and that require 24 hour availability of a physician:   [] Swallowing Precautions                                     [] Weight bearing precautions   [x] Wound Care                             [x] Infection Prevention   [x] DVT Prophylaxis/assessment              [x] Monitoring for complications    [x] Fall Precautions/Prevention                         [x] Fluid/Electrolyte/Nutrition Balance   [] Voice Protection                           [x] Medication Management   [x] Respiratory                   [x] Pain Mgmt   [x] Bowel/Bladder Fx    Medical Prognosis: [] Good  [x] Fair    [] Guarded   Total expected IRF days 12                                            Physician anticipated functional outcomes:  FWW and HHC PT/OT and supervision.   Rehab Goals:   [] Return to premorbid function of_______________________________.    [] Independent   [] Mod I  [x] Supervision  [] CGA   [] Min A   [] Mod A  Level for ambulation []without assistive device  [x] with assistive device        [] Independent   [] Mod I  [x] Supervision [] CGA   [] Min A   [] Mod A  Level for transfers []without assistive device [x] with assistive device         [] Independent   [] Mod I  [x] Supervision [] CGA   [] Min A   [] Mod A Level with ADL's []without assistive device   [x] with assistive device     ___________________     Level with cognitive skills requiring [] No assist [x] Supervision  [] Active Assist/Cues     [] Maximize level of mobility and ADL's to decrease burden on caregiver    IPOC brief synthesis of Preadmission Screen, Post-Admission Evaluation, and Therapy Evaluations:  Acute inpatient rehabilitation with occupational and physical therapy 180 minutes 5 out of every 7 days. Will address basic and  advancing mobility with self-care instruction and adaptive equipment training. Caregiver education will be offered. Expected length of stay  prior to a supervised level of function for discharge home with a walker and [facility] OT/PT is 2 weeks.     Additional recommendation:     1. 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 We must monitor her surgical incisions for signs of infection. She needs bowel and bladder monitoring every training. She needs a cautious pain management. We must provide aggressive pulmonary hygiene measures, nutritional support and DVT prophylaxis. Outpatient follow-up with oncology and her surgeon. 2. DVT prophylaxis. Lovenox 40 mg subcu daily. I must monitor her hemoglobin and platelet count periodically while on this medication. Weightbearing activities will be pursued daily. GI prophylaxis is available. 3. Uncontrolled pain: Cryotherapy, Oral analgesics and progressive mobilization. Attention to bowel intervention while on the analgesics. 4. 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 Blood sugars are checked at mealtime and bedtime.   She is on a Humalog sliding scale with plans for reintroducing oral agents from home when she is eating more consistently. 5. Chronic kidney disease stage IIIa: Avoiding nephrotoxic medications and wide swings of blood pressure. Encouraging consistent oral hydration. Periodic monitoring of her chemistries. 6. Hypertension: She is currently off medications to control her blood pressure. Vital signs are checked at rest and with activity. Target systolic blood pressures [id number]. Anticipated discharge destination:    [] Home Independently   [x] Home with supervision    []SNF     [] Other       This plan has been reviewed with me in a language I understand. I have had the opportunity to include my input with my therapy team.    ________________________________________________   ______________________  Patient/Significant Other      Date    I have reviewed this initial plan of care and agree with its contents:    Title   Name    Date    Time    Physician: Griselda Ryan MD 6/11/2022 9:51 AM    Case Mgmt: KASI Patten, LSW 06/10/22 1200    OT: RANDELL Munguia, OTR/L 06/10/22 1514    PT: Ambreen Fofana, PT 6/10/22, [facility]    RN: Neri Cordero.  Francisco GOMEZ 6/9/2022 4158    ST:    Dietician: AYDEE Parikh 06/10/2022  15:39    ADMIT DATE:6/9/2022

## 2022-06-09 NOTE — CARE COORDINATION
Patient to admit to ARU today. COVID negative test noted. Patient meets criteria and is approved to come to ARU. Patient able to admit after ARU Medical Director and  sign the pre-admission screen (PAS).

## 2022-06-09 NOTE — PROGRESS NOTES
Nephrology Progress Note        [address], [address] [address]  Phone: [phone]  Office Hours: 8:30AM - 4:30PM  Monday - Friday 6/9/2022 7:01 AM  Subjective:   Admit Date: 5/22/2022  PCP: Michael Aj DO  Interval History:   Resting on room air  On tpn    Diet: ADULT DIET;  Full Liquid  ADULT ORAL NUTRITION SUPPLEMENT; Breakfast, Lunch, Dinner; Diabetic Oral Supplement  PN-Adult Premix 5/15 - Central      Data:   Scheduled Meds:   pantoprazole  40 mg IntraVENous BID    escitalopram  10 mg Oral Daily    lidocaine  1 patch TransDERmal Daily    sucralfate  1 g Oral 4 times per day    fat emulsion  250 mL IntraVENous Once per day on Mon Tue Thu Fri    lidocaine PF  5 mL IntraDERmal Once    sodium chloride flush  5-40 mL IntraVENous 2 times per day    lidocaine   Topical Once    enoxaparin  40 mg SubCUTAneous Daily    atorvastatin  20 mg Oral Daily    Vitamin D  1,000 Units Oral Daily    cetirizine  10 mg Oral Daily    levothyroxine  137 mcg Oral Daily    losartan  100 mg Oral Daily    metoprolol tartrate  150 mg Oral Daily    metoprolol tartrate  100 mg Oral Nightly    oxybutynin  5 mg Oral BID    rOPINIRole  3 mg Oral Nightly    vitamin B-12  100 mcg Oral Daily    insulin lispro  0-6 Units SubCUTAneous Q4H    sodium chloride flush  5-40 mL IntraVENous 2 times per day     Continuous Infusions:   PN-Adult Premix 5/15 - Central 60 mL/hr at 06/08/22 1729    sodium chloride 950 mL (06/08/22 2138)    sodium chloride      dextrose Stopped (05/31/22 1905)    sodium chloride Stopped (05/25/22 1247)     PRN Meds:calcium carbonate, sodium chloride flush, sodium chloride, benzocaine-menthol, phenol, ondansetron **OR** ondansetron, oxyCODONE **OR** oxyCODONE, morphine **OR** morphine, zolpidem, promethazine, albuterol sulfate HFA, glucose, dextrose bolus **OR** dextrose bolus, glucagon (rDNA), dextrose, sodium chloride flush, sodium chloride, ondansetron **OR** ondansetron, polyethylene glycol, acetaminophen **OR** acetaminophen  I/O last 3 completed shifts: In: 18 [P.O.:960; I.V.:30]  Out: 1205 [Urine:1150; Drains:55]  No intake/output data recorded. Intake/Output Summary (Last 24 hours) at 6/9/2022 0701  Last data filed at 6/8/2022 2130  Gross per 24 hour   Intake 990 ml   Output 755 ml   Net 235 ml       CBC:   Recent Labs     06/06/22  1145 06/07/22  0647 06/08/22  0625   WBC 8.4 9.4 6.5   HGB 10.5* 10.6* 10.1*    218 211       BMP:    Recent Labs     06/06/22  1145 06/06/22  1145 06/07/22  0647 06/07/22  1430 06/08/22  0625   *  126*   < > 124* 128* 128*   K 4.9  4.9  --  5.0  --  4.5   CL 99  100  --  96*  --  100   CO2 18*  17*  --  21  --  20*   BUN 21  22  --  23  --  21   CREATININE 0.6  0.6  --  0.6  --  0.6   GLUCOSE 183*  185*  --  168*  --  188*    < > = values in this interval not displayed.      Hepatic:   Recent Labs     06/08/22  0625   AST 14*   ALT 11   BILITOT 0.2   ALKPHOS 50         Objective:   Vitals: BP (!) 118/43   Pulse 86   Temp 97.9 °F (36.6 °C) (Oral)   Resp 16   Ht 5' (1.524 m)   Wt 155 lb 11.2 oz (70.6 kg)   SpO2 99%   BMI 30.41 kg/m²   General appearance:  in no acute distress  HEENT: normocephalic, atraumatic,   Neck: supple, trachea midline  Lungs: breathing comfortably on room air  Extremities: extremities atraumatic, no cyanosis or edema  Neurologic: alert, oriented, follows commands, interactive    Assessment and Plan:     Patient Active Problem List    Diagnosis Date Noted    Partial small bowel obstruction (HCC)     Severe malnutrition ([facility] [address] 05/31/2022    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

## 2022-06-09 NOTE — PROGRESS NOTES
Occupational Therapy    Occupational Therapy Treatment Note    Name: Dick Fink MRN: [id number] :   1934   Date:  2022   Admission Date: 2022 Room:  [facility]     Primary Problem:  Cecum mass     Restrictions/Precautions:          abdominal precautions, fall risk, CLAUDIO drain    Communication with other providers: PATRICIA Carlson regarding hallucinations/confusion     Subjective:  Patient states:  Pt agreeable to session, A&OX4 but having episodes of confusion/fatigue throughout session. Pain: denies     Objective:    Observation: Pt presented in semi-rico's, agreeable to session. Pt's daughter and  present, reports pt has been having hallucinations and demo decreased safety awareness at times. Objective Measures:  CLAUDIO drain, IV     Treatment, including education:  Therapeutic Activity Training:   Therapeutic activity training was instructed today. Cues were given for safety, sequence, UE/LE placement, awareness, and balance. Activities performed today included bed mobility training, sup-sit, sit-stand. Supine <> EOB with Mod A and increased time. Min A to scoot hips forward    Static sitting balance CGA-Min A d/t R lateral leaning and minor posterior LOB    Sit to Stand transfer X3 trials with Min-Mod A with use of RW and Mod A to maintain static standing balance with pt demo lateral/posterior sway with pt demo episodes of closing eyes/LOB, pt instructed back into sitting for increased safety. Pt required max verbal/tactile cues throughout for increased safety with technique. Pt's nurse reports pt has not slept well today and that could be cause of confusion. EOB <> supine with Mod A for B LE mgmt. Max A to scoot up/reposition in bed. Self Care Training:   Cues were given for safety, sequence, UE/LE placement, visual cues, and balance. Activities performed today included  toileting and hygiene.     Rolling L/R with Min A  For X2 trials to doff old depend and don clean one with DEP assist, DEP for anterior/posterior hygiene, and DEP to remove old PurWick and place clean one. SBA with implements provided for facial/hand hygiene while in semi-rico's     Patient educated on role of OT , benefits of OT and rationale for therapeutic intervention. Educated on need to be patient advocate, benefit of EOB activity. Patient left safely in bed at end of session, with call light in reach, alarm on and nursing aware. Gait belt was used for func transfers / mobility. Pt's  and daughter at bedside with pt. Assessment / Impression:    Patient's tolerance of treatment: Poor  Adverse Reaction: none  Significant change in status and impact:  Increased confusion   Barriers to improvement: Safety awareness, strength       Plan for Next Session:    Cont OT POC     Time in:  1257  Time out:  1326  Timed treatment minutes:  29  Total treatment time:  29      Electronically signed by:     KHURRAM Cook,   6/9/2022, 1:30 PM

## 2022-06-09 NOTE — PLAN OF CARE
Problem: Discharge Planning  Goal: Discharge to home or other facility with appropriate resources  Outcome: Completed     Problem: Safety - Adult  Goal: Free from fall injury  Outcome: Completed     Problem: ABCDS Injury Assessment  Goal: Absence of physical injury  Outcome: Completed     Problem: Chronic Conditions and Co-morbidities  Goal: Patient's chronic conditions and co-morbidity symptoms are monitored and maintained or improved  Outcome: Completed     Problem: Pain  Goal: Verbalizes/displays adequate comfort level or baseline comfort level  Outcome: Completed     Problem: Nutrition Deficit:  Goal: Optimize nutritional status  Outcome: Completed

## 2022-06-09 NOTE — H&P
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 History of present illness: Patient is an 66-year-old right-hand-dominant female who has a long history of diverticulitis. She was not terribly symptomatic with this up until the last 6 weeks or so. Over that period of time recently she has had various pains in her abdomen with nausea and difficulty with bowel activity. She was treated with antibiotics, diet changes and anti-inflammatories. Unfortunately her symptoms progressed and eventually she had a colonoscopy that identified a mass. On 2022 she underwent a robotic assisted laparoscopic hemicolectomy (right-sided) with Dr. Reyes Melo. Perioperatively she has had a rise in her creatinine, fluctuating blood pressures and significant pain. Recently she is started to have bowel movements again. She has struggled with poor sleep, generalized weakness and inability to do her own mobility and self-care. She requires inpatient rehabilitation to address these issues. Review of systems: Fair appetite. Occasional flatus. Infrequent bowel movements. Some positional dizziness. Minimal dyspnea but significant fatigue with activity. No new limb paresthesias. The remainder of their review of systems was negative except as mentioned in the history of present illness.     Social History: Lives With: Spouse  Type of Home: House  Home Layout: One level  Home Access: Stairs to enter with rails  Entrance Stairs - Number of Steps: 2 to porch, 1 into house  Entrance [simvastatin]    Past Medical History:   Past Medical History:   Diagnosis Date    Diabetes mellitus ([address]     H/O cardiac catheterization 05/18/2021    no significant CAD in main vessels, has severe stenosis in small  branch diagonal vessel    H/O cardiovascular stress test 3/22/18,03/30/2017    ECG portion of the stress test is negative for ischemia EF >70% Normal stress myocardial perfusion.  H/O cardiovascular stress test 04/07/2021    abnormal stress    H/O Doppler ultrasound (Abdomimal Aorta) 03/29/2017    No evidence of abdominal aortic aneurysm.  H/O echocardiogram 07/02/2014    Normal left ventricular size, wall motion and systolic function. [place] elevated pulmonary artery systolic pressure. Mild MR and TR and trace AR EF 55 to 60%    Hx of echocardiogram 03/29/2017    EF 55-60%. Grade I diastolic dysfunction. Mildly dilated left atrium. No evidence of pericardial effusion.      Hyperlipidemia     Hypertension     Sleep apnea     Thyroid disease         Past Surgical History:     Past Surgical History:   Procedure Laterality Date    BREAST BIOPSY Right     approx age 76, benign    CATARACT REMOVAL      CHOLECYSTECTOMY      COLONOSCOPY N/A 5/25/2022    COLONOSCOPY POLYPECTOMY SNARE/COLD BIOPSY performed by Kelsey Ornelas MD at [facility] N/A 5/27/2022    BOWEL RESECTION RIGHT HEMICOLECTOMY LAPAROSCOPIC ROBOTIC XI performed by Moiz Jeff MD at [facility] [address]      eye lift       Current Medications:     Current Facility-Administered Medications:     0.9 % sodium chloride infusion, , IntraVENous, PRN, Klaudia Jacques MD    albuterol sulfate HFA (PROVENTIL;VENTOLIN;PROAIR) 108 (90 Base) MCG/ACT inhaler 2 puff, 2 puff, Inhalation, Q6H PRN, Klaudia Jacques MD    [START ON 6/10/2022] atorvastatin (LIPITOR) tablet 20 mg, 20 mg, Oral, Daily, Klaudia Jacques MD    benzocaine-menthol (CEPACOL SORE THROAT) lozenge 1 lozenge, 1 lozenge, Oral, PRN, Anamika Flores MD    calcium carbonate (TUMS) chewable tablet 500 mg, 500 mg, Oral, TID PRN, Anamika Flores MD  [facility] ON 6/10/2022] cetirizine (ZYRTEC) tablet 10 mg, 10 mg, Oral, Daily, Anamika Flores MD    [START ON 6/10/2022] escitalopram (LEXAPRO) tablet 10 mg, 10 mg, Oral, Daily, Anamika Flores MD    [START ON 6/10/2022] levothyroxine (SYNTHROID) tablet 137 mcg, 137 mcg, Oral, Daily, Anamika Flores MD    [START ON 6/10/2022] lidocaine 4 % external patch 1 patch, 1 patch, TransDERmal, Daily, Anamika Flores MD    [START ON 6/10/2022] losartan (COZAAR) tablet 100 mg, 100 mg, Oral, Daily, Anamika Flores MD    [START ON 6/10/2022] metoprolol tartrate (LOPRESSOR) tablet 150 mg, 150 mg, Oral, Daily, Anamika Flores MD    metoprolol tartrate (LOPRESSOR) tablet 100 mg, 100 mg, Oral, Nightly, Anamika Flores MD    oxybutynin (DITROPAN) tablet 5 mg, 5 mg, Oral, BID, Anamika Flores MD    pantoprazole (PROTONIX) injection 40 mg, 40 mg, IntraVENous, BID, Anamika Flores MD    phenol 1.4 % mouth spray 1 spray, 1 spray, Mouth/Throat, Q2H PRN, Anamika Flores MD    rOPINIRole (REQUIP) tablet 3 mg, 3 mg, Oral, Nightly, Anamika Flores MD    sodium chloride flush 0.9 % injection 5-40 mL, 5-40 mL, IntraVENous, 2 times per day, Anamika Flores MD    sodium chloride flush 0.9 % injection 5-40 mL, 5-40 mL, IntraVENous, PRN, Anamika Flores MD    [START ON 6/10/2022] sucralfate (CARAFATE) tablet 1 g, 1 g, Oral, 4 times per day, Anamika Flores MD  [facility]  [START ON 6/10/2022] vitamin B-12 (CYANOCOBALAMIN) tablet 100 mcg, 100 mcg, Oral, Daily, Anamika Flores MD    [START ON 6/10/2022] Vitamin D (CHOLECALCIFEROL) tablet 1,000 Units, 1,000 Units, Oral, Daily, Anamika Flores MD    dextrose 5 % solution, 100 mL/hr, IntraVENous, PRN, Anamika Flores MD    dextrose bolus 10% 125 mL, 125 mL, IntraVENous, PRN **OR** dextrose bolus 10% 250 mL, 250 mL, IntraVENous, PRN, Anamika Flores MD  [facility] glucagon (rDNA) injection 1 mg, 1 mg, IntraMUSCular, PRN, Agnes Rodriguez MD    glucose chewable tablet 16 g, 4 tablet, Oral, PRN, Agnes Rodriguez MD    [START ON 6/10/2022] enoxaparin (LOVENOX) injection 40 mg, 40 mg, SubCUTAneous, Daily, Agnes Rodriguez MD    ondansetron (ZOFRAN-ODT) disintegrating tablet 4 mg, 4 mg, Oral, Q8H PRN **OR** ondansetron (ZOFRAN) injection 4 mg, 4 mg, IntraVENous, Q6H PRN, Agnes Rodriguez MD    oxyCODONE (ROXICODONE) immediate release tablet 5 mg, 5 mg, Oral, Q4H PRN **OR** oxyCODONE HCl (OXY-IR) immediate release tablet 10 mg, 10 mg, Oral, Q4H PRN, Agnes Rodriguez MD    0.9 % sodium chloride infusion, , IntraVENous, PRN, Agnes Rodriguez MD    polyethylene glycol (GLYCOLAX) packet 17 g, 17 g, Oral, Daily PRN, Agnes Rodriguez MD    sodium chloride flush 0.9 % injection 5-40 mL, 5-40 mL, IntraVENous, 2 times per day, Agnes Rodriguez MD    sodium chloride flush 0.9 % injection 5-40 mL, 5-40 mL, IntraVENous, PRN, Agnes Rodriguez MD    insulin lispro (HUMALOG) injection vial 0-6 Units, 0-6 Units, SubCUTAneous, Q4H, Agnes Rodriguez MD    acetaminophen (TYLENOL) tablet 650 mg, 650 mg, Oral, Q4H PRN, TEO Lowe MD    bisacodyl (DULCOLAX) suppository 10 mg, 10 mg, Rectal, Daily PRN, TEO Lowe MD    melatonin tablet 3 mg, 3 mg, Oral, Nightly PRN, TEO Lowe MD    Family History:   Family History   Problem Relation Age of Onset    Pacemaker Sister     Arrhythmia Sister     Breast Cancer Sister         pre menopausal    Ovarian Cancer Neg Hx        Exam:    Blood pressure 139/60, pulse 95, temperature 97.7 °F (36.5 °C), temperature source Oral, resp. rate 16, height 5' (1.524 m), weight 153 lb (69.4 kg), SpO2 97 %, not currently breastfeeding. General: Patient was seen semiupright in bed. Alert. Easily distracted, however. In no distress. HEENT: Neck supple. No adenopathy. MMM. Pulmonary: Clear and unlabored.     Cardiac: Regular rate and rhythm. Abdomen: Patient's abdomen was soft and nondistended. Bowel sounds were present throughout. There was no rebound, guarding or masses noted. Spinal exam: Diffuse percussion tenderness without open skin areas or gross malalignment. Upper extremities: No significant reflexes. Fair  strength. [address] coordination. No bruising. Lower extremities: Guarded hip flexion with abdominal pain. No signs of DVT. Heels clear. No reflexes. 4 -/5 strength across the knees and ankles. Sitting balance was fair. Standing balance was poor. Lab Results   Component Value Date    WBC 6.5 06/08/2022    HGB 10.1 (L) 06/08/2022    HCT 31.8 (L) 06/08/2022    MCV 91.6 06/08/2022     06/08/2022     Lab Results   Component Value Date    INR 1.09 05/14/2021    INR 1.09 12/09/2016    PROTIME 12.4 05/14/2021    PROTIME 12.7 12/09/2016     Lab Results   Component Value Date    CREATININE 0.5 (L) 06/09/2022    BUN 17 06/09/2022     (L) 06/09/2022    K 4.1 06/09/2022     06/09/2022    CO2 24 06/09/2022     Lab Results   Component Value Date    ALT 11 06/08/2022    AST 14 (L) 06/08/2022    ALKPHOS 50 06/08/2022    BILITOT 0.2 06/08/2022         Impression: 80-year-old female with history of diabetes, chronic kidney disease, hypertension and depression who has developed terminal ileum adenocarcinoma. Strengths for the patient: Alertness, accessible home and supportive family. Limitations/barriers for the patient: Her age, she lives alone and her risk for DVT. Recommendation: Acute inpatient rehabilitation with occupational and physical therapy 180 minutes 5 out of every 7 days. Will address basic and  advancing mobility with self-care instruction and adaptive equipment training. Caregiver education will be offered. Expected length of stay  prior to a supervised level of function for discharge home with a walker and C OT/PT is 2 weeks. Additional recommendation:    1.  Adenocarcinoma of differences.

## 2022-06-09 NOTE — PROGRESS NOTES
Patient slightly confused this morning, seems to be having some hallucinations. Reaching for things that are not there and talking to her daughter who is not here at times. Answers orientation questions appropriately and follows commands, NIH/neuro check completed with no physical deficits. Patient states she thinks she is just very tired, and that she did not sleep at all last night. She also did have Ambien at 3am which she has not had since 6/1. Dr Johanna Dubois is aware and is okay with discharge to ARU, and to continue to monitor for delirium, reorientation as needed. Lights off and door closed to promote rest, patient assisted to a comfortable position.

## 2022-06-10 LAB
GLUCOSE BLD-MCNC: 114 MG/DL (ref 70–99)
GLUCOSE BLD-MCNC: 120 MG/DL (ref 70–99)
GLUCOSE BLD-MCNC: 121 MG/DL (ref 70–99)
GLUCOSE BLD-MCNC: 138 MG/DL (ref 70–99)
GLUCOSE BLD-MCNC: 95 MG/DL (ref 70–99)

## 2022-06-10 PROCEDURE — 99024 POSTOP FOLLOW-UP VISIT: CPT | Performed by: NURSE PRACTITIONER

## 2022-06-10 PROCEDURE — 99232 SBSQ HOSP IP/OBS MODERATE 35: CPT | Performed by: PHYSICAL MEDICINE & REHABILITATION

## 2022-06-10 PROCEDURE — C9113 INJ PANTOPRAZOLE SODIUM, VIA: HCPCS | Performed by: STUDENT IN AN ORGANIZED HEALTH CARE EDUCATION/TRAINING PROGRAM

## 2022-06-10 PROCEDURE — 97112 NEUROMUSCULAR REEDUCATION: CPT

## 2022-06-10 PROCEDURE — 82962 GLUCOSE BLOOD TEST: CPT

## 2022-06-10 PROCEDURE — 97535 SELF CARE MNGMENT TRAINING: CPT

## 2022-06-10 PROCEDURE — 94761 N-INVAS EAR/PLS OXIMETRY MLT: CPT

## 2022-06-10 PROCEDURE — 84100 ASSAY OF PHOSPHORUS: CPT

## 2022-06-10 PROCEDURE — 2580000003 HC RX 258: Performed by: STUDENT IN AN ORGANIZED HEALTH CARE EDUCATION/TRAINING PROGRAM

## 2022-06-10 PROCEDURE — APPNB15 APP NON BILLABLE TIME 0-15 MINS: Performed by: NURSE PRACTITIONER

## 2022-06-10 PROCEDURE — 6370000000 HC RX 637 (ALT 250 FOR IP): Performed by: STUDENT IN AN ORGANIZED HEALTH CARE EDUCATION/TRAINING PROGRAM

## 2022-06-10 PROCEDURE — 97530 THERAPEUTIC ACTIVITIES: CPT

## 2022-06-10 PROCEDURE — 6360000002 HC RX W HCPCS: Performed by: STUDENT IN AN ORGANIZED HEALTH CARE EDUCATION/TRAINING PROGRAM

## 2022-06-10 PROCEDURE — 99211 OFF/OP EST MAY X REQ PHY/QHP: CPT

## 2022-06-10 PROCEDURE — 1280000000 HC REHAB R&B

## 2022-06-10 PROCEDURE — 97163 PT EVAL HIGH COMPLEX 45 MIN: CPT

## 2022-06-10 PROCEDURE — 97116 GAIT TRAINING THERAPY: CPT

## 2022-06-10 PROCEDURE — 97166 OT EVAL MOD COMPLEX 45 MIN: CPT

## 2022-06-10 RX ORDER — INSULIN LISPRO 100 [IU]/ML
0-6 INJECTION, SOLUTION INTRAVENOUS; SUBCUTANEOUS
Status: DISCONTINUED | OUTPATIENT
Start: 2022-06-11 | End: 2022-06-23 | Stop reason: HOSPADM

## 2022-06-10 RX ORDER — INSULIN LISPRO 100 [IU]/ML
0-3 INJECTION, SOLUTION INTRAVENOUS; SUBCUTANEOUS NIGHTLY
Status: DISCONTINUED | OUTPATIENT
Start: 2022-06-10 | End: 2022-06-23 | Stop reason: HOSPADM

## 2022-06-10 RX ADMIN — ROPINIROLE HYDROCHLORIDE 3 MG: 1 TABLET, FILM COATED ORAL at 20:38

## 2022-06-10 RX ADMIN — SODIUM CHLORIDE, PRESERVATIVE FREE 10 ML: 5 INJECTION INTRAVENOUS at 20:42

## 2022-06-10 RX ADMIN — PANTOPRAZOLE SODIUM 40 MG: 40 INJECTION, POWDER, FOR SOLUTION INTRAVENOUS at 08:41

## 2022-06-10 RX ADMIN — ENOXAPARIN SODIUM 40 MG: 100 INJECTION SUBCUTANEOUS at 08:47

## 2022-06-10 RX ADMIN — METOPROLOL TARTRATE 150 MG: 50 TABLET, FILM COATED ORAL at 08:47

## 2022-06-10 RX ADMIN — LEVOTHYROXINE SODIUM 137 MCG: 0.11 TABLET ORAL at 06:00

## 2022-06-10 RX ADMIN — ESCITALOPRAM OXALATE 10 MG: 10 TABLET ORAL at 08:47

## 2022-06-10 RX ADMIN — SUCRALFATE 1 G: 1 TABLET ORAL at 06:00

## 2022-06-10 RX ADMIN — PANTOPRAZOLE SODIUM 40 MG: 40 INJECTION, POWDER, FOR SOLUTION INTRAVENOUS at 20:40

## 2022-06-10 RX ADMIN — OXYCODONE HYDROCHLORIDE 10 MG: 10 TABLET ORAL at 20:33

## 2022-06-10 RX ADMIN — CETIRIZINE HYDROCHLORIDE 10 MG: 10 TABLET, FILM COATED ORAL at 08:47

## 2022-06-10 RX ADMIN — LOSARTAN POTASSIUM 100 MG: 100 TABLET, FILM COATED ORAL at 08:47

## 2022-06-10 RX ADMIN — Medication 100 MCG: at 08:46

## 2022-06-10 RX ADMIN — SUCRALFATE 1 G: 1 TABLET ORAL at 00:50

## 2022-06-10 RX ADMIN — METOPROLOL TARTRATE 100 MG: 50 TABLET, FILM COATED ORAL at 20:38

## 2022-06-10 RX ADMIN — OXYBUTYNIN CHLORIDE 5 MG: 5 TABLET ORAL at 08:47

## 2022-06-10 RX ADMIN — Medication 1000 UNITS: at 08:47

## 2022-06-10 RX ADMIN — ATORVASTATIN CALCIUM 20 MG: 10 TABLET, FILM COATED ORAL at 08:47

## 2022-06-10 RX ADMIN — OXYBUTYNIN CHLORIDE 5 MG: 5 TABLET ORAL at 20:39

## 2022-06-10 RX ADMIN — SODIUM CHLORIDE, PRESERVATIVE FREE 10 ML: 5 INJECTION INTRAVENOUS at 08:51

## 2022-06-10 RX ADMIN — SUCRALFATE 1 G: 1 TABLET ORAL at 12:12

## 2022-06-10 RX ADMIN — SODIUM CHLORIDE, PRESERVATIVE FREE 10 ML: 5 INJECTION INTRAVENOUS at 20:40

## 2022-06-10 RX ADMIN — SUCRALFATE 1 G: 1 TABLET ORAL at 16:25

## 2022-06-10 ASSESSMENT — PAIN SCALES - GENERAL
PAINLEVEL_OUTOF10: 0
PAINLEVEL_OUTOF10: 7

## 2022-06-10 ASSESSMENT — PAIN - FUNCTIONAL ASSESSMENT: PAIN_FUNCTIONAL_ASSESSMENT: PREVENTS OR INTERFERES SOME ACTIVE ACTIVITIES AND ADLS

## 2022-06-10 ASSESSMENT — PAIN DESCRIPTION - DESCRIPTORS: DESCRIPTORS: ACHING

## 2022-06-10 ASSESSMENT — PAIN DESCRIPTION - PAIN TYPE: TYPE: SURGICAL PAIN

## 2022-06-10 ASSESSMENT — PAIN DESCRIPTION - FREQUENCY: FREQUENCY: INTERMITTENT

## 2022-06-10 ASSESSMENT — PAIN DESCRIPTION - LOCATION: LOCATION: LEG

## 2022-06-10 ASSESSMENT — PAIN DESCRIPTION - ONSET: ONSET: ON-GOING

## 2022-06-10 ASSESSMENT — PAIN DESCRIPTION - ORIENTATION: ORIENTATION: RIGHT

## 2022-06-10 NOTE — PROGRESS NOTES
GENERAL SURGERY PROGRESS NOTE    Dick Fink is a 80 y.o. 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 Subjective: Today she feels better and has not had more reflux or vomiting. Pain is controlled. Mobilizing some. States she is eating well but states wants something beside liquid diet. Objective:    Vitals: VITALS:  BP (!) 140/56   Pulse 91   Temp 98.1 °F (36.7 °C) (Oral)   Resp 16   Ht 5' (1.524 m)   Wt 157 lb 14.4 oz (71.6 kg)   SpO2 97%   BMI 30.84 kg/m²     I/O: 06/09 0701 - 06/10 0700  In: 961 [P.O.:951; I.V.:10]  Out: -     Labs/Imaging Results:   Lab Results   Component Value Date     06/09/2022    K 4.1 06/09/2022    K 4.4 03/15/2018     06/09/2022    CO2 24 06/09/2022    BUN 17 06/09/2022    CREATININE 0.5 06/09/2022    GLUCOSE 131 06/09/2022    CALCIUM 7.9 06/09/2022      Lab Results   Component Value Date    WBC 6.5 06/08/2022    HGB 10.1 (L) 06/08/2022    HCT 31.8 (L) 06/08/2022    MCV 91.6 06/08/2022     06/08/2022         IV Fluids:   sodium chloride    dextrose    sodium chloride    Scheduled Meds: atorvastatin, 20 mg, Oral, Daily    cetirizine, 10 mg, Oral, Daily    escitalopram, 10 mg, Oral, Daily    levothyroxine, 137 mcg, Oral, Daily    lidocaine, 1 patch, TransDERmal, Daily    losartan, 100 mg, Oral, Daily    metoprolol tartrate, 150 mg, Oral, Daily    metoprolol tartrate, 100 mg, Oral, Nightly    oxybutynin, 5 mg, Oral, BID    pantoprazole, 40 mg, IntraVENous, BID    rOPINIRole, 3 mg, Oral, Nightly    sodium chloride flush, 5-40 mL, IntraVENous, 2 times per day    sucralfate, 1 g, Oral, 4 times per day    vitamin B-12, 100 mcg, Oral, Daily    Vitamin D, 1,000 Units, Oral, Daily    enoxaparin, 40 mg, SubCUTAneous, Daily    sodium chloride flush, 5-40 mL, IntraVENous, 2 times per day    insulin lispro, 0-6 Units, SubCUTAneous, Q4H    Physical Exam:  General: Patient is confused today, no distress. HEENT: Anicteric sclerae, MMM. Extremities: No edema bilat LE. Abdomen: Soft, appropriately tender, mildly distended. Incisions intact with staples. Multiple skin tears from tape. CLAUDIO drain in place, serous    Assessment and Plan:  80 y.o. female s/p robotic assisted right colectomy. Doing ok. Patient Active Problem List:     Long-term insulin use in type 2 diabetes ([facility] [address]     Essential hypertension     Dyslipidemia     Abnormal EKG     Abnormal stress test     Cecum mass    - Discussed findings and options with Sherryle Alken. - Intake is improving, continue to encourage eating. Will advance diet  - Nausea and reflux better. Continue to monitor. Will have them crush and dissolve carafate (suspension not available).  Increased PPI to BID   - out of bed, IS, ambulate as able    LAURE Puga - CNP

## 2022-06-10 NOTE — PLAN OF CARE
Problem: Discharge Planning  Goal: Discharge to home or other facility with appropriate resources  Outcome: Progressing  Flowsheets (Taken 6/9/2022 2334)  Discharge to home or other facility with appropriate resources: Identify barriers to discharge with patient and caregiver     Problem: Skin/Tissue Integrity  Goal: Absence of new skin breakdown  Description: 1. Monitor for areas of redness and/or skin breakdown  2. Assess vascular access sites hourly  3. Every 4-6 hours minimum:  Change oxygen saturation probe site  4. Every 4-6 hours:  If on nasal continuous positive airway pressure, respiratory therapy assess nares and determine need for appliance change or resting period.   Outcome: Progressing     Problem: Safety - Adult  Goal: Free from fall injury  Outcome: Progressing     Problem: ABCDS Injury Assessment  Goal: Absence of physical injury  Outcome: Progressing

## 2022-06-10 NOTE — PROGRESS NOTES
Comprehensive Nutrition Assessment    Type and Reason for Visit:  Initial,Consult (oral nutrition supplement)    Nutrition Recommendations/Plan:   1. Continue soft, bite sized diet as tolerates   2. Will continue to offer oral nutrition supplement with meals  3. Encourage consistent intake  4. Will continue to follow up during stay     Malnutrition Assessment:  Malnutrition Status:  Insufficient data (recent dx severe malnutrition) (06/10/22 3906)    Context:  Acute Illness       Nutrition Assessment:    Admit to rehab unit with hx terminal ileum adneocarcinoma s/p right hemicolectomy. Has been on full liquid diet with TPN, able to wean off PN and diet advanced today to soft diet. Patient anxious to have more solid foods. Receiving diabetic oral nutrition supplement with meals, drinking some of supplements. Will continue to follow at high nutrition risk at this time with hx poor intake and increased needs. Nutrition Related Findings:    resting in bed with visitors in room, hx DM, CKD, assisted with reordering meals with change to solid diet today Wound Type: Surgical Incision       Current Nutrition Intake & Therapies:    Average Meal Intake: 26-50%,51-75%  Average Supplements Intake: 26-50%  ADULT ORAL NUTRITION SUPPLEMENT; Breakfast, Lunch, Dinner; Diabetic Oral Supplement  ADULT DIET; Dysphagia - Soft and Bite Sized    Anthropometric Measures:  Height: 5' (152.4 cm)  Ideal Body Weight (IBW): 100 lbs (45 kg)    Admission Body Weight: 151 lb 10.8 oz (68.8 kg) (from acute stay)  Current Body Weight: 157 lb 13.6 oz (71.6 kg), 157.8 % IBW. Weight Source: Stated  Current BMI (kg/m2): 30.8  Usual Body Weight: 160 lb (72.6 kg) (per hx)  % Weight Change (Calculated): -1.3  Weight Adjustment For: No Adjustment                 BMI Categories: Obese Class 1 (BMI 30.0-34. 9)    Estimated Daily Nutrient Needs:  Energy Requirements Based On: Formula  Weight Used for Energy Requirements: Current  Energy (kcal/day): 7378-3346  Weight Used for Protein Requirements: Ideal  Protein (g/day): 54-67 (1.2-1.5 g/kg)  Method Used for Fluid Requirements: 1 ml/kcal  Fluid (ml/day): 1500    Nutrition Diagnosis:   · Inadequate oral intake related to altered GI function as evidenced by poor intake prior to Gu[place] Controls loss,other (comment) (severe malnutrition)      Nutrition Interventions:   Food and/or Nutrient Delivery: Continue Current Diet,Continue Oral Nutrition Supplement  Nutrition Education/Counseling: Education initiated  Coordination of Nutrition Care: Continue to monitor while inpatient  Plan of Care discussed with: patient- new diet order today able to try solid foods    Goals:     Goals: PO intake 50% or greater,by next RD assessment       Nutrition Monitoring and Evaluation:              Discharge Planning:    Continue Oral Nutrition Supplement     Jerica Morales, [address] [place] [address],   Contact: [phone]

## 2022-06-10 NOTE — CONSULTS
[facility] Continence Nurse  Consult Note       Rhonda Jewell  AGE: 80 y.o. GENDER: female  : 1934  TODAY'S DATE:  6/10/2022    Subjective:     Reason for CWOCN Evaluation and Assessment: skin assessment      Rhonda Jewell is a 80 y.o. female referred by:   [x] Physician  [] Nursing  [] Other:     Wound Identification:  Wound Type: no wounds  Contributing Factors: diabetes, chronic pressure, decreased mobility, malnutrition, incontinence of stool and incontinence of urine        PAST MEDICAL HISTORY        Diagnosis Date    Diabetes mellitus ([address]     H/O cardiac catheterization 2021    no significant CAD in main vessels, has severe stenosis in small  branch diagonal vessel    H/O cardiovascular stress test 3/22/18,2017    ECG portion of the stress test is negative for ischemia EF >70% Normal stress myocardial perfusion.  H/O cardiovascular stress test 2021    abnormal stress    H/O Doppler ultrasound (Abdomimal Aorta) 2017    No evidence of abdominal aortic aneurysm.  H/O echocardiogram 2014    Normal left ventricular size, wall motion and systolic function. Mildle elevated pulmonary artery systolic pressure. Mild MR and TR and trace AR EF 55 to 60%    Hx of echocardiogram 2017    EF 55-60%. Grade I diastolic dysfunction. Mildly dilated left atrium. No evidence of pericardial effusion.      Hyperlipidemia     Hypertension     Sleep apnea     Thyroid disease        PAST SURGICAL HISTORY    Past Surgical History:   Procedure Laterality Date    BREAST BIOPSY Right     approx age 76, benign    CATARACT REMOVAL      CHOLECYSTECTOMY      COLONOSCOPY N/A 2022    COLONOSCOPY POLYPECTOMY SNARE/COLD BIOPSY performed by Yumiko Ontiveros MD at [facility] N/A 2022    BOWEL RESECTION RIGHT HEMICOLECTOMY LAPAROSCOPIC ROBOTIC XI performed by Irene Daley MD at [facility] [place] [place]      eye lift       FAMILY HISTORY    Family History   Problem Relation Age of Onset   Grace Ortega Pacemaker Sister     Arrhythmia Sister     Breast Cancer Sister         pre menopausal    Ovarian Cancer Neg Hx        SOCIAL HISTORY    Social History     Tobacco Use    Smoking status: Never Smoker    Smokeless tobacco: Never Used   Vaping Use    Vaping Use: Never used   Substance Use Topics    Alcohol use: Not Currently     Alcohol/week: 1.0 standard drink     Types: 1 Glasses of wine per week     Comment: rarely    Drug use: No       ALLERGIES    Allergies   Allergen Reactions    Lopid [Gemfibrozil] Shortness Of Breath and Other (See Comments)     Cough    Bystolic [Nebivolol Hcl]     Cefdinir     Coreg [Carvedilol]     Crestor [Rosuvastatin]     Fenofibrate     Glucophage [Metformin]     Hydrochlorothiazide Other (See Comments)    Metronidazole     Norvasc [Amlodipine Besylate]     Tribenzor [Olmesartan-Amlodipine-Hctz]      Pt states that her chest felt funny and achy when she took the medication    Zocor [Simvastatin]        MEDICATIONS    No current facility-administered medications on file prior to encounter.      Current Outpatient Medications on File Prior to Encounter   Medication Sig Dispense Refill    promethazine (PHENERGAN) 25 MG tablet Take 25 mg by mouth every 6 hours as needed for Nausea      loperamide (IMODIUM A-D) 2 MG tablet Take 2 mg by mouth as needed for Diarrhea      melatonin 3 MG TABS tablet Take 3 mg by mouth nightly as needed      levocetirizine (XYZAL) 5 MG tablet 1 tablet daily      magnesium oxide (MAG-OX) 400 (241.3 Mg) MG TABS tablet daily      albuterol sulfate  (90 Base) MCG/ACT inhaler as needed      vitamin B-12 (CYANOCOBALAMIN) 100 MCG tablet Take 100 mcg by mouth daily      LANTUS SOLOSTAR 100 UNIT/ML injection pen       Calcium-Magnesium-Vitamin D (CALCIUM 1200+D3 PO) Take by mouth daily      Cholecalciferol (VITAMIN D PO) Take by mouth      oxybutynin (DITROPAN) 5 MG tablet Take 5 mg by mouth 2 times daily      atorvastatin (LIPITOR) 20 MG tablet Take 20 mg by mouth daily      rOPINIRole (REQUIP) 3 MG tablet Take 3 mg by mouth nightly      levothyroxine (SYNTHROID) 137 MCG tablet Take 137 mcg by mouth daily   3    Insulin Pen Needle (PEN NEEDLES 31GX5/16\") 31G X 8 MM MISC       losartan (COZAAR) 100 MG tablet Take 100 mg by mouth daily      metoprolol (LOPRESSOR) 50 MG tablet 3 pills in the AM : 150 mg dose  2 pills in the PM: 100 mg dose           Objective:      BP (!) 140/56   Pulse 91   Temp 98.1 °F (36.7 °C) (Oral)   Resp 16   Ht 5' (1.524 m)   Wt 157 lb 14.4 oz (71.6 kg)   SpO2 97%   BMI 30.84 kg/m²   Ravi Risk Score: Ravi Scale Score: 17    LABS    CBC:   Lab Results   Component Value Date    WBC 6.5 06/08/2022    RBC 3.47 06/08/2022    HGB 10.1 06/08/2022    HCT 31.8 06/08/2022    MCV 91.6 06/08/2022    MCH 29.1 06/08/2022    MCHC 31.8 06/08/2022    RDW 13.4 06/08/2022     06/08/2022    MPV 11.1 06/08/2022     CMP:    Lab Results   Component Value Date     06/09/2022    K 4.1 06/09/2022    K 4.4 03/15/2018     06/09/2022    CO2 24 06/09/2022    BUN 17 06/09/2022    CREATININE 0.5 06/09/2022    GFRAA >60 06/09/2022    LABGLOM >60 06/09/2022    GLUCOSE 131 06/09/2022    PROT 5.5 06/08/2022    LABALBU 2.8 06/08/2022    CALCIUM 7.9 06/09/2022    BILITOT 0.2 06/08/2022    ALKPHOS 50 06/08/2022    AST 14 06/08/2022    ALT 11 06/08/2022     Albumin:    Lab Results   Component Value Date    LABALBU 2.8 06/08/2022     PT/INR:    Lab Results   Component Value Date    PROTIME 12.4 05/14/2021    INR 1.09 05/14/2021     HgBA1c:    Lab Results   Component Value Date    LABA1C 5.9 05/22/2022         Assessment:     Patient Active Problem List   Diagnosis    Long-term insulin use in type 2 diabetes ([facility]    Essential hypertension    Dyslipidemia    Abnormal EKG    Abnormal stress test    Cecum mass    Severe malnutrition ([address]    Partial small bowel obstruction (HCC)    Adenocarcinoma (HCC)       Measurements:       Response to treatment:  Well tolerated by patient. Pain Assessment:  Severity:  none  Quality of pain: na  Wound Pain Timing/Severity: na  Premedicated: no    Plan:     Plan of Care:       Pt in bed. On Skin Guard mattress. Agreeable to skin assessment. Heels soft with blanchable erythema. Recommend to keep floated. Incontinent of stool. Has [place]. Danica care done. No wounds noted to buttock. Coccyx with blanchable erythema. Moisture barrier cream recommended and applied. Positioned to right side with heels floated with pillow support. Nurse in room. Pt is a mild risk for skin breakdown AEB Ravi. Follow Ravi orders. Re consult wound care if further needs arise. Specialty Bed Required : yes  [x] Low Air Loss   [x] Pressure Redistribution  [] Fluid Immersion  [] Bariatric  [] Total Pressure Relief  [] Other:     Discharge Plan:  Placement for patient upon discharge: tbd  Hospice Care: no  Patient appropriate for Outpatient [address] [facility] [address]: no    Patient/Caregiver Teaching:  Level of patient/caregiver understanding able to:   Voiced understanding.         Electronically signed by Marjan Howard RN, CWOCN on 6/10/2022 at 11:19 AM

## 2022-06-10 NOTE — PROGRESS NOTES
Physical Therapy  . [facility] ARU PHYSICAL THERAPY EVALUATION    Chart Review:  Past Medical History:   Diagnosis Date    Diabetes mellitus ([address]     H/O cardiac catheterization 05/18/2021    no significant CAD in main vessels, has severe stenosis in small  branch diagonal vessel    H/O cardiovascular stress test 3/22/18,03/30/2017    ECG portion of the stress test is negative for ischemia EF >70% Normal stress myocardial perfusion.  H/O cardiovascular stress test 04/07/2021    abnormal stress    H/O Doppler ultrasound (Abdomimal Aorta) 03/29/2017    No evidence of abdominal aortic aneurysm.  H/O echocardiogram 07/02/2014    Normal left ventricular size, wall motion and systolic function. Mildle elevated pulmonary artery systolic pressure. Mild MR and TR and trace AR EF 55 to 60%    Hx of echocardiogram 03/29/2017    EF 55-60%. Grade I diastolic dysfunction. Mildly dilated left atrium. No evidence of pericardial effusion.      Hyperlipidemia     Hypertension     Sleep apnea     Thyroid disease      Past Surgical History:   Procedure Laterality Date    BREAST BIOPSY Right     approx age 76, benign    CATARACT REMOVAL      CHOLECYSTECTOMY      COLONOSCOPY N/A 5/25/2022    COLONOSCOPY POLYPECTOMY SNARE/COLD BIOPSY performed by Marguerite Gabriel MD at [facility] N/A 5/27/2022    BOWEL RESECTION RIGHT HEMICOLECTOMY LAPAROSCOPIC ROBOTIC XI performed by Melanie Leigh MD at [address] [place] [address] [address]      eye lift     Fall History: unclear, Pt reports to therapy 1, PASS states positive for  \"two or more\"  Social History:  Social/Functional History  Lives With: Spouse  Type of Home: House  Home Layout: One level  Home Access: Stairs to enter with rails  Entrance Stairs - Number of Steps: 2 to porch, 1 into house  Entrance Stairs - Rails: Both  Bathroom Shower/Tub: Walk-in shower  Bathroom Toilet: Standard  Bathroom Equipment: Built-in shower seat,Grab bars in shower,3-in-1 commode  Bathroom Accessibility: Accessible  Home Equipment: Reacher  Has the patient had two or more falls in the past year or any fall with injury in the past year?: No (Pt has had one fall in the last year without significant injury.)  Receives Help From: Family (2 daughters in the area who assist prn.)  ADL Assistance: Independent  Homemaking Assistance: Independent  Homemaking Responsibilities: Yes  Meal Prep Responsibility: Primary  Laundry Responsibility: Primary (Shares responsibility with .)  Cleaning Responsibility: Primary (Shares responsibility with .)  Bill Paying/Finance Responsibility: Primary  Shopping Responsibility: Primary  Health Care Management: Primary (Pt uses a pillbox at baseline.)  Ambulation Assistance: Independent  Transfer Assistance: Independent  Active : Yes  Mode of Transportation: [employer]  Occupation: Retired  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 Additional Comments: Pt has regular flat bed at home that she reports is high. Pt has been sleeping in recliner chair recently d/t discomfort in bed. Restrictions:  Restrictions/Precautions  Restrictions/Precautions: Fall Risk,General Precautions            Pain Level: 0       Objective:  Orientation  Overall Orientation Status: Within Normal Limits  Orientation Level: Oriented X4        Vision  Vision Exceptions: Wears glasses at all times  Hearing  Hearing: Within functional limits    Sensation:  Sensation  Overall Sensation Status: Impaired (Pt with numbness to B hands, previous CT release in R hand and current CTS in L hand.)    Observation:   Observation/Palpation:Pt in recliner upon entering, no pain, agreeable to evaluation. Pt did at one time in eval exhibit a pill rolling behavior and then \"handed\" therapist nothing, stating it was a \" bottle or something\".   Observation:Scar: Pt has uncovered incision sites to abdomen with alysia present. , laura drain L side    ROM:   PROM RLE (degrees)  RLE General PROM: DF to neutral     PROM LLE (degrees)  LLE General PROM: DF to neutral                    Strength:    Strength RLE  Comment: hip 4/5, knee 4+/5, DF 5/5 in available ROM  Strength LLE  Comment: hip 4-/5, knee 4+/5, DF 4+/5 in available ROM              Bed Mobility:   Lying to Sitting on Side of Bed  Assistance Needed: Partial/moderate assistance  Comment: mod assist with LEs  CARE Score: 3  Discharge Goal: Independent  Roll Left and Right  Assistance Needed: Partial/moderate assistance  Comment: min assist  CARE Score: 3  Discharge Goal: Independent  Sit to Lying  Assistance Needed: Partial/moderate assistance  Comment: min assist  CARE Score: 3  Discharge Goal: Independent    Transfers:    Sit to Stand  Assistance Needed: Substantial/maximal assistance  Comment: max assist to 2ww  CARE Score: 2  Discharge Goal: Independent  Chair/Bed-to-Chair Transfer  Assistance Needed: Partial/moderate assistance  Comment: mod assist sit pivot  CARE Score: 3  Discharge Goal: Independent     Car Transfer  Assistance Needed: Partial/moderate assistance  Comment: min assist for LEs  CARE Score: 3  Discharge Goal: Independent    Ambulation:   Device used PTA: none   Walking Ability  Does the Patient Walk?: Yes     Walk 10 Feet  Assistance Needed: Partial/moderate assistance  Comment: min assist with 2ww, leans to R with variable step length and width, discontinuous steps  CARE Score: 3  Discharge Goal: Independent     Walk 50 Feet with Two Turns  Comment: 42' max distance, min assist increasing to mod with increased lateral sway and path deviation  Reason if not Attempted: Not attempted due to medical condition or safety concerns  CARE Score: 88  Discharge Goal: Independent     Walk 150 Feet  Reason if not Attempted: Not attempted due to medical condition or safety concerns  CARE Score: 88  Discharge Goal: Supervision or touching assistance     Walking 10 Feet on Uneven Surfaces  Assistance Needed: Partial/moderate assistance  Comment: mod assist with 2ww, LOb to R x 3  CARE Score: 3  Discharge Goal: Supervision or touching assistance     1 Step (Curb)  Assistance Needed: Partial/moderate assistance  Comment: min assist with 100% cues  CARE Score: 3  Discharge Goal: Supervision or touching assistance     4 Steps  Comment: pt too fatigued to complete, may be able to complete next session  Discharge Goal: Supervision or touching assistance     12 Steps  Reason if not Attempted: Not attempted due to medical condition or safety concerns  CARE Score: 88  Discharge Goal: Supervision or touching assistance    Gait Deviations: []None [x]variable amisha  [] Increased VARUN  [] Staggers [x]Deviated Path  [x] variable step length  [x] Decreased step height  []Decreased arm swing  [] Shuffles  [] Decreased head and trunk rotation  [x]other: leans to R signiicantly       Wheelchair:  w/c Ability: Wheelchair Ability  Uses a Wheelchair and/or Scooter?: No                Balance:        Object: Picking Up Object  Assistance Needed: Partial/moderate assistance  Comment: min assist with 2ww and reacher  CARE Score: 3  Discharge Goal: Independent               Assessment:   The patient is a 80year old female admitted onto ARU after hospitalization 5/22 for abdominal pain. Pt found to have cecal mass and on 5/27 underwent R hemicolectomy with CLAUDIO drain still present. Pt started on TPN 5/31 but now on full liquid diet. 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 Pt has option for chemo, but this would begin after rehab stay. PTA pt was fully independent without device. Pt presents this date with LE weakness, overall decreased activity tolerance and significant lean to R that family states was not present before, causing need for min to max assist and inconsistent performance this date.  Pt requires need for 2ww for gait, which is new device for the pt. Pt did also demonstrate some odd behaviors with pill rolling and handing therapist nothing while thinking there was something in her hands. Pt needed redirection to task and re-explanation at times. Feel pt will benefit from ARU-PT to address deficits to return home with spouse, but feel extra outside assist will be needed if cancer progresses or pt decides to undergo chemotherapy.                            Patient education:   ARU schedule, ARU expectations for participation, plan of care,   Treatment Initiated:  Functional mobility training, gait training, patient education, balance  Barriers to Improvement:  Cognitive issues, fatigue  Discharge Recommendations:  Home with increased assist with IADL  Equipment Recommendations:  2ww    Goals:  Patient Goals   Patient goals : Pt desires to return home     Long Term Goals  Time Frame for Long term goals : 7-10 days STG=LTG  Long term goal 1: Pt will perform bed mobility with mod I  Long term goal 2: pt will perform sit to stand, pivot and car transfers with mod I  Long term goal 3: pt will perform ambulation with 2ww 50' on levels with mod I, 150' on levels and 10' on unlevels with supervision  Long term goal 4: Pt will ascend/descend curb step and up to 12 steps with rail with supervision  Long term goal 5: Pt will retrieve light item with reacher and 2ww with mod I  Plan:    Requires PT Follow-Up: Yes  Pt will be seen at least 60 minutes per day for a minimum of 5 days per week, plus group therapy as appropriate       PT Individual Minutes  Time In: 1300  Time Out: 1419  Minutes: 79             OT reported +11 min     PT Evaluation 15   Gait Training 30   Therapeutic Exercise    Neuro Re-Ed 15   Therapeutic Activity 30   Wheelchair Propulsion    Group    Other:    TOTAL 79       Electronically signed by:    Thee Meyers PT, PT   6/10/2022, @NOW

## 2022-06-10 NOTE — PROGRESS NOTES
Progress Note( Dr. Lalito Deluna)  6/10/2022  Subjective:   Admit Date: 6/9/2022  PCP: Cele Aj DO    Admitted For : Admitted on 5/22/2022 for cecal mass  Transferred to ARU on evening of 6/9/2022    Consulted For: Had hypoglycemic episode/better control of blood glucose    Interval History: Patient doing a lot better. Days  Started on almost regular diet as she is tolerating the food  well for last couple of days  Before she was transferred to ARU discontinue TPN    Denies any chest pains,   Denies SOB . Patient has no more nausea or vomiting   diet was advanced to regular diet as tolerated  no new bowel or bladder symptoms.        Intake/Output Summary (Last 24 hours) at 6/10/2022 1800  Last data filed at 6/10/2022 0845  Gross per 24 hour   Intake 961 ml   Output 60 ml   Net 901 ml       DATA    CBC:   Recent Labs     06/08/22  0625   WBC 6.5   HGB 10.1*       CMP:  Recent Labs     06/08/22  0625 06/09/22  0655   * 131*   K 4.5 4.1    100   CO2 20* 24   BUN 21 17   CREATININE 0.6 0.5*   CALCIUM 8.4 7.9*   PROT 5.5*  --    LABALBU 2.8*  --    BILITOT 0.2  --    ALKPHOS 50  --    AST 14*  --    ALT 11  --      Lipids:   Lab Results   Component Value Date    CHOL 138 05/02/2019    HDL 54 05/02/2019    TRIG 161 06/01/2022     Glucose:  Recent Labs     06/10/22  0728 06/10/22  1119 06/10/22  1645   POCGLU 114* 138* 120*     [id number]:  Lab Results   Component Value Date    LABA1C 5.9 05/22/2022     High Sensitivity TSH:   Lab Results   Component Value Date    TSHHS 2.010 06/06/2022     Free T3:   Lab Results   Component Value Date    FT3 2.3 12/06/2017     Free T4:  Lab Results   Component Value Date    T4FREE 1.15 12/06/2017       No orders to display        Scheduled Medicines   Medications:    atorvastatin  20 mg Oral Daily    cetirizine  10 mg Oral Daily    escitalopram  10 mg Oral Daily    levothyroxine  137 mcg Oral Daily    lidocaine  1 patch TransDERmal Daily    losartan 100 mg Oral Daily    metoprolol tartrate  150 mg Oral Daily    metoprolol tartrate  100 mg Oral Nightly    oxybutynin  5 mg Oral BID    pantoprazole  40 mg IntraVENous BID    rOPINIRole  3 mg Oral Nightly    sodium chloride flush  5-40 mL IntraVENous 2 times per day    sucralfate  1 g Oral 4 times per day    vitamin B-12  100 mcg Oral Daily    Vitamin D  1,000 Units Oral Daily    enoxaparin  40 mg SubCUTAneous Daily    sodium chloride flush  5-40 mL IntraVENous 2 times per day    insulin lispro  0-6 Units SubCUTAneous Q4H      Infusions:    sodium chloride      dextrose      sodium chloride           Objective:   Vitals: BP (!) 134/55   Pulse 86   Temp 97.7 °F (36.5 °C) (Oral)   Resp 15   Ht 5' (1.524 m)   Wt 157 lb 14.4 oz (71.6 kg)   SpO2 98%   BMI 30.84 kg/m²   General appearance: alert and cooperative with exam  Neck: no JVD or bruit  Thyroid : Normal lobes   Lungs: Has Vesicular Breath sounds   Heart:  regular rate and rhythm  Abdomen: soft, yes -tender; bowel sounds normal; no masses,  no organomegaly  Had right-sided hemicolectomy 5/27/2022  Musculoskeletal: Normal  Extremities: extremities normal, , no edema  Neurologic:  Awake, alert, oriented to name, place and time. Cranial nerves II-XII are grossly intact. Motor is  intact. Sensory,  and gait is normal.    Assessment:     Patient Active Problem List:     Long-term insulin use in type 2 diabetes [facility])     Essential hypertension     Dyslipidemia     Abnormal EKG     Abnormal stress test     Cecum mass     Severe malnutrition (HCC)     Partial small bowel obstruction ([address]     Had hemicolectomy on 5/27/2022      INVASIVE ADENOCARCINOMA, TERMINAL ILEUM      Plan:     1. Reviewed POC blood glucose . Labs and X ray results   2. Reviewed Current Medicines   3. On  Correction bolus Humalog Insulin regime   4. Monitor Blood glucose frequently   5. Modified  the dose of Insulin/ other medicines as needed   6.  Going for xray of Lumbar spine 7. Will follow     .      Lanny Chung MD, MD

## 2022-06-11 LAB
AMMONIA: 13 UMOL/L (ref 11–51)
ANION GAP SERPL CALCULATED.3IONS-SCNC: 6 MMOL/L (ref 4–16)
ANION GAP SERPL CALCULATED.3IONS-SCNC: 6 MMOL/L (ref 4–16)
BACTERIA: NEGATIVE /HPF
BASE EXCESS MIXED: 3.7 (ref 0–2.3)
BILIRUBIN URINE: NEGATIVE MG/DL
BLOOD, URINE: NEGATIVE
BUN BLDV-MCNC: 13 MG/DL (ref 6–23)
CALCIUM SERPL-MCNC: 8.8 MG/DL (ref 8.3–10.6)
CARBON MONOXIDE, BLOOD: 2.3 % (ref 0–5)
CHLORIDE BLD-SCNC: 91 MMOL/L (ref 99–110)
CHLORIDE BLD-SCNC: 91 MMOL/L (ref 99–110)
CHLORIDE URINE RANDOM: 54 MMOL/L (ref 43–210)
CLARITY: CLEAR
CO2 CONTENT: 29.8 MMOL/L (ref 19–24)
CO2: 27 MMOL/L (ref 21–32)
CO2: 27 MMOL/L (ref 21–32)
COLOR: YELLOW
COMMENT: ABNORMAL
CREAT SERPL-MCNC: 0.8 MG/DL (ref 0.6–1.1)
GFR AFRICAN AMERICAN: >60 ML/MIN/1.73M2
GFR NON-AFRICAN AMERICAN: >60 ML/MIN/1.73M2
GLUCOSE BLD-MCNC: 120 MG/DL (ref 70–99)
GLUCOSE BLD-MCNC: 122 MG/DL (ref 70–99)
GLUCOSE BLD-MCNC: 122 MG/DL (ref 70–99)
GLUCOSE BLD-MCNC: 135 MG/DL (ref 70–99)
GLUCOSE BLD-MCNC: 143 MG/DL (ref 70–99)
GLUCOSE, URINE: NEGATIVE MG/DL
HCO3 ARTERIAL: 28.5 MMOL/L (ref 18–23)
HCT VFR BLD CALC: 28.5 % (ref 37–47)
HEMOGLOBIN: 9.5 GM/DL (ref 12.5–16)
HYALINE CASTS: 0 /LPF
KETONES, URINE: NEGATIVE MG/DL
LEUKOCYTE ESTERASE, URINE: ABNORMAL
MCH RBC QN AUTO: 28.8 PG (ref 27–31)
MCHC RBC AUTO-ENTMCNC: 33.3 % (ref 32–36)
MCV RBC AUTO: 86.4 FL (ref 78–100)
METHEMOGLOBIN ARTERIAL: 1.4 %
MUCUS: ABNORMAL HPF
NITRITE URINE, QUANTITATIVE: NEGATIVE
O2 SATURATION: 88.8 % (ref 96–97)
PCO2 ARTERIAL: 43 MMHG (ref 32–45)
PDW BLD-RTO: 13.1 % (ref 11.7–14.9)
PH BLOOD: 7.43 (ref 7.34–7.45)
PH, URINE: 6.5 (ref 5–8)
PLATELET # BLD: 238 K/CU MM (ref 140–440)
PMV BLD AUTO: 10.5 FL (ref 7.5–11.1)
PO2 ARTERIAL: 58 MMHG (ref 75–100)
POTASSIUM SERPL-SCNC: 4.6 MMOL/L (ref 3.5–5.1)
POTASSIUM SERPL-SCNC: 4.7 MMOL/L (ref 3.5–5.1)
POTASSIUM, UR: 25.8 MMOL/L (ref 22–119)
PROTEIN UA: NEGATIVE MG/DL
RBC # BLD: 3.3 M/CU MM (ref 4.2–5.4)
RBC URINE: ABNORMAL /HPF (ref 0–6)
SODIUM BLD-SCNC: 124 MMOL/L (ref 135–145)
SODIUM BLD-SCNC: 124 MMOL/L (ref 135–145)
SODIUM URINE: 51 MMOL/L (ref 35–167)
SPECIFIC GRAVITY UA: 1.01 (ref 1–1.03)
TRICHOMONAS: ABNORMAL /HPF
UROBILINOGEN, URINE: 0.2 MG/DL (ref 0.2–1)
WBC # BLD: 12 K/CU MM (ref 4–10.5)
WBC UA: 7 /HPF (ref 0–5)

## 2022-06-11 PROCEDURE — 2580000003 HC RX 258: Performed by: STUDENT IN AN ORGANIZED HEALTH CARE EDUCATION/TRAINING PROGRAM

## 2022-06-11 PROCEDURE — 83935 ASSAY OF URINE OSMOLALITY: CPT

## 2022-06-11 PROCEDURE — 82803 BLOOD GASES ANY COMBINATION: CPT

## 2022-06-11 PROCEDURE — 6370000000 HC RX 637 (ALT 250 FOR IP): Performed by: INTERNAL MEDICINE

## 2022-06-11 PROCEDURE — 6370000000 HC RX 637 (ALT 250 FOR IP): Performed by: STUDENT IN AN ORGANIZED HEALTH CARE EDUCATION/TRAINING PROGRAM

## 2022-06-11 PROCEDURE — 82962 GLUCOSE BLOOD TEST: CPT

## 2022-06-11 PROCEDURE — 94761 N-INVAS EAR/PLS OXIMETRY MLT: CPT

## 2022-06-11 PROCEDURE — 97530 THERAPEUTIC ACTIVITIES: CPT

## 2022-06-11 PROCEDURE — 82436 ASSAY OF URINE CHLORIDE: CPT

## 2022-06-11 PROCEDURE — 97116 GAIT TRAINING THERAPY: CPT

## 2022-06-11 PROCEDURE — 1280000000 HC REHAB R&B

## 2022-06-11 PROCEDURE — 36600 WITHDRAWAL OF ARTERIAL BLOOD: CPT

## 2022-06-11 PROCEDURE — 6360000002 HC RX W HCPCS: Performed by: STUDENT IN AN ORGANIZED HEALTH CARE EDUCATION/TRAINING PROGRAM

## 2022-06-11 PROCEDURE — 51798 US URINE CAPACITY MEASURE: CPT

## 2022-06-11 PROCEDURE — 81001 URINALYSIS AUTO W/SCOPE: CPT

## 2022-06-11 PROCEDURE — 87040 BLOOD CULTURE FOR BACTERIA: CPT

## 2022-06-11 PROCEDURE — 84300 ASSAY OF URINE SODIUM: CPT

## 2022-06-11 PROCEDURE — C9113 INJ PANTOPRAZOLE SODIUM, VIA: HCPCS | Performed by: STUDENT IN AN ORGANIZED HEALTH CARE EDUCATION/TRAINING PROGRAM

## 2022-06-11 PROCEDURE — 2580000003 HC RX 258: Performed by: PHYSICAL MEDICINE & REHABILITATION

## 2022-06-11 PROCEDURE — 80048 BASIC METABOLIC PNL TOTAL CA: CPT

## 2022-06-11 PROCEDURE — 94150 VITAL CAPACITY TEST: CPT

## 2022-06-11 PROCEDURE — 85027 COMPLETE CBC AUTOMATED: CPT

## 2022-06-11 PROCEDURE — 2580000003 HC RX 258: Performed by: HOSPITALIST

## 2022-06-11 PROCEDURE — 97542 WHEELCHAIR MNGMENT TRAINING: CPT

## 2022-06-11 PROCEDURE — 97535 SELF CARE MNGMENT TRAINING: CPT

## 2022-06-11 PROCEDURE — 80051 ELECTROLYTE PANEL: CPT

## 2022-06-11 PROCEDURE — 84133 ASSAY OF URINE POTASSIUM: CPT

## 2022-06-11 PROCEDURE — 82140 ASSAY OF AMMONIA: CPT

## 2022-06-11 PROCEDURE — 97110 THERAPEUTIC EXERCISES: CPT

## 2022-06-11 PROCEDURE — 36415 COLL VENOUS BLD VENIPUNCTURE: CPT

## 2022-06-11 RX ORDER — SODIUM CHLORIDE 1000 MG
1 TABLET, SOLUBLE MISCELLANEOUS
Status: DISCONTINUED | OUTPATIENT
Start: 2022-06-11 | End: 2022-06-12

## 2022-06-11 RX ORDER — SODIUM CHLORIDE 9 MG/ML
INJECTION, SOLUTION INTRAVENOUS CONTINUOUS
Status: DISCONTINUED | OUTPATIENT
Start: 2022-06-11 | End: 2022-06-13

## 2022-06-11 RX ORDER — 0.9 % SODIUM CHLORIDE 0.9 %
500 INTRAVENOUS SOLUTION INTRAVENOUS ONCE
Status: COMPLETED | OUTPATIENT
Start: 2022-06-11 | End: 2022-06-11

## 2022-06-11 RX ADMIN — SODIUM CHLORIDE: 9 INJECTION, SOLUTION INTRAVENOUS at 21:48

## 2022-06-11 RX ADMIN — OXYBUTYNIN CHLORIDE 5 MG: 5 TABLET ORAL at 20:09

## 2022-06-11 RX ADMIN — SODIUM CHLORIDE, PRESERVATIVE FREE 10 ML: 5 INJECTION INTRAVENOUS at 20:22

## 2022-06-11 RX ADMIN — ESCITALOPRAM OXALATE 10 MG: 10 TABLET ORAL at 10:25

## 2022-06-11 RX ADMIN — ATORVASTATIN CALCIUM 20 MG: 10 TABLET, FILM COATED ORAL at 10:26

## 2022-06-11 RX ADMIN — SUCRALFATE 1 G: 1 TABLET ORAL at 06:12

## 2022-06-11 RX ADMIN — SODIUM CHLORIDE, PRESERVATIVE FREE 10 ML: 5 INJECTION INTRAVENOUS at 10:27

## 2022-06-11 RX ADMIN — SODIUM CHLORIDE, PRESERVATIVE FREE 10 ML: 5 INJECTION INTRAVENOUS at 10:28

## 2022-06-11 RX ADMIN — LEVOTHYROXINE SODIUM 137 MCG: 0.11 TABLET ORAL at 06:12

## 2022-06-11 RX ADMIN — SODIUM CHLORIDE, PRESERVATIVE FREE 10 ML: 5 INJECTION INTRAVENOUS at 20:17

## 2022-06-11 RX ADMIN — SODIUM CHLORIDE: 9 INJECTION, SOLUTION INTRAVENOUS at 17:11

## 2022-06-11 RX ADMIN — PANTOPRAZOLE SODIUM 40 MG: 40 INJECTION, POWDER, FOR SOLUTION INTRAVENOUS at 10:26

## 2022-06-11 RX ADMIN — Medication 1000 UNITS: at 10:26

## 2022-06-11 RX ADMIN — Medication 100 MCG: at 10:29

## 2022-06-11 RX ADMIN — ENOXAPARIN SODIUM 40 MG: 100 INJECTION SUBCUTANEOUS at 10:26

## 2022-06-11 RX ADMIN — METOPROLOL TARTRATE 100 MG: 50 TABLET, FILM COATED ORAL at 20:09

## 2022-06-11 RX ADMIN — OXYBUTYNIN CHLORIDE 5 MG: 5 TABLET ORAL at 10:26

## 2022-06-11 RX ADMIN — SUCRALFATE 1 G: 1 TABLET ORAL at 23:51

## 2022-06-11 RX ADMIN — SODIUM CHLORIDE 500 ML: 9 INJECTION, SOLUTION INTRAVENOUS at 12:58

## 2022-06-11 RX ADMIN — PANTOPRAZOLE SODIUM 40 MG: 40 INJECTION, POWDER, FOR SOLUTION INTRAVENOUS at 20:09

## 2022-06-11 RX ADMIN — SODIUM CHLORIDE 1 G: 1 TABLET ORAL at 20:09

## 2022-06-11 RX ADMIN — SUCRALFATE 1 G: 1 TABLET ORAL at 01:07

## 2022-06-11 ASSESSMENT — PAIN SCALES - GENERAL: PAINLEVEL_OUTOF10: 0

## 2022-06-11 NOTE — PROGRESS NOTES
Patient has diminished LOC. She responds to pain and will open her eyes briefly and push your hand away with sternal rub. When I held an ammonia capsule under her nose, she grimaced and turned her head away repeatedly. Dr Caterina Marshall updated telephonicly with labs including WBC and Na. Consult sent to Dr Dante Villa for hyponatremia. Perfect serve sent to Dr Guamro Castro, who is on call for Dr Ignacio Spann, who called and agrees her Na needs corrected. Dr Dante Villa updated via perfect serve. Seizure precautions initiated.

## 2022-06-11 NOTE — PROGRESS NOTES
Physical Therapy      [x] daily progress note       [] discharge       Patient Name:  Britney Wood   :  1934 MRN: [id number]  Room:  [address] [place], [place] [address] Date of Admission: 2022  Rehabilitation Diagnosis:   Other specified diseases of intestine [K63.89]  Adenocarcinoma ([facility] [address] [C80.1]       Date 2022       Day of ARU Week:  3   Time IN/OUT 8215-8902  [id number]   Individual Tx Minutes 85+35   TOTAL Tx Time Mins 120   Variance Time    Variance Time []   Refusal due to:     []   Medical hold/reason:    []   Illness   []   Off Unit for test/procedure  []   Extra time needed to complete task  []   Therapeutic need  []   Other (specify):   Restrictions Restrictions/Precautions  Restrictions/Precautions: Fall Risk,General Precautions      Communication with other providers: [x]   OK to see per nursing:     []   Spoke with team member regarding:      Subjective observations and cognitive status: Pt up in recliner with nsg in room, pt willing to participate. Pt groggy throughout session req prompting to stay awake. VS at rest: O2 93, HR 97, BP 86/52 ( taken during session) pt denies lightheadedness but continued to be lethargic; Assisted pt back into recliner at end of session. PM: Dgtr standing at pt's door, reporting pt sliding out of recliner, Pt groggy but responsive to therapist. A pt in scooting back into chair, then transferred A x 2 for safety back to bed. Pt then incontinent with urine. A pt with removing clothing, performing hygiene, and changing into hospital gown. Time spent with transfer, hygiene from incontinency, and positioning. Dgtr and pt's spouse voice concern, stating \" this is not her, she is not her normal\". Sariah Avendaño RN aware of issue, reports labs have been ordered.      Pain level/location: 0/10       Location:    Discharge recommendations  Anticipated discharge date:  TBD  Destination: []home alone   []home with assist PRN     [] home with continuous supervision     []SNF      [] Assisted living     [] Other:   Continued therapy: [][facility] PT  []OUTPATIENT  PT   [] No Further PT  Equipment needs: TBD     Bed Mobility:           [x]   Pt received out of bed   Rolling R/L:  SBA-min A with rail  Scooting:  Dep x 2 supine scoot to HOB  Sit --> Supine: Mod A LEs  Bed features used: [] Yes    [] HOB elevated      [] Bed rail                                    [] No     Transfers:    Sit--> Stand:  Min A from recliner, Mod A from WC, req cues to scoot to [address] [place] [address] and COG fwd. Stand --> Sit:   Min-mod A for controlled descent  Stand-Pivot:   Min A with RW, extra time and cues  PM: Mod A x 2 SPT  Recliner to bed without AD  Assistive device required for transfer:   RW    Gait:    Distance:  7'+54'+50'   Assistance:  Min A  Device:  RW  Gait Quality:   slow recip pattern, tendency to veer to L req mod cues for obstacle clearance/correction    Wheelchair Propulsion:  Distance:   17'+22'   Assistance:   SBA, req cues for WC brakes, hand placement on WC, continuation of ax  Extremities Used:   B UEs or LEs, difficulty with coordinating both    Additional Therapeutic activities/exercises completed this date:     []   Nu-step:  Time:        Level:         #Steps:       []   Rebounder:    []  Seated     []  Standing        []   Balance training         []   Postural training    []   Supine ther ex (reps/sets):     [x]   Seated ther ex (reps/sets): B  LE x 15 reps for LAQ, marching, hip abd/add, knee flexion, isometric hip add  with max TCs and VCs to stay awake, initiation and continuation of ax. .     []   Standing ther ex (reps/sets):      []   Picked up object from floor                       []   Reacher used   [x]   Other:Pt incontinent with urine, dep for toileting, did A with bridging for therapist to pull down pants, dep for hygiene.     []   Other:   []   Other:      Patient/Caregiver Education and Training:   [x]   Bed Mobility/Transfer technique/safety  [x]   Gait technique/sequencing  [x]   Proper use of assistive device  [x]   Advanced mobility safety and technique  []   Reinforced patient's precautions with mobility/functional tasks  []   Postural awareness  []   Family training  []   Other:     Treatment Plan for Next Session: transfers, quad and hip strengthening, dynamic balance, gait progression        Treatment/Activity Tolerance:   [] Tolerated treatment with no adverse effects    [x] Patient limited by fatigue  [] Patient limited by pain   [] Patient limited by medical complications:    [] Adverse reaction to Tx:   [] Significant change in status    Safety:       []  bed alarm set    [x]  chair alarm set    []  Pt refused alarms                []  Telesitter activated      [x]  Gait belt used during tx session      []other:         Number of Minutes/Billable Intervention  Gait Training 40   Therapeutic Exercise 20   Neuro Re-Ed    Therapeutic Activity 45   Wheelchair Propulsion 15   Group    Other:    TOTAL 120         Social History  Social/Functional History  Lives With: Spouse  Type of Home: House  Home Layout: One level  Home Access: Stairs to enter with rails  Entrance Stairs - Number of Steps: 2 to porch, 1 into house  Entrance Stairs - Rails: Both  Bathroom Shower/Tub: Walk-in shower  Bathroom Toilet: Standard  Bathroom Equipment: Built-in shower seat,Grab bars in shower,3-in-1 commode  Bathroom Accessibility: Accessible  Home Equipment: Reacher  Has the patient had two or more falls in the past year or any fall with injury in the past year?: No (Pt has had one fall in the last year without significant injury.)  Receives Help From: Family (2 daughters in the area who assist prn.)  ADL Assistance: Independent  Homemaking Assistance: Independent  Homemaking Responsibilities: Yes  Meal Prep Responsibility: Primary  Laundry Responsibility: Primary (Shares responsibility with .)  Cleaning Responsibility: Primary (Shares responsibility with .)  Bill Paying/Finance Responsibility: Primary  Shopping Responsibility: Primary  Health Care Management: Primary (Pt uses a pillbox at baseline.)  Ambulation Assistance: Independent  Transfer Assistance: Independent  Active : Yes  Mode of Transportation: SUV  Occupation: Retired  Type of Occupation: [employer] ([employer])  Leisure & Hobbies: Pt enjoys square dancing and crafts. Additional Comments: Pt has regular flat bed at home that she reports is high. Pt has been sleeping in recliner chair recently d/t discomfort in bed. Objective                                                                                    Goals:  (Update in navigator)   : Long Term Goals  Time Frame for Long term goals : 7-10 days STG=LTG  Long term goal 1: Pt will perform bed mobility with mod I  Long term goal 2: pt will perform sit to stand, pivot and car transfers with mod I  Long term goal 3: pt will perform ambulation with 2ww 50' on levels with mod I, 150' on levels and 10' on unlevels with supervision  Long term goal 4: Pt will ascend/descend curb step and up to 12 steps with rail with supervision  Long term goal 5: Pt will retrieve light item with reacher and 2ww with mod I:        Plan of Care                                                                              Times per week: 5 days per week for a minimum of 60 minutes/day plus group as appropriate for 60 minutes.   Treatment to include      Electronically signed by   Radha Vigil, PTA #[id number]  6/11/2022, 8:29 AM

## 2022-06-11 NOTE — PROGRESS NOTES
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 No new pain, belly cramping or diarrhea. Prior (baseline) level of function: Independent. Current level of function:         Current  [facility] and Goals:   Occupational Therapy:    Short Term Goals  Time Frame for Short term goals: STGs=LTGs :   Long Term Goals  Time Frame for Long term goals : 10-12 days or until d/c. Long Term Goal 1: Pt will complete grooming tasks c Mod I.  Long Term Goal 2: Pt will complete total body bathing c AE PRN and supervision. Long Term Goal 3: Pt will complete UB dressing c Mod I.  Long Term Goal 4: Pt will complete LB dressing c AE PRN and supervision. Long Term Goal 5: Pt will doff/don footwear c AE PRN and Mod I. Additional Goals?: Yes  Long Term Goal 6: Pt will complete toileting c supervision. Long Term Goal 7: Pt will perform functional transfers (bed, chair, toilet, shower) c DME PRN and supervision. Long Term Goal 8: Pt will perform therex/therax to facilitate an increase in strength/endurance/ax tolerance (c emphasis on static/dynamic standing balance/tolerance > 6 mins) c supervision. Long Term Goal 9: Pt will complete light home management tasks c supervision. :                                       Eating: Eating  Assistance Needed: Independent  Comment: Pt able to open packages/containers to self feed IND. CARE Score: 6  Discharge Goal: Independent       Oral Hygiene: Oral Hygiene  Assistance Needed: Independent  Comment: Pt completed oral care Mod I seated.   CARE Score: 6  Discharge Goal: Independent    UB/LB Bathing: Shower/Bathe Self  Assistance Needed: Substantial/maximal assistance  Comment: Pt required assist with rear washing and Max A for balance to remain in stance. CARE Score: 2  Discharge Goal: Supervision or touching assistance    UB Dressing: Upper Body Dressing  Assistance Needed: Partial/moderate assistance  Comment: Pt required assist pulling shirt down in the back and adjusting sleeves. CARE Score: 3  Discharge Goal: Independent         LB Dressing: Lower Body Dressing  Assistance Needed: Substantial/maximal assistance  Comment: Pt required assist threading depends onto L foot, however Pt able to thread BLEs into pants. Pt required Max A as Pt required therapist pull pants up from below knee level while providing assist for balance to remain in stance. CARE Score: 2  Discharge Goal: Supervision or touching assistance    Donning and [place] Footwear: Putting On/Taking Off Footwear  Assistance Needed: Supervision or touching assistance  Comment: Pt able to doff/don socks using figure four method. Pt without shoes on ARU at this time. CARE Score: 4  Discharge Goal: Independent      Toiletin [address] needed: Substantial/maximal assistance  Comment: Pt lost balance during clothing management at toilet using grab bars, requiring Max A to correct and assistance managing depends. CARE Score: 2  Discharge Goal: Supervision or touching assistance      Toilet Transfers: Toilet Transfer  Assistance needed: Partial/moderate assistance  Comment: Mod A to perform stand pivot transfer using 2WW and grab bars d/t loss of balance. Pt required Max safety cues for correct positioning of hands/body/2WW.   CARE Score: 3  Discharge Goal: Supervision or touching assistance    Physical Therapy:         Long Term Goals  Time Frame for Long term goals : 7-10 days STG=LTG  Long term goal 1: Pt will perform bed mobility with mod I  Long term goal 2: pt will perform sit to stand, pivot and car transfers with mod I  Long term goal 3: pt will perform ambulation with 2ww 50' on levels with mod I, 150' on levels and 10' on unlevels with supervision  Long term goal 4: Pt will ascend/descend curb step and up to 12 steps with rail with supervision  Long term goal 5: Pt will retrieve light item with reacher and 2ww with mod I      Bed Mobility:   Sit to Lying  Assistance Needed: Partial/moderate assistance  Comment: min assist  CARE Score: 3  Discharge Goal: Independent  Roll Left and Right  Assistance Needed: Partial/moderate assistance  Comment: min assist  CARE Score: 3  Discharge Goal: Independent  Lying to Sitting on Side of Bed  Assistance Needed: Partial/moderate assistance  Comment: mod assist with LEs  CARE Score: 3  Discharge Goal: Independent    Transfers:    Sit to Stand  Assistance Needed: Substantial/maximal assistance  Comment: max assist to 2ww  CARE Score: 2  Discharge Goal: Independent  Chair/Bed-to-Chair Transfer  Assistance Needed: Partial/moderate assistance  Comment: mod assist sit pivot  CARE Score: 3  Discharge Goal: Independent     Car Transfer  Assistance Needed: Partial/moderate assistance  Comment: min assist for LEs  CARE Score: 3  Discharge Goal: Independent    Ambulation:    Walking Ability  Does the Patient Walk?: Yes     Walk 10 Feet  Assistance Needed: Partial/moderate assistance  Comment: min assist with 2ww, leans to R with variable step length and width, discontinuous steps  CARE Score: 3  Discharge Goal: Independent     Walk 50 Feet with Two Turns  Comment: 42' max distance, min assist increasing to mod with increased lateral sway and path deviation  Reason if not Attempted: Not attempted due to medical condition or safety concerns  CARE Score: 88  Discharge Goal: Independent     Walk 150 Feet  Reason if not Attempted: Not attempted due to medical condition or safety concerns  CARE Score: 88  Discharge Goal: Supervision or touching assistance Walking 10 Feet on Uneven Surfaces  Assistance Needed: Partial/moderate assistance  Comment: mod assist with 2ww, LOb to R x 3  CARE Score: 3  Discharge Goal: Supervision or touching assistance     1 Step (Curb)  Assistance Needed: Partial/moderate assistance  Comment: min assist with 100% cues  CARE Score: 3  Discharge Goal: Supervision or touching assistance     4 Steps  Comment: pt too fatigued to complete, may be able to complete next session  Discharge Goal: Supervision or touching assistance     12 Steps  Reason if not Attempted: Not attempted due to medical condition or safety concerns  CARE Score: 88  Discharge Goal: Supervision or touching assistance       Wheelchair:  w/c Ability: Wheelchair Ability  Uses a Wheelchair and/or Scooter?: No                Balance:        Object: Picking Up Object  Assistance Needed: Partial/moderate assistance  Comment: min assist with 2ww and reacher  CARE Score: 3  Discharge Goal: Independent    I      Exam:    Blood pressure (!) 148/65, pulse 88, temperature 98.1 °F (36.7 °C), temperature source Oral, resp. rate 18, height 5' (1.524 m), weight 157 lb 14.4 oz (71.6 kg), SpO2 98 %, not currently breastfeeding. General: Sitting up in a bedside chair with legs elevated. Soft-spoken but alert and oriented. HEENT: Gazing right and left. MMM. No tremor, slurred speech or JVD. Pulmonary: Clear and unlabored. Cardiac: Regular rate and rhythm. Abdomen: Patient's abdomen is soft and nondistended. Bowel sounds were present throughout. There was no rebound, guarding or masses noted. Surgical incisions stapled and clean and dry. Positive flatus. Upper extremities: Slow and deliberate with arm movements but she can reach up to her chin. Fair  strength. Lower extremities: No signs of DVT. Heels clear. Give way with MMT. Sitting balance was fair. Standing balance was poor.     Lab Results   Component Value Date    WBC 6.5 06/08/2022    HGB 10.1 (L) 06/08/2022    HCT 31.8 (L) 06/08/2022    MCV 91.6 06/08/2022     06/08/2022     Lab Results   Component Value Date    INR 1.09 05/14/2021    INR 1.09 12/09/2016    PROTIME 12.4 05/14/2021    PROTIME 12.7 12/09/2016     Lab Results   Component Value Date    CREATININE 0.5 (L) 06/09/2022    BUN 17 06/09/2022     (L) 06/09/2022    K 4.1 06/09/2022     06/09/2022    CO2 24 06/09/2022     Lab Results   Component Value Date    ALT 11 06/08/2022    AST 14 (L) 06/08/2022    ALKPHOS 50 06/08/2022    BILITOT 0.2 06/08/2022       Expected length of stay  prior to a supervised level of function for discharge home with a walker and C OT/PT is 2 weeks. Recommendations:    1. 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 Monitoring her surgical incisions for signs of infection. They are all clean and dry with minimal pinkness now. Generally continent of bowel and bladder. Ongoing cautious pain management. We must provide aggressive pulmonary hygiene measures, nutritional support and DVT prophylaxis. Outpatient follow-up with oncology and her surgeon. Verbal cues and moderate physical assistance for transfers. 2. DVT prophylaxis. Lovenox 40 mg subcu daily. I must monitor her hemoglobin and platelet count periodically while on this medication. Weightbearing activities are pursued daily. GI prophylaxis is available. No signs of acute blood loss. 3. Uncontrolled pain: Cryotherapy, Oral analgesics and progressive mobilization. Attention to bowel intervention while on the analgesics. 4. 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 Blood sugars are checked at mealtime and bedtime. She is on a Humalog sliding scale with plans for reintroducing oral agents from home when she is eating more consistently.   5. Chronic kidney disease stage IIIa: Avoiding nephrotoxic medications and wide swings of blood pressure. Encouraging consistent oral hydration. Periodic monitoring of her chemistries. 6. Hypertension: She is currently off medications to control her blood pressure. Vital signs are checked at rest and with activity. Target systolic blood pressures 135-669. Blood pressure is just above the target range. Monitoring closely.

## 2022-06-11 NOTE — PROGRESS NOTES
Progress Note( Dr. Nick Gowers)  6/11/2022  Subjective:   Admit Date: 6/9/2022  PCP: Jimenez Aj DO    Admitted For : Admitted on 5/22/2022 for cecal mass  Transferred to ARU on evening of 6/9/2022    Consulted For: Had hypoglycemic episode/better control of blood glucose    Interval History: Patient doing a lot better. Days  Started on almost regular diet as she is tolerating the food  well for last couple of days  Before she was transferred to ARU   discontinue TPN    Denies any chest pains,   Denies SOB . Patient has no more nausea or vomiting   diet was advanced to regular diet as tolerated  no new bowel or bladder symptoms. Intake/Output Summary (Last 24 hours) at 6/11/2022 1033  Last data filed at 6/11/2022 0619  Gross per 24 hour   Intake 340 ml   Output 50 ml   Net 290 ml       DATA    CBC:   No results for input(s): WBC, HGB, PLT in the last 72 hours.  CMP:  Recent Labs     06/09/22  0655   *   K 4.1      CO2 24   BUN 17   CREATININE 0.5*   CALCIUM 7.9*     Lipids:   Lab Results   Component Value Date    CHOL 138 05/02/2019    HDL 54 05/02/2019    TRIG 161 06/01/2022     Glucose:  Recent Labs     06/10/22  1645 06/10/22  2032 06/11/22  0801   POCGLU 120* 95 120*     [id number]:  Lab Results   Component Value Date    LABA1C 5.9 05/22/2022     High Sensitivity TSH:   Lab Results   Component Value Date    TSHHS 2.010 06/06/2022     Free T3:   Lab Results   Component Value Date    FT3 2.3 12/06/2017     Free T4:  Lab Results   Component Value Date    T4FREE 1.15 12/06/2017       No orders to display        Scheduled Medicines   Medications:    insulin lispro  0-3 Units SubCUTAneous Nightly    insulin lispro  0-6 Units SubCUTAneous TID WC    atorvastatin  20 mg Oral Daily    cetirizine  10 mg Oral Daily    escitalopram  10 mg Oral Daily    levothyroxine  137 mcg Oral Daily    lidocaine  1 patch TransDERmal Daily    losartan  100 mg Oral Daily    metoprolol tartrate  150 mg Oral Daily    metoprolol tartrate  100 mg Oral Nightly    oxybutynin  5 mg Oral BID    pantoprazole  40 mg IntraVENous BID    rOPINIRole  3 mg Oral Nightly    sodium chloride flush  5-40 mL IntraVENous 2 times per day    sucralfate  1 g Oral 4 times per day    vitamin B-12  100 mcg Oral Daily    Vitamin D  1,000 Units Oral Daily    enoxaparin  40 mg SubCUTAneous Daily    sodium chloride flush  5-40 mL IntraVENous 2 times per day      Infusions:    sodium chloride      dextrose      sodium chloride           Objective:   Vitals: BP (!) 96/57   Pulse 89   Temp 97.5 °F (36.4 °C) (Oral)   Resp 16   Ht 5' (1.524 m)   Wt 162 lb (73.5 kg)   SpO2 96%   BMI 31.64 kg/m²   General appearance: alert and cooperative with exam  Neck: no JVD or bruit  Thyroid : Normal lobes   Lungs: Has Vesicular Breath sounds   Heart:  regular rate and rhythm  Abdomen: soft, yes -tender; bowel sounds normal; no masses,  no organomegaly  Had right-sided hemicolectomy 5/27/2022  Musculoskeletal: Normal  Extremities: extremities normal, , no edema  Neurologic:  Awake, alert, oriented to name, place and time. Cranial nerves II-XII are grossly intact. Motor is  intact. Sensory,  and gait is normal.    Assessment:     Patient Active Problem List:     Long-term insulin use in type 2 diabetes [facility])     Essential hypertension     Dyslipidemia     Abnormal EKG     Abnormal stress test     Cecum mass     Severe malnutrition (HCC)     Partial small bowel obstruction ([address]     Had hemicolectomy on 5/27/2022      INVASIVE ADENOCARCINOMA, TERMINAL ILEUM      Plan:     1. Reviewed POC blood glucose . Labs and X ray results   2. Reviewed Current Medicines   3. On  Correction bolus Humalog Insulin regime   4. Monitor Blood glucose frequently   5. Modified  the dose of Insulin/ other medicines as needed   6. Going for xray of Lumbar spine   7. Will follow     .      Crystal Corral MD, MD

## 2022-06-11 NOTE — PROGRESS NOTES
Responded to RRT. Dr. Leslie Evangelista at bedside assessing patient. Pt able to arouse to stimulus but still lethargic. ABG ordered and drawn per RT. Updated Dr. Dion Tijerina regarding pt's WBC of 12 today, WBC was 6, 2 days ago. Does not appear to be on any antibiotics at this time.

## 2022-06-11 NOTE — PROGRESS NOTES
Physical Rehabilitation: OCCUPATIONAL THERAPY     [x] daily progress note       [] discharge       Patient Name:  Britney Wood   :  1934 MRN: [id number]  Room:  [address] [place] [address] Date of Admission: 2022  Rehabilitation Diagnosis:   Other specified diseases of intestine [K63.89]  Adenocarcinoma ([facility] [address] [C80.1]       Date 2022       Day of ARU Week:  3   Time IN//930   Individual Tx Minutes 60   Group Tx Minutes    Co-Treat Minutes    Concurrent Tx Minutes    TOTAL Tx Time Mins 60   Variance Time    Variance Time []   Refusal due to:     []   Medical hold/reason:    []   Illness   []   Off Unit for test/procedure  []   Extra time needed to complete task  []   Therapeutic need  []   Other (specify):   Restrictions Restrictions/Precautions: Fall Risk,General Precautions         Communication with other providers: []   OK to see per nursing:     []   Spoke with team member regarding:      Subjective observations and cognitive status:      Pain level/location:    0/10       Location:    Discharge recommendations  Anticipated discharge date:  tbd  Destination: []home alone   []home alone w assist prn   [] home w/ family    [] Continuous supervision       []SNF    [] Assisted living     [] Other:   Continued therapy: []HHC OT  []OUTPATIENT  OT   [] No Further OT  Equipment needs:        ADLs:        Oral Hygiene: Oral Hygiene  Assistance Needed: Independent  Comment: Pt completed oral care Mod I seated. CARE Score: 6  Discharge Goal: Independent    UB/LB Bathing:   Supervision for UB bathing provided cues for thoroughness and increased alertness throughout  LB: Mod A for thoroughness for perineal hygiene     UB Dressing: supervision/min A for donning shirt overhead        LB Dressing:  Mod A   Pt able to thread BLEs into pants and requires mod A for clothing management in standing to pull up pants d/t unsteady balance     Donning and [place] Footwear: min A with use of sock aide  KHURRAM provided demo of using sock aide during donning- pt requires min A for putting sock on sock aide     Toileting: Mod A  Pt demos able to perform perineal hygiene seated and scooting forward on commode for better access  Mod A for thoroughness     Toilet Transfers: Mod A for stand pivot to commode using FWW- cueing provided for pushing from chair and requires mod A for balance during UE transition w/c>FWW   Device Used:    []   Standard Toilet         []   Grab Bars           []  Bedside Commode       [x]   Elevated Toilet          []   Other:        Bed Mobility:           [x]   Pt received out of bed   Rolling R/L:    Scooting:    Supine --> Sit:    Sit --> Supine:      Transfers:    Sit--> Stand:   Mod-max A with cueing for body positioning and weightshifting to stand   Stand --> Sit:   CGA  Stand-Pivot:   Min-Mod A for balance   Other:    Assistive device required for transfer:   FWW       Functional Mobility:    Assistance:    Device:   []   Rhina Mckay     []   Standard Walker []   Wheelchair        []   [employer]       []   4-Wheeled Hermon Jasonward         []   Cardiac Walker       []   Other:              Additional Therapeutic activities/exercises completed this date:     [x]   ADL Training   [x]   Balance/Postural training     []   Bed/Transfer Training   []   Endurance Training   []   Neuromuscular Re-ed   []   Nu-step:  Time:        Level:         #Steps:       []   Rebounder:    []  Seated     []  Standing        []   Supine Ther Ex (reps/sets):     []   Seated Ther Ex (reps/sets):     []   Standing Ther Ex (reps/sets):     []   Other:      Comments:      Patient/Caregiver Education and Training:   [x]   [employer] Equipment Use - training using sock aide   []   Bed Mobility/Transfer Technique/Safety  []   Energy Conservation Tips  []   Family training  [x]   Postural Awareness  [x]   Safety During Functional Activities - pt demos compromised balance and weakness with standing- provided mod cues throughout for upright and midline posture  []   Reinforced Patient's Precautions   []   Progress was updated and reviewed in Rehabtracker with patient and/or family this         date.     Treatment Plan for Next Session: continue POC as tolerated     Ass    Treatment/Activity Tolerance:   [] Tolerated treatment with no adverse effects    [x] Patient limited by fatigue - pt demos decreased alertness throughout   [] Patient limited by pain   [] Patient limited by medical complications:    [] Adverse reaction to Tx:   [] Significant change in status    Safety:       []  bed alarm set    [x]  chair alarm set    []  Pt refused alarms                []  Telesitter activated      [x]  Gait belt used during tx session      []other:       Number of Minutes/Billable Intervention  Therapeutic Exercise    ADL Self-care 60   Neuro Re-Ed    Therapeutic Activity    Group    Other:    TOTAL 60       Social History  Social/Functional History  Lives With: Spouse  Type of Home: House  Home Layout: One level  Home Access: Stairs to enter with rails  Entrance Stairs - Number of Steps: 2 to porch, 1 into house  Entrance Stairs - Rails: Both  Bathroom Shower/Tub: Walk-in shower  Bathroom Toilet: Standard  Bathroom Equipment: Built-in shower seat,Grab bars in shower,3-in-1 commode  Bathroom Accessibility: Accessible  Home Equipment: Reacher  Has the patient had two or more falls in the past year or any fall with injury in the past year?: No (Pt has had one fall in the last year without significant injury.)  Receives Help From: Family (2 daughters in the area who assist prn.)  ADL Assistance: Independent  Homemaking Assistance: Independent  Homemaking Responsibilities: Yes  Meal Prep Responsibility: Primary  Laundry Responsibility: Primary (Shares responsibility with .)  Cleaning Responsibility: Primary (Shares responsibility with .)  Bill Paying/Finance Responsibility: Primary  Shopping Responsibility: Primary  Health Care Management: Primary (Pt uses a pillbox at baseline.)  Ambulation Assistance: Independent  Transfer Assistance: Independent  Active : Yes  Mode of Transportation: SUV  Occupation: Retired  Type of Occupation: [employer] ([employer])  Leisure & Hobbies: Pt enjoys square dancing and crafts. Additional Comments: Pt has regular flat bed at home that she reports is high. Pt has been sleeping in recliner chair recently d/t discomfort in bed. Objective                                                                                    Goals:  (Update in navigator)  Short Term Goals  Time Frame for Short term goals: STGs=LTGs:  Long Term Goals  Time Frame for Long term goals : 10-12 days or until d/c. Long Term Goal 1: Pt will complete grooming tasks c Mod I.  Long Term Goal 2: Pt will complete total body bathing c AE PRN and supervision. Long Term Goal 3: Pt will complete UB dressing c Mod I.  Long Term Goal 4: Pt will complete LB dressing c AE PRN and supervision. Long Term Goal 5: Pt will doff/don footwear c AE PRN and Mod I. Additional Goals?: Yes  Long Term Goal 6: Pt will complete toileting c supervision. Long Term Goal 7: Pt will perform functional transfers (bed, chair, toilet, shower) c DME PRN and supervision. Long Term Goal 8: Pt will perform therex/therax to facilitate an increase in strength/endurance/ax tolerance (c emphasis on static/dynamic standing balance/tolerance > 6 mins) c supervision. Long Term Goal 9: Pt will complete light home management tasks c supervision.:        Plan of Care                                                                              Times per week: 5 days per week for a minimum of 60 minutes/day plus group as appropriate for 60 minutes.   Treatment to include Plan  Times per Day: Daily  Current Treatment Recommendations: Strengthening,Balance training,Functional mobility training,Endurance training,Safety education & training,Patient/Caregiver education & training,Equipment evaluation, education, & procurement,Positioning,Home management training,Self-Care / Ruthann Perea re-education,Coordination training    Electronically signed by   KHURRAM Freeman,  6/11/2022, 12:05 PM

## 2022-06-11 NOTE — PROGRESS NOTES
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

## 2022-06-12 LAB
ANION GAP SERPL CALCULATED.3IONS-SCNC: 8 MMOL/L (ref 4–16)
ANION GAP SERPL CALCULATED.3IONS-SCNC: 9 MMOL/L (ref 4–16)
CHLORIDE BLD-SCNC: 96 MMOL/L (ref 99–110)
CHLORIDE BLD-SCNC: 97 MMOL/L (ref 99–110)
CO2: 25 MMOL/L (ref 21–32)
CO2: 27 MMOL/L (ref 21–32)
GLUCOSE BLD-MCNC: 126 MG/DL (ref 70–99)
GLUCOSE BLD-MCNC: 134 MG/DL (ref 70–99)
GLUCOSE BLD-MCNC: 136 MG/DL (ref 70–99)
GLUCOSE BLD-MCNC: 137 MG/DL (ref 70–99)
OSMOLALITY URINE: 408 MOS/L (ref 292–1090)
POTASSIUM SERPL-SCNC: 4.1 MMOL/L (ref 3.5–5.1)
POTASSIUM SERPL-SCNC: 4.9 MMOL/L (ref 3.5–5.1)
SODIUM BLD-SCNC: 131 MMOL/L (ref 135–145)
SODIUM BLD-SCNC: 131 MMOL/L (ref 135–145)
T4 FREE: 1.24 NG/DL (ref 0.9–1.8)
TSH HIGH SENSITIVITY: 1.47 UIU/ML (ref 0.27–4.2)

## 2022-06-12 PROCEDURE — 84443 ASSAY THYROID STIM HORMONE: CPT

## 2022-06-12 PROCEDURE — 2700000000 HC OXYGEN THERAPY PER DAY

## 2022-06-12 PROCEDURE — 36415 COLL VENOUS BLD VENIPUNCTURE: CPT

## 2022-06-12 PROCEDURE — 94761 N-INVAS EAR/PLS OXIMETRY MLT: CPT

## 2022-06-12 PROCEDURE — 94150 VITAL CAPACITY TEST: CPT

## 2022-06-12 PROCEDURE — 2580000003 HC RX 258: Performed by: STUDENT IN AN ORGANIZED HEALTH CARE EDUCATION/TRAINING PROGRAM

## 2022-06-12 PROCEDURE — 82962 GLUCOSE BLOOD TEST: CPT

## 2022-06-12 PROCEDURE — 6370000000 HC RX 637 (ALT 250 FOR IP): Performed by: STUDENT IN AN ORGANIZED HEALTH CARE EDUCATION/TRAINING PROGRAM

## 2022-06-12 PROCEDURE — C9113 INJ PANTOPRAZOLE SODIUM, VIA: HCPCS | Performed by: STUDENT IN AN ORGANIZED HEALTH CARE EDUCATION/TRAINING PROGRAM

## 2022-06-12 PROCEDURE — 80051 ELECTROLYTE PANEL: CPT

## 2022-06-12 PROCEDURE — 84439 ASSAY OF FREE THYROXINE: CPT

## 2022-06-12 PROCEDURE — 6370000000 HC RX 637 (ALT 250 FOR IP): Performed by: INTERNAL MEDICINE

## 2022-06-12 PROCEDURE — 6360000002 HC RX W HCPCS: Performed by: STUDENT IN AN ORGANIZED HEALTH CARE EDUCATION/TRAINING PROGRAM

## 2022-06-12 PROCEDURE — 1280000000 HC REHAB R&B

## 2022-06-12 RX ORDER — SODIUM CHLORIDE 1000 MG
1 TABLET, SOLUBLE MISCELLANEOUS 2 TIMES DAILY WITH MEALS
Status: DISCONTINUED | OUTPATIENT
Start: 2022-06-12 | End: 2022-06-18

## 2022-06-12 RX ADMIN — SUCRALFATE 1 G: 1 TABLET ORAL at 12:15

## 2022-06-12 RX ADMIN — PANTOPRAZOLE SODIUM 40 MG: 40 INJECTION, POWDER, FOR SOLUTION INTRAVENOUS at 20:42

## 2022-06-12 RX ADMIN — OXYBUTYNIN CHLORIDE 5 MG: 5 TABLET ORAL at 09:37

## 2022-06-12 RX ADMIN — SUCRALFATE 1 G: 1 TABLET ORAL at 16:54

## 2022-06-12 RX ADMIN — ENOXAPARIN SODIUM 40 MG: 100 INJECTION SUBCUTANEOUS at 09:38

## 2022-06-12 RX ADMIN — CETIRIZINE HYDROCHLORIDE 10 MG: 10 TABLET, FILM COATED ORAL at 09:38

## 2022-06-12 RX ADMIN — SODIUM CHLORIDE 1 G: 1 TABLET ORAL at 09:38

## 2022-06-12 RX ADMIN — ROPINIROLE HYDROCHLORIDE 3 MG: 1 TABLET, FILM COATED ORAL at 20:42

## 2022-06-12 RX ADMIN — SODIUM CHLORIDE 1 G: 1 TABLET ORAL at 16:54

## 2022-06-12 RX ADMIN — Medication 1000 UNITS: at 09:38

## 2022-06-12 RX ADMIN — SODIUM CHLORIDE, PRESERVATIVE FREE 10 ML: 5 INJECTION INTRAVENOUS at 20:42

## 2022-06-12 RX ADMIN — SUCRALFATE 1 G: 1 TABLET ORAL at 05:42

## 2022-06-12 RX ADMIN — Medication 100 MCG: at 09:41

## 2022-06-12 RX ADMIN — SODIUM CHLORIDE, PRESERVATIVE FREE 10 ML: 5 INJECTION INTRAVENOUS at 20:43

## 2022-06-12 RX ADMIN — ATORVASTATIN CALCIUM 20 MG: 10 TABLET, FILM COATED ORAL at 09:38

## 2022-06-12 RX ADMIN — ESCITALOPRAM OXALATE 10 MG: 10 TABLET ORAL at 09:38

## 2022-06-12 RX ADMIN — OXYBUTYNIN CHLORIDE 5 MG: 5 TABLET ORAL at 20:42

## 2022-06-12 RX ADMIN — LEVOTHYROXINE SODIUM 137 MCG: 0.11 TABLET ORAL at 05:42

## 2022-06-12 RX ADMIN — PANTOPRAZOLE SODIUM 40 MG: 40 INJECTION, POWDER, FOR SOLUTION INTRAVENOUS at 09:39

## 2022-06-12 RX ADMIN — METOPROLOL TARTRATE 150 MG: 50 TABLET, FILM COATED ORAL at 09:37

## 2022-06-12 ASSESSMENT — PAIN SCALES - GENERAL
PAINLEVEL_OUTOF10: 0

## 2022-06-12 NOTE — PROGRESS NOTES
805 Ken Ramey. Family in room questions about pt sodium level, family updated that sodium level is still low but has improved from yesterday, updated family on plan to stop IV fluids after current bag is finished infusion per provider order. Family in understanding, no other questions or concerns at this time.

## 2022-06-12 NOTE — PROGRESS NOTES
Night Shift ARU RN notes:  Patient's mentation:  2100 Patient is now conversing in complete sentence, alert and oriented x4 but still sleepy but not lethargic. Dr. Kolton Astudillo was updated on progress. 2200 Urine sample was submitted per order. Laboratory:  · (1950) Na+124 Na+ tablet and NSS IVF was started  · (1950) K+4.6,   · (2021) Blood glucose 122  · (2240) Urine Osmolality 408  · (2240) Urine exam was resulted:   6/11/2022 22:40   Color, UA YELLOW   Clarity, UA CLEAR   Bilirubin, Urine NEGATIVE   Ketones, Urine NEGATIVE   Specific Gravity, UA 1.010   Blood, Urine NEGATIVE   Protein, UA NEGATIVE   Urobilinogen, Urine 0.2   Leukocyte Esterase, Urine TRACE (A)   Glucose, Urine NEGATIVE   Nitrite Urine, Quantitative NEGATIVE   pH, Urine 6.5   Hyaline Casts, UA 0   Mucus, UA RARE (A)   WBC, UA 7 (H)   RBC, UA NONE SEEN   Bacteria, UA NEGATIVE   Trichomonas, UA NONE SEEN   URINALYSIS WITH REFLEX TO CULTURE Rpt (A)   Chloride 54   Potassium, Ur 25.8   Sodium, Ur 51     · Patient was continuously close monitored. Easy to rouse and converses with complete statement. Eats snacks and took all her oral meds without problems. Alert oriented x 4, but still when left for a minute, will go back to sleep. No s/s of distress and discomforts.

## 2022-06-12 NOTE — PROGRESS NOTES
Progress Note( Dr. Charles Campos)  6/12/2022  Subjective:   Admit Date: 6/9/2022  PCP: Francisco Aj DO    Admitted For : Admitted on 5/22/2022 for cecal mass  Transferred to ARU on evening of 6/9/2022    Consulted For: Had hypoglycemic episode/better control of blood glucose    Interval History: Patient doing a lot better. Started on almost regular diet as she is tolerating the food  well for last couple of days      Denies any chest pains,   Denies SOB . Patient has no more nausea or vomiting   diet was advanced to regular diet as tolerated  no new bowel or bladder symptoms.        Intake/Output Summary (Last 24 hours) at 6/12/2022 0951  Last data filed at 6/12/2022 0556  Gross per 24 hour   Intake --   Output 670 ml   Net -670 ml       DATA    CBC:   Recent Labs     06/11/22  1303   WBC 12.0*   HGB 9.5*       CMP:  Recent Labs     06/11/22  1303 06/11/22  1950 06/12/22  0618   * 124* 131*   K 4.7 4.6 4.1   CL 91* 91* 96*   CO2 27 27 27   BUN 13  --   --    CREATININE 0.8  --   --    CALCIUM 8.8  --   --      Lipids:   Lab Results   Component Value Date    CHOL 138 05/02/2019    HDL 54 05/02/2019    TRIG 161 06/01/2022     Glucose:  Recent Labs     06/11/22  1621 06/11/22  2021 06/12/22  0740   POCGLU 122* 122* 126*     [id number]:  Lab Results   Component Value Date    LABA1C 5.9 05/22/2022     High Sensitivity TSH:   Lab Results   Component Value Date    TSHHS 1.470 06/12/2022     Free T3:   Lab Results   Component Value Date    FT3 2.3 12/06/2017     Free T4:  Lab Results   Component Value Date    T4FREE 1.24 06/12/2022       No orders to display        Scheduled Medicines   Medications:    sodium chloride  1 g Oral BID WC    insulin lispro  0-3 Units SubCUTAneous Nightly    insulin lispro  0-6 Units SubCUTAneous TID WC    atorvastatin  20 mg Oral Daily    cetirizine  10 mg Oral Daily    escitalopram  10 mg Oral Daily    levothyroxine  137 mcg Oral Daily    lidocaine  1 patch TransDERmal Daily    metoprolol tartrate  150 mg Oral Daily    metoprolol tartrate  100 mg Oral Nightly    oxybutynin  5 mg Oral BID    pantoprazole  40 mg IntraVENous BID    rOPINIRole  3 mg Oral Nightly    sodium chloride flush  5-40 mL IntraVENous 2 times per day    sucralfate  1 g Oral 4 times per day    vitamin B-12  100 mcg Oral Daily    Vitamin D  1,000 Units Oral Daily    enoxaparin  40 mg SubCUTAneous Daily    sodium chloride flush  5-40 mL IntraVENous 2 times per day      Infusions:    sodium chloride 40 mL/hr at 06/12/22 0740    sodium chloride      dextrose      sodium chloride           Objective:   Vitals: BP (!) 126/47   Pulse 98   Temp 98.2 °F (36.8 °C) (Oral)   Resp 16   Ht 5' (1.524 m)   Wt 166 lb 1.6 oz (75.3 kg)   SpO2 100%   BMI 32.44 kg/m²   General appearance: alert and cooperative with exam  Neck: no JVD or bruit  Thyroid : Normal lobes   Lungs: Has Vesicular Breath sounds   Heart:  regular rate and rhythm  Abdomen: soft, yes -tender; bowel sounds normal; no masses,  no organomegaly  Had right-sided hemicolectomy 5/27/2022  Musculoskeletal: Normal  Extremities: extremities normal, , no edema  Neurologic:  Awake, alert, oriented to name, place and time. Cranial nerves II-XII are grossly intact. Motor is  intact. Sensory,  and gait is normal.    Assessment:     Patient Active Problem List:     Long-term insulin use in type 2 diabetes [facility])     Essential hypertension     Dyslipidemia     Abnormal EKG     Abnormal stress test     Cecum mass     Severe malnutrition (HCC)     Partial small bowel obstruction ([address]     Had hemicolectomy on 5/27/2022      INVASIVE ADENOCARCINOMA, TERMINAL ILEUM      Plan:     1. Reviewed POC blood glucose . Labs and X ray results   2. Reviewed Current Medicines   3. On  Correction bolus Humalog Insulin regime   4. Monitor Blood glucose frequently   5. Modified  the dose of Insulin/ other medicines as needed   6.  Going for xray of Lumbar spine 7. Will follow     .      Crystal Corral MD, MD

## 2022-06-12 NOTE — PROGRESS NOTES
[address] [place] [address], [address] [place] [address]  Phone: [phone]  Office Hours: 8:30AM - 4:30PM  Monday - Friday     Nephrology Progress Note  6/12/2022 7:31 AM  Subjective:   Admit Date: 6/9/2022  PCP: DO Braeden Centeno History:feels much better  don't remember what happened yesterday  Diet: ADULT ORAL NUTRITION SUPPLEMENT; Breakfast, Lunch, Dinner; Diabetic Oral Supplement  ADULT DIET; Dysphagia - Soft and Bite Sized      Data:   Scheduled Meds:   sodium chloride  1 g Oral BID WC    insulin lispro  0-3 Units SubCUTAneous Nightly    insulin lispro  0-6 Units SubCUTAneous TID WC    atorvastatin  20 mg Oral Daily    cetirizine  10 mg Oral Daily    escitalopram  10 mg Oral Daily    levothyroxine  137 mcg Oral Daily    lidocaine  1 patch TransDERmal Daily    metoprolol tartrate  150 mg Oral Daily    metoprolol tartrate  100 mg Oral Nightly    oxybutynin  5 mg Oral BID    pantoprazole  40 mg IntraVENous BID    rOPINIRole  3 mg Oral Nightly    sodium chloride flush  5-40 mL IntraVENous 2 times per day    sucralfate  1 g Oral 4 times per day    vitamin B-12  100 mcg Oral Daily    Vitamin D  1,000 Units Oral Daily    enoxaparin  40 mg SubCUTAneous Daily    sodium chloride flush  5-40 mL IntraVENous 2 times per day     Continuous Infusions:   sodium chloride 75 mL/hr at 06/11/22 2148    sodium chloride      dextrose      sodium chloride       PRN Meds:sodium chloride, albuterol sulfate HFA, benzocaine-menthol, calcium carbonate, phenol, sodium chloride flush, dextrose, dextrose bolus **OR** dextrose bolus, glucagon (rDNA), glucose, ondansetron **OR** ondansetron, [Held by provider] oxyCODONE **OR** [Held by provider] oxyCODONE, sodium chloride, polyethylene glycol, sodium chloride flush, acetaminophen, bisacodyl, [Held by provider] melatonin  I/O last 3 completed shifts:   In: 200 [P.O.:200]  Out: 720 [Urine:600; Drains:120]  No intake/output data recorded. Intake/Output Summary (Last 24 hours) at 6/12/2022 0731  Last data filed at 6/12/2022 0556  Gross per 24 hour   Intake --   Output 670 ml   Net -670 ml     CBC:   Recent Labs     06/11/22  1303   WBC 12.0*   HGB 9.5*        BMP:    Recent Labs     06/11/22  1303 06/11/22  1950 06/12/22  0618   * 124* 131*   K 4.7 4.6 4.1   CL 91* 91* 96*   CO2 27 27 27   BUN 13  --   --    CREATININE 0.8  --   --    GLUCOSE 143*  --   --      Troponin: No results for input(s): TROPONINI in the last 72 hours. BNP: No results for input(s): BNP in the last 72 hours. Lipids: No results for input(s): CHOL, HDL in the last 72 hours. Invalid input(s): LDLCALCU  ABGs:   Lab Results   Component Value Date    PO2ART 58 06/11/2022    TTD9OOH 43.0 06/11/2022       Objective:   Vitals: BP (!) 126/33   Pulse 91   Temp 97.5 °F (36.4 °C) (Oral)   Resp 16   Ht 5' (1.524 m)   Wt 166 lb 1.6 oz (75.3 kg)   SpO2 100%   BMI 32.44 kg/m²   General appearance: alert and cooperative with exam  HEENT: Head: Normocephalic, no lesions, without obvious abnormality.   Neck: no adenopathy, no carotid bruit, no JVD, supple, symmetrical, trachea midline and thyroid not enlarged, symmetric, no tenderness/mass/nodules  Lungs: clear to auscultation bilaterally  Heart: S1, S2 normal  Abdomen: soft, non-tender; bowel sounds normal; no masses,  no organomegaly  Extremities: extremities normal, atraumatic, no cyanosis or edema  Neurologic: Mental status: Alert, oriented, thought content appropriate    Assessment and Plan:     Patient Active Problem List:     Long-term insulin use in type 2 diabetes ([facility] [address]     Essential hypertension     Dyslipidemia     Abnormal EKG     Abnormal stress test     Cecum mass     Severe malnutrition (HCC)     Partial small bowel obstruction (HCC)     Adenocarcinoma (HCC)     Generalized weakness     Uncontrolled pain     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

## 2022-06-12 NOTE — PROGRESS NOTES
Patient has been lethargic all day. Spoke with Dr. Stephane Mattson to this matter. Informed him Bp was low and I did not give bp medications  today due to that fact. He ordered a bolus and bloodwork to be done.

## 2022-06-12 NOTE — PATIENT CARE CONFERENCE
ACUTE REHAB TEAM CONFERENCE SUMMARY   [address] 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 Additional support includes local daughters and additional local family. Patient reports having a built-in SC and GB in shower PTA (patient would like a BSC). Plan is to discharge to home with .     PHYSICAL THERAPY (Updated in QI)        Long Term Goals  Time Frame for Long term goals : 7-10 days STG=LTG  Long term goal 1: Pt will perform bed mobility with mod I  Long term goal 2: pt will perform sit to stand, pivot and car transfers with mod I  Long term goal 3: pt will perform ambulation with 2ww 50' on levels with mod I, 150' on levels and 10' on unlevels with supervision  Long term goal 4: Pt will ascend/descend curb step and up to 12 steps with rail with supervision  Long term goal 5: Pt will retrieve light item with reacher and 2ww with mod I    Impairments/deficits, barriers: activity tolerance, balance                  Equipment needed at discharge:2ww, possible w/c      PT IRF-CARSON scores since initial assessment  Bed Mobility:   Sit to Lying  Assistance Needed: Partial/moderate assistance  Comment: min assist  CARE Score: 3  Discharge Goal: Independent    Roll Left and Right  Assistance Needed: Supervision or touching assistance  Comment: supervision  CARE Score: 4  Discharge Goal: Independent    Lying to Sitting on Side of Bed  Assistance Needed: Supervision or touching assistance  Comment: CG, no bed features(air mattress inflated)  CARE Score: 4  Discharge Goal: Independent    Transfers:    Sit to Stand  Assistance Needed: Partial/moderate assistance  Comment: mod assist from w/c with max verbal cues, but variable to as good as CG with max cues  CARE Score: 3  Discharge Goal: Independent    Chair/Bed-to-Chair Transfer  Assistance Needed: Partial/moderate assistance  Comment: mod assist to min assist(variable)  CARE Score: 3  Discharge Goal: Independent         Car Transfer  Assistance Needed: Partial/moderate assistance  Comment: approaching with 2ww, no assist for LEs, min assist to stand from car  CARE Score: 3  Discharge Goal: Independent    Ambulation:    Walking Ability  Does the Patient Walk?: Yes     Walk 10 Feet  Assistance Needed: Partial/moderate assistance  Comment: min assist with 2ww  CARE Score: 3  Discharge Goal: Independent     Walk 50 Feet with Two Turns  Assistance Needed: Supervision or touching assistance  Comment: CG with 2ww, leaning to R , but improved from eval, cues for feet inside walker on turns  CARE Score: 4  Discharge Goal: Independent     Walk 150 Feet  Assistance Needed: Supervision or touching assistance  Comment: CG with 2ww, 182'  CARE Score: 4  Discharge Goal: Supervision or touching assistance     Walking 10 Feet on Uneven Surfaces  Assistance Needed: Supervision or touching assistance  Comment: CG assist with 2ww  CARE Score: 4  Discharge Goal: Supervision or touching assistance     1 Step (Curb)  Assistance Needed: Partial/moderate assistance  Comment: min assist with wlkr with 75% cues  CARE Score: 3  Discharge Goal: Supervision or touching assistance     4 Steps  Assistance Needed: Partial/moderate assistance  Comment: mod assist due to LLE weakness, 75% cues  CARE Score: 3  Discharge Goal: Supervision or touching assistance     12 Steps  Reason if not Attempted: Not attempted due to medical condition or safety concerns  CARE Score: 88  Discharge Goal: Supervision or touching assistance           Wheelchair:  w/c Ability: Wheelchair Ability  Uses a Wheelchair and/or Scooter?: No                        Balance:        Object: Picking Up Object  Assistance Needed: Supervision or touching assistance  Comment: CG with 2ww and reacher  CARE Score: 4  Discharge Goal: Independent    Fall Risk: [x]  Yes  []  No    OCCUPATIONAL THERAPY  (Updated in QI)  Short Term Goals  Time Frame for Short term goals: STGs=LTGs :   Long Term Goals  Time Frame for Long term goals : 10-12 days or until d/c. Long Term Goal 1: Pt will complete grooming tasks c Mod I.  Long Term Goal 2: Pt will complete total body bathing c AE PRN and supervision. Long Term Goal 3: Pt will complete UB dressing c Mod I.  Long Term Goal 4: Pt will complete LB dressing c AE PRN and supervision. Long Term Goal 5: Pt will doff/don footwear c AE PRN and Mod I. Additional Goals?: Yes  Long Term Goal 6: Pt will complete toileting c supervision. Long Term Goal 7: Pt will perform functional transfers (bed, chair, toilet, shower) c DME PRN and supervision. Long Term Goal 8: Pt will perform therex/therax to facilitate an increase in strength/endurance/ax tolerance (c emphasis on static/dynamic standing balance/tolerance > 6 mins) c supervision.   Long Term Goal 9: Pt will complete light home management tasks c supervision. :        OT IRF-CARSON scores and goals since initial assessment:    ADLs:    Eating: Eating  Assistance Needed: Independent  Comment: X  CARE Score: 6  Discharge Goal: Independent       Oral Hygiene: Oral Hygiene  Assistance Needed: Independent  Comment: Mod I  CARE Score: 6  Discharge Goal: Independent    UB/LB Bathing: Shower/Bathe Self  Assistance Needed: Supervision or touching assistance  Comment: Pt completed sinkside ADL; CGA in stance to wash bottom  CARE Score: 4  Discharge Goal: Supervision or touching assistance    UB Dressing: Upper Body Dressing  Assistance Needed: Supervision or touching assistance  Comment: Pt able to don pullover shirt; cues to pull down in front  CARE Score: 4  Discharge Goal: Independent         LB Dressing: Lower Body Dressing  Assistance Needed: Partial/moderate assistance  Comment: Pt able to thread BLEs into brief and pants; Pt able to manage briefs over hips c CGA; pt required min A to manage pants over hips d/t pants being snug  CARE Score: 3  Discharge Goal: Supervision or touching assistance    Donning and [place] Footwear: Putting On/Taking Off Footwear  Assistance Needed: Setup or clean-up assistance  Comment: Pt able to doff/don socks using figure 4 position  CARE Score: 5  Discharge Goal: Independent      Toiletin [address] needed: Partial/moderate assistance  Comment: CGA to manage depends down; min A for thoroughness c BM hygiene  CARE Score: 3  Discharge Goal: Supervision or touching assistance      Toilet Transfers: Toilet Transfer  Assistance needed: Supervision or touching assistance  Comment: CGA using RW and grab bars  CARE Score: 4  Discharge Goal: Supervision or touching assistance      Impairments/deficits, barriers:  Cognition, functional balance/transfers/mobility, endurance, strength, FMC, and ADLs.   Assessment  Performance deficits / Impairments: Decreased functional mobility ,Decreased safe awareness,Decreased balance,Decreased coordination,Decreased ADL status,Decreased cognition,Decreased posture,Decreased endurance,Decreased high-level IADLs,Decreased strength,Decreased sensation,Decreased fine motor control  Decision Making: Medium Complexity  Equipment needed at discharge:None. COGNITIVE FUNCTION/SPEECH THERAPY (AS INDICATED)  LTG                                                 Nursing Current Medical Status:   [] Is continent of bowel and bladder     [] Is incontinent of bowel and bladder    [] Has had an adequate number of bowel movements   [] Urinates with no urinary retention >300ml in bladder   [] Targeting bladder protocol with lara removal   [] Maintaining O2 SATs at 92% or greater   [x] Has pain managed while on ARU         [] Has had no skin breakdown while on ARU   [] Has improved skin integrity via wound measurements   [] Has no signs/symptoms of infection at the wound site   [] Pressure wounds Stage/Location:    [] Arrived on unit with pressure wound  [] Has been free from injury during hospitalization   [] Has experienced a fall during hospitalization  [] Ongoing education with patient/family with understanding demonstrated for:  [] Receives IV Fluids  [x] Other: had rapid response Saturday, ultrasound ordered today        NUTRITION  Weight: 153 lb 10.6 oz (69.7 kg) / Body mass index is 30.01 kg/m². Current diet: ADULT ORAL NUTRITION SUPPLEMENT; Breakfast, Lunch, Dinner; Diabetic Oral Supplement  ADULT DIET; Dysphagia - Soft and Bite Sized; 1800 ml  Intake: Tolerating solid foods, meal intake 50-75%, will drink oral nutrition supplement      Medical improvements/barriers: ambulated today good distance  182', Mod-CGA transfers, L hip pain, improved ADL's, min A toilet hygiene      Team goals for next treatment period/Intervention for current barriers:   [x] Pt will increase activity tolerance for daily tasks.   [x] Pt will improve bed mobility with reduced assist.  [x] Pt will improve safety in fx tasks with reduced cues/assist  [x] Pt will improve transfers with reduced assist  [x] Pt will improve toileting with reduced assist  [x] Pt will improve ADL's with use of adaptive equipment with reduced assist  [] Pt will Raz Springer Director  [] Mason Kaur,       [] Kristen Hebert, RN Nurse Supervisor     []  Iván Aranda, PT  [x]  Haris Schmid, PT       [] Sandra Small, OT   [x] Ivan Escoto, OT  [] Pardeep Schaeffer, OT     [x]  Rakesh Vigil, SLP    []  Michele Peterson, SLP   [] Jaylan Durant, FERMIN      [x] Ruben Hickman Mgr   [][address] Mgr     [] Trudi Garcia, PATRICIA   [x] Gopi Amin, PATRICIA    [] Dilip Ramos, PATRICIA    [] Saskia Glass RN    [] Luis Enrique Spicer, PATRICIA   [] Irineo Villalobos, PATRICIA   [] Linda Tobin RN Nurse Mgr     I have led this Team Conference and agree with the plan, Liana Mazariegos MD, 6/14/2022, 12:55 PM  Goals have been updated to reflect recent status.     Team conference note transcribed this date by: Paulino Loza MA, Efrain Siu, Therapy Coordinator

## 2022-06-12 NOTE — PROGRESS NOTES
1400 Patient lethargic to the point of falling asleep when talking to her. She also couldn't complete her therapy due to not being able to stay awake. 1530 patient is not responding to me or daughter. I spoke with charge nurse Dona Hein, about the situation. He went in and assessed the situation. Patient gave response after a sternal rub. He called the doctor who consulted nephrology and surgery. [id number] nephrology suggested calling a rapid to get patient admitted. [id number] hospitalist is not wanting to admitting patient. He added fluids and ordered bladder scan and straight cath. [id number] patient placed on 2l o2 due to ABG  1930 patient is still not responsive except for some mumbling. I could not give sodium pill due to patient not being awake. Suraj Moe [Negative] : Heme/Lymph

## 2022-06-13 ENCOUNTER — APPOINTMENT (OUTPATIENT)
Dept: GENERAL RADIOLOGY | Age: 87
DRG: 374 | End: 2022-06-13
Attending: PHYSICAL MEDICINE & REHABILITATION
Payer: MEDICARE

## 2022-06-13 LAB
ANION GAP SERPL CALCULATED.3IONS-SCNC: 7 MMOL/L (ref 4–16)
BUN BLDV-MCNC: 10 MG/DL (ref 6–23)
CALCIUM SERPL-MCNC: 8.2 MG/DL (ref 8.3–10.6)
CHLORIDE BLD-SCNC: 95 MMOL/L (ref 99–110)
CO2: 26 MMOL/L (ref 21–32)
CREAT SERPL-MCNC: 0.6 MG/DL (ref 0.6–1.1)
GFR AFRICAN AMERICAN: >60 ML/MIN/1.73M2
GFR NON-AFRICAN AMERICAN: >60 ML/MIN/1.73M2
GLUCOSE BLD-MCNC: 123 MG/DL (ref 70–99)
GLUCOSE BLD-MCNC: 131 MG/DL (ref 70–99)
GLUCOSE BLD-MCNC: 140 MG/DL (ref 70–99)
GLUCOSE BLD-MCNC: 140 MG/DL (ref 70–99)
GLUCOSE BLD-MCNC: 189 MG/DL (ref 70–99)
POTASSIUM SERPL-SCNC: 4 MMOL/L (ref 3.5–5.1)
SODIUM BLD-SCNC: 128 MMOL/L (ref 135–145)

## 2022-06-13 PROCEDURE — 6370000000 HC RX 637 (ALT 250 FOR IP): Performed by: INTERNAL MEDICINE

## 2022-06-13 PROCEDURE — 97116 GAIT TRAINING THERAPY: CPT

## 2022-06-13 PROCEDURE — 97110 THERAPEUTIC EXERCISES: CPT

## 2022-06-13 PROCEDURE — 97530 THERAPEUTIC ACTIVITIES: CPT

## 2022-06-13 PROCEDURE — 99024 POSTOP FOLLOW-UP VISIT: CPT | Performed by: NURSE PRACTITIONER

## 2022-06-13 PROCEDURE — C9113 INJ PANTOPRAZOLE SODIUM, VIA: HCPCS | Performed by: STUDENT IN AN ORGANIZED HEALTH CARE EDUCATION/TRAINING PROGRAM

## 2022-06-13 PROCEDURE — 1280000000 HC REHAB R&B

## 2022-06-13 PROCEDURE — 6370000000 HC RX 637 (ALT 250 FOR IP): Performed by: PHYSICAL MEDICINE & REHABILITATION

## 2022-06-13 PROCEDURE — 94761 N-INVAS EAR/PLS OXIMETRY MLT: CPT

## 2022-06-13 PROCEDURE — APPNB15 APP NON BILLABLE TIME 0-15 MINS: Performed by: NURSE PRACTITIONER

## 2022-06-13 PROCEDURE — 36415 COLL VENOUS BLD VENIPUNCTURE: CPT

## 2022-06-13 PROCEDURE — 94150 VITAL CAPACITY TEST: CPT

## 2022-06-13 PROCEDURE — 99232 SBSQ HOSP IP/OBS MODERATE 35: CPT | Performed by: PHYSICAL MEDICINE & REHABILITATION

## 2022-06-13 PROCEDURE — 71046 X-RAY EXAM CHEST 2 VIEWS: CPT

## 2022-06-13 PROCEDURE — 6360000002 HC RX W HCPCS: Performed by: STUDENT IN AN ORGANIZED HEALTH CARE EDUCATION/TRAINING PROGRAM

## 2022-06-13 PROCEDURE — 6370000000 HC RX 637 (ALT 250 FOR IP): Performed by: STUDENT IN AN ORGANIZED HEALTH CARE EDUCATION/TRAINING PROGRAM

## 2022-06-13 PROCEDURE — 97535 SELF CARE MNGMENT TRAINING: CPT

## 2022-06-13 PROCEDURE — 2580000003 HC RX 258: Performed by: STUDENT IN AN ORGANIZED HEALTH CARE EDUCATION/TRAINING PROGRAM

## 2022-06-13 PROCEDURE — 80048 BASIC METABOLIC PNL TOTAL CA: CPT

## 2022-06-13 PROCEDURE — 82962 GLUCOSE BLOOD TEST: CPT

## 2022-06-13 RX ADMIN — METOPROLOL TARTRATE 150 MG: 50 TABLET, FILM COATED ORAL at 08:22

## 2022-06-13 RX ADMIN — CETIRIZINE HYDROCHLORIDE 10 MG: 10 TABLET, FILM COATED ORAL at 08:24

## 2022-06-13 RX ADMIN — PANTOPRAZOLE SODIUM 40 MG: 40 INJECTION, POWDER, FOR SOLUTION INTRAVENOUS at 21:53

## 2022-06-13 RX ADMIN — ONDANSETRON 4 MG: 2 INJECTION INTRAMUSCULAR; INTRAVENOUS at 08:36

## 2022-06-13 RX ADMIN — ACETAMINOPHEN 650 MG: 325 TABLET ORAL at 21:41

## 2022-06-13 RX ADMIN — SODIUM CHLORIDE, PRESERVATIVE FREE 10 ML: 5 INJECTION INTRAVENOUS at 08:27

## 2022-06-13 RX ADMIN — SUCRALFATE 1 G: 1 TABLET ORAL at 05:34

## 2022-06-13 RX ADMIN — SODIUM CHLORIDE 1 G: 1 TABLET ORAL at 18:12

## 2022-06-13 RX ADMIN — ESCITALOPRAM OXALATE 10 MG: 10 TABLET ORAL at 08:24

## 2022-06-13 RX ADMIN — ACETAMINOPHEN 650 MG: 325 TABLET ORAL at 05:34

## 2022-06-13 RX ADMIN — INSULIN LISPRO 1 UNITS: 100 INJECTION, SOLUTION INTRAVENOUS; SUBCUTANEOUS at 12:28

## 2022-06-13 RX ADMIN — SODIUM CHLORIDE, PRESERVATIVE FREE 10 ML: 5 INJECTION INTRAVENOUS at 21:42

## 2022-06-13 RX ADMIN — LEVOTHYROXINE SODIUM 137 MCG: 0.11 TABLET ORAL at 05:34

## 2022-06-13 RX ADMIN — Medication 1000 UNITS: at 08:22

## 2022-06-13 RX ADMIN — ACETAMINOPHEN 650 MG: 325 TABLET ORAL at 15:10

## 2022-06-13 RX ADMIN — Medication 15 G: at 21:42

## 2022-06-13 RX ADMIN — Medication 100 MCG: at 08:26

## 2022-06-13 RX ADMIN — ENOXAPARIN SODIUM 40 MG: 100 INJECTION SUBCUTANEOUS at 08:24

## 2022-06-13 RX ADMIN — Medication 15 G: at 18:07

## 2022-06-13 RX ADMIN — SODIUM CHLORIDE 1 G: 1 TABLET ORAL at 08:22

## 2022-06-13 RX ADMIN — PANTOPRAZOLE SODIUM 40 MG: 40 INJECTION, POWDER, FOR SOLUTION INTRAVENOUS at 08:15

## 2022-06-13 RX ADMIN — SODIUM CHLORIDE, PRESERVATIVE FREE 10 ML: 5 INJECTION INTRAVENOUS at 08:41

## 2022-06-13 RX ADMIN — OXYBUTYNIN CHLORIDE 5 MG: 5 TABLET ORAL at 08:22

## 2022-06-13 RX ADMIN — ATORVASTATIN CALCIUM 20 MG: 10 TABLET, FILM COATED ORAL at 08:24

## 2022-06-13 RX ADMIN — SUCRALFATE 1 G: 1 TABLET ORAL at 00:30

## 2022-06-13 RX ADMIN — OXYBUTYNIN CHLORIDE 5 MG: 5 TABLET ORAL at 21:41

## 2022-06-13 RX ADMIN — ROPINIROLE HYDROCHLORIDE 3 MG: 1 TABLET, FILM COATED ORAL at 21:40

## 2022-06-13 RX ADMIN — INSULIN LISPRO 1 UNITS: 100 INJECTION, SOLUTION INTRAVENOUS; SUBCUTANEOUS at 21:43

## 2022-06-13 RX ADMIN — INSULIN LISPRO 1 UNITS: 100 INJECTION, SOLUTION INTRAVENOUS; SUBCUTANEOUS at 18:07

## 2022-06-13 RX ADMIN — SUCRALFATE 1 G: 1 TABLET ORAL at 18:07

## 2022-06-13 RX ADMIN — SUCRALFATE 1 G: 1 TABLET ORAL at 12:28

## 2022-06-13 RX ADMIN — SODIUM CHLORIDE, PRESERVATIVE FREE 10 ML: 5 INJECTION INTRAVENOUS at 21:43

## 2022-06-13 ASSESSMENT — PAIN DESCRIPTION - DESCRIPTORS: DESCRIPTORS: ACHING;DISCOMFORT;DULL

## 2022-06-13 ASSESSMENT — PAIN SCALES - GENERAL
PAINLEVEL_OUTOF10: 0
PAINLEVEL_OUTOF10: 8
PAINLEVEL_OUTOF10: 0
PAINLEVEL_OUTOF10: 0
PAINLEVEL_OUTOF10: 3

## 2022-06-13 ASSESSMENT — PAIN - FUNCTIONAL ASSESSMENT
PAIN_FUNCTIONAL_ASSESSMENT: ACTIVITIES ARE NOT PREVENTED
PAIN_FUNCTIONAL_ASSESSMENT: ACTIVITIES ARE NOT PREVENTED

## 2022-06-13 ASSESSMENT — PAIN DESCRIPTION - LOCATION
LOCATION: ABDOMEN
LOCATION: BACK

## 2022-06-13 ASSESSMENT — PAIN DESCRIPTION - ORIENTATION
ORIENTATION: LEFT;RIGHT;LOWER
ORIENTATION: UPPER

## 2022-06-13 NOTE — PROGRESS NOTES
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 Little recall of some of the details of her \"episode\". Prior (baseline) level of function: Independent. Current level of function:         Current  IRF-CARSON and Goals:   Occupational Therapy:    Short Term Goals  Time Frame for Short term goals: STGs=LTGs :   Long Term Goals  Time Frame for Long term goals : 10-12 days or until d/c. Long Term Goal 1: Pt will complete grooming tasks c Mod I.  Long Term Goal 2: Pt will complete total body bathing c AE PRN and supervision. Long Term Goal 3: Pt will complete UB dressing c Mod I.  Long Term Goal 4: Pt will complete LB dressing c AE PRN and supervision. Long Term Goal 5: Pt will doff/don footwear c AE PRN and Mod I. Additional Goals?: Yes  Long Term Goal 6: Pt will complete toileting c supervision. Long Term Goal 7: Pt will perform functional transfers (bed, chair, toilet, shower) c DME PRN and supervision. Long Term Goal 8: Pt will perform therex/therax to facilitate an increase in strength/endurance/ax tolerance (c emphasis on static/dynamic standing balance/tolerance > 6 mins) c supervision. Long Term Goal 9: Pt will complete light home management tasks c supervision. :                                       Eating: Eating  Assistance Needed: Independent  Comment: X  CARE Score: 6  Discharge Goal: Independent       Oral Hygiene: Oral Hygiene  Assistance Needed: Independent  Comment: Pt completed oral care Mod I seated.   CARE Score: 6  Discharge Goal: Independent    UB/LB Bathing: Shower/Bathe Self  Assistance Needed: Partial/moderate assistance  Comment: Pt able to wash face, UB, thighs and distal LEs using figure 4 positioning; mod A to bathe bottom in stance  CARE Score: 3  Discharge Goal: Supervision or touching assistance    UB Dressing: Upper Body Dressing  Assistance Needed: Partial/moderate assistance  Comment: Assist to pull down in back  CARE Score: 3  Discharge Goal: Independent         LB Dressing: Lower Body Dressing  Assistance Needed: Substantial/maximal assistance  Comment: Pt able to thread BLEs into pants; pt required assist to don attends and manage pants over hips  CARE Score: 2  Discharge Goal: Supervision or touching assistance    Donning and [place] Footwear: Putting On/Taking Off Footwear  Assistance Needed: Supervision or touching assistance  Comment: Pt able to doff/don socks using figure 4 position  CARE Score: 4  Discharge Goal: Independent      Toiletin [address] needed: Substantial/maximal assistance  Comment: Pt lost balance during clothing management at toilet using grab bars, requiring Max A to correct and assistance managing depends. CARE Score: 2  Discharge Goal: Supervision or touching assistance      Toilet Transfers: Toilet Transfer  Assistance needed: Partial/moderate assistance  Comment: Mod A to perform stand pivot transfer using 2WW and grab bars d/t loss of balance. Pt required Max safety cues for correct positioning of hands/body/2WW.   CARE Score: 3  Discharge Goal: Supervision or touching assistance    Physical Therapy:         Long Term Goals  Time Frame for Long term goals : 7-10 days STG=LTG  Long term goal 1: Pt will perform bed mobility with mod I  Long term goal 2: pt will perform sit to stand, pivot and car transfers with mod I  Long term goal 3: pt will perform ambulation with 2ww 50' on levels with mod I, 150' on levels and 10' on unlevels with supervision  Long term goal 4: Pt will ascend/descend curb step and up to 12 steps with rail with supervision  Long term goal 5: Pt will retrieve light item with reacher and 2ww with mod I      Bed Mobility:   Sit to Lying  Assistance Needed: Partial/moderate assistance  Comment: min assist  CARE Score: 3  Discharge Goal: Independent  Roll Left and Right  Assistance Needed: Supervision or touching assistance  Comment: supervision  CARE Score: 4  Discharge Goal: Independent  Lying to Sitting on Side of Bed  Assistance Needed: Partial/moderate assistance  Comment: mod assist with LEs  CARE Score: 3  Discharge Goal: Independent    Transfers:    Sit to Stand  Assistance Needed: Partial/moderate assistance  Comment: mod assist from w/c with max verbal cues  CARE Score: 3  Discharge Goal: Independent  Chair/Bed-to-Chair Transfer  Assistance Needed: Partial/moderate assistance  Comment: mod assist  CARE Score: 3  Discharge Goal: Independent     Car Transfer  Assistance Needed: Partial/moderate assistance  Comment: min assist for LEs  CARE Score: 3  Discharge Goal: Independent    Ambulation:    Walking Ability  Does the Patient Walk?: Yes     Walk 10 Feet  Assistance Needed: Partial/moderate assistance  Comment: min assist with 2ww  CARE Score: 3  Discharge Goal: Independent     Walk 50 Feet with Two Turns  Comment: 42' max distance, min assist increasing to mod with increased lateral sway and path deviation  Reason if not Attempted: Not attempted due to medical condition or safety concerns  CARE Score: 88  Discharge Goal: Independent     Walk 150 Feet  Reason if not Attempted: Not attempted due to medical condition or safety concerns  CARE Score: 88  Discharge Goal: Supervision or touching assistance     Walking 10 Feet on Uneven Surfaces  Assistance Needed: Partial/moderate assistance  Comment: mod assist with 2ww, LOb to R x 3  CARE Score: 3  Discharge Goal: Supervision or touching assistance     1 Step (Curb)  Assistance Needed: Partial/moderate assistance  Comment: min assist with 100% cues  CARE Score: 3  Discharge Goal: Supervision or touching assistance     4 Steps  Comment: pt too fatigued to complete, may be able to complete next session  Discharge Goal: Supervision or touching assistance     12 Steps  Reason if not Attempted: Not attempted due to medical condition or safety concerns  CARE Score: 88  Discharge Goal: Supervision or touching assistance       Wheelchair:  w/c Ability: Wheelchair Ability  Uses a Wheelchair and/or Scooter?: No                Balance:        Object: Picking Up Object  Assistance Needed: Partial/moderate assistance  Comment: min assist with 2ww and reacher  CARE Score: 3  Discharge Goal: Independent    I      Exam:    Blood pressure (!) 94/56, pulse 100, temperature 98.8 °F (37.1 °C), temperature source Oral, resp. rate 14, height 5' (1.524 m), weight 173 lb 15.1 oz (78.9 kg), SpO2 100 %, not currently breastfeeding. General: Observed up in a wheelchair engaging in therapy. Alert. Oriented x2-3. Following commands. HEENT: Gazing right and left. Neck supple. MMM. Pulmonary: Unlabored breathing with symmetric air exchange. Cardiac: RRR. Abdomen: Patient's abdomen is soft and nondistended. Bowel sounds were present throughout. There was no rebound, guarding or masses noted. Upper extremities: Unable to reposition her wheelchair on her own but she does bring both hands up overhead to command. Lower extremities: Trace edema. Calf soft. Guarded hip flexion with minor abdominal discomfort. Sitting balance was good. Standing balance was poor.     Lab Results   Component Value Date    WBC 12.0 (H) 06/11/2022    HGB 9.5 (L) 06/11/2022    HCT 28.5 (L) 06/11/2022    MCV 86.4 06/11/2022     06/11/2022     Lab Results   Component Value Date    INR 1.09 05/14/2021    INR 1.09 12/09/2016    PROTIME 12.4 05/14/2021    PROTIME 12.7 12/09/2016     Lab Results   Component Value Date    CREATININE 0.6 06/13/2022    BUN 10 06/13/2022     (L) 06/13/2022    K 4.0 06/13/2022    CL 95 (L) 06/13/2022    CO2 26 06/13/2022     Lab Results   Component Value Date    ALT 11 06/08/2022    AST 14 (L) 06/08/2022    ALKPHOS 50 06/08/2022    BILITOT 0.2 06/08/2022       Expected length of stay  prior to a supervised level of function for discharge home with a walker and [facility] OT/PT is 2 weeks. Recommendations:    1. 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 There was no clear explanation for this. Today she is participating in the daily occupational and physical therapy. Surgical service and I both agree there are no incisional signs of infection. Her chest x-ray and chemistries have been consistent. Generally continent of bowel and bladder. Ongoing cautious pain management.  We must provide aggressive pulmonary hygiene measures, nutritional support and DVT prophylaxis.  Outpatient follow-up with oncology and her surgeon. Verbal cues and moderate physical assistance for transfers again today. 2. DVT prophylaxis.  Lovenox 40 mg subcu daily.  I must monitor her hemoglobin and platelet count periodically while on this medication.  Weightbearing activities are slowly improving daily.  GI prophylaxis is available. No clinical signs of blood loss. 3. Uncontrolled pain: Limited use of oral analgesics to avoid delirium.   Attention to bowel intervention while on the analgesics. 4. 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  Blood sugars are checked at mealtime and bedtime.  She is on a Humalog sliding scale with plans for reintroducing oral agents from home when she is eating more consistently.   5. Chronic kidney disease stage IIIa: Avoiding nephrotoxic medications and wide swings of blood pressure.  Encouraging consistent oral hydration.  Periodic monitoring of her chemistries. 6. Hypertension: She is currently off medications to control her blood pressure.  Vital signs are checked at rest and with activity.  Target systolic blood pressures 313-473. Blood pressure is a little below the target range.   Monitoring closely.

## 2022-06-13 NOTE — PROGRESS NOTES
20 mg Oral Daily    cetirizine  10 mg Oral Daily    escitalopram  10 mg Oral Daily    levothyroxine  137 mcg Oral Daily    lidocaine  1 patch TransDERmal Daily    metoprolol tartrate  150 mg Oral Daily    metoprolol tartrate  100 mg Oral Nightly    oxybutynin  5 mg Oral BID    pantoprazole  40 mg IntraVENous BID    rOPINIRole  3 mg Oral Nightly    sodium chloride flush  5-40 mL IntraVENous 2 times per day    sucralfate  1 g Oral 4 times per day    vitamin B-12  100 mcg Oral Daily    Vitamin D  1,000 Units Oral Daily    enoxaparin  40 mg SubCUTAneous Daily    sodium chloride flush  5-40 mL IntraVENous 2 times per day      Infusions:    sodium chloride      dextrose      sodium chloride           Objective:   Vitals: /64   Pulse (!) 105   Temp 98.8 °F (37.1 °C) (Oral)   Resp 14   Ht 5' (1.524 m)   Wt 173 lb 15.1 oz (78.9 kg)   SpO2 100%   BMI 33.97 kg/m²   General appearance: alert and cooperative with exam  Neck: no JVD or bruit  Thyroid : Normal lobes   Lungs: Has Vesicular Breath sounds   Heart:  regular rate and rhythm  Abdomen: soft, yes -tender; bowel sounds normal; no masses,  no organomegaly  Had right-sided hemicolectomy 5/27/2022  Musculoskeletal: Normal  Extremities: extremities normal, , no edema  Neurologic:  Awake, alert, oriented to name, place and time. Cranial nerves II-XII are grossly intact. Motor is  intact. Sensory,  and gait is normal.    Assessment:     Patient Active Problem List:     Long-term insulin use in type 2 diabetes [facility])     Essential hypertension     Dyslipidemia     Abnormal EKG     Abnormal stress test     Cecum mass     Severe malnutrition (HCC)     Partial small bowel obstruction ([address]     Had hemicolectomy on 5/27/2022      INVASIVE ADENOCARCINOMA, TERMINAL ILEUM      Plan:     1. Reviewed POC blood glucose . Labs and X ray results   2. Reviewed Current Medicines   3. On  Correction bolus Humalog Insulin regime   4.  Monitor Blood glucose frequently   5. Modified  the dose of Insulin/ other medicines as needed   6. Going for xray of Lumbar spine   7. Will follow     .      Kendra Ross MD, MD

## 2022-06-13 NOTE — CARE COORDINATION
Case Management Admission Note      Patient:Rylie Chin      HZN:[id number]  VBZ:[id number]  Rehab Dx/Hx: Other specified diseases of intestine [K63.89]  Adenocarcinoma ([facility] [C80.1]    Chief Complaint:   Past Medical History:   Diagnosis Date    Diabetes mellitus ([facility]    [facility] H/O cardiac catheterization 05/18/2021    no significant CAD in main vessels, has severe stenosis in small  branch diagonal vessel    H/O cardiovascular stress test 3/22/18,03/30/2017    ECG portion of the stress test is negative for ischemia EF >70% Normal stress myocardial perfusion.  H/O cardiovascular stress test 04/07/2021    abnormal stress    H/O Doppler ultrasound (Abdomimal Aorta) 03/29/2017    No evidence of abdominal aortic aneurysm.  H/O echocardiogram 07/02/2014    Normal left ventricular size, wall motion and systolic function. Mildle elevated pulmonary artery systolic pressure. Mild MR and TR and trace AR EF 55 to 60%    Hx of echocardiogram 03/29/2017    EF 55-60%. Grade I diastolic dysfunction. Mildly dilated left atrium. No evidence of pericardial effusion.      Hyperlipidemia     Hypertension     Sleep apnea     Thyroid disease      Past Surgical History:   Procedure Laterality Date    BREAST BIOPSY Right     approx age 76, benign    CATARACT REMOVAL      CHOLECYSTECTOMY      COLONOSCOPY N/A 5/25/2022    COLONOSCOPY POLYPECTOMY SNARE/COLD BIOPSY performed by Deepika Hendricks MD at [facility] N/A 5/27/2022    BOWEL RESECTION RIGHT HEMICOLECTOMY LAPAROSCOPIC ROBOTIC XI performed by Ninfa Barton MD at [facility]      eye lift     Allergies   Allergen Reactions    Lopid [Gemfibrozil] Shortness Of Breath and Other (See Comments)     Cough    Bystolic [Nebivolol Hcl]     Cefdinir     Coreg [Carvedilol]     Crestor [Rosuvastatin]     Fenofibrate     Glucophage [Metformin]     Hydrochlorothiazide Other (See Comments)  Metronidazole     Norvasc [Amlodipine Besylate]     Tribenzor [Olmesartan-Amlodipine-Hctz]      Pt states that her chest felt funny and achy when she took the medication    Zocor [Simvastatin]      Precautions: falls    Date of Admit: 6/9/2022  Room #: 1019/1019-A      Current functional status at time of admit:        Home Living/DME Available:      Type of Home: House  Home Access: Stairs to enter with rails  Bathroom Shower/Tub: Walk-in shower  Bathroom Toilet: Standard  Bathroom Equipment: Built-in shower seat,Grab bars in shower,3-in-1 commode  Home Equipment: Reacher       IADL Hx:   Homemaking Responsibilities: Yes  Active : Yes  Mode of Transportation: SUV  Occupation: Retired  Leisure & Hobbies: Pt enjoys square dancing and crafts. Spouse:  Niranjan Centeno. Family: Daughters Tomer Jacobson and Umesh Amanda. Additional local family. Comments: LSW met with patient for admission assessment. Patient lives with  Niranjan Centeno in a 1-level home in [place]. There are 2+1 steps to enter with rail and no steps inside. Patient reports walk-in shower for bathing and bedroom and bathroom are on the main level. Patient has PCP, was independent in ADLs PTA, and uses [facility] in [place] for prescriptions. Patient states she has a built-in shower binu and grab bars at home (would like a BSC) and no pre-existing HHC. Patient reports access to food, utilities, and shelter and feels safe in her environment. BIMS completed in initial assessment. Plan is to D/C home with , with [facility] and DME as recommended by therapy staff. F/U to be set with Dr. Rancho Simon and family will transport home.     KASI Michael, JANELLEW, 6/13/2022, 3:40 PM

## 2022-06-13 NOTE — PROGRESS NOTES
Nephrology Progress Note        [address], [address] [address]  Phone: [phone]  Office Hours: 8:30AM - 4:30PM  Monday - Friday 6/13/2022 7:05 AM  Subjective:   Admit Date: 6/9/2022  PCP: Issac Aj DO  Interval History:   Resting  On NC    Diet: ADULT ORAL NUTRITION SUPPLEMENT; Breakfast, Lunch, Dinner; Diabetic Oral Supplement  ADULT DIET; Dysphagia - Soft and Bite Sized; 1800 ml      Data:   Scheduled Meds:   sodium chloride  1 g Oral BID WC    insulin lispro  0-3 Units SubCUTAneous Nightly    insulin lispro  0-6 Units SubCUTAneous TID WC    atorvastatin  20 mg Oral Daily    cetirizine  10 mg Oral Daily    escitalopram  10 mg Oral Daily    levothyroxine  137 mcg Oral Daily    lidocaine  1 patch TransDERmal Daily    metoprolol tartrate  150 mg Oral Daily    metoprolol tartrate  100 mg Oral Nightly    oxybutynin  5 mg Oral BID    pantoprazole  40 mg IntraVENous BID    rOPINIRole  3 mg Oral Nightly    sodium chloride flush  5-40 mL IntraVENous 2 times per day    sucralfate  1 g Oral 4 times per day    vitamin B-12  100 mcg Oral Daily    Vitamin D  1,000 Units Oral Daily    enoxaparin  40 mg SubCUTAneous Daily    sodium chloride flush  5-40 mL IntraVENous 2 times per day     Continuous Infusions:   sodium chloride      dextrose      sodium chloride       PRN Meds:sodium chloride, albuterol sulfate HFA, benzocaine-menthol, calcium carbonate, phenol, sodium chloride flush, dextrose, dextrose bolus **OR** dextrose bolus, glucagon (rDNA), glucose, ondansetron **OR** ondansetron, [Held by provider] oxyCODONE **OR** [Held by provider] oxyCODONE, sodium chloride, polyethylene glycol, sodium chloride flush, acetaminophen, bisacodyl, [Held by provider] melatonin  I/O last 3 completed shifts:  In: -   Out: 70 [Drains:70]  No intake/output data recorded.   No intake or output data in the 24 hours ending 06/13/22 0705    CBC:   Recent Labs     06/11/22  1303 WBC 12.0*   HGB 9.5*          BMP:    Recent Labs     06/11/22  1303 06/11/22  1303 06/11/22  1950 06/12/22  0618 06/12/22 1959   *   < > 124* 131* 131*   K 4.7   < > 4.6 4.1 4.9   CL 91*   < > 91* 96* 97*   CO2 27   < > 27 27 25   BUN 13  --   --   --   --    CREATININE 0.8  --   --   --   --    GLUCOSE 143*  --   --   --   --     < > = values in this interval not displayed.          Objective:   Vitals: BP (!) 127/47   Pulse 84   Temp 98.2 °F (36.8 °C) (Oral)   Resp 14   Ht 5' (1.524 m)   Wt 173 lb 15.1 oz (78.9 kg)   SpO2 100%   BMI 33.97 kg/m²   General appearance:  in no acute distress  HEENT: normocephalic, atraumatic,   Neck: supple, trachea midline  Lungs: c breathing comfortably on nc  Extremities: extremities atraumatic, no cyanosis or edema      Assessment and Plan:     Patient Active Problem List    Diagnosis Date Noted    Generalized weakness     Uncontrolled pain     Uncontrolled type 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)     Moderate malnutrition (HCC)     Chronic kidney disease, stage 3a ([address]     Acquired hypothyroidism     Reactive depression     Adenocarcinoma ([address] 06/09/2022    Partial small bowel obstruction (HCC)     Severe malnutrition ([address] 05/31/2022    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

## 2022-06-13 NOTE — PROGRESS NOTES
Progress Note( Dr. Samaria Silverio)  6/13/2022  Subjective:   Admit Date: 6/9/2022  PCP: Michael Aj DO    Admitted For : Admitted on 5/22/2022 for cecal mass  Transferred to ARU on evening of 6/9/2022    Consulted For: Had hypoglycemic episode/better control of blood glucose    Interval History: Patient doing a lot better. Started on almost regular diet as she is tolerating the food  well for last couple of days      Denies any chest pains,   Denies SOB . Patient has no more nausea or vomiting   diet was advanced to regular diet as tolerated  no new bowel or bladder symptoms. Intake/Output Summary (Last 24 hours) at 6/13/2022 0820  Last data filed at 6/13/2022 0723  Gross per 24 hour   Intake 100 ml   Output 1630 ml   Net -1530 ml       DATA    CBC:   Recent Labs     06/11/22  1303   WBC 12.0*   HGB 9.5*       CMP:  Recent Labs     06/11/22  1303 06/11/22  1303 06/11/22  1950 06/12/22  0618 06/12/22  1959   *   < > 124* 131* 131*   K 4.7   < > 4.6 4.1 4.9   CL 91*   < > 91* 96* 97*   CO2 27   < > 27 27 25   BUN 13  --   --   --   --    CREATININE 0.8  --   --   --   --    CALCIUM 8.8  --   --   --   --     < > = values in this interval not displayed.      Lipids:   Lab Results   Component Value Date    CHOL 138 05/02/2019    HDL 54 05/02/2019    TRIG 161 06/01/2022     Glucose:  Recent Labs     06/12/22  1626 06/12/22  2050 06/13/22  0736   POCGLU 136* 134* 131*     [id number]:  Lab Results   Component Value Date    LABA1C 5.9 05/22/2022     High Sensitivity TSH:   Lab Results   Component Value Date    TSHHS 1.470 06/12/2022     Free T3:   Lab Results   Component Value Date    FT3 2.3 12/06/2017     Free T4:  Lab Results   Component Value Date    T4FREE 1.24 06/12/2022       No orders to display        Scheduled Medicines   Medications:    sodium chloride  1 g Oral BID WC    insulin lispro  0-3 Units SubCUTAneous Nightly    insulin lispro  0-6 Units SubCUTAneous TID WC    atorvastatin 20 mg Oral Daily    cetirizine  10 mg Oral Daily    escitalopram  10 mg Oral Daily    levothyroxine  137 mcg Oral Daily    lidocaine  1 patch TransDERmal Daily    metoprolol tartrate  150 mg Oral Daily    metoprolol tartrate  100 mg Oral Nightly    oxybutynin  5 mg Oral BID    pantoprazole  40 mg IntraVENous BID    rOPINIRole  3 mg Oral Nightly    sodium chloride flush  5-40 mL IntraVENous 2 times per day    sucralfate  1 g Oral 4 times per day    vitamin B-12  100 mcg Oral Daily    Vitamin D  1,000 Units Oral Daily    enoxaparin  40 mg SubCUTAneous Daily    sodium chloride flush  5-40 mL IntraVENous 2 times per day      Infusions:    sodium chloride      dextrose      sodium chloride           Objective:   Vitals: /64   Pulse (!) 105   Temp 98.8 °F (37.1 °C) (Oral)   Resp 14   Ht 5' (1.524 m)   Wt 173 lb 15.1 oz (78.9 kg)   SpO2 100%   BMI 33.97 kg/m²   General appearance: alert and cooperative with exam  Neck: no JVD or bruit  Thyroid : Normal lobes   Lungs: Has Vesicular Breath sounds   Heart:  regular rate and rhythm  Abdomen: soft, yes -tender; bowel sounds normal; no masses,  no organomegaly  Had right-sided hemicolectomy 5/27/2022  Musculoskeletal: Normal  Extremities: extremities normal, , no edema  Neurologic:  Awake, alert, oriented to name, place and time. Cranial nerves II-XII are grossly intact. Motor is  intact. Sensory,  and gait is normal.    Assessment:     Patient Active Problem List:     Long-term insulin use in type 2 diabetes [facility])     Essential hypertension     Dyslipidemia     Abnormal EKG     Abnormal stress test     Cecum mass     Severe malnutrition (HCC)     Partial small bowel obstruction ([address]     Had hemicolectomy on 5/27/2022      INVASIVE ADENOCARCINOMA, TERMINAL ILEUM      Plan:     1. Reviewed POC blood glucose . Labs and X ray results   2. Reviewed Current Medicines   3. On  Correction bolus Humalog Insulin regime   4.  Monitor Blood glucose frequently   5. Modified  the dose of Insulin/ other medicines as needed   6. Going for xray of Lumbar spine   7. Will follow     .      Kendra Ross MD, MD

## 2022-06-13 NOTE — PROGRESS NOTES
Occupational Therapy    Physical Rehabilitation: OCCUPATIONAL THERAPY     [x] daily progress note       [] discharge       Patient Name:  Kylie Abel   :  1934 MRN: [id number]  Room:  [address] [place] [address] Date of Admission: 2022  Rehabilitation Diagnosis:   Other specified diseases of intestine [K63.89]  Adenocarcinoma ([facility] [C80.1]       Date 2022       Day of ARU Week:  5   Time IN/OUT [id number]   Individual Tx Minutes 52+24   Group Tx Minutes    Co-Treat Minutes    Concurrent Tx Minutes    TOTAL Tx Time Mins 68   Variance Time +16 (PT (Ena Bustamante) - wasn't able to get full time with patient   Variance Time []   Refusal due to:     []   Medical hold/reason:    []   Illness   []   Off Unit for test/procedure  []   Extra time needed to complete task  []   Therapeutic need  []   Other (specify):   Restrictions Restrictions/Precautions: Fall Risk,General Precautions         Communication with other providers: [x]   OK to see per nursing:     []   Spoke with team member regarding:      Subjective observations and cognitive status: Pt resting in bed on approach; pleasant and agreeable to therapy session. During session, nsg entered room to take patient down for an x-ray. PM: pt sitting up in chair on approach; Pt agreeable to therapy.     Pain level/location:   4 /10       Location: low back    Discharge recommendations  Anticipated discharge date:  TBD  Destination: []home alone   []home alone w assist prn   [] home w/ family    [] Continuous supervision       []SNF    [] Assisted living     [] Other:   Continued therapy: []HHC OT  []OUTPATIENT  OT   [] No Further OT  Equipment needs: TBD        ADLs:    UB/LB Bathing: Shower/Bathe Self  Assistance Needed: Partial/moderate assistance  Comment: Pt able to wash face, UB, thighs and distal LEs using figure 4 positioning; mod A to bathe bottom in stance  CARE Score: 3  Discharge Goal: Supervision or touching assistance    UB Dressing: Upper Body Dressing  Assistance Needed: Partial/moderate assistance  Comment: Assist to pull down in back  CARE Score: 3  Discharge Goal: Independent         LB Dressing: Lower Body Dressing  Assistance Needed: Substantial/maximal assistance  Comment: Pt able to thread BLEs into pants; pt required assist to don attends and manage pants over hips  CARE Score: 2  Discharge Goal: Supervision or touching assistance    Donning and Aynor Footwear: Putting On/Taking Off Footwear  Assistance Needed: Supervision or touching assistance  Comment: Pt able to doff/don socks using figure 4 position  CARE Score: 4  Discharge Goal: Independent      Toileting: declined need     Toilet Transfers: NA  Device Used:    []   Standard Toilet         []   Grab Bars           []  Bedside Commode       []   Elevated Toilet          []   Other:        Bed Mobility:           []   Pt received out of bed   Supine --> Sit:  SBA       Transfers:    Sit--> Stand: Mod A   Stand --> Sit:   Max A for controlled descent   Stand-Pivot: Mod A   Other:    Assistive device required for transfer:   RW       Functional Mobility:    Assistance:  NA   Device:   []   Jaswinder Estrada     []   Standard Walker []   Wheelchair        []   [employer]       []   4-Wheeled Ronnald Real         []   Cardiac Walker       []   Other:        Additional Therapeutic activities/exercises completed this date:     [x]   ADL Training   []   Balance/Postural training     []   Bed/Transfer Training   []   Endurance Training   []   Neuromuscular Re-ed   []   Nu-step:  Time:        Level:         #Steps:       []   Rebounder:    []  Seated     []  Standing        []   Supine Ther Ex (reps/sets):     [x]   Seated Ther Ex (reps/sets):   To increase BUE endurance for ADLs and transfers, pt completed 1 set x10 reps of the following therex, c a 2.5# weighted bar:   Shoulder presses  Chest presses  Shoulder abduction/adduction  Concentric arm circles (clockwise and counterclockwise)  Bicep curls     [] Standing Ther Ex (reps/sets):     []   Other:      Comments:      Patient/Caregiver Education and Training:   []   Adaptive Equipment Use  []   Bed Mobility/Transfer Technique/Safety  []   Energy Conservation Tips  []   Family training  []   Postural Awareness  []   Safety During Functional Activities  []   Reinforced Patient's Precautions   []   Progress was updated and reviewed in Rehabtracker with patient and/or family this         date. Treatment Plan for Next Session: Continue OT POC       Assessment: This pt demonstrated a positive response to today's treatment as evidenced by actively engaged in therapy session. The patient is making progress toward established goals as evidenced by QI scores. Ongoing deficits are observed in the areas of functional transfers, strength, balance and continued focus on this is recommended.        Treatment/Activity Tolerance:   [x] Tolerated treatment with no adverse effects    [] Patient limited by fatigue  [] Patient limited by pain   [] Patient limited by medical complications:    [] Adverse reaction to Tx:   [] Significant change in status    Safety:       []  bed alarm set    []  chair alarm set    []  Pt refused alarms                []  Telesitter activated      [x]  Gait belt used during tx session      []other:       Number of Minutes/Billable Intervention  Therapeutic Exercise    ADL Self-care 52   Neuro Re-Ed    Therapeutic Activity 24   Group    Other:    TOTAL 76       Social History  Social/Functional History  Lives With: Spouse  Type of Home: House  Home Layout: One level  Home Access: Stairs to enter with rails  Entrance Stairs - Number of Steps: 2 to porch, 1 into house  Entrance Stairs - Rails: Both  Bathroom Shower/Tub: Walk-in shower  Bathroom Toilet: Standard  Bathroom Equipment: Built-in shower seat,Grab bars in shower,3-in-1 commode  Bathroom Accessibility: Accessible  Home Equipment: Reacher  Has the patient had two or more falls in the past year or any fall with injury in the past year?: No (Pt has had one fall in the last year without significant injury.)  Receives Help From: Family (2 daughters in the area who assist prn.)  ADL Assistance: Independent  Homemaking Assistance: Independent  Homemaking Responsibilities: Yes  Meal Prep Responsibility: Primary  Laundry Responsibility: Primary (Shares responsibility with .)  Cleaning Responsibility: Primary (Shares responsibility with .)  Bill Paying/Finance Responsibility: Primary  Shopping Responsibility: Primary  Health Care Management: Primary (Pt uses a pillbox at baseline.)  Ambulation Assistance: Independent  Transfer Assistance: Independent  Active : Yes  Mode of Transportation: [employer]  Occupation: Retired  Type of Occupation: [employer])  Leisure & Hobbies: Pt enjoys square dancing and crafts. Additional Comments: Pt has regular flat bed at home that she reports is high. Pt has been sleeping in recliner chair recently d/t discomfort in bed. Objective                                                                                    Goals:  (Update in navigator)  Short Term Goals  Time Frame for Short term goals: STGs=LTGs:  Long Term Goals  Time Frame for Long term goals : 10-12 days or until d/c. Long Term Goal 1: Pt will complete grooming tasks c Mod I.  Long Term Goal 2: Pt will complete total body bathing c AE PRN and supervision. Long Term Goal 3: Pt will complete UB dressing c Mod I.  Long Term Goal 4: Pt will complete LB dressing c AE PRN and supervision. Long Term Goal 5: Pt will doff/don footwear c AE PRN and Mod I. Additional Goals?: Yes  Long Term Goal 6: Pt will complete toileting c supervision. Long Term Goal 7: Pt will perform functional transfers (bed, chair, toilet, shower) c DME PRN and supervision.   Long Term Goal 8: Pt will perform therex/therax to facilitate an increase in strength/endurance/ax tolerance (c emphasis on static/dynamic standing balance/tolerance > 6 mins) c supervision. Long Term Goal 9: Pt will complete light home management tasks c supervision.:        Plan of Care                                                                              Times per week: 5 days per week for a minimum of 60 minutes/day plus group as appropriate for 60 minutes.   Treatment to include Plan  Times per Day: Daily  Current Treatment Recommendations: Strengthening,Balance training,Functional mobility training,Endurance training,Safety education & training,Patient/Caregiver education & training,Equipment evaluation, education, & procurement,Positioning,Home management training,Self-Care / Renae Ledbetter re-education,Coordination training    Electronically signed by   KHURRAM Francois,  6/13/2022, 10:55 AM

## 2022-06-13 NOTE — PROGRESS NOTES
GENERAL SURGERY PROGRESS NOTE    Rubén Mina is a 80 y.o. 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 Subjective: Had episode of confusion over the weekend, she is better today. Sitting up in bed eating breakfast.      Objective:    Vitals: VITALS:  /64   Pulse (!) 105   Temp 98.8 °F (37.1 °C) (Oral)   Resp 14   Ht 5' (1.524 m)   Wt 173 lb 15.1 oz (78.9 kg)   SpO2 100%   BMI 33.97 kg/m²     I/O: 06/12 0701 - 06/13 0700  In: 100 [P.O.:100]  Out: 30 [Drains:30]    Labs/Imaging Results:   Lab Results   Component Value Date     06/13/2022    K 4.0 06/13/2022    K 4.4 03/15/2018    CL 95 06/13/2022    CO2 26 06/13/2022    BUN 10 06/13/2022    CREATININE 0.6 06/13/2022    GLUCOSE 123 06/13/2022    CALCIUM 8.2 06/13/2022      Lab Results   Component Value Date    WBC 12.0 (H) 06/11/2022    HGB 9.5 (L) 06/11/2022    HCT 28.5 (L) 06/11/2022    MCV 86.4 06/11/2022     06/11/2022         IV Fluids: sodium chloride    dextrose    sodium chloride    Scheduled Meds:   sodium chloride, 1 g, Oral, BID WC    insulin lispro, 0-3 Units, SubCUTAneous, Nightly    insulin lispro, 0-6 Units, SubCUTAneous, TID WC    atorvastatin, 20 mg, Oral, Daily    cetirizine, 10 mg, Oral, Daily    escitalopram, 10 mg, Oral, Daily    levothyroxine, 137 mcg, Oral, Daily    lidocaine, 1 patch, TransDERmal, Daily    metoprolol tartrate, 150 mg, Oral, Daily    metoprolol tartrate, 100 mg, Oral, Nightly    oxybutynin, 5 mg, Oral, BID    pantoprazole, 40 mg, IntraVENous, BID    rOPINIRole, 3 mg, Oral, Nightly    sodium chloride flush, 5-40 mL, IntraVENous, 2 times per day    sucralfate, 1 g, Oral, 4 times per day    vitamin B-12, 100 mcg, Oral, Daily    Vitamin D, 1,000 Units, Oral, Daily    enoxaparin, 40 mg, SubCUTAneous, Daily    sodium chloride flush, 5-40 mL, IntraVENous, 2 times per day    Physical Exam:  General: Patient is confused today, no distress. HEENT: Anicteric sclerae, MMM. Extremities: No edema bilat LE. Abdomen: Soft, appropriately tender, mildly distended. Incisions intact with staples. Multiple skin tears from tape. CLAUDIO drain in place, serous    Assessment and Plan:  80 y.o. female s/p robotic assisted right colectomy. Doing ok. Patient Active Problem List:     Long-term insulin use in type 2 diabetes ([address]     Essential hypertension     Dyslipidemia     Abnormal EKG     Abnormal stress test     Cecum mass    - Discussed findings and options with Jeremi Soto. - Intake is improving, continue to encourage eating. Will advance diet  - OK to remove CLAUDIO drain and staples.   - Continue working with therapy    Mary Barnes, LAURE - CNP

## 2022-06-13 NOTE — PROGRESS NOTES
Physical Therapy  . [x] daily progress note       [] discharge       Patient Name:  Yaz Webster   :  1934 MRN: [id number]  Room:  [address] [place] [address] Date of Admission: 2022  Rehabilitation Diagnosis:   Other specified diseases of intestine [K63.89]  Adenocarcinoma ([facility] [address] [C80.1]       Date 2022       Day of ARU Week:  5   Time IN/OUT 1127/1201, 1158/1201, 1518/1525   Individual Tx Minutes 34, 63, 7   Group Tx Minutes    Co-Treat Minutes    Concurrent Tx Minutes    TOTAL Tx Time Mins 94   Variance Time    Variance Time []   Refusal due to:     []   Medical hold/reason:    []   Illness   []   Off Unit for test/procedure  []   Extra time needed to complete task  []   Therapeutic need  []   Other (specify):   Restrictions Restrictions/Precautions  Restrictions/Precautions: Fall Risk,General Precautions      Interdisciplinary communication [x]   Cleared for therapy per nursing     []   RN notified about issues during session  [x]   RN updated on pt performance: Gave information regarding O2, BP and application of Biofreeze to low back at end of late pm session  []   Spoke with   []   Spoke with OT  []   Spoke with MD  []   Other:    Subjective observations and cognitive status: Pt had rapid response called on  and remained on the unit. Pt scheduled time was 11:00 but she was not back from XR. At 11:27 pt coming out of bathroom with aide, very fatigued, but agreeable to therapy to tolerance. Noted from CBC today that sodium is dropping again. Pt is on 2L O2, 100% saturation. Nursing ok'd to check on RA, sats 95-97%. Multiple attempts needed with coordination with OT to get full minutes in today     Pain level/location:  8  /10       Location: L posterior rib/low back with some movements. Pain appears to be muscular in nature.  Provided/applied Biofreeze at end of afternoon   Discharge recommendations  Anticipated discharge date: tbd  Destination: []home alone   []home alone with assist PRN [x] home w/ family  But spouse cannot assist    [] Continuous supervision  []SNF    [] Assisted living     [] Other:  Continued therapy: [x]HHC PT  []OUTPATIENT PT   [] No Further PT  []SNF PT  Caregiver training recommended: [x]Yes  [] No   Equipment needs: 2ww, w/c     PT IRF-CARSON scores and goals for discharge assessment:   Roll Left and Right  Assistance Needed: Supervision or touching assistance  Comment: supervision  CARE Score: 4  Discharge Goal: Independent    Sit to Lying  Assistance Needed: Partial/moderate assistance  Comment: min assist  CARE Score: 3  Discharge Goal: Independent    Lying to Sitting on Side of Bed  Assistance Needed: Partial/moderate assistance  Comment: mod assist  CARE Score: 3  Discharge Goal: Independent    Sit to Stand  Assistance Needed: Partial/moderate assistance  Comment: mod assist from w/c with max verbal cues  CARE Score: 3  Discharge Goal: Independent    Chair/Bed-to-Chair Transfer  Assistance Needed: Partial/moderate assistance  Comment: mod assist  CARE Score: 3  Discharge Goal: Independent        Car Transfer  Assistance Needed: Partial/moderate assistance  Comment: approaching with 2ww, no assist for LEs, min assist to stand from car  CARE Score: 3  Discharge Goal: Independent    Walk 10 Feet?   Walk 10 Feet?: Yes         Picking Up Object  Assistance Needed: Supervision or touching assistance  Comment: CG with 2ww and reacher  CARE Score: 4  Discharge Goal: Independent    Wheelchair Ability  Uses a Wheelchair and/or Scooter?: No                        Walking Ability  Does the Patient Walk?: Yes    Walk 10 Feet  Assistance Needed: Partial/moderate assistance  Comment: min assist with 2ww  CARE Score: 3  Discharge Goal: Independent    Walk 50 Feet with Two Turns  Assistance Needed: Supervision or touching assistance  Comment: CG with 2ww, leaning to R , but improved from eval, cues for feet inside walker on turns  CARE Score: 4  Discharge Goal: Independent    Walk 150 Feet-101' max distance today  Reason if not Attempted: Not attempted due to medical condition or safety concerns  CARE Score: 88  Discharge Goal: Supervision or touching assistance    Walking 10 Feet on Uneven Surfaces  Assistance Needed: Supervision or touching assistance  Comment: CG assist with 2ww  CARE Score: 4  Discharge Goal: Supervision or touching assistance    1 Step (Curb)  Assistance Needed: Partial/moderate assistance  Comment: min assist with wlkr with 75% cues  CARE Score: 3  Discharge Goal: Supervision or touching assistance    4 Steps  Assistance Needed: Partial/moderate assistance  Comment: mod assist due to LLE weakness, 75% cues  CARE Score: 3  Discharge Goal: Supervision or touching assistance    12 Steps  Reason if not Attempted: Not attempted due to medical condition or safety concerns  CARE Score: 88  Discharge Goal: Supervision or touching assistance      Additional Therapeutic activities/exercises completed this date:     []   Nu-step:  Time:        Level:         #Steps:       []   Rebounder:    []  Seated     []  Standing        []   Balance training         []   Postural training    []   Supine ther ex (reps/sets):     []   Seated ther ex (reps/sets):     []   Standing ther ex (reps/sets):     []   Other:  Toileting activity completed with    []   Other:    Comments:      Patient/Caregiver Education and Training:   []   Role of PT  [x]   Education about Dx  [x]   Use of call light for assist   []   HEP provided and explained   [x]   Treatment plan reviewed  []   Home safety  []   Wheelchair mobility/management   [x]   Body mechanics: prolonged training today for transfer setup of feet and \"none over toes\" with improvement to sit to stand performance, but need for cues to remember how to set up  [x]   Bed Mobility/Transfer technique  [x]   Gait technique/sequencing  [x]   Proper use of assistive device/adaptive equipment  [x]   Stair training/Advanced mobility safety and technique  [] Reinforced patient's precautions/mobility while maintaining precautions  []   Postural awareness  [x]   Family/caregiver training: updated family on her performance in PT  []   Progress was updated and reviewed in Rehabtracker with patient and/or family this date. []   Other:    Treatment Plan for Next Session: Progression of gait distance, bed mobility training, sit to stand reinforcement of set up     Assessment: This pt demonstrated a   positive response to today's treatment as evidenced by improvement in sit to stand and walking distance/walking posture and balance. The patient is making progress toward established goals as evidenced by QI scores. Ongoing deficits are observed in the areas of L back pain, balance, activity tolerance and continued focus on transfers, bed mobility are recommended.        Treatment/Activity Tolerance:   [] Tolerated treatment with no adverse effects    [x] Patient limited by fatigue  [x] Patient limited by pain   [] Patient limited by medical complications:    [] Adverse reaction to Tx:   [] Significant change in status    Safety:       []  bed alarm set    [x]  chair alarm set    []  Pt refused alarms                []  Telesitter activated      [x]  Gait belt used during tx session      []other:       Number of Minutes/Billable Intervention  Gait Training 40   Therapeutic Exercise 7   Neuro Re-Ed    Therapeutic Activity 57   Wheelchair Propulsion    Group    Other:    TOTAL 104     Social History  Social/Functional History  Lives With: Spouse  Type of Home: House  Home Layout: One level  Home Access: Stairs to enter with rails  Entrance Stairs - Number of Steps: 2 to porch, 1 into house  Entrance Stairs - Rails: Both  Bathroom Shower/Tub: Walk-in shower  Bathroom Toilet: Standard  Bathroom Equipment: Built-in shower seat,Grab bars in shower,3-in-1 commode  Bathroom Accessibility: Accessible  Home Equipment: Reacher  Has the patient had two or more falls in the past year or any fall with injury in the past year?: No (Pt has had one fall in the last year without significant injury.)  Receives Help From: Family (2 daughters in the area who assist prn.)  ADL Assistance: Independent  Homemaking Assistance: Independent  Homemaking Responsibilities: Yes  Meal Prep Responsibility: Primary  Laundry Responsibility: Primary (Shares responsibility with .)  Cleaning Responsibility: Primary (Shares responsibility with .)  Bill Paying/Finance Responsibility: Primary  Shopping Responsibility: Primary  Health Care Management: Primary (Pt uses a pillbox at baseline.)  Ambulation Assistance: Independent  Transfer Assistance: Independent  Active : Yes  Mode of Transportation: SUV  Occupation: Retired  Type of Occupation: [employer] ([employer])  Leisure & Hobbies: Pt enjoys square dancing and crafts. Additional Comments: Pt has regular flat bed at home that she reports is high. Pt has been sleeping in recliner chair recently d/t discomfort in bed. Objective                                                                                    Goals:  (Update in navigator)   : Long Term Goals  Time Frame for Long term goals : 7-10 days STG=LTG  Long term goal 1: Pt will perform bed mobility with mod I  Long term goal 2: pt will perform sit to stand, pivot and car transfers with mod I  Long term goal 3: pt will perform ambulation with 2ww 50' on levels with mod I, 150' on levels and 10' on unlevels with supervision  Long term goal 4: Pt will ascend/descend curb step and up to 12 steps with rail with supervision  Long term goal 5: Pt will retrieve light item with reacher and 2ww with mod I:        Plan of Care                                                                              Times per week: 5 days per week for a minimum of 60 minutes/day plus group as appropriate for 60 minutes.   Treatment to include      Electronically signed by   Lashonda Werner PT,   6/13/2022, 3:34 PM

## 2022-06-14 ENCOUNTER — APPOINTMENT (OUTPATIENT)
Dept: ULTRASOUND IMAGING | Age: 87
DRG: 374 | End: 2022-06-14
Attending: PHYSICAL MEDICINE & REHABILITATION
Payer: MEDICARE

## 2022-06-14 LAB
ANION GAP SERPL CALCULATED.3IONS-SCNC: 7 MMOL/L (ref 4–16)
BUN BLDV-MCNC: 37 MG/DL (ref 6–23)
CALCIUM SERPL-MCNC: 8.5 MG/DL (ref 8.3–10.6)
CHLORIDE BLD-SCNC: 97 MMOL/L (ref 99–110)
CO2: 27 MMOL/L (ref 21–32)
CREAT SERPL-MCNC: 0.6 MG/DL (ref 0.6–1.1)
GFR AFRICAN AMERICAN: >60 ML/MIN/1.73M2
GFR NON-AFRICAN AMERICAN: >60 ML/MIN/1.73M2
GLUCOSE BLD-MCNC: 103 MG/DL (ref 70–99)
GLUCOSE BLD-MCNC: 125 MG/DL (ref 70–99)
GLUCOSE BLD-MCNC: 139 MG/DL (ref 70–99)
GLUCOSE BLD-MCNC: 139 MG/DL (ref 70–99)
GLUCOSE BLD-MCNC: 99 MG/DL (ref 70–99)
POTASSIUM SERPL-SCNC: 4 MMOL/L (ref 3.5–5.1)
SODIUM BLD-SCNC: 131 MMOL/L (ref 135–145)

## 2022-06-14 PROCEDURE — 36592 COLLECT BLOOD FROM PICC: CPT

## 2022-06-14 PROCEDURE — 97530 THERAPEUTIC ACTIVITIES: CPT

## 2022-06-14 PROCEDURE — 1280000000 HC REHAB R&B

## 2022-06-14 PROCEDURE — 6360000002 HC RX W HCPCS: Performed by: STUDENT IN AN ORGANIZED HEALTH CARE EDUCATION/TRAINING PROGRAM

## 2022-06-14 PROCEDURE — 97110 THERAPEUTIC EXERCISES: CPT

## 2022-06-14 PROCEDURE — 6370000000 HC RX 637 (ALT 250 FOR IP): Performed by: PHYSICAL MEDICINE & REHABILITATION

## 2022-06-14 PROCEDURE — 97116 GAIT TRAINING THERAPY: CPT

## 2022-06-14 PROCEDURE — 6370000000 HC RX 637 (ALT 250 FOR IP): Performed by: STUDENT IN AN ORGANIZED HEALTH CARE EDUCATION/TRAINING PROGRAM

## 2022-06-14 PROCEDURE — 6370000000 HC RX 637 (ALT 250 FOR IP): Performed by: INTERNAL MEDICINE

## 2022-06-14 PROCEDURE — 80048 BASIC METABOLIC PNL TOTAL CA: CPT

## 2022-06-14 PROCEDURE — 97112 NEUROMUSCULAR REEDUCATION: CPT

## 2022-06-14 PROCEDURE — 82962 GLUCOSE BLOOD TEST: CPT

## 2022-06-14 PROCEDURE — 93971 EXTREMITY STUDY: CPT

## 2022-06-14 PROCEDURE — C9113 INJ PANTOPRAZOLE SODIUM, VIA: HCPCS | Performed by: STUDENT IN AN ORGANIZED HEALTH CARE EDUCATION/TRAINING PROGRAM

## 2022-06-14 PROCEDURE — 2580000003 HC RX 258: Performed by: STUDENT IN AN ORGANIZED HEALTH CARE EDUCATION/TRAINING PROGRAM

## 2022-06-14 PROCEDURE — 97535 SELF CARE MNGMENT TRAINING: CPT

## 2022-06-14 PROCEDURE — 94761 N-INVAS EAR/PLS OXIMETRY MLT: CPT

## 2022-06-14 PROCEDURE — 99233 SBSQ HOSP IP/OBS HIGH 50: CPT | Performed by: PHYSICAL MEDICINE & REHABILITATION

## 2022-06-14 RX ADMIN — SODIUM CHLORIDE, PRESERVATIVE FREE 10 ML: 5 INJECTION INTRAVENOUS at 20:58

## 2022-06-14 RX ADMIN — SODIUM CHLORIDE 1 G: 1 TABLET ORAL at 08:47

## 2022-06-14 RX ADMIN — ESCITALOPRAM OXALATE 10 MG: 10 TABLET ORAL at 08:47

## 2022-06-14 RX ADMIN — LEVOTHYROXINE SODIUM 137 MCG: 0.11 TABLET ORAL at 05:55

## 2022-06-14 RX ADMIN — ENOXAPARIN SODIUM 40 MG: 100 INJECTION SUBCUTANEOUS at 08:48

## 2022-06-14 RX ADMIN — SODIUM CHLORIDE 1 G: 1 TABLET ORAL at 17:35

## 2022-06-14 RX ADMIN — SUCRALFATE 1 G: 1 TABLET ORAL at 05:55

## 2022-06-14 RX ADMIN — SUCRALFATE 1 G: 1 TABLET ORAL at 17:35

## 2022-06-14 RX ADMIN — SODIUM CHLORIDE, PRESERVATIVE FREE 10 ML: 5 INJECTION INTRAVENOUS at 08:54

## 2022-06-14 RX ADMIN — PANTOPRAZOLE SODIUM 40 MG: 40 INJECTION, POWDER, FOR SOLUTION INTRAVENOUS at 10:38

## 2022-06-14 RX ADMIN — ROPINIROLE HYDROCHLORIDE 3 MG: 1 TABLET, FILM COATED ORAL at 20:57

## 2022-06-14 RX ADMIN — ACETAMINOPHEN 650 MG: 325 TABLET ORAL at 05:55

## 2022-06-14 RX ADMIN — METOPROLOL TARTRATE 150 MG: 50 TABLET, FILM COATED ORAL at 08:47

## 2022-06-14 RX ADMIN — Medication 15 G: at 08:47

## 2022-06-14 RX ADMIN — SUCRALFATE 1 G: 1 TABLET ORAL at 00:13

## 2022-06-14 RX ADMIN — OXYBUTYNIN CHLORIDE 5 MG: 5 TABLET ORAL at 08:47

## 2022-06-14 RX ADMIN — Medication 15 G: at 20:57

## 2022-06-14 RX ADMIN — CETIRIZINE HYDROCHLORIDE 10 MG: 10 TABLET, FILM COATED ORAL at 08:47

## 2022-06-14 RX ADMIN — SODIUM CHLORIDE, PRESERVATIVE FREE 10 ML: 5 INJECTION INTRAVENOUS at 20:59

## 2022-06-14 RX ADMIN — ATORVASTATIN CALCIUM 20 MG: 10 TABLET, FILM COATED ORAL at 08:47

## 2022-06-14 RX ADMIN — OXYBUTYNIN CHLORIDE 5 MG: 5 TABLET ORAL at 20:57

## 2022-06-14 RX ADMIN — SODIUM CHLORIDE, PRESERVATIVE FREE 10 ML: 5 INJECTION INTRAVENOUS at 08:47

## 2022-06-14 RX ADMIN — SUCRALFATE 1 G: 1 TABLET ORAL at 12:42

## 2022-06-14 RX ADMIN — PANTOPRAZOLE SODIUM 40 MG: 40 INJECTION, POWDER, FOR SOLUTION INTRAVENOUS at 20:58

## 2022-06-14 RX ADMIN — METOPROLOL TARTRATE 100 MG: 50 TABLET, FILM COATED ORAL at 20:57

## 2022-06-14 RX ADMIN — Medication 100 MCG: at 08:47

## 2022-06-14 RX ADMIN — Medication 15 G: at 12:41

## 2022-06-14 RX ADMIN — Medication 1000 UNITS: at 08:47

## 2022-06-14 ASSESSMENT — PAIN DESCRIPTION - LOCATION: LOCATION: ABDOMEN;BACK

## 2022-06-14 ASSESSMENT — PAIN SCALES - GENERAL
PAINLEVEL_OUTOF10: 0
PAINLEVEL_OUTOF10: 3

## 2022-06-14 NOTE — CARE COORDINATION
LSW met with patient following Care Conference. LSW informed patient of recommendations for a RW, BSC, and possibly a WC. Patient agreeable. LSW then informed of recommendation for [facility] PT, OT, and Nursing and patient is agreeable to all. Patient verbalized understanding and will choose an agency closer to discharge. D/C Plan:  Date:  June 23rd  DME:  RW, BSC, WC? [facility] DME)  HHC:  PT, OT, Nursing (Agency TBD)  To:   Home with  (family will transport)

## 2022-06-14 NOTE — PROGRESS NOTES
Comprehensive Nutrition Assessment    Type and Reason for Visit:  Reassess    Nutrition Recommendations/Plan:   1. Continue soft diet as tolerates  2. Will continue to offer oral nutrition supplement during stay  3. Encourage consistent intake  4. Will continue to follow up during stay     Malnutrition Assessment:  Malnutrition Status:  Insufficient data (recent dx severe malnutrition) (06/10/22 1536)    Context:  Acute Illness       Nutrition Assessment:    Tolerating soft and bite sized diet, meal intake 50-75%. Patient trying to slowly increase intake, will drink oral nutrition supplement also. Will follow at moderate nutrition risk with advanced diet and improving intake. Nutrition Related Findings:    up in chair waiting on lunch, Na+ has been low receiving NaCl tablets Wound Type: Surgical Incision       Current Nutrition Intake & Therapies:    Average Meal Intake: 51-75%  Average Supplements Intake: 51-75%  ADULT ORAL NUTRITION SUPPLEMENT; Breakfast, Lunch, Dinner; Diabetic Oral Supplement  ADULT DIET; Dysphagia - Soft and Bite Sized; 1800 ml    Anthropometric Measures:  Height: 5' (152.4 cm)  Ideal Body Weight (IBW): 100 lbs (45 kg)    Admission Body Weight: 151 lb 10.8 oz (68.8 kg) (from acute stay)  Current Body Weight: 153 lb 10.6 oz (69.7 kg), 157.8 % IBW. Weight Source: Bed Scale  Current BMI (kg/m2): 30  Usual Body Weight: 160 lb (72.6 kg) (per hx)  % Weight Change (Calculated): -1.3  Weight Adjustment For: No Adjustment                 BMI Categories: Obese Class 1 (BMI 30.0-34. 9)    Estimated Daily Nutrient Needs:  Energy Requirements Based On: Formula  Weight Used for Energy Requirements: Current  Energy (kcal/day): 0825-0654  Weight Used for Protein Requirements: Ideal  Protein (g/day): 54-67 (1.2-1.5 g/kg)  Method Used for Fluid Requirements: 1 ml/kcal  Fluid (ml/day): 1500    Nutrition Diagnosis:   · Inadequate oral intake related to altered GI function as evidenced by poor intake prior to admission,weight loss,other (comment) (severe malnutrition)      Nutrition Interventions:   Food and/or Nutrient Delivery: Continue Current Diet,Continue Oral Nutrition Supplement  Nutrition Education/Counseling: Education initiated  Coordination of Nutrition Care: Continue to monitor while inpatient  Plan of Care discussed with: patient- new diet order today able to try solid foods    Goals:  Previous Goal Met: Progressing toward Goal(s)  Goals: PO intake 50% or greater,by next RD assessment       Nutrition Monitoring and Evaluation:      Food/Nutrient Intake Outcomes: Food and Nutrient Intake,Supplement Intake,Diet Advancement/Tolerance  Physical Signs/Symptoms Outcomes: Biochemical Data,Meal Time Behavior,Skin,Weight,GI Status    Discharge Planning:    Continue Oral Nutrition Supplement     Ruth Haines, [address] [place] [address],   Contact: [phone]

## 2022-06-14 NOTE — PROGRESS NOTES
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 She reports better alertness. Prior (baseline) level of function: Independent. Current level of function:         Current  IRF-CARSON and Goals:   Occupational Therapy:    Short Term Goals  Time Frame for Short term goals: STGs=LTGs :   Long Term Goals  Time Frame for Long term goals : 10-12 days or until d/c. Long Term Goal 1: Pt will complete grooming tasks c Mod I.  Long Term Goal 2: Pt will complete total body bathing c AE PRN and supervision. Long Term Goal 3: Pt will complete UB dressing c Mod I.  Long Term Goal 4: Pt will complete LB dressing c AE PRN and supervision. Long Term Goal 5: Pt will doff/don footwear c AE PRN and Mod I. Additional Goals?: Yes  Long Term Goal 6: Pt will complete toileting c supervision. Long Term Goal 7: Pt will perform functional transfers (bed, chair, toilet, shower) c DME PRN and supervision. Long Term Goal 8: Pt will perform therex/therax to facilitate an increase in strength/endurance/ax tolerance (c emphasis on static/dynamic standing balance/tolerance > 6 mins) c supervision. Long Term Goal 9: Pt will complete light home management tasks c supervision. :                                       Eating: Eating  Assistance Needed: Independent  Comment: X  CARE Score: 6  Discharge Goal: Independent       Oral Hygiene: Oral Hygiene  Assistance Needed: Independent  Comment:  Mod I  CARE Score: 6  Discharge Goal: Independent    UB/LB Bathing: Shower/Bathe Self  Assistance Needed: Supervision or touching assistance  Comment: Pt completed sinkside ADL; CGA in stance to wash bottom  CARE Score: 4  Discharge Goal: Supervision or touching assistance    UB Dressing: Upper Body Dressing  Assistance Needed: Supervision or touching assistance  Comment: Pt able to don pullover shirt; cues to pull down in front  CARE Score: 4  Discharge Goal: Independent         LB Dressing: Lower Body Dressing  Assistance Needed: Partial/moderate assistance  Comment: Pt able to thread BLEs into brief and pants; Pt able to manage briefs over hips c CGA; pt required min A to manage pants over hips d/t pants being snug  CARE Score: 3  Discharge Goal: Supervision or touching assistance    Donning and [place] Footwear: Putting On/Taking Off Footwear  Assistance Needed: Setup or clean-up assistance  Comment: Pt able to doff/don socks using figure 4 position  CARE Score: 5  Discharge Goal: Independent      Toiletin [address] needed: Partial/moderate assistance  Comment: CGA to manage depends down; min A for thoroughness c BM hygiene  CARE Score: 3  Discharge Goal: Supervision or touching assistance      Toilet Transfers:   Toilet Transfer  Assistance needed: Supervision or touching assistance  Comment: CGA using RW and grab bars  CARE Score: 4  Discharge Goal: Supervision or touching assistance    Physical Therapy:         Long Term Goals  Time Frame for Long term goals : 7-10 days STG=LTG  Long term goal 1: Pt will perform bed mobility with mod I  Long term goal 2: pt will perform sit to stand, pivot and car transfers with mod I  Long term goal 3: pt will perform ambulation with 2ww 50' on levels with mod I, 150' on levels and 10' on unlevels with supervision  Long term goal 4: Pt will ascend/descend curb step and up to 12 steps with rail with supervision  Long term goal 5: Pt will retrieve light item with reacher and 2ww with mod I      Bed Mobility:   Sit to Lying  Assistance Needed: Partial/moderate assist with wlkr with 75% cues  CARE Score: 3  Discharge Goal: Supervision or touching assistance     4 Steps  Assistance Needed: Partial/moderate assistance  Comment: mod assist due to LLE weakness, 75% cues  CARE Score: 3  Discharge Goal: Supervision or touching assistance     12 Steps  Reason if not Attempted: Not attempted due to medical condition or safety concerns  CARE Score: 88  Discharge Goal: Supervision or touching assistance       Wheelchair:  w/c Ability: Wheelchair Ability  Uses a Wheelchair and/or Scooter?: No                Balance:        Object: Picking Up Object  Assistance Needed: Supervision or touching assistance  Comment: CG with 2ww and reacher  CARE Score: 4  Discharge Goal: Independent    I      Exam:    Blood pressure (!) 138/52, pulse (!) 104, temperature 97.9 °F (36.6 °C), temperature source Oral, resp. rate 16, height 5' (1.524 m), weight 153 lb 10.6 oz (69.7 kg), SpO2 98 %, not currently breastfeeding. General: Sitting up in a wheelchair. Attending to the therapy activity before her. Alert. HEENT: Gazing right and left. Soft-spoken but intelligible. No JVD. Pulmonary: Shallow respirations without wheezes or rales. Cardiac: RRR. Abdomen: Patient's abdomen is soft and nondistended. Bowel sounds were present throughout. There was no rebound, guarding or masses noted. Upper extremities: Her right upper limb demonstrates 3+ pitting edema from the mid humerus to the fingertips. She has a PICC line in her right upper inner arm now. Lower extremities: Trace edema. No signs of DVT. No reflexes. Sitting balance was good. Standing balance was poor.     Lab Results   Component Value Date    WBC 12.0 (H) 06/11/2022    HGB 9.5 (L) 06/11/2022    HCT 28.5 (L) 06/11/2022    MCV 86.4 06/11/2022     06/11/2022     Lab Results   Component Value Date    INR 1.09 05/14/2021    INR 1.09 12/09/2016    PROTIME 12.4 05/14/2021    PROTIME 12.7 12/09/2016     Lab Results   Component Value Date    CREATININE 0.6 06/14/2022    BUN 37 (H) 06/14/2022     (L) 06/14/2022    K 4.0 06/14/2022    CL 97 (L) 06/14/2022    CO2 27 06/14/2022     Lab Results   Component Value Date    ALT 11 06/08/2022    AST 14 (L) 06/08/2022    ALKPHOS 50 06/08/2022    BILITOT 0.2 06/08/2022       Expected length of stay  prior to a supervised level of function for discharge home with a walker and [address] OT/PT is 6/23/2022. Recommendations:    1. 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 Right upper limb is quite concerning, however. I will get a venous Doppler study to further evaluate the swelling. Venous thrombosis is certainly possible. The surgical service and I both agree there are no incisional signs of infection. Her chest x-ray and chemistries have been consistent. Generally continent of bowel and bladder.  Ongoing cautious pain management.  We must provide aggressive pulmonary hygiene measures, nutritional support and DVT prophylaxis.  Outpatient follow-up with oncology and her surgeon.  Verbal cues and minimum to moderate physical assistance for transfers today. 2. DVT prophylaxis.  Lovenox 40 mg subcu daily.  I must monitor her hemoglobin and platelet count periodically while on this medication.  Weightbearing activities are slowly improving daily.  GI prophylaxis is available.  The significant right upper limb swelling is concerning. 3. Uncontrolled pain: Limited use of oral analgesics to avoid delirium.   Attention to bowel intervention while on the analgesics. 4. 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  Blood sugars are checked at mealtime and bedtime.  She is on a Humalog sliding scale with plans for reintroducing oral agents from home when she is eating more consistently.   5. Chronic kidney disease stage IIIa: Avoiding nephrotoxic medications and wide swings of blood pressure.  Encouraging consistent oral hydration.  Periodic monitoring of her chemistries. 6. Hypertension: She is currently off medications to control her blood pressure.  Vital signs are checked at rest and with activity.  Target systolic blood pressures 573-850.  NMWEL pressure is within the target range today.  Monitoring closely.      Counseling and Coordination of Care: In care conference today I met with the patient's OT, PT, RN and . We discussed the patient's problems, progress and prognosis. Disposition issues were clarified and plans were established for ongoing rehabilitation efforts beyond the ARU stay. I reviewed this information with the patient during a second distinct visit with the patient. More than half of the total time of 35 minutes spent with the patient involved counseling and coordination of care.

## 2022-06-14 NOTE — PROGRESS NOTES
Occupational Therapy    Physical Rehabilitation: OCCUPATIONAL THERAPY     [x] daily progress note       [] discharge       Patient Name:  Mere Doyle   :  1934 MRN: [id number]  Room:  [address] [place] [address] Date of Admission: 2022  Rehabilitation Diagnosis:   Other specified diseases of intestine [K63.89]  Adenocarcinoma ([facility] [address] [C80.1]       Date 2022       Day of ARU Week:  6   Time IN/OUT 8968-5099   Individual Tx Minutes 60   Group Tx Minutes    Co-Treat Minutes    Concurrent Tx Minutes    TOTAL Tx Time Mins 60   Variance Time    Variance Time []   Refusal due to:     []   Medical hold/reason:    []   Illness   []   Off Unit for test/procedure  []   Extra time needed to complete task  []   Therapeutic need  []   Other (specify):   Restrictions Restrictions/Precautions: Fall Risk,General Precautions         Communication with other providers: [x]   OK to see per nursing:     []   Spoke with team member regarding:      Subjective observations and cognitive status: Pt sitting up in chair on approach; pleasant and agreeable to therapy. Upon arrival therapist noticed fluid build up in R UE. Nsg notified as well as Dr. Sis Morales. Pain level/location:    /10       Location:    Discharge recommendations  Anticipated discharge date:  TBD  Destination: []home alone   []home alone w assist prn   [] home w/ family    [] Continuous supervision       []SNF    [] Assisted living     [] Other:   Continued therapy: []HHC OT  []OUTPATIENT  OT   [] No Further OT  Equipment needs: none. ADLs:    Eating: Eating  Assistance Needed: Independent  Comment: X  CARE Score: 6  Discharge Goal: Independent       Oral Hygiene: Oral Hygiene  Assistance Needed: Independent  Comment:  Mod I  CARE Score: 6  Discharge Goal: Independent    UB/LB Bathing: Shower/Bathe Self  Assistance Needed: Supervision or touching assistance  Comment: Pt completed sinkside ADL; CGA in stance to wash bottom  CARE Score: 4  Discharge Goal: Supervision or touching assistance    UB Dressing: Upper Body Dressing  Assistance Needed: Supervision or touching assistance  Comment: Pt able to don pullover shirt; cues to pull down in front  CARE Score: 4  Discharge Goal: Independent         LB Dressing: Lower Body Dressing  Assistance Needed: Partial/moderate assistance  Comment: Pt able to thread BLEs into brief and pants; Pt able to manage briefs over hips c CGA; pt required min A to manage pants over hips d/t pants being snug  CARE Score: 3  Discharge Goal: Supervision or touching assistance    Donning and [place] Footwear: Putting On/Taking Off Footwear  Assistance Needed: Setup or clean-up assistance  Comment: Pt able to doff/don socks using figure 4 position  CARE Score: 5  Discharge Goal: Independent      Toile[address] needed: Partial/moderate assistance  Comment: CGA to manage depends down; min A for thoroughness c BM hygiene  CARE Score: 3  Discharge Goal: Supervision or touching assistance      Toilet Transfers:   CGA Toilet Transfer  Assistance needed: Supervision or touching assistance  Comment: CGA using RW and grab bars  CARE Score: 4  Discharge Goal: Supervision or touching assistance  Device Used:    []   Standard Toilet         []   Grab Bars           []  Bedside Commode       []   Elevated Toilet          []   Other:        Bed Mobility:           [x]   Pt received out of bed       Transfers:    Sit--> Stand:  Ranging from CG/min A   Stand --> Sit:   CGA  Stand-Pivot:   CGA  Other:    Assistive device required for transfer:   RW       Functional Mobility:  Throughout room   Assistance:  CGA   Device:   [x]   Rolling Walker     []   Standard Walker []   Wheelchair        []   Minta Ruffing       []   4-Wheeled Duey Contes         []   Cardiac Walker       []   Other:          Additional Therapeutic activities/exercises completed this date:     [x]   ADL Training   [x]   Balance/Postural training     [x]   Bed/Transfer Training []   Endurance Training   []   Neuromuscular Re-ed   []   Nu-step:  Time:        Level:         #Steps:       []   Rebounder:    []  Seated     []  Standing        []   Supine Ther Ex (reps/sets):     []   Seated Ther Ex (reps/sets):     []   Standing Ther Ex (reps/sets):     []   Other:      Comments:      Patient/Caregiver Education and Training:   []   [employer] Equipment Use  []   Bed Mobility/Transfer Technique/Safety  []   Energy Conservation Tips  []   Family training  []   Postural Awareness  []   Safety During Functional Activities  []   Reinforced Patient's Precautions   []   Progress was updated and reviewed in Rehabtracker with patient and/or family this         date. Treatment Plan for Next Session: Continue OT POC       Assessment: This pt demonstrated a positive response to today's treatment as evidenced by improved functional transfers and balance. The patient is making progress toward established goals as evidenced by QI scores. Ongoing deficits are observed in the areas of strength, balance for ADLs and continued focus on this is recommended.        Treatment/Activity Tolerance:   [x] Tolerated treatment with no adverse effects    [] Patient limited by fatigue  [] Patient limited by pain   [] Patient limited by medical complications:    [] Adverse reaction to Tx:   [] Significant change in status    Safety:       []  bed alarm set    []  chair alarm set    []  Pt refused alarms                []  Telesitter activated      [x]  Gait belt used during tx session      []other:       Number of Minutes/Billable Intervention  Therapeutic Exercise    ADL Self-care 60   Neuro Re-Ed    Therapeutic Activity    Group    Other:    TOTAL 60       Social History  Social/Functional History  Lives With: Spouse  Type of Home: House  Home Layout: One level  Home Access: Stairs to enter with rails  Entrance Stairs - Number of Steps: 2 to porch, 1 into house  Entrance Stairs - Rails: Both  Bathroom Shower/Tub: Walk-in shower  Bathroom Toilet: Standard  Bathroom Equipment: Built-in shower seat,Grab bars in shower,3-in-1 commode  Bathroom Accessibility: Accessible  Home Equipment: Reacher  Has the patient had two or more falls in the past year or any fall with injury in the past year?: No (Pt has had one fall in the last year without significant injury.)  Receives Help From: Family (2 daughters in the area who assist prn.)  ADL Assistance: Independent  Homemaking Assistance: Independent  Homemaking Responsibilities: Yes  Meal Prep Responsibility: Primary  Laundry Responsibility: Primary (Shares responsibility with .)  Cleaning Responsibility: Primary (Shares responsibility with .)  Bill Paying/Finance Responsibility: Primary  Shopping Responsibility: Primary  Health Care Management: Primary (Pt uses a pillbox at baseline.)  Ambulation Assistance: Independent  Transfer Assistance: Independent  Active : Yes  Mode of Transportation: SUV  Occupation: Retired  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 Additional Comments: Pt has regular flat bed at home that she reports is high. Pt has been sleeping in recliner chair recently d/t discomfort in bed. Objective                                                                                    Goals:  (Update in navigator)  Short Term Goals  Time Frame for Short term goals: STGs=LTGs:  Long Term Goals  Time Frame for Long term goals : 10-12 days or until d/c. Long Term Goal 1: Pt will complete grooming tasks c Mod I.  Long Term Goal 2: Pt will complete total body bathing c AE PRN and supervision. Long Term Goal 3: Pt will complete UB dressing c Mod I.  Long Term Goal 4: Pt will complete LB dressing c AE PRN and supervision. Long Term Goal 5: Pt will doff/don footwear c AE PRN and Mod I. Additional Goals?: Yes  Long Term Goal 6: Pt will complete toileting c supervision.   Long Term Goal

## 2022-06-14 NOTE — CONSULTS
Consult completed. US ordered r/t increased swelling to upper extremity with PICC and shows a nonocclusive subclavian thrombus. Current Evidence-Based Guidelines recommend NOT to remove the PICC, as it risks degloving and embolizing the thrombus; anticoagulation should be increased and thrombolytics considered and line removed after benefits outweigh the risks, including completion of all IV meds/infusions as obtaining alternate access risks additional thrombus formation in other extremity. Dr. Vickie German called and POC discussed; Gaston Pablo, Primary Nurse notified and will continue to monitor site and pass along in transfer of care - PICC Team should be consulted again if there are s/s of loss of perfusion or other concerns.

## 2022-06-14 NOTE — PROGRESS NOTES
Placed IV team inpatient consult for sudden swelling to right arm, requesting placement check. PICC noted to right upper arm, flushed this AM without issue and no swelling observed. Dr. Ariel Ocampo notified.

## 2022-06-14 NOTE — PROGRESS NOTES
Nephrology Progress Note        [address], [address] [place] [address]  Phone: [phone]  Office Hours: 8:30AM - 4:30PM  Monday - Friday 6/14/2022 7:06 AM  Subjective:   Admit Date: 6/9/2022  PCP: Mark Aj,   Interval History:   Doing well on room air    Diet: ADULT ORAL NUTRITION SUPPLEMENT; Breakfast, Lunch, Dinner; Diabetic Oral Supplement  ADULT DIET; Dysphagia - Soft and Bite Sized; 1800 ml      Data:   Scheduled Meds:   urea  15 g Oral 4x Daily    sodium chloride  1 g Oral BID WC    insulin lispro  0-3 Units SubCUTAneous Nightly    insulin lispro  0-6 Units SubCUTAneous TID WC    atorvastatin  20 mg Oral Daily    cetirizine  10 mg Oral Daily    escitalopram  10 mg Oral Daily    levothyroxine  137 mcg Oral Daily    lidocaine  1 patch TransDERmal Daily    metoprolol tartrate  150 mg Oral Daily    metoprolol tartrate  100 mg Oral Nightly    oxybutynin  5 mg Oral BID    pantoprazole  40 mg IntraVENous BID    rOPINIRole  3 mg Oral Nightly    sodium chloride flush  5-40 mL IntraVENous 2 times per day    sucralfate  1 g Oral 4 times per day    vitamin B-12  100 mcg Oral Daily    Vitamin D  1,000 Units Oral Daily    enoxaparin  40 mg SubCUTAneous Daily    sodium chloride flush  5-40 mL IntraVENous 2 times per day     Continuous Infusions:   sodium chloride      dextrose      sodium chloride       PRN Meds:sodium chloride, albuterol sulfate HFA, benzocaine-menthol, calcium carbonate, phenol, sodium chloride flush, dextrose, dextrose bolus **OR** dextrose bolus, glucagon (rDNA), glucose, ondansetron **OR** ondansetron, [Held by provider] oxyCODONE **OR** [Held by provider] oxyCODONE, sodium chloride, polyethylene glycol, sodium chloride flush, acetaminophen, bisacodyl, [Held by provider] melatonin  I/O last 3 completed shifts: In: 807 [P.O.:807]  Out: 1630 [Urine:1600; Drains:30]  No intake/output data recorded.     Intake/Output Summary (Last 24 hours) at 6/14/2022 0706  Last data filed at 6/14/2022 0625  Gross per 24 hour   Intake 707 ml   Output 1600 ml   Net -893 ml       CBC:   Recent Labs     06/11/22  1303   WBC 12.0*   HGB 9.5*          BMP:    Recent Labs     06/11/22  1303 06/11/22  1950 06/12/22  0618 06/12/22  1959 06/13/22  0724   *   < > 131* 131* 128*   K 4.7   < > 4.1 4.9 4.0   CL 91*   < > 96* 97* 95*   CO2 27   < > 27 25 26   BUN 13  --   --   --  10   CREATININE 0.8  --   --   --  0.6   GLUCOSE 143*  --   --   --  123*    < > = values in this interval not displayed.          Objective:   Vitals: BP (!) 117/51   Pulse 68   Temp 98.1 °F (36.7 °C) (Oral)   Resp 14   Ht 5' (1.524 m)   Wt 153 lb 10.6 oz (69.7 kg)   SpO2 98%   BMI 30.01 kg/m²   General appearance: alert and cooperative with exam, in no acute distress  HEENT: normocephalic, atraumatic,   Neck: supple, trachea midline  Lungs:breathing comfortably on room air  Abdomen:  non distended,   Extremities: extremities atraumatic, no cyanosis or edema  Neurologic: alert, oriented, follows commands, interactive    Assessment and Plan:     Patient Active Problem List    Diagnosis Date Noted    Generalized weakness     Uncontrolled pain     Uncontrolled type 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)     Moderate malnutrition (HCC)     Chronic kidney disease, stage 3a ([facility] [address]     Acquired hypothyroidism     Reactive depression     Adenocarcinoma ([address] 06/09/2022    Partial small bowel obstruction (HCC)     Severe malnutrition ([address] 05/31/2022    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

## 2022-06-14 NOTE — PLAN OF CARE
Problem: Discharge Planning  Goal: Discharge to home or other facility with appropriate resources  Outcome: Progressing     Problem: Skin/Tissue Integrity  Goal: Absence of new skin breakdown  Description: 1. Monitor for areas of redness and/or skin breakdown  2. Assess vascular access sites hourly  3. Every 4-6 hours minimum:  Change oxygen saturation probe site  4. Every 4-6 hours:  If on nasal continuous positive airway pressure, respiratory therapy assess nares and determine need for appliance change or resting period.   Outcome: Progressing     Problem: Safety - Adult  Goal: Free from fall injury  Outcome: Progressing     Problem: ABCDS Injury Assessment  Goal: Absence of physical injury  Outcome: Progressing     Problem: Chronic Conditions and Co-morbidities  Goal: Patient's chronic conditions and co-morbidity symptoms are monitored and maintained or improved  Outcome: Progressing     Problem: Nutrition Deficit:  Goal: Optimize nutritional status  Outcome: Progressing  Flowsheets (Taken 6/14/2022 1241 by Sang Ashley RD, LD)  Nutrient intake appropriate for improving, restoring, or maintaining nutritional needs: Monitor oral intake, labs, and treatment plans     Problem: Pain  Goal: Verbalizes/displays adequate comfort level or baseline comfort level  Outcome: Progressing

## 2022-06-14 NOTE — PROGRESS NOTES
Physical Therapy  . [x] daily progress note       [] discharge       Patient Name:  Hola Camacho   :  1934 MRN: [id number]  Room:  [address] [place] [address] Date of Admission: 2022  Rehabilitation Diagnosis:   Other specified diseases of intestine [K63.89]  Adenocarcinoma ([facility] [address] [C80.1]       Date 2022       Day of ARU Week:  6   Time IN//930, 1310/1410   Individual Tx Minutes 60+60   Group Tx Minutes    Co-Treat Minutes    Concurrent Tx Minutes    TOTAL Tx Time Mins 120   Variance Time    Variance Time []   Refusal due to:     []   Medical hold/reason:    []   Illness   []   Off Unit for test/procedure  []   Extra time needed to complete task  []   Therapeutic need  []   Other (specify):   Restrictions Restrictions/Precautions  Restrictions/Precautions: Fall Risk,General Precautions      Interdisciplinary communication [x]   Cleared for therapy per nursing     [x]   RN notified about issues during session: blood on tissue after BM.   []   RN updated on pt performance  []   Spoke with   []   Spoke with OT  []   Spoke with MD  []   Other:    Subjective observations and cognitive status: Pt has CLAUDIO drain and staples removed. Pt states she feels better today, back pain gone in am, but starting to bother pt again in pm. Provided Biofreeze again to L lumbar area with pt reporting pain improved with no impact on mobility in pm session  PM: Pt having US doppler of RUE due to increased edema noted after shower today.  MD did not put stipulation on use(pt tolerance)   Pain level/location:   2-3 /10       Location: L lumbar back pain in pm   Discharge recommendations  Anticipated discharge date:    Destination: []home alone   []home alone with assist PRN     [x] home w/ family      [] Continuous supervision  []SNF    [] Assisted living     [] Other:  Continued therapy: [x]HHC PT  []OUTPATIENT  PT   [] No Further PT  []SNF PT  Caregiver training recommended: [x]Yes  [] No   Equipment needs: Landy Guerrero Fe Flores requires the assistance of a wheeled walker to successfully ambulate from room to room at home to allow completion of daily living tasks such as: bathing, toileting, dressing and grooming. A wheeled walker is necessary due to the patient's unsteady gait, upper body weakness, inability to  a standard walker. This patient can ambulate only by pushing a walker instead of using a lesser assistive device such as a cane or crutch. Possible w/c pending progress of gait      Bed Mobility:           []   Pt received out of bed   Rolling R/L:  supervision  Scooting: Mod I  Lying --> Sit:  Supervision but with moderate effort   Bed features used: [] Yes  [x] No , but pt on air mattress      Transfers:    Sit--> Stand: continued training for set up of transfer, first transfer of day mod assist, but subsequent transfers CG to min assist (variable) with mod cues for set up.repeated from various heights   Stand --> Sit:   CG  Chair-->Bed/Bed --> Chair:   Variable CG to min assist  Toilet Transfer (if applicable): CG from [facility] frame over toilet. Mod assist for changing brief and managing hiking brief. Pt performed urinary hygiene in am with  Supervision, min assist in pm for bowel movement  Other:    Assistive device required for transfer:   2ww    Gait:    Distance:  AM:15' x2(to/from BR), 182', PM:10', 30'   Assistance:  CG  Device:  2ww  Gait Quality:  Pt is leaning only slightly to R today. Pt given visual feedback by PT demonstration of stepping into walker instead of feet staying behind walker and also keeping feet within walker while turning.  PM session gait was at end of session and pt too fatigued to correct deviations      Additional Therapeutic activities/exercises completed this date:     []   Nu-step:  Time:        Level:         #Steps:       []   Rebounder:    []  Seated     []  Standing        [x]   Balance training :standing at walker pt performed marching 10 x1 with cues for tall posture, B UE movements in midling and progressing to incorporating trunk rotation with assymetical movements with the latter causing pt to have posterior LOB with mod assist to lower pt to chair        []   Postural training    []   Supine ther ex (reps/sets):     [x]   Seated ther ex (reps/sets): B heel cord stretch to improve DF for better biomechanics in sit to stand 30 seconds x 3 each leg. Pt had difficulty relaxing and needed mod cues. DF 10 x1 with fatigue at rep8. In recliner with legs elevated performed heel slides and abduction 10 x1 with rest between sets   [x]   Standing ther ex (reps/sets): step ups to 4\" step 10 x1, 5x1 B LE leading    [x]   Picking up object from floor (standing): supervision/set up(handed pt reacher)                 [x]   Reacher used   []   Other:   []   Other:    Comments:      Patient/Caregiver Education and Training:   [x]   Role of PT  [x]   Education about Dx  [x]   Use of call light for assist   []   Wheelchair mobility/management  []   HEP provided and explained   [x]   Treatment plan reviewed  []   Home safety  []   Body mechanics  []   Positioning  [x]   Bed Mobility/Transfer technique  [x]   Gait technique/sequencing  [x]   Proper use of assistive device/adaptive equipment  [x]   Stair training/Advanced mobility safety and technique  []   Reinforced patient's precautions/mobility while maintaining precautions  []   Postural awareness  []   Family/caregiver training  []   Progress was updated and reviewed in Rehabtracker with patient and/or family this date. []   Other:      Treatment Plan for Next Session: progression of stair training, uneven surfaces, gait training for keeping up to walker     Assessment:  Assessment: This pt demonstrated a positive response to today's treatment as evidenced by improved gait distance and while transfers inconsistent, her best performance is improving. The patient is making  progress toward established goals as evidenced by QI scores. Treatment/Activity Tolerance:   [] Tolerated treatment with no adverse effects    [x] Patient limited by fatigue  [] Patient limited by pain   [] Patient limited by medical complications:    [] Adverse reaction to Tx:   [] Significant change in status    Barrier/s to progress/learning:   []   None  [x]   Cognition: with fatigue pt has decreased attention and follow through  []   Hearing deficit  []   Pre-morbid mental/psychological status   []   Motivation  []   Communication  []   Anxiety  []   Vision deficit  []   Attention  []   Other:      Safety:       []  bed alarm set    [x]  chair alarm set    []  Pt refused alarms                []  Telesitter activated      [x]  Gait belt used during tx session      []other:         Number of Minutes/Billable Intervention  Gait Training 45   Therapeutic Exercise 15   Neuro Re-Ed 20   Therapeutic Activity 40   Wheelchair Propulsion    Group    Other:    TOTAL 120         Social History  Social/Functional History  Lives With: Spouse  Type of Home: House  Home Layout: One level  Home Access: Stairs to enter with rails  Entrance Stairs - Number of Steps: 2 to porch, 1 into house  Entrance Stairs - Rails: Both  Bathroom Shower/Tub: Walk-in shower  Bathroom Toilet: Standard  Bathroom Equipment: Built-in shower seat,Grab bars in shower,3-in-1 commode  Bathroom Accessibility: Accessible  Home Equipment: Reacher  Has the patient had two or more falls in the past year or any fall with injury in the past year?: No (Pt has had one fall in the last year without significant injury.)  Receives Help From: Family (2 daughters in the area who assist prn.)  ADL Assistance: Independent  Homemaking Assistance: Independent  Homemaking Responsibilities: Yes  Meal Prep Responsibility: Primary  Laundry Responsibility: Primary (Shares responsibility with .)  Cleaning Responsibility: Primary (Shares responsibility with .)  Bill Paying/Finance Responsibility: Primary  Shopping Responsibility: Primary  Health Care Management: Primary (Pt uses a pillbox at baseline.)  Ambulation Assistance: Independent  Transfer Assistance: Independent  Active : Yes  Mode of Transportation: SUV  Occupation: Retired  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 Additional Comments: Pt has regular flat bed at home that she reports is high. Pt has been sleeping in recliner chair recently d/t discomfort in bed. Objective                                                                                    Goals:  (Update in navigator)   : Long Term Goals  Time Frame for Long term goals : 7-10 days STG=LTG  Long term goal 1: Pt will perform bed mobility with mod I  Long term goal 2: pt will perform sit to stand, pivot and car transfers with mod I  Long term goal 3: pt will perform ambulation with 2ww 50' on levels with mod I, 150' on levels and 10' on unlevels with supervision  Long term goal 4: Pt will ascend/descend curb step and up to 12 steps with rail with supervision  Long term goal 5: Pt will retrieve light item with reacher and 2ww with mod I:        Plan of Care                                                                              Times per week: 5 days per week for a minimum of 60 minutes/day plus group as appropriate for 60 minutes.   Treatment to include      Electronically signed by   Thee Meyers PT,  6/14/2022, 8:45 AM

## 2022-06-15 LAB
ANION GAP SERPL CALCULATED.3IONS-SCNC: 8 MMOL/L (ref 4–16)
BUN BLDV-MCNC: 43 MG/DL (ref 6–23)
CALCIUM SERPL-MCNC: 8.5 MG/DL (ref 8.3–10.6)
CHLORIDE BLD-SCNC: 97 MMOL/L (ref 99–110)
CO2: 29 MMOL/L (ref 21–32)
CREAT SERPL-MCNC: 0.6 MG/DL (ref 0.6–1.1)
GFR AFRICAN AMERICAN: >60 ML/MIN/1.73M2
GFR NON-AFRICAN AMERICAN: >60 ML/MIN/1.73M2
GLUCOSE BLD-MCNC: 107 MG/DL (ref 70–99)
GLUCOSE BLD-MCNC: 109 MG/DL (ref 70–99)
GLUCOSE BLD-MCNC: 130 MG/DL (ref 70–99)
GLUCOSE BLD-MCNC: 131 MG/DL (ref 70–99)
GLUCOSE BLD-MCNC: 141 MG/DL (ref 70–99)
POTASSIUM SERPL-SCNC: 4 MMOL/L (ref 3.5–5.1)
SODIUM BLD-SCNC: 134 MMOL/L (ref 135–145)

## 2022-06-15 PROCEDURE — 2580000003 HC RX 258: Performed by: STUDENT IN AN ORGANIZED HEALTH CARE EDUCATION/TRAINING PROGRAM

## 2022-06-15 PROCEDURE — 80048 BASIC METABOLIC PNL TOTAL CA: CPT

## 2022-06-15 PROCEDURE — 97116 GAIT TRAINING THERAPY: CPT

## 2022-06-15 PROCEDURE — 6370000000 HC RX 637 (ALT 250 FOR IP): Performed by: STUDENT IN AN ORGANIZED HEALTH CARE EDUCATION/TRAINING PROGRAM

## 2022-06-15 PROCEDURE — 94150 VITAL CAPACITY TEST: CPT

## 2022-06-15 PROCEDURE — 6370000000 HC RX 637 (ALT 250 FOR IP): Performed by: INTERNAL MEDICINE

## 2022-06-15 PROCEDURE — 94761 N-INVAS EAR/PLS OXIMETRY MLT: CPT

## 2022-06-15 PROCEDURE — 6360000002 HC RX W HCPCS: Performed by: STUDENT IN AN ORGANIZED HEALTH CARE EDUCATION/TRAINING PROGRAM

## 2022-06-15 PROCEDURE — 82962 GLUCOSE BLOOD TEST: CPT

## 2022-06-15 PROCEDURE — 6370000000 HC RX 637 (ALT 250 FOR IP): Performed by: PHYSICAL MEDICINE & REHABILITATION

## 2022-06-15 PROCEDURE — 97530 THERAPEUTIC ACTIVITIES: CPT

## 2022-06-15 PROCEDURE — 1280000000 HC REHAB R&B

## 2022-06-15 PROCEDURE — 99232 SBSQ HOSP IP/OBS MODERATE 35: CPT | Performed by: PHYSICAL MEDICINE & REHABILITATION

## 2022-06-15 PROCEDURE — 97535 SELF CARE MNGMENT TRAINING: CPT

## 2022-06-15 PROCEDURE — 97112 NEUROMUSCULAR REEDUCATION: CPT

## 2022-06-15 PROCEDURE — 36415 COLL VENOUS BLD VENIPUNCTURE: CPT

## 2022-06-15 PROCEDURE — 97110 THERAPEUTIC EXERCISES: CPT

## 2022-06-15 RX ORDER — PANTOPRAZOLE SODIUM 40 MG/1
40 TABLET, DELAYED RELEASE ORAL
Status: DISCONTINUED | OUTPATIENT
Start: 2022-06-15 | End: 2022-06-23 | Stop reason: HOSPADM

## 2022-06-15 RX ADMIN — INSULIN LISPRO 1 UNITS: 100 INJECTION, SOLUTION INTRAVENOUS; SUBCUTANEOUS at 17:05

## 2022-06-15 RX ADMIN — RIVAROXABAN 15 MG: 15 TABLET, FILM COATED ORAL at 16:20

## 2022-06-15 RX ADMIN — Medication 1000 UNITS: at 08:13

## 2022-06-15 RX ADMIN — SUCRALFATE 1 G: 1 TABLET ORAL at 12:02

## 2022-06-15 RX ADMIN — SUCRALFATE 1 G: 1 TABLET ORAL at 05:56

## 2022-06-15 RX ADMIN — SODIUM CHLORIDE 1 G: 1 TABLET ORAL at 16:20

## 2022-06-15 RX ADMIN — SODIUM CHLORIDE, PRESERVATIVE FREE 10 ML: 5 INJECTION INTRAVENOUS at 08:12

## 2022-06-15 RX ADMIN — SUCRALFATE 1 G: 1 TABLET ORAL at 00:06

## 2022-06-15 RX ADMIN — ROPINIROLE HYDROCHLORIDE 3 MG: 1 TABLET, FILM COATED ORAL at 22:58

## 2022-06-15 RX ADMIN — SUCRALFATE 1 G: 1 TABLET ORAL at 22:57

## 2022-06-15 RX ADMIN — PANTOPRAZOLE SODIUM 40 MG: 40 TABLET, DELAYED RELEASE ORAL at 16:20

## 2022-06-15 RX ADMIN — Medication 15 G: at 12:02

## 2022-06-15 RX ADMIN — Medication 15 G: at 22:58

## 2022-06-15 RX ADMIN — SODIUM CHLORIDE, PRESERVATIVE FREE 10 ML: 5 INJECTION INTRAVENOUS at 23:03

## 2022-06-15 RX ADMIN — PANTOPRAZOLE SODIUM 40 MG: 40 TABLET, DELAYED RELEASE ORAL at 12:02

## 2022-06-15 RX ADMIN — LEVOTHYROXINE SODIUM 137 MCG: 0.11 TABLET ORAL at 05:55

## 2022-06-15 RX ADMIN — OXYBUTYNIN CHLORIDE 5 MG: 5 TABLET ORAL at 22:58

## 2022-06-15 RX ADMIN — METOPROLOL TARTRATE 100 MG: 50 TABLET, FILM COATED ORAL at 22:58

## 2022-06-15 RX ADMIN — CETIRIZINE HYDROCHLORIDE 10 MG: 10 TABLET, FILM COATED ORAL at 08:13

## 2022-06-15 RX ADMIN — SODIUM CHLORIDE 1 G: 1 TABLET ORAL at 08:13

## 2022-06-15 RX ADMIN — ESCITALOPRAM OXALATE 10 MG: 10 TABLET ORAL at 08:13

## 2022-06-15 RX ADMIN — Medication 15 G: at 08:13

## 2022-06-15 RX ADMIN — SUCRALFATE 1 G: 1 TABLET ORAL at 16:20

## 2022-06-15 RX ADMIN — METOPROLOL TARTRATE 150 MG: 50 TABLET, FILM COATED ORAL at 08:13

## 2022-06-15 RX ADMIN — ENOXAPARIN SODIUM 40 MG: 100 INJECTION SUBCUTANEOUS at 08:13

## 2022-06-15 RX ADMIN — OXYBUTYNIN CHLORIDE 5 MG: 5 TABLET ORAL at 08:13

## 2022-06-15 RX ADMIN — Medication 15 G: at 16:20

## 2022-06-15 RX ADMIN — Medication 100 MCG: at 08:13

## 2022-06-15 RX ADMIN — ATORVASTATIN CALCIUM 20 MG: 10 TABLET, FILM COATED ORAL at 08:13

## 2022-06-15 NOTE — PROGRESS NOTES
Progress Note( Dr. Ginger Tirado)  6/14/2022  Subjective:   Admit Date: 6/9/2022  PCP: Jimbo Aj DO    Admitted For : Admitted on 5/22/2022 for cecal mass  Transferred to ARU on evening of 6/9/2022    Consulted For: Had hypoglycemic episode/better control of blood glucose    Interval History: Patient doing a lot better. Started on almost regular diet as she is tolerating the food  well for last couple of days      Denies any chest pains,   Denies SOB . Patient has no more nausea or vomiting   diet was advanced to regular diet as tolerated  no new bowel or bladder symptoms. Intake/Output Summary (Last 24 hours) at 6/14/2022 2317  Last data filed at 6/14/2022 2251  Gross per 24 hour   Intake 550 ml   Output 0 ml   Net 550 ml       DATA    CBC:   No results for input(s): WBC, HGB, PLT in the last 72 hours. CMP:  Recent Labs     06/12/22  1959 06/13/22  0724 06/14/22  0630   * 128* 131*   K 4.9 4.0 4.0   CL 97* 95* 97*   CO2 25 26 27   BUN  --  10 37*   CREATININE  --  0.6 0.6   CALCIUM  --  8.2* 8.5     Lipids:   Lab Results   Component Value Date    CHOL 138 05/02/2019    HDL 54 05/02/2019    TRIG 161 06/01/2022     Glucose:  Recent Labs     06/14/22  1221 06/14/22  1634 06/14/22 2053   POCGLU 139* 125* 139*     CilgmurvflL1E:  Lab Results   Component Value Date    LABA1C 5.9 05/22/2022     High Sensitivity TSH:   Lab Results   Component Value Date    TSHHS 1.470 06/12/2022     Free T3:   Lab Results   Component Value Date    FT3 2.3 12/06/2017     Free T4:  Lab Results   Component Value Date    T4FREE 1.24 06/12/2022       VL DUP UPPER EXTREMITY VENOUS RIGHT   Final Result   Nonocclusive thrombus attached to the PICC line in the right subclavian vein. No other significant abnormality. XR CHEST (2 VW)   Final Result   Small pleural effusions with adjacent atelectasis.               Scheduled Medicines   Medications:    urea  15 g Oral 4x Daily    sodium chloride  1 g Oral BID WC    insulin lispro  0-3 Units SubCUTAneous Nightly    insulin lispro  0-6 Units SubCUTAneous TID WC    atorvastatin  20 mg Oral Daily    cetirizine  10 mg Oral Daily    escitalopram  10 mg Oral Daily    levothyroxine  137 mcg Oral Daily    lidocaine  1 patch TransDERmal Daily    metoprolol tartrate  150 mg Oral Daily    metoprolol tartrate  100 mg Oral Nightly    oxybutynin  5 mg Oral BID    pantoprazole  40 mg IntraVENous BID    rOPINIRole  3 mg Oral Nightly    sodium chloride flush  5-40 mL IntraVENous 2 times per day    sucralfate  1 g Oral 4 times per day    vitamin B-12  100 mcg Oral Daily    Vitamin D  1,000 Units Oral Daily    enoxaparin  40 mg SubCUTAneous Daily    sodium chloride flush  5-40 mL IntraVENous 2 times per day      Infusions:    sodium chloride      dextrose      sodium chloride           Objective:   Vitals: /77   Pulse 85   Temp 98 °F (36.7 °C) (Oral)   Resp 16   Ht 5' (1.524 m)   Wt 153 lb 10.6 oz (69.7 kg)   SpO2 96%   BMI 30.01 kg/m²   General appearance: alert and cooperative with exam  Neck: no JVD or bruit  Thyroid : Normal lobes   Lungs: Has Vesicular Breath sounds   Heart:  regular rate and rhythm  Abdomen: soft, yes -tender; bowel sounds normal; no masses,  no organomegaly  Had right-sided hemicolectomy 5/27/2022  Musculoskeletal: Normal  Extremities: extremities normal, , no edema  Neurologic:  Awake, alert, oriented to name, place and time. Cranial nerves II-XII are grossly intact. Motor is  intact. Sensory,  and gait is normal.    Assessment:     Patient Active Problem List:     Long-term insulin use in type 2 diabetes [facility])     Essential hypertension     Dyslipidemia     Abnormal EKG     Abnormal stress test     Cecum mass     Severe malnutrition (HCC)     Partial small bowel obstruction ([facility] [address]     Had hemicolectomy on 5/27/2022      INVASIVE ADENOCARCINOMA, TERMINAL ILEUM      Plan:     1. Reviewed POC blood glucose .  Labs and X ray results 2. Reviewed Current Medicines   3. On  Correction bolus Humalog Insulin regime   4. Monitor Blood glucose frequently   5. Modified  the dose of Insulin/ other medicines as needed   6. Going for xray of Lumbar spine   7. Will follow     .      Kriss Nicholson MD, MD

## 2022-06-15 NOTE — PROGRESS NOTES
insulin lispro  0-3 Units SubCUTAneous Nightly    insulin lispro  0-6 Units SubCUTAneous TID WC    atorvastatin  20 mg Oral Daily    cetirizine  10 mg Oral Daily    escitalopram  10 mg Oral Daily    levothyroxine  137 mcg Oral Daily    lidocaine  1 patch TransDERmal Daily    metoprolol tartrate  150 mg Oral Daily    metoprolol tartrate  100 mg Oral Nightly    oxybutynin  5 mg Oral BID    pantoprazole  40 mg IntraVENous BID    rOPINIRole  3 mg Oral Nightly    sodium chloride flush  5-40 mL IntraVENous 2 times per day    sucralfate  1 g Oral 4 times per day    vitamin B-12  100 mcg Oral Daily    Vitamin D  1,000 Units Oral Daily    enoxaparin  40 mg SubCUTAneous Daily    sodium chloride flush  5-40 mL IntraVENous 2 times per day      Infusions:    sodium chloride      dextrose      sodium chloride           Objective:   Vitals: /77   Pulse 85   Temp 98 °F (36.7 °C) (Oral)   Resp 16   Ht 5' (1.524 m)   Wt 153 lb 10.6 oz (69.7 kg)   SpO2 96%   BMI 30.01 kg/m²   General appearance: alert and cooperative with exam  Neck: no JVD or bruit  Thyroid : Normal lobes   Lungs: Has Vesicular Breath sounds   Heart:  regular rate and rhythm  Abdomen: soft, yes -tender; bowel sounds normal; no masses,  no organomegaly  Had right-sided hemicolectomy 5/27/2022  Musculoskeletal: Normal  Extremities: extremities normal, , no edema  Neurologic:  Awake, alert, oriented to name, place and time. Cranial nerves II-XII are grossly intact. Motor is  intact. Sensory,  and gait is normal.    Assessment:     Patient Active Problem List:     Long-term insulin use in type 2 diabetes [facility])     Essential hypertension     Dyslipidemia     Abnormal EKG     Abnormal stress test     Cecum mass     Severe malnutrition (HCC)     Partial small bowel obstruction ([facility] [address]     Had hemicolectomy on 5/27/2022      INVASIVE ADENOCARCINOMA, TERMINAL ILEUM      Plan:     1. Reviewed POC blood glucose .  Labs and X ray results 2. Reviewed Current Medicines   3. On  Correction bolus Humalog Insulin regime   4. Monitor Blood glucose frequently   5. Modified  the dose of Insulin/ other medicines as needed   6. Going for xray of Lumbar spine   7. Will follow     .      Adrien Johnson MD, MD

## 2022-06-15 NOTE — PROGRESS NOTES
Physical Therapy  . [x] daily progress note       [] discharge       Patient Name:  Stacie Garcia   :  1934 MRN: [id number]  Room:  [address] [place] [address] Date of Admission: 2022  Rehabilitation Diagnosis:   Other specified diseases of intestine [K63.89]  Adenocarcinoma ([facility] [address] [C80.1]       Date 6/15/2022       Day of ARU Week:  7   Time IN//930   Individual Tx Minutes 60   Group Tx Minutes    Co-Treat Minutes    Concurrent Tx Minutes    TOTAL Tx Time Mins 60   Variance Time    Variance Time []   Refusal due to:     []   Medical hold/reason:    []   Illness   []   Off Unit for test/procedure  []   Extra time needed to complete task  []   Therapeutic need  []   Other (specify):   Restrictions Restrictions/Precautions  Restrictions/Precautions: Fall Risk,General Precautions      Interdisciplinary communication [x]   Cleared for therapy per nursing     [x]   RN notified about issues during session: see subjective  []   RN updated on pt performance  []   Spoke with   []   Spoke with OT  []   Spoke with MD  []   Other:    Subjective observations and cognitive status: Upon entering, pt in bed with Purewic in, but bed saturated with urine due to poor placement. Pt's sacral border dressing saturated as well. Started session by toileting and brief cleanup in standing with nurse coming to replace sacral border patch and apply lidocaine patch. Pt also with edema RUE caused by thrombus on PICC line. Recommendation was for PICC line to remain for due to risk for embolus and to monitor for signs of decreased perfusion. No negative signs today.     Pain level/location:  0  /10       Location:    Discharge recommendations  Anticipated discharge date:    Destination: []home alone   []home alone with assist PRN     [x] home w/ family      [] Continuous supervision  []SNF    [] Assisted living     [] Other:  Continued therapy: [x]HHC PT  []OUTPATIENT  PT   [] No Further PT  []SNF PT  Caregiver training recommended: [x]Yes  [] No   Equipment needs: Naeem Menezes requires the assistance of a wheeled walker to successfully ambulate from room to room at home to allow completion of daily living tasks such as: bathing, toileting, dressing and grooming. A wheeled walker is necessary due to the patient's unsteady gait, upper body weakness, inability to  a standard walker. This patient can ambulate only by pushing a walker instead of using a lesser assistive device such as a cane or crutch. Possible w/c     Bed Mobility:           []   Pt received out of bed   Scooting:  Seated mod I  Lying --> Sit:  Supervision with mod effort this am  Bed features used: [x] Yes: hob elevated and rail  [] No       Transfers:    Sit--> Stand:  First stand min assist, subsequent performance CG to SBA with pt talking herself through set up  Stand --> Sit:   CG to SBA  Chair-->Bed/Bed --> Chair:   SBA  Toilet Transfer (if applicable): SBA with grab bars  Other:    Assistive device required for transfer:   2ww    Gait:    Distance:  150' including turns  Assistance:  SBA/CG  Device:  2ww  Gait Quality:  Reviewed training from last session regarding body position in walker prior to gait. Pt demonstrated good posture on straight line and turns until about 100' when fatigue increased, then needed cues for awareness and self corrected. Additional Therapeutic activities/exercises completed this date:     []   Nu-step:  Time:        Level:         #Steps:       []   Rebounder:    []  Seated     []  Standing        [x]   Balance training : standing balance training during functional task with cues for hand placement, no posterior LOB today, good control in light reaching situations. Pt tolerated 15 min x1, 5 min x1.         []   Postural training    [x]   Supine ther ex (reps/sets): heel slides, abduction , DF 10 x1 with pt able to keep full ROM for all reps with verbal cues.      []   Seated ther ex (reps/sets):     [] Standing ther ex (reps/sets):     []   Picking up object from floor (standing):                   []   Reacher used   []   Other:   []   Other:    Comments:      Patient/Caregiver Education and Training:   []   Role of PT  [x]   Education about Dx  [x]   Use of call light for assist   []   Wheelchair mobility/management  []   HEP provided and explained   [x]   Treatment plan reviewed  []   Home safety  []   Body mechanics  []   Positioning  [x]   Bed Mobility/Transfer technique  [x]   Gait technique/sequencing  []   Proper use of assistive device/adaptive equipment  [x]   Stair training/Advanced mobility safety and technique  []   Reinforced patient's precautions/mobility while maintaining precautions  []   Postural awareness  []   Family/caregiver training  []   Progress was updated and reviewed in Rehabtracker with patient and/or family this date. []   Other:      Treatment Plan for Next Session: stair training, balance     Assessment:  Assessment: This pt demonstrated a positive response to today's treatment as evidenced by improved safety in gait. The patient is making  progress toward established goals as evidenced by QI scores.    Treatment/Activity Tolerance:   [] Tolerated treatment with no adverse effects    [x] Patient limited by fatigue  [] Patient limited by pain   [] Patient limited by medical complications:    [] Adverse reaction to Tx:   [] Significant change in status    Barrier/s to progress/learning:   []   None  [x]   Cognition  []   Hearing deficit  []   Pre-morbid mental/psychological status   []   Motivation  []   Communication  []   Anxiety  []   Vision deficit  []   Attention  []   Other:      Safety:       []  bed alarm set    [x]  chair alarm set    []  Pt refused alarms                []  Telesitter activated      [x]  Gait belt used during tx session      []other:         Number of Minutes/Billable Intervention  Gait Training 15   Therapeutic Exercise 10   Neuro Re-Ed 20   Therapeutic Activity 25   Wheelchair Propulsion    Group    Other:    TOTAL 60         Social History  Social/Functional History  Lives With: Spouse  Type of Home: House  Home Layout: One level  Home Access: Stairs to enter with rails  Entrance Stairs - Number of Steps: 2 to porch, 1 into house  Entrance Stairs - Rails: Both  Bathroom Shower/Tub: Walk-in shower  Bathroom Toilet: Standard  Bathroom Equipment: Built-in shower seat,Grab bars in shower,3-in-1 commode  Bathroom Accessibility: [facility]: Reacher  Has the patient had two or more falls in the past year or any fall with injury in the past year?: No (Pt has had one fall in the last year without significant injury.)  Receives Help From: Family (2 daughters in the area who assist prn.)  ADL Assistance: Independent  Homemaking Assistance: Independent  Homemaking Responsibilities: Yes  Meal Prep Responsibility: Primary  Laundry Responsibility: Primary (Shares responsibility with .)  Cleaning Responsibility: Primary (Shares responsibility with .)  Bill Paying/Finance Responsibility: Primary  Shopping Responsibility: Primary  Health Care Management: Primary (Pt uses a pillbox at baseline.)  Ambulation Assistance: Independent  Transfer Assistance: Independent  Active : Yes  Mode of Transportation: Supportie  Occupation: Retired  Type of Occupation: [employer] ([employer])  Leisure & Hobbies: Pt enjoys square dancing and crafts. Additional Comments: Pt has regular flat bed at home that she reports is high. Pt has been sleeping in recliner chair recently d/t discomfort in bed. Objective                                                                                    Goals:  (Update in navigator)   :   Long Term Goals  Time Frame for Long term goals : 7-10 days STG=LTG  Long term goal 1: Pt will perform bed mobility with mod I  Long term goal 2: pt will perform sit to stand, pivot and car transfers with mod I  Long term goal 3: pt will perform ambulation with 2ww 50' on levels with mod I, 150' on levels and 10' on unlevels with supervision  Long term goal 4: Pt will ascend/descend curb step and up to 12 steps with rail with supervision  Long term goal 5: Pt will retrieve light item with reacher and 2ww with mod I:        Plan of Care                                                                              Times per week: 5 days per week for a minimum of 60 minutes/day plus group as appropriate for 60 minutes.   Treatment to include      Electronically signed by   Thee Meeyrs PT,  6/15/2022, 8:21 AM

## 2022-06-15 NOTE — PROGRESS NOTES
Physical Therapy      [x] daily progress note       [] discharge       Patient Name:  Shira Cobos   :  1934 MRN: [id number]  Room:  [address] [place] [address] Date of Admission: 2022  Rehabilitation Diagnosis:   Other specified diseases of intestine [K63.89]  Adenocarcinoma ([facility] [address] [C80.1]       Date 6/15/2022       Day of ARU Week:  7   Time IN/OUT 9079-5582   Individual Tx Minutes 60   TOTAL Tx Time Mins 60   Variance Time    Variance Time []   Refusal due to:     []   Medical hold/reason:    []   Illness   []   Off Unit for test/procedure  []   Extra time needed to complete task  []   Therapeutic need  []   Other (specify):   Restrictions Restrictions/Precautions  Restrictions/Precautions: Fall Risk,General Precautions      Communication with other providers: [x]   OK to see per nursing:     []   Spoke with team member regarding:      Subjective observations and cognitive status:  Pt up in chair, pleasant and willing to participate     Pain level/location: 0/10       Location:    Discharge recommendations  Anticipated discharge date:    Destination: []?home alone   []?home alone with assist PRN     [x]? home w/ family      []? Continuous supervision  []? SNF    []? Assisted living     []? Other:  Continued therapy: [x]? [facility] PT  []? OUTPATIENT  PT   []? No Further PT  []? SNF PT  Caregiver training recommended: [x]? Yes  []? No   Equipment needs: Shira Cobos requires the assistance of a wheeled walker to successfully ambulate from room to room at home to allow completion of daily living tasks such as: bathing, toileting, dressing and grooming. A wheeled walker is necessary due to the patient's unsteady gait, upper body weakness, inability to  a standard walker. This patient can ambulate only by pushing a walker instead of using a lesser assistive device such as a cane or crutch.    Possible w/c     Bed Mobility:           [x]   Pt received out of bed     Transfers:    Sit--> Stand:  SBA from recliner, Min A from [facility]  Stand --> Sit:   CG-SBA  Stand-Pivot:   CG-SBA  Toilet transfer: SBA with grab bar and use of elevated seat;  Toileting: Min A to pull up pants   Assistive device required for transfer:   RW    Gait:    Distance:  82'+132'  Assistance:  SBA  Device:  RW  Gait Quality:   slow steady recip pattern, req rest breaks due to UE fatigue per pt report    Stairs   # Completed:   4  Assistance:    CGA, non -recip pattern  Supportive Device:  B rails  Height:   6\"    Curb       Assistance:    CGA x 2 trials, min cues to step closer to step before advancing walker up/down  Supportive Device:  RW  Height:   4\"    Uneven Surfaces:       Assistance:    SB-CGA x 2 trials, min cues for amb within COG of AD  Device:    RW  Surfaces Completed:   [x] Carpet with bean bags beneath       [] Throw rugs          [] Outdoor pavements      [] Grass          [] Loose gravel   [] Carpet      []  Other:           Patient/Caregiver Education and Training:   [x]   Bed Mobility/Transfer technique/safety  [x]   Gait technique/sequencing  [x]   Proper use of assistive device  [x]   Advanced mobility safety and technique  []   Reinforced patient's precautions with mobility/functional tasks  []   Postural awareness  []   Family training  []   Other:    Treatment Plan for Next Session: dynamic balance, object retrieval, LE strengthening, gait progression       Treatment/Activity Tolerance:   [x] Tolerated treatment with no adverse effects    [] Patient limited by fatigue  [] Patient limited by pain   [] Patient limited by medical complications:    [] Adverse reaction to Tx:   [] Significant change in status    Safety:       []  bed alarm set    [x]  chair alarm set    []  Pt refused alarms                []  Telesitter activated      [x]  Gait belt used during tx session      []other:         Number of Minutes/Billable Intervention  Gait Training 40   Therapeutic Exercise    Neuro Re-Ed    Therapeutic Activity 20   Wheelchair Propulsion Group    Other:    TOTAL 60         Social History  Social/Functional History  Lives With: Spouse  Type of Home: House  Home Layout: One level  Home Access: Stairs to enter with rails  Entrance Stairs - Number of Steps: 2 to porch, 1 into house  Entrance Stairs - Rails: Both  Bathroom Shower/Tub: Walk-in shower  Bathroom Toilet: Standard  Bathroom Equipment: Built-in shower seat,Grab bars in shower,3-in-1 commode  Bathroom Accessibility: Accessible  Home Equipment: Reacher  Has the patient had two or more falls in the past year or any fall with injury in the past year?: No (Pt has had one fall in the last year without significant injury.)  Receives Help From: Family (2 daughters in the area who assist prn.)  ADL Assistance: Independent  Homemaking Assistance: Independent  Homemaking Responsibilities: Yes  Meal Prep Responsibility: Primary  Laundry Responsibility: Primary (Shares responsibility with .)  Cleaning Responsibility: Primary (Shares responsibility with .)  Bill Paying/Finance Responsibility: Primary  Shopping Responsibility: Primary  Health Care Management: Primary (Pt uses a pillbox at baseline.)  Ambulation Assistance: Independent  Transfer Assistance: Independent  Active : Yes  Mode of Transportation: [employer]  Occupation: Retired  Type of Occupation: [employer] ([employer])  Leisure & Hobbies: Pt enjoys square dancing and crafts. Additional Comments: Pt has regular flat bed at home that she reports is high. Pt has been sleeping in recliner chair recently d/t discomfort in bed. Objective                                                                                    Goals:  (Update in navigator)   :   Long Term Goals  Time Frame for Long term goals : 7-10 days STG=LTG  Long term goal 1: Pt will perform bed mobility with mod I  Long term goal 2: pt will perform sit to stand, pivot and car transfers with mod I  Long term goal 3: pt will perform ambulation with 2ww 50' on levels with mod I, 150' on levels and 10' on unlevels with supervision  Long term goal 4: Pt will ascend/descend curb step and up to 12 steps with rail with supervision  Long term goal 5: Pt will retrieve light item with reacher and 2ww with mod I:        Plan of Care                                                                              Times per week: 5 days per week for a minimum of 60 minutes/day plus group as appropriate for 60 minutes.   Treatment to include      Electronically signed by   Valerie Mills, PTA #[id number]  6/15/2022, 1:29 PM

## 2022-06-15 NOTE — PROGRESS NOTES
Nephrology Progress Note        [address], [address] [address]  Phone: [phone]  Office Hours: 8:30AM - 4:30PM  Monday - Friday        6/15/2022 6:28 AM  Subjective:   Admit Date: 6/9/2022  PCP: Jeremy Aj DO  Interval History:   On room air  Alert and awake  Poor appettite    Diet: ADULT ORAL NUTRITION SUPPLEMENT; Breakfast, Lunch, Dinner; Diabetic Oral Supplement  ADULT DIET; Dysphagia - Soft and Bite Sized; 1800 ml      Data:   Scheduled Meds:   urea  15 g Oral 4x Daily    sodium chloride  1 g Oral BID WC    insulin lispro  0-3 Units SubCUTAneous Nightly    insulin lispro  0-6 Units SubCUTAneous TID WC    atorvastatin  20 mg Oral Daily    cetirizine  10 mg Oral Daily    escitalopram  10 mg Oral Daily    levothyroxine  137 mcg Oral Daily    lidocaine  1 patch TransDERmal Daily    metoprolol tartrate  150 mg Oral Daily    metoprolol tartrate  100 mg Oral Nightly    oxybutynin  5 mg Oral BID    pantoprazole  40 mg IntraVENous BID    rOPINIRole  3 mg Oral Nightly    sodium chloride flush  5-40 mL IntraVENous 2 times per day    sucralfate  1 g Oral 4 times per day    vitamin B-12  100 mcg Oral Daily    Vitamin D  1,000 Units Oral Daily    enoxaparin  40 mg SubCUTAneous Daily    sodium chloride flush  5-40 mL IntraVENous 2 times per day     Continuous Infusions:   sodium chloride      dextrose      sodium chloride       PRN Meds:sodium chloride, albuterol sulfate HFA, benzocaine-menthol, calcium carbonate, phenol, sodium chloride flush, dextrose, dextrose bolus **OR** dextrose bolus, glucagon (rDNA), glucose, ondansetron **OR** ondansetron, [Held by provider] oxyCODONE **OR** [Held by provider] oxyCODONE, sodium chloride, polyethylene glycol, sodium chloride flush, acetaminophen, bisacodyl, [Held by provider] melatonin  I/O last 3 completed shifts:   In: 060 [P.O.:847]  Out: 1600 [Urine:1600]  I/O this shift:  In: 240 [P.O.:240]  Out: 0     Intake/Output Summary (Last 24 hours) at 6/15/2022 0628  Last data filed at 6/15/2022 0510  Gross per 24 hour   Intake 380 ml   Output 0 ml   Net 380 ml       CBC: No results for input(s): WBC, HGB, PLT in the last 72 hours. BMP:    Recent Labs     06/12/22 1959 06/13/22  0724 06/14/22  0630   * 128* 131*   K 4.9 4.0 4.0   CL 97* 95* 97*   CO2 25 26 27   BUN  --  10 37*   CREATININE  --  0.6 0.6   GLUCOSE  --  123* 99     Hepatic: No results for input(s): AST, ALT, ALB, BILITOT, ALKPHOS in the last 72 hours. Troponin: No results for input(s): TROPONINI in the last 72 hours. BNP: No results for input(s): BNP in the last 72 hours. Lipids: No results for input(s): CHOL, HDL in the last 72 hours. Invalid input(s): LDLCALCU  ABGs:   Lab Results   Component Value Date    PO2ART 58 06/11/2022    IFQ6ZIL 43.0 06/11/2022     INR: No results for input(s): INR in the last 72 hours.     Objective:   Vitals: BP (!) 143/64   Pulse 95   Temp 98.4 °F (36.9 °C) (Oral)   Resp 16   Ht 5' (1.524 m)   Wt 156 lb 15.5 oz (71.2 kg)   SpO2 96%   BMI 30.66 kg/m²   General appearance: alert and cooperative with exam, in no acute distress  HEENT: normocephalic, atraumatic,   Neck: supple, trachea midline  Lungs: breathing comfortably on room air  Heart[de-identified] regular rate and rhythm,  Extremities: extremities atraumatic, no cyanosis or edema  Neurologic: alert, oriented, follows commands, interactive    Assessment and Plan:     Patient Active Problem List    Diagnosis Date Noted    Generalized weakness     Uncontrolled pain     Uncontrolled type 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)     Moderate malnutrition ([address]     Chronic kidney disease, stage 3a ([address]     Acquired hypothyroidism     Reactive depression     Adenocarcinoma ([address] 06/09/2022    Partial small bowel obstruction (HCC)     Severe malnutrition ([address] 05/31/2022    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    Abnormal stress test     Dyslipidemia 04/06/2021    Abnormal EKG 04/06/2021    Long-term insulin use in type 2 diabetes ([facility] 03/14/2018    Essential hypertension 03/14/2018     Reports hx of chronic hyponatremia  Sodium 131, better  Continue salt tab  Continue urea for now  Needs to eat                  Electronically signed by Fabian Otto DO on 6/15/2022 at 6:28 AM    [address] Burnett DO  [facility],  [facility]  [address], [address]  PHONE: [phone]  FAX: [phone]

## 2022-06-15 NOTE — PROGRESS NOTES
Progress Note( Dr. Sullivan [place])  6/15/2022  Subjective:   Admit Date: 6/9/2022  PCP: Sana Aj DO    Admitted For : Admitted on 5/22/2022 for cecal mass  Transferred to ARU on evening of 6/9/2022    Consulted For: Had hypoglycemic episode/better control of blood glucose    Interval History: Patient doing a lot better. Started on almost regular diet as she is tolerating the food  well for last couple of days  DVT right upper extremity as noted below    Denies any chest pains,   Denies SOB . Patient has no more nausea or vomiting   diet was advanced to regular diet as tolerated  no new bowel or bladder symptoms. Intake/Output Summary (Last 24 hours) at 6/15/2022 0719  Last data filed at 6/15/2022 0510  Gross per 24 hour   Intake 380 ml   Output 0 ml   Net 380 ml       DATA    CBC:   No results for input(s): WBC, HGB, PLT in the last 72 hours. CMP:  Recent Labs     06/13/22  0724 06/14/22  0630 06/15/22  0611   * 131* 134*   K 4.0 4.0 4.0   CL 95* 97* 97*   CO2 26 27 29   BUN 10 37* 43*   CREATININE 0.6 0.6 0.6   CALCIUM 8.2* 8.5 8.5     Lipids:   Lab Results   Component Value Date    CHOL 138 05/02/2019    HDL 54 05/02/2019    TRIG 161 06/01/2022     Glucose:  Recent Labs     06/14/22  1221 06/14/22  1634 06/14/22 2053   POCGLU 139* 125* 139*     PkrzdqjwnoT4R:  Lab Results   Component Value Date    LABA1C 5.9 05/22/2022     High Sensitivity TSH:   Lab Results   Component Value Date    TSHHS 1.470 06/12/2022     Free T3:   Lab Results   Component Value Date    FT3 2.3 12/06/2017     Free T4:  Lab Results   Component Value Date    T4FREE 1.24 06/12/2022       VL DUP UPPER EXTREMITY VENOUS RIGHT   Final Result   Nonocclusive thrombus attached to the PICC line in the right subclavian vein. No other significant abnormality. XR CHEST (2 VW)   Final Result   Small pleural effusions with adjacent atelectasis.             Impression Right Arm   6/14/2022   Nonocclusive thrombus attached to the PICC line in the right subclavian vein. No other significant abnormality         Scheduled Medicines   Medications:    urea  15 g Oral 4x Daily    sodium chloride  1 g Oral BID WC    insulin lispro  0-3 Units SubCUTAneous Nightly    insulin lispro  0-6 Units SubCUTAneous TID WC    atorvastatin  20 mg Oral Daily    cetirizine  10 mg Oral Daily    escitalopram  10 mg Oral Daily    levothyroxine  137 mcg Oral Daily    lidocaine  1 patch TransDERmal Daily    metoprolol tartrate  150 mg Oral Daily    metoprolol tartrate  100 mg Oral Nightly    oxybutynin  5 mg Oral BID    pantoprazole  40 mg IntraVENous BID    rOPINIRole  3 mg Oral Nightly    sodium chloride flush  5-40 mL IntraVENous 2 times per day    sucralfate  1 g Oral 4 times per day    vitamin B-12  100 mcg Oral Daily    Vitamin D  1,000 Units Oral Daily    enoxaparin  40 mg SubCUTAneous Daily    sodium chloride flush  5-40 mL IntraVENous 2 times per day      Infusions:    sodium chloride      dextrose      sodium chloride           Objective:   Vitals: BP (!) 143/64   Pulse 95   Temp 98.4 °F (36.9 °C) (Oral)   Resp 16   Ht 5' (1.524 m)   Wt 156 lb 15.5 oz (71.2 kg)   SpO2 96%   BMI 30.66 kg/m²   General appearance: alert and cooperative with exam  Neck: no JVD or bruit  Thyroid : Normal lobes   Lungs: Has Vesicular Breath sounds   Heart:  regular rate and rhythm  Abdomen: soft, yes -tender; bowel sounds normal; no masses,  no organomegaly  Had right-sided hemicolectomy 5/27/2022  Musculoskeletal: Normal  Extremities: extremities normal, , no edema  Neurologic:  Awake, alert, oriented to name, place and time. Cranial nerves II-XII are grossly intact. Motor is  intact.   Sensory,  and gait is normal.    Assessment:     Patient Active Problem List:     Long-term insulin use in type 2 diabetes [facility])     Essential hypertension     Dyslipidemia     Abnormal EKG     Abnormal stress test     Cecum mass     Severe malnutrition (HCC) Partial small bowel obstruction ([facility] [facility]     Had hemicolectomy on 5/27/2022      INVASIVE ADENOCARCINOMA, TERMINAL ILEUM      DVT of right upper arm    Plan:     1. Reviewed POC blood glucose . Labs and X ray results   2. Reviewed Current Medicines   3. On  Correction bolus Humalog Insulin regime   4. Monitor Blood glucose frequently   5. Modified  the dose of Insulin/ other medicines as needed    6. Will follow     .      Maria E Devi MD, MD

## 2022-06-15 NOTE — PROGRESS NOTES
Occupational Therapy  Physical Rehabilitation: OCCUPATIONAL THERAPY     [x] daily progress note       [] discharge       Patient Name:  Silverio Jernigan   :  1934 MRN: [id number]  Room:  [address] [place] [address] Date of Admission: 2022  Rehabilitation Diagnosis:   Other specified diseases of intestine [K63.89]  Adenocarcinoma ([facility] [C80.1]       Date 6/15/2022       Day of ARU Week:  7   Time IN/OUT [id number]   Individual Tx Minutes 60   Group Tx Minutes    Co-Treat Minutes    Concurrent Tx Minutes    TOTAL Tx Time Mins 60   Variance Time    Variance Time []   Refusal due to:     []   Medical hold/reason:    []   Illness   []   Off Unit for test/procedure  []   Extra time needed to complete task  []   Therapeutic need  []   Other (specify):   Restrictions Restrictions/Precautions: Fall Risk,General Precautions         Communication with other providers: [x]   OK to see per nursing:     []   Spoke with team member regarding:      Subjective observations and cognitive status:      Pain level/location:    /10       Location:    Discharge recommendations  Anticipated discharge date:    Destination: []home alone   []home alone w assist prn   [x] home w/ family    [] Continuous supervision       []SNF    [] Assisted living     [] Other:   Continued therapy: [x]HHC OT  []OUTPATIENT  OT   [] No Further OT  Equipment needs:   Silverio Jernigan requires a 3 in 1 bedside commode due to being unable to use the toilet within the home  And confined to one level of the home.      Toileting:   CGA for clothing management; min A for BM hygiene for thoroughness        Toilet Transfers:   CGA using RW   Device Used:    []   Standard Toilet         []   Grab Bars           []  Bedside Commode       []   Elevated Toilet          []   Other:        Bed Mobility:           [x]   Pt received out of bed     Transfers:    Sit--> Stand:  Ranging from CGA/min A   Stand --> Sit:   CGA/ min A for controlled descent   Stand-Pivot:   CGA  Other: the areas of strength, endurance and functional transfers and continued focus on this is recommended.        Treatment/Activity Tolerance:   [x] Tolerated treatment with no adverse effects    [] Patient limited by fatigue  [] Patient limited by pain   [] Patient limited by medical complications:    [] Adverse reaction to Tx:   [] Significant change in status    Safety:       []  bed alarm set    [x]  chair alarm set    []  Pt refused alarms                []  Telesitter activated      [x]  Gait belt used during tx session      []other:       Number of Minutes/Billable Intervention  Therapeutic Exercise 15   ADL Self-care 15   Neuro Re-Ed    Therapeutic Activity 30   Group    Other:    TOTAL 60       Social History  Social/Functional History  Lives With: Spouse  Type of Home: House  Home Layout: One level  Home Access: Stairs to enter with rails  Entrance Stairs - Number of Steps: 2 to porch, 1 into house  Entrance Stairs - Rails: Both  Bathroom Shower/Tub: Walk-in shower  Bathroom Toilet: Standard  Bathroom Equipment: Built-in shower seat,Grab bars in shower,3-in-1 commode  Bathroom Accessibility: Accessible  Home Equipment: Reacher  Has the patient had two or more falls in the past year or any fall with injury in the past year?: No (Pt has had one fall in the last year without significant injury.)  Receives Help From: Family (2 daughters in the area who assist prn.)  ADL Assistance: Independent  Homemaking Assistance: Independent  Homemaking Responsibilities: Yes  Meal Prep Responsibility: Primary  Laundry Responsibility: Primary (Shares responsibility with .)  Cleaning Responsibility: Primary (Shares responsibility with .)  Bill Paying/Finance Responsibility: Primary  Shopping Responsibility: Primary  Health Care Management: Primary (Pt uses a pillbox at baseline.)  Ambulation Assistance: Independent  Transfer Assistance: Independent  Active : Yes  Mode of Transportation: [facility]  Occupation: Retired  Type of Occupation: [employer] ([employer]  Leisure & Hobbies: Pt enjoys square dancing and crafts. Additional Comments: Pt has regular flat bed at home that she reports is high. Pt has been sleeping in recliner chair recently d/t discomfort in bed. Objective                                                                                    Goals:  (Update in navigator)  Short Term Goals  Time Frame for Short term goals: STGs=LTGs:  Long Term Goals  Time Frame for Long term goals : 10-12 days or until d/c. Long Term Goal 1: Pt will complete grooming tasks c Mod I.  Long Term Goal 2: Pt will complete total body bathing c AE PRN and supervision. Long Term Goal 3: Pt will complete UB dressing c Mod I.  Long Term Goal 4: Pt will complete LB dressing c AE PRN and supervision. Long Term Goal 5: Pt will doff/don footwear c AE PRN and Mod I. Additional Goals?: Yes  Long Term Goal 6: Pt will complete toileting c supervision. Long Term Goal 7: Pt will perform functional transfers (bed, chair, toilet, shower) c DME PRN and supervision. Long Term Goal 8: Pt will perform therex/therax to facilitate an increase in strength/endurance/ax tolerance (c emphasis on static/dynamic standing balance/tolerance > 6 mins) c supervision. Long Term Goal 9: Pt will complete light home management tasks c supervision.:        Plan of Care                                                                              Times per week: 5 days per week for a minimum of 60 minutes/day plus group as appropriate for 60 minutes.   Treatment to include Plan  Times per Day: Daily  Current Treatment Recommendations: Strengthening,Balance training,Functional mobility training,Endurance training,Safety education & training,Patient/Caregiver education & training,Equipment evaluation, education, & procurement,Positioning,Home management training,Self-Care / Aloma Chauncey re-education,Coordination training    Electronically signed by   KHURRAM Wilson,  6/15/2022, 9:44 AM

## 2022-06-15 NOTE — PLAN OF CARE
Problem: Discharge Planning  Goal: Discharge to home or other facility with appropriate resources  6/15/2022 1052 by Lizet Padgett LPN  Outcome: Progressing  6/14/2022 2252 by Yadi Solis LPN  Outcome: Progressing     Problem: Skin/Tissue Integrity  Goal: Absence of new skin breakdown  Description: 1. Monitor for areas of redness and/or skin breakdown  2. Assess vascular access sites hourly  3. Every 4-6 hours minimum:  Change oxygen saturation probe site  4. Every 4-6 hours:  If on nasal continuous positive airway pressure, respiratory therapy assess nares and determine need for appliance change or resting period.   6/15/2022 1052 by Lizet Padgett LPN  Outcome: Progressing  6/14/2022 2252 by Yadi Solis LPN  Outcome: Progressing     Problem: Safety - Adult  Goal: Free from fall injury  6/15/2022 1052 by Lizet Padgett LPN  Outcome: Progressing  6/14/2022 2252 by Yadi Solis LPN  Outcome: Progressing     Problem: ABCDS Injury Assessment  Goal: Absence of physical injury  6/15/2022 1052 by Lizet Padgett LPN  Outcome: Progressing  6/14/2022 2252 by Yadi Solis LPN  Outcome: Progressing  Flowsheets (Taken 6/14/2022 2249)  Absence of Physical Injury: Implement safety measures based on patient assessment     Problem: Chronic Conditions and Co-morbidities  Goal: Patient's chronic conditions and co-morbidity symptoms are monitored and maintained or improved  6/15/2022 1052 by Lizet Padgett LPN  Outcome: Progressing  6/14/2022 2252 by Yadi Solis LPN  Outcome: Progressing     Problem: Nutrition Deficit:  Goal: Optimize nutritional status  6/15/2022 1052 by Lizet Padgett LPN  Outcome: Progressing  6/14/2022 2252 by Yadi Solis LPN  Outcome: Progressing     Problem: Pain  Goal: Verbalizes/displays adequate comfort level or baseline comfort level  6/15/2022 1052 by Lizet Padgett LPN  Outcome: Progressing  6/14/2022 2252 by Yadi Solis LPN  Outcome: Progressing

## 2022-06-15 NOTE — PROGRESS NOTES
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 No new pain or discoloration. Prior (baseline) level of function: Independent. Current level of function:         Current  [facility] and Goals:   Occupational Therapy:    Short Term Goals  Time Frame for Short term goals: STGs=LTGs :   Long Term Goals  Time Frame for Long term goals : 10-12 days or until d/c. Long Term Goal 1: Pt will complete grooming tasks c Mod I.  Long Term Goal 2: Pt will complete total body bathing c AE PRN and supervision. Long Term Goal 3: Pt will complete UB dressing c Mod I.  Long Term Goal 4: Pt will complete LB dressing c AE PRN and supervision. Long Term Goal 5: Pt will doff/don footwear c AE PRN and Mod I. Additional Goals?: Yes  Long Term Goal 6: Pt will complete toileting c supervision. Long Term Goal 7: Pt will perform functional transfers (bed, chair, toilet, shower) c DME PRN and supervision. Long Term Goal 8: Pt will perform therex/therax to facilitate an increase in strength/endurance/ax tolerance (c emphasis on static/dynamic standing balance/tolerance > 6 mins) c supervision. Long Term Goal 9: Pt will complete light home management tasks c supervision. :                                       Eating: Eating  Assistance Needed: Independent  Comment: X  CARE Score: 6  Discharge Goal: Independent       Oral Hygiene: Oral Hygiene  Assistance Needed: Independent  Comment:  Mod I  CARE Score: 6  Discharge Goal: Independent    UB/LB Bathing: Shower/Bathe Self  Assistance Needed: Supervision or touching assistance  Comment: Pt completed sinkside ADL; CGA in stance to wash bottom  CARE Score: 4  Discharge Goal: Supervision or touching assistance    UB Dressing: Upper Body Dressing  Assistance Needed: Supervision or touching assistance  Comment: Pt able to don pullover shirt; cues to pull down in front  CARE Score: 4  Discharge Goal: Independent         LB Dressing: Lower Body Dressing  Assistance Needed: Partial/moderate assistance  Comment: Pt able to thread BLEs into brief and pants; Pt able to manage briefs over hips c CGA; pt required min A to manage pants over hips d/t pants being snug  CARE Score: 3  Discharge Goal: Supervision or touching assistance    Donning and [place] Footwear: Putting On/Taking Off Footwear  Assistance Needed: Setup or clean-up assistance  Comment: Pt able to doff/don socks using figure 4 position  CARE Score: 5  Discharge Goal: Independent      Toiletin [address] needed: Partial/moderate assistance  Comment: CGA to manage depends down; min A for thoroughness c BM hygiene  CARE Score: 3  Discharge Goal: Supervision or touching assistance      Toilet Transfers:   Toilet Transfer  Assistance needed: Supervision or touching assistance  Comment: CGA using RW and grab bars  CARE Score: 4  Discharge Goal: Supervision or touching assistance    Physical Therapy:         Long Term Goals  Time Frame for Long term goals : 7-10 days STG=LTG  Long term goal 1: Pt will perform bed mobility with mod I  Long term goal 2: pt will perform sit to stand, pivot and car transfers with mod I  Long term goal 3: pt will perform ambulation with 2ww 50' on levels with mod I, 150' on levels and 10' on unlevels with supervision  Long term goal 4: Pt will ascend/descend curb step and up to 12 steps with rail with supervision  Long term goal 5: Pt will retrieve light item with reacher and 2ww with mod I      Bed Mobility:   Sit to Lying  Assistance Needed: Partial/moderate assistance  Comment: min assist  CARE Score: 3  Discharge Goal: Independent  Roll Left and Right  Assistance Needed: Supervision or touching assistance  Comment: supervision  CARE Score: 4  Discharge Goal: Independent  Lying to Sitting on Side of Bed  Assistance Needed: Supervision or touching assistance  Comment: CG, no bed features(air mattress inflated)  CARE Score: 4  Discharge Goal: Independent    Transfers:    Sit to Stand  Assistance Needed: Partial/moderate assistance  Comment: mod assist from w/c with max verbal cues  CARE Score: 3  Discharge Goal: Independent  Chair/Bed-to-Chair Transfer  Assistance Needed: Partial/moderate assistance  Comment: mod assist  CARE Score: 3  Discharge Goal: Independent     Car Transfer  Assistance Needed: Partial/moderate assistance  Comment: approaching with 2ww, no assist for LEs, min assist to stand from car  CARE Score: 3  Discharge Goal: Independent    Ambulation:    Walking Ability  Does the Patient Walk?: Yes     Walk 10 Feet  Assistance Needed: Partial/moderate assistance  Comment: min assist with 2ww  CARE Score: 3  Discharge Goal: Independent     Walk 50 Feet with Two Turns  Assistance Needed: Supervision or touching assistance  Comment: CG with 2ww, leaning to R , but improved from eval, cues for feet inside walker on turns  Reason if not Attempted: Not attempted due to medical condition or safety concerns  CARE Score: 4  Discharge Goal: Independent     Walk 150 Feet  Assistance Needed: Supervision or touching assistance  Comment: CG with 2ww, 80'  Reason if not Attempted: Not attempted due to medical condition or safety concerns  CARE Score: 4  Discharge Goal: Supervision or touching assistance     Walking 10 Feet on Uneven Surfaces  Assistance Needed: Supervision or touching assistance  Comment: CG assist with 2ww  CARE Score: 4  Discharge Goal: Supervision or touching assistance     1 Step (Curb)  Assistance Needed: Partial/moderate assistance  Comment: min assist with wlkr with 75% cues  CARE Score: 3  Discharge Goal: Supervision or touching assistance     4 Steps  Assistance Needed: Partial/moderate assistance  Comment: mod assist due to LLE weakness, 75% cues  CARE Score: 3  Discharge Goal: Supervision or touching assistance     12 Steps  Reason if not Attempted: Not attempted due to medical condition or safety concerns  CARE Score: 88  Discharge Goal: Supervision or touching assistance       Wheelchair:  w/c Ability: Wheelchair Ability  Uses a Wheelchair and/or Scooter?: No                Balance:        Object: Picking Up Object  Assistance Needed: Supervision or touching assistance  Comment: CG with 2ww and reacher  CARE Score: 4  Discharge Goal: Independent    I      Exam:    Blood pressure (!) 126/53, pulse 94, temperature 98.2 °F (36.8 °C), temperature source Oral, resp. rate 16, height 5' (1.524 m), weight 156 lb 15.5 oz (71.2 kg), SpO2 97 %, not currently breastfeeding. General: Semirecumbent in bed with her right arm elevated. She was able to bring both hands together in the midline. Alert. HEENT: Gazing right and left. Intelligible speech. Neck supple. No JVD. Pulmonary: Symmetric air exchange without wheezing or coughing. Cardiac: Regular rate and rhythm. Abdomen: Patient's abdomen is soft and nondistended. Bowel sounds were present throughout. There was no rebound, guarding or masses noted. Upper extremities: 1+ pitting edema in the right upper limb. Fair  strength. Left arm moves more freely. No swelling of the left. Lower extremities: Heels clear. No signs of DVT in the lower limbs. Better spontaneous movements. Sitting balance was good. Standing balance was poor.     Lab Results   Component Value Date    WBC 12.0 (H) 06/11/2022    HGB 9.5 (L) 06/11/2022    HCT 28.5 (L) 06/11/2022    MCV 86.4 06/11/2022     06/11/2022     Lab Results   Component Value Date    INR 1.09 05/14/2021    INR 1.09 12/09/2016    PROTIME 12.4 05/14/2021    PROTIME 12.7 12/09/2016     Lab Results   Component Value Date    CREATININE 0.6 06/15/2022    BUN 43 (H) 06/15/2022     (L) 06/15/2022    K 4.0 06/15/2022    CL 97 (L) 06/15/2022    CO2 29 06/15/2022     Lab Results   Component Value Date    ALT 11 06/08/2022    AST 14 (L) 06/08/2022    ALKPHOS 50 06/08/2022    BILITOT 0.2 06/08/2022       Expected length of stay  prior to a supervised level of function for discharge home with a walker and [address] OT/PT is 6/23/2022. Recommendations:    1. 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 Her Doppler study showed a DVT in the right upper limb. Xarelto started. There are no incisional signs of infection.  Her chest x-ray and chemistries are unremarkable. Generally continent of bowel and bladder.  Ongoing cautious pain management. Benefiting from aggressive pulmonary hygiene measures, nutritional support and pain management.  Outpatient follow-up with oncology and her surgeon.  Verbal cues and minimum physical assistance for transfers today. 2. DVT prophylaxis.  With her confirmed DVT, Lovenox was stopped and I am starting her on Xarelto. Monitoring her hemoglobin periodically while on this medication.  Weightbearing activities are slowly improving daily.  GI prophylaxis is available.     3. Uncontrolled pain: Limited use of oral analgesics to avoid delirium.   Attention to bowel intervention while on the analgesics. 4. 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  Blood sugars are checked at mealtime and bedtime.  She is on a Humalog sliding scale with plans for reintroducing oral agents from home when she is eating more consistently.   5. Chronic kidney disease stage IIIa: Avoiding nephrotoxic medications and wide swings of blood pressure.  Encouraging consistent oral hydration.  Periodic monitoring of her chemistries. 6. Hypertension: She is currently off medications to control her blood pressure.  Vital signs are checked at rest and with activity.  Target systolic blood pressures [id number].  AKUPB pressure is within the target range again today.  Monitoring closely.

## 2022-06-15 NOTE — PLAN OF CARE
Problem: Discharge Planning  Goal: Discharge to home or other facility with appropriate resources  6/14/2022 2252 by Kaur Quiroga LPN  Outcome: Progressing  6/14/2022 1255 by Ahmet Murdock LPN  Outcome: Progressing     Problem: Skin/Tissue Integrity  Goal: Absence of new skin breakdown  Description: 1. Monitor for areas of redness and/or skin breakdown  2. Assess vascular access sites hourly  3. Every 4-6 hours minimum:  Change oxygen saturation probe site  4. Every 4-6 hours:  If on nasal continuous positive airway pressure, respiratory therapy assess nares and determine need for appliance change or resting period.   6/14/2022 2252 by Kaur Quiroga LPN  Outcome: Progressing  6/14/2022 1255 by Ahmet Murdock LPN  Outcome: Progressing     Problem: Safety - Adult  Goal: Free from fall injury  6/14/2022 2252 by Kaur Quiroga LPN  Outcome: Progressing  6/14/2022 1255 by Ahmet Murdock LPN  Outcome: Progressing     Problem: ABCDS Injury Assessment  Goal: Absence of physical injury  6/14/2022 2252 by Kaur Quiroga LPN  Outcome: Progressing  Flowsheets (Taken 6/14/2022 2249)  Absence of Physical Injury: Implement safety measures based on patient assessment  6/14/2022 1255 by Ahmet Murdock LPN  Outcome: Progressing     Problem: Chronic Conditions and Co-morbidities  Goal: Patient's chronic conditions and co-morbidity symptoms are monitored and maintained or improved  6/14/2022 2252 by Kaur Quiroga LPN  Outcome: Progressing  6/14/2022 1255 by Ahmet Murdock LPN  Outcome: Progressing     Problem: Nutrition Deficit:  Goal: Optimize nutritional status  6/14/2022 2252 by Kaur Quiroga LPN  Outcome: Progressing  6/14/2022 1255 by Ahmet Murdock LPN  Outcome: Progressing  Flowsheets (Taken 6/14/2022 1241 by Laurie Huddleston RD, LD)  Nutrient intake appropriate for improving, restoring, or maintaining nutritional needs: Monitor oral intake, labs, and treatment plans     Problem: Pain  Goal: Verbalizes/displays adequate comfort level or baseline comfort level  6/14/2022 2252 by Jayden Ying LPN  Outcome: Progressing  6/14/2022 1255 by Christine Fink LPN  Outcome: Progressing

## 2022-06-16 LAB
CULTURE: NORMAL
CULTURE: NORMAL
GLUCOSE BLD-MCNC: 104 MG/DL (ref 70–99)
GLUCOSE BLD-MCNC: 120 MG/DL (ref 70–99)
GLUCOSE BLD-MCNC: 153 MG/DL (ref 70–99)
GLUCOSE BLD-MCNC: 160 MG/DL (ref 70–99)
Lab: NORMAL
Lab: NORMAL
SPECIMEN: NORMAL
SPECIMEN: NORMAL

## 2022-06-16 PROCEDURE — 6370000000 HC RX 637 (ALT 250 FOR IP): Performed by: INTERNAL MEDICINE

## 2022-06-16 PROCEDURE — 97110 THERAPEUTIC EXERCISES: CPT

## 2022-06-16 PROCEDURE — 2580000003 HC RX 258: Performed by: STUDENT IN AN ORGANIZED HEALTH CARE EDUCATION/TRAINING PROGRAM

## 2022-06-16 PROCEDURE — 97530 THERAPEUTIC ACTIVITIES: CPT

## 2022-06-16 PROCEDURE — 6370000000 HC RX 637 (ALT 250 FOR IP): Performed by: STUDENT IN AN ORGANIZED HEALTH CARE EDUCATION/TRAINING PROGRAM

## 2022-06-16 PROCEDURE — 97535 SELF CARE MNGMENT TRAINING: CPT

## 2022-06-16 PROCEDURE — 94150 VITAL CAPACITY TEST: CPT

## 2022-06-16 PROCEDURE — 99232 SBSQ HOSP IP/OBS MODERATE 35: CPT | Performed by: PHYSICAL MEDICINE & REHABILITATION

## 2022-06-16 PROCEDURE — 94761 N-INVAS EAR/PLS OXIMETRY MLT: CPT

## 2022-06-16 PROCEDURE — 1280000000 HC REHAB R&B

## 2022-06-16 PROCEDURE — 82962 GLUCOSE BLOOD TEST: CPT

## 2022-06-16 PROCEDURE — 6370000000 HC RX 637 (ALT 250 FOR IP): Performed by: PHYSICAL MEDICINE & REHABILITATION

## 2022-06-16 PROCEDURE — 97116 GAIT TRAINING THERAPY: CPT

## 2022-06-16 RX ADMIN — Medication 15 G: at 21:26

## 2022-06-16 RX ADMIN — SUCRALFATE 1 G: 1 TABLET ORAL at 16:44

## 2022-06-16 RX ADMIN — Medication 15 G: at 07:57

## 2022-06-16 RX ADMIN — SUCRALFATE 1 G: 1 TABLET ORAL at 23:54

## 2022-06-16 RX ADMIN — PANTOPRAZOLE SODIUM 40 MG: 40 TABLET, DELAYED RELEASE ORAL at 16:44

## 2022-06-16 RX ADMIN — SODIUM CHLORIDE, PRESERVATIVE FREE 10 ML: 5 INJECTION INTRAVENOUS at 07:56

## 2022-06-16 RX ADMIN — CETIRIZINE HYDROCHLORIDE 10 MG: 10 TABLET, FILM COATED ORAL at 07:58

## 2022-06-16 RX ADMIN — Medication 100 MCG: at 07:57

## 2022-06-16 RX ADMIN — METOPROLOL TARTRATE 150 MG: 50 TABLET, FILM COATED ORAL at 07:58

## 2022-06-16 RX ADMIN — ESCITALOPRAM OXALATE 10 MG: 10 TABLET ORAL at 07:58

## 2022-06-16 RX ADMIN — OXYBUTYNIN CHLORIDE 5 MG: 5 TABLET ORAL at 21:25

## 2022-06-16 RX ADMIN — SUCRALFATE 1 G: 1 TABLET ORAL at 05:59

## 2022-06-16 RX ADMIN — OXYBUTYNIN CHLORIDE 5 MG: 5 TABLET ORAL at 07:58

## 2022-06-16 RX ADMIN — RIVAROXABAN 15 MG: 15 TABLET, FILM COATED ORAL at 07:58

## 2022-06-16 RX ADMIN — METOPROLOL TARTRATE 100 MG: 50 TABLET, FILM COATED ORAL at 21:25

## 2022-06-16 RX ADMIN — ROPINIROLE HYDROCHLORIDE 3 MG: 1 TABLET, FILM COATED ORAL at 21:25

## 2022-06-16 RX ADMIN — INSULIN LISPRO 1 UNITS: 100 INJECTION, SOLUTION INTRAVENOUS; SUBCUTANEOUS at 12:46

## 2022-06-16 RX ADMIN — PANTOPRAZOLE SODIUM 40 MG: 40 TABLET, DELAYED RELEASE ORAL at 05:59

## 2022-06-16 RX ADMIN — RIVAROXABAN 15 MG: 15 TABLET, FILM COATED ORAL at 16:43

## 2022-06-16 RX ADMIN — SODIUM CHLORIDE, PRESERVATIVE FREE 10 ML: 5 INJECTION INTRAVENOUS at 21:26

## 2022-06-16 RX ADMIN — Medication 1000 UNITS: at 07:58

## 2022-06-16 RX ADMIN — SUCRALFATE 1 G: 1 TABLET ORAL at 12:46

## 2022-06-16 RX ADMIN — SODIUM CHLORIDE, PRESERVATIVE FREE 10 ML: 5 INJECTION INTRAVENOUS at 07:55

## 2022-06-16 RX ADMIN — SODIUM CHLORIDE 1 G: 1 TABLET ORAL at 16:43

## 2022-06-16 RX ADMIN — ATORVASTATIN CALCIUM 20 MG: 10 TABLET, FILM COATED ORAL at 07:58

## 2022-06-16 RX ADMIN — LEVOTHYROXINE SODIUM 137 MCG: 0.11 TABLET ORAL at 05:59

## 2022-06-16 RX ADMIN — SODIUM CHLORIDE 1 G: 1 TABLET ORAL at 07:58

## 2022-06-16 RX ADMIN — SODIUM CHLORIDE, PRESERVATIVE FREE 10 ML: 5 INJECTION INTRAVENOUS at 21:27

## 2022-06-16 RX ADMIN — INSULIN LISPRO 1 UNITS: 100 INJECTION, SOLUTION INTRAVENOUS; SUBCUTANEOUS at 21:35

## 2022-06-16 NOTE — PROGRESS NOTES
Physical Therapy      [x] daily progress note       [] discharge       Patient Name:  Jeremi Soto   :  1934 MRN: [id number]  Room:  [address] [place] [address] Date of Admission: 2022  Rehabilitation Diagnosis:   Other specified diseases of intestine [K63.89]  [facility] [C80.1]       Date 2022       Day of ARU Week:  1   Time IN/OUT 0132-2925     Individual Tx Minutes 60   TOTAL Tx Time Mins 60   Variance Time    Variance Time []   Refusal due to:     []   Medical hold/reason:    []   Illness   []   Off Unit for test/procedure  []   Extra time needed to complete task  []   Therapeutic need  []   Other (specify):   Restrictions Restrictions/Precautions  Restrictions/Precautions: Fall Risk,General Precautions      Communication with other providers: [x]   OK to see per nursing:     []   Spoke with team member regarding:      Subjective observations and cognitive status: Pt up in chair, reports \"having an off day\", c/o fatigue and feeling \"blah\", also reports a dietary mix up regarding a diet change, and that her meals today \"weren't any good. \" Pt req frequent rest breaks throughout session. Pain level/location: 0/10       Location:    Discharge recommendations  Anticipated discharge date:    Destination: []??home alone   []? ?home alone with assist PRN     [x]? ? home w/ family      []? ? Continuous supervision  []? ?SNF    []? ? Assisted living     []? ? Other:  Continued therapy: [x]? ?[facility]  []??OUTPATIENT  PT   []? ? No Further PT  []? ?SNF PT  Caregiver training recommended: [x]? ? Yes  []? ? No   Equipment needs: Rylie Monaco requires the assistance of a wheeled walker to successfully ambulate from room to room at home to allow completion of daily living tasks such as: bathing, toileting, dressing and grooming.  A wheeled walker is necessary due to the patient's unsteady gait, upper body weakness, inability to  a standard walker.  This patient can ambulate only by pushing a walker instead of using a lesser assistive device such as a cane or crutch. Possible w/c     Bed Mobility:           [x]   Pt received out of bed     Transfers:    Sit--> Stand:  SBA  Stand --> Sit:   Supervision  Stand-Pivot:   SB-close supervision  Car Transfers:  SB-close supervision, min cues for hand placement out of car  Assistive device required for transfer:   RW    Gait:    Distance:  75'+108'+ 116'  Assistance:  SB-close supervision  Device:  RW  Gait Quality:   recip pattern, steady pace, no LOB     Additional Therapeutic activities/exercises completed this date:     []   Nu-step:  Time:        Level:         #Steps:       []   Rebounder:    []  Seated     []  Standing        []   Balance training         []   Postural training    [x]   B LE ther ex (reps/sets):    Supine: Ankle Pumps x 20  Quad Sets x 20  Gluteal Sets x 20  Heel Slides x 15  Short Arc Quads x 15  Hip Abduction x 15       []   Standing ther ex (reps/sets):     [x]   Picked up different sized and weighted object  from floor amb to each object with RW; Pt able to demonstrate safety with AE management while amb and with reaching within VARUN                      [x]   Reacher used   []   Other:   []   Other:   []   Other:      Patient/Caregiver Education and Training:   [x]   Bed Mobility/Transfer technique/safety  [x]   Gait technique/sequencing  [x]   Proper use of assistive device  []   Advanced mobility safety and technique  []   Reinforced patient's precautions with mobility/functional tasks  []   Postural awareness  []   Family training  []   Other:    Treatment Plan for Next Session: gait progression, LE strengthening, dynamic balance, communtiy / outdoor barriers.          Treatment/Activity Tolerance:   [x] Tolerated treatment with no adverse effects    [] Patient limited by fatigue  [] Patient limited by pain   [] Patient limited by medical complications:    [] Adverse reaction to Tx:   [] Significant change in status    Safety:       [] bed alarm set    [x]  chair alarm set    []  Pt refused alarms                []  Telesitter activated      [x]  Gait belt used during tx session      []other:         Number of Minutes/Billable Intervention  Gait Training 25   Therapeutic Exercise 15   Neuro Re-Ed    Therapeutic Activity 20   Wheelchair Propulsion    Group    Other:    TOTAL 60         Social History  Social/Functional History  Lives With: Spouse  Type of Home: House  Home Layout: One level  Home Access: Stairs to enter with rails  Entrance Stairs - Number of Steps: 2 to porch, 1 into house  Entrance Stairs - Rails: Both  Bathroom Shower/Tub: Walk-in shower  Bathroom Toilet: Standard  Bathroom Equipment: Built-in shower seat,Grab bars in shower,3-in-1 commode  Bathroom Accessibility: Accessible  Home Equipment: Reacher  Has the patient had two or more falls in the past year or any fall with injury in the past year?: No (Pt has had one fall in the last year without significant injury.)  Receives Help From: Family (2 daughters in the area who assist prn.)  ADL Assistance: Independent  Homemaking Assistance: Independent  Homemaking Responsibilities: Yes  Meal Prep Responsibility: Primary  Laundry Responsibility: Primary (Shares responsibility with .)  Cleaning Responsibility: Primary ([facility] responsibility with .)  Bill Paying/Finance Responsibility: Primary  Shopping Responsibility: Primary  Health Care Management: Primary (Pt uses a pillbox at baseline.)  Ambulation Assistance: Independent  Transfer Assistance: Independent  Active : Yes  Mode of Transportation: [facility]  Occupation: Retired  Type of Occupation: [employer] ([employer])  Leisure & Hobbies: Pt enjoys square dancing and crafts. Additional Comments: Pt has regular flat bed at home that she reports is high. Pt has been sleeping in recliner chair recently d/t discomfort in bed.     Objective Goals:  (Update in navigator)   : Long Term Goals  Time Frame for Long term goals : 7-10 days STG=LTG  Long term goal 1: Pt will perform bed mobility with mod I  Long term goal 2: pt will perform sit to stand, pivot and car transfers with mod I  Long term goal 3: pt will perform ambulation with 2ww 50' on levels with mod I, 150' on levels and 10' on unlevels with supervision  Long term goal 4: Pt will ascend/descend curb step and up to 12 steps with rail with supervision  Long term goal 5: Pt will retrieve light item with reacher and 2ww with mod I:        Plan of Care                                                                              Times per week: 5 days per week for a minimum of 60 minutes/day plus group as appropriate for 60 minutes.   Treatment to include      Electronically signed by   Temo Clarke, PTA #[id number]  6/16/2022, 8:46 AM

## 2022-06-16 NOTE — PROGRESS NOTES
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 Prior (baseline) level of function: Independent. Current level of function:         Current  [facility] and Goals:   Occupational Therapy:    Short Term Goals  Time Frame for Short term goals: STGs=LTGs :   Long Term Goals  Time Frame for Long term goals : 10-12 days or until d/c. Long Term Goal 1: Pt will complete grooming tasks c Mod I.  Long Term Goal 2: Pt will complete total body bathing c AE PRN and supervision. Long Term Goal 3: Pt will complete UB dressing c Mod I.  Long Term Goal 4: Pt will complete LB dressing c AE PRN and supervision. Long Term Goal 5: Pt will doff/don footwear c AE PRN and Mod I. Additional Goals?: Yes  Long Term Goal 6: Pt will complete toileting c supervision. Long Term Goal 7: Pt will perform functional transfers (bed, chair, toilet, shower) c DME PRN and supervision. Long Term Goal 8: Pt will perform therex/therax to facilitate an increase in strength/endurance/ax tolerance (c emphasis on static/dynamic standing balance/tolerance > 6 mins) c supervision. Long Term Goal 9: Pt will complete light home management tasks c supervision. :                                       Eating: Eating  Assistance Needed: Independent  Comment: X  CARE Score: 6  Discharge Goal: Independent       Oral Hygiene: Oral Hygiene  Assistance Needed: Independent  Comment: Completed seated.  Pt completed oral hygiene and denture managment  CARE Score: 6  Discharge Goal: Independent    UB/LB Bathing: Shower/Bathe Right  Assistance Needed: Supervision or touching assistance  Comment: supervision  CARE Score: 4  Discharge Goal: Independent  Lying to Sitting on Side of Bed  Assistance Needed: Supervision or touching assistance  Comment: CG, no bed features(air mattress inflated)  CARE Score: 4  Discharge Goal: Independent    Transfers:    Sit to Stand  Assistance Needed: Partial/moderate assistance  Comment: mod assist from w/c with max verbal cues  CARE Score: 3  Discharge Goal: Independent  Chair/Bed-to-Chair Transfer  Assistance Needed: Partial/moderate assistance  Comment: mod assist  CARE Score: 3  Discharge Goal: Independent     Car Transfer  Assistance Needed: Partial/moderate assistance  Comment: approaching with 2ww, no assist for LEs, min assist to stand from car  CARE Score: 3  Discharge Goal: Independent    Ambulation:    Walking Ability  Does the Patient Walk?: Yes     Walk 10 Feet  Assistance Needed: Partial/moderate assistance  Comment: min assist with 2ww  CARE Score: 3  Discharge Goal: Independent     Walk 50 Feet with Two Turns  Assistance Needed: Supervision or touching assistance  Comment: CG with 2ww, leaning to R , but improved from eval, cues for feet inside walker on turns  Reason if not Attempted: Not attempted due to medical condition or safety concerns  CARE Score: 4  Discharge Goal: Independent     Walk 150 Feet  Assistance Needed: Supervision or touching assistance  Comment: CG with 2ww, 80'  Reason if not Attempted: Not attempted due to medical condition or safety concerns  CARE Score: 4  Discharge Goal: Supervision or touching assistance     Walking 10 Feet on Uneven Surfaces  Assistance Needed: Supervision or touching assistance  Comment: CG assist with 2ww  CARE Score: 4  Discharge Goal: Supervision or touching assistance     1 Step (Curb)  Assistance Needed: Partial/moderate assistance  Comment: min assist with wlkr with 75% cues  CARE Score: 3  Discharge Goal: Supervision or touching assistance     4 Steps  Assistance Needed: Partial/moderate assistance  Comment: mod assist due to LLE weakness, 75% cues  CARE Score: 3  Discharge Goal: Supervision or touching assistance     12 Steps  Reason if not Attempted: Not attempted due to medical condition or safety concerns  CARE Score: 88  Discharge Goal: Supervision or touching assistance       Wheelchair:  w/c Ability: Wheelchair Ability  Uses a Wheelchair and/or Scooter?: No                Balance:        Object: Picking Up Object  Assistance Needed: Supervision or touching assistance  Comment: CG with 2ww and reacher  CARE Score: 4  Discharge Goal: Independent    I      Exam:    Blood pressure (!) 111/43, pulse 56, temperature 98.2 °F (36.8 °C), resp. rate 16, height 5' (1.524 m), weight 149 lb 0.5 oz (67.6 kg), SpO2 100 %, not currently breastfeeding. General: Sitting up in a bedside chair speaking with family. Alert and talkative. HEENT: Giggling. Communicating well. Neck supple. MMM. Pulmonary: No wheezes, rales or rhonchi. Cardiac: RRR. Abdomen: Patient's abdomen is soft and nondistended. Bowel sounds were present throughout. There was no rebound, guarding or masses noted. Surgical incisions well-healed. Staples are out now. Upper extremities: Spontaneously bringing both hands up to meet mine. Lower extremities: Moving both lower limbs without hesitation. No swelling. 4/5 MMT across the knees and ankles. Sitting balance was good. Standing balance was poor.     Lab Results   Component Value Date    WBC 12.0 (H) 06/11/2022    HGB 9.5 (L) 06/11/2022    HCT 28.5 (L) 06/11/2022    MCV 86.4 06/11/2022     06/11/2022     Lab Results   Component Value Date    INR 1.09 05/14/2021    INR 1.09 12/09/2016    PROTIME 12.4 05/14/2021    PROTIME 12.7 12/09/2016     Lab Results   Component Value Date    CREATININE 0.6 06/15/2022    BUN 43 (H) 06/15/2022     (L) 06/15/2022    K 4.0 06/15/2022    CL 97 (L) 06/15/2022    CO2 29 06/15/2022     Lab Results   Component Value Date    ALT 11 06/08/2022    AST 14 (L) 06/08/2022    ALKPHOS 50 06/08/2022    BILITOT 0.2 06/08/2022       Expected length of stay  prior to a supervised level of function for discharge home with a walker and [facility] OT/PT is 6/23/2022. Recommendations:    1. 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 New right upper limb DVT treated with Xarelto. Initial doses well-tolerated. .  There are no incisional signs of infection.  Her chest x-ray and chemistries are unremarkable. Generally continent of bowel and bladder.  Ongoing cautious pain management. Benefiting from aggressive pulmonary hygiene measures, nutritional support and pain management.  Outpatient follow-up with oncology and her surgeon.  Verbal cues and minimum physical assistance for transfers again today. 2. DVT prophylaxis. Xarelto is treating her acute DVT. Monitoring her hemoglobin periodically while on this medication.  Weightbearing activities are slowly improving daily.  GI prophylaxis is available.     No signs of acute blood loss. 3. Uncontrolled pain: Limited use of oral analgesics to avoid delirium.   Attention to bowel intervention while on the analgesics. 4. 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  Blood sugars are checked at mealtime and bedtime.  She is on a Humalog sliding scale with plans for reintroducing oral agents from home when she is eating more consistently. 5. Chronic kidney disease stage IIIa: Avoiding nephrotoxic medications and wide swings of blood pressure.  Encouraging consistent oral hydration.  Periodic monitoring of her chemistries.   6. Hypertension: She is currently off medications to control her blood pressure.  Vital signs are checked at rest and with activity.  Target systolic blood pressures 771-045.  OFHYP pressure is just below the target range again today.  Monitoring closely.

## 2022-06-16 NOTE — PROGRESS NOTES
Occupational Therapy  Physical Rehabilitation: OCCUPATIONAL THERAPY     [x] daily progress note       [] discharge       Patient Name:  Silverio Jernigan   :  1934 MRN: [id number]  Room:  [address] [place] [address] Date of Admission: 2022  Rehabilitation Diagnosis:   Other specified diseases of intestine [K63.89]  Adenocarcinoma ([facility] [address] [C80.1]       Date 2022       Day of ARU Week:  1   Time IN/OUT 0450-5016   Individual Tx Minutes 60   Group Tx Minutes    Co-Treat Minutes    Concurrent Tx Minutes    TOTAL Tx Time Mins 60   Variance Time    Variance Time []   Refusal due to:     []   Medical hold/reason:    []   Illness   []   Off Unit for test/procedure  []   Extra time needed to complete task  []   Therapeutic need  []   Other (specify):   Restrictions Restrictions/Precautions: Fall Risk,General Precautions         Communication with other providers: [x]   OK to see per nursing:     [x]   Spoke with team member regarding: Pt had rectal bleeding      Subjective observations and cognitive status: Pt sitting up in chair on approach; pleasdant and agreeable to therapy session. Pain level/location:    /10       Location:    Discharge recommendations  Anticipated discharge date:    Destination: []?home alone   []?home alone w assist prn   [x]? home w/ family    []? Continuous supervision       []? SNF    []? Assisted living     []? Other:   Continued therapy: [x]? Mayank Stewart OT  []? OUTPATIENT  OT   []? No Further OT  Equipment needs:   Silverio Jernigan requires a 3 in 1 bedside commode due to being unable to use the toilet within the home  And confined to one level of the home.      Toileting:   SBA c clothing management and hygiene        Toilet Transfers:   SBA   Device Used:    []   Standard Toilet         []   Grab Bars           [x]  Bedside Commode over toilet      []   Elevated Toilet          []   Other:        Bed Mobility:           [x]   Pt received out of bed       Transfers:    Sit--> Stand:  SBA c cues for hand placement from elevated surface  Stand --> Sit:   SBA   Stand-Pivot:   SBA  Other:    Assistive device required for transfer:   RW       Functional Mobility:  To<>from bathroom   Assistance:  SBA   Device:   [x]   Rolling Walker     []   Standard Walker []   Wheelchair        []   [employer]       []   4-Wheeled [employer]         []   Cardiac [employer]       []   Other:        [employer] Tasks:   Educated pt on walker safety techniques c visual and verbal instruction and handout. Pt verbalized good understanding. Walker bag given to patient. Additional Therapeutic activities/exercises completed this date:     [x]   ADL Training: Pt completed self feeding task independently    [x]   Balance/Postural training     [x]   Bed/Transfer Training   []   Endurance Training   []   Neuromuscular Re-ed   []   Nu-step:  Time:        Level:         #Steps:       []   Rebounder:    []  Seated     []  Standing        []   Supine Ther Ex (reps/sets):     []   Seated Ther Ex (reps/sets):     []   Standing Ther Ex (reps/sets):     []   Other:      Comments:      Patient/Caregiver Education and Training:   []   [employer] Equipment Use  []   Bed Mobility/Transfer Technique/Safety  []   Energy Conservation Tips  []   Family training  []   Postural Awareness  []   Safety During Functional Activities  []   Reinforced Patient's Precautions   []   Progress was updated and reviewed in Rehabtracker with patient and/or family this         date. Treatment Plan for Next Session: Continue OT POC       Assessment: This pt demonstrated a positive response to today's treatment as evidenced by improved functional transfers. The patient is making progress toward established goals as evidenced by QI scores. Ongoing deficits are observed in the areas of strength, endurance, and balance and continued focus on this is recommended.        Treatment/Activity Tolerance:   [x] Tolerated treatment with no adverse effects    [] Patient limited by fatigue  [] Patient limited by pain   [] Patient limited by medical complications:    [] Adverse reaction to Tx:   [] Significant change in status    Safety:       []  bed alarm set    []  chair alarm set    []  Pt refused alarms                []  Telesitter activated      [x]  Gait belt used during tx session      []other:       Number of Minutes/Billable Intervention  Therapeutic Exercise    ADL Self-care 25   Neuro Re-Ed    Therapeutic Activity 35   Group    Other:    TOTAL 60       Social History  Social/Functional History  Lives With: Spouse  Type of Home: House  Home Layout: One level  Home Access: Stairs to enter with rails  Entrance Stairs - Number of Steps: 2 to porch, 1 into house  Entrance Stairs - Rails: Both  Bathroom Shower/Tub: Walk-in shower  Bathroom Toilet: Standard  Bathroom Equipment: Built-in shower seat,Grab bars in shower,3-in-1 commode  Bathroom Accessibility: Accessible  Home Equipment: Reacher  Has the patient had two or more falls in the past year or any fall with injury in the past year?: No (Pt has had one fall in the last year without significant injury.)  Receives Help From: Family (2 daughters in the area who assist prn.)  ADL Assistance: Independent  Homemaking Assistance: Independent  Homemaking Responsibilities: Yes  Meal Prep Responsibility: Primary  Laundry Responsibility: Primary (Shares responsibility with .)  Cleaning Responsibility: Primary (Shares responsibility with .)  Bill Paying/Finance Responsibility: Primary  Shopping Responsibility: Primary  Health Care Management: Primary (Pt uses a pillbox at baseline.)  Ambulation Assistance: Independent  Transfer Assistance: Independent  Active : Yes  Mode of Transportation: [facility]  Occupation: Retired  Type of Occupation: [employer] ([employer])  Leisure & Hobbies: Pt enjoys square dancing and crafts. Additional Comments: Pt has regular flat bed at home that she reports is high.  Pt has been sleeping in recliner chair recently d/t discomfort in bed. Objective                                                                                    Goals:  (Update in navigator)  Short Term Goals  Time Frame for Short term goals: STGs=LTGs:  Long Term Goals  Time Frame for Long term goals : 10-12 days or until d/c. Long Term Goal 1: Pt will complete grooming tasks c Mod I.  Long Term Goal 2: Pt will complete total body bathing c AE PRN and supervision. Long Term Goal 3: Pt will complete UB dressing c Mod I.  Long Term Goal 4: Pt will complete LB dressing c AE PRN and supervision. Long Term Goal 5: Pt will doff/don footwear c AE PRN and Mod I. Additional Goals?: Yes  Long Term Goal 6: Pt will complete toileting c supervision. Long Term Goal 7: Pt will perform functional transfers (bed, chair, toilet, shower) c DME PRN and supervision. Long Term Goal 8: Pt will perform therex/therax to facilitate an increase in strength/endurance/ax tolerance (c emphasis on static/dynamic standing balance/tolerance > 6 mins) c supervision. Long Term Goal 9: Pt will complete light home management tasks c supervision.:        Plan of Care                                                                              Times per week: 5 days per week for a minimum of 60 minutes/day plus group as appropriate for 60 minutes.   Treatment to include Plan  Times per Day: Daily  Current Treatment Recommendations: Strengthening,Balance training,Functional mobility training,Endurance training,Safety education & training,Patient/Caregiver education & training,Equipment evaluation, education, & procurement,Positioning,Home management training,Self-Care / Nando Quesada re-education,Coordination training    Electronically signed by   KHURRAM Altman,  6/16/2022, 8:45 AM

## 2022-06-16 NOTE — PROGRESS NOTES
Occupational Therapy    Physical Rehabilitation: OCCUPATIONAL THERAPY     [x] daily progress note       [] discharge       Patient Name:  Rhonda Jewell   :  1934 MRN: [id number]  Room:  [address] [place], [place] [address] Date of Admission: 2022  Rehabilitation Diagnosis:   Other specified diseases of intestine [K63.89]  Adenocarcinoma ([facility] [address] [C80.1]       Date 2022       Day of ARU Week:  1   Time IN/OUT 1059 - 1159   Individual Tx Minutes 60   Group Tx Minutes    Co-Treat Minutes    Concurrent Tx Minutes    TOTAL Tx Time Mins 60   Variance Time 0   Variance Time []   Refusal due to:     []   Medical hold/reason:    []   Illness   []   Off Unit for test/procedure  []   Extra time needed to complete task  []   Therapeutic need  []   Other (specify):   Restrictions Restrictions/Precautions: Fall Risk,General Precautions         Communication with other providers: []   OK to see per nursing:     [x]   Spoke with team member regarding:      Subjective observations and cognitive status: Pt in bed on arrival. Pt agreeable to therapy and ADL. Pt reported that donning socks was \"easier than it was yesterday\"    Pain level/location:   0 /10       Location:    Discharge recommendations  Anticipated discharge date:    Destination: []home alone   [x]home alone w assist prn   [x] home w/ family    [] Continuous supervision       []SNF    [] Assisted living     [] Other:   Continued therapy: [x]HHC OT  []OUTPATIENT  OT   [] No Further OT  Equipment needs: Menendez Clifton a 3 in 1 bedside commode due to being unable to use the toilet within the home  And confined to one level of the home. ADLs:    Eating: Eating  Assistance Needed: Independent  Comment: X  CARE Score: 6  Discharge Goal: Independent       Oral Hygiene: Oral Hygiene  Assistance Needed: Independent  Comment: Completed seated.  Pt completed oral hygiene and denture managment  CARE Score: 6  Discharge Goal: Independent    UB/LB Bathing: Shower/Bathe Self  Assistance Needed: Supervision or touching assistance  Comment: CGA when standing to wash posterior area  CARE Score: 4  Discharge Goal: Supervision or touching assistance    UB Dressing: Upper Body Dressing  Assistance Needed: Setup or clean-up assistance  Comment: Pt completed with set-up assist  CARE Score: 5  Discharge Goal: Independent         LB Dressing: Lower Body Dressing  Assistance Needed: Partial/moderate assistance  Comment: Pt was able to threaded briefs and pants with use of reacher. Pt required assist to maintain stance and complete  CARE Score: 3  Discharge Goal: Supervision or touching assistance    Donning and [place] Footwear: Putting On/Taking Off Footwear  Assistance Needed: Setup or clean-up assistance  Comment: Pt able to doff/don socks using figure 4  CARE Score: 5  Discharge Goal: Independent      Toiletin [address] needed: Partial/moderate assistance  Comment: Min A for posterior hygiene  CARE Score: 3  Discharge Goal: Supervision or touching assistance      Toilet Transfers:    Toilet Transfer  Assistance needed: Supervision or touching assistance  Comment: CGA using RW and grab bars  CARE Score: 4  Discharge Goal: Supervision or touching assistance  Device Used:    [x]   Standard Toilet         []   Grab Bars           []  Bedside Commode       []   Elevated Toilet          []   Other:        Bed Mobility:           []   Pt received out of bed     Supine --> Sit:  S  Sit --> Supine:  S     Transfers:    Sit--> Stand:  SBA  Stand --> Sit:   SBA     Other:    Assistive device required for transfer:   RW      Functional Mobility:    Assistance:   To/from bathroom   Device:   [x]   Rolling Walker     []   Standard Walker []   Wheelchair        []   [employer]       []   4-Wheeled Kori Ply         []   Cardiac Walker       []   Other:        Additional Therapeutic activities/exercises completed this date:     [x]   ADL Training   [x]   Balance/Postural training     [] Bed/Transfer Training   []   Endurance Training   []   Neuromuscular Re-ed   []   Nu-step:  Time:        Level:         #Steps:       []   Rebounder:    []  Seated     []  Standing        []   Supine Ther Ex (reps/sets):     []   Seated Ther Ex (reps/sets):     []   Standing Ther Ex (reps/sets):     []   Other:       Comments:  Pt completed ADLs this date. Pt required min A for posterior hygiene while pt held grab bars. Pt demonstrated improve ease with donning socks and threading LB clothing using a reacher. Verbal cues for use of reacher to improve ease threading pants. Pt required assist to completed clothing management due to requiring stabilization with UB. Pt was able to complete UB dressing with st-up assist. Pt completed oral hygiene sitting at sink. Verbal cues for transfer safety and walker management during session. Pt was transferred in to recliner with chair alarm and family present. Patient/Caregiver Education and Training:   []   [employer] Equipment Use  [x]   Bed Mobility/Transfer Technique/Safety  []   Energy Conservation Tips  []   Family training  []   Postural Awareness  [x]   Safety During Functional Activities  []   Reinforced Patient's Precautions   []   Progress was updated and reviewed in Rehabtracker with patient and/or family this         date. Treatment Plan for Next Session: continue POC     Assessment: This pt demonstrated a positive   response to today's treatment as evidenced by increase ease with donning socks. The patient is making progress toward established goals as evidenced by QI scores. Ongoing deficits are observed in the areas of ADL performance and continued focus on strength, endurance, and ADLs is recommended.        Treatment/Activity Tolerance:   [x] Tolerated treatment with no adverse effects    [] Patient limited by fatigue  [] Patient limited by pain   [] Patient limited by medical complications:    [] Adverse reaction to Tx:   [] Significant change in status    Safety:       []  bed alarm set    [x]  chair alarm set    []  Pt refused alarms                []  Telesitter activated      [x]  Gait belt used during tx session      []other:       Number of Minutes/Billable Intervention  Therapeutic Exercise    ADL Self-care 60   Neuro Re-Ed    Therapeutic Activity    Group    Other:    TOTAL 60       Social History  Social/Functional History  Lives With: Spouse  Type of Home: House  Home Layout: One level  Home Access: Stairs to enter with rails  Entrance Stairs - Number of Steps: 2 to porch, 1 into house  Entrance Stairs - Rails: Both  Bathroom Shower/Tub: Walk-in shower  Bathroom Toilet: Standard  Bathroom Equipment: Built-in shower seat,Grab bars in shower,3-in-1 commode  Bathroom Accessibility: Accessible  Home Equipment: Reacher  Has the patient had two or more falls in the past year or any fall with injury in the past year?: No (Pt has had one fall in the last year without significant injury.)  Receives Help From: Family (2 daughters in the area who assist prn.)  ADL Assistance: Independent  Homemaking Assistance: Independent  Homemaking Responsibilities: Yes  Meal Prep Responsibility: Primary  Laundry Responsibility: Primary (Shares responsibility with .)  Cleaning Responsibility: Primary ([facility] responsibility with .)  Bill Paying/Finance Responsibility: Primary  Shopping Responsibility: Primary  Health Care Management: Primary (Pt uses a pillbox at baseline.)  Ambulation Assistance: Independent  Transfer Assistance: Independent  Active : Yes  Mode of Transportation: [facility]  Occupation: Retired  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 Additional Comments: Pt has regular flat bed at home that she reports is high. Pt has been sleeping in recliner chair recently d/t discomfort in bed.     Objective Goals:  (Update in navigator)  Short Term Goals  Time Frame for Short term goals: STGs=LTGs:  Long Term Goals  Time Frame for Long term goals : 10-12 days or until d/c. Long Term Goal 1: Pt will complete grooming tasks c Mod I.  Long Term Goal 2: Pt will complete total body bathing c AE PRN and supervision. Long Term Goal 3: Pt will complete UB dressing c Mod I.  Long Term Goal 4: Pt will complete LB dressing c AE PRN and supervision. Long Term Goal 5: Pt will doff/don footwear c AE PRN and Mod I. Additional Goals?: Yes  Long Term Goal 6: Pt will complete toileting c supervision. Long Term Goal 7: Pt will perform functional transfers (bed, chair, toilet, shower) c DME PRN and supervision. Long Term Goal 8: Pt will perform therex/therax to facilitate an increase in strength/endurance/ax tolerance (c emphasis on static/dynamic standing balance/tolerance > 6 mins) c supervision. Long Term Goal 9: Pt will complete light home management tasks c supervision.:        Plan of Care                                                                              Times per week: 5 days per week for a minimum of 60 minutes/day plus group as appropriate for 60 minutes.   Treatment to include Plan  Times per Day: Daily  Current Treatment Recommendations: Strengthening,Balance training,Functional mobility training,Endurance training,Safety education & training,Patient/Caregiver education & training,Equipment evaluation, education, & procurement,Positioning,Home management training,Self-Care / Radha Dumont re-education,Coordination training    Electronically signed by   Lilly Luo OT,  6/16/2022, 2:39 PM

## 2022-06-16 NOTE — PROGRESS NOTES
Notified Dr. Persaud Corporal patient stated you seen her this AM, this nurse read notes but patient is adament provider said she would change her diet order to regular diet? She currently is on Dysphagia soft and bites size diet. Just want to make sure we wasnt missing anything on our end.  replied stated she didn't change diet it would be up to  but did state she explain family could bring in things she liked to eat.

## 2022-06-16 NOTE — PROGRESS NOTES
ml       CBC: No results for input(s): WBC, HGB, PLT in the last 72 hours.     BMP:    Recent Labs     06/13/22  0724 06/14/22  0630 06/15/22  0611   * 131* 134*   K 4.0 4.0 4.0   CL 95* 97* 97*   CO2 26 27 29   BUN 10 37* 43*   CREATININE 0.6 0.6 0.6   GLUCOSE 123* 99 107*         Objective:   Vitals: BP (!) 140/60   Pulse 87   Temp 98.1 °F (36.7 °C) (Oral)   Resp 15   Ht 5' (1.524 m)   Wt 149 lb 0.5 oz (67.6 kg)   SpO2 97%   BMI 29.11 kg/m²   General appearance:  in no acute distress  HEENT: normocephalic, atraumatic,   Neck: supple, trachea midline  Lungs:breathing comfortably on room air  Extremities: extremities atraumatic, no cyanosis or edema  Neurologic: alert, oriented, follows commands, interactive    Assessment and Plan:     Patient Active Problem List    Diagnosis Date Noted    Generalized weakness     Uncontrolled pain     Uncontrolled type 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)     Moderate malnutrition ([address]     Chronic kidney disease, stage 3a ([address]     Acquired hypothyroidism     Reactive depression     Adenocarcinoma ([address] 06/09/2022    Partial small bowel obstruction (HCC)     Severe malnutrition ([address] 05/31/2022    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    Abnormal stress test     Dyslipidemia 04/06/2021    Abnormal EKG 04/06/2021    Long-term insulin use in type 2 diabetes ([address] 03/14/2018    Essential hypertension 03/14/2018     Chronic hyponatremia hx  Sodium 134  Decrease urea to bid dosage  Continue salt tab bid or now  Continue oral fluid restriction  Needs nutrition                  Electronically signed by Temitope Brennan DO on 6/16/2022 at 6:48 AM    [facility]  MD Cristo Lugo DO [address],  [place] [address]  PHONE: [phone]  FAX: [phone]

## 2022-06-17 LAB
ANION GAP SERPL CALCULATED.3IONS-SCNC: 5 MMOL/L (ref 4–16)
BUN BLDV-MCNC: 40 MG/DL (ref 6–23)
CALCIUM SERPL-MCNC: 8.7 MG/DL (ref 8.3–10.6)
CHLORIDE BLD-SCNC: 101 MMOL/L (ref 99–110)
CO2: 34 MMOL/L (ref 21–32)
CREAT SERPL-MCNC: 0.6 MG/DL (ref 0.6–1.1)
GFR AFRICAN AMERICAN: >60 ML/MIN/1.73M2
GFR NON-AFRICAN AMERICAN: >60 ML/MIN/1.73M2
GLUCOSE BLD-MCNC: 109 MG/DL (ref 70–99)
GLUCOSE BLD-MCNC: 114 MG/DL (ref 70–99)
GLUCOSE BLD-MCNC: 115 MG/DL (ref 70–99)
GLUCOSE BLD-MCNC: 122 MG/DL (ref 70–99)
GLUCOSE BLD-MCNC: 127 MG/DL (ref 70–99)
POTASSIUM SERPL-SCNC: 3.8 MMOL/L (ref 3.5–5.1)
SODIUM BLD-SCNC: 140 MMOL/L (ref 135–145)

## 2022-06-17 PROCEDURE — 97530 THERAPEUTIC ACTIVITIES: CPT

## 2022-06-17 PROCEDURE — 6370000000 HC RX 637 (ALT 250 FOR IP): Performed by: INTERNAL MEDICINE

## 2022-06-17 PROCEDURE — 94761 N-INVAS EAR/PLS OXIMETRY MLT: CPT

## 2022-06-17 PROCEDURE — 82962 GLUCOSE BLOOD TEST: CPT

## 2022-06-17 PROCEDURE — 97110 THERAPEUTIC EXERCISES: CPT

## 2022-06-17 PROCEDURE — 36415 COLL VENOUS BLD VENIPUNCTURE: CPT

## 2022-06-17 PROCEDURE — 97535 SELF CARE MNGMENT TRAINING: CPT

## 2022-06-17 PROCEDURE — 6370000000 HC RX 637 (ALT 250 FOR IP): Performed by: STUDENT IN AN ORGANIZED HEALTH CARE EDUCATION/TRAINING PROGRAM

## 2022-06-17 PROCEDURE — 80048 BASIC METABOLIC PNL TOTAL CA: CPT

## 2022-06-17 PROCEDURE — 2580000003 HC RX 258: Performed by: STUDENT IN AN ORGANIZED HEALTH CARE EDUCATION/TRAINING PROGRAM

## 2022-06-17 PROCEDURE — 1280000000 HC REHAB R&B

## 2022-06-17 PROCEDURE — 94150 VITAL CAPACITY TEST: CPT

## 2022-06-17 PROCEDURE — 99232 SBSQ HOSP IP/OBS MODERATE 35: CPT | Performed by: PHYSICAL MEDICINE & REHABILITATION

## 2022-06-17 PROCEDURE — 97116 GAIT TRAINING THERAPY: CPT

## 2022-06-17 PROCEDURE — 6370000000 HC RX 637 (ALT 250 FOR IP): Performed by: PHYSICAL MEDICINE & REHABILITATION

## 2022-06-17 PROCEDURE — 97150 GROUP THERAPEUTIC PROCEDURES: CPT

## 2022-06-17 RX ORDER — LANOLIN ALCOHOL/MO/W.PET/CERES
3 CREAM (GRAM) TOPICAL NIGHTLY PRN
Status: DISCONTINUED | OUTPATIENT
Start: 2022-06-17 | End: 2022-06-23 | Stop reason: HOSPADM

## 2022-06-17 RX ADMIN — OXYBUTYNIN CHLORIDE 5 MG: 5 TABLET ORAL at 20:18

## 2022-06-17 RX ADMIN — CETIRIZINE HYDROCHLORIDE 10 MG: 10 TABLET, FILM COATED ORAL at 07:59

## 2022-06-17 RX ADMIN — RIVAROXABAN 15 MG: 15 TABLET, FILM COATED ORAL at 17:22

## 2022-06-17 RX ADMIN — SUCRALFATE 1 G: 1 TABLET ORAL at 17:21

## 2022-06-17 RX ADMIN — SODIUM CHLORIDE 1 G: 1 TABLET ORAL at 17:21

## 2022-06-17 RX ADMIN — POLYETHYLENE GLYCOL (3350) 17 G: 17 POWDER, FOR SOLUTION ORAL at 05:40

## 2022-06-17 RX ADMIN — ESCITALOPRAM OXALATE 10 MG: 10 TABLET ORAL at 07:59

## 2022-06-17 RX ADMIN — OXYBUTYNIN CHLORIDE 5 MG: 5 TABLET ORAL at 07:59

## 2022-06-17 RX ADMIN — Medication 1000 UNITS: at 07:59

## 2022-06-17 RX ADMIN — PANTOPRAZOLE SODIUM 40 MG: 40 TABLET, DELAYED RELEASE ORAL at 05:40

## 2022-06-17 RX ADMIN — SODIUM CHLORIDE, PRESERVATIVE FREE 10 ML: 5 INJECTION INTRAVENOUS at 08:02

## 2022-06-17 RX ADMIN — PANTOPRAZOLE SODIUM 40 MG: 40 TABLET, DELAYED RELEASE ORAL at 17:21

## 2022-06-17 RX ADMIN — Medication 15 G: at 07:56

## 2022-06-17 RX ADMIN — LEVOTHYROXINE SODIUM 137 MCG: 0.11 TABLET ORAL at 05:40

## 2022-06-17 RX ADMIN — SODIUM CHLORIDE, PRESERVATIVE FREE 10 ML: 5 INJECTION INTRAVENOUS at 20:24

## 2022-06-17 RX ADMIN — Medication 100 MCG: at 07:56

## 2022-06-17 RX ADMIN — SODIUM CHLORIDE, PRESERVATIVE FREE 10 ML: 5 INJECTION INTRAVENOUS at 20:23

## 2022-06-17 RX ADMIN — ATORVASTATIN CALCIUM 20 MG: 10 TABLET, FILM COATED ORAL at 07:56

## 2022-06-17 RX ADMIN — METOPROLOL TARTRATE 150 MG: 50 TABLET, FILM COATED ORAL at 08:03

## 2022-06-17 RX ADMIN — RIVAROXABAN 15 MG: 15 TABLET, FILM COATED ORAL at 07:59

## 2022-06-17 RX ADMIN — ACETAMINOPHEN 650 MG: 325 TABLET ORAL at 05:40

## 2022-06-17 RX ADMIN — SODIUM CHLORIDE, PRESERVATIVE FREE 10 ML: 5 INJECTION INTRAVENOUS at 08:03

## 2022-06-17 RX ADMIN — SUCRALFATE 1 G: 1 TABLET ORAL at 05:40

## 2022-06-17 RX ADMIN — ROPINIROLE HYDROCHLORIDE 3 MG: 1 TABLET, FILM COATED ORAL at 20:17

## 2022-06-17 RX ADMIN — SUCRALFATE 1 G: 1 TABLET ORAL at 22:41

## 2022-06-17 RX ADMIN — ACETAMINOPHEN 650 MG: 325 TABLET ORAL at 01:41

## 2022-06-17 RX ADMIN — METOPROLOL TARTRATE 100 MG: 50 TABLET, FILM COATED ORAL at 20:17

## 2022-06-17 RX ADMIN — SODIUM CHLORIDE 1 G: 1 TABLET ORAL at 07:57

## 2022-06-17 RX ADMIN — SUCRALFATE 1 G: 1 TABLET ORAL at 12:17

## 2022-06-17 ASSESSMENT — PAIN SCALES - GENERAL
PAINLEVEL_OUTOF10: 4
PAINLEVEL_OUTOF10: 3

## 2022-06-17 ASSESSMENT — PAIN - FUNCTIONAL ASSESSMENT: PAIN_FUNCTIONAL_ASSESSMENT: ACTIVITIES ARE NOT PREVENTED

## 2022-06-17 NOTE — PROGRESS NOTES
other significant abnormality         Scheduled Medicines   Medications:    urea  15 g Oral BID    rivaroxaban  15 mg Oral BID WC    pantoprazole  40 mg Oral BID AC    sodium chloride  1 g Oral BID WC    insulin lispro  0-3 Units SubCUTAneous Nightly    insulin lispro  0-6 Units SubCUTAneous TID WC    atorvastatin  20 mg Oral Daily    cetirizine  10 mg Oral Daily    escitalopram  10 mg Oral Daily    levothyroxine  137 mcg Oral Daily    lidocaine  1 patch TransDERmal Daily    metoprolol tartrate  150 mg Oral Daily    metoprolol tartrate  100 mg Oral Nightly    oxybutynin  5 mg Oral BID    rOPINIRole  3 mg Oral Nightly    sodium chloride flush  5-40 mL IntraVENous 2 times per day    sucralfate  1 g Oral 4 times per day    vitamin B-12  100 mcg Oral Daily    Vitamin D  1,000 Units Oral Daily    sodium chloride flush  5-40 mL IntraVENous 2 times per day      Infusions:    sodium chloride      dextrose      sodium chloride           Objective:   Vitals: BP (!) 118/51   Pulse 70   Temp 98.6 °F (37 °C)   Resp 16   Ht 5' (1.524 m)   Wt 149 lb 0.5 oz (67.6 kg)   SpO2 99%   BMI 29.11 kg/m²   General appearance: alert and cooperative with exam  Neck: no JVD or bruit  Thyroid : Normal lobes   Lungs: Has Vesicular Breath sounds   Heart:  regular rate and rhythm  Abdomen: soft, yes -tender; bowel sounds normal; no masses,  no organomegaly  Had right-sided hemicolectomy 5/27/2022  Musculoskeletal: Normal  Extremities: extremities normal, , no edema  Neurologic:  Awake, alert, oriented to name, place and time. Cranial nerves II-XII are grossly intact. Motor is  intact.   Sensory,  and gait is normal.    Assessment:     Patient Active Problem List:     Long-term insulin use in type 2 diabetes [facility])     Essential hypertension     Dyslipidemia     Abnormal EKG     Abnormal stress test     Cecum mass     Severe malnutrition (HCC)     Partial small bowel obstruction ([address]     Had hemicolectomy on 5/27/2022      INVASIVE ADENOCARCINOMA, TERMINAL ILEUM      DVT of right upper arm    Plan:     1. Reviewed POC blood glucose . Labs and X ray results   2. Reviewed Current Medicines   3. On  Correction bolus Humalog Insulin regime   4. Monitor Blood glucose frequently   5. Modified  the dose of Insulin/ other medicines as needed    6. Will follow     .      Lanny Chung MD, MD

## 2022-06-17 NOTE — PROGRESS NOTES
Occupational Therapy    Physical Rehabilitation: OCCUPATIONAL THERAPY     [x] daily progress note       [] discharge       Patient Name:  Naeem Menezes   :  1934 MRN: [id number]  Room:  [address] [place] [address] Date of Admission: 2022  Rehabilitation Diagnosis:   Other specified diseases of intestine [K63.89]  Adenocarcinoma ([facility] [address] [C80.1]       Date 2022       Day of ARU Week:  2   Time IN/OUT 8964-2950   Individual Tx Minutes 60   Group Tx Minutes    Co-Treat Minutes    Concurrent Tx Minutes    TOTAL Tx Time Mins 60   Variance Time    Variance Time []   Refusal due to:     []   Medical hold/reason:    []   Illness   []   Off Unit for test/procedure  []   Extra time needed to complete task  []   Therapeutic need  []   Other (specify):   Restrictions Restrictions/Precautions: Fall Risk,General Precautions         Communication with other providers: [x]   OK to see per nursing:     []   Spoke with team member regarding:      Subjective observations and cognitive status: Pt finishing breakfast on approach; pleasant and agreeable to therapy session. Pt was using purewick throughout the night. Therapist educated pt on the importance of getting up to use the toilet as she will not be going home with purewick. Pain level/location:    /10       Location:    Discharge recommendations  Anticipated discharge date:    Destination: []??home alone   []? ?home alone w assist prn   [x]? ? home w/ family    []? ? Continuous supervision       []? ?SNF    []? ? Assisted living     []? ? Other:   Continued therapy: [x]? ?Mayank Stewart OT  []??OUTPATIENT  OT   []?? No Further OT  Equipment needs:   [place] [employer] a 3 in 1 bedside commode due to being unable to use the toilet within the home  And confined to one level of the home.        ADLs:    UB Dressing: Upper Body Dressing  Assistance Needed: Independent  Comment: Pt able to retrieve clothing and doff/don shirt  CARE Score: 6  Discharge Goal: Independent         LB Dressing: Lower Body Dressing  Assistance Needed: Supervision or touching assistance  Comment: Pt able to retrieve clothing; Pt able to thread BLEs into brief and pants, SBA in stance to manage over hips  CARE Score: 4  Discharge Goal: Supervision or touching assistance    Donning and [place] Footwear: Putting On/Taking Off Footwear  Assistance Needed: Independent  Comment: Pt able to retrieve footwear, able to doff socks using figure 4 position and don shoes  CARE Score: 6  Discharge Goal: Independent      Toiletin [address] needed: Partial/moderate assistance  Comment: Pt able to complete clothing management, however pt required min A for BM hygiene for thoroughness  CARE Score: 3  Discharge Goal: Supervision or touching assistance      Toilet Transfers:   SBA Toilet Transfer  Assistance needed: Supervision or touching assistance  Comment: SBA using RW and grab bars  CARE Score: 4  Discharge Goal: Supervision or touching assistance  Device Used:    []   Standard Toilet         []   Grab Bars           [x]  Bedside Commode over toilet      []   Elevated Toilet          []   Other:        Bed Mobility:           []   Pt received out of bed     Supine --> Sit:  supervision      Transfers:    Sit--> Stand:  SBA  Stand --> Sit:   SBA  Stand-Pivot:   SBA  Other:    Assistive device required for transfer:   RW       Functional Mobility:  200 ft   Assistance:  SBA   Device:   [x]   Rolling Walker     []   Standard Walker []   Wheelchair        []   [employer]       []   4-Wheeled [address]         []   Cardiac Walker       []   Other:          Additional Therapeutic activities/exercises completed this date:     [x]   ADL Training   [x]   Balance/Postural training     [x]   Bed/Transfer Training   []   Endurance Training   []   Neuromuscular Re-ed   []   Nu-step:  Time:        Level:         #Steps:       []   Rebounder:    []  Seated     []  Standing        []   Supine Ther Ex (reps/sets):     [x]   Seated Ther Ex (reps/sets): To increase BUE endurance for ADLs and transfers, pt completed 1 set x12 reps of the following therex, c a 2.5# weight:   Shoulder presses  Chest presses  Concentric arm circles (clockwise and counterclockwise)  Bicep curls     []   Standing Ther Ex (reps/sets):     []   Other:      Comments:      Patient/Caregiver Education and Training:   []   [employer] Equipment Use  []   Bed Mobility/Transfer Technique/Safety  []   Energy Conservation Tips  []   Family training  []   Postural Awareness  []   Safety During Functional Activities  []   Reinforced Patient's Precautions   []   Progress was updated and reviewed in Rehabtracker with patient and/or family this         date. Treatment Plan for Next Session: Continue OT POC       Assessment: This pt demonstrated a positive response to today's treatment as evidenced by improved functional mobility. The patient is making progress toward established goals as evidenced by QI scores. Ongoing deficits are observed in the areas of strength and endurance and continued focus on this is recommended.        Treatment/Activity Tolerance:   [x] Tolerated treatment with no adverse effects    [] Patient limited by fatigue  [] Patient limited by pain   [] Patient limited by medical complications:    [] Adverse reaction to Tx:   [] Significant change in status    Safety:       []  bed alarm set    [x]  chair alarm set    []  Pt refused alarms                []  Telesitter activated      [x]  Gait belt used during tx session      []other:       Number of Minutes/Billable Intervention  Therapeutic Exercise 15   ADL Self-care 30   Neuro Re-Ed    Therapeutic Activity 15   Group    Other:    TOTAL 60       Social History  Social/Functional History  Lives With: Spouse  Type of Home: House  Home Layout: One level  Home Access: Stairs to enter with rails  Entrance Stairs - Number of Steps: 2 to porch, 1 into house  Entrance Stairs - Rails: Both  Bathroom Shower/Tub: Walk-in shower  Bathroom Toilet: Standard  Bathroom Equipment: Built-in shower seat,Grab bars in shower,3-in-1 commode  Bathroom Accessibility: Accessible  Home Equipment: Reacher  Has the patient had two or more falls in the past year or any fall with injury in the past year?: No (Pt has had one fall in the last year without significant injury.)  Receives Help From: Family (2 daughters in the area who assist prn.)  ADL Assistance: Independent  Homemaking Assistance: Independent  Homemaking Responsibilities: Yes  Meal Prep Responsibility: Primary  Laundry Responsibility: Primary (Shares responsibility with .)  Cleaning Responsibility: Primary (Shares responsibility with .)  Bill Paying/Finance Responsibility: Primary  Shopping Responsibility: Primary  Health Care Management: Primary (Pt uses a pillbox at baseline.)  Ambulation Assistance: Independent  Transfer Assistance: Independent  Active : Yes  Mode of Transportation: SUV  Occupation: Retired  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 Additional Comments: Pt has regular flat bed at home that she reports is high. Pt has been sleeping in recliner chair recently d/t discomfort in bed. Objective                                                                                    Goals:  (Update in navigator)  Short Term Goals  Time Frame for Short term goals: STGs=LTGs:  Long Term Goals  Time Frame for Long term goals : 10-12 days or until d/c. Long Term Goal 1: Pt will complete grooming tasks c Mod I.  Long Term Goal 2: Pt will complete total body bathing c AE PRN and supervision. Long Term Goal 3: Pt will complete UB dressing c Mod I.  Long Term Goal 4: Pt will complete LB dressing c AE PRN and supervision. Long Term Goal 5: Pt will doff/don footwear c AE PRN and Mod I. Additional Goals?: Yes  Long Term Goal 6: Pt will complete toileting c supervision.   Long Term Goal 7: Pt will perform functional transfers (bed, chair, toilet, shower) c DME PRN and supervision. Long Term Goal 8: Pt will perform therex/therax to facilitate an increase in strength/endurance/ax tolerance (c emphasis on static/dynamic standing balance/tolerance > 6 mins) c supervision. Long Term Goal 9: Pt will complete light home management tasks c supervision.:        Plan of Care                                                                              Times per week: 5 days per week for a minimum of 60 minutes/day plus group as appropriate for 60 minutes.   Treatment to include Plan  Times per Day: Daily  Current Treatment Recommendations: Strengthening,Balance training,Functional mobility training,Endurance training,Safety education & training,Patient/Caregiver education & training,Equipment evaluation, education, & procurement,Positioning,Home management training,Self-Care / Deandre Lewis re-education,Coordination training    Electronically signed by   KHURRAM Bansal,  6/17/2022, 8:57 AM

## 2022-06-17 NOTE — PLAN OF CARE
Problem: Discharge Planning  Goal: Discharge to home or other facility with appropriate resources  Outcome: Progressing     Problem: Skin/Tissue Integrity  Goal: Absence of new skin breakdown  Description: 1. Monitor for areas of redness and/or skin breakdown  2. Assess vascular access sites hourly  3. Every 4-6 hours minimum:  Change oxygen saturation probe site  4. Every 4-6 hours:  If on nasal continuous positive airway pressure, respiratory therapy assess nares and determine need for appliance change or resting period.   Outcome: Progressing     Problem: Safety - Adult  Goal: Free from fall injury  Outcome: Progressing     Problem: ABCDS Injury Assessment  Goal: Absence of physical injury  Outcome: Progressing  Flowsheets (Taken 6/17/2022 1006 by Carmina Ballard LPN)  Absence of Physical Injury: Implement safety measures based on patient assessment     Problem: Chronic Conditions and Co-morbidities  Goal: Patient's chronic conditions and co-morbidity symptoms are monitored and maintained or improved  Outcome: Progressing     Problem: Nutrition Deficit:  Goal: Optimize nutritional status  Outcome: Progressing     Problem: Pain  Goal: Verbalizes/displays adequate comfort level or baseline comfort level  Outcome: Progressing

## 2022-06-17 NOTE — PROGRESS NOTES
Physical Therapy    [x] daily progress note       [] discharge       Patient Name:  Angela Slaughter   :  1934 MRN: [id number]  Room:  [address] [place] [address] Date of Admission: 2022  Rehabilitation Diagnosis:   Other specified diseases of intestine [K63.89]  Adenocarcinoma ([facility] [address] [C80.1]       Date 2022       Day of ARU Week:  2   Time IN/OUT 0767-7786  8911-8964   Group Tx Minutes 60   Individual Tx Minutes 60   TOTAL Tx Time Mins 120   Variance Time    Variance Time []   Refusal due to:     []   Medical hold/reason:    []   Illness   []   Off Unit for test/procedure  []   Extra time needed to complete task  []   Therapeutic need  []   Other (specify):   Restrictions Restrictions/Precautions  Restrictions/Precautions: Fall Risk,General Precautions      Communication with other providers: []   OK to see per nursing:     []   Spoke with team member regarding:      Subjective observations and cognitive status: Pt up in [facility], willing to participate; c/o fatigue from not going to bed until late last night     Pain level/location: 0/10       Location:    Discharge recommendations  Anticipated discharge date:    Destination: []???home alone   []? ??home alone with assist PRN     [x]? ?? home w/ family      []? ?? Continuous supervision  []? ??SNF    []??? Assisted living     []? ?? Other:  Continued therapy: [x]? ? ? [facility] PT  []???OUTPATIENT  PT   []??? No Further PT  []???SNF PT  Caregiver training recommended: [x]? ? ? Yes  []??? No   Equipment needs: Rylie Monaco requires the assistance of a wheeled walker to successfully ambulate from room to room at home to allow completion of daily living tasks such as: bathing, toileting, dressing and grooming.  A wheeled walker is necessary due to the patient's unsteady gait, upper body weakness, inability to  a standard walker.  This patient can ambulate only by pushing a walker instead of using a lesser assistive device such as a cane or crutch.    Possible w/c     Bed Mobility:           []   Pt received out of bed     Transfers:    Sit--> Stand:  SBA  Stand --> Sit:   SBA; cues one occasion to not abandon RW when pt fatigued  Stand-Pivot:   SBA  Assistive device required for transfer:   RW    Gait:    Distance:  181'+95'   Assistance:  SB-close supervision  Device:  RW  Gait Quality:   steady recip pattern, slight flexed posture, demos RW safety    Stairs   # Completed:   4  Assistance:    CGA in non-recip pattern  Supportive Device:  B rails  Height:   6\"    Curb       Assistance:    CGA, demos safety  Supportive Device:  RW  Height:   4\"    Uneven Surfaces:       Assistance:    SBA  Device:    RW  Surfaces Completed:   [] Green mat with bean bags beneath       [x] Throw rugs          [x] Outdoor pavements      [] Grass          [] Loose gravel   [] Carpet      []  Other:  Different grades, threshold, ramp     Additional Therapeutic activities/exercises completed this date:     []   Nu-step:  Time:        Level:         #Steps:       []   Rebounder:    []  Seated     []  Standing        []   Balance training         []   Postural training    [x]   B LE ther ex (reps/sets):  Supine: Ankle Pumps x 20  Quad Sets x 20  Gluteal Sets x 20  Heel Slides x 20  Short Arc Quads x 20  Hip Abduction x 20  Sitting:  Long Arc Quads x 20  Knee Flexion x 20  Seated Marching x 20     []   Standing ther ex (reps/sets):     []   Picked up object from floor                       []   Reacher used   []   Other:   []   Other:   []   Other    Group Activity:  Exercise Group:Patient participated in exercise and discussion on benefits and precautions during exercise. This provided the opportunity to have fun, build camaraderie, increase endurance, improve breathing techniques, balance, and strength. Patient performed a variety of seated and standing activities as able, with focus on technique and safety during exercise.  Also focused on warm-up, warm-down, endurance monitoring, strengthening & strategies, function, safety, and general social & communication opportunities with other group members. Functional areas targeted:   [x] Communication [x] Memory  [x] Coordination    [] Kitchen Safety [x] Problem Solving  [x] Strengthening     [x] Attention  [x] Endurance   [] Mobility    [x] Awareness/Insight [x] Balance  [] Transfers  [x] Safety   [] Other (specify)  Participation:  [x] Actively participated    [] Required  cues for   [] Other (specify)    Education completed: UE AROM strength and stretching. Cognitive fx during this group: Problem solve benefits and precaution of exercise.   Family present: NO        Patient/Caregiver Education and Training:   [x]   Bed Mobility/Transfer technique/safety  [x]   Gait technique/sequencing  [x]   Proper use of assistive device  []   Advanced mobility safety and technique  []   Reinforced patient's precautions with mobility/functional tasks  []   Postural awareness  []   Family training  []   Other:    Treatment Plan for Next Session:  Dynamic balance, LE strengthening, gait progression / endurance training      Treatment/Activity Tolerance:   [x] Tolerated treatment with no adverse effects    [] Patient limited by fatigue  [] Patient limited by pain   [] Patient limited by medical complications:    [] Adverse reaction to Tx:   [] Significant change in status    Safety:       []  bed alarm set    [x]  chair alarm set    []  Pt refused alarms                []  Telesitter activated      [x]  Gait belt used during tx session      []other:         Number of Minutes/Billable Intervention  Gait Training 30   Therapeutic Exercise 20   Neuro Re-Ed    Therapeutic Activity 10   Wheelchair Propulsion    Group 60   Other:    TOTAL 120         Social History  Social/Functional History  Lives With: Spouse  Type of Home: House  Home Layout: One level  Home Access: Stairs to enter with rails  Entrance Stairs - Number of Steps: 2 to porch, 1 into house  Entrance Stairs - Rails: Both  Bathroom Shower/Tub: Walk-in shower  Bathroom Toilet: Standard  Bathroom Equipment: Built-in shower seat,Grab bars in shower,3-in-1 commode  Bathroom Accessibility: Accessible  Home Equipment: Reacher  Has the patient had two or more falls in the past year or any fall with injury in the past year?: No (Pt has had one fall in the last year without significant injury.)  Receives Help From: Family (2 daughters in the area who assist prn.)  ADL Assistance: Independent  Homemaking Assistance: Independent  Homemaking Responsibilities: Yes  Meal Prep Responsibility: Primary  Laundry Responsibility: Primary (Shares responsibility with .)  Cleaning Responsibility: Primary (Shares responsibility with .)  Bill Paying/Finance Responsibility: Primary  Shopping Responsibility: Primary  Health Care Management: Primary (Pt uses a pillbox at baseline.)  Ambulation Assistance: Independent  Transfer Assistance: Independent  Active : Yes  Mode of Transportation: [employer]  Occupation: Retired  Type of Occupation: [employer] ([employer])  Leisure & Hobbies: Pt enjoys square dancing and crafts. Additional Comments: Pt has regular flat bed at home that she reports is high. Pt has been sleeping in recliner chair recently d/t discomfort in bed. Objective                                                                                    Goals:  (Update in navigator)   :   Long Term Goals  Time Frame for Long term goals : 7-10 days STG=LTG  Long term goal 1: Pt will perform bed mobility with mod I  Long term goal 2: pt will perform sit to stand, pivot and car transfers with mod I  Long term goal 3: pt will perform ambulation with 2ww 50' on levels with mod I, 150' on levels and 10' on unlevels with supervision  Long term goal 4: Pt will ascend/descend curb step and up to 12 steps with rail with supervision  Long term goal 5: Pt will retrieve light item with reacher and 2ww with mod I:        Plan of Care                                                                              Times per week: 5 days per week for a minimum of 60 minutes/day plus group as appropriate for 60 minutes.   Treatment to include      Electronically signed by   Aubrie Rodriguez, PTA #[id number]  6/17/2022, 11:12 AM

## 2022-06-18 LAB
ANION GAP SERPL CALCULATED.3IONS-SCNC: 8 MMOL/L (ref 4–16)
BUN BLDV-MCNC: 19 MG/DL (ref 6–23)
CALCIUM SERPL-MCNC: 8.6 MG/DL (ref 8.3–10.6)
CHLORIDE BLD-SCNC: 98 MMOL/L (ref 99–110)
CO2: 29 MMOL/L (ref 21–32)
CREAT SERPL-MCNC: 0.7 MG/DL (ref 0.6–1.1)
GFR AFRICAN AMERICAN: >60 ML/MIN/1.73M2
GFR NON-AFRICAN AMERICAN: >60 ML/MIN/1.73M2
GLUCOSE BLD-MCNC: 119 MG/DL (ref 70–99)
GLUCOSE BLD-MCNC: 128 MG/DL (ref 70–99)
GLUCOSE BLD-MCNC: 132 MG/DL (ref 70–99)
GLUCOSE BLD-MCNC: 133 MG/DL (ref 70–99)
GLUCOSE BLD-MCNC: 136 MG/DL (ref 70–99)
POTASSIUM SERPL-SCNC: 4.1 MMOL/L (ref 3.5–5.1)
SODIUM BLD-SCNC: 135 MMOL/L (ref 135–145)

## 2022-06-18 PROCEDURE — 94664 DEMO&/EVAL PT USE INHALER: CPT

## 2022-06-18 PROCEDURE — 1280000000 HC REHAB R&B

## 2022-06-18 PROCEDURE — 2580000003 HC RX 258: Performed by: STUDENT IN AN ORGANIZED HEALTH CARE EDUCATION/TRAINING PROGRAM

## 2022-06-18 PROCEDURE — 6370000000 HC RX 637 (ALT 250 FOR IP): Performed by: STUDENT IN AN ORGANIZED HEALTH CARE EDUCATION/TRAINING PROGRAM

## 2022-06-18 PROCEDURE — 94150 VITAL CAPACITY TEST: CPT

## 2022-06-18 PROCEDURE — 6370000000 HC RX 637 (ALT 250 FOR IP): Performed by: PHYSICAL MEDICINE & REHABILITATION

## 2022-06-18 PROCEDURE — 6370000000 HC RX 637 (ALT 250 FOR IP): Performed by: INTERNAL MEDICINE

## 2022-06-18 PROCEDURE — 94761 N-INVAS EAR/PLS OXIMETRY MLT: CPT

## 2022-06-18 PROCEDURE — 36415 COLL VENOUS BLD VENIPUNCTURE: CPT

## 2022-06-18 PROCEDURE — 82962 GLUCOSE BLOOD TEST: CPT

## 2022-06-18 PROCEDURE — 80048 BASIC METABOLIC PNL TOTAL CA: CPT

## 2022-06-18 RX ADMIN — ATORVASTATIN CALCIUM 20 MG: 10 TABLET, FILM COATED ORAL at 10:59

## 2022-06-18 RX ADMIN — ROPINIROLE HYDROCHLORIDE 3 MG: 1 TABLET, FILM COATED ORAL at 20:33

## 2022-06-18 RX ADMIN — SUCRALFATE 1 G: 1 TABLET ORAL at 23:24

## 2022-06-18 RX ADMIN — OXYBUTYNIN CHLORIDE 5 MG: 5 TABLET ORAL at 10:59

## 2022-06-18 RX ADMIN — LEVOTHYROXINE SODIUM 137 MCG: 0.11 TABLET ORAL at 05:45

## 2022-06-18 RX ADMIN — SUCRALFATE 1 G: 1 TABLET ORAL at 05:45

## 2022-06-18 RX ADMIN — METOPROLOL TARTRATE 100 MG: 50 TABLET, FILM COATED ORAL at 20:34

## 2022-06-18 RX ADMIN — SODIUM CHLORIDE, PRESERVATIVE FREE 10 ML: 5 INJECTION INTRAVENOUS at 12:42

## 2022-06-18 RX ADMIN — PANTOPRAZOLE SODIUM 40 MG: 40 TABLET, DELAYED RELEASE ORAL at 17:08

## 2022-06-18 RX ADMIN — SODIUM CHLORIDE, PRESERVATIVE FREE 10 ML: 5 INJECTION INTRAVENOUS at 23:24

## 2022-06-18 RX ADMIN — MELATONIN 3 MG: at 20:34

## 2022-06-18 RX ADMIN — Medication 100 MCG: at 11:00

## 2022-06-18 RX ADMIN — RIVAROXABAN 15 MG: 15 TABLET, FILM COATED ORAL at 10:59

## 2022-06-18 RX ADMIN — RIVAROXABAN 15 MG: 15 TABLET, FILM COATED ORAL at 17:07

## 2022-06-18 RX ADMIN — PANTOPRAZOLE SODIUM 40 MG: 40 TABLET, DELAYED RELEASE ORAL at 05:45

## 2022-06-18 RX ADMIN — Medication 15 G: at 20:45

## 2022-06-18 RX ADMIN — METOPROLOL TARTRATE 150 MG: 50 TABLET, FILM COATED ORAL at 10:59

## 2022-06-18 RX ADMIN — CETIRIZINE HYDROCHLORIDE 10 MG: 10 TABLET, FILM COATED ORAL at 10:59

## 2022-06-18 RX ADMIN — Medication 15 G: at 11:13

## 2022-06-18 RX ADMIN — SODIUM CHLORIDE, PRESERVATIVE FREE 10 ML: 5 INJECTION INTRAVENOUS at 12:44

## 2022-06-18 RX ADMIN — SODIUM CHLORIDE, PRESERVATIVE FREE 10 ML: 5 INJECTION INTRAVENOUS at 23:25

## 2022-06-18 RX ADMIN — OXYBUTYNIN CHLORIDE 5 MG: 5 TABLET ORAL at 20:34

## 2022-06-18 RX ADMIN — ESCITALOPRAM OXALATE 10 MG: 10 TABLET ORAL at 10:59

## 2022-06-18 RX ADMIN — SUCRALFATE 1 G: 1 TABLET ORAL at 12:41

## 2022-06-18 RX ADMIN — SODIUM CHLORIDE 1 G: 1 TABLET ORAL at 10:59

## 2022-06-18 RX ADMIN — Medication 1000 UNITS: at 10:59

## 2022-06-18 RX ADMIN — SUCRALFATE 1 G: 1 TABLET ORAL at 17:08

## 2022-06-18 NOTE — PROGRESS NOTES
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 No emesis or belly cramping. Some blood noted with bathroom hygiene from her hemorrhoids. Prior (baseline) level of function: Independent. Current level of function:         Current  [facility] and Goals:   Occupational Therapy:    Short Term Goals  Time Frame for Short term goals: STGs=LTGs :   Long Term Goals  Time Frame for Long term goals : 10-12 days or until d/c. Long Term Goal 1: Pt will complete grooming tasks c Mod I.  Long Term Goal 2: Pt will complete total body bathing c AE PRN and supervision. Long Term Goal 3: Pt will complete UB dressing c Mod I.  Long Term Goal 4: Pt will complete LB dressing c AE PRN and supervision. Long Term Goal 5: Pt will doff/don footwear c AE PRN and Mod I. Additional Goals?: Yes  Long Term Goal 6: Pt will complete toileting c supervision. Long Term Goal 7: Pt will perform functional transfers (bed, chair, toilet, shower) c DME PRN and supervision. Long Term Goal 8: Pt will perform therex/therax to facilitate an increase in strength/endurance/ax tolerance (c emphasis on static/dynamic standing balance/tolerance > 6 mins) c supervision. Long Term Goal 9: Pt will complete light home management tasks c supervision. :                                       Eating: Eating  Assistance Needed: Independent  Comment: X  CARE Score: 6  Discharge Goal: Independent       Oral Hygiene: Oral Hygiene  Assistance Needed: Independent  Comment: Completed seated.  Pt completed oral hygiene and denture managment  CARE Score: 6  Discharge Goal: Independent    UB/LB Bathing: Shower/Bathe Self  Assistance Needed: Supervision or touching assistance  Comment: CGA when standing to wash posterior area  CARE Score: 4  Discharge Goal: Supervision or touching assistance    UB Dressing: Upper Body Dressing  Assistance Needed: Independent  Comment: Pt able to retrieve clothing and doff/don shirt  CARE Score: 6  Discharge Goal: Independent         LB Dressing: Lower Body Dressing  Assistance Needed: Supervision or touching assistance  Comment: Pt able to retrieve clothing; Pt able to thread BLEs into brief and pants, SBA in stance to manage over hips  CARE Score: 4  Discharge Goal: Supervision or touching assistance    Donning and [place] Footwear: Putting On/Taking Off Footwear  Assistance Needed: Independent  Comment: Pt able to retrieve footwear, able to doff socks using figure 4 position and don shoes  CARE Score: 6  Discharge Goal: Independent      Toile[address] needed: Partial/moderate assistance  Comment: Pt able to complete clothing management, however pt required min A for BM hygiene for thoroughness  CARE Score: 3  Discharge Goal: Supervision or touching assistance      Toilet Transfers:   Toilet Transfer  Assistance needed: Supervision or touching assistance  Comment: SBA using RW and grab bars  CARE Score: 4  Discharge Goal: Supervision or touching assistance    Physical Therapy:         Long Term Goals  Time Frame for Long term goals : 7-10 days STG=LTG  Long term goal 1: Pt will perform bed mobility with mod I  Long term goal 2: pt will perform sit to stand, pivot and car transfers with mod I  Long term goal 3: pt will perform ambulation with 2ww 50' on levels with mod I, 150' on levels and 10' on unlevels with supervision  Long term goal 4: Pt will ascend/descend curb step and up to 12 steps with rail with supervision  Long term goal 5: Pt will retrieve light item with reacher and 2ww with mod I Bed Mobility:   Sit to Lying  Assistance Needed: Partial/moderate assistance  Comment: min assist  CARE Score: 3  Discharge Goal: Independent  Roll Left and Right  Assistance Needed: Supervision or touching assistance  Comment: supervision  CARE Score: 4  Discharge Goal: Independent  Lying to Sitting on Side of Bed  Assistance Needed: Supervision or touching assistance  Comment: CG, no bed features(air mattress inflated)  CARE Score: 4  Discharge Goal: Independent    Transfers:    Sit to Stand  Assistance Needed: Partial/moderate assistance  Comment: mod assist from w/c with max verbal cues  CARE Score: 3  Discharge Goal: Independent  Chair/Bed-to-Chair Transfer  Assistance Needed: Partial/moderate assistance  Comment: mod assist  CARE Score: 3  Discharge Goal: Independent     Car Transfer  Assistance Needed: Partial/moderate assistance  Comment: approaching with 2ww, no assist for LEs, min assist to stand from car  CARE Score: 3  Discharge Goal: Independent    Ambulation:    Walking Ability  Does the Patient Walk?: Yes     Walk 10 Feet  Assistance Needed: Partial/moderate assistance  Comment: min assist with 2ww  CARE Score: 3  Discharge Goal: Independent     Walk 50 Feet with Two Turns  Assistance Needed: Supervision or touching assistance  Comment: CG with 2ww, leaning to R , but improved from eval, cues for feet inside walker on turns  Reason if not Attempted: Not attempted due to medical condition or safety concerns  CARE Score: 4  Discharge Goal: Independent     Walk 150 Feet  Assistance Needed: Supervision or touching assistance  Comment: CG with 2ww, 80'  Reason if not Attempted: Not attempted due to medical condition or safety concerns  CARE Score: 4  Discharge Goal: Supervision or touching assistance     Walking 10 Feet on Uneven Surfaces  Assistance Needed: Supervision or touching assistance  Comment: CG assist with 2ww  CARE Score: 4  Discharge Goal: Supervision or touching assistance     1 Step (Curb)  Assistance Needed: Partial/moderate assistance  Comment: min assist with wlkr with 75% cues  CARE Score: 3  Discharge Goal: Supervision or touching assistance     4 Steps  Assistance Needed: Partial/moderate assistance  Comment: mod assist due to LLE weakness, 75% cues  CARE Score: 3  Discharge Goal: Supervision or touching assistance     12 Steps  Reason if not Attempted: Not attempted due to medical condition or safety concerns  CARE Score: 88  Discharge Goal: Supervision or touching assistance       Wheelchair:  w/c Ability: Wheelchair Ability  Uses a Wheelchair and/or Scooter?: No                Balance:        Object: Picking Up Object  Assistance Needed: Supervision or touching assistance  Comment: CG with 2ww and reacher  CARE Score: 4  Discharge Goal: Independent    I      Exam:    Blood pressure (!) 105/53, pulse 84, temperature 98.3 °F (36.8 °C), temperature source Oral, resp. rate 16, height 5' (1.524 m), weight 146 lb 2.6 oz (66.3 kg), SpO2 98 %, not currently breastfeeding. General: Semirecumbent in bed. Alert. In no distress. Following directions. HEENT: Gazing right and left. MMM. No JVD. Pulmonary: Symmetric air exchange without coughing. Cardiac: Regular rate and rhythm. Normal    Abdomen: Patient's abdomen is soft and nondistended. Bowel sounds were present throughout. There was no rebound, guarding or masses noted. Multiple abdominal incisions well-healed. Upper extremities: Bringing both hands together in the midline. No new bruising. Right arm swelling is much better. PICC line intact. Lower extremities: No signs of DVT. Heels clear. Sitting balance was good.   Standing balance was fair-.    Lab Results   Component Value Date    WBC 12.0 (H) 06/11/2022    HGB 9.5 (L) 06/11/2022    HCT 28.5 (L) 06/11/2022    MCV 86.4 06/11/2022     06/11/2022     Lab Results   Component Value Date    INR 1.09 05/14/2021    INR 1.09 12/09/2016    PROTIME 12.4 05/14/2021    PROTIME 12.7 12/09/2016     Lab Results   Component Value Date    CREATININE 0.6 06/17/2022    BUN 40 (H) 06/17/2022     06/17/2022    K 3.8 06/17/2022     06/17/2022    CO2 34 (H) 06/17/2022     Lab Results   Component Value Date    ALT 11 06/08/2022    AST 14 (L) 06/08/2022    ALKPHOS 50 06/08/2022    BILITOT 0.2 06/08/2022       Expected length of stay  prior to a supervised level of function for discharge home with a walker and [address] OT/PT is 6/23/2022. Recommendations:    1. 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    New right upper limb DVT treated with Xarelto. Initial doses well-tolerated. Her PICC line can come out after 5 days of treatment with Xarelto.  There are no incisional signs of infection.  Her chest x-ray and chemistries are unremarkable. Generally continent of bowel and bladder.  Ongoing cautious pain management.  Benefiting from aggressive pulmonary hygiene measures, nutritional support and pain management.  Outpatient follow-up with oncology and her surgeon.  Verbal cues and CGA-minimum physical assistance for transfers today. 2. DVT prophylaxis. Xarelto is treating her acute DVT. Monitoring her hemoglobin periodically while on this medication.  Weightbearing activities are slowly improving daily.  GI prophylaxis is available.  No new bruising or change in swelling. 3. Uncontrolled pain: Limited use of oral analgesics to avoid delirium.   Attention to bowel intervention while on the analgesics. 4. 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  Blood sugars are checked at mealtime and bedtime.  She is on a Humalog sliding scale with plans for reintroducing oral agents from home when she is eating more consistently.   5. Chronic kidney disease stage IIIa: Avoiding nephrotoxic medications and wide swings of blood pressure.  Encouraging consistent oral hydration.  Periodic monitoring of her chemistries.   6. Hypertension: She is currently off medications to control her blood pressure.  Vital signs are checked at rest and with activity.  Target systolic blood pressures [id number].  PBZWQ pressure is just below the target range again today.  Monitoring closely.

## 2022-06-18 NOTE — PLAN OF CARE
Problem: Discharge Planning  Goal: Discharge to home or other facility with appropriate resources  6/18/2022 0355 by Dena Quinonez RN  Outcome: Progressing  6/17/2022 1522 by Eli Conti RN  Outcome: Progressing     Problem: Skin/Tissue Integrity  Goal: Absence of new skin breakdown  Description: 1. Monitor for areas of redness and/or skin breakdown  2. Assess vascular access sites hourly  3. Every 4-6 hours minimum:  Change oxygen saturation probe site  4. Every 4-6 hours:  If on nasal continuous positive airway pressure, respiratory therapy assess nares and determine need for appliance change or resting period.   6/18/2022 0355 by Dena Quinonez RN  Outcome: Progressing  6/17/2022 1522 by Eli Conti RN  Outcome: Progressing     Problem: Safety - Adult  Goal: Free from fall injury  6/18/2022 0355 by Dena Quinonez RN  Outcome: Progressing  6/17/2022 1522 by Eli Conti RN  Outcome: Progressing     Problem: ABCDS Injury Assessment  Goal: Absence of physical injury  6/18/2022 0355 by Dena Quinonez RN  Outcome: Progressing  6/17/2022 1522 by Eli Conti RN  Outcome: Progressing  Flowsheets (Taken 6/17/2022 1006 by Joi Lozano LPN)  Absence of Physical Injury: Implement safety measures based on patient assessment     Problem: Chronic Conditions and Co-morbidities  Goal: Patient's chronic conditions and co-morbidity symptoms are monitored and maintained or improved  6/18/2022 0355 by Dena Quinonez RN  Outcome: Progressing  6/17/2022 1522 by Eli Conti RN  Outcome: Progressing     Problem: Nutrition Deficit:  Goal: Optimize nutritional status  6/18/2022 0355 by Dena Quinonez RN  Outcome: Progressing  6/17/2022 1522 by Eli Conti RN  Outcome: Progressing     Problem: Pain  Goal: Verbalizes/displays adequate comfort level or baseline comfort level  6/18/2022 0355 by Dena Quinonez RN  Outcome: Progressing  6/17/2022 1522 by Eli Conti RN  Outcome: Progressing

## 2022-06-18 NOTE — PROGRESS NOTES
Progress Note( Dr. Sullivan [place])  6/17/2022  Subjective:   Admit Date: 6/9/2022  PCP: Sana Aj DO    Admitted For : Admitted on 5/22/2022 for cecal mass  Transferred to ARU on evening of 6/9/2022    Consulted For: Had hypoglycemic episode/better control of blood glucose    Interval History: Patient doing a lot better. Started on almost regular diet as she is tolerating the food  well for last couple of days  DVT right upper extremity as noted below    Denies any chest pains,   Denies SOB . Patient has no more nausea or vomiting   diet was advanced to regular diet as tolerated  no new bowel or bladder symptoms. Intake/Output Summary (Last 24 hours) at 6/17/2022 2223  Last data filed at 6/17/2022 1903  Gross per 24 hour   Intake 780 ml   Output 375 ml   Net 405 ml       DATA    CBC:   No results for input(s): WBC, HGB, PLT in the last 72 hours. CMP:  Recent Labs     06/15/22  0611 06/17/22  0537   * 140   K 4.0 3.8   CL 97* 101   CO2 29 34*   BUN 43* 40*   CREATININE 0.6 0.6   CALCIUM 8.5 8.7     Lipids:   Lab Results   Component Value Date    CHOL 138 05/02/2019    HDL 54 05/02/2019    TRIG 161 06/01/2022     Glucose:  Recent Labs     06/17/22  1224 06/17/22  1639 06/17/22 2030   POCGLU 115* 109* 127*     [id number]:  Lab Results   Component Value Date    LABA1C 5.9 05/22/2022     High Sensitivity TSH:   Lab Results   Component Value Date    TSHHS 1.470 06/12/2022     Free T3:   Lab Results   Component Value Date    FT3 2.3 12/06/2017     Free T4:  Lab Results   Component Value Date    T4FREE 1.24 06/12/2022       VL DUP UPPER EXTREMITY VENOUS RIGHT   Final Result   Nonocclusive thrombus attached to the PICC line in the right subclavian vein. No other significant abnormality. XR CHEST (2 VW)   Final Result   Small pleural effusions with adjacent atelectasis. Impression Right Arm   6/14/2022   Nonocclusive thrombus attached to the PICC line in the right subclavian vein. No other significant abnormality         Scheduled Medicines   Medications:    urea  15 g Oral Daily    rivaroxaban  15 mg Oral BID WC    pantoprazole  40 mg Oral BID AC    sodium chloride  1 g Oral BID WC    insulin lispro  0-3 Units SubCUTAneous Nightly    insulin lispro  0-6 Units SubCUTAneous TID WC    atorvastatin  20 mg Oral Daily    cetirizine  10 mg Oral Daily    escitalopram  10 mg Oral Daily    levothyroxine  137 mcg Oral Daily    lidocaine  1 patch TransDERmal Daily    metoprolol tartrate  150 mg Oral Daily    metoprolol tartrate  100 mg Oral Nightly    oxybutynin  5 mg Oral BID    rOPINIRole  3 mg Oral Nightly    sodium chloride flush  5-40 mL IntraVENous 2 times per day    sucralfate  1 g Oral 4 times per day    vitamin B-12  100 mcg Oral Daily    Vitamin D  1,000 Units Oral Daily    sodium chloride flush  5-40 mL IntraVENous 2 times per day      Infusions:    sodium chloride      dextrose      sodium chloride           Objective:   Vitals: BP (!) 105/53   Pulse 84   Temp 98.3 °F (36.8 °C) (Oral)   Resp 16   Ht 5' (1.524 m)   Wt 146 lb 2.6 oz (66.3 kg)   SpO2 98%   BMI 28.55 kg/m²   General appearance: alert and cooperative with exam  Neck: no JVD or bruit  Thyroid : Normal lobes   Lungs: Has Vesicular Breath sounds   Heart:  regular rate and rhythm  Abdomen: soft, yes -tender; bowel sounds normal; no masses,  no organomegaly  Had right-sided hemicolectomy 5/27/2022  Musculoskeletal: Normal  Extremities: extremities normal, , no edema  Neurologic:  Awake, alert, oriented to name, place and time. Cranial nerves II-XII are grossly intact. Motor is  intact.   Sensory,  and gait is normal.    Assessment:     Patient Active Problem List:     Long-term insulin use in type 2 diabetes [facility])     Essential hypertension     Dyslipidemia     Abnormal EKG     Abnormal stress test     Cecum mass     Severe malnutrition (HCC)     Partial small bowel obstruction ([address]     Had hemicolectomy on 5/27/2022      INVASIVE ADENOCARCINOMA, TERMINAL ILEUM      DVT of right upper arm    Plan:     1. Reviewed POC blood glucose . Labs and X ray results   2. Reviewed Current Medicines   3. On  Correction bolus Humalog Insulin regime   4. Monitor Blood glucose frequently   5. Modified  the dose of Insulin/ other medicines as needed    6. Will follow     .      Lux Live MD, MD

## 2022-06-18 NOTE — PROGRESS NOTES
Nephrology Progress Note        [address], [address] [place] [address]  Phone: [phone]  Office Hours: 8:30AM - 4:30PM  Monday - Friday 6/18/2022 6:30 AM  Subjective:   Admit Date: 6/9/2022  PCP: Fernando Aj,   Interval History:   Resting on room air    Diet: ADULT ORAL NUTRITION SUPPLEMENT; Breakfast, Lunch, Dinner; Diabetic Oral Supplement  ADULT DIET; Dysphagia - Soft and Bite Sized; 1800 ml      Data:   Scheduled Meds:   urea  15 g Oral Daily    rivaroxaban  15 mg Oral BID WC    pantoprazole  40 mg Oral BID AC    sodium chloride  1 g Oral BID WC    insulin lispro  0-3 Units SubCUTAneous Nightly    insulin lispro  0-6 Units SubCUTAneous TID WC    atorvastatin  20 mg Oral Daily    cetirizine  10 mg Oral Daily    escitalopram  10 mg Oral Daily    levothyroxine  137 mcg Oral Daily    lidocaine  1 patch TransDERmal Daily    metoprolol tartrate  150 mg Oral Daily    metoprolol tartrate  100 mg Oral Nightly    oxybutynin  5 mg Oral BID    rOPINIRole  3 mg Oral Nightly    sodium chloride flush  5-40 mL IntraVENous 2 times per day    sucralfate  1 g Oral 4 times per day    vitamin B-12  100 mcg Oral Daily    Vitamin D  1,000 Units Oral Daily    sodium chloride flush  5-40 mL IntraVENous 2 times per day     Continuous Infusions:   sodium chloride      dextrose      sodium chloride       PRN Meds:melatonin, sodium chloride, albuterol sulfate HFA, benzocaine-menthol, calcium carbonate, phenol, sodium chloride flush, dextrose, dextrose bolus **OR** dextrose bolus, glucagon (rDNA), glucose, ondansetron **OR** [DISCONTINUED] ondansetron, sodium chloride, polyethylene glycol, sodium chloride flush, acetaminophen, bisacodyl  I/O last 3 completed shifts:   In: 80 [P.O.:580]  Out: 375 [Urine:375]  I/O this shift:  In: 240 [P.O.:240]  Out: 700 [Urine:700]    Intake/Output Summary (Last 24 hours) at 6/18/2022 0630  Last data filed at 6/18/2022 0537  Gross per 24 hour Intake 780 ml   Output 1075 ml   Net -295 ml       CBC: No results for input(s): WBC, HGB, PLT in the last 72 hours.     BMP:    Recent Labs     06/17/22  0537      K 3.8      CO2 34*   BUN 40*   CREATININE 0.6   GLUCOSE 114*         Objective:   Vitals: BP (!) 141/85   Pulse 90   Temp 98.3 °F (36.8 °C) (Oral)   Resp 16   Ht 5' (1.524 m)   Wt 145 lb 4.5 oz (65.9 kg)   SpO2 96%   BMI 28.37 kg/m²   General appearance:  in no acute distress  HEENT: normocephalic, atraumatic,   Neck: supple, trachea midline  Lungs:  breathing comfortably on room air  Extremities: extremities atraumatic, no cyanosis or edema      Assessment and Plan:     Patient Active Problem List    Diagnosis Date Noted    Generalized weakness     Uncontrolled pain     Uncontrolled type 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)     Moderate malnutrition (HCC)     Chronic kidney disease, stage 3a ([address]     Acquired hypothyroidism     Reactive depression     Adenocarcinoma ([address] 06/09/2022    Partial small bowel obstruction (HCC)     Severe malnutrition ([address] 05/31/2022    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    Abnormal stress test     Dyslipidemia 04/06/2021    Abnormal EKG 04/06/2021    Long-term insulin use in type 2 diabetes [facility] 03/14/2018    Essential hypertension 03/14/2018     Sodium 140  Continue salt tanbs  Continue daily urea  Continue good nutrition  Limit oral fluids to 2L    Thank you                  Electronically signed by Meredith Jalloh DO on 6/18/2022 at 6:30 AM    MD Oumar Barreto DO [address],  [place]  [facility], [place] [address]  PHONE: [phone]  FAX: [phone]

## 2022-06-19 LAB
GLUCOSE BLD-MCNC: 115 MG/DL (ref 70–99)
GLUCOSE BLD-MCNC: 129 MG/DL (ref 70–99)
GLUCOSE BLD-MCNC: 157 MG/DL (ref 70–99)
GLUCOSE BLD-MCNC: 161 MG/DL (ref 70–99)

## 2022-06-19 PROCEDURE — 6370000000 HC RX 637 (ALT 250 FOR IP): Performed by: PHYSICAL MEDICINE & REHABILITATION

## 2022-06-19 PROCEDURE — 2580000003 HC RX 258: Performed by: STUDENT IN AN ORGANIZED HEALTH CARE EDUCATION/TRAINING PROGRAM

## 2022-06-19 PROCEDURE — 82962 GLUCOSE BLOOD TEST: CPT

## 2022-06-19 PROCEDURE — 6370000000 HC RX 637 (ALT 250 FOR IP): Performed by: INTERNAL MEDICINE

## 2022-06-19 PROCEDURE — 6370000000 HC RX 637 (ALT 250 FOR IP): Performed by: STUDENT IN AN ORGANIZED HEALTH CARE EDUCATION/TRAINING PROGRAM

## 2022-06-19 PROCEDURE — 1280000000 HC REHAB R&B

## 2022-06-19 PROCEDURE — 94761 N-INVAS EAR/PLS OXIMETRY MLT: CPT

## 2022-06-19 PROCEDURE — 94150 VITAL CAPACITY TEST: CPT

## 2022-06-19 PROCEDURE — 94664 DEMO&/EVAL PT USE INHALER: CPT

## 2022-06-19 RX ADMIN — Medication 15 G: at 20:28

## 2022-06-19 RX ADMIN — OXYBUTYNIN CHLORIDE 5 MG: 5 TABLET ORAL at 11:37

## 2022-06-19 RX ADMIN — INSULIN LISPRO 1 UNITS: 100 INJECTION, SOLUTION INTRAVENOUS; SUBCUTANEOUS at 20:59

## 2022-06-19 RX ADMIN — Medication 100 MCG: at 11:37

## 2022-06-19 RX ADMIN — RIVAROXABAN 15 MG: 15 TABLET, FILM COATED ORAL at 16:50

## 2022-06-19 RX ADMIN — INSULIN LISPRO 1 UNITS: 100 INJECTION, SOLUTION INTRAVENOUS; SUBCUTANEOUS at 13:38

## 2022-06-19 RX ADMIN — Medication 1000 UNITS: at 11:37

## 2022-06-19 RX ADMIN — SODIUM CHLORIDE, PRESERVATIVE FREE 10 ML: 5 INJECTION INTRAVENOUS at 20:28

## 2022-06-19 RX ADMIN — ESCITALOPRAM OXALATE 10 MG: 10 TABLET ORAL at 11:37

## 2022-06-19 RX ADMIN — ROPINIROLE HYDROCHLORIDE 3 MG: 1 TABLET, FILM COATED ORAL at 20:27

## 2022-06-19 RX ADMIN — METOPROLOL TARTRATE 150 MG: 50 TABLET, FILM COATED ORAL at 11:36

## 2022-06-19 RX ADMIN — Medication 15 G: at 11:36

## 2022-06-19 RX ADMIN — SODIUM CHLORIDE, PRESERVATIVE FREE 10 ML: 5 INJECTION INTRAVENOUS at 11:46

## 2022-06-19 RX ADMIN — RIVAROXABAN 15 MG: 15 TABLET, FILM COATED ORAL at 11:36

## 2022-06-19 RX ADMIN — OXYBUTYNIN CHLORIDE 5 MG: 5 TABLET ORAL at 20:27

## 2022-06-19 RX ADMIN — CETIRIZINE HYDROCHLORIDE 10 MG: 10 TABLET, FILM COATED ORAL at 11:37

## 2022-06-19 RX ADMIN — SUCRALFATE 1 G: 1 TABLET ORAL at 05:46

## 2022-06-19 RX ADMIN — LEVOTHYROXINE SODIUM 137 MCG: 0.11 TABLET ORAL at 05:46

## 2022-06-19 RX ADMIN — SUCRALFATE 1 G: 1 TABLET ORAL at 16:50

## 2022-06-19 RX ADMIN — SUCRALFATE 1 G: 1 TABLET ORAL at 11:36

## 2022-06-19 RX ADMIN — PANTOPRAZOLE SODIUM 40 MG: 40 TABLET, DELAYED RELEASE ORAL at 16:50

## 2022-06-19 RX ADMIN — MELATONIN 3 MG: at 20:25

## 2022-06-19 RX ADMIN — PANTOPRAZOLE SODIUM 40 MG: 40 TABLET, DELAYED RELEASE ORAL at 05:46

## 2022-06-19 RX ADMIN — ATORVASTATIN CALCIUM 20 MG: 10 TABLET, FILM COATED ORAL at 11:36

## 2022-06-19 RX ADMIN — METOPROLOL TARTRATE 100 MG: 50 TABLET, FILM COATED ORAL at 20:25

## 2022-06-19 RX ADMIN — SODIUM CHLORIDE, PRESERVATIVE FREE 10 ML: 5 INJECTION INTRAVENOUS at 11:48

## 2022-06-19 ASSESSMENT — PAIN SCALES - GENERAL: PAINLEVEL_OUTOF10: 0

## 2022-06-19 NOTE — PLAN OF CARE
Problem: Discharge Planning  Goal: Discharge to home or other facility with appropriate resources  Outcome: Progressing     Problem: Skin/Tissue Integrity  Goal: Absence of new skin breakdown  Description: 1. Monitor for areas of redness and/or skin breakdown  2. Assess vascular access sites hourly  3. Every 4-6 hours minimum:  Change oxygen saturation probe site  4. Every 4-6 hours:  If on nasal continuous positive airway pressure, respiratory therapy assess nares and determine need for appliance change or resting period.   Outcome: Progressing     Problem: Safety - Adult  Goal: Free from fall injury  Outcome: Progressing     Problem: ABCDS Injury Assessment  Goal: Absence of physical injury  Outcome: Progressing     Problem: Chronic Conditions and Co-morbidities  Goal: Patient's chronic conditions and co-morbidity symptoms are monitored and maintained or improved  Outcome: Progressing     Problem: Nutrition Deficit:  Goal: Optimize nutritional status  Outcome: Progressing     Problem: Pain  Goal: Verbalizes/displays adequate comfort level or baseline comfort level  Outcome: Progressing

## 2022-06-19 NOTE — PROGRESS NOTES
Progress Note( Dr. Bill Reich)  6/18/2022  Subjective:   Admit Date: 6/9/2022  PCP: Temo Aj DO    Admitted For : Admitted on 5/22/2022 for cecal mass  Transferred to ARU on evening of 6/9/2022    Consulted For: Had hypoglycemic episode/better control of blood glucose    Interval History: Patient doing a lot better. Started on almost regular diet as she is tolerating the food  well for last couple of days  DVT right upper extremity as noted below    Denies any chest pains,   Denies SOB . Patient has no more nausea or vomiting   diet was advanced to regular diet as tolerated  no new bowel or bladder symptoms. Intake/Output Summary (Last 24 hours) at 6/18/2022 2024  Last data filed at 6/18/2022 1901  Gross per 24 hour   Intake 147 ml   Output 1650 ml   Net -1503 ml       DATA    CBC:   No results for input(s): WBC, HGB, PLT in the last 72 hours. CMP:  Recent Labs     06/17/22  0537 06/18/22  1140    135   K 3.8 4.1    98*   CO2 34* 29   BUN 40* 19   CREATININE 0.6 0.7   CALCIUM 8.7 8.6     Lipids:   Lab Results   Component Value Date    CHOL 138 05/02/2019    HDL 54 05/02/2019    TRIG 161 06/01/2022     Glucose:  Recent Labs     06/18/22  0736 06/18/22  1111 06/18/22  1626   POCGLU 119* 136* 133*     [id number]:  Lab Results   Component Value Date    LABA1C 5.9 05/22/2022     High Sensitivity TSH:   Lab Results   Component Value Date    TSHHS 1.470 06/12/2022     Free T3:   Lab Results   Component Value Date    FT3 2.3 12/06/2017     Free T4:  Lab Results   Component Value Date    T4FREE 1.24 06/12/2022       VL DUP UPPER EXTREMITY VENOUS RIGHT   Final Result   Nonocclusive thrombus attached to the PICC line in the right subclavian vein. No other significant abnormality. XR CHEST (2 VW)   Final Result   Small pleural effusions with adjacent atelectasis.             Impression Right Arm   6/14/2022   Nonocclusive thrombus attached to the PICC line in the right subclavian vein.   No other significant abnormality         Scheduled Medicines   Medications:    urea  15 g Oral BID    rivaroxaban  15 mg Oral BID WC    pantoprazole  40 mg Oral BID AC    insulin lispro  0-3 Units SubCUTAneous Nightly    insulin lispro  0-6 Units SubCUTAneous TID WC    atorvastatin  20 mg Oral Daily    cetirizine  10 mg Oral Daily    escitalopram  10 mg Oral Daily    levothyroxine  137 mcg Oral Daily    lidocaine  1 patch TransDERmal Daily    metoprolol tartrate  150 mg Oral Daily    metoprolol tartrate  100 mg Oral Nightly    oxybutynin  5 mg Oral BID    rOPINIRole  3 mg Oral Nightly    sodium chloride flush  5-40 mL IntraVENous 2 times per day    sucralfate  1 g Oral 4 times per day    vitamin B-12  100 mcg Oral Daily    Vitamin D  1,000 Units Oral Daily    sodium chloride flush  5-40 mL IntraVENous 2 times per day      Infusions:    sodium chloride      dextrose      sodium chloride           Objective:   Vitals: BP (!) 143/63   Pulse 67   Temp 98.3 °F (36.8 °C) (Oral)   Resp 16   Ht 5' (1.524 m)   Wt 145 lb 4.5 oz (65.9 kg)   SpO2 100%   BMI 28.37 kg/m²   General appearance: alert and cooperative with exam  Neck: no JVD or bruit  Thyroid : Normal lobes   Lungs: Has Vesicular Breath sounds   Heart:  regular rate and rhythm  Abdomen: soft, yes -tender; bowel sounds normal; no masses,  no organomegaly  Had right-sided hemicolectomy 5/27/2022  Musculoskeletal: Normal  Extremities: extremities normal, , no edema  Neurologic:  Awake, alert, oriented to name, place and time. Cranial nerves II-XII are grossly intact. Motor is  intact.   Sensory,  and gait is normal.    Assessment:     Patient Active Problem List:     Long-term insulin use in type 2 diabetes [facility])     Essential hypertension     Dyslipidemia     Abnormal EKG     Abnormal stress test     Cecum mass     Severe malnutrition (HCC)     Partial small bowel obstruction ([address]     Had hemicolectomy on 5/27/2022      INVASIVE ADENOCARCINOMA, TERMINAL ILEUM      DVT of right upper arm    Plan:     1. Reviewed POC blood glucose . Labs and X ray results   2. Reviewed Current Medicines   3. On  Correction bolus Humalog Insulin regime   4. Monitor Blood glucose frequently   5. Modified  the dose of Insulin/ other medicines as needed    6. Will follow     .      Noemí Matamoros MD, MD

## 2022-06-19 NOTE — PROGRESS NOTES
-SODIUM 135 FROM 140  -WILL STOP SALT TABS AND SEE HOW SHE DOES ON UREA ALONE  - CONSULT TO SEE ABOUT UREA COVERAGE AS OUTPT    [address] YOU    Patient Active Problem List    Diagnosis Date Noted    Generalized weakness     Uncontrolled pain     Uncontrolled type 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)     Moderate malnutrition (HCC)     [facility], [facility]     Acquired hypothyroidism     Reactive depression     Adenocarcinoma ([facility] 06/09/2022    Partial small bowel obstruction (HCC)     Severe malnutrition ([facility] 05/31/2022    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    Abnormal stress test     Dyslipidemia 04/06/2021    Abnormal EKG 04/06/2021    Long-term insulin use in type 2 diabetes ([facility] 03/14/2018    Essential hypertension 03/14/2018

## 2022-06-19 NOTE — PATIENT CARE CONFERENCE
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 Additional family support local.  Current DME recommendations are for a RW, BSC, and possibly a WC. Current HHC recommendations are for PT, OT, and Nursing. Plan is to discharge to home with .     PHYSICAL THERAPY (Updated in QI)        Long Term Goals  Time Frame for Long term goals : 7-10 days STG=LTG  Long term goal 1: Pt will perform bed mobility with mod I  Long term goal 2: pt will perform sit to stand, pivot and car transfers with mod I  Long term goal 3: pt will perform ambulation with 2ww 50' on levels with mod I, 150' on levels and 10' on unlevels with supervision  Long term goal 4: Pt will ascend/descend curb step and up to 12 steps with rail with supervision  Long term goal 5: Pt will retrieve light item with reacher and 2ww with mod I    Impairments/deficits, barriers:  Increased pain in RLE , but manageable (hx of sciatica), recall of cues, LE weakness, balance                 Equipment needed at discharge:2ww      PT IRF-CARSON scores since initial assessment  Bed Mobility:   Sit to Lying  Assistance Needed: Independent  Comment: no bed features, regular bed  CARE Score: 6  Discharge Goal: Independent    Roll Left and Right  Assistance Needed: Independent  Comment: no bed features  CARE Score: 6  Discharge Goal: Independent    Lying to Sitting on Side of Bed  Assistance Needed: Independent  Comment: Ind without use of bed features  CARE Score: 6  Discharge Goal: Independent    Transfers:    Sit to Stand  Assistance Needed: Independent  Comment: Mod Ind with 2WW from various sitting surfaces (EOB, w/c seat, regular chair with armrests, outdoor bench)  CARE Score: 6  Discharge Goal: Independent    Chair/Bed-to-Chair Transfer  Assistance Needed: Independent  Comment: Mod Ind with 2WW  CARE Score: 6  Discharge Goal: Independent         Car Transfer  Assistance Needed: Supervision or touching assistance  Comment: SBA with approach to car using 2ww  CARE Score: 4  Discharge Goal: Independent    Ambulation:    Walking Ability  Does the Patient Walk?: Yes     Walk 10 Feet  Assistance Needed: Independent  Comment:  Mod Ind with 2WW (gait quality was consistently steady)  CARE Score: 6  Discharge Goal: Independent     Walk 50 Feet with Two Turns  Assistance Needed: Supervision or touching assistance  Comment: SBA with 2ww  CARE Score: 4  Discharge Goal: Independent     Walk 150 Feet  Assistance Needed: Supervision or touching assistance  Comment: SBA with 2ww  CARE Score: 4  Discharge Goal: Supervision or touching assistance     Walking 10 Feet on Uneven Surfaces  Assistance Needed: Supervision or touching assistance  Comment: SBA using 2WW over outdoor surfaces  CARE Score: 4  Discharge Goal: Supervision or touching assistance     1 Step (Curb)  Assistance Needed: Supervision or touching assistance  Comment: SBA-Sup with 2WW  CARE Score: 4  Discharge Goal: Supervision or touching assistance     4 Steps  Assistance Needed: Supervision or touching assistance  Comment: SBA using railings (pt performed step-through pattern consistently on ascent/descent of 4\"/6\" step heights without signs of L/RLE instability or aggravation of RLE pain)  CARE Score: 4  Discharge Goal: Supervision or touching assistance     12 Steps  Assistance Needed: Supervision or touching assistance  Comment: SBA using railings (pt performed step-through pattern consistently on ascent/descent of 4\"/6\" step heights without signs of L/RLE instability or aggravation of RLE pain)  Reason if not Attempted: Not attempted due to medical condition or safety concerns  CARE Score: 4  Discharge Goal: Supervision or touching assistance           Wheelchair:  w/c Ability: Wheelchair Ability  Uses a Wheelchair and/or Scooter?: No                        Balance:        Object: Picking Up Object  Assistance Needed: Supervision or touching assistance  Comment: supervision with 2ww and reacher  CARE Score: 4  Discharge Goal: Independent    Fall Risk: [x]  Yes  []  No    OCCUPATIONAL THERAPY  (Updated in QI)  Short Term Goals  Time Frame for Short term goals: STGs=LTGs :   Long Term Goals  Time Frame for Long term goals : 10-12 days or until d/c. Long Term Goal 1: Pt will complete grooming tasks c Mod I.  Long Term Goal 2: Pt will complete total body bathing c AE PRN and supervision. Long Term Goal 3: Pt will complete UB dressing c Mod I.  Long Term Goal 4: Pt will complete LB dressing c AE PRN and supervision. Long Term Goal 5: Pt will doff/don footwear c AE PRN and Mod I. Additional Goals?: Yes  Long Term Goal 6: Pt will complete toileting c supervision. Long Term Goal 7: Pt will perform functional transfers (bed, chair, toilet, shower) c DME PRN and supervision. Long Term Goal 8: Pt will perform therex/therax to facilitate an increase in strength/endurance/ax tolerance (c emphasis on static/dynamic standing balance/tolerance > 6 mins) c supervision.   Long Term Goal 9: Pt will complete light home management tasks c supervision. :         OT IRF-CARSON scores and goals since initial assessment:    ADLs:    Eating: Eating  Assistance Needed: Independent  Comment: Pt able to open packages/containers and feed self  CARE Score: 6  Discharge Goal: Independent       Oral Hygiene: Oral Hygiene  Assistance Needed: Independent  Comment: mod I seated at the sink to complete oral hygiene and denture care  CARE Score: 6  Discharge Goal: Independent    UB/LB Bathing: Shower/Bathe Self  Assistance Needed: Supervision or touching assistance  Comment: Supervision in stance to bathe posterior perineal area; cues for thoroughness  CARE Score: 4  Discharge Goal: Supervision or touching assistance    UB Dressing: Upper Body Dressing  Assistance Needed: Independent  Comment: to doff/don pullover Tshirt  CARE Score: 6  Discharge Goal: Independent         LB Dressing: Lower Body Dressing  Assistance Needed: Supervision or touching assistance  Comment: Cues to thread BLEs into pants and brief at seated base for safety; Supervision in stance to manage over hips  CARE Score: 4  Discharge Goal: Supervision or touching assistance    Donning and [place] Footwear: Putting On/Taking Off Footwear  Assistance Needed: Independent  Comment: seated using figure 4 position to don shoes  CARE Score: 6  Discharge Goal: Independent      Toiletin [address] needed: Supervision or touching assistance  Comment: Supervision for clothing management and BM hygiene, cues for thoroughness  CARE Score: 4  Discharge Goal: Supervision or touching assistance      Toilet Transfers: Toilet Transfer  Assistance needed: Supervision or touching assistance  Comment: Supervision using RW and grab bars  CARE Score: 4  Discharge Goal: Supervision or touching assistance      Impairments/deficits, barriers:  Functional balance, endurance, strength, and ADLs.   Assessment  Performance deficits / Impairments: Decreased functional mobility ,Decreased safe awareness,Decreased balance,Decreased coordination,Decreased ADL status,Decreased cognition,Decreased posture,Decreased endurance,Decreased high-level IADLs,Decreased strength,Decreased sensation,Decreased fine motor control  Decision Making: Medium Complexity  Equipment needed at [address] a 3 in 1 bedside commode due to being unable to use the toilet within the home and confined to one level of the home. COGNITIVE FUNCTION/SPEECH THERAPY (AS INDICATED)  LTG                                           Nursing Current Medical Status:   [] Is continent of bowel and bladder     [x] Is incontinent of bowel and bladder    [x] Has had an adequate number of bowel movements   [x] Urinates with no urinary retention >300ml in bladder   [] Targeting bladder protocol with laar removal   [x] Maintaining O2 SATs at 92% or greater   [x] Has pain managed while on ARU         [x] Has had no skin breakdown while on ARU   [] Has improved skin integrity via wound measurements   [] Has no signs/symptoms of infection at the wound site   [] Pressure wounds Stage/Location:    [] Arrived on unit with pressure wound  [x] Has been free from injury during hospitalization   [] Has experienced a fall during hospitalization  [x] Ongoing education with patient/family with understanding demonstrated for:  [] Receives IV Fluids  [] Other:        NUTRITION  Weight: 141 lb 5 oz (64.1 kg) / Body mass index is 27.6 kg/m². Current diet: ADULT ORAL NUTRITION SUPPLEMENT; Breakfast, Lunch, Dinner; Diabetic Oral Supplement  ADULT DIET; Regular; 1800 ml  Intake: Diet advanced to regular, meal intake varying %. Medical improvements/barriers: Progressing well--c/o some esophageal issues with intake        Team goals for next treatment period/Intervention for current barriers:   [x] Pt will increase activity tolerance for daily tasks.   [] Pt will improve bed mobility with reduced assist.  [x] Pt will improve safety in fx tasks with reduced cues/assist  [x] Pt will improve transfers with reduced assist  [] Pt will improve toileting with reduced assist  [x] Pt will improve ADL's with use of adaptive equipment with reduced assist  [x] Pt will improve pain mgmt for maximum participation in tx program  [] Pt will improve communication to get basic needs met on unit  [] Pt will improve swallowing for safe diet advancement with use of strategies  []  Plan for discharge to home. Patient Strengths: Family support, Motivated, Cooperative and Pleasant    Justification for Continued Stay  Based on my medical assessment of the patient and review of information from the interdisciplinary team as part of this weekly team conference, the patient continues to meet the following criteria for IRF level of care:   The patient requires active and ongoing intervention of multiple therapy disciplines   The patient requires and intensive rehabilitation therapy program   The patient requires continued physician supervision by a rehabilitation physician   The patient requires 24 hours rehab nursing care   The patient requires an intensive and coordinated interdisciplinary team approach to the delivery of rehabilitative care.      Assessment/Plan   [x]  The patient is making good progression towards their long term goals and is actively participating in and has a reasonable expectation to continue to benefit from the intensive rehabilitation therapy program   []  The estimated discharge date has been changed from initial team conference due to:   []  The estimated discharge destination has been changed from initial team conference due to:         Ongoing tx following discharge: [x]HHC PT OT     []OUTPATIENT     [] No Further Treatment     [] Family/Caregiver Training  []  Transitional Living Arrangement   [] Home Assessment (date  )     [] Family Conference   []  Therapeutic Pass       []  Other: (specify)    Estimated Discharge Date: 6/23/22    Estimated Discharge Destination: []home alone   []home alone with assist prn  []Continuous supervision [x]Return home with s/o/spouse/family   [] Assisted living    []SNF     Team members participating in today's conference. [x] Ramirez Howard, Medical Director  [x] Hodan Peterson,       [] Griselda Fat, RN Nurse Supervisor     []  Jeff Jordan, PT  [x]  Lennox Ruffing, PT       [] Bard Tavares, OT   [x] Cris Thurman, OT  [] Amina Nicole, OT     [x]  Caleb Gaming, SLP    []  Salvatore Hunt, FERMIN   [] Aneesh Ramirez, FERMIN      [x] Patrick Olvera Mgr   [][address] Mgr     [] Reji Vela RN   [x] Vannesa Madrid RN    [] Jf Pretty RN    [] Raphael Stock RN    [] Chandana Barrgaan RN   [] Mark Greene RN   [] Fatuma Calabrese RN Nurse Mgr     I have led this Team Conference and agree with the plan, Ramirez Howard MD, 6/21/2022, 12:53 PM  Goals have been updated to reflect recent status.     Team conference note transcribed this date by: Edward Hernandez MA, [place], Therapy Coordinator

## 2022-06-19 NOTE — PROGRESS NOTES
Progress Note( Dr. 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 Started on almost regular diet as she is tolerating the food  well for last couple of days  DVT right upper extremity as noted below    Denies any chest pains,   Denies SOB . Patient has no more nausea or vomiting   diet was advanced to regular diet as tolerated  no new bowel or bladder symptoms. Intake/Output Summary (Last 24 hours) at 6/19/2022 1810  Last data filed at 6/19/2022 1648  Gross per 24 hour   Intake 900 ml   Output 2450 ml   Net -1550 ml       DATA    CBC:   No results for input(s): WBC, HGB, PLT in the last 72 hours. CMP:  Recent Labs     06/17/22  0537 06/18/22  1140    135   K 3.8 4.1    98*   CO2 34* 29   BUN 40* 19   CREATININE 0.6 0.7   CALCIUM 8.7 8.6     Lipids:   Lab Results   Component Value Date    CHOL 138 05/02/2019    HDL 54 05/02/2019    TRIG 161 06/01/2022     Glucose:  Recent Labs     06/19/22  0707 06/19/22  1109 06/19/22  1620   POCGLU 115* 161* 129*     [id number]:  Lab Results   Component Value Date    LABA1C 5.9 05/22/2022     High Sensitivity TSH:   Lab Results   Component Value Date    TSHHS 1.470 06/12/2022     Free T3:   Lab Results   Component Value Date    FT3 2.3 12/06/2017     Free T4:  Lab Results   Component Value Date    T4FREE 1.24 06/12/2022       VL DUP UPPER EXTREMITY VENOUS RIGHT   Final Result   Nonocclusive thrombus attached to the PICC line in the right subclavian vein. No other significant abnormality. XR CHEST (2 VW)   Final Result   Small pleural effusions with adjacent atelectasis.             Impression Right Arm   6/14/2022   Nonocclusive thrombus attached to the PICC line in the right subclavian vein.   No other significant abnormality         Scheduled Medicines   Medications:    urea  15 g Oral BID    rivaroxaban  15 mg Oral BID WC    pantoprazole  40 mg Oral BID AC    insulin lispro  0-3 Units SubCUTAneous Nightly    insulin lispro  0-6 Units SubCUTAneous TID WC    atorvastatin  20 mg Oral Daily    cetirizine  10 mg Oral Daily    escitalopram  10 mg Oral Daily    levothyroxine  137 mcg Oral Daily    lidocaine  1 patch TransDERmal Daily    metoprolol tartrate  150 mg Oral Daily    metoprolol tartrate  100 mg Oral Nightly    oxybutynin  5 mg Oral BID    rOPINIRole  3 mg Oral Nightly    sodium chloride flush  5-40 mL IntraVENous 2 times per day    sucralfate  1 g Oral 4 times per day    vitamin B-12  100 mcg Oral Daily    Vitamin D  1,000 Units Oral Daily    sodium chloride flush  5-40 mL IntraVENous 2 times per day      Infusions:    sodium chloride      dextrose      sodium chloride           Objective:   Vitals: BP (!) 122/41   Pulse (!) 47   Temp 98.6 °F (37 °C)   Resp 18   Ht 5' (1.524 m)   Wt 145 lb (65.8 kg)   SpO2 98%   BMI 28.32 kg/m²   General appearance: alert and cooperative with exam  Neck: no JVD or bruit  Thyroid : Normal lobes   Lungs: Has Vesicular Breath sounds   Heart:  regular rate and rhythm  Abdomen: soft, yes -tender; bowel sounds normal; no masses,  no organomegaly  Had right-sided hemicolectomy 5/27/2022  Musculoskeletal: Normal  Extremities: extremities normal, , no edema  Neurologic:  Awake, alert, oriented to name, place and time. Cranial nerves II-XII are grossly intact. Motor is  intact.   Sensory,  and gait is normal.    Assessment:     Patient Active Problem List:     Long-term insulin use in type 2 diabetes [facility])     Essential hypertension     Dyslipidemia     Abnormal EKG     Abnormal stress test     Cecum mass     Severe malnutrition (HCC)     Partial small bowel obstruction ([address]     Had hemicolectomy on 5/27/2022      INVASIVE ADENOCARCINOMA, TERMINAL ILEUM      DVT of right upper arm    Plan:     1. Reviewed POC blood glucose . Labs and X ray results   2. Reviewed Current Medicines   3. On  Correction bolus Humalog Insulin regime   4. Monitor Blood glucose frequently   5. Modified  the dose of Insulin/ other medicines as needed    6. Will follow     .      Noemí Matamoros MD, MD

## 2022-06-20 ENCOUNTER — CLINICAL DOCUMENTATION (OUTPATIENT)
Dept: ONCOLOGY | Age: 87
End: 2022-06-20

## 2022-06-20 LAB
ANION GAP SERPL CALCULATED.3IONS-SCNC: 7 MMOL/L (ref 4–16)
BUN BLDV-MCNC: 41 MG/DL (ref 6–23)
CALCIUM SERPL-MCNC: 8.8 MG/DL (ref 8.3–10.6)
CHLORIDE BLD-SCNC: 98 MMOL/L (ref 99–110)
CO2: 28 MMOL/L (ref 21–32)
CREAT SERPL-MCNC: 0.7 MG/DL (ref 0.6–1.1)
GFR AFRICAN AMERICAN: >60 ML/MIN/1.73M2
GFR NON-AFRICAN AMERICAN: >60 ML/MIN/1.73M2
GLUCOSE BLD-MCNC: 110 MG/DL (ref 70–99)
GLUCOSE BLD-MCNC: 110 MG/DL (ref 70–99)
GLUCOSE BLD-MCNC: 148 MG/DL (ref 70–99)
GLUCOSE BLD-MCNC: 161 MG/DL (ref 70–99)
GLUCOSE BLD-MCNC: 169 MG/DL (ref 70–99)
POTASSIUM SERPL-SCNC: 4.1 MMOL/L (ref 3.5–5.1)
SODIUM BLD-SCNC: 133 MMOL/L (ref 135–145)

## 2022-06-20 PROCEDURE — 36415 COLL VENOUS BLD VENIPUNCTURE: CPT

## 2022-06-20 PROCEDURE — 94150 VITAL CAPACITY TEST: CPT

## 2022-06-20 PROCEDURE — 6370000000 HC RX 637 (ALT 250 FOR IP): Performed by: PHYSICAL MEDICINE & REHABILITATION

## 2022-06-20 PROCEDURE — 97110 THERAPEUTIC EXERCISES: CPT

## 2022-06-20 PROCEDURE — 2580000003 HC RX 258: Performed by: STUDENT IN AN ORGANIZED HEALTH CARE EDUCATION/TRAINING PROGRAM

## 2022-06-20 PROCEDURE — 97116 GAIT TRAINING THERAPY: CPT

## 2022-06-20 PROCEDURE — 97530 THERAPEUTIC ACTIVITIES: CPT

## 2022-06-20 PROCEDURE — 97535 SELF CARE MNGMENT TRAINING: CPT

## 2022-06-20 PROCEDURE — 94761 N-INVAS EAR/PLS OXIMETRY MLT: CPT

## 2022-06-20 PROCEDURE — 1280000000 HC REHAB R&B

## 2022-06-20 PROCEDURE — 6370000000 HC RX 637 (ALT 250 FOR IP): Performed by: INTERNAL MEDICINE

## 2022-06-20 PROCEDURE — 82962 GLUCOSE BLOOD TEST: CPT

## 2022-06-20 PROCEDURE — 6370000000 HC RX 637 (ALT 250 FOR IP): Performed by: STUDENT IN AN ORGANIZED HEALTH CARE EDUCATION/TRAINING PROGRAM

## 2022-06-20 PROCEDURE — 99232 SBSQ HOSP IP/OBS MODERATE 35: CPT | Performed by: PHYSICAL MEDICINE & REHABILITATION

## 2022-06-20 PROCEDURE — 80048 BASIC METABOLIC PNL TOTAL CA: CPT

## 2022-06-20 RX ORDER — SODIUM CHLORIDE 1000 MG
1 TABLET, SOLUBLE MISCELLANEOUS 2 TIMES DAILY WITH MEALS
Status: DISCONTINUED | OUTPATIENT
Start: 2022-06-20 | End: 2022-06-23 | Stop reason: HOSPADM

## 2022-06-20 RX ADMIN — SUCRALFATE 1 G: 1 TABLET ORAL at 00:02

## 2022-06-20 RX ADMIN — SODIUM CHLORIDE, PRESERVATIVE FREE 10 ML: 5 INJECTION INTRAVENOUS at 20:18

## 2022-06-20 RX ADMIN — ACETAMINOPHEN 650 MG: 325 TABLET ORAL at 20:22

## 2022-06-20 RX ADMIN — INSULIN LISPRO 1 UNITS: 100 INJECTION, SOLUTION INTRAVENOUS; SUBCUTANEOUS at 20:28

## 2022-06-20 RX ADMIN — Medication 15 G: at 20:18

## 2022-06-20 RX ADMIN — SUCRALFATE 1 G: 1 TABLET ORAL at 05:33

## 2022-06-20 RX ADMIN — SUCRALFATE 1 G: 1 TABLET ORAL at 16:53

## 2022-06-20 RX ADMIN — OXYBUTYNIN CHLORIDE 5 MG: 5 TABLET ORAL at 08:05

## 2022-06-20 RX ADMIN — SODIUM CHLORIDE, PRESERVATIVE FREE 10 ML: 5 INJECTION INTRAVENOUS at 20:17

## 2022-06-20 RX ADMIN — PANTOPRAZOLE SODIUM 40 MG: 40 TABLET, DELAYED RELEASE ORAL at 05:33

## 2022-06-20 RX ADMIN — ESCITALOPRAM OXALATE 10 MG: 10 TABLET ORAL at 08:06

## 2022-06-20 RX ADMIN — Medication 1000 UNITS: at 08:06

## 2022-06-20 RX ADMIN — ACETAMINOPHEN 650 MG: 325 TABLET ORAL at 05:45

## 2022-06-20 RX ADMIN — MELATONIN 3 MG: at 20:22

## 2022-06-20 RX ADMIN — SODIUM CHLORIDE, PRESERVATIVE FREE 10 ML: 5 INJECTION INTRAVENOUS at 08:09

## 2022-06-20 RX ADMIN — Medication 15 G: at 08:05

## 2022-06-20 RX ADMIN — ATORVASTATIN CALCIUM 20 MG: 10 TABLET, FILM COATED ORAL at 08:06

## 2022-06-20 RX ADMIN — METOPROLOL TARTRATE 100 MG: 50 TABLET, FILM COATED ORAL at 20:17

## 2022-06-20 RX ADMIN — ACETAMINOPHEN 650 MG: 325 TABLET ORAL at 10:51

## 2022-06-20 RX ADMIN — SUCRALFATE 1 G: 1 TABLET ORAL at 12:49

## 2022-06-20 RX ADMIN — PANTOPRAZOLE SODIUM 40 MG: 40 TABLET, DELAYED RELEASE ORAL at 16:53

## 2022-06-20 RX ADMIN — CETIRIZINE HYDROCHLORIDE 10 MG: 10 TABLET, FILM COATED ORAL at 08:06

## 2022-06-20 RX ADMIN — SODIUM CHLORIDE, PRESERVATIVE FREE 10 ML: 5 INJECTION INTRAVENOUS at 08:06

## 2022-06-20 RX ADMIN — METOPROLOL TARTRATE 150 MG: 50 TABLET, FILM COATED ORAL at 08:06

## 2022-06-20 RX ADMIN — LEVOTHYROXINE SODIUM 137 MCG: 0.11 TABLET ORAL at 05:33

## 2022-06-20 RX ADMIN — INSULIN LISPRO 1 UNITS: 100 INJECTION, SOLUTION INTRAVENOUS; SUBCUTANEOUS at 12:49

## 2022-06-20 RX ADMIN — OXYBUTYNIN CHLORIDE 5 MG: 5 TABLET ORAL at 20:17

## 2022-06-20 RX ADMIN — SODIUM CHLORIDE TAB 1 GM 1 G: 1 TAB at 08:06

## 2022-06-20 RX ADMIN — RIVAROXABAN 15 MG: 15 TABLET, FILM COATED ORAL at 16:53

## 2022-06-20 RX ADMIN — SODIUM CHLORIDE TAB 1 GM 1 G: 1 TAB at 16:53

## 2022-06-20 RX ADMIN — ROPINIROLE HYDROCHLORIDE 3 MG: 1 TABLET, FILM COATED ORAL at 20:17

## 2022-06-20 RX ADMIN — INSULIN LISPRO 1 UNITS: 100 INJECTION, SOLUTION INTRAVENOUS; SUBCUTANEOUS at 17:22

## 2022-06-20 RX ADMIN — Medication 100 MCG: at 08:05

## 2022-06-20 RX ADMIN — RIVAROXABAN 15 MG: 15 TABLET, FILM COATED ORAL at 08:06

## 2022-06-20 ASSESSMENT — PAIN DESCRIPTION - LOCATION
LOCATION: HIP
LOCATION: BACK

## 2022-06-20 ASSESSMENT — PAIN DESCRIPTION - DESCRIPTORS: DESCRIPTORS: ACHING;DISCOMFORT

## 2022-06-20 ASSESSMENT — PAIN DESCRIPTION - ORIENTATION
ORIENTATION: RIGHT
ORIENTATION: LOWER;RIGHT

## 2022-06-20 ASSESSMENT — PAIN SCALES - GENERAL
PAINLEVEL_OUTOF10: 7
PAINLEVEL_OUTOF10: 3
PAINLEVEL_OUTOF10: 0
PAINLEVEL_OUTOF10: 0

## 2022-06-20 NOTE — PROGRESS NOTES
Physical Therapy  . [x] daily progress note       [x] discharge       Patient Name:  Mercedez Hay   :  1934 MRN: [id number]  Room:  [address] [place] [address] Date of Admission: 2022  Rehabilitation Diagnosis:   Other specified diseases of intestine [K63.89]  Adenocarcinoma ([facility] [address] [C80.1]       Date 2022       Day of ARU Week:  5   Time IN/OUT 1040/1140, 1400/1500   Individual Tx Minutes 60,60   Group Tx Minutes    Co-Treat Minutes    Concurrent Tx Minutes    TOTAL Tx Time Mins 120   Variance Time    Variance Time []   Refusal due to:     []   Medical hold/reason:    []   Illness   []   Off Unit for test/procedure  []   Extra time needed to complete task  []   Therapeutic need  []   Other (specify):   Restrictions Restrictions/Precautions  Restrictions/Precautions: Fall Risk,General Precautions      Interdisciplinary communication [x]   Cleared for therapy per nursing     [x]   RN notified about issues during session: pain in R LE, nurse brought pain patch and tylenol  []   RN updated on pt performance  [x]   Spoke with :jessee C  []   Spoke with OT  []   Spoke with MD  []   Other:    Subjective observations and cognitive status: Pt in recliner, states her RLE sciatic pain is bothering her more today, Informed nursing. Pt still agreeable to therapy. Pt states family has lowered her bed(she had to use step stool to get in previously) in preparation for discharge.     Pain level/location:   5 10       Location: R back and LE sciatica with increased pain today   Discharge recommendations  Anticipated discharge date:    Destination: []home alone   []home alone with assist PRN     [x] home w/ family      [] Continuous supervision  []SNF    [] Assisted living     [] Other:  Continued therapy: [x]C PT  []OUTPATIENT PT   [] No Further PT  []SNF PT  Caregiver training recommended: []Yes  [x] No   Equipment needs: Mercedez Hay requires the assistance of a wheeled walker to successfully ambulate from room to room at home to allow completion of daily living tasks such as: bathing, toileting, dressing and grooming. A wheeled walker is necessary due to the patient's unsteady gait, upper body weakness, inability to  a standard walker. This patient can ambulate only by pushing a walker instead of using a lesser assistive device such as a cane or crutch. PT IRF-CARSON scores and goals for discharge assessment:   Roll Left and Right  Assistance Needed: Independent  Comment: no bed features  CARE Score: 6  Discharge Goal: Independent    Sit to Lying  Assistance Needed: Independent  Comment: no bed features, regular bed  CARE Score: 6  Discharge Goal: Independent    Lying to Sitting on Side of Bed  Assistance Needed: Independent  Comment: no bed features on regular bed  CARE Score: 6  Discharge Goal: Independent    Sit to Stand  Assistance Needed: Supervision or touching assistance  Comment: supervision with 2ww  CARE Score: 4  Discharge Goal: Independent    Chair/Bed-to-Chair Transfer  Assistance Needed: Supervision or touching assistance  Comment: CG with 2ww due to unsteady on turn at times  CARE Score: 4  Discharge Goal: Independent    Toilet Transfer  Assistance needed: Supervision or touching assistance  Comment: Supervision using RW and grab bars  CARE Score: 4  Discharge Goal: Supervision or touching assistance    Car Transfer  Assistance Needed: Supervision or touching assistance  Comment: SBA with approach to car using 2ww  CARE Score: 4  Discharge Goal: Independent    Walk 10 Feet?   Walk 10 Feet?: Yes         Picking Up Object  Assistance Needed: Supervision or touching assistance  Comment: supervision with 2ww and reacher  CARE Score: 4  Discharge Goal: Independent    Wheelchair Ability  Uses a Wheelchair and/or Scooter?: No                        Walking Ability  Does the Patient Walk?: Yes    Walk 10 Feet  Assistance Needed: Supervision or touching assistance  Comment: SBA with 2ww  CARE Score: 4  Discharge Goal: Independent    Walk 50 Feet with Two Turns  Assistance Needed: Supervision or touching assistance  Comment: SBA with 2ww  CARE Score: 4  Discharge Goal: Independent    Walk 150 Feet  Assistance Needed: Supervision or touching assistance  Comment: SBA with 2ww  CARE Score: 4  Discharge Goal: Supervision or touching assistance  In pm pt able to ambulate max distance of 240'  Walking 10 Feet on Uneven Surfaces  Assistance Needed: Supervision or touching assistance  Comment: SBA with 2ww, cues for slowing down to adjust to irregularities on surface  CARE Score: 4  Discharge Goal: Supervision or touching assistance    1 Step (Curb)  Assistance Needed: Supervision or touching assistance  Comment: SBA with 2ww, cues for safety and sequence  CARE Score: 4  Discharge Goal: Supervision or touching assistance    4 Steps  Assistance Needed: Supervision or touching assistance  Comment: SBA with B rails with 25% cues for sequence  CARE Score: 4  Discharge Goal: Supervision or touching assistance    12 Steps  Assistance Needed: Supervision or touching assistance  Comment: CG with cues on 6\" steps to use R foot up ascending, L foot down due to L hip weakness. Pt can perform reciprocating on 4\" steps. Uses B rails. O2 94%, HR 65  CARE Score: 4  Discharge Goal: Supervision or touching assistance      Additional Therapeutic activities/exercises completed this date:     []   Nu-step:  Time:        Level:         #Steps:       []   Rebounder:    []  Seated     []  Standing        []   Balance training         []   Postural training    [x]   Supine ther ex (reps/sets): verbally reviewed HEP handout for AP, heel slide, SAQ, abduction with good recall    [x]   Seated ther ex (reps/sets):  LAQ with 5 seconds hold with quads shaking last 5 reps, marching 10 x1,    [x]   Standing ther ex (reps/sets): Instructed in HEP of marching, abduction, knee flexion and heel/toe raises 10 x1 each with seated rest between all, cues for technique     [x]   Other: Toileting activity completed with supervision for transfer and hygiene in am and pm. Gait training for open/close door to bathroom with pt demonstrating good technique after training   []   Other:    Comments:      Patient/Caregiver Education and Training:   []   Role of PT  []   Education about Dx  [x]   Use of call light for assist   []   HEP provided and explained   [x]   Treatment plan reviewed  [x]   Home safety  []   Wheelchair mobility/management   []   Body mechanics  [x]   Bed Mobility/Transfer technique  [x]   Gait technique/sequencing: educated in proper technique for sharp turns and side stepping with good performance by pt  [x]   Proper use of assistive device/adaptive equipment  [x]   Stair training/Advanced mobility safety and technique  []   Reinforced patient's precautions/mobility while maintaining precautions  []   Postural awareness  [x]   Family/caregiver training: spouse, daughter and grand daughter present at end of am session. Update them on her progress and answered questions. Covered recommendation for Kassiel[facility] Stewart PT and spouse stated they had used [facility] in past with positive experience. Notified CM  []   Progress was updated and reviewed in Rehabtracker with patient and/or family this date. []   Other:    Treatment Plan for Next Session: LE ex, stair training, advanced gait training     Assessment: This pt demonstrated a positive   response to today's treatment as evidenced by supervision for all mobility. The patient is making excellent progress toward established goals as evidenced by QI scores.     Treatment/Activity Tolerance:   [] Tolerated treatment with no adverse effects    [x] Patient limited by fatigue  [x] Patient limited by pain   [] Patient limited by medical complications:    [] Adverse reaction to Tx:   [] Significant change in status    Safety:       []  bed alarm set    [x]  chair alarm set    []  Pt refused alarms                []  Telesitter activated      [x]  Gait belt used during tx session      []other:       Number of Minutes/Billable Intervention  Gait Training 60   Therapeutic Exercise 25   Neuro Re-Ed    Therapeutic Activity 35   Wheelchair Propulsion    Group    Other:    TOTAL 120     Social History  Social/Functional History  Lives With: Spouse  Type of Home: House  Home Layout: One level  Home Access: Stairs to enter with rails  Entrance Stairs - Number of Steps: 2 to porch, 1 into house  Entrance Stairs - Rails: Both  Bathroom Shower/Tub: Walk-in shower  Bathroom Toilet: Standard  Bathroom Equipment: Built-in shower seat,Grab bars in shower,3-in-1 commode  Bathroom Accessibility: Accessible  Home Equipment: Reacher  Has the patient had two or more falls in the past year or any fall with injury in the past year?: No (Pt has had one fall in the last year without significant injury.)  Receives Help From: Family (2 daughters in the area who assist prn.)  ADL Assistance: Independent  Homemaking Assistance: Independent  Homemaking Responsibilities: Yes  Meal Prep Responsibility: Primary  Laundry Responsibility: Primary (Shares responsibility with .)  Cleaning Responsibility: Primary ([facility] responsibility with .)  Bill Paying/Finance Responsibility: Primary  Shopping Responsibility: Primary  Health Care Management: Primary (Pt uses a pillbox at baseline.)  Ambulation Assistance: Independent  Transfer Assistance: Independent  Active : Yes  Mode of Transportation: Simple-Fill  Occupation: Retired  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 Additional Comments: Pt has regular flat bed at home that she reports is high. Pt has been sleeping in recliner chair recently d/t discomfort in bed. Objective                                                                                    Goals:  (Update in navigator)   :   Long Term Goals  Time Frame for Long term goals : 7-10 days STG=LTG  Long term goal 1: Pt will perform bed mobility with mod I  Long term goal 2: pt will perform sit to stand, pivot and car transfers with mod I  Long term goal 3: pt will perform ambulation with 2ww 50' on levels with mod I, 150' on levels and 10' on unlevels with supervision  Long term goal 4: Pt will ascend/descend curb step and up to 12 steps with rail with supervision  Long term goal 5: Pt will retrieve light item with reacher and 2ww with mod I:        Plan of Care                                                                              Times per week: 5 days per week for a minimum of 60 minutes/day plus group as appropriate for 60 minutes.   Treatment to include      Electronically signed by   John Godfrey PT,   6/20/2022, 11:45 AM

## 2022-06-20 NOTE — PROGRESS NOTES
[employer] completed and patient's daughter, Fer Hancock will  today, advised her that it will be available for her at the

## 2022-06-20 NOTE — CARE COORDINATION
Patient has prior [facility] and would like their services again. Per pharmacist Isidra Rao, patient's insurance does not cover Urea.  $40/8 doses private pay.

## 2022-06-20 NOTE — PROGRESS NOTES
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 She denies abdominal pain. Occasional bowel movement reported. No nausea. Prior (baseline) level of function: Independent. Current level of function:         Current  IRF-CARSON and Goals:   Occupational Therapy:    Short Term Goals  Time Frame for Short term goals: STGs=LTGs :   Long Term Goals  Time Frame for Long term goals : 10-12 days or until d/c. Long Term Goal 1: Pt will complete grooming tasks c Mod I.  Long Term Goal 2: Pt will complete total body bathing c AE PRN and supervision. Long Term Goal 3: Pt will complete UB dressing c Mod I.  Long Term Goal 4: Pt will complete LB dressing c AE PRN and supervision. Long Term Goal 5: Pt will doff/don footwear c AE PRN and Mod I. Additional Goals?: Yes  Long Term Goal 6: Pt will complete toileting c supervision. Long Term Goal 7: Pt will perform functional transfers (bed, chair, toilet, shower) c DME PRN and supervision. Long Term Goal 8: Pt will perform therex/therax to facilitate an increase in strength/endurance/ax tolerance (c emphasis on static/dynamic standing balance/tolerance > 6 mins) c supervision. Long Term Goal 9: Pt will complete light home management tasks c supervision.  :                                       Eating: Eating  Assistance Needed: Independent  Comment: Pt able to open packages/containers and feed self  CARE Score: 6  Discharge Goal: Independent       Oral Hygiene: Oral Hygiene  Assistance Needed: Independent  Comment: mod I seated at the sink to complete oral hygiene and denture care  CARE Score: 6  Discharge Goal: Independent    UB/LB Bathing: Shower/Bathe Self  Assistance Needed: Supervision or touching assistance  Comment: Supervision in stance to bathe posterior perineal area; cues for thoroughness  CARE Score: 4  Discharge Goal: Supervision or touching assistance    UB Dressing: Upper Body Dressing  Assistance Needed: Independent  Comment: to doff/don pullover Tshirt  CARE Score: 6  Discharge Goal: Independent         LB Dressing: Lower Body Dressing  Assistance Needed: Supervision or touching assistance  Comment: Cues to thread BLEs into pants and brief at seated base for safety; Supervision in stance to manage over hips  CARE Score: 4  Discharge Goal: Supervision or touching assistance    Donning and [place] Footwear: Putting On/Taking Off Footwear  Assistance Needed: Independent  Comment: seated using figure 4 position to don shoes  CARE Score: 6  Discharge Goal: Independent      Toiletin [address] needed: Supervision or touching assistance  Comment: Supervision for clothing management and BM hygiene, cues for thoroughness  CARE Score: 4  Discharge Goal: Supervision or touching assistance      Toilet Transfers:   Toilet Transfer  Assistance needed: Supervision or touching assistance  Comment: Supervision using RW and grab bars  CARE Score: 4  Discharge Goal: Supervision or touching assistance    Physical Therapy:         Long Term Goals  Time Frame for Long term goals : 7-10 days STG=LTG  Long term goal 1: Pt will perform bed mobility with mod I  Long term goal 2: pt will perform sit to stand, pivot and car transfers with mod I  Long term goal 3: pt will perform ambulation with 2ww 50' on levels with mod I, 150' on levels and 10' on unlevels with supervision  Long term goal 4: Pt will ascend/descend curb step and up to 12 steps with rail with supervision  Long term goal 5: Pt will retrieve light item with reacher and 2ww with mod I      Bed Mobility:   Sit to Lying  Assistance Needed: Independent  Comment: no bed features, regular bed  CARE Score: 6  Discharge Goal: Independent  Roll Left and Right  Assistance Needed: Independent  Comment: no bed features  CARE Score: 6  Discharge Goal: Independent  Lying to Sitting on Side of Bed  Assistance Needed: Independent  Comment: no bed features on regular bed  CARE Score: 6  Discharge Goal: Independent    Transfers:    Sit to Stand  Assistance Needed: Supervision or touching assistance  Comment: supervision with 2ww  CARE Score: 4  Discharge Goal: Independent  Chair/Bed-to-Chair Transfer  Assistance Needed: Supervision or touching assistance  Comment: CG with 2ww due to unsteady on turn at times  CARE Score: 4  Discharge Goal: Independent     Car Transfer  Assistance Needed: Supervision or touching assistance  Comment: SBA with approach to car using 2ww  CARE Score: 4  Discharge Goal: Independent    Ambulation:    Walking Ability  Does the Patient Walk?: Yes     Walk 10 Feet  Assistance Needed: Supervision or touching assistance  Comment: SBA with 2ww  CARE Score: 4  Discharge Goal: Independent     Walk 50 Feet with Two Turns  Assistance Needed: Supervision or touching assistance  Comment: SBA with 2ww  Reason if not Attempted: Not attempted due to medical condition or safety concerns  CARE Score: 4  Discharge Goal: Independent     Walk 150 Feet  Assistance Needed: Supervision or touching assistance  Comment: SBA with 2ww  Reason if not Attempted: Not attempted due to medical condition or safety concerns  CARE Score: 4  Discharge Goal: Supervision or touching assistance     Walking 10 Feet on Uneven Surfaces  Assistance Needed: Supervision or touching assistance  Comment: SBA with 2ww, cues for slowing down to adjust to irregularities on surface  CARE Score: 4  Discharge Goal: Supervision or touching assistance     1 Step (Curb)  Assistance Needed: Supervision or touching assistance  Comment: SBA with 2ww, cues for safety and sequence  CARE Score: 4  Discharge Goal: Supervision or touching assistance     4 Steps  Assistance Needed: Supervision or touching assistance  Comment: SBA with B rails with 25% cues for sequence  CARE Score: 4  Discharge Goal: Supervision or touching assistance     12 Steps  Assistance Needed: Supervision or touching assistance  Comment: CG with cues on 6\" steps to use R foot up ascending, L foot down due to L hip weakness. Pt can perform reciprocating on 4\" steps. Uses B rails. O2 94%, HR 65  Reason if not Attempted: Not attempted due to medical condition or safety concerns  CARE Score: 4  Discharge Goal: Supervision or touching assistance       Wheelchair:  w/c Ability: Wheelchair Ability  Uses a Wheelchair and/or Scooter?: No                Balance:        Object: Picking Up Object  Assistance Needed: Supervision or touching assistance  Comment: supervision with 2ww and reacher  CARE Score: 4  Discharge Goal: Independent    I      Exam:    Blood pressure (!) 124/55, pulse 55, temperature 98.1 °F (36.7 °C), temperature source Oral, resp. rate 16, height 5' (1.524 m), weight 145 lb (65.8 kg), SpO2 99 %, not currently breastfeeding. General: Sitting up in a bedside chair visiting with family. Talkative. Smiling and laughing. HEENT: Neck supple. MMM. Speech clear. Pulmonary: No wheezes, rales or coughing. Cardiac: Regular rate and rhythm. Abdomen: Patient's abdomen is soft and nondistended. Bowel sounds were present throughout. There was no rebound, guarding or masses noted. Upper extremities: Bringing her hands together in the midline. No new bruising or swelling. No tremor. Lower extremities: Calves soft. Able to toe tap bilaterally. Heels clear. Sitting balance was good.   Standing balance was fair-.    Lab Results   Component Value Date    WBC 12.0 (H) 06/11/2022    HGB 9.5 (L) 06/11/2022    HCT 28.5 (L) 06/11/2022 MCV 86.4 06/11/2022     06/11/2022     Lab Results   Component Value Date    INR 1.09 05/14/2021    INR 1.09 12/09/2016    PROTIME 12.4 05/14/2021    PROTIME 12.7 12/09/2016     Lab Results   Component Value Date    CREATININE 0.7 06/20/2022    BUN 41 (H) 06/20/2022     (L) 06/20/2022    K 4.1 06/20/2022    CL 98 (L) 06/20/2022    CO2 28 06/20/2022     Lab Results   Component Value Date    ALT 11 06/08/2022    AST 14 (L) 06/08/2022    ALKPHOS 50 06/08/2022    BILITOT 0.2 06/08/2022       Expected length of stay  prior to a supervised level of function for discharge home with a walker and [address] OT/PT is 6/23/2022. Recommendations:    1. 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    New right upper limb DVT treated with Xarelto without obvious complications. PICC line ordered out now that she has completed 5 full days of therapeutic anticoagulation.  There are no incisional signs of infection.  Generally continent of bowel and bladder.  Ongoing cautious pain management.  Benefiting from aggressive pulmonary hygiene measures, nutritional support and pain management.  Outpatient follow-up with oncology and her surgeon.  Verbal cues and CGA for transfers today. 2. DVT prophylaxis.  Xarelto is treating her acute DVT. Monitoring her hemoglobin periodically while on this medication.  Weightbearing activities are steadily improving now.  GI prophylaxis is available.  No clinical signs of blood loss. 3. Uncontrolled pain: Limited use of oral analgesics to avoid delirium.   Successful bowel intervention while on the analgesics.   4. 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  Blood sugars are checked at mealtime and bedtime.  She is on a Humalog sliding scale with plans for reintroducing oral agents from home when she is eating more consistently. 5. Chronic kidney disease stage IIIa: Avoiding nephrotoxic medications and wide swings of blood pressure.  Encouraging consistent oral hydration.  Periodic monitoring of her chemistries.   6. Hypertension: She is currently off medications to control her blood pressure.  Vital signs are checked at rest and with activity.  Target systolic blood pressures 931-128.  ZKVHQ pressure is within the target range again today.  Monitoring closely.

## 2022-06-20 NOTE — PROGRESS NOTES
Nephrology Progress Note        [address], [address] [place] [address]  Phone: [phone]  Office Hours: 8:30AM - 4:30PM  Monday - Friday 6/20/2022 6:56 AM  Subjective:   Admit Date: 6/9/2022  PCP: Sarah Aj DO  Interval History:   Resting on room air    Diet: ADULT ORAL NUTRITION SUPPLEMENT; Breakfast, Lunch, Dinner; Diabetic Oral Supplement  ADULT DIET; Regular; 1800 ml      Data:   Scheduled Meds:   sodium chloride  1 g Oral BID WC    urea  15 g Oral BID    rivaroxaban  15 mg Oral BID WC    pantoprazole  40 mg Oral BID AC    insulin lispro  0-3 Units SubCUTAneous Nightly    insulin lispro  0-6 Units SubCUTAneous TID WC    atorvastatin  20 mg Oral Daily    cetirizine  10 mg Oral Daily    escitalopram  10 mg Oral Daily    levothyroxine  137 mcg Oral Daily    lidocaine  1 patch TransDERmal Daily    metoprolol tartrate  150 mg Oral Daily    metoprolol tartrate  100 mg Oral Nightly    oxybutynin  5 mg Oral BID    rOPINIRole  3 mg Oral Nightly    sodium chloride flush  5-40 mL IntraVENous 2 times per day    sucralfate  1 g Oral 4 times per day    vitamin B-12  100 mcg Oral Daily    Vitamin D  1,000 Units Oral Daily    sodium chloride flush  5-40 mL IntraVENous 2 times per day     Continuous Infusions:   sodium chloride      dextrose      sodium chloride       PRN Meds:melatonin, sodium chloride, albuterol sulfate HFA, benzocaine-menthol, calcium carbonate, phenol, sodium chloride flush, dextrose, dextrose bolus **OR** dextrose bolus, glucagon (rDNA), glucose, ondansetron **OR** [DISCONTINUED] ondansetron, sodium chloride, polyethylene glycol, sodium chloride flush, acetaminophen, bisacodyl  I/O last 3 completed shifts: In: 7727 [P.O.:1037; I.V.:10]  Out: 2450 [Urine:2450]  I/O this shift:   In: 400 [P.O.:400]  Out: 900 [Urine:900]    Intake/Output Summary (Last 24 hours) at 6/20/2022 0656  Last data filed at 6/20/2022 0448  Gross per 24 hour   Intake 1000 ml   Output 2400 ml   Net -1400 ml       CBC: No results for input(s): WBC, HGB, PLT in the last 72 hours. BMP:    Recent Labs     06/18/22  1140 06/20/22  0500    133*   K 4.1 4.1   CL 98* 98*   CO2 29 28   BUN 19 41*   CREATININE 0.7 0.7   GLUCOSE 132* 110*     Hepatic: No results for input(s): AST, ALT, ALB, BILITOT, ALKPHOS in the last 72 hours. Troponin: No results for input(s): TROPONINI in the last 72 hours. BNP: No results for input(s): BNP in the last 72 hours. Lipids: No results for input(s): CHOL, HDL in the last 72 hours. Invalid input(s): LDLCALCU  ABGs:   Lab Results   Component Value Date    PO2ART 58 06/11/2022    ANO6BQC 43.0 06/11/2022     INR: No results for input(s): INR in the last 72 hours.     Objective:   Vitals: /74   Pulse 84   Temp 97.7 °F (36.5 °C) (Oral)   Resp 18   Ht 5' (1.524 m)   Wt 145 lb (65.8 kg)   SpO2 96%   BMI 28.32 kg/m²   General appearance: , in no acute distress  HEENT: normocephalic, atraumatic,   Neck: supple, trachea midline  Lungs:  breathing comfortably on room air  Extremities: extremities atraumatic, no cyanosis or edema  Neurologic: alert, oriented, follows commands, interactive    Assessment and Plan:     Patient Active Problem List    Diagnosis Date Noted    Generalized weakness     Uncontrolled pain     Uncontrolled type 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)     Moderate malnutrition (HCC)     Chronic kidney disease, stage 3a ([address]     Acquired hypothyroidism     Reactive depression     Adenocarcinoma ([address] 06/09/2022    Partial small bowel obstruction (HCC)     Severe malnutrition ([address] 05/31/2022    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    Abnormal stress test     Dyslipidemia 04/06/2021    Abnormal EKG 04/06/2021    Long-term insulin use in type 2 diabetes ([address] 03/14/2018    Essential hypertension 03/14/2018     ---Acute on chronic hyponatremia  Continue salt tab 1g bid and urea 15g bid upon dc  Needs good nutrition  Avoid overdrinking fluids    Thank you                  Electronically signed by Lisbet Aguirre DO on 6/20/2022 at 6:56 AM    [facility]  MD Kori Smith DO [address],  [address]  [facility], [place] [address]  PHONE: [phone]  FAX: [phone]

## 2022-06-20 NOTE — PROGRESS NOTES
Comprehensive Nutrition Assessment    Type and Reason for Visit:  Reassess    Nutrition Recommendations/Plan:   1. Continue regular diet as tolerates   2. Will continue to offer oral nutrition supplement  3. Will continue to follow up during stay     Malnutrition Assessment:  Malnutrition Status:  Insufficient data (recent dx severe malnutrition) (06/10/22 1536)    Context:  Acute Illness       Nutrition Assessment:    Diet advanced from soft to regular diet, remains on 1800 ml fluid restriction. Meal intake % at most meals. Improving intake during stay. Will continue to follow at moderate nutrition risk at this time. Nutrition Related Findings:    up in chair with visitors, POCT under 150 mg/dL Wound Type: Surgical Incision       Current Nutrition Intake & Therapies:    Average Meal Intake: %  Average Supplements Intake: 51-75%  ADULT ORAL NUTRITION SUPPLEMENT; Breakfast, Lunch, Dinner; Diabetic Oral Supplement  ADULT DIET; Regular; 1800 ml    Anthropometric Measures:  Height: 5' (152.4 cm)  Ideal Body Weight (IBW): 100 lbs (45 kg)    Admission Body Weight: 151 lb 10.8 oz (68.8 kg) (from acute stay)  Current Body Weight: 145 lb 1 oz (65.8 kg), 157.8 % IBW. Weight Source: Bed Scale  Current BMI (kg/m2): 28.3  Usual Body Weight: 160 lb (72.6 kg) (per hx)  % Weight Change (Calculated): -1.3  Weight Adjustment For: No Adjustment                 BMI Categories: Overweight (BMI 25.0-29. 9)    Estimated Daily Nutrient Needs:  Energy Requirements Based On: Formula  Weight Used for Energy Requirements: Current  Energy (kcal/day): 5104-1544  Weight Used for Protein Requirements: Ideal  Protein (g/day): 54-67 (1.2-1.5 g/kg)  Method Used for Fluid Requirements: 1 ml/kcal  Fluid (ml/day): 1500    Nutrition Diagnosis:   · Inadequate oral intake related to altered GI function as evidenced by poor intake prior to Guzman Controls loss,other (comment) (severe malnutrition)      Nutrition Interventions:   Food and/or Nutrient Delivery: Continue Current Diet,Continue Oral Nutrition Supplement  Nutrition Education/Counseling: Education initiated  Coordination of Nutrition Care: Continue to monitor while inpatient  Plan of Care discussed with: patient- new diet order today able to try solid foods    Goals:  Previous Goal Met: Progressing toward Goal(s)  Goals: PO intake 50% or greater,by next RD assessment       Nutrition Monitoring and Evaluation:      Food/Nutrient Intake Outcomes: Food and Nutrient Intake,Diet Advancement/Tolerance,Supplement Intake  Physical Signs/Symptoms Outcomes: Biochemical Data,Meal Time Behavior,GI Status,Skin,Weight    Discharge Planning:    Continue current diet,Continue Oral Nutrition Supplement     Leroy Gonzalez, [address] [place] [address],   Contact: [phone]

## 2022-06-20 NOTE — PROGRESS NOTES
Occupational Therapy    Physical Rehabilitation: OCCUPATIONAL THERAPY     [x] daily progress note       [] discharge       Patient Name:  Ced Dawn   :  1934 MRN: [id number]  Room:  [address] [place] [address] Date of Admission: 2022  Rehabilitation Diagnosis:   Other specified diseases of intestine [K63.89]  Adenocarcinoma ([facility] [C80.1]       Date 2022       Day of ARU Week:  5   Time IN/OUT 3770-5067   Individual Tx Minutes 60   Group Tx Minutes    Co-Treat Minutes    Concurrent Tx Minutes    TOTAL Tx Time Mins 60   Variance Time    Variance Time []   Refusal due to:     []   Medical hold/reason:    []   Illness   []   Off Unit for test/procedure  []   Extra time needed to complete task  []   Therapeutic need  []   Other (specify):   Restrictions Restrictions/Precautions: Fall Risk,General Precautions         Communication with other providers: [x]   OK to see per nursing:     []   Spoke with team member regarding:      Subjective observations and cognitive status: Pt resting in bed finishing breakfast on approach; pleasant and agreeable to therapy session. Pt had purewick in and was incontinent of urine and stool. Pt stated, Ric Jacobs is sure easier! I wouldn't make it to the bathroom without it. \" therapist educated pt regarding not relying on Chari Rutherford since she is not going home with one and stool was present in the pure wick which can cause infection. Pain level/location:    /10       Location:    Discharge recommendations  Anticipated discharge date:    Destination: []???home alone   []? ??home alone w assist prn   [x]? ?? home w/ family    []? ?? Continuous supervision       []? ??SNF    []??? Assisted living     []? ?? Other:   Continued therapy: [x]? ? ? [address] OT  []???OUTPATIENT  OT   []??? No Further OT  Equipment needs:   Nathalie Maciel a 3 in 1 bedside commode due to being unable to use the toilet within the home  And confined to one level of the home.        ADLs:    Eating: Eating  Assistance Needed: Independent  Comment: Pt able to open packages/containers and feed self  CARE Score: 6  Discharge Goal: Independent       Oral Hygiene: Oral Hygiene  Assistance Needed: Independent  Comment: mod I seated at the sink to complete oral hygiene and denture care  CARE Score: 6  Discharge Goal: Independent    UB/LB Bathing: Shower/Bathe Self  Assistance Needed: Supervision or touching assistance  Comment: Supervision in stance to bathe posterior perineal area; cues for thoroughness  CARE Score: 4  Discharge Goal: Supervision or touching assistance    UB Dressing: Upper Body Dressing  Assistance Needed: Independent  Comment: to doff/don pullover Tshirt  CARE Score: 6  Discharge Goal: Independent         LB Dressing: Lower Body Dressing  Assistance Needed: Supervision or touching assistance  Comment: Cues to thread BLEs into pants and brief at seated base for safety; Supervision in stance to manage over hips  CARE Score: 4  Discharge Goal: Supervision or touching assistance    Donning and [employer] Footwear: Putting On/Taking Off Footwear  Assistance Needed: Independent  Comment: seated using figure 4 position to don shoes  CARE Score: 6  Discharge Goal: Independent      Toiletin [address] needed: Supervision or touching assistance  Comment: Supervision for clothing management and BM hygiene, cues for thoroughness  CARE Score: 4  Discharge Goal: Supervision or touching assistance      Toilet Transfers:   Sup Toilet Transfer  Assistance needed: Supervision or touching assistance  Comment: Supervision using RW and grab bars  CARE Score: 4  Discharge Goal: Supervision or touching assistance  Device Used:    []   Standard Toilet         [x]   Grab Bars           [x]  Bedside Commode over toilet       []   Elevated Toilet          []   Other:        Bed Mobility:           []   Pt received out of bed   Supine --> Sit:  Mod I       Transfers:    Sit--> Stand:  supervision  Stand --> Sit:   supervision  Stand-Pivot:   Supervision   Other:    Assistive device required for transfer:   RW       Functional Mobility:  To<>from bathroom   Assistance:  Supervision   Device:   [x]   Rolling Walker     []   Standard Walker []   Wheelchair        []   [employer]       []   4-Wheeled Adriana Gambles         []   Cardiac Walker       []   Other:          Additional Therapeutic activities/exercises completed this date:     [x]   ADL Training   [x]   Balance/Postural training     [x]   Bed/Transfer Training   []   Endurance Training   []   Neuromuscular Re-ed   []   Nu-step:  Time:        Level:         #Steps:       []   Rebounder:    []  Seated     []  Standing        []   Supine Ther Ex (reps/sets):     []   Seated Ther Ex (reps/sets):     []   Standing Ther Ex (reps/sets):     []   Other:      Comments:      Patient/Caregiver Education and Training:   []   [employer] Equipment Use  []   Bed Mobility/Transfer Technique/Safety  []   Energy Conservation Tips  []   Family training  []   Postural Awareness  []   Safety During Functional Activities  []   Reinforced Patient's Precautions   []   Progress was updated and reviewed in Rehabtracker with patient and/or family this         date. Treatment Plan for Next Session: Continue OT POC       Assessment: This pt demonstrated a positive response to today's treatment as evidenced by improved balance. The patient is making good progress toward established goals as evidenced by QI scores. Ongoing deficits are observed in the areas of strength, endurance and functional transfers and continued focus on this is recommended.        Treatment/Activity Tolerance:   [x] Tolerated treatment with no adverse effects    [] Patient limited by fatigue  [] Patient limited by pain   [] Patient limited by medical complications:    [] Adverse reaction to Tx:   [] Significant change in status    Safety:       []  bed alarm set    [x]  chair alarm set    []  Pt refused alarms                [] Telesitter activated      [x]  Gait belt used during tx session      []other:       Number of Minutes/Billable Intervention  Therapeutic Exercise    ADL Self-care 60   Neuro Re-Ed    Therapeutic Activity    Group    Other:    TOTAL 60       Social History  Social/Functional History  Lives With: Spouse  Type of Home: House  Home Layout: One level  Home Access: Stairs to enter with rails  Entrance Stairs - Number of Steps: 2 to porch, 1 into house  Entrance Stairs - Rails: Both  Bathroom Shower/Tub: Walk-in shower  Bathroom Toilet: Standard  Bathroom Equipment: Built-in shower seat,Grab bars in shower,3-in-1 commode  Bathroom Accessibility: Accessible  Home Equipment: Reacher  Has the patient had two or more falls in the past year or any fall with injury in the past year?: No (Pt has had one fall in the last year without significant injury.)  Receives Help From: Family (2 daughters in the area who assist prn.)  ADL Assistance: Independent  Homemaking Assistance: Independent  Homemaking Responsibilities: Yes  Meal Prep Responsibility: Primary  Laundry Responsibility: Primary (Shares responsibility with .)  Cleaning Responsibility: Primary ([facility] responsibility with .)  Bill Paying/Finance Responsibility: Primary  Shopping Responsibility: Primary  Health Care Management: Primary (Pt uses a pillbox at baseline.)  Ambulation Assistance: Independent  Transfer Assistance: Independent  Active : Yes  Mode of Transportation: [employer]  Occupation: Retired  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 Additional Comments: Pt has regular flat bed at home that she reports is high. Pt has been sleeping in recliner chair recently d/t discomfort in bed.     Objective                                                                                    Goals:  (Update in navigator)  Short Term Goals  Time Frame for Short term goals: STGs=LTGs:  Long Term Goals  Time Frame for Long term goals : 10-12 days or until d/c. Long Term Goal 1: Pt will complete grooming tasks c Mod I.  Long Term Goal 2: Pt will complete total body bathing c AE PRN and supervision. Long Term Goal 3: Pt will complete UB dressing c Mod I.  Long Term Goal 4: Pt will complete LB dressing c AE PRN and supervision. Long Term Goal 5: Pt will doff/don footwear c AE PRN and Mod I. Additional Goals?: Yes  Long Term Goal 6: Pt will complete toileting c supervision. Long Term Goal 7: Pt will perform functional transfers (bed, chair, toilet, shower) c DME PRN and supervision. Long Term Goal 8: Pt will perform therex/therax to facilitate an increase in strength/endurance/ax tolerance (c emphasis on static/dynamic standing balance/tolerance > 6 mins) c supervision. Long Term Goal 9: Pt will complete light home management tasks c supervision.:        Plan of Care                                                                              Times per week: 5 days per week for a minimum of 60 minutes/day plus group as appropriate for 60 minutes.   Treatment to include Plan  Times per Day: Daily  Current Treatment Recommendations: Strengthening,Balance training,Functional mobility training,Endurance training,Safety education & training,Patient/Caregiver education & training,Equipment evaluation, education, & procurement,Positioning,Home management training,Self-Care / Deandre Lewis re-education,Coordination training    Electronically signed by   KHURRAM Bansal,  6/20/2022, 9:23 AM

## 2022-06-21 LAB
GLUCOSE BLD-MCNC: 119 MG/DL (ref 70–99)
GLUCOSE BLD-MCNC: 132 MG/DL (ref 70–99)
GLUCOSE BLD-MCNC: 135 MG/DL (ref 70–99)
GLUCOSE BLD-MCNC: 168 MG/DL (ref 70–99)

## 2022-06-21 PROCEDURE — 82962 GLUCOSE BLOOD TEST: CPT

## 2022-06-21 PROCEDURE — 97535 SELF CARE MNGMENT TRAINING: CPT

## 2022-06-21 PROCEDURE — 6370000000 HC RX 637 (ALT 250 FOR IP): Performed by: PHYSICAL MEDICINE & REHABILITATION

## 2022-06-21 PROCEDURE — 97110 THERAPEUTIC EXERCISES: CPT

## 2022-06-21 PROCEDURE — 97530 THERAPEUTIC ACTIVITIES: CPT

## 2022-06-21 PROCEDURE — 6370000000 HC RX 637 (ALT 250 FOR IP): Performed by: INTERNAL MEDICINE

## 2022-06-21 PROCEDURE — 99233 SBSQ HOSP IP/OBS HIGH 50: CPT | Performed by: PHYSICAL MEDICINE & REHABILITATION

## 2022-06-21 PROCEDURE — 94150 VITAL CAPACITY TEST: CPT

## 2022-06-21 PROCEDURE — 1280000000 HC REHAB R&B

## 2022-06-21 PROCEDURE — 97116 GAIT TRAINING THERAPY: CPT

## 2022-06-21 PROCEDURE — 94761 N-INVAS EAR/PLS OXIMETRY MLT: CPT

## 2022-06-21 PROCEDURE — 6370000000 HC RX 637 (ALT 250 FOR IP): Performed by: STUDENT IN AN ORGANIZED HEALTH CARE EDUCATION/TRAINING PROGRAM

## 2022-06-21 RX ADMIN — RIVAROXABAN 15 MG: 15 TABLET, FILM COATED ORAL at 08:19

## 2022-06-21 RX ADMIN — SUCRALFATE 1 G: 1 TABLET ORAL at 05:54

## 2022-06-21 RX ADMIN — PANTOPRAZOLE SODIUM 40 MG: 40 TABLET, DELAYED RELEASE ORAL at 16:58

## 2022-06-21 RX ADMIN — SUCRALFATE 1 G: 1 TABLET ORAL at 12:14

## 2022-06-21 RX ADMIN — ROPINIROLE HYDROCHLORIDE 3 MG: 1 TABLET, FILM COATED ORAL at 20:54

## 2022-06-21 RX ADMIN — ACETAMINOPHEN 650 MG: 325 TABLET ORAL at 05:54

## 2022-06-21 RX ADMIN — Medication 100 MCG: at 08:19

## 2022-06-21 RX ADMIN — MELATONIN 3 MG: at 20:54

## 2022-06-21 RX ADMIN — RIVAROXABAN 15 MG: 15 TABLET, FILM COATED ORAL at 16:58

## 2022-06-21 RX ADMIN — Medication 1000 UNITS: at 08:19

## 2022-06-21 RX ADMIN — PANTOPRAZOLE SODIUM 40 MG: 40 TABLET, DELAYED RELEASE ORAL at 05:54

## 2022-06-21 RX ADMIN — ATORVASTATIN CALCIUM 20 MG: 10 TABLET, FILM COATED ORAL at 08:19

## 2022-06-21 RX ADMIN — INSULIN LISPRO 1 UNITS: 100 INJECTION, SOLUTION INTRAVENOUS; SUBCUTANEOUS at 17:15

## 2022-06-21 RX ADMIN — LEVOTHYROXINE SODIUM 137 MCG: 0.11 TABLET ORAL at 05:54

## 2022-06-21 RX ADMIN — METOPROLOL TARTRATE 100 MG: 50 TABLET, FILM COATED ORAL at 20:54

## 2022-06-21 RX ADMIN — ESCITALOPRAM OXALATE 10 MG: 10 TABLET ORAL at 08:19

## 2022-06-21 RX ADMIN — SUCRALFATE 1 G: 1 TABLET ORAL at 16:58

## 2022-06-21 RX ADMIN — OXYBUTYNIN CHLORIDE 5 MG: 5 TABLET ORAL at 20:54

## 2022-06-21 RX ADMIN — SUCRALFATE 1 G: 1 TABLET ORAL at 00:31

## 2022-06-21 RX ADMIN — CETIRIZINE HYDROCHLORIDE 10 MG: 10 TABLET, FILM COATED ORAL at 08:19

## 2022-06-21 RX ADMIN — SODIUM CHLORIDE TAB 1 GM 1 G: 1 TAB at 08:19

## 2022-06-21 RX ADMIN — SODIUM CHLORIDE TAB 1 GM 1 G: 1 TAB at 16:56

## 2022-06-21 RX ADMIN — ACETAMINOPHEN 650 MG: 325 TABLET ORAL at 14:28

## 2022-06-21 RX ADMIN — OXYBUTYNIN CHLORIDE 5 MG: 5 TABLET ORAL at 08:19

## 2022-06-21 ASSESSMENT — PAIN DESCRIPTION - ONSET: ONSET: ON-GOING

## 2022-06-21 ASSESSMENT — PAIN DESCRIPTION - DESCRIPTORS: DESCRIPTORS: ACHING;DISCOMFORT

## 2022-06-21 ASSESSMENT — PAIN DESCRIPTION - FREQUENCY: FREQUENCY: INTERMITTENT

## 2022-06-21 ASSESSMENT — PAIN DESCRIPTION - LOCATION: LOCATION: HIP

## 2022-06-21 ASSESSMENT — PAIN SCALES - GENERAL
PAINLEVEL_OUTOF10: 3
PAINLEVEL_OUTOF10: 6

## 2022-06-21 ASSESSMENT — PAIN - FUNCTIONAL ASSESSMENT: PAIN_FUNCTIONAL_ASSESSMENT: ACTIVITIES ARE NOT PREVENTED

## 2022-06-21 ASSESSMENT — PAIN DESCRIPTION - ORIENTATION: ORIENTATION: RIGHT

## 2022-06-21 NOTE — PROGRESS NOTES
Physical Therapy  . [x] daily progress note       [] discharge       Patient Name:  Britney Wood   :  1934 MRN: [id number]  Room:  [address] [place] [address] Date of Admission: 2022  Rehabilitation Diagnosis:   Other specified diseases of intestine [K63.89]  Adenocarcinoma ([facility] [address] [C80.1]       Date 2022       Day of ARU Week:  6   Time IN/OUT 1308/1408   Individual Tx Minutes 60   Group Tx Minutes    Co-Treat Minutes    Concurrent Tx Minutes    TOTAL Tx Time Mins 60   Variance Time    Variance Time []   Refusal due to:     []   Medical hold/reason:    []   Illness   []   Off Unit for test/procedure  []   Extra time needed to complete task  []   Therapeutic need  []   Other (specify):   Restrictions Restrictions/Precautions  Restrictions/Precautions: Fall Risk,General Precautions      Interdisciplinary communication [x]   Cleared for therapy per nursing     [x]   RN notified about issues during session: pt needed pain patch changed  []   RN updated on pt performance  []   Spoke with   []   Spoke with OT  []   Spoke with MD  []   Other:    Subjective observations and cognitive status: Family in room upon entering. All looking forward to discharge with no questions     Pain level/location:  0  /10       Location:    Discharge recommendations  Anticipated discharge date:    Destination: []home alone   []home alone with assist PRN     [x] home w/ family      [] Continuous supervision  []SNF    [] Assisted living     [] Other:  Continued therapy: [x]HHC PT  []OUTPATIENT  PT   [] No Further PT  []SNF PT  Caregiver training recommended: []Yes  [] No   Equipment needs: Britney Wood requires the assistance of a clay wheeled walker to successfully ambulate from room to room at home to allow completion of daily living tasks such as: bathing, toileting, dressing and grooming. A wheeled walker is necessary due to the patient's unsteady gait, upper body weakness, inability to  a standard walker. This patient can ambulate only by pushing a walker instead of using a lesser assistive device such as a cane or crutch. Bed Mobility:           [x]   Pt received out of bed       Transfers:    Sit--> Stand: Mod I  Stand --> Sit:   Mod i  Chair-->Bed/Bed --> Chair:   Supervision upon initial trial after sitting for some time due to decreased balance  Toilet Transfer (if applicable): mod I with pt needing set up for therapist to get new brief and wet wipes for hygiene (x2 during session)  Other:    Assistive device required for transfer:   2ww    Gait:    Distance: 181', 20', 170'  Assistance: Mod I to supervision for no extra challenges, supervision for divided attention as pt is beginning to have tendency to abandon walker at times. Educated pt on fall risk for this and that therapist felt she needed a little more strength before progressing gait device. Pt verbalized understanding  Device:  2ww  Gait Quality:  Continuous steps with good clearance and self correction when starting to get too far behind walker      Curb   Height:  4\"  Assistance:  Supervision with cue to get closer to curb before lifting walker  Device:  2ww    Uneven Surfaces:       Assistance: mod I  Device:    2ww  Surfaces Completed:   [x]  carpet with bean bags beneath      []  Throw rugs       []  Ramp       []  Outdoor pavements        []  Grass             []  Loose gravel        []  Other:         Additional Therapeutic activities/exercises completed this date:     []   Nu-step:  Time:        Level:         #Steps:       []   Rebounder:    []  Seated     []  Standing        []   Balance training         []   Postural training    []   Supine ther ex (reps/sets):      [x]? Seated ther ex (reps/sets):  LAQ with 3 seconds hold with quads not shaking  reps, marching 10 x1, AP 10 x1. [x]?    Standing ther ex (reps/sets):marching, abduction, knee flexion and heel/toe raises 10 x1 each with seated rest between all, cues for technique(see below) standing heel cord stretch 10 seconds x3 with pt needing max cues for proper technique. []   Picking up object from floor (standing):                   []   Reacher used   []   Other:   []   Other:    Comments:      Patient/Caregiver Education and Training:   []   Role of PT  []   Education about Dx  []   Use of call light for assist   []   Wheelchair mobility/management  [x]   HEP provided and explained : had pt use handouts issued yesterday to follow LE ex as noted above. Performed in kitchen setting to use counter top. Pt needed redirected to use countertop instead of walker for balance with education regarding using most stable thing for balance. Pt needed 25% technique cues. [x]   Treatment plan reviewed  [x]   Home safety  []   Body mechanics  []   Positioning  [x]   Bed Mobility/Transfer technique  [x]   Gait technique/sequencing  [x]   Proper use of assistive device/adaptive equipment  [x]   Stair training/Advanced mobility safety and technique  []   Reinforced patient's precautions/mobility while maintaining precautions  []   Postural awareness  []   Family/caregiver training  []   Progress was updated and reviewed in Rehabtracker with patient and/or family this date. []   Other:      Treatment Plan for Next Session: balance, uneven surface, stairs, gait in functional settings with divided attention      Assessment:   Assessment: This pt demonstrated a positive response to today's treatment as evidenced by improved gait and activity tolerance. The patient is making  progress toward established goals as evidenced by QI scores. Ongoing deficits are observed in the areas of divided attention and continued focus on functional gait is recommended.      Treatment/Activity Tolerance:   [] Tolerated treatment with no adverse effects    [x] Patient limited by fatigue  [] Patient limited by pain   [] Patient limited by medical complications:    [] Adverse reaction to Tx:   [] Significant change in status    Barrier/s to progress/learning:   []   None  [x]   Cognition: some issues with carryover of new learning  []   Hearing deficit  []   Pre-morbid mental/psychological status   []   Motivation  []   Communication  []   Anxiety  []   Vision deficit  []   Attention  []   Other:      Safety:       [x]  bed alarm set    []  chair alarm set    []  Pt refused alarms                []  Telesitter activated      [x]  Gait belt used during tx session      []other:         Number of Minutes/Billable Intervention  Gait Training 25   Therapeutic Exercise 15   Neuro Re-Ed    Therapeutic Activity 20   Wheelchair Propulsion    Group    Other:    TOTAL 60         Social History  Social/Functional History  Lives With: Spouse  Type of Home: House  Home Layout: One level  Home Access: Stairs to enter with rails  Entrance Stairs - Number of Steps: 2 to porch, 1 into house  Entrance Stairs - Rails: Both  Bathroom Shower/Tub: Walk-in shower  Bathroom Toilet: Standard  Bathroom Equipment: Built-in shower seat,Grab bars in shower,3-in-1 commode  Bathroom Accessibility: Accessible  Home Equipment: Reacher  Has the patient had two or more falls in the past year or any fall with injury in the past year?: No (Pt has had one fall in the last year without significant injury.)  Receives Help From: Family (2 daughters in the area who assist prn.)  ADL Assistance: Independent  Homemaking Assistance: Independent  Homemaking Responsibilities: Yes  Meal Prep Responsibility: Primary  Laundry Responsibility: Primary (Shares responsibility with .)  Cleaning Responsibility: Primary ([facility] responsibility with .)  Bill Paying/Finance Responsibility: Primary  Shopping Responsibility: Primary  Health Care Management: Primary (Pt uses a pillbox at baseline.)  Ambulation Assistance: Independent  Transfer Assistance: Independent  Active : Yes  Mode of Transportation: [facility]  Occupation: Retired  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 Additional Comments: Pt has regular flat bed at home that she reports is high. Pt has been sleeping in recliner chair recently d/t discomfort in bed. Objective                                                                                    Goals:  (Update in navigator)   : Long Term Goals  Time Frame for Long term goals : 7-10 days STG=LTG  Long term goal 1: Pt will perform bed mobility with mod I  Long term goal 2: pt will perform sit to stand, pivot and car transfers with mod I  Long term goal 3: pt will perform ambulation with 2ww 50' on levels with mod I, 150' on levels and 10' on unlevels with supervision  Long term goal 4: Pt will ascend/descend curb step and up to 12 steps with rail with supervision  Long term goal 5: Pt will retrieve light item with reacher and 2ww with mod I:        Plan of Care                                                                              Times per week: 5 days per week for a minimum of 60 minutes/day plus group as appropriate for 60 minutes.   Treatment to include      Electronically signed by   Haris Schmid PT,  6/21/2022, 1:25 PM

## 2022-06-21 NOTE — PROGRESS NOTES
Physical Therapy    [x] daily progress note       [] discharge       Patient Name:  Dejuan Paige   :  1934 MRN: [id number]  Room:  [address] [place] [address] Date of Admission: 2022  Rehabilitation Diagnosis:   Other specified diseases of intestine [K63.89]  Adenocarcinoma ([facility] [address] [C80.1]       Date 2022       Day of ARU Week:  6   Time IN//1040   Individual Tx Minutes 60   Group Tx Minutes    Co-Treat Minutes    Concurrent Tx Minutes    TOTAL Tx Time Mins 60   Variance Time    Variance Time []   Refusal due to:     []   Medical hold/reason:    []   Illness   []   Off Unit for test/procedure  []   Extra time needed to complete task  []   Therapeutic need  []   Other (specify):   Restrictions Restrictions/Precautions  Restrictions/Precautions: Fall Risk,General Precautions      Interdisciplinary communication [x]   Cleared for therapy per nursing     []   RN notified about issues during session  []   RN updated on pt performance  []   Spoke with   []   Spoke with OT  []   Spoke with MD  [x]   Other: coordinated with SLP swallowing difficulty concerns of pt   Subjective observations and cognitive status: Pt asleep in bed, awakens when name is called, states concern related to swallowing difficulty, willing to participate in PT. Pain level/location: 3/10       Location: RLE due to sciatica    Discharge recommendations  Anticipated discharge date:     Destination: []?home alone   []?home alone with assist PRN     [x]? home w/ family      []? Continuous supervision  []? SNF    []? Assisted living     []? Other:  Continued therapy: [x]? Mayank Stewart PT  []? OUTPATIENT PT   []? No Further PT  []? SNF PT  Caregiver training recommended: []? Yes  [x]? No   Equipment needs: Dejuan Paige requires the assistance of a wheeled walker to successfully ambulate from room to room at home to allow completion of daily living tasks such as: bathing, toileting, dressing and grooming.   A wheeled walker is necessary due to the patient's unsteady gait, upper body weakness, inability to  a standard walker. This patient can ambulate only by pushing a walker instead of using a lesser assistive device such as a cane or crutch. PT IRF-CARSON scores and goals for discharge assessment:     Lying to Sitting on Side of Bed  Assistance Needed: Independent  Comment: Ind without use of bed features  CARE Score: 6  Discharge Goal: Independent    Sit to Stand  Assistance Needed: Independent  Comment: Mod Ind with 2WW from various sitting surfaces (EOB, w/c seat, regular chair with armrests, outdoor bench)  CARE Score: 6  Discharge Goal: Independent    Chair/Bed-to-Chair Transfer  Assistance Needed: Independent  Comment: Mod Ind with 2WW  CARE Score: 6  Discharge Goal: Independent    Walk 10 Feet? Walk 10 Feet?: Yes    1 Step  1 Step?: Yes      Walking Ability  Does the Patient Walk?: Yes    Walk 10 Feet  Assistance Needed: Independent  Comment: Mod Ind with 2WW (gait quality was consistently steady)  CARE Score: 6  Discharge Goal: Independent    Walk 50 Feet with Two Turns  Assistance Needed: Independent  Comment: Mod Ind with 2WW (gait quality was consistently steady)  Reason if not Attempted: Not attempted due to medical condition or safety concerns  CARE Score: 6  Discharge Goal: Independent    Walk 150 Feet  Assistance Needed: Independent  Comment:  Mod Ind with 2WW (gait quality was consistently steady)  Reason if not Attempted: Not attempted due to medical condition or safety concerns  CARE Score: 6  Discharge Goal: Supervision or touching assistance    Walking 10 Feet on Uneven Surfaces  Assistance Needed: Supervision or touching assistance  Comment: SBA using 2WW over outdoor surfaces  CARE Score: 4  Discharge Goal: Supervision or touching assistance    1 Step (Curb)  Assistance Needed: Supervision or touching assistance  Comment: SBA-Sup with 2WW  CARE Score: 4  Discharge Goal: Supervision or touching assistance    4 Steps  Assistance Needed: Supervision or touching assistance  Comment: SBA using railings (pt performed step-through pattern consistently on ascent/descent of 4\"/6\" step heights without signs of L/RLE instability or aggravation of RLE pain)  CARE Score: 4  Discharge Goal: Supervision or touching assistance    12 Steps  Assistance Needed: Supervision or touching assistance  Comment: SBA using railings (pt performed step-through pattern consistently on ascent/descent of 4\"/6\" step heights without signs of L/RLE instability or aggravation of RLE pain)  Reason if not Attempted: Not attempted due to medical condition or safety concerns  CARE Score: 4  Discharge Goal: Supervision or touching assistance    Patient/Caregiver Education and Training:   []   Role of PT  []   Education about Dx  []   Use of call light for assist   []   HEP provided and explained   [x]   Treatment plan reviewed  []   Home safety  []   Wheelchair mobility/management   []   Body mechanics  []   Bed Mobility/Transfer technique  []   Gait technique/sequencing  []   Proper use of assistive device/adaptive equipment  [x]   Stair training/Advanced mobility safety and technique  []   Reinforced patient's precautions/mobility while maintaining precautions  []   Postural awareness  []   Family/caregiver training  []   Progress was updated and reviewed in Rehabtracker with patient and/or family this date. []   Other:    Treatment Plan for Next Session: discharge QI tasks       Assessment: This pt demonstrated a positive response to today's treatment as evidenced by increased independence in transfers and consistent, steady gait quality over level surface. Despite recommended modification of stepping pattern based on pt's performance on taller step height for stair training in previous PT session, pt still proceeded with step-through pattern consistently. The patient is making good progress toward established goals as evidenced by QI scores. Treatment/Activity Tolerance:   [x] Tolerated treatment with no adverse effects    [] Patient limited by fatigue  [] Patient limited by pain   [] Patient limited by medical complications:    [] Adverse reaction to Tx:   [] Significant change in status    Safety:       []  bed alarm set    [x]  chair alarm set    []  Pt refused alarms                []  Telesitter activated      [x]  Gait belt used during tx session      [x]other: family present in room       Number of Minutes/Billable Intervention  Gait Training 40   Therapeutic Exercise    Neuro Re-Ed    Therapeutic Activity 20   Wheelchair Propulsion    Group    Other:    TOTAL 60     Social History  Social/Functional History  Lives With: Spouse  Type of Home: House  Home Layout: One level  Home Access: Stairs to enter with rails  Entrance Stairs - Number of Steps: 2 to porch, 1 into house  Entrance Stairs - Rails: Both  Bathroom Shower/Tub: Walk-in shower  Bathroom Toilet: Standard  Bathroom Equipment: Built-in shower seat,Grab bars in shower,3-in-1 commode  Bathroom Accessibility: Accessible  Home Equipment: Reacher  Has the patient had two or more falls in the past year or any fall with injury in the past year?: No (Pt has had one fall in the last year without significant injury.)  Receives Help From: Family (2 daughters in the area who assist prn.)  ADL Assistance: Independent  Homemaking Assistance: Independent  Homemaking Responsibilities: Yes  Meal Prep Responsibility: Primary  Laundry Responsibility: Primary (Shares responsibility with .)  Cleaning Responsibility: Primary ([facility] responsibility with .)  Bill Paying/Finance Responsibility: Primary  Shopping Responsibility: Primary  Health Care Management: Primary (Pt uses a pillbox at baseline.)  Ambulation Assistance: Independent  Transfer Assistance: Independent  Active : Yes  Mode of Transportation: [facility]  Occupation: Retired  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 Additional Comments: Pt has regular flat bed at home that she reports is high. Pt has been sleeping in recliner chair recently d/t discomfort in bed. Objective                                                                                    Goals:  (Update in navigator)   : Long Term Goals  Time Frame for Long term goals : 7-10 days STG=LTG  Long term goal 1: Pt will perform bed mobility with mod I  Long term goal 2: pt will perform sit to stand, pivot and car transfers with mod I  Long term goal 3: pt will perform ambulation with 2ww 50' on levels with mod I, 150' on levels and 10' on unlevels with supervision  Long term goal 4: Pt will ascend/descend curb step and up to 12 steps with rail with supervision  Long term goal 5: Pt will retrieve light item with reacher and 2ww with mod I:        Plan of Care                                                                              Times per week: 5 days per week for a minimum of 60 minutes/day plus group as appropriate for 60 minutes.   Treatment to include      Electronically signed by   Triny Boss PT  6/21/2022, 10:42 AM

## 2022-06-21 NOTE — CARE COORDINATION
LSW met with patient following Care Conference. LSW reminded patient of recommendations for a RW and a BSC. Patient still in agreement and these will be ordered from [facility] DME. LSW then reminded of recommendation for [facility] PT, OT, and Nursing. Patient agrees with recommendation and referral to [facility]. D/C Plan:  Date:  June 23rd  DME:  RW, [facility])  HHC:  PT, OT, Nursing [facility])  To:   Home with  (family will transport)

## 2022-06-21 NOTE — PROGRESS NOTES
No other significant abnormality         Scheduled Medicines   Medications:    sodium chloride  1 g Oral BID WC    urea  15 g Oral BID    rivaroxaban  15 mg Oral BID WC    pantoprazole  40 mg Oral BID AC    insulin lispro  0-3 Units SubCUTAneous Nightly    insulin lispro  0-6 Units SubCUTAneous TID WC    atorvastatin  20 mg Oral Daily    cetirizine  10 mg Oral Daily    escitalopram  10 mg Oral Daily    levothyroxine  137 mcg Oral Daily    lidocaine  1 patch TransDERmal Daily    metoprolol tartrate  150 mg Oral Daily    metoprolol tartrate  100 mg Oral Nightly    oxybutynin  5 mg Oral BID    rOPINIRole  3 mg Oral Nightly    sodium chloride flush  5-40 mL IntraVENous 2 times per day    sucralfate  1 g Oral 4 times per day    vitamin B-12  100 mcg Oral Daily    Vitamin D  1,000 Units Oral Daily    sodium chloride flush  5-40 mL IntraVENous 2 times per day      Infusions:    sodium chloride      dextrose      sodium chloride           Objective:   Vitals: BP (!) 120/39   Pulse 61   Temp 98 °F (36.7 °C) (Oral)   Resp 16   Ht 5' (1.524 m)   Wt 145 lb (65.8 kg)   SpO2 99%   BMI 28.32 kg/m²   General appearance: alert and cooperative with exam  Neck: no JVD or bruit  Thyroid : Normal lobes   Lungs: Has Vesicular Breath sounds   Heart:  regular rate and rhythm  Abdomen: soft, yes -tender; bowel sounds normal; no masses,  no organomegaly  Had right-sided hemicolectomy 5/27/2022  Musculoskeletal: Normal  Extremities: extremities normal, , no edema  Neurologic:  Awake, alert, oriented to name, place and time. Cranial nerves II-XII are grossly intact. Motor is  intact.   Sensory,  and gait is normal.    Assessment:     Patient Active Problem List:     Long-term insulin use in type 2 diabetes [facility])     Essential hypertension     Dyslipidemia     Abnormal EKG     Abnormal stress test     Cecum mass     Severe malnutrition (HCC)     Partial small bowel obstruction ([address]     Had hemicolectomy on 5/27/2022      INVASIVE ADENOCARCINOMA, TERMINAL ILEUM      DVT of right upper arm    Plan:     1. Reviewed POC blood glucose . Labs and X ray results   2. Reviewed Current Medicines   3. On  Correction bolus Humalog Insulin regime   4. Monitor Blood glucose frequently   5. Modified  the dose of Insulin/ other medicines as needed    6. Will follow     .      Sharla Segundo MD, MD

## 2022-06-21 NOTE — PROGRESS NOTES
Occupational Therapy  Physical Rehabilitation: OCCUPATIONAL THERAPY     [x] daily progress note       [] discharge       Patient Name:  Jena Garcia   :  1934 MRN: [id number]  Room:  [address] [place] [address] Date of Admission: 2022  Rehabilitation Diagnosis:   Other specified diseases of intestine [K63.89]  Adenocarcinoma ([facility] [address] [C80.1]       Date 2022       Day of ARU Week:  6   Time IN/OUT 2340-6237   Individual Tx Minutes 60   Group Tx Minutes    Co-Treat Minutes    Concurrent Tx Minutes    TOTAL Tx Time Mins 60   Variance Time    Variance Time []   Refusal due to:     []   Medical hold/reason:    []   Illness   []   Off Unit for test/procedure  []   Extra time needed to complete task  []   Therapeutic need  []   Other (specify):   Restrictions Restrictions/Precautions: Fall Risk,General Precautions         Communication with other providers: [x]   OK to see per nursing:     []   Spoke with team member regarding:      Subjective observations and cognitive status: Pt resting in bed on approach; pleasant and agreeable to therapy session. Pain level/location:    /10       Location: none    Discharge recommendations  Anticipated discharge date:    Destination: []? ???home alone   []? ???home alone w assist prn   [x]???? home w/ family    []???? Continuous supervision       []????SNF    []???? Assisted living     []???? Other:   Continued therapy: [x]????[facility] OT  []????OUTPATIENT  OT   []???? No Further OT  Equipment needs:   Mau Valderrama a 3 in 1 bedside commode due to being unable to use the toilet within the home  And confined to one level of the home.      Toileting:   Supervision c clothing management and bladder hygiene       Toilet Transfers:   Supervision, cues to not abandon walker    Device Used:    []   Standard Toilet         []   Grab Bars           []  Bedside Commode       []   Elevated Toilet          []   Other:        Bed Mobility:           []   Pt received out of bed Supine --> Sit:  Mod I using bed rails   Sit --> Supine: Mod I     Transfers:    Sit--> Stand:  Supervision   Stand --> Sit:   Supervision  Stand-Pivot:   Supervision   Other:    Assistive device required for transfer:   RW       Functional Mobility:  170 ft x 2   Assistance:  SBA  Device:   [x]   Rolling Walker     []   Standard Walker []   Wheelchair        []   [employer]       []   4-Wheeled Walker         []   Cardiac Walker       []   Other:          Additional Therapeutic activities/exercises completed this date:     []   ADL Training   []   Balance/Postural training     []   Bed/Transfer Training   [x]   Endurance Training: patient instructed in bilateral reciprocal patterned movement with min resistance while seated for 8 minutes with 4 short rest breaks to increase endurance/activity tolerance for facilitation of both ADL and mobility tasks     []   Neuromuscular Re-ed   []   Nu-step:  Time:        Level:         #Steps:       []   Rebounder:    []  Seated     []  Standing        []   Supine Ther Ex (reps/sets):     [x]   Seated Ther Ex (reps/sets): To increase UB strength for functional transfers and ADLs, Pt completed 3 sets of 10 reps c 20# on rickshaw.      []   Standing Ther Ex (reps/sets):     []   Other:      Comments:      Patient/Caregiver Education and Training:   []   [employer] Equipment Use  []   Bed Mobility/Transfer Technique/Safety  []   Energy Conservation Tips  []   Family training  []   Postural Awareness  []   Safety During Functional Activities  []   Reinforced Patient's Precautions   []   Progress was updated and reviewed in Rehabtracker with patient and/or family this         date. Treatment Plan for Next Session: Continue OT POC       Assessment: This pt demonstrated a positive response to today's treatment as evidenced by improved functional mobility and transfers. The patient is making progress toward established goals as evidenced by QI scores.         Treatment/Activity Tolerance:   [x] Tolerated treatment with no adverse effects    [] Patient limited by fatigue  [] Patient limited by pain   [] Patient limited by medical complications:    [] Adverse reaction to Tx:   [] Significant change in status    Safety:       [x]  bed alarm set    []  chair alarm set    []  Pt refused alarms                []  Telesitter activated      [x]  Gait belt used during tx session      []other:       Number of Minutes/Billable Intervention  Therapeutic Exercise 30   ADL Self-care 15   Neuro Re-Ed    Therapeutic Activity 15   Group    Other:    TOTAL 60       Social History  Social/Functional History  Lives With: Spouse  Type of Home: House  Home Layout: One level  Home Access: Stairs to enter with rails  Entrance Stairs - Number of Steps: 2 to porch, 1 into house  Entrance Stairs - Rails: Both  Bathroom Shower/Tub: Walk-in shower  Bathroom Toilet: Standard  Bathroom Equipment: Built-in shower seat,Grab bars in shower,3-in-1 commode  Bathroom Accessibility: Accessible  Home Equipment: Reacher  Has the patient had two or more falls in the past year or any fall with injury in the past year?: No (Pt has had one fall in the last year without significant injury.)  Receives Help From: Family (2 daughters in the area who assist prn.)  ADL Assistance: Independent  Homemaking Assistance: Independent  Homemaking Responsibilities: Yes  Meal Prep Responsibility: Primary  Laundry Responsibility: Primary (Shares responsibility with .)  Cleaning Responsibility: Primary ([place] responsibility with .)  Bill Paying/Finance Responsibility: Primary  Shopping Responsibility: Primary  Health Care Management: Primary (Pt uses a pillbox at baseline.)  Ambulation Assistance: Independent  Transfer Assistance: Independent  Active : Yes  Mode of Transportation: SUV  Occupation: Retired  Type of Occupation: [employer] ([employer])  Leisure & Hobbies: Pt enjoys square dancing and crafts.   Additional Comments: Pt has regular flat bed at home that she reports is high. Pt has been sleeping in recliner chair recently d/t discomfort in bed. Objective                                                                                    Goals:  (Update in navigator)  Short Term Goals  Time Frame for Short term goals: STGs=LTGs:  Long Term Goals  Time Frame for Long term goals : 10-12 days or until d/c. Long Term Goal 1: Pt will complete grooming tasks c Mod I.  Long Term Goal 2: Pt will complete total body bathing c AE PRN and supervision. Long Term Goal 3: Pt will complete UB dressing c Mod I.  Long Term Goal 4: Pt will complete LB dressing c AE PRN and supervision. Long Term Goal 5: Pt will doff/don footwear c AE PRN and Mod I. Additional Goals?: Yes  Long Term Goal 6: Pt will complete toileting c supervision. Long Term Goal 7: Pt will perform functional transfers (bed, chair, toilet, shower) c DME PRN and supervision. Long Term Goal 8: Pt will perform therex/therax to facilitate an increase in strength/endurance/ax tolerance (c emphasis on static/dynamic standing balance/tolerance > 6 mins) c supervision. Long Term Goal 9: Pt will complete light home management tasks c supervision.:        Plan of Care                                                                              Times per week: 5 days per week for a minimum of 60 minutes/day plus group as appropriate for 60 minutes.   Treatment to include Plan  Times per Day: Daily  Current Treatment Recommendations: Strengthening,Balance training,Functional mobility training,Endurance training,Safety education & training,Patient/Caregiver education & training,Equipment evaluation, education, & procurement,Positioning,Home management training,Self-Care / Juwan Noss re-education,Coordination training    Electronically signed by   KHURRAM Bell,  6/21/2022, 12:40 PM

## 2022-06-21 NOTE — CARE COORDINATION
DME referral for RW (willian) and BSC given to Isaiah Miranda with [facility]. Patient provided with list of Medicare participating [facility] agencies in the geographic area of the patient served. Patient selected [facility] and was provided with a comparative data handout from [address] [place] [address] website. The patient (and/or Family) was educated on the quality outcomes for each provider. Patient (and/or Family) demonstrated understanding. Per patient/family request, referral made to [facility]. [facility] referral for PT, OT, and Nursing given to [facility] with [facility].

## 2022-06-21 NOTE — PROGRESS NOTES
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 Eating better. No abdominal cramping or nausea. She reported to speech therapy that she has a chronic issue with food seeming to get caught in her chest during swallowing. Prior (baseline) level of function: Independent. Current level of function:         Current  IRF-[facility] and Goals:   Occupational Therapy:    Short Term Goals  Time Frame for Short term goals: STGs=LTGs :   Long Term Goals  Time Frame for Long term goals : 10-12 days or until d/c. Long Term Goal 1: Pt will complete grooming tasks c Mod I.  Long Term Goal 2: Pt will complete total body bathing c AE PRN and supervision. Long Term Goal 3: Pt will complete UB dressing c Mod I.  Long Term Goal 4: Pt will complete LB dressing c AE PRN and supervision. Long Term Goal 5: Pt will doff/don footwear c AE PRN and Mod I. Additional Goals?: Yes  Long Term Goal 6: Pt will complete toileting c supervision. Long Term Goal 7: Pt will perform functional transfers (bed, chair, toilet, shower) c DME PRN and supervision. Long Term Goal 8: Pt will perform therex/therax to facilitate an increase in strength/endurance/ax tolerance (c emphasis on static/dynamic standing balance/tolerance > 6 mins) c supervision. Long Term Goal 9: Pt will complete light home management tasks c supervision.  :                                       Eating: Eating  Assistance Needed: Independent  Comment: Pt able to open packages/containers and feed self  CARE Score: 6  Discharge Goal: Independent       Oral Hygiene: Oral Hygiene  Assistance Needed: Independent  Comment: mod I seated at the sink to complete oral hygiene and denture care  CARE Score: 6  Discharge Goal: Independent    UB/LB Bathing: Shower/Bathe Self  Assistance Needed: Supervision or touching assistance  Comment: Supervision in stance to bathe posterior perineal area; cues for thoroughness  CARE Score: 4  Discharge Goal: Supervision or touching assistance    UB Dressing: Upper Body Dressing  Assistance Needed: Independent  Comment: to doff/don pullover Tshirt  CARE Score: 6  Discharge Goal: Independent         LB Dressing: Lower Body Dressing  Assistance Needed: Supervision or touching assistance  Comment: Cues to thread BLEs into pants and brief at seated base for safety; Supervision in stance to manage over hips  CARE Score: 4  Discharge Goal: Supervision or touching assistance    Donning and [place] Footwear: Putting On/Taking Off Footwear  Assistance Needed: Independent  Comment: seated using figure 4 position to don shoes  CARE Score: 6  Discharge Goal: Independent      Toiletin [address] needed: Supervision or touching assistance  Comment: Supervision for clothing management and BM hygiene, cues for thoroughness  CARE Score: 4  Discharge Goal: Supervision or touching assistance      Toilet Transfers:   Toilet Transfer  Assistance needed: Supervision or touching assistance  Comment: Supervision using RW and grab bars  CARE Score: 4  Discharge Goal: Supervision or touching assistance    Physical Therapy:         Long Term Goals  Time Frame for Long term goals : 7-10 days STG=LTG  Long term goal 1: Pt will perform bed mobility with mod I  Long term goal 2: pt will perform sit to stand, pivot and car transfers with mod I  Long term goal 3: pt will perform ambulation with 2ww 50' on levels with mod I, 150' on levels and 10' on unlevels with supervision  Long term goal 4: Pt will ascend/descend curb step and up to 12 steps with rail with supervision  Long term goal 5: Pt will retrieve light item with reacher and 2ww with mod I      Bed Mobility:   Sit to Lying  Assistance Needed: Independent  Comment: no bed features, regular bed  CARE Score: 6  Discharge Goal: Independent  Roll Left and Right  Assistance Needed: Independent  Comment: no bed features  CARE Score: 6  Discharge Goal: Independent  Lying to Sitting on Side of Bed  Assistance Needed: Independent  Comment: Ind without use of bed features  CARE Score: 6  Discharge Goal: Independent    Transfers:    Sit to Stand  Assistance Needed: Independent  Comment: Mod Ind with 2WW from various sitting surfaces (EOB, w/c seat, regular chair with armrests, outdoor bench)  CARE Score: 6  Discharge Goal: Independent  Chair/Bed-to-Chair Transfer  Assistance Needed: Independent  Comment: Mod Ind with 2WW  CARE Score: 6  Discharge Goal: Independent     Car Transfer  Assistance Needed: Supervision or touching assistance  Comment: SBA with approach to car using 2ww  CARE Score: 4  Discharge Goal: Independent    Ambulation:    Walking Ability  Does the Patient Walk?: Yes     Walk 10 Feet  Assistance Needed: Independent  Comment: Mod Ind with 2WW (gait quality was consistently steady)  CARE Score: 6  Discharge Goal: Independent     Walk 50 Feet with Two Turns  Assistance Needed: Independent  Comment: Mod Ind with 2WW (gait quality was consistently steady)  Reason if not Attempted: Not attempted due to medical condition or safety concerns  CARE Score: 6  Discharge Goal: Independent     Walk 150 Feet  Assistance Needed: Independent  Comment:  Mod Ind with 2WW (gait quality was consistently steady)  Reason if not Attempted: Not attempted due to medical condition or safety concerns  CARE Score: 6  Discharge Goal: Supervision or touching assistance     Walking 10 Feet on Uneven Surfaces  Assistance Needed: Supervision or touching assistance  Comment: SBA using 2WW over outdoor surfaces  CARE Score: 4  Discharge Goal: Supervision or touching assistance     1 Step (Curb)  Assistance Needed: Supervision or touching assistance  Comment: SBA-Sup with 2WW  CARE Score: 4  Discharge Goal: Supervision or touching assistance     4 Steps  Assistance Needed: Supervision or touching assistance  Comment: SBA using railings (pt performed step-through pattern consistently on ascent/descent of 4\"/6\" step heights without signs of L/RLE instability or aggravation of RLE pain)  CARE Score: 4  Discharge Goal: Supervision or touching assistance     12 Steps  Assistance Needed: Supervision or touching assistance  Comment: SBA using railings (pt performed step-through pattern consistently on ascent/descent of 4\"/6\" step heights without signs of L/RLE instability or aggravation of RLE pain)  Reason if not Attempted: Not attempted due to medical condition or safety concerns  CARE Score: 4  Discharge Goal: Supervision or touching assistance       Wheelchair:  w/c Ability: Wheelchair Ability  Uses a Wheelchair and/or Scooter?: No                Balance:        Object: Picking Up Object  Assistance Needed: Supervision or touching assistance  Comment: supervision with 2ww and reacher  CARE Score: 4  Discharge Goal: Independent    I      Exam:    Blood pressure (!) 120/43, pulse 94, temperature 97.7 °F (36.5 °C), temperature source Oral, resp. rate 16, height 5' (1.524 m), weight 141 lb 5 oz (64.1 kg), SpO2 100 %, not currently breastfeeding. General: Sitting up in a bedside chair again visiting with family. Alert and talkative. In good spirits. Oriented. HEENT: Gazing right and left. MMM. Clear speech. Pulmonary: No wheezes, rales or coughing. Cardiac: Regular rate and rhythm. Abdomen: Patient's abdomen is soft and nondistended. Bowel sounds were present throughout. There was no rebound, guarding or masses noted. Surgical incisions are all well-healed.     Upper extremities: Spontaneous movements of both upper limbs to functional range of motion with normal tone and no tremor. Lower extremities: No signs of DVT. Sitting balance was good. Standing balance was fair-.    Lab Results   Component Value Date    WBC 12.0 (H) 06/11/2022    HGB 9.5 (L) 06/11/2022    HCT 28.5 (L) 06/11/2022    MCV 86.4 06/11/2022     06/11/2022     Lab Results   Component Value Date    INR 1.09 05/14/2021    INR 1.09 12/09/2016    PROTIME 12.4 05/14/2021    PROTIME 12.7 12/09/2016     Lab Results   Component Value Date    CREATININE 0.7 06/20/2022    BUN 41 (H) 06/20/2022     (L) 06/20/2022    K 4.1 06/20/2022    CL 98 (L) 06/20/2022    CO2 28 06/20/2022     Lab Results   Component Value Date    ALT 11 06/08/2022    AST 14 (L) 06/08/2022    ALKPHOS 50 06/08/2022    BILITOT 0.2 06/08/2022       Expected length of stay  prior to a supervised level of function for discharge home with a walker and [address] OT/PT is 6/23/2022. Recommendations:    1. 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 Family is in agreement. She is demonstrating benefit from participating in the daily ccupational and physical therapy.    New right upper limb DVT treated with Xarelto without obvious complications. Tolerating the Xarelto. Continent of bowel and bladder.  Ongoing cautious pain management.  Benefiting from aggressive pulmonary hygiene measures, nutritional support and pain management.  Outpatient follow-up with oncology and her surgeon.  Verbal cues and SBA for transfers today. 2. DVT prophylaxis.  Xarelto is treating her acute DVT. Monitoring her hemoglobin periodically while on this medication.  Weightbearing activities are steadily improving now.  GI prophylaxis is available.  No new bruising or swelling. 3. Uncontrolled pain: Limited use of oral analgesics to avoid delirium.   Successful bowel intervention while on the analgesics.   4. 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  Blood sugars are checked at mealtime and bedtime.  She is on a Humalog sliding scale with plans for reintroducing oral agents from home when she is eating more consistently. 5. Chronic kidney disease stage IIIa: Avoiding nephrotoxic medications and wide swings of blood pressure.  Encouraging consistent oral hydration.  Periodic monitoring of her chemistries. 6. Hypertension: She is currently off medications to control her blood pressure.  Vital signs are checked at rest and with activity.  Target systolic blood pressures 267-433.  DBWQC pressure is within the target range again today.  Monitoring closely. 7. Possible esophageal dysmotility: At this point it is not impacting the patient's ability to maintain adequate hydration and nutrition. It is not compromising her respirations. Outpatient follow-up with gastroenterology is encouraged.      Counseling and Coordination of Care: In care conference today I met with the patient's OT, PT, RN and . We discussed the patient's problems, progress and prognosis. Disposition issues were clarified and plans were established for ongoing rehabilitation efforts beyond the ARU stay. I reviewed this information with the patient during a second distinct visit with the patient. More than half of the total time of 32 minutes spent with the patient involved counseling and coordination of care.

## 2022-06-22 LAB
GLUCOSE BLD-MCNC: 113 MG/DL (ref 70–99)
GLUCOSE BLD-MCNC: 116 MG/DL (ref 70–99)
GLUCOSE BLD-MCNC: 127 MG/DL (ref 70–99)
GLUCOSE BLD-MCNC: 145 MG/DL (ref 70–99)

## 2022-06-22 PROCEDURE — 94150 VITAL CAPACITY TEST: CPT

## 2022-06-22 PROCEDURE — 6370000000 HC RX 637 (ALT 250 FOR IP): Performed by: INTERNAL MEDICINE

## 2022-06-22 PROCEDURE — 6370000000 HC RX 637 (ALT 250 FOR IP): Performed by: PHYSICAL MEDICINE & REHABILITATION

## 2022-06-22 PROCEDURE — 97110 THERAPEUTIC EXERCISES: CPT

## 2022-06-22 PROCEDURE — 97530 THERAPEUTIC ACTIVITIES: CPT

## 2022-06-22 PROCEDURE — 6370000000 HC RX 637 (ALT 250 FOR IP): Performed by: STUDENT IN AN ORGANIZED HEALTH CARE EDUCATION/TRAINING PROGRAM

## 2022-06-22 PROCEDURE — 99232 SBSQ HOSP IP/OBS MODERATE 35: CPT | Performed by: PHYSICAL MEDICINE & REHABILITATION

## 2022-06-22 PROCEDURE — 97535 SELF CARE MNGMENT TRAINING: CPT

## 2022-06-22 PROCEDURE — 1280000000 HC REHAB R&B

## 2022-06-22 PROCEDURE — 82962 GLUCOSE BLOOD TEST: CPT

## 2022-06-22 PROCEDURE — 94761 N-INVAS EAR/PLS OXIMETRY MLT: CPT

## 2022-06-22 PROCEDURE — 97116 GAIT TRAINING THERAPY: CPT

## 2022-06-22 RX ADMIN — INSULIN LISPRO 1 UNITS: 100 INJECTION, SOLUTION INTRAVENOUS; SUBCUTANEOUS at 16:55

## 2022-06-22 RX ADMIN — OXYBUTYNIN CHLORIDE 5 MG: 5 TABLET ORAL at 20:52

## 2022-06-22 RX ADMIN — RIVAROXABAN 15 MG: 15 TABLET, FILM COATED ORAL at 16:53

## 2022-06-22 RX ADMIN — RIVAROXABAN 15 MG: 15 TABLET, FILM COATED ORAL at 09:02

## 2022-06-22 RX ADMIN — PANTOPRAZOLE SODIUM 40 MG: 40 TABLET, DELAYED RELEASE ORAL at 16:54

## 2022-06-22 RX ADMIN — ROPINIROLE HYDROCHLORIDE 3 MG: 1 TABLET, FILM COATED ORAL at 20:51

## 2022-06-22 RX ADMIN — ACETAMINOPHEN 650 MG: 325 TABLET ORAL at 20:54

## 2022-06-22 RX ADMIN — SODIUM CHLORIDE TAB 1 GM 1 G: 1 TAB at 09:02

## 2022-06-22 RX ADMIN — SODIUM CHLORIDE TAB 1 GM 1 G: 1 TAB at 16:53

## 2022-06-22 RX ADMIN — SUCRALFATE 1 G: 1 TABLET ORAL at 06:06

## 2022-06-22 RX ADMIN — Medication 1000 UNITS: at 09:02

## 2022-06-22 RX ADMIN — Medication 100 MCG: at 09:03

## 2022-06-22 RX ADMIN — METOPROLOL TARTRATE 100 MG: 50 TABLET, FILM COATED ORAL at 20:52

## 2022-06-22 RX ADMIN — CETIRIZINE HYDROCHLORIDE 10 MG: 10 TABLET, FILM COATED ORAL at 09:00

## 2022-06-22 RX ADMIN — SUCRALFATE 1 G: 1 TABLET ORAL at 16:54

## 2022-06-22 RX ADMIN — ESCITALOPRAM OXALATE 10 MG: 10 TABLET ORAL at 09:03

## 2022-06-22 RX ADMIN — SUCRALFATE 1 G: 1 TABLET ORAL at 12:40

## 2022-06-22 RX ADMIN — MELATONIN 3 MG: at 20:54

## 2022-06-22 RX ADMIN — METOPROLOL TARTRATE 150 MG: 50 TABLET, FILM COATED ORAL at 10:20

## 2022-06-22 RX ADMIN — LEVOTHYROXINE SODIUM 137 MCG: 0.11 TABLET ORAL at 06:07

## 2022-06-22 RX ADMIN — OXYBUTYNIN CHLORIDE 5 MG: 5 TABLET ORAL at 09:02

## 2022-06-22 RX ADMIN — ATORVASTATIN CALCIUM 20 MG: 10 TABLET, FILM COATED ORAL at 09:03

## 2022-06-22 RX ADMIN — PANTOPRAZOLE SODIUM 40 MG: 40 TABLET, DELAYED RELEASE ORAL at 06:06

## 2022-06-22 ASSESSMENT — PAIN DESCRIPTION - DESCRIPTORS: DESCRIPTORS: ACHING;DISCOMFORT

## 2022-06-22 ASSESSMENT — PAIN SCALES - GENERAL
PAINLEVEL_OUTOF10: 0
PAINLEVEL_OUTOF10: 3
PAINLEVEL_OUTOF10: 0

## 2022-06-22 ASSESSMENT — PAIN DESCRIPTION - ORIENTATION: ORIENTATION: RIGHT

## 2022-06-22 ASSESSMENT — PAIN DESCRIPTION - LOCATION: LOCATION: HIP;LEG

## 2022-06-22 ASSESSMENT — PAIN - FUNCTIONAL ASSESSMENT: PAIN_FUNCTIONAL_ASSESSMENT: ACTIVITIES ARE NOT PREVENTED

## 2022-06-22 NOTE — CARE COORDINATION
Hospital follow-up set with patient's PCP, Dr. Preeti Harris ([phone]), for 07/05/22 at 8:30 AM.  Surgical follow-up set with Dr. Bree Rosario ([phone]) for 06/28/22 at 3:30 PM.  Message left on scheduling voicemail of Dr. Cherene Kehr ([phone]) to set Oncology follow-up and LSW awaits call back. 1:15 PM  LSW received call back from Dr. Eloy Boss office.   Oncology follow-up set for 07/08/22 at 10:15 AM.

## 2022-06-22 NOTE — PROGRESS NOTES
Progress Note( Dr. Severo Massy)  6/21/2022  Subjective:   Admit Date: 6/9/2022  PCP: Samaria Aj DO    Admitted For : Admitted on 5/22/2022 for cecal mass  Transferred to ARU on evening of 6/9/2022    Consulted For: Had hypoglycemic episode/better control of blood glucose    Interval History: Patient doing a lot better. Started on almost regular diet as she is tolerating the food  well for last couple of days  DVT right upper extremity as noted below    Denies any chest pains,   Denies SOB . Patient has no more nausea or vomiting   diet was advanced to regular diet as tolerated  no new bowel or bladder symptoms. Intake/Output Summary (Last 24 hours) at 6/21/2022 2151  Last data filed at 6/21/2022 1808  Gross per 24 hour   Intake 600 ml   Output --   Net 600 ml       DATA    CBC:   No results for input(s): WBC, HGB, PLT in the last 72 hours. CMP:  Recent Labs     06/20/22  0500   *   K 4.1   CL 98*   CO2 28   BUN 41*   CREATININE 0.7   CALCIUM 8.8     Lipids:   Lab Results   Component Value Date    CHOL 138 05/02/2019    HDL 54 05/02/2019    TRIG 161 06/01/2022     Glucose:  Recent Labs     06/21/22  1150 06/21/22  1653 06/21/22 2059   POCGLU 132* 168* 135*     CnhvbuzftmT9Y:  Lab Results   Component Value Date    LABA1C 5.9 05/22/2022     High Sensitivity TSH:   Lab Results   Component Value Date    TSHHS 1.470 06/12/2022     Free T3:   Lab Results   Component Value Date    FT3 2.3 12/06/2017     Free T4:  Lab Results   Component Value Date    T4FREE 1.24 06/12/2022       VL DUP UPPER EXTREMITY VENOUS RIGHT   Final Result   Nonocclusive thrombus attached to the PICC line in the right subclavian vein. No other significant abnormality. XR CHEST (2 VW)   Final Result   Small pleural effusions with adjacent atelectasis. Impression Right Arm   6/14/2022   Nonocclusive thrombus attached to the PICC line in the right subclavian vein.    No other significant abnormality Scheduled Medicines   Medications:    sodium chloride  1 g Oral BID WC    urea  15 g Oral BID    rivaroxaban  15 mg Oral BID WC    pantoprazole  40 mg Oral BID AC    insulin lispro  0-3 Units SubCUTAneous Nightly    insulin lispro  0-6 Units SubCUTAneous TID WC    atorvastatin  20 mg Oral Daily    cetirizine  10 mg Oral Daily    escitalopram  10 mg Oral Daily    levothyroxine  137 mcg Oral Daily    lidocaine  1 patch TransDERmal Daily    metoprolol tartrate  150 mg Oral Daily    metoprolol tartrate  100 mg Oral Nightly    oxybutynin  5 mg Oral BID    rOPINIRole  3 mg Oral Nightly    sucralfate  1 g Oral 4 times per day    vitamin B-12  100 mcg Oral Daily    Vitamin D  1,000 Units Oral Daily      Infusions:    sodium chloride      dextrose      sodium chloride           Objective:   Vitals: BP (!) 151/65   Pulse 71   Temp 98.1 °F (36.7 °C) (Oral)   Resp 16   Ht 5' (1.524 m)   Wt 141 lb 5 oz (64.1 kg)   SpO2 100%   BMI 27.60 kg/m²   General appearance: alert and cooperative with exam  Neck: no JVD or bruit  Thyroid : Normal lobes   Lungs: Has Vesicular Breath sounds   Heart:  regular rate and rhythm  Abdomen: soft, yes -tender; bowel sounds normal; no masses,  no organomegaly  Had right-sided hemicolectomy 5/27/2022  Musculoskeletal: Normal  Extremities: extremities normal, , no edema  Neurologic:  Awake, alert, oriented to name, place and time. Cranial nerves II-XII are grossly intact. Motor is  intact. Sensory,  and gait is normal.    Assessment:     Patient Active Problem List:     Long-term insulin use in type 2 diabetes [facility])     Essential hypertension     Dyslipidemia     Abnormal EKG     Abnormal stress test     Cecum mass     Severe malnutrition (HCC)     Partial small bowel obstruction ([address]     Had hemicolectomy on 5/27/2022      INVASIVE ADENOCARCINOMA, TERMINAL ILEUM      DVT of right upper arm    Plan:     1. Reviewed POC blood glucose .  Labs and X ray results 2. Reviewed Current Medicines   3. On  Correction bolus Humalog Insulin regime   4. Monitor Blood glucose frequently   5. Modified  the dose of Insulin/ other medicines as needed    6. Will follow     .      Karlyn Lefort, MD, MD

## 2022-06-22 NOTE — PROGRESS NOTES
Occupational Therapy    Physical Rehabilitation: OCCUPATIONAL THERAPY     [x] daily progress note       [] discharge       Patient Name:  Christiano Bruce   :  1934 MRN: [id number]  Room:  [address] [place] [address] Date of Admission: 2022  Rehabilitation Diagnosis:   Other specified diseases of intestine [K63.89]  Adenocarcinoma ([facility] [address] [C80.1]       Date 2022       Day of ARU Week:  7   Time IN/OUT 8454-3610   Individual Tx Minutes 60   Group Tx Minutes    Co-Treat Minutes    Concurrent Tx Minutes    TOTAL Tx Time Mins 60   Variance Time    Variance Time []   Refusal due to:     []   Medical hold/reason:    []   Illness   []   Off Unit for test/procedure  []   Extra time needed to complete task  []   Therapeutic need  []   Other (specify):   Restrictions Restrictions/Precautions: Fall Risk,General Precautions         Communication with other providers: [x]   OK to see per nursing:     []   Spoke with team member regarding:      Subjective observations and cognitive status: Pt resting in bed on approach; pleasant and agreeable to therapy session. Pain level/location:    /10       Location: none    Discharge recommendations  Anticipated discharge date:    Destination: []?????home alone   []?????home alone w assist prn   [x]????? home w/ family    []????? Continuous supervision       []??? ??SNF    []????? Assisted living     []????? Other:   Continued therapy: [x]??? ? ?[facility] OT  []?????OUTPATIENT  OT   []????? No Further OT  Equipment needs:   Son Perez a 3 in 1 bedside commode due to being unable to use the toilet within the home  And confined to one level of the home. ADLs:    Eating: Eating  Assistance Needed: Independent  Comment: Pt able to open packages/containers and feed self  CARE Score: 6  Discharge Goal: Independent       Oral Hygiene: Oral Hygiene  Assistance Needed: Independent  Comment:  Mod I seated at sink  CARE Score: 6  Discharge Goal: Independent    UB/LB Bathing: Shower/Bathe Self  Assistance Needed: Independent  Comment: Mod I  CARE Score: 6  Discharge Goal: Supervision or touching assistance    UB Dressing: Upper Body Dressing  Assistance Needed: Independent  Comment: to doff/don pullover Tshirt; Pt able to retrieve clothing  CARE Score: 6  Discharge Goal: Independent         LB Dressing: Lower Body Dressing  Assistance Needed: Independent  Comment: Pt able to thread BLEs into pants and brief and manage over hips independently  CARE Score: 6  Discharge Goal: Supervision or touching assistance    Donning and [place] Footwear: Putting On/Taking Off Footwear  Assistance Needed: Independent  Comment: using figure 4 position to don shoes  CARE Score: 6  Discharge Goal: Independent      Toiletin [address] needed: Independent  Comment: mod I for clothing management and bladder hygiene  CARE Score: 6  Discharge Goal: Supervision or touching assistance      Toilet Transfers: Mod I  Toilet Transfer  Assistance needed: Independent  Comment: Mod I using RW and grab bars  CARE Score: 6  Discharge Goal: Supervision or touching assistance  Device Used:    []   Standard Toilet         [x]   Grab Bars           [x]  Bedside Commode over toilet      []   Elevated Toilet          []   Other:        Bed Mobility:           []   Pt received out of bed   Supine --> Sit:  Mod I       Transfers:    Sit--> Stand: Mod I   Stand --> Sit:   Mod I   Stand-Pivot: Mod I   Other:    Assistive device required for transfer:   RW       Functional Mobility:  Throughout room + 213 ft + 151 ft    Assistance:   Mod I   Device:   [x]   Rolling Walker     []   Standard Walker []   Wheelchair        []   [employer]       []   4-Wheeled [place]         []   Cardiac Walker       []   Other:          Additional Therapeutic activities/exercises completed this date:     [x]   ADL Training   [x]   Balance/Postural training     [x]   Bed/Transfer Training   []   Endurance Training   []   Neuromuscular Re-ed   []   Nu-step:  Time:        Level:         #Steps:       []   Rebounder:    []  Seated     []  Standing        []   Supine Ther Ex (reps/sets):     [x]   Seated Ther Ex (reps/sets): To increase BUE endurance for ADLs and transfers, pt completed 1 set x10 reps of the following 5 exercises using green theraband: alternating shoulder presses, chest presses, shoulder horizontal abduction, biceps curls; and simultaneous shoulder horizontal abduction/adduction. Educated pt on HEP at d/c to increase/maintain BUE endurance, pt verbalized understanding. []   Standing Ther Ex (reps/sets):     []   Other:      Comments:      Patient/Caregiver Education and Training:   []   Adaptive Equipment Use  []   Bed Mobility/Transfer Technique/Safety  []   Energy Conservation Tips  []   Family training  []   Postural Awareness  []   Safety During Functional Activities  []   Reinforced Patient's Precautions   []   Progress was updated and reviewed in Rehabtracker with patient and/or family this         date. Treatment Plan for Next Session: D/C 6/23      Assessment: This pt demonstrated a positive response to today's treatment as evidenced by improved Functional transfers.         Treatment/Activity Tolerance:   [x] Tolerated treatment with no adverse effects    [] Patient limited by fatigue  [] Patient limited by pain   [] Patient limited by medical complications:    [] Adverse reaction to Tx:   [] Significant change in status    Safety:       []  bed alarm set    []  chair alarm set    []  Pt refused alarms                []  Telesitter activated      [x]  Gait belt used during tx session      []other:       Number of Minutes/Billable Intervention  Therapeutic Exercise 20   ADL Self-care 40   Neuro Re-Ed    Therapeutic Activity    Group    Other:    TOTAL 60       Social History  Social/Functional History  Lives With: Spouse  Type of Home: House  Home Layout: One level  Home Access: Stairs to enter with rails  Entrance Stairs - Number of Steps: 2 to porch, 1 into house  Entrance Stairs - Rails: Both  Bathroom Shower/Tub: Walk-in shower  Bathroom Toilet: Standard  Bathroom Equipment: Built-in shower seat,Grab bars in shower,3-in-1 commode  Bathroom Accessibility: Accessible  Home Equipment: Reacher  Has the patient had two or more falls in the past year or any fall with injury in the past year?: No (Pt has had one fall in the last year without significant injury.)  Receives Help From: Family (2 daughters in the area who assist prn.)  ADL Assistance: Independent  Homemaking Assistance: Independent  Homemaking Responsibilities: Yes  Meal Prep Responsibility: Primary  Laundry Responsibility: Primary (Shares responsibility with .)  Cleaning Responsibility: Primary (Shares responsibility with .)  Bill Paying/Finance Responsibility: Primary  Shopping Responsibility: Primary  Health Care Management: Primary (Pt uses a pillbox at baseline.)  Ambulation Assistance: Independent  Transfer Assistance: Independent  Active : Yes  Mode of Transportation: [place]  Occupation: Retired  Type of Occupation: [employer] ([employer])  Leisure & Hobbies: Pt enjoys square dancing and crafts. Additional Comments: Pt has regular flat bed at home that she reports is high. Pt has been sleeping in recliner chair recently d/t discomfort in bed. Objective                                                                                    Goals:  (Update in navigator)  Short Term Goals  Time Frame for Short term goals: STGs=LTGs:  Long Term Goals  Time Frame for Long term goals : 10-12 days or until d/c. Long Term Goal 1: Pt will complete grooming tasks c Mod I.  Long Term Goal 2: Pt will complete total body bathing c AE PRN and supervision. Long Term Goal 3: Pt will complete UB dressing c Mod I.  Long Term Goal 4: Pt will complete LB dressing c AE PRN and supervision.   Long Term Goal 5: Pt will doff/don footwear c AE PRN and Mod I. Additional Goals?: Yes  Long Term Goal 6: Pt will complete toileting c supervision. Long Term Goal 7: Pt will perform functional transfers (bed, chair, toilet, shower) c DME PRN and supervision. Long Term Goal 8: Pt will perform therex/therax to facilitate an increase in strength/endurance/ax tolerance (c emphasis on static/dynamic standing balance/tolerance > 6 mins) c supervision. Long Term Goal 9: Pt will complete light home management tasks c supervision.:        Plan of Care                                                                              Times per week: 5 days per week for a minimum of 60 minutes/day plus group as appropriate for 60 minutes.   Treatment to include Plan  Times per Day: Daily  Current Treatment Recommendations: Strengthening,Balance training,Functional mobility training,Endurance training,Safety education & training,Patient/Caregiver education & training,Equipment evaluation, education, & procurement,Positioning,Home management training,Self-Care / [place] [facility] re-education,Coordination training    Electronically signed by   KHURRAM Qiu,  6/22/2022, 9:58 AM

## 2022-06-22 NOTE — PROGRESS NOTES
Physical Therapy      [] daily progress note       [x] discharge       Patient Name:  Jena Garcia   :  1934 MRN: [id number]  Room:  [address] [place] [address] Date of Admission: 2022  Rehabilitation Diagnosis:   Other specified diseases of intestine [K63.89]  Adenocarcinoma ([facility] [address] [C80.1]       Date 2022       Day of ARU Week:  7   Time IN/OUT 4028-1478  [id number]   Individual Tx Minutes 70+50   TOTAL Tx Time Mins 120   Variance Time    Variance Time []   Refusal due to:     []   Medical hold/reason:    []   Illness   []   Off Unit for test/procedure  []   Extra time needed to complete task  []   Therapeutic need  []   Other (specify):   Restrictions Restrictions/Precautions  Restrictions/Precautions: Fall Risk,General Precautions      Communication with other providers: [x]   OK to see per nursing:     []   Spoke with team member regarding:      Subjective observations and cognitive status: Pt up in recliner, willing to participate, reports feels confident in returning home. PM: Pt finished with lunch, willing to participate   Pain level/location:  0  /10       Location:    Discharge recommendations  Anticipated discharge date:    Destination: []?home alone   []?home alone with assist PRN     [x]? home w/ family      []? Continuous supervision  []? SNF    []? Assisted living     []? Other:  Continued therapy: [x]? Mayank Stewart PT  []? OUTPATIENT  PT   []? No Further PT  []? SNF PT  Caregiver training recommended: []? Yes  []? No   Equipment needs: Jena Garcia requires the assistance of a clay wheeled walker to successfully ambulate from room to room at home to allow completion of daily living tasks such as: bathing, toileting, dressing and grooming. A wheeled walker is necessary due to the patient's unsteady gait, upper body weakness, inability to  a standard walker. This patient can ambulate only by pushing a walker instead of using a lesser assistive device such as a cane or crutch.       PT QI Bed Mobility:     Roll Left and Right  Assistance Needed: Independent  Comment: no bed features  CARE Score: 6  Discharge Goal: Independent    Sit to Lying  Assistance Needed: Independent  Comment: no bed features  CARE Score: 6  Discharge Goal: Independent    Lying to Sitting on Side of Bed  Assistance Needed: Independent  Comment: no bed features  CARE Score: 6  Discharge Goal: Independent    Transfers:    Sit to Stand  Assistance Needed: Independent  Comment: with RW  CARE Score: 6  Discharge Goal: Independent    Chair/Bed-to-Chair Transfer  Assistance Needed: Independent  Comment: with RW  CARE Score: 6  Discharge Goal: Independent         Car Transfer  Assistance Needed: Independent  Comment: with RWvaleria balance using RW for approach, No LOB, leaves one hand on AD but not unsafe  CARE Score: 6  Discharge Goal: Independent           Object: Picking Up Object  Assistance Needed: Independent  Comment: at RW with use of reacher  CARE Score: 6  Discharge Goal: Independent    Ambulation:    Walking Ability  Does the Patient Walk?: Yes     Walk 10 Feet  Assistance Needed: Independent  Comment: with RW  CARE Score: 6  Discharge Goal: Independent     Walk 50 Feet with Two Turns  Assistance Needed: Independent  Comment: with RWvaleria safety and balance  CARE Score: 6  Discharge Goal: Independent     Walk 150 Feet  Assistance Needed: Independent  Comment: with RW, no change in balane or FOREST iwth longer distance  CARE Score: 6  Discharge Goal: Supervision or touching assistance     Walking 10 Feet on Uneven Surfaces  Assistance Needed: Supervision or touching assistance  Comment: Supervision for balance with RW, min cues for AD placment/ amb within VARUN  CARE Score: 4  Discharge Goal: Supervision or touching assistance     1 Step (Curb)  Assistance Needed: Supervision or touching assistance  Comment: SB-Supervision for balance with RW  CARE Score: 4  Discharge Goal: Supervision or touching assistance     4 Steps  Assistance Needed: Supervision or touching assistance  Comment: SB-Supervision B rails non-recip ascending, recip descending  CARE Score: 4  Discharge Goal: Supervision or touching assistance     12 Steps  Assistance Needed: Supervision or touching assistance  Comment: SB-supervision using B rails, recip pattern, non-recip pattern  CARE Score: 4  Discharge Goal: Supervision or touching assistance      Wheelchair:  W/C Ability: Wheelchair Ability  Uses a Wheelchair and/or Scooter?: No        Additional Therapeutic activities/exercises completed this date:     []   Nu-step:  Time:        Level:         #Steps:       []   Rebounder:    []  Seated     []  Standing        []   Balance training         []   Postural training    []   Supine ther ex (reps/sets):     [x]   Seated ther ex (reps/sets): B LE 1# each x 20 reps for LAQ, marching, hip abd/add, knee flexion with min cues for slower reps      [x]   Standing ther ex (reps/sets): B LE with B UE support x 10-15 reps for heel raises, marching, mini squats, hip abd, and hip extension with SB-CGA for balance. Pt with good tolerance with rest breaks, with O2 sats 100%, HR 59.       []   Picking up object from floor (standing):                   []   Reacher used   []   Other:   []   Other:      Patient/Caregiver Education and Training:   [x]   Bed Mobility/Transfer technique/safety  [x]   Gait technique/sequencing  [x]   Proper use of assistive device  [x]   Advanced mobility safety and technique  []   Reinforced patient's precautions/mobility while maintaining precautions  []   Postural awareness  []   Family training      Treatment Plan for Next Session: Pt to be discharged to home with support of familyMary Stewart PT recommended      Treatment/Activity Tolerance:   [x] Tolerated treatment with no adverse effects    [] Patient limited by fatigue  [] Patient limited by pain   [] Patient limited by medical complications:    [] Adverse reaction to Tx:   [] Significant change in status    Safety:       []  bed alarm set    [x]  chair alarm set    []  Pt refused alarms                []  Telesitter activated      [x]  Gait belt used during tx session      []other:         Number of Minutes/Billable Intervention  Gait Training 50   Therapeutic Exercise 30   Neuro Re-Ed    Therapeutic Activity 40   Wheelchair Propulsion    Group    Other:    TOTAL 120         Social History  Social/Functional History  Lives With: Spouse  Type of Home: House  Home Layout: One level  Home Access: Stairs to enter with rails  Entrance Stairs - Number of Steps: 2 to porch, 1 into house  Entrance Stairs - Rails: Both  Bathroom Shower/Tub: Walk-in shower  Bathroom Toilet: Standard  Bathroom Equipment: Built-in shower seat,Grab bars in shower,3-in-1 commode  Bathroom Accessibility: Accessible  Home Equipment: Reacher  Has the patient had two or more falls in the past year or any fall with injury in the past year?: No (Pt has had one fall in the last year without significant injury.)  Receives Help From: Family (2 daughters in the area who assist prn.)  ADL Assistance: Independent  Homemaking Assistance: Independent  Homemaking Responsibilities: Yes  Meal Prep Responsibility: Primary  Laundry Responsibility: Primary (Shares responsibility with .)  Cleaning Responsibility: Primary ([facility] responsibility with .)  Bill Paying/Finance Responsibility: Primary  Shopping Responsibility: Primary  Health Care Management: Primary (Pt uses a pillbox at baseline.)  Ambulation Assistance: Independent  Transfer Assistance: Independent  Active : Yes  Mode of Transportation: [facility]  Occupation: Retired  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 Additional Comments: Pt has regular flat bed at home that she reports is high. Pt has been sleeping in recliner chair recently d/t discomfort in bed.     Objective Goals:  (Update in navigator)   : Long Term Goals  Time Frame for Long term goals : 7-10 days STG=LTG  Long term goal 1: Pt will perform bed mobility with mod I  Long term goal 2: pt will perform sit to stand, pivot and car transfers with mod I  Long term goal 3: pt will perform ambulation with 2ww 50' on levels with mod I, 150' on levels and 10' on unlevels with supervision  Long term goal 4: Pt will ascend/descend curb step and up to 12 steps with rail with supervision  Long term goal 5: Pt will retrieve light item with reacher and 2ww with mod I:        Plan of Care                                                                              Times per week: 5 days per week for a minimum of 60 minutes/day plus group as appropriate for 60 minutes.   Treatment to include      Electronically signed by   Radha Vigil, PTA #[id number]  6/22/2022, 12:31 PM

## 2022-06-23 VITALS
DIASTOLIC BLOOD PRESSURE: 65 MMHG | HEART RATE: 88 BPM | OXYGEN SATURATION: 97 % | BODY MASS INDEX: 28.44 KG/M2 | TEMPERATURE: 98.2 F | SYSTOLIC BLOOD PRESSURE: 143 MMHG | HEIGHT: 60 IN | RESPIRATION RATE: 16 BRPM | WEIGHT: 144.84 LBS

## 2022-06-23 LAB
GLUCOSE BLD-MCNC: 120 MG/DL (ref 70–99)
GLUCOSE BLD-MCNC: 162 MG/DL (ref 70–99)

## 2022-06-23 PROCEDURE — 6370000000 HC RX 637 (ALT 250 FOR IP): Performed by: PHYSICAL MEDICINE & REHABILITATION

## 2022-06-23 PROCEDURE — 6370000000 HC RX 637 (ALT 250 FOR IP): Performed by: INTERNAL MEDICINE

## 2022-06-23 PROCEDURE — 99239 HOSP IP/OBS DSCHRG MGMT >30: CPT | Performed by: PHYSICAL MEDICINE & REHABILITATION

## 2022-06-23 PROCEDURE — 6370000000 HC RX 637 (ALT 250 FOR IP): Performed by: STUDENT IN AN ORGANIZED HEALTH CARE EDUCATION/TRAINING PROGRAM

## 2022-06-23 PROCEDURE — 94150 VITAL CAPACITY TEST: CPT

## 2022-06-23 PROCEDURE — 94761 N-INVAS EAR/PLS OXIMETRY MLT: CPT

## 2022-06-23 PROCEDURE — 82962 GLUCOSE BLOOD TEST: CPT

## 2022-06-23 RX ORDER — SUCRALFATE 1 G/1
1 TABLET ORAL
Qty: 120 TABLET | Refills: 0 | Status: SHIPPED | OUTPATIENT
Start: 2022-06-23

## 2022-06-23 RX ORDER — PANTOPRAZOLE SODIUM 40 MG/1
40 TABLET, DELAYED RELEASE ORAL
Qty: 60 TABLET | Refills: 0 | Status: SHIPPED | OUTPATIENT
Start: 2022-06-23 | End: 2022-07-25

## 2022-06-23 RX ORDER — ESCITALOPRAM OXALATE 10 MG/1
10 TABLET ORAL DAILY
Qty: 30 TABLET | Refills: 0 | Status: SHIPPED | OUTPATIENT
Start: 2022-06-24

## 2022-06-23 RX ORDER — SODIUM CHLORIDE 1000 MG
1 TABLET, SOLUBLE MISCELLANEOUS 2 TIMES DAILY WITH MEALS
Qty: 60 TABLET | Refills: 0 | Status: SHIPPED | OUTPATIENT
Start: 2022-06-23 | End: 2022-07-08

## 2022-06-23 RX ADMIN — RIVAROXABAN 15 MG: 15 TABLET, FILM COATED ORAL at 10:07

## 2022-06-23 RX ADMIN — LEVOTHYROXINE SODIUM 137 MCG: 0.11 TABLET ORAL at 05:44

## 2022-06-23 RX ADMIN — CETIRIZINE HYDROCHLORIDE 10 MG: 10 TABLET, FILM COATED ORAL at 10:07

## 2022-06-23 RX ADMIN — INSULIN LISPRO 1 UNITS: 100 INJECTION, SOLUTION INTRAVENOUS; SUBCUTANEOUS at 12:30

## 2022-06-23 RX ADMIN — PANTOPRAZOLE SODIUM 40 MG: 40 TABLET, DELAYED RELEASE ORAL at 05:44

## 2022-06-23 RX ADMIN — ESCITALOPRAM OXALATE 10 MG: 10 TABLET ORAL at 10:07

## 2022-06-23 RX ADMIN — ATORVASTATIN CALCIUM 20 MG: 10 TABLET, FILM COATED ORAL at 10:06

## 2022-06-23 RX ADMIN — OXYBUTYNIN CHLORIDE 5 MG: 5 TABLET ORAL at 10:07

## 2022-06-23 RX ADMIN — Medication 1000 UNITS: at 10:07

## 2022-06-23 RX ADMIN — SUCRALFATE 1 G: 1 TABLET ORAL at 05:44

## 2022-06-23 RX ADMIN — SODIUM CHLORIDE TAB 1 GM 1 G: 1 TAB at 10:06

## 2022-06-23 RX ADMIN — SUCRALFATE 1 G: 1 TABLET ORAL at 00:08

## 2022-06-23 RX ADMIN — Medication 100 MCG: at 10:07

## 2022-06-23 RX ADMIN — SUCRALFATE 1 G: 1 TABLET ORAL at 12:28

## 2022-06-23 RX ADMIN — METOPROLOL TARTRATE 150 MG: 50 TABLET, FILM COATED ORAL at 10:06

## 2022-06-23 NOTE — DISCHARGE SUMMARY
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 She was not terribly symptomatic with this up until the last 6 weeks or so. Over that period of time recently she has had various pains in her abdomen with nausea and difficulty with bowel activity. She was treated with antibiotics, diet changes and anti-inflammatories. Unfortunately her symptoms progressed and eventually she had a colonoscopy that identified a mass. On 2022 she underwent a robotic assisted laparoscopic hemicolectomy (right-sided) with Dr. Maxwell Forde. Perioperatively she had a rise in her creatinine, fluctuating blood pressures and significant pain. Just prior to her ARU stay, she started to have bowel movements again. She particularly struggled with poor sleep and generalized weakness. During her first weekend on the acute rehab unit, she had an episode of significant confusion and lethargy that was never convincingly explained. There is no obvious chemical imbalance, evidence of infection or clear medication reaction determined.   This episode passed and she has been bright and active since.    Prior (baseline) level of function: Independent. Current level of function:         Current  [facility] and Goals:   Occupational Therapy:    Short Term Goals  Time Frame for Short term goals: STGs=LTGs :   Long Term Goals  Time Frame for Long term goals : 10-12 days or until d/c. Long Term Goal 1: Pt will complete grooming tasks c Mod I.  Long Term Goal 2: Pt will complete total body bathing c AE PRN and supervision. Long Term Goal 3: Pt will complete UB dressing c Mod I.  Long Term Goal 4: Pt will complete LB dressing c AE PRN and supervision. Long Term Goal 5: Pt will doff/don footwear c AE PRN and Mod I. Additional Goals?: Yes  Long Term Goal 6: Pt will complete toileting c supervision. Long Term Goal 7: Pt will perform functional transfers (bed, chair, toilet, shower) c DME PRN and supervision. Long Term Goal 8: Pt will perform therex/therax to facilitate an increase in strength/endurance/ax tolerance (c emphasis on static/dynamic standing balance/tolerance > 6 mins) c supervision. Long Term Goal 9: Pt will complete light home management tasks c supervision. :                                       Eating: Eating  Assistance Needed: Independent  Comment: Pt able to open packages/containers and feed self  CARE Score: 6  Discharge Goal: Independent       Oral Hygiene: Oral Hygiene  Assistance Needed: Independent  Comment: Mod I seated at sink  CARE Score: 6  Discharge Goal: Independent    UB/LB Bathing: Shower/Bathe Self  Assistance Needed: Independent  Comment: Mod I  CARE Score: 6  Discharge Goal: Supervision or touching assistance    UB Dressing: Upper Body Dressing  Assistance Needed: Independent  Comment: to doff/don pullover Tshirt;  Pt able to retrieve clothing  CARE Score: 6  Discharge Goal: Independent         LB Dressing: Lower Body Dressing  Assistance Needed: Independent  Comment: Pt able to thread BLEs into pants and brief and manage over hips independently  CARE Score: 6  Discharge Goal: Supervision or touching assistance    Donning and [place] Footwear: Putting On/Taking Off Footwear  Assistance Needed: Independent  Comment: using figure 4 position to don shoes  CARE Score: 6  Discharge Goal: Independent      Toiletin [address] needed: Independent  Comment: mod I for clothing management and bladder hygiene  CARE Score: 6  Discharge Goal: Supervision or touching assistance      Toilet Transfers: Toilet Transfer  Assistance needed: Independent  Comment:  Mod I using RW and grab bars  CARE Score: 6  Discharge Goal: Supervision or touching assistance    Physical Therapy:         Long Term Goals  Time Frame for Long term goals : 7-10 days STG=LTG  Long term goal 1: Pt will perform bed mobility with mod I  Long term goal 2: pt will perform sit to stand, pivot and car transfers with mod I  Long term goal 3: pt will perform ambulation with 2ww 50' on levels with mod I, 150' on levels and 10' on unlevels with supervision  Long term goal 4: Pt will ascend/descend curb step and up to 12 steps with rail with supervision  Long term goal 5: Pt will retrieve light item with reacher and 2ww with mod I      Bed Mobility:   Sit to Lying  Assistance Needed: Independent  Comment: no bed features  CARE Score: 6  Discharge Goal: Independent  Roll Left and Right  Assistance Needed: Independent  Comment: no bed features  CARE Score: 6  Discharge Goal: Independent  Lying to Sitting on Side of Bed  Assistance Needed: Independent  Comment: no bed features  CARE Score: 6  Discharge Goal: Independent    Transfers:    Sit to Stand  Assistance Needed: Independent  Comment: with RW  CARE Score: 6  Discharge Goal: Independent  Chair/Bed-to-Chair Transfer  Assistance Needed: Independent  Comment: with RW  CARE Score: 6  Discharge Goal: Independent     Car Transfer  Assistance Needed: Independent  Comment: with RW, valeria balance using RW for approach, No LOB, leaves one hand on AD but not unsafe  CARE Score: 6  Discharge Goal: Independent    Ambulation:    Walking Ability  Does the Patient Walk?: Yes     Walk 10 Feet  Assistance Needed: Independent  Comment: with RW  CARE Score: 6  Discharge Goal: Independent     Walk 50 Feet with Two Turns  Assistance Needed: Independent  Comment: with RW, demos safety and balance  Reason if not Attempted: Not attempted due to medical condition or safety concerns  CARE Score: 6  Discharge Goal: Independent     Walk 150 Feet  Assistance Needed: Independent  Comment: with RW, no change in [place] or FOR[place] iwth longer distance  Reason if not Attempted: Not attempted due to medical condition or safety concerns  CARE Score: 6  Discharge Goal: Supervision or touching assistance     Walking 10 Feet on Uneven Surfaces  Assistance Needed: Supervision or touching assistance  Comment: Supervision for balance with RW, min cues for AD placment/ amb within VARUN  CARE Score: 4  Discharge Goal: Supervision or touching assistance     1 Step (Curb)  Assistance Needed: Supervision or touching assistance  Comment: SB-Supervision for balance with RW  CARE Score: 4  Discharge Goal: Supervision or touching assistance     4 Steps  Assistance Needed: Supervision or touching assistance  Comment: SB-Supervision B rails non-recip ascending, recip descending  CARE Score: 4  Discharge Goal: Supervision or touching assistance     12 Steps  Assistance Needed: Supervision or touching assistance  Comment: SB-supervision using B rails, recip pattern, non-recip pattern  Reason if not Attempted: Not attempted due to medical condition or safety concerns  CARE Score: 4  Discharge Goal: Supervision or touching assistance       Wheelchair:  w/c Ability: Wheelchair Ability  Uses a Wheelchair and/or Scooter?: No                Balance:        Object: Picking Up Object  Assistance Needed: Independent  Comment: at RW with use of reacher  CARE Score: 6  Discharge Goal: Independent    I      Exam:    Blood pressure (!) 143/65, pulse 88, temperature 98.2 °F (36.8 °C), temperature source Oral, resp. rate 16, height 5' (1.524 m), weight 144 lb 13.5 oz (65.7 kg), SpO2 97 %, not currently breastfeeding. General: Sitting up in a bedside chair reviewing discharge instructions. Alert and oriented x3. In good spirits. HEENT: Clear speech. Full visual field. MMM. No JVD. Pulmonary: Unlabored air exchange without wheezes, rales or coughing. Cardiac: Regular rate and rhythm. Abdomen: Patient's abdomen is soft and nondistended. Bowel sounds were present throughout. There was no rebound, guarding or masses noted. Surgical incisions are well-healed. Upper extremities: Able to bring both hands up to meet mine. Fatigues with MMT across the shoulders. Functional  strength. No new bruising or swelling. Lower extremities: No signs of DVT. 4/5 strength across the knees and ankles. Heels clear. Diminished sensation about the feet with no significant edema or calf tenderness. Sitting balance was good. Standing balance was fair-.    Lab Results   Component Value Date    WBC 12.0 (H) 06/11/2022    HGB 9.5 (L) 06/11/2022    HCT 28.5 (L) 06/11/2022    MCV 86.4 06/11/2022     06/11/2022     Lab Results   Component Value Date    INR 1.09 05/14/2021    INR 1.09 12/09/2016    PROTIME 12.4 05/14/2021    PROTIME 12.7 12/09/2016     Lab Results   Component Value Date    CREATININE 0.7 06/20/2022    BUN 41 (H) 06/20/2022     (L) 06/20/2022    K 4.1 06/20/2022    CL 98 (L) 06/20/2022    CO2 28 06/20/2022     Lab Results   Component Value Date    ALT 11 06/08/2022    AST 14 (L) 06/08/2022    ALKPHOS 50 06/08/2022    BILITOT 0.2 06/08/2022           The patient presented to the ARU with the above history requiring a multidisciplinary treatment plan including close medical supervision by the [facility] Director.  The patient participated in the prescribed therapy treatment plan with reasonable compliance and progressive tolerance. They avoided significant medical complications. By the time of discharge the patient had become safer with adaptive equipment to transfer and toilet with a FWW with the supervision of family/caregivers. The patient was tolerating an oral diet without choking/coughing and was back to their baseline with regards to bowel and bladder control. Discharge instructions were reviewed with the patient and different family members this week. The patient is to follow up with the PCP, her surgeon and the oncologist over the next 2 weeks. No driving. [facility] PT[facility]OT will be arranged. Medication List      START taking these medications    escitalopram 10 MG tablet  Commonly known as: LEXAPRO  Take 1 tablet by mouth daily  Start taking on: June 24, 2022     pantoprazole 40 MG tablet  Commonly known as: PROTONIX  Take 1 tablet by mouth 2 times daily (before meals)     * rivaroxaban 15 MG Tabs tablet  Commonly known as: XARELTO  Take 1 tablet by mouth 2 times daily (with meals) for 14 days     * rivaroxaban 20 MG Tabs tablet  Commonly known as: Xarelto  Take 1 tablet by mouth daily (with breakfast)  Start taking on: July 8, 2022     sodium chloride 1 g tablet  Take 1 tablet by mouth 2 times daily (with meals)     sucralfate 1 GM tablet  Commonly known as: CARAFATE  Take 1 tablet by mouth 4 times daily (before meals and nightly)     urea 15 g Pack packet  Commonly known as: URE-NA  Take 15 g by mouth in the morning and at bedtime         * This list has 2 medication(s) that are the same as other medications prescribed for you. Read the directions carefully, and ask your doctor or other care provider to review them with you.             CONTINUE taking these medications    albuterol sulfate  (90 Base) MCG/ACT inhaler  Commonly known as: PROVENTIL;VENTOLIN;PROAIR     atorvastatin 20 MG tablet  Commonly known as: LIPITOR     CALCIUM 1200+D3 PO     Lantus SoloStar 100 UNIT/ML injection pen  Generic drug: insulin glargine     levocetirizine 5 MG tablet  Commonly known as: XYZAL     levothyroxine 137 MCG tablet  Commonly known as: SYNTHROID     melatonin 3 MG Tabs tablet     metoprolol tartrate 50 MG tablet  Commonly known as: LOPRESSOR     oxybutynin 5 MG tablet  Commonly known as: DITROPAN     PEN NEEDLES 31GX5/16\" 31G X 8 MM Misc     rOPINIRole 3 MG tablet  Commonly known as: REQUIP     vitamin B-12 100 MCG tablet  Commonly known as: CYANOCOBALAMIN     VITAMIN D PO        STOP taking these medications    loperamide 2 MG tablet  Commonly known as: IMODIUM A-D     losartan 100 MG tablet  Commonly known as: COZAAR     magnesium oxide 400 (241.3 Mg) MG Tabs tablet  Commonly known as: MAG-OX     promethazine 25 MG tablet  Commonly known as: PHENERGAN           Where to Get Your Medications      You can get these medications from any pharmacy    Bring a paper prescription for each of these medications  · escitalopram 10 MG tablet  · pantoprazole 40 MG tablet  · rivaroxaban 15 MG Tabs tablet  · rivaroxaban 20 MG Tabs tablet  · sodium chloride 1 g tablet  · sucralfate 1 GM tablet  · urea 15 g Pack packet         CONDITION ON DISCHARGE: Stable. The prognosis is fair for further improvements in ADL's and safety with adapted gait/transfers. Record review, patient exam, discharge instructions, medication reconciliation and summary for this discharge visit took more than 30 minutes.

## 2022-06-23 NOTE — PROGRESS NOTES
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 Looking forward to discharge tomorrow. Prior (baseline) level of function: Independent. Current level of function:         Current  [facility] and Goals:   Occupational Therapy:    Short Term Goals  Time Frame for Short term goals: STGs=LTGs :   Long Term Goals  Time Frame for Long term goals : 10-12 days or until d/c. Long Term Goal 1: Pt will complete grooming tasks c Mod I.  Long Term Goal 2: Pt will complete total body bathing c AE PRN and supervision. Long Term Goal 3: Pt will complete UB dressing c Mod I.  Long Term Goal 4: Pt will complete LB dressing c AE PRN and supervision. Long Term Goal 5: Pt will doff/don footwear c AE PRN and Mod I. Additional Goals?: Yes  Long Term Goal 6: Pt will complete toileting c supervision. Long Term Goal 7: Pt will perform functional transfers (bed, chair, toilet, shower) c DME PRN and supervision. Long Term Goal 8: Pt will perform therex/therax to facilitate an increase in strength/endurance/ax tolerance (c emphasis on static/dynamic standing balance/tolerance > 6 mins) c supervision. Long Term Goal 9: Pt will complete light home management tasks c supervision. :                                       Eating: Eating  Assistance Needed: Independent  Comment: Pt able to open packages/containers and feed self  CARE Score: 6  Discharge Goal: Independent       Oral Hygiene: Oral Hygiene  Assistance Needed: Independent  Comment:  Mod I seated at sink  CARE Score: 6  Discharge Goal: Independent    UB/LB Bathing: Shower/Bathe Self  Assistance Needed: Independent  Comment: Mod I  CARE Score: 6  Discharge Goal: Supervision or touching assistance    UB Dressing: Upper Body Dressing  Assistance Needed: Independent  Comment: to doff/don pullover Tshirt; Pt able to retrieve clothing  CARE Score: 6  Discharge Goal: Independent         LB Dressing: Lower Body Dressing  Assistance Needed: Independent  Comment: Pt able to thread BLEs into pants and brief and manage over hips independently  CARE Score: 6  Discharge Goal: Supervision or touching assistance    Donning and [place] Footwear: Putting On/Taking Off Footwear  Assistance Needed: Independent  Comment: using figure 4 position to don shoes  CARE Score: 6  Discharge Goal: Independent      Toiletin [address] needed: Independent  Comment: mod I for clothing management and bladder hygiene  CARE Score: 6  Discharge Goal: Supervision or touching assistance      Toilet Transfers: Toilet Transfer  Assistance needed: Independent  Comment:  Mod I using RW and grab bars  CARE Score: 6  Discharge Goal: Supervision or touching assistance    Physical Therapy:         Long Term Goals  Time Frame for Long term goals : 7-10 days STG=LTG  Long term goal 1: Pt will perform bed mobility with mod I  Long term goal 2: pt will perform sit to stand, pivot and car transfers with mod I  Long term goal 3: pt will perform ambulation with 2ww 50' on levels with mod I, 150' on levels and 10' on unlevels with supervision  Long term goal 4: Pt will ascend/descend curb step and up to 12 steps with rail with supervision  Long term goal 5: Pt will retrieve light item with reacher and 2ww with mod I      Bed Mobility:   Sit to Lying  Assistance Needed: Independent  Comment: no bed features  CARE Score: 6  Discharge Goal: Independent  Roll Left and Right  Assistance Needed: Independent  Comment: no bed features  CARE Score: 6  Discharge Goal: Independent  Lying to Sitting on Side of Bed  Assistance Needed: Independent  Comment: no bed features  CARE Score: 6  Discharge Goal: Independent    Transfers:    Sit to Stand  Assistance Needed: Independent  Comment: with RW  CARE Score: 6  Discharge Goal: Independent  Chair/Bed-to-Chair Transfer  Assistance Needed: Independent  Comment: with RW  CARE Score: 6  Discharge Goal: Independent     Car Transfer  Assistance Needed: Independent  Comment: with valeria CARLOS balance using RW for approach, No LOB, leaves one hand on AD but not unsafe  CARE Score: 6  Discharge Goal: Independent    Ambulation:    Walking Ability  Does the Patient Walk?: Yes     Walk 10 Feet  Assistance Needed: Independent  Comment: with RW  CARE Score: 6  Discharge Goal: Independent     Walk 50 Feet with Two Turns  Assistance Needed: Independent  Comment: with RW, valeria safety and balance  Reason if not Attempted: Not attempted due to medical condition or safety concerns  CARE Score: 6  Discharge Goal: Independent     Walk 150 Feet  Assistance Needed: Independent  Comment: with RW, no change in [place] or [place] iwth longer distance  Reason if not Attempted: Not attempted due to medical condition or safety concerns  CARE Score: 6  Discharge Goal: Supervision or touching assistance     Walking 10 Feet on Uneven Surfaces  Assistance Needed: Supervision or touching assistance  Comment: Supervision for balance with RW, min cues for AD placment/ amb within VARUN  CARE Score: 4  Discharge Goal: Supervision or touching assistance     1 Step (Curb)  Assistance Needed: Supervision or touching assistance  Comment: SB-Supervision for balance with RW  CARE Score: 4  Discharge Goal: Supervision or touching assistance     4 Steps  Assistance Needed: Supervision or touching assistance  Comment: SB-Supervision B rails non-recip ascending, recip descending  CARE Score: 4  Discharge Goal: Supervision or touching assistance     12 Steps  Assistance Needed: Supervision or touching assistance  Comment: SB-supervision using B rails, recip pattern, non-recip pattern  Reason if not Attempted: Not attempted due to medical condition or safety concerns  CARE Score: 4  Discharge Goal: Supervision or touching assistance       Wheelchair:  w/c Ability: Wheelchair Ability  Uses a Wheelchair and/or Scooter?: No                Balance:        Object: Picking Up Object  Assistance Needed: Independent  Comment: at [address] [place] [address] with use of reacher  CARE Score: 6  Discharge Goal: Independent    I      Exam:    Blood pressure (!) 133/51, pulse 93, temperature 97.9 °F (36.6 °C), temperature source Oral, resp. rate 16, height 5' (1.524 m), weight 140 lb 14 oz (63.9 kg), SpO2 97 %, not currently breastfeeding. General: Observed sitting up in a wheelchair. Alert and engaging in conversation. HEENT: Gazing right and left. Controlling her airway. MMM. No JVD. Pulmonary: Symmetric air exchange without wheezes, rales or coughing. Cardiac: RRR. Abdomen: Patient's abdomen is soft and nondistended. Bowel sounds were present throughout. There was no rebound, guarding or masses noted. Surgical incisions are well-healed. Upper extremities: Bringing both hands up to meet mine. Fatigues with MMT across the shoulders. Functional  strength. Lower extremities: Diminished sensation about the feet with no significant edema or calf tenderness. Sitting balance was good.   Standing balance was fair-.    Lab Results   Component Value Date    WBC 12.0 (H) 06/11/2022    HGB 9.5 (L) 06/11/2022    HCT 28.5 (L) 06/11/2022    MCV 86.4 06/11/2022     06/11/2022     Lab Results   Component Value Date    INR 1.09 05/14/2021    INR 1.09 12/09/2016    PROTIME 12.4 05/14/2021    PROTIME 12.7 12/09/2016     Lab Results   Component Value Date    CREATININE 0.7 06/20/2022    BUN 41 (H) 06/20/2022     (L) 06/20/2022    K 4.1 06/20/2022    CL 98 (L) 06/20/2022    CO2 28 06/20/2022     Lab Results Component Value Date    ALT 11 06/08/2022    AST 14 (L) 06/08/2022    ALKPHOS 50 06/08/2022    BILITOT 0.2 06/08/2022       Expected length of stay  prior to a supervised level of function for discharge home with a walker and [facility] OT[facility]PT is 6/23/2022. Recommendations:    1. 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 Follow-up with her surgeon and oncology as scheduled. Family training has been ongoing. Very consistent engagement in the daily ccupational and physical therapy. Laurie Mott right upper limb DVT has not limited her physical recovery. Tolerating the Xarelto. Continent of bowel and bladder.  Ongoing cautious pain management.  Benefiting from aggressive pulmonary hygiene measures, nutritional support and pain management.  Outpatient follow-up with oncology and her surgeon.  Verbal cues and SBA for transfers again today. 2. DVT prophylaxis.  Xarelto is treating her acute DVT. Monitoring her hemoglobin periodically while on this medication.  Weightbearing activities are steadily improving now.  GI prophylaxis is available.  No clinical signs of blood loss. 3. Uncontrolled pain: Tylenol is controlling her pain. Successful bowel intervention while on the analgesics. 4. 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  Blood sugars are checked at mealtime and bedtime.  She is on a Humalog sliding scale with plans for reintroducing oral agents from home when she is eating more consistently. 5. Chronic kidney disease stage IIIa: Avoiding nephrotoxic medications and wide swings of blood pressure.  Encouraging consistent oral hydration.  Periodic monitoring of her chemistries.   6. Hypertension: She is currently off medications to control her blood pressure.  Vital signs are checked at rest and with activity.  Target systolic blood pressures 120-140.  Blood pressure is within the target range again today.  Monitoring closely. 7. Possible esophageal dysmotility: At this point, this problem is not impacting the patient's ability to maintain adequate hydration and nutrition. It is not compromising her respirations. Outpatient follow-up with gastroenterology is encouraged.

## 2022-06-23 NOTE — PLAN OF CARE
Problem: Discharge Planning  Goal: Discharge to home or other facility with appropriate resources  6/23/2022 1042 by Gracia Dickens RN  Outcome: Completed  6/23/2022 1042 by Gracia Dickens RN  Outcome: Adequate for Discharge  6/23/2022 0327 by Thee Sinclair RN  Outcome: Progressing     Problem: Skin/Tissue Integrity  Goal: Absence of new skin breakdown  Description: 1. Monitor for areas of redness and/or skin breakdown  2. Assess vascular access sites hourly  3. Every 4-6 hours minimum:  Change oxygen saturation probe site  4. Every 4-6 hours:  If on nasal continuous positive airway pressure, respiratory therapy assess nares and determine need for appliance change or resting period.   6/23/2022 1042 by Gracia Dickens RN  Outcome: Completed  6/23/2022 1042 by Gracia Dickens RN  Outcome: Adequate for Discharge  6/23/2022 0327 by Thee Sinclair RN  Outcome: Progressing     Problem: Safety - Adult  Goal: Free from fall injury  6/23/2022 1042 by Gracia Dickens RN  Outcome: Completed  6/23/2022 1042 by Gracia Dickens RN  Outcome: Adequate for Discharge  6/23/2022 0327 by Thee Sinclair RN  Outcome: Progressing     Problem: ABCDS Injury Assessment  Goal: Absence of physical injury  6/23/2022 1042 by Gracia Dickens RN  Outcome: Completed  6/23/2022 1042 by Gracia Dickens RN  Outcome: Adequate for Discharge  6/23/2022 0327 by Thee Sinclair RN  Outcome: Progressing     Problem: Chronic Conditions and Co-morbidities  Goal: Patient's chronic conditions and co-morbidity symptoms are monitored and maintained or improved  6/23/2022 1042 by Gracia Dickens RN  Outcome: Completed  6/23/2022 1042 by Gracia Dickens RN  Outcome: Adequate for Discharge  6/23/2022 0327 by Thee Sinclair RN  Outcome: Progressing     Problem: Nutrition Deficit:  Goal: Optimize nutritional status  6/23/2022 1042 by Gracia Dickens RN  Outcome: Completed  6/23/2022 1042 by Gracia Dickens RN  Outcome: Adequate for Discharge  6/23/2022 0327 by Thee Sinclair RN  Outcome: Progressing     Problem: Pain  Goal: Verbalizes/displays adequate comfort level or baseline comfort level  6/23/2022 1042 by Alex Velasquez RN  Outcome: Completed  6/23/2022 1042 by Alex Velasquez RN  Outcome: Adequate for Discharge  6/23/2022 0327 by Daly Nixon RN  Outcome: Progressing

## 2022-06-23 NOTE — PROGRESS NOTES
Patients discharge instructions reviewed, RX's given to patient and patient has BSC, walker. Patient voices no concerns or has questions at this time. Daughters at bedside, Patient seen leaving facility in stable condition with all belongings.

## 2022-06-23 NOTE — PROGRESS NOTES
Progress Note( Dr. Kory Messina)  6/22/2022  Subjective:   Admit Date: 6/9/2022  PCP: Shahzad Aj DO    Admitted For : Admitted on 5/22/2022 for cecal mass  Transferred to ARU on evening of 6/9/2022    Consulted For: Had hypoglycemic episode/better control of blood glucose    Interval History: Patient doing a lot better. Started on almost regular diet as she is tolerating the food  well for last couple of days  DVT right upper extremity as noted below    Denies any chest pains,   Denies SOB . Patient has no more nausea or vomiting   diet was advanced to regular diet as tolerated  no new bowel or bladder symptoms. Intake/Output Summary (Last 24 hours) at 6/22/2022 2132  Last data filed at 6/22/2022 2102  Gross per 24 hour   Intake 410 ml   Output --   Net 410 ml       DATA    CBC:   No results for input(s): WBC, HGB, PLT in the last 72 hours. CMP:  Recent Labs     06/20/22  0500   *   K 4.1   CL 98*   CO2 28   BUN 41*   CREATININE 0.7   CALCIUM 8.8     Lipids:   Lab Results   Component Value Date    CHOL 138 05/02/2019    HDL 54 05/02/2019    TRIG 161 06/01/2022     Glucose:  Recent Labs     06/22/22  1205 06/22/22  1617 06/22/22 2059   POCGLU 116* 145* 127*     [id number]:  Lab Results   Component Value Date    LABA1C 5.9 05/22/2022     High Sensitivity TSH:   Lab Results   Component Value Date    TSHHS 1.470 06/12/2022     Free T3:   Lab Results   Component Value Date    FT3 2.3 12/06/2017     Free T4:  Lab Results   Component Value Date    T4FREE 1.24 06/12/2022       VL DUP UPPER EXTREMITY VENOUS RIGHT   Final Result   Nonocclusive thrombus attached to the PICC line in the right subclavian vein. No other significant abnormality. XR CHEST (2 VW)   Final Result   Small pleural effusions with adjacent atelectasis. Impression Right Arm   6/14/2022   Nonocclusive thrombus attached to the PICC line in the right subclavian vein.    No other significant abnormality Scheduled Medicines   Medications:    sodium chloride  1 g Oral BID WC    urea  15 g Oral BID    rivaroxaban  15 mg Oral BID WC    pantoprazole  40 mg Oral BID AC    insulin lispro  0-3 Units SubCUTAneous Nightly    insulin lispro  0-6 Units SubCUTAneous TID WC    atorvastatin  20 mg Oral Daily    cetirizine  10 mg Oral Daily    escitalopram  10 mg Oral Daily    levothyroxine  137 mcg Oral Daily    lidocaine  1 patch TransDERmal Daily    metoprolol tartrate  150 mg Oral Daily    metoprolol tartrate  100 mg Oral Nightly    oxybutynin  5 mg Oral BID    rOPINIRole  3 mg Oral Nightly    sucralfate  1 g Oral 4 times per day    vitamin B-12  100 mcg Oral Daily    Vitamin D  1,000 Units Oral Daily      Infusions:    sodium chloride      dextrose      sodium chloride           Objective:   Vitals: BP (!) 133/51   Pulse 93   Temp 97.9 °F (36.6 °C) (Oral)   Resp 16   Ht 5' (1.524 m)   Wt 140 lb 14 oz (63.9 kg)   SpO2 97%   BMI 27.51 kg/m²   General appearance: alert and cooperative with exam  Neck: no JVD or bruit  Thyroid : Normal lobes   Lungs: Has Vesicular Breath sounds   Heart:  regular rate and rhythm  Abdomen: soft, yes -tender; bowel sounds normal; no masses,  no organomegaly  Had right-sided hemicolectomy 5/27/2022  Musculoskeletal: Normal  Extremities: extremities normal, , no edema  Neurologic:  Awake, alert, oriented to name, place and time. Cranial nerves II-XII are grossly intact. Motor is  intact. Sensory,  and gait is normal.    Assessment:     Patient Active Problem List:     Long-term insulin use in type 2 diabetes [facility])     Essential hypertension     Dyslipidemia     Abnormal EKG     Abnormal stress test     Cecum mass     Severe malnutrition (HCC)     Partial small bowel obstruction ([address]     Had hemicolectomy on 5/27/2022      INVASIVE ADENOCARCINOMA, TERMINAL ILEUM      DVT of right upper arm    Plan:     1. Reviewed POC blood glucose .  Labs and X ray results 2. Reviewed Current Medicines   3. On  Correction bolus Humalog Insulin regime   4. Monitor Blood glucose frequently   5. Modified  the dose of Insulin/ other medicines as needed    6. Will follow     .      Meghna Fofana MD, MD

## 2022-06-28 ENCOUNTER — TELEPHONE (OUTPATIENT)
Dept: CARDIOLOGY CLINIC | Age: 87
End: 2022-06-28

## 2022-06-28 ENCOUNTER — OFFICE VISIT (OUTPATIENT)
Dept: SURGERY | Age: 87
End: 2022-06-28

## 2022-06-28 VITALS — DIASTOLIC BLOOD PRESSURE: 60 MMHG | SYSTOLIC BLOOD PRESSURE: 98 MMHG | RESPIRATION RATE: 18 BRPM | HEART RATE: 60 BPM

## 2022-06-28 DIAGNOSIS — C18.2 CANCER OF RIGHT COLON (HCC): Primary | ICD-10-CM

## 2022-06-28 DIAGNOSIS — N83.201 CYST OF RIGHT OVARY: ICD-10-CM

## 2022-06-28 DIAGNOSIS — C80.1 ADENOCARCINOMA (HCC): ICD-10-CM

## 2022-06-28 DIAGNOSIS — Z09 POSTOP CHECK: ICD-10-CM

## 2022-06-28 PROCEDURE — 99024 POSTOP FOLLOW-UP VISIT: CPT | Performed by: SURGERY

## 2022-06-28 NOTE — PROGRESS NOTES
GENERAL SURGERY NOTE - Outpatient [address] [facility]    PATIENT: Sherine Castillo 1934, 80 y.o., female   Date: 6/28/2022  MRN: [id number]   Requesting Provider:   No ref. provider found History Obtained From:  patient, electronic medical record     Reason for Evaluation & Chief Complaint:    Chief Complaint   Patient presents with    Post-Op Check     1st post op Lan bowel resection right hemicolectomy 5/27/22. Denies any pain, feeling good. HISTORY OF PRESENT ILLNESS:      Narinder May is a 80 y.o. female presenting postoperatively after robotic assisted right hemicolectomy for ileocecal mass. Since the procedure, she has been doing well overall. She has follow up with Dr. Sussy Sheridan on July 8th. Eating a regular diet with difficulty - her appetite has not been great, is taking Ensure too. Bowel movement are Normal - a little loose, sometimes is having blood in the stool and on the toilet paper, has been going on since her surgery. The patient is not having any pain. Wounds/Incisions: are healing well. No drainage or erythema. Past Medical History:    Past Medical History:   Diagnosis Date    Cancer of right colon ([facility] 7/5/2022    Diabetes mellitus ([facility]     H/O cardiac catheterization 05/18/2021    no significant CAD in main vessels, has severe stenosis in small  branch diagonal vessel    H/O cardiovascular stress test 3/22/18,03/30/2017    ECG portion of the stress test is negative for ischemia EF >70% Normal stress myocardial perfusion.  H/O cardiovascular stress test 04/07/2021    abnormal stress    H/O Doppler ultrasound (Abdomimal Aorta) 03/29/2017    No evidence of abdominal aortic aneurysm.  H/O echocardiogram 07/02/2014    Normal left ventricular size, wall motion and systolic function. Mildle elevated pulmonary artery systolic pressure. Mild MR and TR and trace AR EF 55 to 60%    Hx of echocardiogram 03/29/2017    EF 55-60%. Grade I diastolic dysfunction. Mildly dilated left atrium. No evidence of pericardial effusion.      Hyperlipidemia     Hypertension     Sleep apnea     Thyroid disease        Past Surgical History:    Past Surgical History:   Procedure Laterality Date    BREAST BIOPSY Right     approx age 76, benign    CATARACT REMOVAL      CHOLECYSTECTOMY      COLONOSCOPY N/A 5/25/2022    COLONOSCOPY POLYPECTOMY SNARE/COLD BIOPSY performed by Jose Rubin MD at [address] N/A 5/27/2022    BOWEL RESECTION RIGHT HEMICOLECTOMY LAPAROSCOPIC ROBOTIC XI performed by Miri Senior MD at [facility]      eye lift       Current Medications:   Current Outpatient Medications   Medication Sig Dispense Refill    rivaroxaban (XARELTO) 15 MG TABS tablet Take 1 tablet by mouth 2 times daily (with meals) for 14 days 28 tablet 0    escitalopram (LEXAPRO) 10 MG tablet Take 1 tablet by mouth daily 30 tablet 0    sodium chloride 1 g tablet Take 1 tablet by mouth 2 times daily (with meals) 60 tablet 0    pantoprazole (PROTONIX) 40 MG tablet Take 1 tablet by mouth 2 times daily (before meals) 60 tablet 0    sucralfate (CARAFATE) 1 GM tablet Take 1 tablet by mouth 4 times daily (before meals and nightly) 120 tablet 0    melatonin 3 MG TABS tablet Take 3 mg by mouth nightly as needed      levocetirizine (XYZAL) 5 MG tablet 1 tablet daily      albuterol sulfate  (90 Base) MCG/ACT inhaler as needed      vitamin B-12 (CYANOCOBALAMIN) 100 MCG tablet Take 100 mcg by mouth daily      LANTUS SOLOSTAR 100 UNIT/ML injection pen       Calcium-Magnesium-Vitamin D (CALCIUM 1200+D3 PO) Take by mouth daily      Cholecalciferol (VITAMIN D PO) Take by mouth      oxybutynin (DITROPAN) 5 MG tablet Take 5 mg by mouth 2 times daily      atorvastatin (LIPITOR) 20 MG tablet Take 20 mg by mouth daily      rOPINIRole (REQUIP) 3 MG tablet Take 3 mg by mouth nightly      levothyroxine (SYNTHROID) 137 MCG tablet Take 137 mcg by mouth daily   3    Insulin Pen Needle (PEN NEEDLES 31GX5/16\") 31G X 8 MM MISC       metoprolol (LOPRESSOR) 50 MG tablet 3 pills in the AM : 150 mg dose  2 pills in the PM: 100 mg dose      urea (URE-NA) 15 g PACK packet Take 15 g by mouth in the morning and at bedtime (Patient not taking: Reported on 6/28/2022) 60 each 0    [START ON 7/8/2022] rivaroxaban (XARELTO) 20 MG TABS tablet Take 1 tablet by mouth daily (with breakfast) (Patient not taking: Reported on 6/28/2022) 30 tablet 0     No current facility-administered medications for this visit. Allergies:  Lopid [gemfibrozil], Bystolic [nebivolol hcl], Cefdinir, Coreg [carvedilol], Crestor [rosuvastatin], Fenofibrate, Glucophage [metformin], Hydrochlorothiazide, Metronidazole, Norvasc [amlodipine besylate], Tribenzor [olmesartan-amlodipine-hctz], and Zocor [simvastatin]    Social History:   Social History     Socioeconomic History    Marital status:      Spouse name: None    Number of children: None    Years of education: None    Highest education level: None   Occupational History    None   Tobacco Use    Smoking status: Never Smoker    Smokeless tobacco: Never Used   Vaping Use    Vaping Use: Never used   Substance and Sexual Activity    Alcohol use: Not Currently     Alcohol/week: 1.0 standard drink     Types: 1 Glasses of wine per week     Comment: rarely    Drug use: No    Sexual activity: None     Comment:    Other Topics Concern    None   Social History Narrative    None     Social Determinants of Health     Financial Resource Strain:     Difficulty of Paying Living Expenses: Not on file   Food Insecurity:     Worried About Running Out of Food in the Last Year: Not on file    Varsha of Food in the Last Year: Not on file   Transportation Needs:     Lack of Transportation (Medical): Not on file    Lack of Transportation (Non-Medical):  Not on file   Physical Activity:     Days of Exercise per Week: Not on file    Minutes of Exercise per Session: Not on file   Stress:     Feeling of Stress : Not on file   Social Connections:     Frequency of Communication with Friends and Family: Not on file    Frequency of Social Gatherings with Friends and Family: Not on file    Attends [employer] Services: Not on file    Active Member of Clubs or Organizations: Not on file    Attends Club or Organization Meetings: Not on file    Marital Status: Not on file   Intimate Partner Violence:     Fear of Current or Ex-Partner: Not on file    Emotionally Abused: Not on file    Physically Abused: Not on file    Sexually Abused: Not on file   Housing Stability:     Unable to Pay for Housing in the Last Year: Not on file    Number of [place] in the Last Year: Not on file    Unstable Housing in the Last Year: Not on file       Family History:   Family History   Problem Relation Age of Onset    Pacemaker Sister     Arrhythmia Sister     Breast Cancer Sister         pre menopausal    Ovarian Cancer Neg Hx        REVIEW OF SYSTEMS:    Review of Systems   Constitutional: Positive for appetite change. Negative for chills and fever. Respiratory: Negative for shortness of breath. Cardiovascular: Negative for chest pain. Gastrointestinal: Positive for blood in stool. Negative for abdominal pain, constipation, diarrhea, nausea and vomiting. I have reviewed the patient's information pertinent to this visit, including medical history, family history, social history and review of systems. PHYSICAL EXAM:    Vitals:    06/28/22 1515   BP: 98/60   Pulse: 60   Resp: 18       Physical Exam  Constitutional:       General: She is not in acute distress. Appearance: She is well-developed. She is not diaphoretic. HENT:      Head: Normocephalic and atraumatic. Eyes:      Pupils: Pupils are equal, round, and reactive to light.    Cardiovascular:      Rate and Rhythm: Normal rate and regular rhythm. Pulmonary:      Effort: Pulmonary effort is normal. No respiratory distress. Abdominal:      Palpations: Abdomen is soft. Tenderness: There is no abdominal tenderness. There is no guarding or rebound. Comments: Incision(s) well healed, no erythema or drainage    Neurological:      General: No focal deficit present. Mental Status: She is alert. Psychiatric:         Behavior: Behavior normal.         DATA:    Pathology Results:   Final Pathologic Diagnosis:   Partial ileocolectomy and appendectomy specimen:        INVASIVE ADENOCARCINOMA, TERMINAL ILEUM.      Tubular adenoma (6 mm), terminal ileum associated with   the above.        Tubular adenomas (3 and 6 mm), cecum and ascending   colon.        Hyperplastic polyp (16 mm), cecum.        Reactive changes, mesenteric lymph nodes. Electronically Signed Out By Elie Reynolds MD   Comment:     COLON AND RECTUM CANCER SUMMARY - EXCISION     Specimen/Procedure: Partial ileocolectomy and appendectomy. Prior Specimen: Biopsy, [id number]. Tumor Site/Location: Terminal ileum. Tumor Size: 5.1 x 6.2 cm. in circumference x 1.5 cm   Macroscopic Tumor Perforation: None. Histologic Type/Grade: Adenocarcinoma, G2. Tumor Extension: Into mesentery and almost to the peritoneal   surface. Margins for invasive carcinoma, in situ carcinoma, and   adenoma: All uninvolved. - Proximal: At least 5 cm from tumor.   - Distal: At least 15 cm from tumor.   - Radial/Mesenteric: At least 6.5 cm from tumor.   - Other:     Treatment Effect: None. Lymphovascular Invasion: Present. Perineural Invasion: None. Type of Polyp in Which Invasive Carcinoma Arose: Tubular   adenoma. Tumor Deposits: Present approx. . 3, microscopic. Regional Lymph Nodes:   - Number of lymph nodes examined: 12   - Number of lymph nodes involved: 0     Additional Pathologic Findings: See above.      Ancillary Studies: MMR/MSI studies results will be reported   as an addendum. Pathologic Stage Classification: pT 3, pN 1c, pM - N/A     Labs:  Lab Results   Component Value Date    WBC 12.0 (H) 06/11/2022    HGB 9.5 (L) 06/11/2022    HCT 28.5 (L) 06/11/2022     06/11/2022     (L) 06/20/2022    K 4.1 06/20/2022    CL 98 (L) 06/20/2022    CO2 28 06/20/2022    BUN 41 (H) 06/20/2022    CREATININE 0.7 06/20/2022    GLUCOSE 110 (H) 06/20/2022    CALCIUM 8.8 06/20/2022    PROT 5.5 (L) 06/08/2022    BILITOT 0.2 06/08/2022    AST 14 (L) 06/08/2022    ALT 11 06/08/2022    ALKPHOS 50 06/08/2022    AMYLASE 56 03/10/2017    LIPASE 31 05/21/2022    INR 1.09 05/14/2021    GLUF 121 (H) 12/06/2017    LABA1C 5.9 05/22/2022       Imaging:     Pertinent laboratory and imaging studies were personally reviewed if available. IMPRESSION:    Liz Feliz is a 80 y.o. 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 Visit Diagnoses:  1. Cancer of right colon ([address]    2. Cyst of right ovary    3. Adenocarcinoma ([address]    4.  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)     Moderate malnutrition (HCC)     Chronic kidney disease, stage 3a ([address]     Acquired hypothyroidism     Reactive depression     Adenocarcinoma ([address] 06/09/2022    Partial small bowel obstruction (HCC)     Severe malnutrition ([address] 05/31/2022    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    Abnormal stress test     Dyslipidemia 04/06/2021    Abnormal EKG 04/06/2021    Long-term insulin use in type 2 diabetes ([address] 03/14/2018    Essential hypertension 03/14/2018       PLAN:   Continue current care   Increase activity as tolerated   Will plan on CT scan in 6 months for follow up of the adnexal region  Cystic lesion in the right adnexa appears unchanged from prior CT.  No   internal enhancement is identified to suggest a solid mass.  Overall,   findings are favored to represent a complex ovarian cyst over abscess for   which follow-up imaging with CT or MRI in 6 months is recommended.  Follow up with Oncology, Dr. Joel Gentile as scheduled   Follow Up: Return in about 3 months (around 9/28/2022) for re-check. Orders Placed This Encounter   Procedures    CT ABDOMEN PELVIS W IV CONTRAST Additional Contrast? None      No orders of the defined types were placed in this encounter.       Electronically signed by Greta Vega MD, 7/5/2022, 11:55 AM.

## 2022-07-01 ENCOUNTER — TELEPHONE (OUTPATIENT)
Dept: SURGERY | Age: 87
End: 2022-07-01

## 2022-07-01 NOTE — TELEPHONE ENCOUNTER
Brandon Frye from [facility] called after Vanda Lombardi described her stools as Dark and bright red. Messaged Dr Lona Villegas. Per Lona Villegas if stools are the same as they were when we saw her in office then just f/u with GI (Thiago Gant). If bleeding is getting worse then go to ER. Called and spoke to Vanda Lombardi stools are the same as they were when we saw her last. Informed her to go ahead and schedule appt with Dr Thiago Gant since she already known with him.

## 2022-07-03 NOTE — PROGRESS NOTES
Progress Note( Dr. Yari Chavez)    Subjective:   Admit Date: 6/9/2022  PCP: Alberta Aj DO    Admitted For : Admitted on 5/22/2022 for cecal mass  Transferred to ARU on evening of 6/9/2022    Consulted For: Had hypoglycemic episode/better control of blood glucose    Interval History: Patient doing a lot better. Started on almost regular diet as she is tolerating the food  well for last couple of days  DVT right upper extremity as noted below    Denies any chest pains,   Denies SOB . Patient has no more nausea or vomiting   diet was advanced to regular diet as tolerated  no new bowel or bladder symptoms. No intake or output data in the 24 hours ending 07/03/22 1959    DATA    CBC:   No results for input(s): WBC, HGB, PLT in the last 72 hours. CMP:  No results for input(s): NA, K, CL, CO2, BUN, CREATININE, GLU, CALCIUM, PROT, LABALBU, BILITOT, ALKPHOS, AST, ALT in the last 72 hours. Lipids:   Lab Results   Component Value Date/Time    CHOL 138 05/02/2019 08:00 AM    HDL 54 05/02/2019 08:00 AM    TRIG 161 06/01/2022 04:34 AM     Glucose:  No results for input(s): POCGLU in the last 72 hours. [id number]:  Lab Results   Component Value Date/Time    LABA1C 5.9 05/22/2022 09:08 AM     High Sensitivity TSH:   Lab Results   Component Value Date/Time    TSHHS 1.470 06/12/2022 06:18 AM     Free T3:   Lab Results   Component Value Date/Time    FT3 2.3 12/06/2017 08:55 AM     Free T4:  Lab Results   Component Value Date/Time    T4FREE 1.24 06/12/2022 06:18 AM       VL DUP UPPER EXTREMITY VENOUS RIGHT   Final Result   Nonocclusive thrombus attached to the PICC line in the right subclavian vein. No other significant abnormality. XR CHEST (2 VW)   Final Result   Small pleural effusions with adjacent atelectasis. Impression Right Arm   6/14/2022   Nonocclusive thrombus attached to the PICC line in the right subclavian vein.    No other significant abnormality         Scheduled Medicines Medications:      Infusions:         Objective:   Vitals: BP (!) 143/65   Pulse 88   Temp 98.2 °F (36.8 °C) (Oral)   Resp 16   Ht 5' (1.524 m)   Wt 144 lb 13.5 oz (65.7 kg)   SpO2 97%   BMI 28.29 kg/m²   General appearance: alert and cooperative with exam  Neck: no JVD or bruit  Thyroid : Normal lobes   Lungs: Has Vesicular Breath sounds   Heart:  regular rate and rhythm  Abdomen: soft, yes -tender; bowel sounds normal; no masses,  no organomegaly  Had right-sided hemicolectomy 5/27/2022  Musculoskeletal: Normal  Extremities: extremities normal, , no edema  Neurologic:  Awake, alert, oriented to name, place and time. Cranial nerves II-XII are grossly intact. Motor is  intact. Sensory,  and gait is normal.    Assessment:     Patient Active Problem List:     Long-term insulin use in type 2 diabetes [facility])     Essential hypertension     Dyslipidemia     Abnormal EKG     Abnormal stress test     Cecum mass     Severe malnutrition (HCC)     Partial small bowel obstruction ([address]     Had hemicolectomy on 5/27/2022      INVASIVE ADENOCARCINOMA, TERMINAL ILEUM      DVT of right upper arm    Plan:     1. Reviewed POC blood glucose . Labs and X ray results   2. Reviewed Current Medicines   3. On  Correction bolus Humalog Insulin regime   4. Monitor Blood glucose frequently   5. Modified  the dose of Insulin/ other medicines as needed    6. Will follow     .      Kaitlin Raymond MD, MD

## 2022-07-05 ENCOUNTER — HOSPITAL ENCOUNTER (EMERGENCY)
Age: 87
Discharge: HOME | End: 2022-07-05
Attending: EMERGENCY MEDICINE
Payer: MEDICARE

## 2022-07-05 VITALS
OXYGEN SATURATION: 97 % | WEIGHT: 140 LBS | RESPIRATION RATE: 16 BRPM | BODY MASS INDEX: 27.48 KG/M2 | DIASTOLIC BLOOD PRESSURE: 46 MMHG | HEIGHT: 60 IN | SYSTOLIC BLOOD PRESSURE: 123 MMHG | HEART RATE: 57 BPM

## 2022-07-05 DIAGNOSIS — D64.9 ANEMIA, UNSPECIFIED TYPE: Primary | ICD-10-CM

## 2022-07-05 PROBLEM — C18.2 CANCER OF RIGHT COLON (HCC): Status: ACTIVE | Noted: 2022-07-05

## 2022-07-05 LAB
ABO/RH: NORMAL
ANION GAP SERPL CALCULATED.3IONS-SCNC: 14 MMOL/L (ref 4–16)
ANTIBODY SCREEN: NEGATIVE
BASOPHILS ABSOLUTE: 0 K/CU MM
BASOPHILS RELATIVE PERCENT: 0.4 % (ref 0–1)
BUN BLDV-MCNC: 15 MG/DL (ref 6–23)
CALCIUM SERPL-MCNC: 8.8 MG/DL (ref 8.3–10.6)
CHLORIDE BLD-SCNC: 103 MMOL/L (ref 99–110)
CO2: 20 MMOL/L (ref 21–32)
CREAT SERPL-MCNC: 0.9 MG/DL (ref 0.6–1.1)
DIFFERENTIAL TYPE: ABNORMAL
EOSINOPHILS ABSOLUTE: 0.2 K/CU MM
EOSINOPHILS RELATIVE PERCENT: 2.3 % (ref 0–3)
GFR AFRICAN AMERICAN: >60 ML/MIN/1.73M2
GFR NON-AFRICAN AMERICAN: 59 ML/MIN/1.73M2
GLUCOSE BLD-MCNC: 101 MG/DL (ref 70–99)
HCT VFR BLD CALC: 24.3 % (ref 37–47)
HEMOGLOBIN: 7.2 GM/DL (ref 12.5–16)
IMMATURE NEUTROPHIL %: 0.4 % (ref 0–0.43)
INR BLD: 2.14 INDEX
LYMPHOCYTES ABSOLUTE: 2 K/CU MM
LYMPHOCYTES RELATIVE PERCENT: 25.8 % (ref 24–44)
MCH RBC QN AUTO: 25.1 PG (ref 27–31)
MCHC RBC AUTO-ENTMCNC: 29.6 % (ref 32–36)
MCV RBC AUTO: 84.7 FL (ref 78–100)
MONOCYTES ABSOLUTE: 0.6 K/CU MM
MONOCYTES RELATIVE PERCENT: 7.9 % (ref 0–4)
PDW BLD-RTO: 15 % (ref 11.7–14.9)
PLATELET # BLD: 264 K/CU MM (ref 140–440)
PMV BLD AUTO: 11.2 FL (ref 7.5–11.1)
POTASSIUM SERPL-SCNC: 4.7 MMOL/L (ref 3.5–5.1)
PROTHROMBIN TIME: 26.8 SECONDS (ref 11.7–14.5)
RBC # BLD: 2.87 M/CU MM (ref 4.2–5.4)
SEGMENTED NEUTROPHILS ABSOLUTE COUNT: 4.8 K/CU MM
SEGMENTED NEUTROPHILS RELATIVE PERCENT: 63.2 % (ref 36–66)
SODIUM BLD-SCNC: 137 MMOL/L (ref 135–145)
TOTAL IMMATURE NEUTOROPHIL: 0.03 K/CU MM
WBC # BLD: 7.6 K/CU MM (ref 4–10.5)

## 2022-07-05 PROCEDURE — 80048 BASIC METABOLIC PNL TOTAL CA: CPT

## 2022-07-05 PROCEDURE — 85025 COMPLETE CBC W/AUTO DIFF WBC: CPT

## 2022-07-05 PROCEDURE — 86850 RBC ANTIBODY SCREEN: CPT

## 2022-07-05 PROCEDURE — 86900 BLOOD TYPING SEROLOGIC ABO: CPT

## 2022-07-05 PROCEDURE — 85610 PROTHROMBIN TIME: CPT

## 2022-07-05 PROCEDURE — 86901 BLOOD TYPING SEROLOGIC RH(D): CPT

## 2022-07-05 PROCEDURE — 99283 EMERGENCY DEPT VISIT LOW MDM: CPT

## 2022-07-05 ASSESSMENT — ENCOUNTER SYMPTOMS
BLOOD IN STOOL: 1
ABDOMINAL PAIN: 0
CONSTIPATION: 0
VOMITING: 0
SHORTNESS OF BREATH: 0
DIARRHEA: 0
RESPIRATORY NEGATIVE: 1
NAUSEA: 0

## 2022-07-05 ASSESSMENT — PAIN - FUNCTIONAL ASSESSMENT: PAIN_FUNCTIONAL_ASSESSMENT: NONE - DENIES PAIN

## 2022-07-05 NOTE — ED TRIAGE NOTES
Arrived ambulatory with walker to room 6 for triage. Tolerated without difficulty. Bed in lowest position. Call light given. Gowned for exam, placed on cardiac monitor.

## 2022-07-05 NOTE — ED NOTES
Discharge instructions reviewed with patient and patients daughter. No additional questions asked. Voiced understanding. Encouraged patient to follow up as discussed by the ED physician. Provided patient copy of this visits lab results.      Asad Sun RN  07/05/22 2331

## 2022-07-05 NOTE — ED PROVIDER NOTES
Triage Chief Complaint:   Abnormal Lab (Patient was seen by PCP today for hospital follow up from a colon resection 05/27/2022. Patient states hgb was 6.8 (unable to find results d/t lab results through [place] [address]). Patient c/o fatigue, pale. Patient was having dark stool but states that has resolved.)    Unga:  Keyanna Carpenter is a 80 y.o. female that presents to the ED with her daughter with a complaint of a low hemoglobin per the outside lab. 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 She had resultant right upper extremity subclavian vein DVT due to a PICC line. PICC line is currently out. She is starting Xarelto patient has had some bleeding per rectum that is since stopped her she states her stools are dark but not black she denies being on any iron she is here only because of the abnormal labs she complains of just generally being tired that is not new no shortness of breath no chest pain. Past Medical History:   Diagnosis Date    Cancer of right colon ([address] 7/5/2022    Diabetes mellitus ([facility] [address] [facility]     H/O cardiac catheterization 05/18/2021    no significant CAD in main vessels, has severe stenosis in small  branch diagonal vessel    H/O cardiovascular stress test 3/22/18,03/30/2017    ECG portion of the stress test is negative for ischemia EF >70% Normal stress myocardial perfusion.  H/O cardiovascular stress test 04/07/2021    abnormal stress    H/O Doppler ultrasound (Abdomimal Aorta) 03/29/2017    No evidence of abdominal aortic aneurysm.  H/O echocardiogram 07/02/2014    Normal left ventricular size, wall motion and systolic function. [place]le elevated pulmonary artery systolic pressure. Mild MR and TR and trace AR EF 55 to 60%    Hx of echocardiogram 03/29/2017    EF 55-60%. Grade I diastolic dysfunction. Mildly dilated left atrium. No evidence of pericardial effusion.      Hyperlipidemia     Hypertension     Sleep apnea     Thyroid disease      Past Surgical History:   Procedure Laterality Date    BREAST BIOPSY Right     approx age 76, benign    CATARACT REMOVAL      CHOLECYSTECTOMY      COLONOSCOPY N/A 5/25/2022    COLONOSCOPY POLYPECTOMY SNARE/COLD BIOPSY performed by Consuelo De Jesus MD at [facility] N/A 5/27/2022    BOWEL RESECTION RIGHT HEMICOLECTOMY LAPAROSCOPIC ROBOTIC XI performed by Vaughn Jose MD at [place] [address]      eye lift     Family History   Problem Relation Age of Onset   [de-identified] Pacemaker Sister    [de-identified] Arrhythmia Sister     Breast Cancer Sister         pre menopausal    Ovarian Cancer Neg Hx      Social History     Socioeconomic History    Marital status:      Spouse name: Not on file    Number of children: Not on file    Years of education: Not on file    Highest education level: Not on file   Occupational History    Not on file   Tobacco Use    Smoking status: Never Smoker    Smokeless tobacco: Never Used   Vaping Use    Vaping Use: Never used   Substance and Sexual Activity    Alcohol use: Not Currently     Alcohol/week: 1.0 standard drink     Types: 1 Glasses of wine per week     Comment: rarely    Drug use: No    Sexual activity: Not on file     Comment:    Other Topics Concern    Not on file   Social History Narrative    Not on file     Social Determinants of Health     Financial Resource Strain:     Difficulty of Paying Living Expenses: Not on file   Food Insecurity:     Worried About Running Out of Food in the Last Year: Not on file    Varsha of Food in the Last Year: Not on file   Transportation Needs:     Lack of Transportation (Medical): Not on file    Lack of Transportation (Non-Medical):  Not on file   Physical Activity:     Days of Exercise per Week: Not on file    Minutes of Exercise per Session: Not on file   Stress:     Feeling of Stress : Not on file   Social Connections:     Frequency of Communication with Friends and Family: Not on file    Frequency of Social Gatherings with Friends and Family: Not on file    Attends Denominational Services: Not on file    Active Member of Clubs or Organizations: Not on file    Attends Club or Organization Meetings: Not on file    Marital Status: Not on file   Intimate Partner Violence:     Fear of Current or Ex-Partner: Not on file    Emotionally Abused: Not on file    Physically Abused: Not on file    Sexually Abused: Not on file   Housing Stability:     Unable to Pay for Housing in the Last Year: Not on file    Number of [place] in the Last Year: Not on file    Unstable Housing in the Last Year: Not on file     No current facility-administered medications for this encounter.      Current Outpatient Medications   Medication Sig Dispense Refill    rivaroxaban (XARELTO) 15 MG TABS tablet Take 1 tablet by mouth 2 times daily (with meals) for 14 days 28 tablet 0    escitalopram (LEXAPRO) 10 MG tablet Take 1 tablet by mouth daily 30 tablet 0    urea (URE-NA) 15 g PACK packet Take 15 g by mouth in the morning and at bedtime (Patient not taking: Reported on 6/28/2022) 60 each 0    sodium chloride 1 g tablet Take 1 tablet by mouth 2 times daily (with meals) 60 tablet 0    pantoprazole (PROTONIX) 40 MG tablet Take 1 tablet by mouth 2 times daily (before meals) 60 tablet 0    sucralfate (CARAFATE) 1 GM tablet Take 1 tablet by mouth 4 times daily (before meals and nightly) 120 tablet 0    [START ON 7/8/2022] rivaroxaban (XARELTO) 20 MG TABS tablet Take 1 tablet by mouth daily (with breakfast) (Patient not taking: Reported on 6/28/2022) 30 tablet 0    melatonin 3 MG TABS tablet Take 3 mg by mouth nightly as needed      levocetirizine (XYZAL) 5 MG tablet 1 tablet daily      albuterol sulfate  (90 Base) MCG/ACT inhaler as needed      vitamin B-12 (CYANOCOBALAMIN) 100 MCG tablet Take 100 mcg by mouth daily      LANTUS SOLOSTAR 100 UNIT/ML injection pen       Calcium-Magnesium-Vitamin D (CALCIUM 1200+D3 PO) Take by mouth daily      Cholecalciferol (VITAMIN D PO) Take by mouth      oxybutynin (DITROPAN) 5 MG tablet Take 5 mg by mouth 2 times daily      atorvastatin (LIPITOR) 20 MG tablet Take 20 mg by mouth daily      rOPINIRole (REQUIP) 3 MG tablet Take 3 mg by mouth nightly      levothyroxine (SYNTHROID) 137 MCG tablet Take 137 mcg by mouth daily   3    Insulin Pen Needle (PEN NEEDLES 31GX5/16\") 31G X 8 MM MISC       metoprolol (LOPRESSOR) 50 MG tablet 3 pills in the AM : 150 mg dose  2 pills in the PM: 100 mg dose       Allergies   Allergen Reactions    Lopid [Gemfibrozil] Shortness Of Breath and Other (See Comments)     Cough    Bystolic [Nebivolol Hcl]     Cefdinir     Coreg [Carvedilol]     Crestor [Rosuvastatin]     Fenofibrate     Glucophage [Metformin]     Hydrochlorothiazide Other (See Comments)    Metronidazole     Norvasc [Amlodipine Besylate]     Tribenzor [Olmesartan-Amlodipine-Hctz]      Pt states that her chest felt funny and achy when she took the medication    Zocor [Simvastatin]          ROS:    Review of Systems   Constitutional: Positive for fatigue. Negative for activity change. Respiratory: Negative. Cardiovascular: Negative. Gastrointestinal:        Recent surgery for adenocarcinoma of the terminal ileum history of recent GI bleeding-negative per patient   Genitourinary: Negative. Allergic/Immunologic: Positive for environmental allergies. Hematological: Negative. All other systems reviewed and are negative. Nursing Notes Reviewed    Physical Exam:      ED Triage Vitals [07/05/22 1415]   Enc Vitals Group      BP       Pulse       Resp       Temp       Temp src       SpO2       Weight 140 lb (63.5 kg)      Height 5' (1.524 m)      Head Circumference       Peak Flow       Pain Score       Pain Loc       Pain Edu? Excl.  in [address]? Physical Exam  Vitals and nursing note reviewed. Exam conducted with a chaperone present. Constitutional:       Appearance: She is well-developed. HENT:      Head: Normocephalic and atraumatic. Right Ear: External ear normal.      Left Ear: External ear normal.   Eyes:      General: No scleral icterus. Right eye: No discharge. Left eye: No discharge. Conjunctiva/sclera: Conjunctivae normal.      Pupils: Pupils are equal, round, and reactive to light. Neck:      Thyroid: No thyromegaly. Vascular: No JVD. Trachea: No tracheal deviation. Cardiovascular:      Rate and Rhythm: Normal rate and regular rhythm. Heart sounds: Normal heart sounds. No murmur heard. No friction rub. No gallop. Pulmonary:      Effort: Pulmonary effort is normal. No respiratory distress. Breath sounds: Normal breath sounds. No stridor. No wheezing or rales. Chest:      Chest wall: No tenderness. Abdominal:      General: A surgical scar is present. Bowel sounds are normal. There is no distension. Palpations: Abdomen is soft. There is no mass. Tenderness: There is no abdominal tenderness. There is no guarding or rebound. Hernia: No hernia is present. Genitourinary:     Rectum: Guaiac result positive. No mass, tenderness or anal fissure. Normal anal tone. Musculoskeletal:         General: No tenderness or deformity. Normal range of motion. Cervical back: Normal range of motion and neck supple. Lymphadenopathy:      Cervical: No cervical adenopathy. Skin:     General: Skin is warm and dry. Capillary Refill: Capillary refill takes less than 2 seconds. Coloration: Skin is pale. Findings: No erythema or rash. Neurological:      Mental Status: She is alert and oriented to person, place, and time. Cranial Nerves: No cranial nerve deficit. Sensory: No sensory deficit. Deep Tendon Reflexes: Reflexes are normal and symmetric. interpreted by myself in the absence of a cardiologist)    Chart review shows recent radiographs:  XR CHEST (2 VW)    Result Date: 6/13/2022  EXAMINATION: TWO XRAY VIEWS OF THE CHEST 6/13/2022 10:58 am COMPARISON: 05/14/2021 HISTORY: ORDERING SYSTEM PROVIDED HISTORY: evalu forlung mass in pt with low sodium. thank you TECHNOLOGIST PROVIDED HISTORY: Reason for exam:->evalu forlung mass in pt with low sodium. thank you Reason for Exam: evalu forlung mass in pt with low sodium. thank you Initial encounter FINDINGS: Right PICC line with the tip projecting over the mid SVC. Small pleural effusions with adjacent atelectasis. No focal consolidation or pneumothorax. The cardiomediastinal silhouette is stable. No overt pulmonary edema. The osseous structures are stable. Small pleural effusions with adjacent atelectasis. XR LUMBAR SPINE (2-3 VIEWS)    Result Date: 6/7/2022  EXAMINATION: THREE XRAY VIEWS OF THE LUMBAR SPINE 6/7/2022 8:15 am COMPARISON: 05/21/2022 HISTORY: ORDERING SYSTEM PROVIDED HISTORY: back pain TECHNOLOGIST PROVIDED HISTORY: Reason for exam:->back pain Reason for Exam: back pain FINDINGS: Lumbar vertebral body heights and alignment demonstrate no acute abnormality. Severe multilevel disc and facet degenerative changes. Sacroiliac joints are maintained. Catheter terminates in the right upper quadrant. Gas-filled distended small and large bowel are present with midline skin staples. Findings favor ileus. No acute abnormality in the lumbar spine. Findings favoring ileus.      FL UGI W SMALL BOWEL    Result Date: 6/7/2022  EXAMINATION: SINGLE CONTRAST UPPER GI SERIES 6/7/2022 HISTORY: ORDERING SYSTEM PROVIDED HISTORY: acid reflux, ?vomiting, s/p right hemicolectomy TECHNOLOGIST PROVIDED HISTORY: Reason for exam:->acid reflux, ?vomiting, s/p right hemicolectomy COMPARISON: 5/31/22 TECHNIQUE: Multiple single contrast images of the esophagus, gastroesophageal junction and stomach were obtained following the oral administration of water soluble contrast FLUOROSCOPY DOSE AND TYPE OR TIME AND EXPOSURES: Dose Area Product: 2724.86 uGy/m2 FINDINGS: Limited exam secondary to patient condition, status post right hemicolectomy. The patient was not NPO.  radiographs demonstrate multiple dilated loops of small bowel throughout the abdomen. Surgical drain in place. On the limited single contrast images, no evidence of esophageal stricture or esophageal obstruction. The esophagus demonstrates normal peristalsis under fluoroscopy. Filling defects are seen within the stomach, which are nonspecific and may represent enteric material.  Contrast promptly filled the small bowel. Persistent multiple dilated loops of small bowel throughout the abdomen. No visualized obstruction. The study was terminated at 90 minutes, after the x-ray tech, Jerri Hendricks, spoke with Dr. Bria Proctor who believed contrast had reached the large bowel. No discrete leak identified. Limited single contrast upper GI reveals no discrete obstruction. Filling defects within the stomach are nonspecific and may represent enteric material.  Consider follow-up outpatient dedicated double-contrast upper GI or endoscopy. Multiple dilated loops of small bowel throughout the abdomen without discrete obstruction. VL DUP UPPER EXTREMITY VENOUS RIGHT    Result Date: 6/14/2022  EXAMINATION: DUPLEX ULTRASOUND OF THE RIGHT UPPER EXTREMITY FOR DVT, 6/14/2022 12:44 pm TECHNIQUE: Duplex ultrasound using B-mode/gray scaled imaging and Doppler spectral analysis and color flow was obtained of the deep venous structures of the upper right upper extremity. COMPARISON: None HISTORY: ORDERING SYSTEM PROVIDED HISTORY: swelling on extremity with PICC. r/o DVT, thrombophlebitis, abscess, or lymphedema TECHNOLOGIST PROVIDED HISTORY: Reason for exam:->swelling on extremity with PICC.  r/o DVT, thrombophlebitis, abscess, or lymphedema Reason for Exam: swelling to right arm, PICC in right Bas. v FINDINGS: Nonocclusive thrombus can be seen attached to the PICC line in the right subclavian vein. The jugular, cephalic, axillary, brachial and basilic veins are patent with normal focal compression and augmentation. Radial and ulnar veins appear stable. Nonocclusive thrombus attached to the PICC line in the right subclavian vein. No other significant abnormality. VL DUP UPPER EXTREMITY VENOUS LEFT    Result Date: 6/9/2022  EXAMINATION: DUPLEX ULTRASOUND OF THE LEFT UPPER EXTREMITY FOR DVT, 6/9/2022 6:22 am TECHNIQUE: Duplex ultrasound using B-mode/gray scaled imaging and Doppler spectral analysis and color flow was obtained of the deep venous structures of the upper left extremity. COMPARISON: None. HISTORY: ORDERING SYSTEM PROVIDED HISTORY: dvt? TECHNOLOGIST PROVIDED HISTORY: Reason for exam:->dvt? Reason for Exam: Left arm pain and swelling FINDINGS: There is normal flow and compressibility of the visualized venous structures. There is no evidence of echogenic thrombus. The veins demonstrate good compressibility with normal color flow study and spectral analysis. No evidence of DVT. MDM:      Patient presents to the ED with a reported low hemoglobin she is tired fatigued this is after her recent surgery she was followed up with a physician outside the [facility]. I do not have access to the labs today her hemoglobin is 7.2. She has normal indices. I recommend follow-up with a hematologist at a scheduled this Friday she may require further outpatient evaluation at this point since her MCV is normal I will not start her on iron this can be determination by her hematologist and her PCP. Patient understands the plan at this point she does not require blood transfusion    Please note that portions of this note may have been completed with a voice recognition/dictation program. Efforts were made to edit the dictations but occasionally words are mis-transcribed.    All pertinent Lab data and radiographic results reviewed with patient at bedside. The patient and/or the family were informed of the results of any tests/labs/imaging, the treatment plan, and time was allotted to answer questions. See chart for details of medications given during the ED stay.     The likelihood of other entities in the differential is insufficient to justify any further testing for them. This was explained to the patient. The patient was advised that persistent or worsening symptoms would require further evaluation.                    Clinical Impression:  1. Anemia, unspecified type      Disposition referral (if applicable): Melvina Aj DO   [address] [facility] [id number] [place] [address],[address] [id number] [phone]    Schedule an appointment as soon as possible for a visit   If symptoms worsen    Disposition medications (if applicable):  Discharge Medication List as of 7/5/2022  3:11 PM              Maxine Benítez DO, FACEP      Comment: Please note this report has been produced using speech recognition software and maycontain errors related to that system including errors in grammar, punctuation, and spelling, as well as words and phrases that may be inappropriate. If there are any questions or concerns please feel free to contact thedictating provider for clarification.         Jil Rogers DO  07/05/22 0565

## 2022-07-08 ENCOUNTER — OFFICE VISIT (OUTPATIENT)
Dept: ONCOLOGY | Age: 87
End: 2022-07-08
Payer: MEDICARE

## 2022-07-08 ENCOUNTER — HOSPITAL ENCOUNTER (OUTPATIENT)
Dept: INFUSION THERAPY | Age: 87
Discharge: HOME | End: 2022-07-08
Payer: MEDICARE

## 2022-07-08 ENCOUNTER — HOSPITAL ENCOUNTER (OUTPATIENT)
Age: 87
Discharge: HOME | End: 2022-07-08
Attending: INTERNAL MEDICINE | Admitting: INTERNAL MEDICINE
Payer: MEDICARE

## 2022-07-08 ENCOUNTER — CLINICAL DOCUMENTATION (OUTPATIENT)
Dept: ONCOLOGY | Age: 87
End: 2022-07-08

## 2022-07-08 VITALS
TEMPERATURE: 98.4 F | DIASTOLIC BLOOD PRESSURE: 54 MMHG | HEART RATE: 79 BPM | SYSTOLIC BLOOD PRESSURE: 141 MMHG | RESPIRATION RATE: 18 BRPM | OXYGEN SATURATION: 97 %

## 2022-07-08 VITALS
BODY MASS INDEX: 27.84 KG/M2 | SYSTOLIC BLOOD PRESSURE: 93 MMHG | WEIGHT: 141.8 LBS | TEMPERATURE: 97.2 F | HEART RATE: 82 BPM | DIASTOLIC BLOOD PRESSURE: 44 MMHG | HEIGHT: 60 IN

## 2022-07-08 DIAGNOSIS — C18.2 CANCER OF RIGHT COLON (HCC): Primary | ICD-10-CM

## 2022-07-08 DIAGNOSIS — D64.9 ANEMIA, UNSPECIFIED TYPE: ICD-10-CM

## 2022-07-08 DIAGNOSIS — C18.2 CANCER OF RIGHT COLON (HCC): ICD-10-CM

## 2022-07-08 DIAGNOSIS — C18.2 MALIGNANT NEOPLASM OF ASCENDING COLON (HCC): ICD-10-CM

## 2022-07-08 DIAGNOSIS — C18.2 MALIGNANT NEOPLASM OF ASCENDING COLON (HCC): Primary | ICD-10-CM

## 2022-07-08 LAB
ALBUMIN SERPL-MCNC: 3.4 GM/DL (ref 3.4–5)
ALP BLD-CCNC: 50 IU/L (ref 40–129)
ALT SERPL-CCNC: 10 U/L (ref 10–40)
ANION GAP SERPL CALCULATED.3IONS-SCNC: 12 MMOL/L (ref 4–16)
AST SERPL-CCNC: 17 IU/L (ref 15–37)
BASOPHILS ABSOLUTE: 0 K/CU MM
BASOPHILS RELATIVE PERCENT: 0.3 % (ref 0–1)
BILIRUB SERPL-MCNC: 0.4 MG/DL (ref 0–1)
BUN BLDV-MCNC: 18 MG/DL (ref 6–23)
CALCIUM SERPL-MCNC: 8.7 MG/DL (ref 8.3–10.6)
CEA: 3.9 NG/ML
CHLORIDE BLD-SCNC: 105 MMOL/L (ref 99–110)
CO2: 22 MMOL/L (ref 21–32)
CREAT SERPL-MCNC: 0.8 MG/DL (ref 0.6–1.1)
DIFFERENTIAL TYPE: ABNORMAL
EOSINOPHILS ABSOLUTE: 0.2 K/CU MM
EOSINOPHILS RELATIVE PERCENT: 2.7 % (ref 0–3)
ERYTHROCYTE SEDIMENTATION RATE: 17 MM/HR (ref 0–30)
FERRITIN: 23 NG/ML (ref 15–150)
FOLATE: 11.3 NG/ML (ref 3.1–17.5)
GFR AFRICAN AMERICAN: >60 ML/MIN/1.73M2
GFR NON-AFRICAN AMERICAN: >60 ML/MIN/1.73M2
GLUCOSE BLD-MCNC: 89 MG/DL (ref 70–99)
HCT VFR BLD CALC: 21.3 % (ref 37–47)
HCT VFR BLD CALC: 30.5 % (ref 37–47)
HEMOGLOBIN: 6.3 GM/DL (ref 12.5–16)
HEMOGLOBIN: 9.1 GM/DL (ref 12.5–16)
IRON: 21 UG/DL (ref 37–145)
LACTATE DEHYDROGENASE: 199 IU/L (ref 120–246)
LYMPHOCYTES ABSOLUTE: 1.7 K/CU MM
LYMPHOCYTES RELATIVE PERCENT: 19 % (ref 24–44)
MCH RBC QN AUTO: 24.7 PG (ref 27–31)
MCHC RBC AUTO-ENTMCNC: 29.6 % (ref 32–36)
MCV RBC AUTO: 83.5 FL (ref 78–100)
MONOCYTES ABSOLUTE: 0.7 K/CU MM
MONOCYTES RELATIVE PERCENT: 7.9 % (ref 0–4)
PCT TRANSFERRIN: 6 % (ref 10–44)
PDW BLD-RTO: 15.7 % (ref 11.7–14.9)
PLATELET # BLD: 217 K/CU MM (ref 140–440)
PMV BLD AUTO: 11.1 FL (ref 7.5–11.1)
POTASSIUM SERPL-SCNC: 4.3 MMOL/L (ref 3.5–5.1)
RBC # BLD: 2.55 M/CU MM (ref 4.2–5.4)
RETICULOCYTE COUNT PCT: 3.4 % (ref 0.2–2.2)
SEGMENTED NEUTROPHILS ABSOLUTE COUNT: 6.3 K/CU MM
SEGMENTED NEUTROPHILS RELATIVE PERCENT: 70.1 % (ref 36–66)
SODIUM BLD-SCNC: 139 MMOL/L (ref 135–145)
TOTAL IRON BINDING CAPACITY: 344 UG/DL (ref 250–450)
TOTAL PROTEIN: 5.5 GM/DL (ref 6.4–8.2)
TSH HIGH SENSITIVITY: 0.1 UIU/ML (ref 0.27–4.2)
UNSATURATED IRON BINDING CAPACITY: 323 UG/DL (ref 110–370)
VITAMIN B-12: 1035 PG/ML (ref 211–911)
WBC # BLD: 9 K/CU MM (ref 4–10.5)

## 2022-07-08 PROCEDURE — 80053 COMPREHEN METABOLIC PANEL: CPT

## 2022-07-08 PROCEDURE — 83615 LACTATE (LD) (LDH) ENZYME: CPT

## 2022-07-08 PROCEDURE — 85652 RBC SED RATE AUTOMATED: CPT

## 2022-07-08 PROCEDURE — 85018 HEMOGLOBIN: CPT

## 2022-07-08 PROCEDURE — 86922 COMPATIBILITY TEST ANTIGLOB: CPT

## 2022-07-08 PROCEDURE — 83550 IRON BINDING TEST: CPT

## 2022-07-08 PROCEDURE — 36415 COLL VENOUS BLD VENIPUNCTURE: CPT

## 2022-07-08 PROCEDURE — 86900 BLOOD TYPING SEROLOGIC ABO: CPT

## 2022-07-08 PROCEDURE — 6360000002 HC RX W HCPCS: Performed by: NURSE PRACTITIONER

## 2022-07-08 PROCEDURE — 96374 THER/PROPH/DIAG INJ IV PUSH: CPT

## 2022-07-08 PROCEDURE — 86901 BLOOD TYPING SEROLOGIC RH(D): CPT

## 2022-07-08 PROCEDURE — 85025 COMPLETE CBC W/AUTO DIFF WBC: CPT

## 2022-07-08 PROCEDURE — 83540 ASSAY OF IRON: CPT

## 2022-07-08 PROCEDURE — 85045 AUTOMATED RETICULOCYTE COUNT: CPT

## 2022-07-08 PROCEDURE — 99215 OFFICE O/P EST HI 40 MIN: CPT | Performed by: INTERNAL MEDICINE

## 2022-07-08 PROCEDURE — 82728 ASSAY OF FERRITIN: CPT

## 2022-07-08 PROCEDURE — 1124F ACP DISCUSS-NO DSCNMKR DOCD: CPT | Performed by: INTERNAL MEDICINE

## 2022-07-08 PROCEDURE — 82378 CARCINOEMBRYONIC ANTIGEN: CPT

## 2022-07-08 PROCEDURE — 85014 HEMATOCRIT: CPT

## 2022-07-08 PROCEDURE — 82746 ASSAY OF FOLIC ACID SERUM: CPT

## 2022-07-08 PROCEDURE — 86850 RBC ANTIBODY SCREEN: CPT

## 2022-07-08 PROCEDURE — 84443 ASSAY THYROID STIM HORMONE: CPT

## 2022-07-08 PROCEDURE — 82607 VITAMIN B-12: CPT

## 2022-07-08 PROCEDURE — P9016 RBC LEUKOCYTES REDUCED: HCPCS

## 2022-07-08 PROCEDURE — 36430 TRANSFUSION BLD/BLD COMPNT: CPT

## 2022-07-08 RX ORDER — FAMOTIDINE 10 MG/ML
20 INJECTION, SOLUTION INTRAVENOUS
OUTPATIENT
Start: 2022-07-08

## 2022-07-08 RX ORDER — ACETAMINOPHEN 325 MG/1
650 TABLET ORAL ONCE
OUTPATIENT
Start: 2022-07-08 | End: 2022-07-08

## 2022-07-08 RX ORDER — ONDANSETRON 2 MG/ML
8 INJECTION INTRAMUSCULAR; INTRAVENOUS
OUTPATIENT
Start: 2022-07-08

## 2022-07-08 RX ORDER — EPINEPHRINE 1 MG/ML
0.3 INJECTION, SOLUTION, CONCENTRATE INTRAVENOUS PRN
OUTPATIENT
Start: 2022-07-08

## 2022-07-08 RX ORDER — SODIUM CHLORIDE 1000 MG
1 TABLET, SOLUBLE MISCELLANEOUS 2 TIMES DAILY
COMMUNITY

## 2022-07-08 RX ORDER — METHYLPREDNISOLONE SODIUM SUCCINATE 40 MG/ML
40 INJECTION, POWDER, LYOPHILIZED, FOR SOLUTION INTRAMUSCULAR; INTRAVENOUS ONCE
Start: 2022-07-08 | End: 2022-07-08

## 2022-07-08 RX ORDER — ACETAMINOPHEN 325 MG/1
650 TABLET ORAL
OUTPATIENT
Start: 2022-07-08

## 2022-07-08 RX ORDER — SODIUM CHLORIDE 9 MG/ML
INJECTION, SOLUTION INTRAVENOUS PRN
Status: DISCONTINUED | OUTPATIENT
Start: 2022-07-08 | End: 2022-07-08 | Stop reason: HOSPADM

## 2022-07-08 RX ORDER — FUROSEMIDE 10 MG/ML
20 INJECTION INTRAMUSCULAR; INTRAVENOUS ONCE
Status: COMPLETED | OUTPATIENT
Start: 2022-07-08 | End: 2022-07-08

## 2022-07-08 RX ORDER — SODIUM CHLORIDE 9 MG/ML
20 INJECTION, SOLUTION INTRAVENOUS CONTINUOUS
OUTPATIENT
Start: 2022-07-08

## 2022-07-08 RX ORDER — DIPHENHYDRAMINE HCL 25 MG
25 TABLET ORAL ONCE
OUTPATIENT
Start: 2022-07-08 | End: 2022-07-08

## 2022-07-08 RX ORDER — ALBUTEROL SULFATE 90 UG/1
4 AEROSOL, METERED RESPIRATORY (INHALATION) PRN
OUTPATIENT
Start: 2022-07-08

## 2022-07-08 RX ORDER — DIPHENHYDRAMINE HYDROCHLORIDE 50 MG/ML
50 INJECTION INTRAMUSCULAR; INTRAVENOUS
OUTPATIENT
Start: 2022-07-08

## 2022-07-08 RX ORDER — SODIUM CHLORIDE 9 MG/ML
INJECTION, SOLUTION INTRAVENOUS CONTINUOUS
OUTPATIENT
Start: 2022-07-08

## 2022-07-08 RX ORDER — SODIUM CHLORIDE 0.9 % (FLUSH) 0.9 %
5-40 SYRINGE (ML) INJECTION PRN
OUTPATIENT
Start: 2022-07-08

## 2022-07-08 RX ORDER — SODIUM CHLORIDE 9 MG/ML
25 INJECTION, SOLUTION INTRAVENOUS PRN
OUTPATIENT
Start: 2022-07-08

## 2022-07-08 RX ORDER — CAPECITABINE 500 MG/1
TABLET, FILM COATED ORAL
Qty: 84 TABLET | Refills: 5 | Status: ON HOLD | OUTPATIENT
Start: 2022-07-08 | End: 2022-11-04 | Stop reason: HOSPADM

## 2022-07-08 RX ADMIN — FUROSEMIDE 20 MG: 10 INJECTION, SOLUTION INTRAVENOUS at 19:04

## 2022-07-08 ASSESSMENT — PATIENT HEALTH QUESTIONNAIRE - PHQ9
2. FEELING DOWN, DEPRESSED OR HOPELESS: 0
SUM OF ALL RESPONSES TO PHQ QUESTIONS 1-9: 0
SUM OF ALL RESPONSES TO PHQ9 QUESTIONS 1 & 2: 0
SUM OF ALL RESPONSES TO PHQ QUESTIONS 1-9: 0
SUM OF ALL RESPONSES TO PHQ QUESTIONS 1-9: 0
1. LITTLE INTEREST OR PLEASURE IN DOING THINGS: 0
SUM OF ALL RESPONSES TO PHQ QUESTIONS 1-9: 0

## 2022-07-08 NOTE — PROGRESS NOTES
MA Rooming Questions  Patient: Charisma Renteria  MRN: [id number]    Date: 7/8/2022         Hospital f/u    5. Did the patient have a depression screening completed today?  Yes    PHQ-9 Total Score: 0 (7/8/2022 11:04 AM)       PHQ-9 Given to (if applicable):               PHQ-9 Score (if applicable):                     [] Positive     [x]  Negative              Does question #9 need addressed (if applicable)                     [] Yes    []  No               Crystal Chung CMA

## 2022-07-08 NOTE — PROGRESS NOTES
Patient scheduled for blood transfusion at [facility] OP infusion today 07/08/22 @ 1230. 1 unit PRBC ordered per physician order. Blood therapy plan added.

## 2022-07-08 NOTE — PROGRESS NOTES
Labs from 07/08/22 reviewed and iron is low. Patient to take ferrous sulfate 325 mg daily per physician instruction. Also, chemo tx regimen added per physician. RX for xeloda 500 mg sent to [address] [place] [address] with below instructions: Take 3 tabs PO BID for 14 days on then 7 days off Q21D    Nurse Navigator referral placed for chemo education. Attempted to call patient at [phone] to notify, but no answer at this time. VM left with this RN direct contact number requesting call back to discuss iron and oral chemo.

## 2022-07-08 NOTE — PROGRESS NOTES
[facility] Update    Date: 07/08/22    Medication has been routed to [facility] per patient's preference. No PA required. Please call us with any questions at [phone] opt.  2.

## 2022-07-08 NOTE — PROGRESS NOTES
Pt is alert and oriented, cooperatvie with care. Pt has family at bedside, all agreeable to plan of care. Pt has PIV placed in left forearm, 20g, see chart. Pt has 1 unit PRBC started, no complications, rate increased to 125ml per hour. PT uses a walker to ambulate to bathroom with staff. Pt has all needs met, call light in reach, thanks staff for care. Denies complaints.

## 2022-07-08 NOTE — PROGRESS NOTES
Patient Name:  Jacinta Mccarthy  Patient :  1934  Patient MRN:  [id number]     Primary Oncologist: Orlando George MD  Referring Provider: Brittney Aj DO     Date of Service: 2022      Reason for Consult: To follow up with stage III terminal ileum adenocarcinoma. 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 female with medical history significant for CKD stage IIIa, diverticulitis, IDDM 2 with peripheral neuropathy, HTN, HLD, G1DD, KEVIN, hypothyroidism, and vitamin B12 deficiency, presented on 22 with complaints of abdominal pain.      She has been treated recently for abdominal pain and suspected diverticulitis, but was not improving after getting antibiotics which prompted her to return to the ED. She denies any personal or family history of colon cancer. Her sister had breast cancer in her 42's. She has had colonoscopies in the past which were normal, but thinks her last one was more than 10 years ago. She denies any previous problems with ovarian cysts or adnexal lesions.       She underwent colonoscopy on 22 and it showed large mass extending from the ileocecal valve into the cecum mass originating at ileocecal valve appears to be more tubulovillous, mass in the cecum appears to be more firm fixed ulcerated consistent with malignancy. Biopsies were obtained.  Mild to moderate sigmoid diverticulosis and grade 2 hemorrhoids and incidental lipoma in transverse colon     Biopsy from cecal mass showed invasive moderately differentiated adenocarcinoma. MRI pelvis showed complex ovarian cyst without internal enhancement to suggest solid mass. Radiologist recommend f/u imaging with CT or MRI in 6 months.      She is s/p surgery on 5/27/22. Final pathology revealed that she has stage III terminal ileum adenocarcinoma (pT3, pN1c). She has lost about 25 lbs over last 2 - 3 months before surgery. Her CEA on 5/22/22 was 3.7. On July 11, 2022, she presented to me for follow-up. 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 I reviewed final path report with her and her family. We also reviewed NCCN guidelines. I also let her know that we ideally recommend adjuvant chemotherapy with either CapeOx (capecitabine + oxaliplatin) for 3 months, FOLFOX for 6 months or single agent capecitabine (in frail patient) for six months. I also discussed with her and her family all the potential side effects as well as benefits of adjuvant chemotherapy. After long discussion with her and family, they are in agreement to start adjuvant chemotherapy with capecitabine. I will set up for chemo education and I recommended to stop chemotherapy as soon as she receives the chemotherapy. I will continue to follow her closely during chemotherapy. She does not have any significant symptoms at today's visit. Past Medical History:     Significant for  1. Hypertension  2. Hyperlipidemia  3. Diabetes mellitus  4. Hypothyroidism  5. Obstructive sleep apnea  6. Coronary artery disease    Past Surgery History:    Significant for  1. Cholecystectomy  2. Dilatation and curettage  3. Cataract surgery  4. Right breast biopsy [benign]  5. Right hemicolectomy    Social History:   She denies smoking or illicit drug abuse.   She socially drinks alcohol. Family History:    Significant for breast cancer in one of her sister. Allergies   Allergen Reactions    Lopid [Gemfibrozil] Shortness Of Breath and Other (See Comments)     Cough    Bystolic [Nebivolol Hcl]     Cefdinir     Coreg [Carvedilol]     Crestor [Rosuvastatin]     Fenofibrate     Glucophage [Metformin]     Hydrochlorothiazide Other (See Comments)    Metronidazole     Norvasc [Amlodipine Besylate]     Tribenzor [Olmesartan-Amlodipine-Hctz]      Pt states that her chest felt funny and achy when she took the medication    Zocor [Simvastatin]        Current Outpatient Medications on File Prior to Visit   Medication Sig Dispense Refill    rivaroxaban (XARELTO) 15 MG TABS tablet Take 15 mg by mouth      sodium chloride 1 g tablet Take 1 g by mouth 2 times daily      escitalopram (LEXAPRO) 10 MG tablet Take 1 tablet by mouth daily 30 tablet 0    pantoprazole (PROTONIX) 40 MG tablet Take 1 tablet by mouth 2 times daily (before meals) 60 tablet 0    sucralfate (CARAFATE) 1 GM tablet Take 1 tablet by mouth 4 times daily (before meals and nightly) 120 tablet 0    melatonin 3 MG TABS tablet Take 3 mg by mouth nightly as needed      vitamin B-12 (CYANOCOBALAMIN) 100 MCG tablet Take 100 mcg by mouth daily      LANTUS SOLOSTAR 100 UNIT/ML injection pen       Cholecalciferol (VITAMIN D PO) Take by mouth      oxybutynin (DITROPAN) 5 MG tablet Take 5 mg by mouth 2 times daily      atorvastatin (LIPITOR) 20 MG tablet Take 20 mg by mouth daily      rOPINIRole (REQUIP) 3 MG tablet Take 3 mg by mouth nightly      levothyroxine (SYNTHROID) 137 MCG tablet Take 137 mcg by mouth daily   3    Insulin Pen Needle (PEN NEEDLES 31GX5/16\") 31G X 8 MM MISC       metoprolol (LOPRESSOR) 50 MG tablet 3 pills in the AM : 150 mg dose  2 pills in the PM: 100 mg dose       No current facility-administered medications on file prior to visit.         Review of Systems:  Constitutional:  No weight loss, No fever, No chills, No night sweats. Energy level is fair. Eyes:  No diplopia, No transient or permanent loss of vision, No scotomata. ENT / Mouth:  No epistaxis, No dysphagia, No hoarseness, No oral ulcers, No gingival bleeding. No sore throat, No postnasal drip, No nasal drip, No mouth pain, No sinus pain, No tinnitus, Normal hearing. Cardiovascular:  No chest pain, No palpitations, No syncope, No upper extremity edema, No lower extremity edema, No calf discomfort. Respiratory:  No cough. No hemoptysis, No pleurisy, No wheezing, No dyspnea. Breast:  No breast mass, No pain, No nipple discharge, No change in size, No change in shape. Gastrointestinal:  No abdominal pain, No abdominal cramping, No nausea, No vomiting, No constipation, No diarrhea, No hematochezia, No melena, No jaundice, No dyspepsia, No dysphagia. Urinary:  No dysuria, No hematuria, No urinary incontinence. Gynecological:  No vaginal discharge, No suprapubic pain, No abnormal vaginal bleeding. Musculoskeletal:  No muscle pain, No swollen joints, No joint redness, No bone pain, No spine tenderness. Skin:  No rash, No nodules, No pruritus, No lesions. Neurologic:  No confusion, No seizures, No syncope, No tremor, No speech change, No headache, No hiccups, No abnormal gait, No sensory changes, No weakness. Psychiatric:  No depression, No anxiety, Concentration normal.  Endocrine:  No polyuria, No polydipsia, No hot flashes, No thyroid symptoms. Hematologic:  No epistaxis, No gingival bleeding, No petechiae, No ecchymosis. Lymphatic:  No lymphadenopathy, No lymphedema. Allergy / Immunologic:  No eczema, No frequent mucous infections, No frequent respiratory infections, No recurrent urticarial, No frequent skin infections.      Vital Signs: BP (!) 93/44 (Site: Right Upper Arm, Position: Sitting, Cuff Size: Medium Adult)   Pulse 82   Temp 97.2 °F (36.2 °C) (Infrared)   Ht 5' (1.524 m)   Wt 141 lb 12.8 oz (64.3 kg)   BMI 27.69 kg/m² Physical Exam:  CONSTITUTIONAL: awake, alert, cooperative, no apparent distress   EYES: pupils equal, round and reactive to light, sclera clear, normal conjunctiva  ENT: Normocephalic, without obvious abnormality, atraumatic  NECK: supple, symmetrical, no jugular venous distension, no carotid bruits   HEMATOLOGIC/LYMPHATIC: no cervical, supraclavicular or axillary lymphadenopathy   LUNGS: VBS, no wheezes, no increased work of breathing, no rhonchi, clear to auscultation, no crackles,    CARDIOVASCULAR: regular rate and rhythm, normal S1 and S2, no murmur noted  ABDOMEN: normal bowel sounds x 4, soft, non-distended, non-tender, no masses palpated, no hepatosplenomegaly   MUSCULOSKELETAL: full range of motion noted, tone is normal  NEUROLOGIC: awake, alert, oriented to name, place and time. Motor skills grossly intact. SKIN: appears intact, normal skin color, normal texture, normal turgor, no jaundice.    EXTREMITIES: no LE edema, no leg swelling, no cyanosis, no clubbing,       Labs:  Hematology:  Lab Results   Component Value Date    WBC 7.6 07/05/2022    RBC 2.87 (L) 07/05/2022    HGB 7.2 (L) 07/05/2022    HCT 24.3 (L) 07/05/2022    MCV 84.7 07/05/2022    MCH 25.1 (L) 07/05/2022    MCHC 29.6 (L) 07/05/2022    RDW 15.0 (H) 07/05/2022     07/05/2022    MPV 11.2 (H) 07/05/2022    SEGSPCT 63.2 07/05/2022    EOSRELPCT 2.3 07/05/2022    BASOPCT 0.4 07/05/2022    LYMPHOPCT 25.8 07/05/2022    MONOPCT 7.9 (H) 07/05/2022    SEGSABS 4.8 07/05/2022    EOSABS 0.2 07/05/2022    BASOSABS 0.0 07/05/2022    LYMPHSABS 2.0 07/05/2022    MONOSABS 0.6 07/05/2022    DIFFTYPE AUTOMATED DIFFERENTIAL 07/05/2022     No results found for: ESR  Chemistry:  Lab Results   Component Value Date     07/05/2022    K 4.7 07/05/2022     07/05/2022    CO2 20 (L) 07/05/2022    BUN 15 07/05/2022    CREATININE 0.9 07/05/2022    GLUCOSE 101 (H) 07/05/2022    CALCIUM 8.8 07/05/2022    PROT 5.5 (L) 06/08/2022    LABALBU 2.8 (L) 06/08/2022    BILITOT 0.2 06/08/2022    ALKPHOS 50 06/08/2022    AST 14 (L) 06/08/2022    ALT 11 06/08/2022    LABGLOM 59 (L) 07/05/2022    GFRAA >60 07/05/2022    PHOS 3.0 06/08/2022    MG 1.7 (L) 06/08/2022    POCGLU 162 (H) 06/23/2022     No results found for: MMA, LDH, HOMOCYSTEINE  No components found for: LD  Lab Results   Component Value Date    TSHHS 1.470 06/12/2022    T4FREE 1.24 06/12/2022    FT3 2.3 12/06/2017     Immunology:  Lab Results   Component Value Date    PROT 5.5 (L) 06/08/2022     No results found for: Blake Guard, KLFLCR  No results found for: B2M  Coagulation Panel:  Lab Results   Component Value Date    PROTIME 26.8 (H) 07/05/2022    INR 2.14 07/05/2022    APTT 29.5 05/14/2021    DDIMER <200 01/24/2016     Anemia Panel:  Lab Results   Component Value Date    XGFPEARA79 1465 (H) 06/07/2022     Tumor Markers:  Lab Results   Component Value Date    CEA 3.7 05/22/2022        Observations:  PHQ-9 Total Score: 0 (7/8/2022 11:04 AM)     Assessment   Stage III terminal ileum adenocarcinoma. Plan:  I reviewed with her findings on CT scan, US pelvis, colonoscopy and labs. Cecal lesion is concerning for malignant process and right adnexa cystic lesion needs further evaluation with MRI.      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 MRI pelvis showed complex ovarian cyst without internal enhancement to suggest solid mass. Radiologist recommend f/u imaging with CT or MRI in 6 months.      She is s/p surgery on 5/27/22. Final pathology revealed that she has stage III terminal ileum adenocarcinoma (pT3, pN1c). She has lost about 25 lbs over last 2 - 3 months before surgery. Her CEA on 5/22/22 was 3.7. On July 11, 2022, she presented to me for follow-up. 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 I reviewed final path report with her and her family. We also reviewed NCCN guidelines.  I

## 2022-07-09 LAB
ABO/RH: NORMAL
ANTIBODY SCREEN: NEGATIVE
COMPONENT: NORMAL
CROSSMATCH RESULT: NORMAL
STATUS: NORMAL
TRANSFUSION STATUS: NORMAL
UNIT DIVISION: 0
UNIT NUMBER: NORMAL

## 2022-07-09 NOTE — PROGRESS NOTES
Patient outpatient blood complete. Repeat H&H resulted at 9.1. Patient taken by wheelchair to car by this nurse.

## 2022-07-11 PROBLEM — C17.2: Status: ACTIVE | Noted: 2022-07-05

## 2022-07-11 NOTE — PROGRESS NOTES
Hgb 6.3; per Dr Edith Cervantes [address] unit of [address], called nursing supervisor, spoke to Brionna Rondon, patient scheduled for 07/08 @ [phone], therapy plan faxed, patient advised of appointment time and signed consent, blood banded and sent for T&S, band on patient (verifed), also advised to keep band on until they receive their infusion and patient states understanding.

## 2022-07-13 NOTE — PROGRESS NOTES
[facility]    Date: 07/13/22    Prescription was routed to [facility]. It is scheduled to ship on 7/12/2022. Patient's copay amount is $32.81   Please call us with any questions at [phone] option 2.     Thank you

## 2022-07-20 ENCOUNTER — CLINICAL DOCUMENTATION (OUTPATIENT)
Dept: ONCOLOGY | Age: 87
End: 2022-07-20

## 2022-07-20 NOTE — PROGRESS NOTES
Patient dropped by office states she was advised to stop in when she rec'd her medication, , Fredrick Mantilla called me and I scheduled patient for chemo ed on Mon. 0725 @ 0900, advised them that 1 visitor allowed @ time of education but no visitor at day of treatments, Fredrick Mantilla gave appt time to patient

## 2022-07-25 ENCOUNTER — CLINICAL DOCUMENTATION (OUTPATIENT)
Dept: ONCOLOGY | Age: 87
End: 2022-07-25

## 2022-07-25 ENCOUNTER — OFFICE VISIT (OUTPATIENT)
Dept: ONCOLOGY | Age: 87
End: 2022-07-25
Payer: MEDICARE

## 2022-07-25 ENCOUNTER — HOSPITAL ENCOUNTER (OUTPATIENT)
Dept: INFUSION THERAPY | Age: 87
Discharge: HOME | End: 2022-07-25
Payer: MEDICARE

## 2022-07-25 VITALS
HEART RATE: 74 BPM | HEIGHT: 60 IN | WEIGHT: 140.2 LBS | SYSTOLIC BLOOD PRESSURE: 133 MMHG | OXYGEN SATURATION: 97 % | TEMPERATURE: 97.6 F | BODY MASS INDEX: 27.52 KG/M2 | DIASTOLIC BLOOD PRESSURE: 56 MMHG

## 2022-07-25 DIAGNOSIS — Z71.9 ENCOUNTER FOR EDUCATION: ICD-10-CM

## 2022-07-25 DIAGNOSIS — C18.2 MALIGNANT NEOPLASM OF ASCENDING COLON (HCC): ICD-10-CM

## 2022-07-25 DIAGNOSIS — C18.2 MALIGNANT NEOPLASM OF ASCENDING COLON (HCC): Primary | ICD-10-CM

## 2022-07-25 DIAGNOSIS — Z71.89 ADVANCE DIRECTIVE DISCUSSED WITH PATIENT: ICD-10-CM

## 2022-07-25 LAB
ALBUMIN SERPL-MCNC: 3.4 GM/DL (ref 3.4–5)
ALP BLD-CCNC: 52 IU/L (ref 40–128)
ALT SERPL-CCNC: 9 U/L (ref 10–40)
ANION GAP SERPL CALCULATED.3IONS-SCNC: 9 MMOL/L (ref 4–16)
AST SERPL-CCNC: 16 IU/L (ref 15–37)
BASOPHILS ABSOLUTE: 0 K/CU MM
BASOPHILS RELATIVE PERCENT: 0.3 % (ref 0–1)
BILIRUB SERPL-MCNC: 0.4 MG/DL (ref 0–1)
BUN BLDV-MCNC: 11 MG/DL (ref 6–23)
CALCIUM SERPL-MCNC: 9.1 MG/DL (ref 8.3–10.6)
CHLORIDE BLD-SCNC: 101 MMOL/L (ref 99–110)
CO2: 28 MMOL/L (ref 21–32)
CREAT SERPL-MCNC: 0.7 MG/DL (ref 0.6–1.1)
DIFFERENTIAL TYPE: ABNORMAL
EOSINOPHILS ABSOLUTE: 0.2 K/CU MM
EOSINOPHILS RELATIVE PERCENT: 2.2 % (ref 0–3)
GFR AFRICAN AMERICAN: >60 ML/MIN/1.73M2
GFR NON-AFRICAN AMERICAN: >60 ML/MIN/1.73M2
GLUCOSE BLD-MCNC: 116 MG/DL (ref 70–99)
HCT VFR BLD CALC: 28.9 % (ref 37–47)
HEMOGLOBIN: 8.7 GM/DL (ref 12.5–16)
LYMPHOCYTES ABSOLUTE: 1.4 K/CU MM
LYMPHOCYTES RELATIVE PERCENT: 19.6 % (ref 24–44)
MCH RBC QN AUTO: 24.3 PG (ref 27–31)
MCHC RBC AUTO-ENTMCNC: 30.1 % (ref 32–36)
MCV RBC AUTO: 80.7 FL (ref 78–100)
MONOCYTES ABSOLUTE: 0.6 K/CU MM
MONOCYTES RELATIVE PERCENT: 7.6 % (ref 0–4)
PDW BLD-RTO: 17.7 % (ref 11.7–14.9)
PLATELET # BLD: 265 K/CU MM (ref 140–440)
PMV BLD AUTO: 10.7 FL (ref 7.5–11.1)
POTASSIUM SERPL-SCNC: 4 MMOL/L (ref 3.5–5.1)
RBC # BLD: 3.58 M/CU MM (ref 4.2–5.4)
SEGMENTED NEUTROPHILS ABSOLUTE COUNT: 5.1 K/CU MM
SEGMENTED NEUTROPHILS RELATIVE PERCENT: 70.3 % (ref 36–66)
SODIUM BLD-SCNC: 138 MMOL/L (ref 135–145)
TOTAL PROTEIN: 5.8 GM/DL (ref 6.4–8.2)
WBC # BLD: 7.2 K/CU MM (ref 4–10.5)

## 2022-07-25 PROCEDURE — 80053 COMPREHEN METABOLIC PANEL: CPT

## 2022-07-25 PROCEDURE — 1124F ACP DISCUSS-NO DSCNMKR DOCD: CPT | Performed by: NURSE PRACTITIONER

## 2022-07-25 PROCEDURE — 36415 COLL VENOUS BLD VENIPUNCTURE: CPT

## 2022-07-25 PROCEDURE — 99215 OFFICE O/P EST HI 40 MIN: CPT | Performed by: NURSE PRACTITIONER

## 2022-07-25 PROCEDURE — 85025 COMPLETE CBC W/AUTO DIFF WBC: CPT

## 2022-07-25 RX ORDER — ONDANSETRON HYDROCHLORIDE 8 MG/1
8 TABLET, FILM COATED ORAL EVERY 8 HOURS PRN
Qty: 30 TABLET | Refills: 1 | Status: ON HOLD | OUTPATIENT
Start: 2022-07-25 | End: 2022-11-04 | Stop reason: HOSPADM

## 2022-07-25 RX ORDER — FAMOTIDINE 20 MG/1
20 TABLET, FILM COATED ORAL 2 TIMES DAILY
Qty: 60 TABLET | Refills: 3 | Status: SHIPPED | OUTPATIENT
Start: 2022-07-25 | End: 2022-09-20 | Stop reason: SDUPTHER

## 2022-07-25 NOTE — PROGRESS NOTES
Patient arrived with  for chemotherapy education. Discussed treatment plan, potential side effects, prevention and symptom management. Reviewed in detail chemotherapy education folder and drug monograph. Patient's regimen will be Capecitabine (xeloda) 1,500 mg: (3) 500 mg tablets PO BID x14 days on then 7 days off every 21 days. Chemotherapy consent signed by patient and will be scanned into patient's chart. Copy given to patient. Patient has already received oral chemo from specialty pharmacy. Patient instructed to start taking xeloda tomorrow 07/26/22. Calendar provided for July/August with tx regimen, appointment times, and written instructions. RX for zofran 8 mg PO Q8 hours PRN sent to the [facility] pharmacy in [facility]. Protonix order discontinued and changed to pepcid 20 mg PO BID per CNP. Patient voices understanding on how and when to take these medications. Contact information to [facility] and this RN as well as on-call phone number for after office hours/weekends provided to patient. CBC and CMP will be drawn today 07/25/22. All needs addressed. Patient denies questions or concerns at this time.

## 2022-07-25 NOTE — PROGRESS NOTES
Patient Name: Tobi Barnett    Patient : 1934     Patient MRN: [id number]       Date: 2022                                                                                                   CHEMOTHERAPY TREATMENT PLAN    Care Team  Medical Oncologist: Lewis Ellis MD  Surgeon: Dr. Yuliet Fuller Oncologist:   Primary Care Physician: Jose Miguel Aj DO     Learning Needs Assessment  Before the treatment plan was discussed and the consent was signed, the care team assessed the patient's learning needs. This includes their ability to assume responsibility for managing their therapy. Diagnosis      Adenocarcinoma of the ileum stage III    The Goal of My Therapy is    (  ) To cure my cancer  (  ) To shrink the tumor prior to surgery  ( x ) Increase my chance of cure after surgery  (  ) Help me live as long as possible with the highest quality of life. I know that a cure is not possible.      Prognosis    ( x ) Curable  (  ) Palliative    Plan Of Treatment    Intent:  (  ) Neoadjuvant   ( x ) Adjuvant   (  ) Control    Treatment Regimen  (including supportive meds)                             Frequency                                               Duration     1500 mg bid days 1-14 of 21 days for six months    Expected Response to Treatment    (  x) This therapy could cure your disease  (  ) This therapy has a good chance of helping you live 1 year or more compared to without it  (  ) This therapy has a good chance of helping you feel better or making you live months longer compared to without it     Quality Of Life    (  ) Expect that you will be able to continue all normal activities  ( x ) Expect that you will have some side effects but should be able to maintain normal activities  (  ) Expect that you will be unable to work but able to perform light duties at home  (  ) Expect that you will be able to perform light duties and will need assistance with self care    Treatment Benefits and Harms     1. Patient taught on all common and rare toxicities regarding their treatment, disease process and drug regimen. This includes the short and long-         term effects of therapy (including infertility risks when appropriate). This also includes drug-drug and drug-food interactions when appropriate. Patient was educated when to call [facility] with adverse effects and symptoms. 2.    Patient was taught safe handling and storage of medications in the home (when appropriate) and procedures for handling body sections and             waste in the home. 3.    Patient was taught on planned for missed doses and follow-up plans during treatment, including amisha of provider visits and labs. 4.    Patient signed consent on treatment and drug information sheets were given. Alternative To Recommended Treatment    (  ) Palliative or Hospice  (  ) Second Opinion  ( x ) Other    Patient Care Management     Advance Care Planning completed:    ( x ) Yes   (  ) No   Pain Care Plan entered (as applicable):    (x ) Yes   (  ) No   Comments:  PHQ-9 completed (Follow-up plan as applicable): ( x) Yes   (  ) No   Comments:  Distress needs addressed with the patient:    ( x ) Yes   (  ) No   Distress areas needing addressed further:       Patient was educated on the expectations and their responsibility to schedule their follow-up appointments. The patient was also educated that if they refuse to show-up to their appointment or refuse to schedule a follow-up appointment that it is their responsibility to call [facility] in a timely manner to schedule their next appointment. The patient was educated on [facility] policy and that the patient is ultimately responsible for monitoring their appointments and adhering to the schedule. (x) Yes   (   ) No    Estimated Out of Pocket Costs discussed per the patient per financial navigator. Patient Consented and taught per Nurse Navigator.      .  Today, time spent with the patient discussing the intent and schedule of their treatment. The patient was given a copy of their treatment summary. Questions and concerns were addressed with the patient. Time spent with the patient face to face today was  60 minutes, counseling and coordination of care dominated more than 50% of this patient encounter. Devin Sabillon presented for scheduled OCM for Capecitabine We discussed potential AE including, but not limited to: myelosuppression,  palmar plantar erythrodysethesia, diarrhea, elevated liver enzymes, fatigue, nausea, vomiting, rash, abdominal pain, anorexia, mucositis, edema, eye irritation, constipation, neuropathy, headache, myalgia, arthralgia, etc. Zofran 8 mg q8 prn. The patient is asked to call our office for temperature 100.4 or greater or with any uncontrolled AE. On call number provided. The patient verbalized understanding and is willing to proceed. Advance Directive: To bring in for scanning  Counseling- referral placed  [place]- referral placed    Sciatic pain- has used celebrex in past, to follow up with pcpc    BP (!) 133/56 (Site: Right Upper Arm, Position: Sitting, Cuff Size: Medium Adult)   Pulse 74   Temp 97.6 °F (36.4 °C) (Temporal)   Ht 5' (1.524 m)   Wt 140 lb 3.2 oz (63.6 kg)   SpO2 97%   BMI 27.38 kg/m²     Physical Exam  Vitals reviewed. HENT:      Head: Normocephalic. Nose: Nose normal.      Mouth/Throat:      Mouth: Mucous membranes are moist.   Eyes:      Extraocular Movements: Extraocular movements intact. Conjunctiva/sclera: Conjunctivae normal.   Cardiovascular:      Rate and Rhythm: Normal rate and regular rhythm. Pulses: Normal pulses. Pulmonary:      Breath sounds: Normal breath sounds. Abdominal:      General: Bowel sounds are normal.      Palpations: Abdomen is soft. Musculoskeletal:         General: Normal range of motion. Cervical back: Normal range of motion. Right lower leg: Edema present.       Left lower leg: Edema present. Comments: RLE small wound central shin, no signs of infeciton   Skin:     General: Skin is warm. Findings: No bruising. Neurological:      General: No focal deficit present. Mental Status: She is alert. Psychiatric:         Mood and Affect: Mood normal.         Behavior: Behavior normal.         Thought Content:  Thought content normal.         Judgment: Judgment normal.      RTC as preciously scheduled

## 2022-07-28 ENCOUNTER — OFFICE VISIT (OUTPATIENT)
Dept: CARDIOLOGY CLINIC | Age: 87
End: 2022-07-28
Payer: MEDICARE

## 2022-07-28 VITALS
HEART RATE: 66 BPM | DIASTOLIC BLOOD PRESSURE: 60 MMHG | SYSTOLIC BLOOD PRESSURE: 104 MMHG | WEIGHT: 140.6 LBS | HEIGHT: 60 IN | BODY MASS INDEX: 27.61 KG/M2

## 2022-07-28 DIAGNOSIS — C17.2 ADENOCARCINOMA OF ILEUM (HCC): ICD-10-CM

## 2022-07-28 DIAGNOSIS — I25.10 CORONARY ARTERY DISEASE INVOLVING NATIVE CORONARY ARTERY OF NATIVE HEART WITHOUT ANGINA PECTORIS: Primary | ICD-10-CM

## 2022-07-28 DIAGNOSIS — Z86.718 H/O DEEP VENOUS THROMBOSIS: ICD-10-CM

## 2022-07-28 DIAGNOSIS — E78.5 DYSLIPIDEMIA: ICD-10-CM

## 2022-07-28 DIAGNOSIS — I10 ESSENTIAL HYPERTENSION: Chronic | ICD-10-CM

## 2022-07-28 PROCEDURE — 99214 OFFICE O/P EST MOD 30 MIN: CPT | Performed by: NURSE PRACTITIONER

## 2022-07-28 PROCEDURE — 1124F ACP DISCUSS-NO DSCNMKR DOCD: CPT | Performed by: NURSE PRACTITIONER

## 2022-07-28 RX ORDER — POTASSIUM CHLORIDE 20 MEQ/1
20 TABLET, EXTENDED RELEASE ORAL DAILY PRN
COMMUNITY
Start: 2022-07-13 | End: 2022-08-16 | Stop reason: SDUPTHER

## 2022-07-28 RX ORDER — METOPROLOL TARTRATE 50 MG/1
50 TABLET, FILM COATED ORAL 2 TIMES DAILY
Qty: 60 TABLET | Refills: 3
Start: 2022-07-28

## 2022-07-28 RX ORDER — FUROSEMIDE 20 MG/1
20 TABLET ORAL PRN
COMMUNITY
Start: 2022-07-13 | End: 2022-08-16 | Stop reason: SDUPTHER

## 2022-07-28 RX ORDER — MONTELUKAST SODIUM 10 MG/1
10 TABLET ORAL NIGHTLY
COMMUNITY

## 2022-07-28 NOTE — ASSESSMENT & PLAN NOTE
- Patient had abnormal EKG leading to abnormal stress test.  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 No intervention due to size of vessel.

## 2022-07-28 NOTE — PROGRESS NOTES
Kellie Carmen [address]  Phone: [phone]    Fax [phone]                  Adelita Zepeda MD, Lalo Shaikh MD, Antoine Lizarraga MD, MD Lolly Hernandez MD Orlene Poser, MD Brendan Pleva, APRKENDRICK Hollingsworth, APRKENDRICK Trammell, APRN     Jaswinder Lopez, APRN        Cardiology Progress Note      7/28/2022    RE: Deniz Amin  (1934)                             Primary cardiologist: Dr. Lolly Lutz       Subjective:  CC:   1. Essential hypertension    2. Dyslipidemia    3. Coronary artery disease involving native coronary artery of native heart without angina pectoris    4. Adenocarcinoma of ileum ([facility]        HPI: Deniz Amin, who is a  80y.o. year old female with a past medical history as listed below. Patient presents to the office for follow up on CAD, HTN, and hyperlipidemia. Patient recently diagnosed with stage III terminal ileum adenocarcinoma and had bowel resection in July 2022. She will receive chemotherapy for 3 to 6 months. Patient also developed DVT of right subclavian vein from PICC line and was placed on anticoagulation. Patient denies any chest pain, shortness of breath, dizziness, syncope, or palpitations. Past Medical History:   Diagnosis Date    Cancer of right colon ([facility] [address] 7/5/2022    Diabetes mellitus ([facility] [address]     H/O cardiac catheterization 05/18/2021    no significant CAD in main vessels, has severe stenosis in small  branch diagonal vessel    H/O cardiovascular stress test 3/22/18,03/30/2017    ECG portion of the stress test is negative for ischemia EF >70% Normal stress myocardial perfusion. H/O cardiovascular stress test 04/07/2021    abnormal stress    H/O Doppler ultrasound (Abdomimal Aorta) 03/29/2017    No evidence of abdominal aortic aneurysm. H/O echocardiogram 07/02/2014    Normal left ventricular size, wall motion and systolic function.  Mildle elevated pulmonary artery systolic pressure. Mild MR and TR and trace AR EF 55 to 60%    Hx of echocardiogram 03/29/2017    EF 55-60%. Grade I diastolic dysfunction. Mildly dilated left atrium. No evidence of pericardial effusion. Hyperlipidemia     Hypertension     Sleep apnea     Thyroid disease        Current Outpatient Medications   Medication Sig Dispense Refill    furosemide (LASIX) 20 MG tablet       montelukast (SINGULAIR) 10 MG tablet Take 10 mg by mouth nightly      potassium chloride (KLOR-CON M) 20 MEQ extended release tablet       famotidine (PEPCID) 20 MG tablet Take 1 tablet by mouth in the morning and 1 tablet before bedtime. 60 tablet 3    ondansetron (ZOFRAN) 8 MG tablet Take 1 tablet by mouth every 8 hours as needed for Nausea or Vomiting 30 tablet 1    rivaroxaban (XARELTO) 15 MG TABS tablet Take 15 mg by mouth      sodium chloride 1 g tablet Take 1 g by mouth 2 times daily      capecitabine (XELODA) 500 MG chemo tablet Take 3 tablets (1,500 mg) by mouth two times daily for 14 days then 7 days off every 21 days.  84 tablet 5    escitalopram (LEXAPRO) 10 MG tablet Take 1 tablet by mouth daily 30 tablet 0    sucralfate (CARAFATE) 1 GM tablet Take 1 tablet by mouth 4 times daily (before meals and nightly) 120 tablet 0    melatonin 3 MG TABS tablet Take 3 mg by mouth nightly as needed      vitamin B-12 (CYANOCOBALAMIN) 100 MCG tablet Take 100 mcg by mouth daily      LANTUS SOLOSTAR 100 UNIT/ML injection pen       Cholecalciferol (VITAMIN D PO) Take by mouth      oxybutynin (DITROPAN) 5 MG tablet Take 5 mg by mouth 2 times daily      atorvastatin (LIPITOR) 20 MG tablet Take 20 mg by mouth daily      rOPINIRole (REQUIP) 3 MG tablet Take 3 mg by mouth nightly      levothyroxine (SYNTHROID) 137 MCG tablet Take 137 mcg by mouth daily   3    Insulin Pen Needle (PEN NEEDLES 31GX5/16\") 31G X 8 MM MISC       metoprolol (LOPRESSOR) 50 MG tablet 3 pills in the AM : 150 mg dose  2 pills in the PM: 100 mg dose       No current facility-administered medications for this visit. Review of Systems:  Review of Systems   All other systems reviewed and are negative. Objective:      Physical Exam:  /60   Pulse 66   Ht 5' (1.524 m)   Wt 140 lb 9.6 oz (63.8 kg)   BMI 27.46 kg/m²   Wt Readings from Last 3 Encounters:   07/28/22 140 lb 9.6 oz (63.8 kg)   07/25/22 140 lb 3.2 oz (63.6 kg)   07/08/22 141 lb 12.8 oz (64.3 kg)     Body mass index is 27.46 kg/m². Physical exam:  Physical Exam  Vitals reviewed. Constitutional:       Appearance: She is well-developed. HENT:      Head: Normocephalic. Eyes:      Conjunctiva/sclera: Conjunctivae normal.      Pupils: Pupils are equal, round, and reactive to light. Cardiovascular:      Rate and Rhythm: Normal rate and regular rhythm. Pulmonary:      Effort: Pulmonary effort is normal.      Breath sounds: Normal breath sounds. Abdominal:      General: Bowel sounds are normal.      Palpations: Abdomen is soft. Musculoskeletal:         General: Normal range of motion. Cervical back: Normal range of motion. Skin:     General: Skin is warm and dry. Neurological:      Mental Status: She is alert and oriented to person, place, and time.         DATA:  Lab Results   Component Value Date/Time    CKTOTAL 74 02/11/2011 10:15 AM    CKMB 1.5 02/11/2011 10:15 AM    TROPONINI 0.012 02/11/2011 10:15 AM     BNP:    Lab Results   Component Value Date/Time    BNP 75.3 02/11/2011 10:15 AM     PT/INR:  No results found for: PTINR  Lab Results   Component Value Date    LABA1C 5.9 05/22/2022    LABA1C 7.0 (H) 03/14/2018     Lab Results   Component Value Date    CHOL 138 05/02/2019    TRIG 161 (H) 06/01/2022    HDL 54 05/02/2019    LDLDIRECT 78 05/02/2019     Lab Results   Component Value Date    ALT 9 (L) 07/25/2022    AST 16 07/25/2022     TSH:  No results found for: TSH    Vitals:    07/28/22 1141   BP: 104/60   Pulse: 66         Echo:2017   Left ventricle is normal.   Ejection fraction is visually estimated at 55-60%. Grade I diastolic dysfunction. Mildly dilated left atrium. No evidence of any pericardial effusion. No evidence of pleural effusion. Stress Test:4/7/21  Abnormal stress, small size, moderate intensity, reversible perfusion defect in anterior wall. LHC:5/18/21  LMCA: Normal.     LAD: Normal.normal LAD, diagonal branch has severe multiple stenosis, its  small vessel     LCx: Normal.     RCA: Mild Lumen Irregularity. The ASCVD Risk score (Radha Ram, et al., 2013) failed to calculate for the following reasons: The 2013 ASCVD risk score is only valid for ages 36 to 78      Assessment/ Plan:     Coronary artery disease involving native coronary artery of native heart without angina pectoris   - Patient had abnormal EKG leading to abnormal stress test.  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 No intervention due to size of vessel. Essential hypertension  -Soft b/p, recently had losartan D/C. Will decrease Lopressor to 50 mg BID. Dyslipidemia  --At or near goal Yes    -She is to continue current medications (Lipitor 20 mg) Hepatic function panel WNL. No abdominal pain. No myalgias.     -The nature of cardiac risk has been fully discussed with this patient. I have made her aware of her LDL target goal given her cardiovascular risk analysis. I have discussed the appropriate diet. The need for lifelong compliance in order to reduce risk is stressed. A regular exercise program is recommended to help achieve and maintain normal body weight, fitness and improve lipid balance. Patient seen, interviewed and examined. Testing was reviewed. Adenocarcinoma of the ileum  -S/P bowel resection in July of 2022. Patient is scheduled for chemotherapy 3 to 6 months. H/O DVT  -Right subclavian DVT post PICC removal. Patient is on Xarelto.      Patient is encouraged to exercise even a brisk walk for 30 minutes at least 3 to 4 times a week. Lifestyle and risk factor modificatons discussed. Various goals are discussed and questions answered. Continue current medications. Appropriate prescriptions are addressed. Questions answered and patient verbalizes understanding. Call for any problems, questions, or concerns. Pt is to follow up in 6 months for Cardiac management    Electronically signed by Donta Abrams.  LAURE Zhong CNP on 7/28/2022 at 11:45 AM

## 2022-08-09 ENCOUNTER — OFFICE VISIT (OUTPATIENT)
Dept: ONCOLOGY | Age: 87
End: 2022-08-09
Payer: MEDICARE

## 2022-08-09 ENCOUNTER — HOSPITAL ENCOUNTER (OUTPATIENT)
Dept: INFUSION THERAPY | Age: 87
Discharge: HOME | End: 2022-08-09
Payer: MEDICARE

## 2022-08-09 VITALS
DIASTOLIC BLOOD PRESSURE: 53 MMHG | HEART RATE: 69 BPM | WEIGHT: 133.6 LBS | BODY MASS INDEX: 26.23 KG/M2 | TEMPERATURE: 98.2 F | SYSTOLIC BLOOD PRESSURE: 119 MMHG | HEIGHT: 60 IN | OXYGEN SATURATION: 98 %

## 2022-08-09 DIAGNOSIS — C18.2 MALIGNANT NEOPLASM OF ASCENDING COLON (HCC): ICD-10-CM

## 2022-08-09 DIAGNOSIS — C18.2 MALIGNANT NEOPLASM OF ASCENDING COLON (HCC): Primary | ICD-10-CM

## 2022-08-09 LAB
ALBUMIN SERPL-MCNC: 3.6 GM/DL (ref 3.4–5)
ALP BLD-CCNC: 46 IU/L (ref 40–128)
ALT SERPL-CCNC: 9 U/L (ref 10–40)
ANION GAP SERPL CALCULATED.3IONS-SCNC: 11 MMOL/L (ref 4–16)
AST SERPL-CCNC: 17 IU/L (ref 15–37)
BASOPHILS ABSOLUTE: 0 K/CU MM
BASOPHILS RELATIVE PERCENT: 0.4 % (ref 0–1)
BILIRUB SERPL-MCNC: 0.6 MG/DL (ref 0–1)
BUN BLDV-MCNC: 16 MG/DL (ref 6–23)
CALCIUM SERPL-MCNC: 8.9 MG/DL (ref 8.3–10.6)
CEA: 5.1 NG/ML
CHLORIDE BLD-SCNC: 102 MMOL/L (ref 99–110)
CO2: 23 MMOL/L (ref 21–32)
CREAT SERPL-MCNC: 0.9 MG/DL (ref 0.6–1.1)
DIFFERENTIAL TYPE: ABNORMAL
EOSINOPHILS ABSOLUTE: 0.2 K/CU MM
EOSINOPHILS RELATIVE PERCENT: 3.2 % (ref 0–3)
GFR AFRICAN AMERICAN: >60 ML/MIN/1.73M2
GFR NON-AFRICAN AMERICAN: 59 ML/MIN/1.73M2
GLUCOSE BLD-MCNC: 96 MG/DL (ref 70–99)
HCT VFR BLD CALC: 30.1 % (ref 37–47)
HEMOGLOBIN: 9.2 GM/DL (ref 12.5–16)
LYMPHOCYTES ABSOLUTE: 1.4 K/CU MM
LYMPHOCYTES RELATIVE PERCENT: 18.2 % (ref 24–44)
MCH RBC QN AUTO: 24.6 PG (ref 27–31)
MCHC RBC AUTO-ENTMCNC: 30.6 % (ref 32–36)
MCV RBC AUTO: 80.5 FL (ref 78–100)
MONOCYTES ABSOLUTE: 0.4 K/CU MM
MONOCYTES RELATIVE PERCENT: 4.9 % (ref 0–4)
PDW BLD-RTO: 21.3 % (ref 11.7–14.9)
PLATELET # BLD: 256 K/CU MM (ref 140–440)
PMV BLD AUTO: 9.4 FL (ref 7.5–11.1)
POTASSIUM SERPL-SCNC: 3.8 MMOL/L (ref 3.5–5.1)
RBC # BLD: 3.74 M/CU MM (ref 4.2–5.4)
SEGMENTED NEUTROPHILS ABSOLUTE COUNT: 5.4 K/CU MM
SEGMENTED NEUTROPHILS RELATIVE PERCENT: 73.3 % (ref 36–66)
SODIUM BLD-SCNC: 136 MMOL/L (ref 135–145)
TOTAL PROTEIN: 6 GM/DL (ref 6.4–8.2)
WBC # BLD: 7.4 K/CU MM (ref 4–10.5)

## 2022-08-09 PROCEDURE — 99214 OFFICE O/P EST MOD 30 MIN: CPT | Performed by: INTERNAL MEDICINE

## 2022-08-09 PROCEDURE — 36415 COLL VENOUS BLD VENIPUNCTURE: CPT

## 2022-08-09 PROCEDURE — 80053 COMPREHEN METABOLIC PANEL: CPT

## 2022-08-09 PROCEDURE — 1124F ACP DISCUSS-NO DSCNMKR DOCD: CPT | Performed by: INTERNAL MEDICINE

## 2022-08-09 PROCEDURE — 85025 COMPLETE CBC W/AUTO DIFF WBC: CPT

## 2022-08-09 PROCEDURE — 82378 CARCINOEMBRYONIC ANTIGEN: CPT

## 2022-08-09 RX ORDER — GABAPENTIN 100 MG/1
CAPSULE ORAL
COMMUNITY
Start: 2022-08-01

## 2022-08-09 NOTE — PROGRESS NOTES
transverse colon     Biopsy from cecal mass showed invasive moderately differentiated adenocarcinoma. MRI pelvis showed complex ovarian cyst without internal enhancement to suggest solid mass. Radiologist recommend f/u imaging with CT or MRI in 6 months. She is s/p surgery on 5/27/22. Final pathology revealed that she has stage III terminal ileum adenocarcinoma (pT3, pN1c). She has lost about 25 lbs over last 2 - 3 months before surgery. Her CEA on 5/22/22 was 3.7. Adjuvant chemotherapy with xeloda was started on 7/26/2022. On August 9, 2022, she presented to me for follow-up. 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 I reviewed final path report with her and her family. We also reviewed NCCN guidelines. I also let her know that we ideally recommend adjuvant chemotherapy with either CapeOx (capecitabine + oxaliplatin) for 3 months, FOLFOX for 6 months or single agent capecitabine (in frail patient) for six months. I also discussed with her and her family all the potential side effects as well as benefits of adjuvant chemotherapy. After long discussion with her and family, they are in agreement to start adjuvant chemotherapy with capecitabine. It wsa started on 7/26/22. She is s/p first cycle of chemotherapy and she is tolerating current chemo quite well. She is going to start her second cycle on 8/16/22. I will see her back after second cycle. I will check all her labs today and will follow up with the results. Chemo induced mucositis - will start magic mouth wash with dexamethasone today. I will consider to decrease the dose if it is getting worse in second cycle. Chemo induced nausea - mild symptom only. Recommend her to take zofran prn. She does not have any other significant symptoms at today's visit. Past Medical History:     Significant for  1. Hypertension  2. Hyperlipidemia  3. Diabetes mellitus  4. Hypothyroidism  5. Obstructive sleep apnea  6. Coronary artery disease    Past Surgery History:    Significant for  1. Cholecystectomy  2. Dilatation and curettage  3. Cataract surgery  4. Right breast biopsy [benign]  5. Right hemicolectomy    Social History:   She denies smoking or illicit drug abuse. She socially drinks alcohol. Family History:    Significant for breast cancer in one of her sister. Allergies   Allergen Reactions    Lopid [Gemfibrozil] Shortness Of Breath and Other (See Comments)     Cough    Bystolic [Nebivolol Hcl]     Cefdinir     Coreg [Carvedilol]     Crestor [Rosuvastatin]     Fenofibrate     Glucophage [Metformin]     Hydrochlorothiazide Other (See Comments)    Metronidazole     Norvasc [Amlodipine Besylate]     Tribenzor [Olmesartan-Amlodipine-Hctz]      Pt states that her chest felt funny and achy when she took the medication    Zocor [Simvastatin]      Review of Systems: \"Per interval history; otherwise 10 point ROS is negative. \"  Her energy level is okay and her sleep is fine. She doesn't have fever, chills, night sweats, cough, SOB, chest pain, hemoptysis or palpitations. Her bowel and bladder functions are normal, except oral mucositis and mild nausea. She denies vomiting, abdominal pain, diarrhea, constipation, dysuria, loss of appetite or weight loss. She doesn't have neuropathy and she denies bleeding or clotting issues. She denies any pain in her body. No anxiety or depression. The rest of the systems are unremarkable.      Vital Signs: BP (!) 119/53 (Site: Left Upper Arm, Position: Sitting, Cuff Size: Medium Adult)   Pulse 69   Temp 98.2 °F (36.8 °C) (Temporal)   Ht 5' (1.524 m)   Wt 133 lb 9.6 oz (60.6 kg)   SpO2 98%   BMI 26.09 kg/m²      Physical Exam:  CONSTITUTIONAL: awake, alert, cooperative, no apparent distress   EYES: pupils equal, round and reactive to light, sclera clear, normal conjunctiva  ENT: Normocephalic, without obvious abnormality, atraumatic  NECK: supple, symmetrical, no jugular venous distension, no carotid bruits   HEMATOLOGIC/LYMPHATIC: no cervical, supraclavicular or axillary lymphadenopathy   LUNGS: VBS, no wheezes, no increased work of breathing, no rhonchi, clear to auscultation, no crackles,    CARDIOVASCULAR: regular rate and rhythm, normal S1 and S2, no murmur noted  ABDOMEN: normal bowel sounds x 4, soft, non-distended, non-tender, no masses palpated, no hepatosplenomegaly   MUSCULOSKELETAL: full range of motion noted, tone is normal  NEUROLOGIC: awake, alert, oriented to name, place and time. Motor skills grossly intact. SKIN: appears intact, normal skin color, normal texture, normal turgor, no jaundice.    EXTREMITIES: no LE edema, no clubbing, no leg swelling, no cyanosis,       Labs:  Hematology:  Lab Results   Component Value Date    WBC 7.4 08/09/2022    RBC 3.74 (L) 08/09/2022    HGB 9.2 (L) 08/09/2022    HCT 30.1 (L) 08/09/2022    MCV 80.5 08/09/2022    MCH 24.6 (L) 08/09/2022    MCHC 30.6 (L) 08/09/2022    RDW 21.3 (H) 08/09/2022     08/09/2022    MPV 9.4 08/09/2022    SEGSPCT 73.3 (H) 08/09/2022    EOSRELPCT 3.2 (H) 08/09/2022    BASOPCT 0.4 08/09/2022    LYMPHOPCT 18.2 (L) 08/09/2022    MONOPCT 4.9 (H) 08/09/2022    SEGSABS 5.4 08/09/2022    EOSABS 0.2 08/09/2022    BASOSABS 0.0 08/09/2022    LYMPHSABS 1.4 08/09/2022    MONOSABS 0.4 08/09/2022    DIFFTYPE AUTOMATED DIFFERENTIAL 08/09/2022     Lab Results   Component Value Date    ESR 17 07/08/2022     Chemistry:  Lab Results   Component Value Date     07/25/2022    K 4.0 07/25/2022     07/25/2022    CO2 28 07/25/2022    BUN 11 07/25/2022    CREATININE 0.7 07/25/2022    GLUCOSE 116 (H) 07/25/2022    CALCIUM 9.1 07/25/2022    PROT 5.8 (L) 07/25/2022    LABALBU 3.4 07/25/2022    BILITOT 0.4 07/25/2022    ALKPHOS 52 07/25/2022    AST 16 07/25/2022    ALT 9 (L) 07/25/2022    LABGLOM >60 07/25/2022    GFRAA >60 07/25/2022    PHOS 3.0 06/08/2022    MG 1.7 oxaliplatin) for 3 months, FOLFOX for 6 months or single agent capecitabine (in frail patient) for six months. I also discussed with her and her family all the potential side effects as well as benefits of adjuvant chemotherapy. After long discussion with her and family, they are in agreement to start adjuvant chemotherapy with capecitabine. It wsa started on 7/26/22. She is s/p first cycle of chemotherapy and she is tolerating current chemo quite well. She is going to start her second cycle on 8/16/22. I will see her back after second cycle. I will check all her labs today and will follow up with the results. Chemo induced mucositis - will start magic mouth wash with dexamethasone today. I will consider to decrease the dose if it is getting worse in second cycle. Chemo induced nausea - mild symptom only. Recommend her to take zofran prn. I answered all her questions and concerns for today. Recent imaging and labs were reviewed and discussed with the patient.

## 2022-08-12 ENCOUNTER — CLINICAL DOCUMENTATION (OUTPATIENT)
Dept: ONCOLOGY | Age: 87
End: 2022-08-12

## 2022-08-16 ENCOUNTER — NURSE ONLY (OUTPATIENT)
Dept: CARDIOLOGY CLINIC | Age: 87
End: 2022-08-16
Payer: MEDICARE

## 2022-08-16 VITALS
DIASTOLIC BLOOD PRESSURE: 52 MMHG | HEART RATE: 69 BPM | WEIGHT: 136.2 LBS | SYSTOLIC BLOOD PRESSURE: 120 MMHG | BODY MASS INDEX: 26.74 KG/M2 | HEIGHT: 60 IN | OXYGEN SATURATION: 99 %

## 2022-08-16 DIAGNOSIS — I10 ESSENTIAL HYPERTENSION: Primary | ICD-10-CM

## 2022-08-16 PROCEDURE — 99211 OFF/OP EST MAY X REQ PHY/QHP: CPT | Performed by: NURSE PRACTITIONER

## 2022-08-16 RX ORDER — FUROSEMIDE 20 MG/1
20 TABLET ORAL PRN
Qty: 30 TABLET | Refills: 1 | Status: SHIPPED | OUTPATIENT
Start: 2022-08-16 | End: 2022-09-15

## 2022-08-16 RX ORDER — CAPECITABINE 500 MG/1
TABLET, FILM COATED ORAL
COMMUNITY
Start: 2022-07-26 | End: 2022-08-16

## 2022-08-16 RX ORDER — POTASSIUM CHLORIDE 20 MEQ/1
20 TABLET, EXTENDED RELEASE ORAL DAILY PRN
Qty: 30 TABLET | Refills: 0 | Status: SHIPPED | OUTPATIENT
Start: 2022-08-16 | End: 2022-09-15

## 2022-08-16 NOTE — PROGRESS NOTES
[facility] PHYSICAL REHABILITATION [facility] [address] [facility],[address]  Phone: [phone]    Fax [phone]                  Bruce Orozco MD, Narciso Davis MD, [address] [facility], MD, Mickel Session, MD Valentino Carolina, MD,[facility]    Rosa Tavares MD, Toño Schaeffer MD, [address], LAURE Arevalo, [place]URE [place], [place]      Blood pressure check     Pt is here for a 2 week BP check. Recent hypotension, losartan was discontinued and Lopressor decreased to 50 mg twice daily. Vitals:    08/16/22 1140 08/16/22 1146   BP: (!) 122/50 (!) 120/52   Site: Left Upper Arm Left Upper Arm   Position: Sitting Sitting   Cuff Size: Medium Adult Medium Adult   Pulse: 69    SpO2: 99%    Weight: 136 lb 3.2 oz (61.8 kg)    Height: 5' (1.524 m)         Wt Readings from Last 3 Encounters:   08/16/22 136 lb 3.2 oz (61.8 kg)   08/09/22 133 lb 9.6 oz (60.6 kg)   07/28/22 140 lb 9.6 oz (63.8 kg)        Plan for pt is to continue with Lopressor 50 mg BID and F/U in 3 months. Pt is to report any dizziness or syncope to the office. Electronically signed by Neelam Carmen.  LAURE Isaacs - CNP on 8/16/2022 at 11:50 AM

## 2022-08-16 NOTE — PATIENT INSTRUCTIONS
Please be informed that if you contact our office outside of normal business hours the physician on call cannot help with any scheduling or rescheduling issues, procedure instruction questions or any type of medication issue. We advise you for any urgent/emergency that you go to the nearest emergency room! PLEASE CALL OUR OFFICE DURING NORMAL BUSINESS HOURS    Monday - Friday   8 am to 5 pm    [place]: [address]: [phone]    [place]:  [phone]    **It is YOUR responsibilty to bring medication bottles and/or updated medication list to [address] [place] [address].  This will allow us to better serve you and all your healthcare needs**

## 2022-08-23 ENCOUNTER — HOSPITAL ENCOUNTER (OUTPATIENT)
Dept: NON INVASIVE DIAGNOSTICS | Age: 87
Discharge: HOME | End: 2022-08-23
Payer: MEDICARE

## 2022-08-23 DIAGNOSIS — Z09 ENCOUNTER FOR FOLLOW-UP EXAMINATION AFTER COMPLETED TREATMENT FOR CONDITIONS OTHER THAN MALIGNANT NEOPLASM: ICD-10-CM

## 2022-08-23 DIAGNOSIS — I50.9 HEART FAILURE, UNSPECIFIED HF CHRONICITY, UNSPECIFIED HEART FAILURE TYPE (HCC): ICD-10-CM

## 2022-08-23 DIAGNOSIS — I51.89 OTHER ILL-DEFINED HEART DISEASES: ICD-10-CM

## 2022-08-23 DIAGNOSIS — I10 ESSENTIAL (PRIMARY) HYPERTENSION: ICD-10-CM

## 2022-08-23 LAB
LV EF: 53 %
LVEF MODALITY: NORMAL

## 2022-08-23 PROCEDURE — 93306 TTE W/DOPPLER COMPLETE: CPT

## 2022-09-06 ENCOUNTER — HOSPITAL ENCOUNTER (OUTPATIENT)
Dept: INFUSION THERAPY | Age: 87
Discharge: HOME | End: 2022-09-06
Payer: MEDICARE

## 2022-09-06 ENCOUNTER — OFFICE VISIT (OUTPATIENT)
Dept: ONCOLOGY | Age: 87
End: 2022-09-06
Payer: MEDICARE

## 2022-09-06 VITALS
SYSTOLIC BLOOD PRESSURE: 153 MMHG | BODY MASS INDEX: 27.52 KG/M2 | RESPIRATION RATE: 16 BRPM | WEIGHT: 140.2 LBS | DIASTOLIC BLOOD PRESSURE: 65 MMHG | HEIGHT: 60 IN | TEMPERATURE: 96.9 F | HEART RATE: 58 BPM | OXYGEN SATURATION: 97 %

## 2022-09-06 DIAGNOSIS — C17.2 ADENOCARCINOMA OF ILEUM (HCC): Primary | ICD-10-CM

## 2022-09-06 PROCEDURE — 1124F ACP DISCUSS-NO DSCNMKR DOCD: CPT | Performed by: INTERNAL MEDICINE

## 2022-09-06 PROCEDURE — 99211 OFF/OP EST MAY X REQ PHY/QHP: CPT

## 2022-09-06 PROCEDURE — 99214 OFFICE O/P EST MOD 30 MIN: CPT | Performed by: INTERNAL MEDICINE

## 2022-09-06 ASSESSMENT — PATIENT HEALTH QUESTIONNAIRE - PHQ9
SUM OF ALL RESPONSES TO PHQ QUESTIONS 1-9: 0
2. FEELING DOWN, DEPRESSED OR HOPELESS: 0
SUM OF ALL RESPONSES TO PHQ QUESTIONS 1-9: 0
SUM OF ALL RESPONSES TO PHQ9 QUESTIONS 1 & 2: 0
SUM OF ALL RESPONSES TO PHQ QUESTIONS 1-9: 0
SUM OF ALL RESPONSES TO PHQ QUESTIONS 1-9: 0
1. LITTLE INTEREST OR PLEASURE IN DOING THINGS: 0

## 2022-09-06 NOTE — PROGRESS NOTES
Patient Name:  Tobi Barnett  Patient :  1934  Patient MRN:  [id number]     Primary Oncologist: Lewis Ellis MD  Referring Provider: Jose Miguel Aj DO     Date of Service: 2022      Chief Complaint:    Chief Complaint   Patient presents with    Discuss Labs     Patient Active Problem List:     Long-term insulin use in type 2 diabetes [facility])     Essential hypertension     Dyslipidemia     Abnormal EKG     Abnormal stress test     Cecum mass     Severe malnutrition (HCC)     Partial small bowel obstruction ([address]     Adenocarcinoma (HCC)     Generalized weakness     Uncontrolled pain     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 female with medical history significant for CKD stage IIIa, diverticulitis, IDDM 2 with peripheral neuropathy, HTN, HLD, G1DD, KEVIN, hypothyroidism, and vitamin B12 deficiency, presented on 22 with complaints of abdominal pain. She has been treated recently for abdominal pain and suspected diverticulitis, but was not improving after getting antibiotics which prompted her to return to the ED. She denies any personal or family history of colon cancer. Her sister had breast cancer in her 42's. She has had colonoscopies in the past which were normal, but thinks her last one was more than 10 years ago. She denies any previous problems with ovarian cysts or adnexal lesions. She underwent colonoscopy on 22 and it showed large mass extending from the ileocecal valve into the cecum mass originating at ileocecal valve appears to be more tubulovillous, mass in the cecum appears to be more firm fixed ulcerated consistent with malignancy. Biopsies were obtained.  Mild to moderate sigmoid diverticulosis and grade 2 hemorrhoids and incidental lipoma in transverse colon     Biopsy from cecal mass showed invasive moderately differentiated adenocarcinoma. MRI pelvis showed complex ovarian cyst without internal enhancement to suggest solid mass. Radiologist recommend f/u imaging with CT or MRI in 6 months. She is s/p surgery on 5/27/22. Final pathology revealed that she has stage III terminal ileum adenocarcinoma (pT3, pN1c). She has lost about 25 lbs over last 2 - 3 months before surgery. Her CEA on 5/22/22 was 3.7. Adjuvant chemotherapy with xeloda was started on 7/26/2022. On September 6, 2022, she presented to me for follow-up. 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 I reviewed final path report with her and her family. We also reviewed NCCN guidelines. I also let her know that we ideally recommend adjuvant chemotherapy with either CapeOx (capecitabine + oxaliplatin) for 3 months, FOLFOX for 6 months or single agent capecitabine (in frail patient) for six months. I also discussed with her and her family all the potential side effects as well as benefits of adjuvant chemotherapy. After long discussion with her and family, they are in agreement to start adjuvant chemotherapy with capecitabine. It was started on 7/26/22. She is in her second cycle of chemotherapy (day 6). She has significant oral mucositis and it interfere with her eating and drinking. She is on xeloda 1500 mg BID. I recommend her stop xeloda for now and I will re evaluate her in 2 weeks. If her symptoms are resolved at that time, I will start her second cycle with xeloda 1000 mg BID. Chemo induced mucositis - recommend to continue with magic mouth wash with dexamethasone. Chemo induced nausea - mild symptom only. Recommend her to take zofran prn. She does not have any other significant symptoms at today's visit. Past Medical History:     Significant for  1. Hypertension  2. Hyperlipidemia  3. Diabetes mellitus  4. Hypothyroidism  5. Obstructive sleep apnea  6. Coronary artery disease    Past Surgery History:    Significant for  1. Cholecystectomy  2. Dilatation and curettage  3. Cataract surgery  4. Right breast biopsy [benign]  5. Right hemicolectomy    Social History:   She denies smoking or illicit drug abuse. She socially drinks alcohol. Family History:    Significant for breast cancer in one of her sister. Allergies   Allergen Reactions    Lopid [Gemfibrozil] Shortness Of Breath and Other (See Comments)     Cough    Bystolic [Nebivolol Hcl]     Cefdinir     Coreg [Carvedilol]     Crestor [Rosuvastatin]     Fenofibrate     Glucophage [Metformin]     Hydrochlorothiazide Other (See Comments)    Metronidazole     Norvasc [Amlodipine Besylate]     Tribenzor [Olmesartan-Amlodipine-Hctz]      Pt states that her chest felt funny and achy when she took the medication    Zocor [Simvastatin]      Review of Systems: \"Per interval history; otherwise 10 point ROS is negative. \"  Her energy level is low and her sleep is fine. She doesn't have fever, chills, night sweats, cough, SOB, chest pain, hemoptysis or palpitations. Her bowel and bladder functions are normal, except oral mucositis. She denies nausea, vomiting, abdominal pain, diarrhea, constipation, dysuria, loss of appetite or weight loss. She doesn't have neuropathy and she denies bleeding or clotting issues. She denies any pain in her body. No anxiety or depression. The rest of the systems are unremarkable.      Vital Signs: BP (!) 153/65 (Site: Left Upper Arm, Position: Sitting, Cuff Size: Medium Adult)   Pulse 58   Temp 96.9 °F (36.1 °C) (Infrared)   Resp 16   Ht 5' (1.524 m)   Wt 140 lb 3.2 oz (63.6 kg)   SpO2 97%   BMI 27.38 kg/m²      Physical Exam:  CONSTITUTIONAL: awake, alert, cooperative, no apparent distress   EYES: pupils equal, round and reactive to light, sclera clear, normal conjunctiva  ENT: Normocephalic, without obvious abnormality, atraumatic  NECK: supple, symmetrical, no jugular venous distension, no carotid bruits   HEMATOLOGIC/LYMPHATIC: no cervical, supraclavicular or axillary lymphadenopathy   LUNGS: VBS, no wheezes, no increased work of breathing, no rhonchi, clear to auscultation, no crackles,    CARDIOVASCULAR: regular rate and rhythm, normal S1 and S2, no murmur noted  ABDOMEN: normal bowel sounds x 4, soft, non-distended, non-tender, no masses palpated, no hepatosplenomegaly   MUSCULOSKELETAL: full range of motion noted, tone is normal  NEUROLOGIC: awake, alert, oriented to name, place and time. Motor skills grossly intact. SKIN: appears intact, normal skin color, normal texture, normal turgor, no jaundice.    EXTREMITIES: no clubbing, no leg swelling, no LE edema, no cyanosis,       Labs:  Hematology:  Lab Results   Component Value Date    WBC 7.4 08/09/2022    RBC 3.74 (L) 08/09/2022    HGB 9.2 (L) 08/09/2022    HCT 30.1 (L) 08/09/2022    MCV 80.5 08/09/2022    MCH 24.6 (L) 08/09/2022    MCHC 30.6 (L) 08/09/2022    RDW 21.3 (H) 08/09/2022     08/09/2022    MPV 9.4 08/09/2022    SEGSPCT 73.3 (H) 08/09/2022    EOSRELPCT 3.2 (H) 08/09/2022    BASOPCT 0.4 08/09/2022    LYMPHOPCT 18.2 (L) 08/09/2022    MONOPCT 4.9 (H) 08/09/2022    SEGSABS 5.4 08/09/2022    EOSABS 0.2 08/09/2022    BASOSABS 0.0 08/09/2022    LYMPHSABS 1.4 08/09/2022    MONOSABS 0.4 08/09/2022    DIFFTYPE AUTOMATED DIFFERENTIAL 08/09/2022     Lab Results   Component Value Date    ESR 17 07/08/2022     Chemistry:  Lab Results   Component Value Date     08/09/2022    K 3.8 08/09/2022     08/09/2022    CO2 23 08/09/2022    BUN 16 08/09/2022    CREATININE 0.9 08/09/2022    GLUCOSE 96 08/09/2022    CALCIUM 8.9 08/09/2022    PROT 6.0 (L) 08/09/2022    LABALBU 3.6 08/09/2022    BILITOT 0.6 08/09/2022    ALKPHOS 46 08/09/2022    AST 17 08/09/2022    ALT 9 (L) 08/09/2022    LABGLOM 59 (L) 08/09/2022    GFRAA >60 08/09/2022    PHOS 3.0 06/08/2022    MG 1.7 (L) 06/08/2022    POCGLU 162 (H) 06/23/2022     Lab Results   Component Value Date     07/08/2022     No components found for: LD  Lab Results   Component Value Date    TSHHS 0.095 (L) 07/08/2022    T4FREE 1.24 06/12/2022    FT3 2.3 12/06/2017     Immunology:  Lab Results   Component Value Date    PROT 6.0 (L) 08/09/2022     No results found for: Karthik Gutierrez, KLFLCR  No results found for: B2M  Coagulation Panel:  Lab Results   Component Value Date    PROTIME 26.8 (H) 07/05/2022    INR 2.14 07/05/2022    APTT 29.5 05/14/2021    DDIMER <200 01/24/2016     Anemia Panel:  Lab Results   Component Value Date    [id number] 1035 (H) 07/08/2022    FOLATE 11.3 07/08/2022     Tumor Markers:  Lab Results   Component Value Date    CEA 5.1 08/09/2022        Observations:  PHQ-9 Total Score: 0 (9/6/2022 11:39 AM)     Assessment   Stage III terminal ileum adenocarcinoma. Plan:  I reviewed with her findings on CT scan, US pelvis, colonoscopy and labs. Cecal lesion is concerning for malignant process and right adnexa cystic lesion needs further evaluation with MRI. 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 MRI pelvis showed complex ovarian cyst without internal enhancement to suggest solid mass. Radiologist recommend f/u imaging with CT or MRI in 6 months. She is s/p surgery on 5/27/22. Final pathology revealed that she has stage III terminal ileum adenocarcinoma (pT3, pN1c). She has lost about 25 lbs over last 2 - 3 months before surgery. Her CEA on 5/22/22 was 3.7. Adjuvant chemotherapy with xeloda was started on 7/26/2022. On September 6, 2022, she presented to me for follow-up. 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 I reviewed final path report with her and her family. We also reviewed NCCN guidelines.  I also let her know that we ideally recommend adjuvant chemotherapy with either CapeOx (capecitabine + oxaliplatin) for 3 months, FOLFOX for 6 months or single agent capecitabine (in frail patient) for six months. I also discussed with her and her family all the potential side effects as well as benefits of adjuvant chemotherapy. After long discussion with her and family, they are in agreement to start adjuvant chemotherapy with capecitabine. It was started on 7/26/22. She is in her second cycle of chemotherapy (day 6). She has significant oral mucositis and it interfere with her eating and drinking. She is on xeloda 1500 mg BID. I recommend her stop xeloda for now and I will re evaluate her in 2 weeks. If her symptoms are resolved at that time, I will start her second cycle with xeloda 1000 mg BID. Chemo induced mucositis - recommend to continue with magic mouth wash with dexamethasone. Chemo induced nausea - mild symptom only. Recommend her to take zofran prn. I answered all her questions and concerns for today. Recent imaging and labs were reviewed and discussed with the patient.

## 2022-09-06 NOTE — PROGRESS NOTES
MA Rooming Questions  Patient: Timmy Feldman  MRN: [id number]    Date: 9/6/2022        1. Do you have any new issues?   no         2. Do you need any refills on medications?    no    3. Have you had any imaging done since your last visit?   no    4. Have you been hospitalized or seen in the emergency room since your last visit here?   no    5. Did the patient have a depression screening completed today?  Yes    PHQ-9 Total Score: 0 (9/6/2022 11:39 AM)       PHQ-9 Given to (if applicable):               PHQ-9 Score (if applicable):                     [] Positive     []  Negative              Does question #9 need addressed (if applicable)                     [] Yes    []  No               Adrianne Martins CMA

## 2022-09-20 ENCOUNTER — TELEPHONE (OUTPATIENT)
Dept: SURGERY | Age: 87
End: 2022-09-20

## 2022-09-20 RX ORDER — FAMOTIDINE 20 MG/1
20 TABLET, FILM COATED ORAL 2 TIMES DAILY
Qty: 60 TABLET | Refills: 3 | Status: ON HOLD | OUTPATIENT
Start: 2022-09-20 | End: 2022-11-04 | Stop reason: HOSPADM

## 2022-09-23 ENCOUNTER — HOSPITAL ENCOUNTER (OUTPATIENT)
Dept: INFUSION THERAPY | Age: 87
Discharge: HOME | End: 2022-09-23
Payer: MEDICARE

## 2022-09-23 ENCOUNTER — OFFICE VISIT (OUTPATIENT)
Dept: ONCOLOGY | Age: 87
End: 2022-09-23
Payer: MEDICARE

## 2022-09-23 VITALS
HEART RATE: 63 BPM | SYSTOLIC BLOOD PRESSURE: 134 MMHG | WEIGHT: 136.6 LBS | OXYGEN SATURATION: 100 % | TEMPERATURE: 98 F | RESPIRATION RATE: 16 BRPM | BODY MASS INDEX: 26.82 KG/M2 | DIASTOLIC BLOOD PRESSURE: 64 MMHG | HEIGHT: 60 IN

## 2022-09-23 DIAGNOSIS — C80.1 ADENOCARCINOMA (HCC): Primary | ICD-10-CM

## 2022-09-23 PROCEDURE — 99213 OFFICE O/P EST LOW 20 MIN: CPT | Performed by: INTERNAL MEDICINE

## 2022-09-23 PROCEDURE — 1124F ACP DISCUSS-NO DSCNMKR DOCD: CPT | Performed by: INTERNAL MEDICINE

## 2022-09-23 PROCEDURE — 99211 OFF/OP EST MAY X REQ PHY/QHP: CPT

## 2022-09-23 RX ORDER — SPIRONOLACTONE 25 MG/1
TABLET ORAL
COMMUNITY
Start: 2022-08-13

## 2022-09-23 NOTE — PROGRESS NOTES
Patient Name:  Alexa Michelle  Patient :  1934  Patient MRN:  [id number]     Primary Oncologist: César Ayoub MD  Referring Provider: Marcelo Aj DO     Date of Service: 2022      Chief Complaint:    Chief Complaint   Patient presents with    Follow-up     Patient Active Problem List:     Long-term insulin use in type 2 diabetes [facility])     Essential hypertension     Dyslipidemia     Abnormal EKG     Abnormal stress test     Cecum mass     Severe malnutrition (HCC)     Partial small bowel obstruction ([address]     Adenocarcinoma (HCC)     Generalized weakness     Uncontrolled pain     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 female with medical history significant for CKD stage IIIa, diverticulitis, IDDM 2 with peripheral neuropathy, HTN, HLD, G1DD, KEVIN, hypothyroidism, and vitamin B12 deficiency, presented on 22 with complaints of abdominal pain. She has been treated recently for abdominal pain and suspected diverticulitis, but was not improving after getting antibiotics which prompted her to return to the ED. She denies any personal or family history of colon cancer. Her sister had breast cancer in her 42's. She has had colonoscopies in the past which were normal, but thinks her last one was more than 10 years ago. She denies any previous problems with ovarian cysts or adnexal lesions. She underwent colonoscopy on 22 and it showed large mass extending from the ileocecal valve into the cecum mass originating at ileocecal valve appears to be more tubulovillous, mass in the cecum appears to be more firm fixed ulcerated consistent with malignancy. Biopsies were obtained.  Mild to moderate sigmoid diverticulosis and grade 2 hemorrhoids and incidental lipoma in transverse colon     Biopsy from cecal mass showed invasive moderately differentiated adenocarcinoma. MRI pelvis showed complex ovarian cyst without internal enhancement to suggest solid mass. Radiologist recommend f/u imaging with CT or MRI in 6 months. She is s/p surgery on 5/27/22. Final pathology revealed that she has stage III terminal ileum adenocarcinoma (pT3, pN1c). She has lost about 25 lbs over last 2 - 3 months before surgery. Her CEA on 5/22/22 was 3.7. Adjuvant chemotherapy with xeloda was started on 7/26/2022. On September 23, 2022, she presented to me for follow-up. 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 I reviewed final path report with her and her family. We also reviewed NCCN guidelines. I also let her know that we ideally recommend adjuvant chemotherapy with either CapeOx (capecitabine + oxaliplatin) for 3 months, FOLFOX for 6 months or single agent capecitabine (in frail patient) for six months. I also discussed with her and her family all the potential side effects as well as benefits of adjuvant chemotherapy. After long discussion with her and family, they are in agreement to start adjuvant chemotherapy with capecitabine. It was started on 7/26/22. She is in her second cycle of chemotherapy (day 6). She has significant oral mucositis and it interfere with her eating and drinking. She is on xeloda 1500 mg BID. I stopped it since 9/6/22. She still has some mucositis today. Will re evaluate her again in 2 weeks. If her symptoms are completely resolved at that time, I will start her second cycle with xeloda 1000 mg BID. Chemo induced mucositis - recommend to continue with magic mouth wash with dexamethasone. Chemo induced nausea - mild symptom only. Recommend her to take zofran prn. She does not have any other significant symptoms at today's visit.     Past Medical History: Significant for  1. Hypertension  2. Hyperlipidemia  3. Diabetes mellitus  4. Hypothyroidism  5. Obstructive sleep apnea  6. Coronary artery disease    Past Surgery History:    Significant for  1. Cholecystectomy  2. Dilatation and curettage  3. Cataract surgery  4. Right breast biopsy [benign]  5. Right hemicolectomy    Social History:   She denies smoking or illicit drug abuse. She socially drinks alcohol. Family History:    Significant for breast cancer in one of her sister. Allergies   Allergen Reactions    Lopid [Gemfibrozil] Shortness Of Breath and Other (See Comments)     Cough    Bystolic [Nebivolol Hcl]     Cefdinir     Coreg [Carvedilol]     Crestor [Rosuvastatin]     Fenofibrate     Glucophage [Metformin]     Hydrochlorothiazide Other (See Comments)    Metronidazole     Norvasc [Amlodipine Besylate]     Tribenzor [Olmesartan-Amlodipine-Hctz]      Pt states that her chest felt funny and achy when she took the medication    Zocor [Simvastatin]      Review of Systems: \"Per interval history; otherwise 10 point ROS is negative. \"  Her energy level is fair and her sleep is fine. She doesn't have fever, chills, night sweats, cough, SOB, chest pain, hemoptysis or palpitations. Her bowel and bladder functions are normal, except oral mucositis. She denies nausea, vomiting, abdominal pain, diarrhea, constipation, dysuria, loss of appetite or weight loss. She doesn't have neuropathy and she denies bleeding or clotting issues. She doesn't have any pain in her body. No anxiety or depression. The rest of the systems are unremarkable.      Vital Signs: /64 (Site: Left Upper Arm, Position: Sitting, Cuff Size: Medium Adult)   Pulse 63   Temp 98 °F (36.7 °C) (Temporal)   Resp 16   Ht 5' (1.524 m)   Wt 136 lb 9.6 oz (62 kg)   SpO2 100%   BMI 26.68 kg/m²      Physical Exam:  CONSTITUTIONAL: awake, alert, cooperative, no apparent distress   EYES: pupils equal, round and reactive to light, sclera clear, normal conjunctiva  ENT: Normocephalic, without obvious abnormality, atraumatic  NECK: supple, symmetrical, no jugular venous distension, no carotid bruits   HEMATOLOGIC/LYMPHATIC: no cervical, supraclavicular or axillary lymphadenopathy   LUNGS: VBS, no wheezes, no increased work of breathing, no rhonchi, clear to auscultation, no crackles,    CARDIOVASCULAR: regular rate and rhythm, normal S1 and S2, no murmur noted  ABDOMEN: normal bowel sounds x 4, soft, non-distended, non-tender, no masses palpated, no hepatosplenomegaly   MUSCULOSKELETAL: full range of motion noted, tone is normal  NEUROLOGIC: awake, alert, oriented to name, place and time. Motor skills grossly intact. SKIN: appears intact, normal skin color, normal texture, normal turgor, no jaundice.    EXTREMITIES: no leg swelling, no LE edema, no clubbing, no cyanosis,       Labs:  Hematology:  Lab Results   Component Value Date    WBC 7.4 08/09/2022    RBC 3.74 (L) 08/09/2022    HGB 9.2 (L) 08/09/2022    HCT 30.1 (L) 08/09/2022    MCV 80.5 08/09/2022    MCH 24.6 (L) 08/09/2022    MCHC 30.6 (L) 08/09/2022    RDW 21.3 (H) 08/09/2022     08/09/2022    MPV 9.4 08/09/2022    SEGSPCT 73.3 (H) 08/09/2022    EOSRELPCT 3.2 (H) 08/09/2022    BASOPCT 0.4 08/09/2022    LYMPHOPCT 18.2 (L) 08/09/2022    MONOPCT 4.9 (H) 08/09/2022    SEGSABS 5.4 08/09/2022    EOSABS 0.2 08/09/2022    BASOSABS 0.0 08/09/2022    LYMPHSABS 1.4 08/09/2022    MONOSABS 0.4 08/09/2022    DIFFTYPE AUTOMATED DIFFERENTIAL 08/09/2022     Lab Results   Component Value Date    ESR 17 07/08/2022     Chemistry:  Lab Results   Component Value Date     08/09/2022    K 3.8 08/09/2022     08/09/2022    CO2 23 08/09/2022    BUN 16 08/09/2022    CREATININE 0.9 08/09/2022    GLUCOSE 96 08/09/2022    CALCIUM 8.9 08/09/2022    PROT 6.0 (L) 08/09/2022    LABALBU 3.6 08/09/2022    BILITOT 0.6 08/09/2022    ALKPHOS 46 08/09/2022    AST 17 08/09/2022    ALT 9 (L) 08/09/2022    LABGLOM 59 (L) 08/09/2022    GFRAA >60 08/09/2022    PHOS 3.0 06/08/2022    MG 1.7 (L) 06/08/2022    POCGLU 162 (H) 06/23/2022     Lab Results   Component Value Date     07/08/2022     No components found for: LD  Lab Results   Component Value Date    TSHHS 0.095 (L) 07/08/2022    T4FREE 1.24 06/12/2022    FT3 2.3 12/06/2017     Immunology:  Lab Results   Component Value Date    PROT 6.0 (L) 08/09/2022     No results found for: Mikayla Duarte, KLFLCR  No results found for: B2M  Coagulation Panel:  Lab Results   Component Value Date    PROTIME 26.8 (H) 07/05/2022    INR 2.14 07/05/2022    APTT 29.5 05/14/2021    DDIMER <200 01/24/2016     Anemia Panel:  Lab Results   Component Value Date    [id number] 1035 (H) 07/08/2022    FOLATE 11.3 07/08/2022     Tumor Markers:  Lab Results   Component Value Date    CEA 5.1 08/09/2022        Observations:  No data recorded     Assessment   Stage III terminal ileum adenocarcinoma. Plan:  I reviewed with her findings on CT scan, US pelvis, colonoscopy and labs. Cecal lesion is concerning for malignant process and right adnexa cystic lesion needs further evaluation with MRI. 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 MRI pelvis showed complex ovarian cyst without internal enhancement to suggest solid mass. Radiologist recommend f/u imaging with CT or MRI in 6 months. She is s/p surgery on 5/27/22. Final pathology revealed that she has stage III terminal ileum adenocarcinoma (pT3, pN1c). She has lost about 25 lbs over last 2 - 3 months before surgery. Her CEA on 5/22/22 was 3.7. Adjuvant chemotherapy with xeloda was started on 7/26/2022. On September 23, 2022, she presented to me for follow-up. 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 I reviewed final path report with her and her family. We also reviewed NCCN guidelines.  I also let her know that we ideally recommend adjuvant chemotherapy with either CapeOx (capecitabine + oxaliplatin) for 3 months, FOLFOX for 6 months or single agent capecitabine (in frail patient) for six months. I also discussed with her and her family all the potential side effects as well as benefits of adjuvant chemotherapy. After long discussion with her and family, they are in agreement to start adjuvant chemotherapy with capecitabine. It was started on 7/26/22. She is in her second cycle of chemotherapy (day 6). She has significant oral mucositis and it interfere with her eating and drinking. She is on xeloda 1500 mg BID. I stopped it since 9/6/22. She still has some mucositis today. Will re evaluate her again in 2 weeks. If her symptoms are completely resolved at that time, I will start her second cycle with xeloda 1000 mg BID. Chemo induced mucositis - recommend to continue with magic mouth wash with dexamethasone. Chemo induced nausea - mild symptom only. Recommend her to take zofran prn. I answered all her questions and concerns for today. Recent imaging and labs were reviewed and discussed with the patient.

## 2022-09-23 NOTE — PROGRESS NOTES
MA Rooming Questions  Patient: Chaparro Fraire  MRN: [id number]    Date: 9/23/2022        1. Do you have any new issues?   no         2. Do you need any refills on medications?    no    3. Have you had any imaging done since your last visit?   no    4. Have you been hospitalized or seen in the emergency room since your last visit here?   no    5. Did the patient have a depression screening completed today?  No    No data recorded     PHQ-9 Given to (if applicable):               PHQ-9 Score (if applicable):                     [] Positive     []  Negative              Does question #9 need addressed (if applicable)                     [] Yes    []  No               Denny Mayes CMA

## 2022-09-25 ASSESSMENT — ENCOUNTER SYMPTOMS
SHORTNESS OF BREATH: 0
CONSTIPATION: 0
ABDOMINAL PAIN: 0
NAUSEA: 0
VOMITING: 0

## 2022-09-25 NOTE — PROGRESS NOTES
GENERAL SURGERY NOTE - Outpatient [address] [facility]    PATIENT: Mali Gleason 1934, 80 y.o., female   Date: 9/26/2022  MRN: [id number]   Requesting Provider:   No ref. provider found History Obtained From:  patient, electronic medical record     Reason for Evaluation & Chief Complaint:    Chief Complaint   Patient presents with    Follow-up     2nd FU S/P Bowel resection Right Hemicolectomy @ [facility] 5/24/22     HISTORY OF PRESENT ILLNESS:      Josef Tao is a 80 y.o. female presenting postoperatively after robotic assisted right hemicolectomy for ileocecal mass. Since the procedure, she has been doing well overall. She saw Dr. Alize Patel on 9/23. Adjuvant therapy was started on 7/26/22. Second cycle of chemo started, significant oral mucositis. Started on magic mouth wash with dexamethasone. Chemo on hold for 2 weeks. Eating a regular diet with difficulty - her appetite has not been great, is taking Ensure too. Bowel movement are Normal - a little loose, sometimes having incontinence     Past Medical History:    Past Medical History:   Diagnosis Date    Cancer of right colon ([address] 7/5/2022    Diabetes mellitus ([facility] [address]     H/O cardiac catheterization 05/18/2021    no significant CAD in main vessels, has severe stenosis in small  branch diagonal vessel    H/O cardiovascular stress test 3/22/18,03/30/2017    ECG portion of the stress test is negative for ischemia EF >70% Normal stress myocardial perfusion. H/O cardiovascular stress test 04/07/2021    abnormal stress    H/O Doppler ultrasound (Abdomimal Aorta) 03/29/2017    No evidence of abdominal aortic aneurysm. H/O echocardiogram 07/02/2014    Normal left ventricular size, wall motion and systolic function. Mildle elevated pulmonary artery systolic pressure. Mild MR and TR and trace AR EF 55 to 60%    Hx of echocardiogram 03/29/2017    EF 55-60%. Grade I diastolic dysfunction. Mildly dilated left atrium.  No evidence of pericardial effusion. Hyperlipidemia     Hypertension     Sleep apnea     Thyroid disease        Past Surgical History:    Past Surgical History:   Procedure Laterality Date    BREAST BIOPSY Right     approx age 76, benign    CATARACT REMOVAL      CHOLECYSTECTOMY      COLONOSCOPY N/A 5/25/2022    COLONOSCOPY POLYPECTOMY SNARE/COLD BIOPSY performed by Keysha Franklin MD at [facility] 5/27/2022    BOWEL RESECTION RIGHT HEMICOLECTOMY LAPAROSCOPIC ROBOTIC XI performed by Daniel Cunha MD at [facility]      eye lift       Current Medications:   Current Outpatient Medications   Medication Sig Dispense Refill    spironolactone (ALDACTONE) 25 MG tablet       famotidine (PEPCID) 20 MG tablet Take 1 tablet by mouth 2 times daily 60 tablet 3    potassium chloride (KLOR-CON M) 20 MEQ extended release tablet Take 1 tablet by mouth daily as needed (with Lasix) 30 tablet 0    furosemide (LASIX) 20 MG tablet Take 1 tablet by mouth as needed (for weight gain of 3 lbs in a day or  5 in a week) 30 tablet 1    gabapentin (NEURONTIN) 100 MG capsule       rivaroxaban (XARELTO) 20 MG TABS tablet Take 1 tablet by mouth daily (with breakfast) (Patient not taking: Reported on 9/26/2022) 30 tablet 0    Magic Mouthwash (MIRACLE MOUTHWASH) Equal parts of Benadryl, Maalox, 2% Viscous Xylocaine and Dexamethasone. Take 5 mL 4 times daily. 300 mL 3    montelukast (SINGULAIR) 10 MG tablet Take 10 mg by mouth nightly      metoprolol tartrate (LOPRESSOR) 50 MG tablet Take 1 tablet by mouth in the morning and 1 tablet before bedtime. 3 pills in the AM : 150 mg dose  2 pills in the PM: 100 mg dose.  60 tablet 3    ondansetron (ZOFRAN) 8 MG tablet Take 1 tablet by mouth every 8 hours as needed for Nausea or Vomiting 30 tablet 1    rivaroxaban (XARELTO) 15 MG TABS tablet Take 15 mg by mouth (Patient not taking: Reported on 9/26/2022)      sodium chloride 1 g tablet Take 1 g by mouth 2 times daily      capecitabine (XELODA) 500 MG chemo tablet Take 3 tablets (1,500 mg) by mouth two times daily for 14 days then 7 days off every 21 days. (Patient not taking: Reported on 9/23/2022) 84 tablet 5    escitalopram (LEXAPRO) 10 MG tablet Take 1 tablet by mouth daily 30 tablet 0    sucralfate (CARAFATE) 1 GM tablet Take 1 tablet by mouth 4 times daily (before meals and nightly) 120 tablet 0    melatonin 3 MG TABS tablet Take 3 mg by mouth nightly as needed      vitamin B-12 (CYANOCOBALAMIN) 100 MCG tablet Take 100 mcg by mouth daily      LANTUS SOLOSTAR 100 UNIT/ML injection pen       Cholecalciferol (VITAMIN D PO) Take by mouth      oxybutynin (DITROPAN) 5 MG tablet Take 5 mg by mouth 2 times daily      atorvastatin (LIPITOR) 20 MG tablet Take 20 mg by mouth daily      rOPINIRole (REQUIP) 3 MG tablet Take 3 mg by mouth nightly      levothyroxine (SYNTHROID) 137 MCG tablet Take 137 mcg by mouth daily   3    Insulin Pen Needle (PEN NEEDLES 31GX5/16\") 31G X 8 MM MISC        No current facility-administered medications for this visit. Allergies:  Lopid [gemfibrozil], Bystolic [nebivolol hcl], Cefdinir, Coreg [carvedilol], Crestor [rosuvastatin], Fenofibrate, Glucophage [metformin], Hydrochlorothiazide, Metronidazole, Norvasc [amlodipine besylate], Tribenzor [olmesartan-amlodipine-hctz], and Zocor [simvastatin]    Social History:   Social History     Socioeconomic History    Marital status:      Spouse name: None    Number of children: None    Years of education: None    Highest education level: None   Tobacco Use    Smoking status: Never    Smokeless tobacco: Never   Vaping Use    Vaping Use: Never used   Substance and Sexual Activity    Alcohol use: Not Currently     Alcohol/week: 1.0 standard drink     Types: 1 Glasses of wine per week     Comment: rarely.  1-2cups coffee daily    Drug use: No       Family History:   Family History   Problem Relation Age of Onset    Pacemaker Sister Arrhythmia Sister     Breast Cancer Sister         pre menopausal    Ovarian Cancer Neg Hx        REVIEW OF SYSTEMS:    Review of Systems   Constitutional:  Positive for appetite change. Negative for chills and fever. Respiratory:  Negative for shortness of breath. Cardiovascular:  Negative for chest pain. Gastrointestinal:  Positive for diarrhea. Negative for abdominal pain, blood in stool, constipation, nausea and vomiting. I have reviewed the patient's information pertinent to this visit, including medical history, family history, social history and review of systems. PHYSICAL EXAM:    Vitals:    09/26/22 1420   BP: 112/70   Site: Left Upper Arm   Position: Sitting   Cuff Size: Medium Adult   Pulse: 68   SpO2: 97%   Weight: 136 lb (61.7 kg)   Height: 5' (1.524 m)       Physical Exam  Constitutional:       General: She is not in acute distress. Appearance: She is well-developed. She is not diaphoretic. HENT:      Head: Normocephalic and atraumatic. Eyes:      Pupils: Pupils are equal, round, and reactive to light. Cardiovascular:      Rate and Rhythm: Normal rate and regular rhythm. Pulmonary:      Effort: Pulmonary effort is normal. No respiratory distress. Abdominal:      Palpations: Abdomen is soft. Tenderness: There is no abdominal tenderness. There is no guarding or rebound. Comments: Incision(s) well healed, no erythema or drainage    Neurological:      General: No focal deficit present. Mental Status: She is alert.    Psychiatric:         Behavior: Behavior normal.     Labs:  Lab Results   Component Value Date    WBC 7.4 08/09/2022    HGB 9.2 (L) 08/09/2022    HCT 30.1 (L) 08/09/2022     08/09/2022     08/09/2022    K 3.8 08/09/2022     08/09/2022    CO2 23 08/09/2022    BUN 16 08/09/2022    CREATININE 0.9 08/09/2022    GLUCOSE 96 08/09/2022    CALCIUM 8.9 08/09/2022    PROT 6.0 (L) 08/09/2022    BILITOT 0.6 08/09/2022    AST 17 08/09/2022 ALT 9 (L) 08/09/2022    ALKPHOS 46 08/09/2022    AMYLASE 56 03/10/2017    LIPASE 31 05/21/2022    INR 2.14 07/05/2022    GLUF 121 (H) 12/06/2017    LABA1C 5.9 05/22/2022       Imaging:     Pertinent laboratory and imaging studies were personally reviewed if available. IMPRESSION:    Blessing Swift is a 80 y.o. 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 Visit Diagnoses:  1. 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)     Moderate malnutrition (HCC)     Chronic kidney disease, stage 3a ([address]     Acquired hypothyroidism     Reactive depression     Adenocarcinoma ([address] 06/09/2022    Partial small bowel obstruction (HCC)     Severe malnutrition ([address] 05/31/2022    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    Abnormal stress test     Dyslipidemia 04/06/2021    Abnormal EKG 04/06/2021    Long-term insulin use in type 2 diabetes ([facility] [address] 03/14/2018    Essential hypertension 03/14/2018       PLAN:  Continue current care  Increase activity as tolerated  Will plan on CT scan in 6 months (due around November) for follow up of the adnexal region  Follow up with Oncology, Dr. Iram Leyva as scheduled  Follow Up: Return in about 8 weeks (around 11/21/2022).     Electronically signed by LAURE Walker CNP, 9/26/2022, 3:06 PM.

## 2022-09-26 ENCOUNTER — OFFICE VISIT (OUTPATIENT)
Dept: SURGERY | Age: 87
End: 2022-09-26
Payer: MEDICARE

## 2022-09-26 VITALS
WEIGHT: 136 LBS | SYSTOLIC BLOOD PRESSURE: 112 MMHG | HEART RATE: 68 BPM | OXYGEN SATURATION: 97 % | DIASTOLIC BLOOD PRESSURE: 70 MMHG | BODY MASS INDEX: 26.7 KG/M2 | HEIGHT: 60 IN

## 2022-09-26 DIAGNOSIS — C80.1 ADENOCARCINOMA (HCC): Primary | ICD-10-CM

## 2022-09-26 PROCEDURE — 99213 OFFICE O/P EST LOW 20 MIN: CPT | Performed by: NURSE PRACTITIONER

## 2022-09-26 PROCEDURE — 1124F ACP DISCUSS-NO DSCNMKR DOCD: CPT | Performed by: NURSE PRACTITIONER

## 2022-09-26 ASSESSMENT — PATIENT HEALTH QUESTIONNAIRE - PHQ9
2. FEELING DOWN, DEPRESSED OR HOPELESS: 0
SUM OF ALL RESPONSES TO PHQ9 QUESTIONS 1 & 2: 0
SUM OF ALL RESPONSES TO PHQ QUESTIONS 1-9: 0
1. LITTLE INTEREST OR PLEASURE IN DOING THINGS: 0

## 2022-09-26 ASSESSMENT — ENCOUNTER SYMPTOMS
DIARRHEA: 1
BLOOD IN STOOL: 0

## 2022-10-05 NOTE — PROGRESS NOTES
Patient Name:  Cody Boyd  Patient :  1934  Patient MRN:  [id number]     Primary Oncologist: Jareth Finney MD  Referring Provider: Jerica Aj DO     Date of Service: 10/6/2022      Chief Complaint:    Chief Complaint   Patient presents with    Follow-up     Patient Active Problem List:     Long-term insulin use in type 2 diabetes [facility])     Essential hypertension     Dyslipidemia     Abnormal EKG     Abnormal stress test     Cecum mass     Severe malnutrition (HCC)     Partial small bowel obstruction ([address]     Adenocarcinoma ([address]     Generalized weakness     Uncontrolled pain     Uncontrolled type 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)     Moderate malnutrition ([address]     Chronic kidney disease, stage 3a ([address]     Acquired hypothyroidism     Reactive depression     Cancer of right colon ([address]    HPI:   Cody Boyd is a 80 y.o. female with medical history significant for CKD stage IIIa, diverticulitis, IDDM 2 with peripheral neuropathy, HTN, HLD, G1DD, KEVIN, hypothyroidism, and vitamin B12 deficiency, presented on 22 with complaints of abdominal pain. She has been treated recently for abdominal pain and suspected diverticulitis, but was not improving after getting antibiotics which prompted her to return to the ED. She denies any personal or family history of colon cancer. Her sister had breast cancer in her 42's. She has had colonoscopies in the past which were normal, but thinks her last one was more than 10 years ago. She denies any previous problems with ovarian cysts or adnexal lesions. She underwent colonoscopy on 22 and it showed large mass extending from the ileocecal valve into the cecum mass originating at ileocecal valve appears to be more tubulovillous, mass in the cecum appears to be more firm fixed ulcerated consistent with malignancy. Biopsies were obtained.  Mild to moderate sigmoid diverticulosis and grade 2 hemorrhoids and incidental lipoma in transverse colon     Biopsy from cecal mass showed invasive moderately differentiated adenocarcinoma. MRI pelvis showed complex ovarian cyst without internal enhancement to suggest solid mass. Radiologist recommend f/u imaging with CT or MRI in 6 months. She is s/p surgery on 5/27/22. Final pathology revealed that she has stage III terminal ileum adenocarcinoma (pT3, pN1c). She has lost about 25 lbs over last 2 - 3 months before surgery. Her CEA on 5/22/22 was 3.7. Adjuvant chemotherapy with xeloda was started on 7/26/2022 and we stopped it after 9/6/22 due to severe oral mucositis. On October 6, 2022, she presented to me for follow-up. 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 I reviewed final path report with her and her family. We also reviewed NCCN guidelines. I also let her know that we ideally recommend adjuvant chemotherapy with either CapeOx (capecitabine + oxaliplatin) for 3 months, FOLFOX for 6 months or single agent capecitabine (in frail patient) for six months. I also discussed with her and her family all the potential side effects as well as benefits of adjuvant chemotherapy. After long discussion with her and family, they are in agreement to start adjuvant chemotherapy with capecitabine. It was started on 7/26/22. She is in her second cycle of chemotherapy (day 6). She has significant oral mucositis and it interfere with her eating and drinking. She is on xeloda 1500 mg BID. I stopped it since 9/6/22. She continues to have some mucositis today. She is concerned to continue with xeloda. We discussed about 5Fu and she is in agreement to start 5 Fu infusion. Will set up for chemo education and will plan to start it soon. Chemo induced mucositis - recommend to continue with magic mouth wash with dexamethasone. Chemo induced nausea - mild symptom only. Recommend her to take zofran prn.      She does not have any other significant symptoms at today's visit. Past Medical History:     Significant for  1. Hypertension  2. Hyperlipidemia  3. Diabetes mellitus  4. Hypothyroidism  5. Obstructive sleep apnea  6. Coronary artery disease    Past Surgery History:    Significant for  1. Cholecystectomy  2. Dilatation and curettage  3. Cataract surgery  4. Right breast biopsy [benign]  5. Right hemicolectomy    Social History:   She denies smoking or illicit drug abuse. She socially drinks alcohol. Family History:    Significant for breast cancer in one of her sister. Allergies   Allergen Reactions    Lopid [Gemfibrozil] Shortness Of Breath and Other (See Comments)     Cough    Bystolic [Nebivolol Hcl]     Cefdinir     Coreg [Carvedilol]     Crestor [Rosuvastatin]     Fenofibrate     Glucophage [Metformin]     Hydrochlorothiazide Other (See Comments)    Metronidazole     Norvasc [Amlodipine Besylate]     Tribenzor [Olmesartan-Amlodipine-Hctz]      Pt states that her chest felt funny and achy when she took the medication    Zocor [Simvastatin]      Review of Systems: \"Per interval history; otherwise 10 point ROS is negative. \"  Her energy level is okay and her sleep is fine. She doesn't have fever, chills, night sweats, cough, SOB, chest pain, hemoptysis or palpitations. Her bowel and bladder functions are normal, except oral mucositis. She denies nausea, vomiting, abdominal pain, diarrhea, constipation, dysuria, loss of appetite or weight loss. She doesn't have neuropathy and she denies bleeding or clotting issues. She denies any pain in her body. No anxiety or depression. The rest of the systems are unremarkable.      Vital Signs: BP (!) 120/51 (Site: Left Upper Arm, Position: Sitting, Cuff Size: Medium Adult)   Pulse 79   Temp 97.2 °F (36.2 °C) (Infrared)   Resp 16   Ht 5' (1.524 m)   Wt 139 lb (63 kg)   SpO2 96%   BMI 27.15 kg/m²      Physical Exam:  CONSTITUTIONAL: awake, alert, cooperative, no apparent distress   EYES: pupils equal, round and reactive to light, sclera clear, normal conjunctiva  ENT: Normocephalic, without obvious abnormality, atraumatic  NECK: supple, symmetrical, no jugular venous distension, no carotid bruits   HEMATOLOGIC/LYMPHATIC: no cervical, supraclavicular or axillary lymphadenopathy   LUNGS: VBS, no wheezes, no increased work of breathing, no rhonchi, clear to auscultation, no crackles,    CARDIOVASCULAR: regular rate and rhythm, normal S1 and S2, no murmur noted  ABDOMEN: normal bowel sounds x 4, soft, non-distended, non-tender, no masses palpated, no hepatosplenomegaly   MUSCULOSKELETAL: full range of motion noted, tone is normal  NEUROLOGIC: awake, alert, oriented to name, place and time. Motor skills grossly intact. SKIN: appears intact, normal skin color, normal texture, normal turgor, no jaundice.    EXTREMITIES: no LE edema, no clubbing, no leg swelling, no cyanosis,       Labs:  Hematology:  Lab Results   Component Value Date    WBC 7.4 08/09/2022    RBC 3.74 (L) 08/09/2022    HGB 9.2 (L) 08/09/2022    HCT 30.1 (L) 08/09/2022    MCV 80.5 08/09/2022    MCH 24.6 (L) 08/09/2022    MCHC 30.6 (L) 08/09/2022    RDW 21.3 (H) 08/09/2022     08/09/2022    MPV 9.4 08/09/2022    SEGSPCT 73.3 (H) 08/09/2022    EOSRELPCT 3.2 (H) 08/09/2022    BASOPCT 0.4 08/09/2022    LYMPHOPCT 18.2 (L) 08/09/2022    MONOPCT 4.9 (H) 08/09/2022    SEGSABS 5.4 08/09/2022    EOSABS 0.2 08/09/2022    BASOSABS 0.0 08/09/2022    LYMPHSABS 1.4 08/09/2022    MONOSABS 0.4 08/09/2022    DIFFTYPE AUTOMATED DIFFERENTIAL 08/09/2022     Lab Results   Component Value Date    ESR 17 07/08/2022     Chemistry:  Lab Results   Component Value Date     08/09/2022    K 3.8 08/09/2022     08/09/2022    CO2 23 08/09/2022    BUN 16 08/09/2022    CREATININE 0.9 08/09/2022    GLUCOSE 96 08/09/2022    CALCIUM 8.9 08/09/2022    PROT 6.0 (L) 08/09/2022    LABALBU 3.6 08/09/2022    BILITOT 0.6 08/09/2022 ALKPHOS 46 08/09/2022    AST 17 08/09/2022    ALT 9 (L) 08/09/2022    LABGLOM 59 (L) 08/09/2022    GFRAA >60 08/09/2022    PHOS 3.0 06/08/2022    MG 1.7 (L) 06/08/2022    POCGLU 162 (H) 06/23/2022     Lab Results   Component Value Date     07/08/2022     No components found for: LD  Lab Results   Component Value Date    TSHHS 0.095 (L) 07/08/2022    T4FREE 1.24 06/12/2022    FT3 2.3 12/06/2017     Immunology:  Lab Results   Component Value Date    PROT 6.0 (L) 08/09/2022     No results found for: Marylu Hivtramaine, KLFLCR  No results found for: B2M  Coagulation Panel:  Lab Results   Component Value Date    PROTIME 26.8 (H) 07/05/2022    INR 2.14 07/05/2022    APTT 29.5 05/14/2021    DDIMER <200 01/24/2016     Anemia Panel:  Lab Results   Component Value Date    BFNBXYCT35 1035 (H) 07/08/2022    FOLATE 11.3 07/08/2022     Tumor Markers:  Lab Results   Component Value Date    CEA 5.1 08/09/2022        Observations:  No data recorded     Assessment   Stage III terminal ileum adenocarcinoma. Plan:  I reviewed with her findings on CT scan, US pelvis, colonoscopy and labs. Cecal lesion is concerning for malignant process and right adnexa cystic lesion needs further evaluation with MRI. 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 MRI pelvis showed complex ovarian cyst without internal enhancement to suggest solid mass. Radiologist recommend f/u imaging with CT or MRI in 6 months. She is s/p surgery on 5/27/22. Final pathology revealed that she has stage III terminal ileum adenocarcinoma (pT3, pN1c). She has lost about 25 lbs over last 2 - 3 months before surgery. Her CEA on 5/22/22 was 3.7. Adjuvant chemotherapy with xeloda was started on 7/26/2022 and we stopped it after 9/6/22 due to severe oral mucositis. On October 6, 2022, she presented to me for follow-up.   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 I reviewed final path report with her and her family. We also reviewed NCCN guidelines. I also let her know that we ideally recommend adjuvant chemotherapy with either CapeOx (capecitabine + oxaliplatin) for 3 months, FOLFOX for 6 months or single agent capecitabine (in frail patient) for six months. I also discussed with her and her family all the potential side effects as well as benefits of adjuvant chemotherapy. After long discussion with her and family, they are in agreement to start adjuvant chemotherapy with capecitabine. It was started on 7/26/22. She is in her second cycle of chemotherapy (day 6). She has significant oral mucositis and it interfere with her eating and drinking. She is on xeloda 1500 mg BID. I stopped it since 9/6/22. She continues to have some mucositis today. She is concerned to continue with xeloda. We discussed about 5Fu and she is in agreement to start 5 Fu infusion. Will set up for chemo education and will plan to start it soon. Chemo induced mucositis - recommend to continue with magic mouth wash with dexamethasone. Chemo induced nausea - mild symptom only. Recommend her to take zofran prn. I answered all her questions and concerns for today. Recent imaging and labs were reviewed and discussed with the patient.

## 2022-10-06 ENCOUNTER — HOSPITAL ENCOUNTER (OUTPATIENT)
Dept: INFUSION THERAPY | Age: 87
Discharge: HOME | End: 2022-10-06
Payer: MEDICARE

## 2022-10-06 ENCOUNTER — OFFICE VISIT (OUTPATIENT)
Dept: ONCOLOGY | Age: 87
End: 2022-10-06
Payer: MEDICARE

## 2022-10-06 VITALS
DIASTOLIC BLOOD PRESSURE: 51 MMHG | HEART RATE: 79 BPM | WEIGHT: 139 LBS | TEMPERATURE: 97.2 F | SYSTOLIC BLOOD PRESSURE: 120 MMHG | HEIGHT: 60 IN | OXYGEN SATURATION: 96 % | RESPIRATION RATE: 16 BRPM | BODY MASS INDEX: 27.29 KG/M2

## 2022-10-06 DIAGNOSIS — C18.2 MALIGNANT NEOPLASM OF ASCENDING COLON (HCC): Primary | ICD-10-CM

## 2022-10-06 PROCEDURE — 99211 OFF/OP EST MAY X REQ PHY/QHP: CPT

## 2022-10-06 PROCEDURE — 99214 OFFICE O/P EST MOD 30 MIN: CPT | Performed by: INTERNAL MEDICINE

## 2022-10-06 PROCEDURE — 1124F ACP DISCUSS-NO DSCNMKR DOCD: CPT | Performed by: INTERNAL MEDICINE

## 2022-10-06 NOTE — CARE COORDINATION
Patient follow-ups set. DME order sent to [facility] and [facility] have been delivered to patient room. [facility] order sent to [facility] for PT, OT, and Nursing. Caregiver training was not recommended prior to discharge. Patient to discharge to home with  [facility]. Patient's daughters to provide transport home and Nursing is aware. Patient is set for discharge from case management standpoint. 3:05 PM  Discharge information sent via routed fax to [employer] at [phone]. no

## 2022-10-06 NOTE — PROGRESS NOTES
MA Rooming Questions  Patient: Melvin Madrid  MRN: [id number]    Date: 10/6/2022        1. Do you have any new issues?   no         2. Do you need any refills on medications?    no    3. Have you had any imaging done since your last visit?   no    4. Have you been hospitalized or seen in the emergency room since your last visit here?   no    5. Did the patient have a depression screening completed today?  No    No data recorded     PHQ-9 Given to (if applicable):               PHQ-9 Score (if applicable):                     [] Positive     []  Negative              Does question #9 need addressed (if applicable)                     [] Yes    []  No               Samantha Ribeiro MA

## 2022-10-28 ENCOUNTER — TELEPHONE (OUTPATIENT)
Dept: INFUSION THERAPY | Age: 87
End: 2022-10-28

## 2022-10-28 NOTE — TELEPHONE ENCOUNTER
Called pt with education date for 11/1 @ 1:00 & treatment date for 11/2 @ 9:15; informed pt we will be rescheduling her OV when she is in the office for her education. Pt informed she can bring both her daughter +  to her education class & just 1 visitor during her treatment; pt voiced understanding.

## 2022-10-31 ENCOUNTER — APPOINTMENT (OUTPATIENT)
Dept: CT IMAGING | Age: 87
DRG: 690 | End: 2022-10-31
Payer: MEDICARE

## 2022-10-31 ENCOUNTER — HOSPITAL ENCOUNTER (INPATIENT)
Age: 87
LOS: 4 days | Discharge: HOME | DRG: 690 | End: 2022-11-04
Attending: EMERGENCY MEDICINE | Admitting: INTERNAL MEDICINE
Payer: MEDICARE

## 2022-10-31 ENCOUNTER — TELEPHONE (OUTPATIENT)
Dept: BARIATRICS/WEIGHT MGMT | Age: 87
End: 2022-10-31

## 2022-10-31 DIAGNOSIS — N39.0 LOWER URINARY TRACT INFECTION: Primary | ICD-10-CM

## 2022-10-31 DIAGNOSIS — K63.89 COLONIC MASS: ICD-10-CM

## 2022-10-31 DIAGNOSIS — N85.8 UTERINE MASS: ICD-10-CM

## 2022-10-31 LAB
ALBUMIN SERPL-MCNC: 4.1 GM/DL (ref 3.4–5)
ALP BLD-CCNC: 70 IU/L (ref 40–129)
ALT SERPL-CCNC: 22 U/L (ref 10–40)
ANION GAP SERPL CALCULATED.3IONS-SCNC: 9 MMOL/L (ref 4–16)
AST SERPL-CCNC: 30 IU/L (ref 15–37)
BACTERIA: ABNORMAL /HPF
BASOPHILS ABSOLUTE: 0.1 K/CU MM
BASOPHILS RELATIVE PERCENT: 0.6 % (ref 0–1)
BILIRUB SERPL-MCNC: 0.3 MG/DL (ref 0–1)
BILIRUBIN URINE: NEGATIVE MG/DL
BLOOD, URINE: ABNORMAL
BUN BLDV-MCNC: 18 MG/DL (ref 6–23)
CALCIUM SERPL-MCNC: 9.5 MG/DL (ref 8.3–10.6)
CHLORIDE BLD-SCNC: 102 MMOL/L (ref 99–110)
CLARITY: ABNORMAL
CO2: 26 MMOL/L (ref 21–32)
COLOR: YELLOW
CREAT SERPL-MCNC: 1 MG/DL (ref 0.6–1.1)
DIFFERENTIAL TYPE: ABNORMAL
EOSINOPHILS ABSOLUTE: 0.2 K/CU MM
EOSINOPHILS RELATIVE PERCENT: 2.7 % (ref 0–3)
GFR SERPL CREATININE-BSD FRML MDRD: 54 ML/MIN/1.73M2
GLUCOSE BLD-MCNC: 106 MG/DL (ref 70–99)
GLUCOSE, URINE: NEGATIVE MG/DL
HCT VFR BLD CALC: 40.7 % (ref 37–47)
HEMOGLOBIN: 13.1 GM/DL (ref 12.5–16)
IMMATURE NEUTROPHIL %: 0.3 % (ref 0–0.43)
KETONES, URINE: ABNORMAL MG/DL
LACTATE: 1 MMOL/L (ref 0.4–2)
LEUKOCYTE ESTERASE, URINE: ABNORMAL
LIPASE: 20 IU/L (ref 13–60)
LYMPHOCYTES ABSOLUTE: 1.6 K/CU MM
LYMPHOCYTES RELATIVE PERCENT: 18.1 % (ref 24–44)
MCH RBC QN AUTO: 30 PG (ref 27–31)
MCHC RBC AUTO-ENTMCNC: 32.2 % (ref 32–36)
MCV RBC AUTO: 93.1 FL (ref 78–100)
MONOCYTES ABSOLUTE: 0.6 K/CU MM
MONOCYTES RELATIVE PERCENT: 6.4 % (ref 0–4)
NITRITE URINE, QUANTITATIVE: POSITIVE
NUCLEATED RBC %: 0 %
PDW BLD-RTO: 17.1 % (ref 11.7–14.9)
PH, URINE: 6.5 (ref 5–8)
PLATELET # BLD: 183 K/CU MM (ref 140–440)
PMV BLD AUTO: 10.3 FL (ref 7.5–11.1)
POTASSIUM SERPL-SCNC: 4.2 MMOL/L (ref 3.5–5.1)
PROTEIN UA: 30 MG/DL
RBC # BLD: 4.37 M/CU MM (ref 4.2–5.4)
RBC URINE: 151 /HPF (ref 0–6)
SEGMENTED NEUTROPHILS ABSOLUTE COUNT: 6.5 K/CU MM
SEGMENTED NEUTROPHILS RELATIVE PERCENT: 71.9 % (ref 36–66)
SODIUM BLD-SCNC: 137 MMOL/L (ref 135–145)
SPECIFIC GRAVITY UA: 1.02 (ref 1–1.03)
TOTAL IMMATURE NEUTOROPHIL: 0.03 K/CU MM
TOTAL NUCLEATED RBC: 0 K/CU MM
TOTAL PROTEIN: 7.3 GM/DL (ref 6.4–8.2)
TRICHOMONAS: ABNORMAL /HPF
UROBILINOGEN, URINE: 0.2 MG/DL (ref 0.2–1)
WBC # BLD: 9 K/CU MM (ref 4–10.5)
WBC CLUMP: ABNORMAL /HPF
WBC UA: 2697 /HPF (ref 0–5)

## 2022-10-31 PROCEDURE — 80053 COMPREHEN METABOLIC PANEL: CPT

## 2022-10-31 PROCEDURE — 83690 ASSAY OF LIPASE: CPT

## 2022-10-31 PROCEDURE — 6360000002 HC RX W HCPCS: Performed by: EMERGENCY MEDICINE

## 2022-10-31 PROCEDURE — 6370000000 HC RX 637 (ALT 250 FOR IP): Performed by: EMERGENCY MEDICINE

## 2022-10-31 PROCEDURE — 2580000003 HC RX 258: Performed by: EMERGENCY MEDICINE

## 2022-10-31 PROCEDURE — 2580000003 HC RX 258: Performed by: INTERNAL MEDICINE

## 2022-10-31 PROCEDURE — 87186 SC STD MICRODIL/AGAR DIL: CPT

## 2022-10-31 PROCEDURE — 81001 URINALYSIS AUTO W/SCOPE: CPT

## 2022-10-31 PROCEDURE — 87086 URINE CULTURE/COLONY COUNT: CPT

## 2022-10-31 PROCEDURE — 99285 EMERGENCY DEPT VISIT HI MDM: CPT

## 2022-10-31 PROCEDURE — 74177 CT ABD & PELVIS W/CONTRAST: CPT

## 2022-10-31 PROCEDURE — 83605 ASSAY OF LACTIC ACID: CPT

## 2022-10-31 PROCEDURE — 6360000004 HC RX CONTRAST MEDICATION: Performed by: EMERGENCY MEDICINE

## 2022-10-31 PROCEDURE — 51798 US URINE CAPACITY MEASURE: CPT

## 2022-10-31 PROCEDURE — 85025 COMPLETE CBC W/AUTO DIFF WBC: CPT

## 2022-10-31 PROCEDURE — 1200000000 HC SEMI PRIVATE

## 2022-10-31 PROCEDURE — 87077 CULTURE AEROBIC IDENTIFY: CPT

## 2022-10-31 RX ORDER — ACETAMINOPHEN 325 MG/1
650 TABLET ORAL EVERY 6 HOURS PRN
Status: DISCONTINUED | OUTPATIENT
Start: 2022-10-31 | End: 2022-11-04 | Stop reason: HOSPADM

## 2022-10-31 RX ORDER — CIPROFLOXACIN 2 MG/ML
400 INJECTION, SOLUTION INTRAVENOUS ONCE
Status: COMPLETED | OUTPATIENT
Start: 2022-10-31 | End: 2022-10-31

## 2022-10-31 RX ORDER — GABAPENTIN 100 MG/1
100 CAPSULE ORAL
Status: DISCONTINUED | OUTPATIENT
Start: 2022-11-01 | End: 2022-11-04 | Stop reason: HOSPADM

## 2022-10-31 RX ORDER — SODIUM CHLORIDE 0.9 % (FLUSH) 0.9 %
5-40 SYRINGE (ML) INJECTION PRN
Status: DISCONTINUED | OUTPATIENT
Start: 2022-10-31 | End: 2022-11-04 | Stop reason: HOSPADM

## 2022-10-31 RX ORDER — HYDRALAZINE HYDROCHLORIDE 20 MG/ML
10 INJECTION INTRAMUSCULAR; INTRAVENOUS EVERY 6 HOURS PRN
Status: DISCONTINUED | OUTPATIENT
Start: 2022-10-31 | End: 2022-11-04 | Stop reason: HOSPADM

## 2022-10-31 RX ORDER — SODIUM CHLORIDE 9 MG/ML
INJECTION, SOLUTION INTRAVENOUS PRN
Status: DISCONTINUED | OUTPATIENT
Start: 2022-10-31 | End: 2022-11-04 | Stop reason: HOSPADM

## 2022-10-31 RX ORDER — LANOLIN ALCOHOL/MO/W.PET/CERES
3 CREAM (GRAM) TOPICAL NIGHTLY PRN
Status: DISCONTINUED | OUTPATIENT
Start: 2022-10-31 | End: 2022-11-04 | Stop reason: HOSPADM

## 2022-10-31 RX ORDER — ONDANSETRON 2 MG/ML
4 INJECTION INTRAMUSCULAR; INTRAVENOUS ONCE
Status: COMPLETED | OUTPATIENT
Start: 2022-10-31 | End: 2022-10-31

## 2022-10-31 RX ORDER — POLYETHYLENE GLYCOL 3350 17 G/17G
17 POWDER, FOR SOLUTION ORAL DAILY PRN
Status: DISCONTINUED | OUTPATIENT
Start: 2022-10-31 | End: 2022-11-04 | Stop reason: HOSPADM

## 2022-10-31 RX ORDER — SULFAMETHOXAZOLE AND TRIMETHOPRIM 800; 160 MG/1; MG/1
1 TABLET ORAL ONCE
Status: COMPLETED | OUTPATIENT
Start: 2022-10-31 | End: 2022-10-31

## 2022-10-31 RX ORDER — OXYBUTYNIN CHLORIDE 5 MG/1
5 TABLET ORAL 2 TIMES DAILY
Status: DISCONTINUED | OUTPATIENT
Start: 2022-10-31 | End: 2022-11-04 | Stop reason: HOSPADM

## 2022-10-31 RX ORDER — SPIRONOLACTONE 50 MG/1
25 TABLET, FILM COATED ORAL DAILY
Status: DISCONTINUED | OUTPATIENT
Start: 2022-11-01 | End: 2022-11-04 | Stop reason: HOSPADM

## 2022-10-31 RX ORDER — UBIDECARENONE 75 MG
100 CAPSULE ORAL DAILY
Status: DISCONTINUED | OUTPATIENT
Start: 2022-11-01 | End: 2022-11-04 | Stop reason: HOSPADM

## 2022-10-31 RX ORDER — SODIUM CHLORIDE 0.9 % (FLUSH) 0.9 %
5-40 SYRINGE (ML) INJECTION EVERY 12 HOURS SCHEDULED
Status: DISCONTINUED | OUTPATIENT
Start: 2022-10-31 | End: 2022-11-04 | Stop reason: HOSPADM

## 2022-10-31 RX ORDER — SODIUM CHLORIDE 9 MG/ML
INJECTION, SOLUTION INTRAVENOUS CONTINUOUS
Status: DISPENSED | OUTPATIENT
Start: 2022-10-31 | End: 2022-11-01

## 2022-10-31 RX ORDER — ACETAMINOPHEN 650 MG/1
650 SUPPOSITORY RECTAL EVERY 6 HOURS PRN
Status: DISCONTINUED | OUTPATIENT
Start: 2022-10-31 | End: 2022-11-04 | Stop reason: HOSPADM

## 2022-10-31 RX ORDER — FAMOTIDINE 20 MG/1
20 TABLET, FILM COATED ORAL DAILY
Status: DISCONTINUED | OUTPATIENT
Start: 2022-11-01 | End: 2022-11-04 | Stop reason: HOSPADM

## 2022-10-31 RX ORDER — ONDANSETRON 4 MG/1
4 TABLET, ORALLY DISINTEGRATING ORAL EVERY 8 HOURS PRN
Status: DISCONTINUED | OUTPATIENT
Start: 2022-10-31 | End: 2022-11-04 | Stop reason: HOSPADM

## 2022-10-31 RX ORDER — ATORVASTATIN CALCIUM 10 MG/1
20 TABLET, FILM COATED ORAL DAILY
Status: DISCONTINUED | OUTPATIENT
Start: 2022-11-01 | End: 2022-11-04 | Stop reason: HOSPADM

## 2022-10-31 RX ORDER — 0.9 % SODIUM CHLORIDE 0.9 %
500 INTRAVENOUS SOLUTION INTRAVENOUS ONCE
Status: COMPLETED | OUTPATIENT
Start: 2022-10-31 | End: 2022-10-31

## 2022-10-31 RX ORDER — ESCITALOPRAM OXALATE 10 MG/1
10 TABLET ORAL DAILY
Status: DISCONTINUED | OUTPATIENT
Start: 2022-11-01 | End: 2022-11-04 | Stop reason: HOSPADM

## 2022-10-31 RX ORDER — ROPINIROLE 1 MG/1
3 TABLET, FILM COATED ORAL NIGHTLY
Status: DISCONTINUED | OUTPATIENT
Start: 2022-10-31 | End: 2022-11-04 | Stop reason: HOSPADM

## 2022-10-31 RX ORDER — ONDANSETRON 2 MG/ML
4 INJECTION INTRAMUSCULAR; INTRAVENOUS EVERY 6 HOURS PRN
Status: DISCONTINUED | OUTPATIENT
Start: 2022-10-31 | End: 2022-11-04 | Stop reason: HOSPADM

## 2022-10-31 RX ORDER — FENTANYL CITRATE 50 UG/ML
25 INJECTION, SOLUTION INTRAMUSCULAR; INTRAVENOUS ONCE
Status: COMPLETED | OUTPATIENT
Start: 2022-10-31 | End: 2022-10-31

## 2022-10-31 RX ORDER — DEXTROSE MONOHYDRATE 100 MG/ML
INJECTION, SOLUTION INTRAVENOUS CONTINUOUS PRN
Status: DISCONTINUED | OUTPATIENT
Start: 2022-10-31 | End: 2022-11-04 | Stop reason: HOSPADM

## 2022-10-31 RX ADMIN — IOPAMIDOL 70 ML: 755 INJECTION, SOLUTION INTRAVENOUS at 17:14

## 2022-10-31 RX ADMIN — SODIUM CHLORIDE 500 ML: 9 INJECTION, SOLUTION INTRAVENOUS at 16:41

## 2022-10-31 RX ADMIN — SODIUM CHLORIDE: 9 INJECTION, SOLUTION INTRAVENOUS at 23:45

## 2022-10-31 RX ADMIN — ONDANSETRON 4 MG: 2 INJECTION INTRAMUSCULAR; INTRAVENOUS at 16:42

## 2022-10-31 RX ADMIN — FENTANYL CITRATE 25 MCG: 50 INJECTION, SOLUTION INTRAMUSCULAR; INTRAVENOUS at 16:41

## 2022-10-31 RX ADMIN — SULFAMETHOXAZOLE AND TRIMETHOPRIM 1 TABLET: 800; 160 TABLET ORAL at 20:18

## 2022-10-31 RX ADMIN — CIPROFLOXACIN 400 MG: 2 INJECTION, SOLUTION INTRAVENOUS at 18:37

## 2022-10-31 ASSESSMENT — PAIN DESCRIPTION - ORIENTATION
ORIENTATION: RIGHT;LOWER
ORIENTATION: LOWER
ORIENTATION: LOWER

## 2022-10-31 ASSESSMENT — PAIN DESCRIPTION - LOCATION
LOCATION: ABDOMEN

## 2022-10-31 ASSESSMENT — PAIN - FUNCTIONAL ASSESSMENT
PAIN_FUNCTIONAL_ASSESSMENT: 0-10
PAIN_FUNCTIONAL_ASSESSMENT: 0-10

## 2022-10-31 ASSESSMENT — PAIN DESCRIPTION - FREQUENCY: FREQUENCY: CONTINUOUS

## 2022-10-31 ASSESSMENT — PAIN DESCRIPTION - DESCRIPTORS: DESCRIPTORS: SHARP

## 2022-10-31 ASSESSMENT — PAIN SCALES - GENERAL
PAINLEVEL_OUTOF10: 4
PAINLEVEL_OUTOF10: 2
PAINLEVEL_OUTOF10: 5

## 2022-10-31 NOTE — ED NOTES
Pt placed on oxygen at this time due to reading 81%; 2L via NC; does not wear o2 at home.      Marlene Pozo RN  10/31/22 [id number]

## 2022-10-31 NOTE — ED NOTES
Pt having RLQ abdominal pain and \"trouble holding urine\" since Friday. Pt has colon CA and suppose to start chemo soon.         Stephon Nguyen RN  10/31/22 [id number]

## 2022-10-31 NOTE — TELEPHONE ENCOUNTER
Pt. Called stating that she is having rlq pain - no fever 97.7 not hot to touch, not red - pt states large amount of pain and problems w/ voiding - no control.- please advise     Perfect serve response - pt needs to respond to the e.d. for eval.

## 2022-10-31 NOTE — ED PROVIDER NOTES
Emergency Department Encounter    Patient: Wilman Wheeler  MRN: [id number]  : 1934  Date of Evaluation: 10/31/2022  ED Provider:  Shannan Arevalo DO    Triage Chief Complaint:   Abdominal Pain (RLQ abdominal pain an d\"cannot hold urine\")    [place]:  Wilman Wheeler is a 80 y.o. female that presents to the emergency department complaining of some abdominal pain that started on Friday. Patient states initially abdominal pain was all over. Patient has not settled down in the right lower quadrant. Patient states has been taking Tylenol for her back pain which has minimal relief for the abdominal pain. She states no nausea vomiting or diarrhea. She states she did have some loose stool today. Patient states she has had appendectomy and a mass removed from her colon in the past.  She denies any dysuria hematuria but states she does have overactive bladder and the oxybutynin is not working. She denies history of IBS or Crohn's disease also colitis. Patient has history of diverticulosis. Patient states abdominal pain 5 out of 10 on the pain scale sharp achy constant. Denies any fever chills cough dizzy lightheaded chest pain shortness of breath. Patient here for evaluation.     ROS - see HPI, below listed is current ROS at time of my eval:  General:  No fevers, no chills, no weakness  Eyes:  No recent vison changes, no discharge  ENT:  No sore throat, no nasal congestion, no hearing changes  Cardiovascular:  No chest pain, no palpitations  Respiratory:  No shortness of breath, no cough, no wheezing  Gastrointestinal: Positive for abdominal pain, no nausea, no vomiting, no diarrhea  Musculoskeletal:  No muscle pain, no joint pain  Skin:  No rash, no pruritis, no easy bruising  Neurologic:  No speech problems, no headache, no extremity numbness, no extremity tingling, no extremity weakness  Psychiatric:  No anxiety  Genitourinary:  No dysuria, no hematuria  Endocrine:  No unexpected weight gain, no unexpected weight loss  Extremities:  no edema, no pain    Past Medical History:   Diagnosis Date    Cancer of right colon ([facility] 7/5/2022    Diabetes mellitus ([facility] [address]     H/O cardiac catheterization 05/18/2021    no significant CAD in main vessels, has severe stenosis in small  branch diagonal vessel    H/O cardiovascular stress test 3/22/18,03/30/2017    ECG portion of the stress test is negative for ischemia EF >70% Normal stress myocardial perfusion. H/O cardiovascular stress test 04/07/2021    abnormal stress    H/O Doppler ultrasound (Abdomimal Aorta) 03/29/2017    No evidence of abdominal aortic aneurysm. H/O echocardiogram 07/02/2014    Normal left ventricular size, wall motion and systolic function. Mildle elevated pulmonary artery systolic pressure. Mild MR and TR and trace AR EF 55 to 60%    Hx of echocardiogram 03/29/2017    EF 55-60%. Grade I diastolic dysfunction. Mildly dilated left atrium. No evidence of pericardial effusion.      Hyperlipidemia     Hypertension     Sleep apnea     Thyroid disease      Past Surgical History:   Procedure Laterality Date    BREAST BIOPSY Right     approx age 76, benign    CATARACT REMOVAL      CHOLECYSTECTOMY      COLONOSCOPY N/A 5/25/2022    COLONOSCOPY POLYPECTOMY SNARE/COLD BIOPSY performed by Melisa Sorensen MD at [address] [place] [address] N/A 5/27/2022    BOWEL RESECTION RIGHT HEMICOLECTOMY LAPAROSCOPIC ROBOTIC XI performed by Lux Lucero MD at . [facility]      eye lift     Family History   Problem Relation Age of Onset    Pacemaker Sister     Arrhythmia Sister     Breast Cancer Sister         pre menopausal    Ovarian Cancer Neg Hx      Social History     Socioeconomic History    Marital status:      Spouse name: Not on file    Number of children: Not on file    Years of education: Not on file    Highest education level: Not on file   Occupational History    Not on file   Tobacco Use    Smoking status: Never    Smokeless tobacco: Never   Vaping Use    Vaping Use: Never used   Substance and Sexual Activity    Alcohol use: Not Currently     Alcohol/week: 1.0 standard drink     Types: 1 Glasses of wine per week     Comment: rarely. 1-2cups coffee daily    Drug use: No    Sexual activity: Not on file     Comment:    Other Topics Concern    Not on file   Social History Narrative    Not on file     Social Determinants of Health     Financial Resource Strain: Not on file   Food Insecurity: Not on file   Transportation Needs: Not on file   Physical Activity: Not on file   Stress: Not on file   Social Connections: Not on file   Intimate Partner Violence: Not on file   Housing Stability: Not on file     Current Facility-Administered Medications   Medication Dose Route Frequency Provider Last Rate Last Admin    fentaNYL (SUBLIMAZE) injection 25 mcg  25 mcg IntraVENous Once Babita Duffy, DO        ondansetron [facility]) injection 4 mg  4 mg IntraVENous Once Babita Bernardo, DO         Current Outpatient Medications   Medication Sig Dispense Refill    spironolactone (ALDACTONE) 25 MG tablet       famotidine (PEPCID) 20 MG tablet Take 1 tablet by mouth 2 times daily 60 tablet 3    potassium chloride (KLOR-CON M) 20 MEQ extended release tablet Take 1 tablet by mouth daily as needed (with Lasix) 30 tablet 0    furosemide (LASIX) 20 MG tablet Take 1 tablet by mouth as needed (for weight gain of 3 lbs in a day or  5 in a week) 30 tablet 1    gabapentin (NEURONTIN) 100 MG capsule       rivaroxaban (XARELTO) 20 MG TABS tablet Take 1 tablet by mouth daily (with breakfast) 30 tablet 0    Magic Mouthwash (MIRACLE MOUTHWASH) Equal parts of Benadryl, Maalox, 2% Viscous Xylocaine and Dexamethasone. Take 5 mL 4 times daily.  300 mL 3    montelukast (SINGULAIR) 10 MG tablet Take 10 mg by mouth nightly      metoprolol tartrate (LOPRESSOR) 50 MG tablet Take 1 tablet by mouth in the morning and 1 tablet before bedtime. 3 pills in the AM : 150 mg dose  2 pills in the PM: 100 mg dose. 60 tablet 3    ondansetron (ZOFRAN) 8 MG tablet Take 1 tablet by mouth every 8 hours as needed for Nausea or Vomiting 30 tablet 1    rivaroxaban (XARELTO) 15 MG TABS tablet Take 15 mg by mouth      sodium chloride 1 g tablet Take 1 g by mouth 2 times daily      capecitabine (XELODA) 500 MG chemo tablet Take 3 tablets (1,500 mg) by mouth two times daily for 14 days then 7 days off every 21 days.  84 tablet 5    escitalopram (LEXAPRO) 10 MG tablet Take 1 tablet by mouth daily 30 tablet 0    sucralfate (CARAFATE) 1 GM tablet Take 1 tablet by mouth 4 times daily (before meals and nightly) 120 tablet 0    melatonin 3 MG TABS tablet Take 3 mg by mouth nightly as needed      vitamin B-12 (CYANOCOBALAMIN) 100 MCG tablet Take 100 mcg by mouth daily      LANTUS SOLOSTAR 100 UNIT/ML injection pen       Cholecalciferol (VITAMIN D PO) Take by mouth      oxybutynin (DITROPAN) 5 MG tablet Take 5 mg by mouth 2 times daily      atorvastatin (LIPITOR) 20 MG tablet Take 20 mg by mouth daily      rOPINIRole (REQUIP) 3 MG tablet Take 3 mg by mouth nightly      levothyroxine (SYNTHROID) 137 MCG tablet Take 137 mcg by mouth daily   3    Insulin Pen Needle (PEN NEEDLES 31GX5/16\") 31G X 8 MM MISC        Allergies   Allergen Reactions    Lopid [Gemfibrozil] Shortness Of Breath and Other (See Comments)     Cough    Bystolic [Nebivolol Hcl]     Cefdinir     Coreg [Carvedilol]     Crestor [Rosuvastatin]     Fenofibrate     Glucophage [Metformin]     Hydrochlorothiazide Other (See Comments)    Metronidazole     Norvasc [Amlodipine Besylate]     Tribenzor [Olmesartan-Amlodipine-Hctz]      Pt states that her chest felt funny and achy when she took the medication    Zocor [Simvastatin]        Nursing Notes Reviewed    Physical Exam:  Triage VS:    ED Triage Vitals   Enc Vitals Group      BP 10/31/22 1310 108/65      Heart Rate 10/31/22 1310 76      Resp 10/31/22 1310 18 Temp 10/31/22 1310 98.6 °F (37 °C)      Temp Source 10/31/22 1310 Oral      SpO2 10/31/22 1310 97 %      Weight 10/31/22 1310 138 lb (62.6 kg)      Height 10/31/22 1524 5' (1.524 m)      Head Circumference --       Peak Flow --       Pain Score --       Pain Loc --       Pain Edu? --       Excl. in [address]? --        My pulse ox interpretation is - normal    General appearance:  No acute distress. Skin:  Warm. Dry. Eye:  Extraocular movements intact. Ears, nose, mouth and throat:  Oral mucosa moist   Neck:  Trachea midline. Extremity:  No swelling. Normal ROM     Heart:  Regular rate and rhythm, normal S1 & S2, no extra heart sounds. Perfusion:  intact  Respiratory:  Lungs clear to auscultation bilaterally. Respirations nonlabored. Abdominal:  Normal bowel sounds. Soft. Some right side abdominal pain palpation no rigidity rebound or guarding noted. Non distended. Back:  No CVA tenderness to palpation     Neurological:  Alert and oriented times 3. No focal neuro deficits.              Psychiatric:  Appropriate    I have reviewed and interpreted all of the currently available lab results from this visit (if applicable):  Results for orders placed or performed during the hospital encounter of 10/31/22   CBC with Auto Differential   Result Value Ref Range    WBC 9.0 4.0 - 10.5 K/CU MM    RBC 4.37 4.2 - 5.4 M/CU MM    Hemoglobin 13.1 12.5 - 16.0 GM/DL    Hematocrit 40.7 37 - 47 %    MCV 93.1 78 - 100 FL    MCH 30.0 27 - 31 PG    MCHC 32.2 32.0 - 36.0 %    RDW 17.1 (H) 11.7 - 14.9 %    Platelets 451 148 - 475 K/CU MM    MPV 10.3 7.5 - 11.1 FL    Differential Type AUTOMATED DIFFERENTIAL     Segs Relative 71.9 (H) 36 - 66 %    Lymphocytes % 18.1 (L) 24 - 44 %    Monocytes % 6.4 (H) 0 - 4 %    Eosinophils % 2.7 0 - 3 %    Basophils % 0.6 0 - 1 %    Segs Absolute 6.5 K/CU MM    Lymphocytes Absolute 1.6 K/CU MM    Monocytes Absolute 0.6 K/CU MM    Eosinophils Absolute 0.2 K/CU MM    Basophils Absolute 0.1 K/CU MM    Nucleated RBC % 0.0 %    Total Nucleated RBC 0.0 K/CU MM    Total Immature Neutrophil 0.03 K/CU MM    Immature Neutrophil % 0.3 0 - 0.43 %   Comprehensive Metabolic Panel   Result Value Ref Range    Sodium 137 135 - 145 MMOL/L    Potassium 4.2 3.5 - 5.1 MMOL/L    Chloride 102 99 - 110 mMol/L    CO2 26 21 - 32 MMOL/L    BUN 18 6 - 23 MG/DL    Creatinine 1.0 0.6 - 1.1 MG/DL    Est, Glom Filt Rate 54 (L) >60 mL/min/1.73m2    Glucose 106 (H) 70 - 99 MG/DL    Calcium 9.5 8.3 - 10.6 MG/DL    Albumin 4.1 3.4 - 5.0 GM/DL    Total Protein 7.3 6.4 - 8.2 GM/DL    Total Bilirubin 0.3 0.0 - 1.0 MG/DL    ALT 22 10 - 40 U/L    AST 30 15 - 37 IU/L    Alkaline Phosphatase 70 40 - 129 IU/L    Anion Gap 9 4 - 16   Lipase   Result Value Ref Range    Lipase 20 13 - 60 IU/L   Urinalysis   Result Value Ref Range    Color, UA YELLOW YELLOW    Clarity, UA TURBID (A) CLEAR    Glucose, Urine NEGATIVE NEGATIVE MG/DL    Bilirubin Urine NEGATIVE NEGATIVE MG/DL    Ketones, Urine TRACE (A) NEGATIVE MG/DL    Specific Gravity, UA 1.020 1.001 - 1.035    Blood, Urine SMALL (A) NEGATIVE    pH, Urine 6.5 5.0 - 8.0    Protein, UA 30 (A) NEGATIVE MG/DL    Urobilinogen, Urine 0.2 0.2 - 1.0 MG/DL    Nitrite Urine, Quantitative POSITIVE (A) NEGATIVE    Leukocyte Esterase, Urine LARGE (A) NEGATIVE   Lactic Acid   Result Value Ref Range    Lactate 1.0 0.4 - 2.0 mMOL/L   Microscopic Urinalysis   Result Value Ref Range    RBC,  (H) 0 - 6 /HPF    WBC, UA 2697 (H) 0 - 5 /HPF    Bacteria, UA MANY (A) NEGATIVE /HPF    WBC Clumps, UA MANY /HPF    Trichomonas, UA NONE SEEN NONE SEEN /HPF      Radiographs (if obtained):  Radiologist's Report Reviewed:  CT ABDOMEN PELVIS W IV CONTRAST Additional Contrast? None   Final Result   1. Postsurgical changes of the bowel. No bowel obstruction. 2.  Diffuse urinary bladder wall thickening with mucosal enhancement may   relate to underlying cystitis.       3.  Heterogeneous 4.1 cm masslike area in the right uterine fundus is   increased in size from the prior study and involves the adjacent sigmoid   colon. Given the interval increase in size this is concerning for a   neoplastic process. It is unclear whether this is centered in the uterus or   sigmoid colon. Underlying infection is not excluded. Surgical consultation   and/or follow-up pelvic ultrasound may be helpful for further evaluation. 4.  Small right lower lobe clustered nodular opacities are incompletely   imaged and may relate to bronchiolitis. EKG (if obtained): (All EKG's are interpreted by myself in the absence of a cardiologist)  Medications   fentaNYL (SUBLIMAZE) injection 25 mcg (25 mcg IntraVENous Given 10/31/22 1641)   ondansetron (ZOFRAN) injection 4 mg (4 mg IntraVENous Given 10/31/22 1642)   0.9 % sodium chloride bolus (500 mLs IntraVENous New Bag 10/31/22 1641)   iopamidol (ISOVUE-370) 76 % injection 70 mL (70 mLs IntraVENous Given 10/31/22 1714)   ciprofloxacin (CIPRO) IVPB 400 mg (0 mg IntraVENous Stopped 10/31/22 2003)         MDM:  Patient presents emergency department complaints of right side right lower quadrant abdominal pain. Abdominal pain started 3 days ago. Patient ordered fentanyl Zofran IV fluids laboratory studies and CT abdomen pelvis. Laboratory studies have been unremarkable with normal white count hemoglobin sodium potassium kidney function liver enzymes and lipase. Urinalysis possible urinary tract infection. Patient ordered ciprofloxacin antibiotic. Patient started itching in the left arm. This medication was discontinued. Patient normal white blood cell count hemoglobin and platelets. Patient with normal lactic acid. Normal sodium potassium kidney function liver enzymes and lipase.   CT of the abdomen pelvis reveals postsurgical changes of the bowel no bowel obstruction, diffuse urinary bladder wall thickening mucosal enhancement may related to underlying cystitis, heterogeneous 4.1 cm masslike area in the right uterine fundus is increased in size from the prior study and involves the adjacent sigmoid colon, concerning for neoplastic process, small right lower lobe clustered nodular opacities incompletely imaged may be related to bronchiolitis. Patient and family updated on laboratory imaging study findings. Patient will need admission for further evaluation treatment and management. Patient had seen Dr. Paddy Daniels general surgeon in the past who has did a bowel resection. Patient will be ordered Bactrim antibiotic. Hospitalist consulted for admission. Clinical Impression:  1. Lower urinary tract infection    2. Uterine mass    3. Colonic mass          ED Provider Disposition Time  DISPOSITION  8:11 PM        Comment: Please note this report has been produced using speech recognition software and may contain errors related to that system including errors in grammar, punctuation, and spelling, as well as words and phrases that may be inappropriate. Efforts were made to edit the dictations.         Jose Valle DO  11/01/22 9975

## 2022-11-01 ENCOUNTER — TELEPHONE (OUTPATIENT)
Dept: ONCOLOGY | Age: 87
End: 2022-11-01

## 2022-11-01 ENCOUNTER — APPOINTMENT (OUTPATIENT)
Dept: ULTRASOUND IMAGING | Age: 87
DRG: 690 | End: 2022-11-01
Payer: MEDICARE

## 2022-11-01 LAB
GLUCOSE BLD-MCNC: 149 MG/DL (ref 70–99)
GLUCOSE BLD-MCNC: 153 MG/DL (ref 70–99)
GLUCOSE BLD-MCNC: 223 MG/DL (ref 70–99)
GLUCOSE BLD-MCNC: 94 MG/DL (ref 70–99)

## 2022-11-01 PROCEDURE — 6360000002 HC RX W HCPCS: Performed by: INTERNAL MEDICINE

## 2022-11-01 PROCEDURE — 6370000000 HC RX 637 (ALT 250 FOR IP): Performed by: INTERNAL MEDICINE

## 2022-11-01 PROCEDURE — 6360000002 HC RX W HCPCS: Performed by: STUDENT IN AN ORGANIZED HEALTH CARE EDUCATION/TRAINING PROGRAM

## 2022-11-01 PROCEDURE — 94150 VITAL CAPACITY TEST: CPT

## 2022-11-01 PROCEDURE — 82962 GLUCOSE BLOOD TEST: CPT

## 2022-11-01 PROCEDURE — 76856 US EXAM PELVIC COMPLETE: CPT

## 2022-11-01 PROCEDURE — 1200000000 HC SEMI PRIVATE

## 2022-11-01 PROCEDURE — 99233 SBSQ HOSP IP/OBS HIGH 50: CPT | Performed by: INTERNAL MEDICINE

## 2022-11-01 PROCEDURE — 99222 1ST HOSP IP/OBS MODERATE 55: CPT | Performed by: SURGERY

## 2022-11-01 PROCEDURE — 93975 VASCULAR STUDY: CPT

## 2022-11-01 PROCEDURE — 2580000003 HC RX 258: Performed by: INTERNAL MEDICINE

## 2022-11-01 PROCEDURE — 94761 N-INVAS EAR/PLS OXIMETRY MLT: CPT

## 2022-11-01 RX ORDER — MONTELUKAST SODIUM 10 MG/1
10 TABLET ORAL NIGHTLY
Status: DISCONTINUED | OUTPATIENT
Start: 2022-11-01 | End: 2022-11-04 | Stop reason: HOSPADM

## 2022-11-01 RX ORDER — ENOXAPARIN SODIUM 100 MG/ML
30 INJECTION SUBCUTANEOUS DAILY
Status: DISCONTINUED | OUTPATIENT
Start: 2022-11-01 | End: 2022-11-04 | Stop reason: HOSPADM

## 2022-11-01 RX ORDER — SODIUM CHLORIDE 1000 MG
1 TABLET, SOLUBLE MISCELLANEOUS 2 TIMES DAILY
Status: DISCONTINUED | OUTPATIENT
Start: 2022-11-01 | End: 2022-11-04 | Stop reason: HOSPADM

## 2022-11-01 RX ORDER — SUCRALFATE 1 G/1
1 TABLET ORAL
Status: DISCONTINUED | OUTPATIENT
Start: 2022-11-01 | End: 2022-11-04 | Stop reason: HOSPADM

## 2022-11-01 RX ADMIN — SODIUM CHLORIDE TAB 1 GM 1 G: 1 TAB at 21:45

## 2022-11-01 RX ADMIN — OXYBUTYNIN CHLORIDE 5 MG: 5 TABLET ORAL at 10:51

## 2022-11-01 RX ADMIN — ESCITALOPRAM OXALATE 10 MG: 10 TABLET ORAL at 10:57

## 2022-11-01 RX ADMIN — SODIUM CHLORIDE, PRESERVATIVE FREE 10 ML: 5 INJECTION INTRAVENOUS at 00:38

## 2022-11-01 RX ADMIN — FAMOTIDINE 20 MG: 20 TABLET ORAL at 10:39

## 2022-11-01 RX ADMIN — OXYBUTYNIN CHLORIDE 5 MG: 5 TABLET ORAL at 20:11

## 2022-11-01 RX ADMIN — PIPERACILLIN AND TAZOBACTAM 3375 MG: 3; .375 INJECTION, POWDER, LYOPHILIZED, FOR SOLUTION INTRAVENOUS at 11:04

## 2022-11-01 RX ADMIN — SUCRALFATE 1 G: 1 TABLET ORAL at 17:49

## 2022-11-01 RX ADMIN — ROPINIROLE HYDROCHLORIDE 3 MG: 1 TABLET, FILM COATED ORAL at 00:35

## 2022-11-01 RX ADMIN — VITAM B12 100 MCG: 100 TAB at 11:05

## 2022-11-01 RX ADMIN — SUCRALFATE 1 G: 1 TABLET ORAL at 05:34

## 2022-11-01 RX ADMIN — MONTELUKAST 10 MG: 10 TABLET, FILM COATED ORAL at 00:38

## 2022-11-01 RX ADMIN — MONTELUKAST 10 MG: 10 TABLET, FILM COATED ORAL at 20:11

## 2022-11-01 RX ADMIN — SODIUM CHLORIDE TAB 1 GM 1 G: 1 TAB at 11:07

## 2022-11-01 RX ADMIN — SUCRALFATE 1 G: 1 TABLET ORAL at 11:15

## 2022-11-01 RX ADMIN — ROPINIROLE HYDROCHLORIDE 3 MG: 1 TABLET, FILM COATED ORAL at 20:11

## 2022-11-01 RX ADMIN — SUCRALFATE 1 G: 1 TABLET ORAL at 20:11

## 2022-11-01 RX ADMIN — ATORVASTATIN CALCIUM 20 MG: 10 TABLET, FILM COATED ORAL at 10:48

## 2022-11-01 RX ADMIN — OXYBUTYNIN CHLORIDE 5 MG: 5 TABLET ORAL at 00:35

## 2022-11-01 RX ADMIN — SPIRONOLACTONE 25 MG: 50 TABLET ORAL at 10:46

## 2022-11-01 RX ADMIN — SODIUM CHLORIDE, PRESERVATIVE FREE 10 ML: 5 INJECTION INTRAVENOUS at 11:06

## 2022-11-01 RX ADMIN — SUCRALFATE 1 G: 1 TABLET ORAL at 00:38

## 2022-11-01 RX ADMIN — SODIUM CHLORIDE 100 ML/HR: 9 INJECTION, SOLUTION INTRAVENOUS at 17:53

## 2022-11-01 RX ADMIN — PIPERACILLIN AND TAZOBACTAM 3375 MG: 3; .375 INJECTION, POWDER, LYOPHILIZED, FOR SOLUTION INTRAVENOUS at 00:34

## 2022-11-01 RX ADMIN — GABAPENTIN 100 MG: 100 CAPSULE ORAL at 10:48

## 2022-11-01 RX ADMIN — ENOXAPARIN SODIUM 30 MG: 100 INJECTION SUBCUTANEOUS at 17:48

## 2022-11-01 RX ADMIN — PIPERACILLIN AND TAZOBACTAM 3375 MG: 3; .375 INJECTION, POWDER, LYOPHILIZED, FOR SOLUTION INTRAVENOUS at 17:54

## 2022-11-01 RX ADMIN — LEVOTHYROXINE SODIUM 137 MCG: 25 TABLET ORAL at 05:34

## 2022-11-01 ASSESSMENT — ENCOUNTER SYMPTOMS
SHORTNESS OF BREATH: 0
CHEST TIGHTNESS: 0
CONSTIPATION: 1
DIARRHEA: 0
EYE REDNESS: 0
SORE THROAT: 0
EYE DISCHARGE: 0
BACK PAIN: 0
ABDOMINAL DISTENTION: 0
NAUSEA: 0
CONSTIPATION: 0
ABDOMINAL PAIN: 1
COLOR CHANGE: 0
VOMITING: 0
WHEEZING: 0
COUGH: 0

## 2022-11-01 NOTE — ED NOTES
Pt c/o itching to left wrist, which distal from IV in left forearm. cipro stopped and unhooked from pt.  Pt denies sob/angioedema s/s     Joao Guerrero RN  10/31/22 2007

## 2022-11-01 NOTE — CONSULTS
HEMATOLOGY ONCOLOGY  Consultation Report    REASON FOR CONSULT    Right sided uterine mass    CHIEF COMPLAINT    Chief Complaint   Patient presents with    Abdominal Pain     RLQ abdominal pain an d\"cannot hold urine\"       HISTORY OF PRESENT ILLNESS   Melvin Madrid is a 80 y.o. female with past medical history significant for CKD stage IIIa, diverticulitis, IDDM 2 with peripheral neuropathy, hypertension, hyperlipidemia, KEVIN, hypothyroidism, vitamin B12 deficiency, stage III adenocarcinoma of the ileum who presents with worsening abdominal pain. She reports several days of abdominal discomfort, constipation and urinary inconvenience. She reported progressive weakness. She took stool softeners and was able to have a bm. Abdominal pain has become more centered in her pelvis. She denies fevers, chills, nausea, vomiting. Review of imaging showed 4.0 x 4.1 x 3.6 cm area in the right uterine fundus abutting the adjacent sigmoid colon which is increased from prior study. Additionally noted to have underlying cystitis. Due to uterine mass, we were called to evaluate. Oncology history:  Colonoscopy 5/25/22 with large mass extending from ileocecal valve into the cecum mass origination at ileocecal valve with pathology consistent with invasive moderately differentiated adenocarcinoma. She is s/p       5/27/22 with stage III terminal ileum adenocarinoma (pT3, pN1c)    She had adjuvant chemotherapy with xeloda from 7/28/22 and stpped 9/6/22 due to severe mucositis. She is due to start 5FU/leucovorin.     PAST MEDICAL HISTORY    Past Medical History:   Diagnosis Date    Cancer of right colon ([address] 7/5/2022    Diabetes mellitus ([facility] [address]     H/O cardiac catheterization 05/18/2021    no significant CAD in main vessels, has severe stenosis in small  branch diagonal vessel    H/O cardiovascular stress test 3/22/18,03/30/2017    ECG portion of the stress test is negative for ischemia EF >70% Normal stress myocardial perfusion. H/O cardiovascular stress test 04/07/2021    abnormal stress    H/O Doppler ultrasound (Abdomimal Aorta) 03/29/2017    No evidence of abdominal aortic aneurysm. H/O echocardiogram 07/02/2014    Normal left ventricular size, wall motion and systolic function. Mildle elevated pulmonary artery systolic pressure. Mild MR and TR and trace AR EF 55 to 60%    Hx of echocardiogram 03/29/2017    EF 55-60%. Grade I diastolic dysfunction. Mildly dilated left atrium. No evidence of pericardial effusion. Hyperlipidemia     Hypertension     Sleep apnea     Thyroid disease        SURGICAL HISTORY    Past Surgical History:   Procedure Laterality Date    BREAST BIOPSY Right     approx age 76, benign    CATARACT REMOVAL      CHOLECYSTECTOMY      COLONOSCOPY N/A 5/25/2022    COLONOSCOPY POLYPECTOMY SNARE/COLD BIOPSY performed by Morales Uribe MD at [address] N/A 5/27/2022    BOWEL RESECTION RIGHT HEMICOLECTOMY LAPAROSCOPIC ROBOTIC XI [facility] [address] [facility] [facility]      [facility]       FAMILY HISTORY    Family History   Problem Relation Age of Onset    Pacemaker Sister     Arrhythmia Sister     Breast Cancer Sister         pre menopausal    Ovarian Cancer Neg Hx        SOCIAL HISTORY    Social History     Socioeconomic History    Marital status:      Spouse name: None    Number of children: None    Years of education: None    Highest education level: None   Tobacco Use    Smoking status: Never    Smokeless tobacco: Never   Vaping Use    Vaping Use: Never used   Substance and Sexual Activity    Alcohol use: Not Currently     Alcohol/week: 1.0 standard drink     Types: 1 Glasses of wine per week     Comment: rarely.  1-2cups coffee daily    Drug use: No       REVIEW OF SYSTEMS    Constitutional:  Denies fever, chills, loss of appetite, weight loss, tiredness, fatigue or weakness   HEENT:  Denies swelling of neck glands  Respiratory:  Denies cough, shortness of breath or hemoptysis  Cardiovascular:  Denies chest pain, palpitations or swelling   GI:  Denies abdominal pain, nausea, vomiting, constipation or diarrhea   Musculoskeletal:  Denies back pain   Skin:  Denies rash   Neurologic:  Denies headache, focal weakness or sensory changes   All systems negative except as marked. PHYSICAL EXAM    Vitals: BP (!) 161/54   Pulse 63   Temp 98.1 °F (36.7 °C) (Oral)   Resp 13   Ht 5' (1.524 m)   Wt 138 lb (62.6 kg)   SpO2 97%   BMI 26.95 kg/m²   CONSTITUTIONAL: awake, alert, cooperative, no apparent distress   EYES: pupils equal, round  sclera clear and conjunctiva normal  ENT: Normocephalic, without obvious abnormality, atraumatic  NECK: supple, symmetrical  HEMATOLOGIC/LYMPHATIC: no cervical, supraclavicular or axillary lymphadenopathy   LUNGS:  no increased work of breathing and clear to auscultation   CARDIOVASCULAR: regular rate and rhythm, normal S1 and S2, no murmur noted  ABDOMEN: normal bowel sounds x 4, soft, TTP  MUSCULOSKELETAL: full range of motion noted, tone is normal  NEUROLOGIC: awake, alert, oriented to name, place and time. Motor skills grossly intact. SKIN: Normal skin color, texture, turgor and no jaundice. Skin appears intact   EXTREMITIES: no LE edema    LABORATORY RESULTS  CBC:   Recent Labs     10/31/22  1438   WBC 9.0   HGB 13.1        BMP:    Recent Labs     10/31/22  1438      K 4.2      CO2 26   BUN 18   CREATININE 1.0   GLUCOSE 106*     Hepatic:   Recent Labs     10/31/22  1438   AST 30   ALT 22   BILITOT 0.3   ALKPHOS 70     INR: No results for input(s): INR in the last 72 hours. RADIOLOGY REPORTS  CT abdomen/pelvis  Impression   1. Postsurgical changes of the bowel. No bowel obstruction. 2.  Diffuse urinary bladder wall thickening with mucosal enhancement may   relate to underlying cystitis.        3.  Heterogeneous 4.1 cm masslike area in the right uterine fundus is   increased in size from the prior study and involves the adjacent sigmoid   colon. Given the interval increase in size this is concerning for a   neoplastic process. It is unclear whether this is centered in the uterus or   sigmoid colon. Underlying infection is not excluded. Surgical consultation   and/or follow-up pelvic ultrasound may be helpful for further evaluation. 4.  Small right lower lobe clustered nodular opacities are incompletely   imaged and may relate to bronchiolitis. ASSESSMENT/RECOMMENDATION    Uterine mass- 4.1 cm, increased in size from last scan in May 2022. Plan for pelvic ultrasound for further characterization. General surgery is consulted. Recommend GYN referral.     Colon cancer- s/p right hemicolectomy. Plan to initiate treatment with fluorouracil/leucovorin after acute issues are resolved. Acute cystitis- on antimicrobials    We will follow the patient. Thank you for allowing me to participate in the care of this very pleasant patient. I have independently evaluated and examined this patient today. I have reviewed radiologic and biochemical tests on this patient. Management Plan is developed mutually with Cheyenne Christie NP.  I have reviewed above note and agree with assessment and plan

## 2022-11-01 NOTE — ED NOTES
ED TO INPATIENT SBAR HANDOFF    Patient Name: Giovanna Jones   :  1934  80 y.o. MRN:  [id number]  Preferred Name    ED Room #:  ED14/ED-14  Family/Caregiver Present yes   Restraints no   Sitter no   Sepsis Risk Score Sepsis Risk Score: 1.12    Situation  Code Status: Prior No additional code details. Allergies: Lopid [gemfibrozil], Bystolic [nebivolol hcl], Cefdinir, Coreg [carvedilol], Crestor [rosuvastatin], Fenofibrate, Glucophage [metformin], Hydrochlorothiazide, Metronidazole, Norvasc [amlodipine besylate], Tribenzor [olmesartan-amlodipine-hctz], Zocor [simvastatin], and Ciprofloxacin  Weight: Patient Vitals for the past 96 hrs (Last 3 readings):   Weight   10/31/22 1524 138 lb (62.6 kg)   10/31/22 1310 138 lb (62.6 kg)     Arrived from: home  Chief Complaint:   Chief Complaint   Patient presents with    Abdominal Pain     RLQ abdominal pain an d\"cannot hold urine\"     Hospital Problem/Diagnosis:  Active Problems:    * No active hospital problems. *  Resolved Problems:    * No resolved hospital problems. *    Imaging:   CT ABDOMEN PELVIS W IV CONTRAST Additional Contrast? None   Final Result   1. Postsurgical changes of the bowel. No bowel obstruction. 2.  Diffuse urinary bladder wall thickening with mucosal enhancement may   relate to underlying cystitis. 3.  Heterogeneous 4.1 cm masslike area in the right uterine fundus is   increased in size from the prior study and involves the adjacent sigmoid   colon. Given the interval increase in size this is concerning for a   neoplastic process. It is unclear whether this is centered in the uterus or   sigmoid colon. Underlying infection is not excluded. Surgical consultation   and/or follow-up pelvic ultrasound may be helpful for further evaluation. 4.  Small right lower lobe clustered nodular opacities are incompletely   imaged and may relate to bronchiolitis.            Abnormal labs:   Abnormal Labs Reviewed   CBC WITH AUTO DIFFERENTIAL - Abnormal; Notable for the following components:       Result Value    RDW 17.1 (*)     Segs Relative 71.9 (*)     Lymphocytes % 18.1 (*)     Monocytes % 6.4 (*)     All other components within normal limits   COMPREHENSIVE METABOLIC PANEL - Abnormal; Notable for the following components:    Est, Glom Filt Rate 54 (*)     Glucose 106 (*)     All other components within normal limits   URINALYSIS - Abnormal; Notable for the following components:    Clarity, UA TURBID (*)     Ketones, Urine TRACE (*)     Blood, Urine SMALL (*)     Protein, UA 30 (*)     Nitrite Urine, Quantitative POSITIVE (*)     Leukocyte Esterase, Urine LARGE (*)     All other components within normal limits   MICROSCOPIC URINALYSIS - Abnormal; Notable for the following components:    RBC,  (*)     WBC, UA 2697 (*)     Bacteria, UA MANY (*)     All other components within normal limits     Critical values: yes     Abnormal Assessment Findings: wbc/rbc's in urine. CT of pelvis does she increase in size of mass    Background  History:   Past Medical History:   Diagnosis Date    Cancer of right colon ([facility] [address] 7/5/2022    Diabetes mellitus ([facility] [address]     H/O cardiac catheterization 05/18/2021    no significant CAD in main vessels, has severe stenosis in small  branch diagonal vessel    H/O cardiovascular stress test 3/22/18,03/30/2017    ECG portion of the stress test is negative for ischemia EF >70% Normal stress myocardial perfusion.  H/O cardiovascular stress test 04/07/2021    abnormal stress    H/O Doppler ultrasound (Abdomimal Aorta) 03/29/2017    No evidence of abdominal aortic aneurysm.  H/O echocardiogram 07/02/2014    Normal left ventricular size, wall motion and systolic function. [place] elevated pulmonary artery systolic pressure. Mild MR and TR and trace AR EF 55 to 60%    Hx of echocardiogram 03/29/2017    EF 55-60%. Grade I diastolic dysfunction. Mildly dilated left atrium. No evidence of pericardial effusion.  Hyperlipidemia     Hypertension     Sleep apnea     Thyroid disease        Assessment    Vitals/MEWS: MEWS Score: 2  Level of Consciousness: Alert (0)   Vitals:    10/31/22 1310 10/31/22 1524 10/31/22 1835   BP: 108/65 (!) 143/50 (!) 156/80   Pulse: 76 (!) 49 55   Resp: 18 16 14   Temp: 98.6 °F (37 °C) 98 °F (36.7 °C)    TempSrc: Oral Oral    SpO2: 97% 97% 97%   Weight: 138 lb (62.6 kg) 138 lb (62.6 kg)    Height:  5' (1.524 m)      FiO2 (%):   O2 Flow Rate: O2 Device: None (Room air)    Cardiac Rhythm:    Pain Assessment:[x] Verbal [] Beauford Billing Scale  Pain Scale: Pain Assessment  Pain Assessment: 0-10  Pain Level: 2  Pain Location: Abdomen  Pain Orientation: Lower  Pain Descriptors: Sharp  Pain Frequency: Continuous  Last documented pain score (0-10 scale) Pain Level: 2  Last documented pain medication administered: 1641  Mental Status: oriented and alert  NIH Score:    C-SSRS: Risk of Suicide: No Risk  Bedside swallow:    Bogdan Coma Scale (GCS): [place] Coma Scale  Eye Opening: Spontaneous  Best Verbal Response: Oriented  Best Motor Response: Obeys commands  [place] Coma Scale Score: 15  Active LDA's:   Peripheral IV 10/31/22 Left Forearm (Active)   Site Assessment Clean, dry & intact 10/31/22 1441   Line Status Blood return noted; Flushed;Normal saline locked;Specimen collected 10/31/22 1441   Line Care Connections checked and tightened 10/31/22 1441   Phlebitis Assessment No symptoms 10/31/22 1441   Infiltration Assessment 0 10/31/22 1441   Alcohol Cap Used No 10/31/22 1441   Dressing Status New dressing applied;Clean, dry & intact 10/31/22 1441   Dressing Type Transparent 10/31/22 1441   Dressing Intervention New 10/31/22 1441     PO Status: Regular  Pertinent or High Risk Medications/Drips: no   o If Yes, please provide details:   Pending Blood Product Administration: no     You may also review the ED PT Care Timeline found under the Summary Nursing Index tab.     Recommendation    Pending orders Plan for Discharge (if known):    Additional Comments:    If any further questions, please call Sending RN at [phone]    Electronically signed by: Electronically signed by Azul Mcneal RN on 10/31/2022 at 8:13 PM       Azul Mcneal RN  10/31/22 2015

## 2022-11-01 NOTE — TELEPHONE ENCOUNTER
left a message that patient is currently in the hospital, called  back and he states Dr Kristie Devlin was in today to see patient, appts have been cancelled

## 2022-11-01 NOTE — PROGRESS NOTES
Bradycardia-patient asymptomatic. #.  DVT of the right subclavian vein from PICC line- was on Xarelto. -Patient reports completing the treatment 3 weeks ago     #. Hypertension  -Patient is on metoprolol, spironolactone  -Holding metoprolol due to bradycardia     #. Hyperlipidemia-atorvastatin     #. Diabetes mellitus type 2, on long-term insulin  -Low intensity sliding scale, hypoglycemia protocol.  -Holding Lantus, resume when appropriate. #.  Diabetic neuropathy-on gabapentin     #. Chronic diastolic congestive heart failure  -Patient takes Lasix as needed  -Echo-8/23/2022-EF 67-74%, grade 2 diastolic dysfunction. #.  Hypothyroidism-continue levothyroxine     #. Restless leg syndrome-on ropinirole 3 mg nightly     #. GERD-famotidine, sucralfate     #. Urine incontinence-on oxybutynin. #.  Vitamin B12 deficiency     #. Depression-on escitalopram.     #.  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  -Consult Dr. Tonja Blankenship.  -Plan to initiate treatment with fluorouracil/leucovorin after acute issues are resolved. Diet ADULT DIET; Regular   DVT Prophylaxis [] Lovenox, []  Heparin, [] SCDs, [] Ambulation,  [] Eliquis, [] Xarelto  [] Coumadin   Code Status Full Code   Disposition From: Home  Expected Disposition: Home  Estimated Date of Discharge: 11/2  Patient requires continued admission due to evaluation for right adnexal cyst.          Subjective:     Chief Complaint: Abdominal Pain (RLQ abdominal pain an d\"cannot hold urine\")       Patient evaluated at bedside. Denies any active complaints. Tolerating diet. Denies any urinary complaints other than frequency and incontinence which are both longstanding issues. Patient is having BM. Advised to ambulate today. Review of Systems:    Review of Systems   Constitutional:  Positive for activity change. Negative for appetite change, diaphoresis and fatigue.    HENT:  Negative for congestion and sore throat. Eyes:  Negative for discharge and visual disturbance. Respiratory:  Negative for cough, shortness of breath and wheezing. Cardiovascular:  Negative for chest pain, palpitations and leg swelling. Gastrointestinal:  Negative for abdominal distention, constipation and diarrhea. Genitourinary:  Positive for frequency. Negative for difficulty urinating, dysuria, flank pain, hematuria, pelvic pain and urgency. Musculoskeletal:  Positive for arthralgias. Negative for back pain and gait problem. Neurological:  Negative for dizziness, syncope and speech difficulty. Hematological:  Negative for adenopathy. Psychiatric/Behavioral:  Negative for agitation and confusion. All other systems reviewed and are negative. Objective:   No intake or output data in the 24 hours ending 11/01/22 1255     Vitals:   Vitals:    11/01/22 0738   BP: (!) 161/54   Pulse: 63   Resp: 13   Temp: 98.1 °F (36.7 °C)   SpO2: 97%       Physical Exam:   Physical Exam  Vitals and nursing note reviewed. Constitutional:       General: She is not in acute distress. Appearance: She is obese. She is not ill-appearing. HENT:      Head: Normocephalic and atraumatic. Nose: Nose normal.      Mouth/Throat:      Mouth: Mucous membranes are moist.   Eyes:      Extraocular Movements: Extraocular movements intact. Pupils: Pupils are equal, round, and reactive to light. Cardiovascular:      Rate and Rhythm: Normal rate and regular rhythm. Pulses: Normal pulses. Heart sounds: Normal heart sounds. Pulmonary:      Effort: Pulmonary effort is normal.      Breath sounds: Normal breath sounds. Abdominal:      Palpations: Abdomen is soft. Musculoskeletal:         General: Normal range of motion. Cervical back: Normal range of motion. Skin:     General: Skin is warm. Capillary Refill: Capillary refill takes less than 2 seconds.    Neurological:      General: No focal deficit present. Mental Status: She is alert and oriented to person, place, and time.    Psychiatric:         Mood and Affect: Mood normal.         Behavior: Behavior normal.          Medications:   Medications:    montelukast  10 mg Oral Nightly    levothyroxine  137 mcg Oral Daily    sucralfate  1 g Oral 4x Daily AC & HS    sodium chloride  1 g Oral BID    sodium chloride flush  5-40 mL IntraVENous 2 times per day    atorvastatin  20 mg Oral Daily    escitalopram  10 mg Oral Daily    famotidine  20 mg Oral Daily    gabapentin  100 mg Oral Daily with breakfast    oxybutynin  5 mg Oral BID    rOPINIRole  3 mg Oral Nightly    spironolactone  25 mg Oral Daily    vitamin B-12  100 mcg Oral Daily    piperacillin-tazobactam  3,375 mg IntraVENous Q8H      Infusions:    sodium chloride      dextrose       PRN Meds: sodium chloride flush, 5-40 mL, PRN  sodium chloride, , PRN  ondansetron, 4 mg, Q8H PRN   Or  ondansetron, 4 mg, Q6H PRN  acetaminophen, 650 mg, Q6H PRN   Or  acetaminophen, 650 mg, Q6H PRN  polyethylene glycol, 17 g, Daily PRN  melatonin, 3 mg, Nightly PRN  glucose, 4 tablet, PRN  dextrose bolus, 125 mL, PRN   Or  dextrose bolus, 250 mL, PRN  glucagon (rDNA), 1 mg, PRN  dextrose, , Continuous PRN  hydrALAZINE, 10 mg, Q6H PRN        Labs      Recent Results (from the past 24 hour(s))   CBC with Auto Differential    Collection Time: 10/31/22  2:38 PM   Result Value Ref Range    WBC 9.0 4.0 - 10.5 K/CU MM    RBC 4.37 4.2 - 5.4 M/CU MM    Hemoglobin 13.1 12.5 - 16.0 GM/DL    Hematocrit 40.7 37 - 47 %    MCV 93.1 78 - 100 FL    MCH 30.0 27 - 31 PG    MCHC 32.2 32.0 - 36.0 %    RDW 17.1 (H) 11.7 - 14.9 %    Platelets 570 683 - 060 K/CU MM    MPV 10.3 7.5 - 11.1 FL    Differential Type AUTOMATED DIFFERENTIAL     Segs Relative 71.9 (H) 36 - 66 %    Lymphocytes % 18.1 (L) 24 - 44 %    Monocytes % 6.4 (H) 0 - 4 %    Eosinophils % 2.7 0 - 3 %    Basophils % 0.6 0 - 1 %    Segs Absolute 6.5 K/CU MM Lymphocytes Absolute 1.6 K/CU MM    Monocytes Absolute 0.6 K/CU MM    Eosinophils Absolute 0.2 K/CU MM    Basophils Absolute 0.1 K/CU MM    Nucleated RBC % 0.0 %    Total Nucleated RBC 0.0 K/CU MM    Total Immature Neutrophil 0.03 K/CU MM    Immature Neutrophil % 0.3 0 - 0.43 %   Comprehensive Metabolic Panel    Collection Time: 10/31/22  2:38 PM   Result Value Ref Range    Sodium 137 135 - 145 MMOL/L    Potassium 4.2 3.5 - 5.1 MMOL/L    Chloride 102 99 - 110 mMol/L    CO2 26 21 - 32 MMOL/L    BUN 18 6 - 23 MG/DL    Creatinine 1.0 0.6 - 1.1 MG/DL    Est, Glom Filt Rate 54 (L) >60 mL/min/1.73m2    Glucose 106 (H) 70 - 99 MG/DL    Calcium 9.5 8.3 - 10.6 MG/DL    Albumin 4.1 3.4 - 5.0 GM/DL    Total Protein 7.3 6.4 - 8.2 GM/DL    Total Bilirubin 0.3 0.0 - 1.0 MG/DL    ALT 22 10 - 40 U/L    AST 30 15 - 37 IU/L    Alkaline Phosphatase 70 40 - 129 IU/L    Anion Gap 9 4 - 16   Lipase    Collection Time: 10/31/22  2:38 PM   Result Value Ref Range    Lipase 20 13 - 60 IU/L   Urinalysis    Collection Time: 10/31/22  3:31 PM   Result Value Ref Range    Color, UA YELLOW YELLOW    Clarity, UA TURBID (A) CLEAR    Glucose, Urine NEGATIVE NEGATIVE MG/DL    Bilirubin Urine NEGATIVE NEGATIVE MG/DL    Ketones, Urine TRACE (A) NEGATIVE MG/DL    Specific Gravity, UA 1.020 1.001 - 1.035    Blood, Urine SMALL (A) NEGATIVE    pH, Urine 6.5 5.0 - 8.0    Protein, UA 30 (A) NEGATIVE MG/DL    Urobilinogen, Urine 0.2 0.2 - 1.0 MG/DL    Nitrite Urine, Quantitative POSITIVE (A) NEGATIVE    Leukocyte Esterase, Urine LARGE (A) NEGATIVE   Microscopic Urinalysis    Collection Time: 10/31/22  3:31 PM   Result Value Ref Range    RBC,  (H) 0 - 6 /HPF    WBC, UA 2697 (H) 0 - 5 /HPF    Bacteria, UA MANY (A) NEGATIVE /HPF    WBC Clumps, UA MANY /HPF    Trichomonas, UA NONE SEEN NONE SEEN /HPF   Lactic Acid    Collection Time: 10/31/22  4:45 PM   Result Value Ref Range    Lactate 1.0 0.4 - 2.0 mMOL/L   POCT Glucose    Collection Time: 11/01/22  6:46 AM   Result Value Ref Range    POC Glucose 94 70 - 99 MG/DL        Imaging/Diagnostics Last 24 Hours   CT ABDOMEN PELVIS W IV CONTRAST Additional Contrast? None    Result Date: 10/31/2022  EXAMINATION: CT OF THE ABDOMEN AND PELVIS WITH CONTRAST 10/31/2022 5:12 pm TECHNIQUE: CT of the abdomen and pelvis was performed with the administration of intravenous contrast. Multiplanar reformatted images are provided for review. Automated exposure control, iterative reconstruction, and/or weight based adjustment of the mA/kV was utilized to reduce the radiation dose to as low as reasonably achievable. COMPARISON: CT abdomen pelvis done May 21, 2022. HISTORY: ORDERING SYSTEM PROVIDED HISTORY: rlq abdominal pain TECHNOLOGIST PROVIDED HISTORY: Reason for exam:->rlq abdominal pain Additional Contrast?->None Decision Support Exception - unselect if not a suspected or confirmed emergency medical condition->Emergency Medical Condition (MA) Reason for Exam: rlq abdominal pain Additional signs and symptoms: no Relevant Medical/Surgical History: 70 ml isovue used FINDINGS: Lower Chest: Small right lower lobe clustered nodular opacities are incompletely imaged. No pericardial or pleural effusion. Coronary artery calcifications. Mitral annular calcification. Atherosclerosis in the visualized thoracic aorta. Liver: Normal. Gallbladder and Bile Ducts: Normal. Spleen: Normal. Adrenal Glands: Normal. Pancreas: Normal. Genitourinary: Both kidneys are symmetric in size and enhancement. No urinary stones or hydronephrosis. The urinary bladder demonstrates diffuse wall thickening and mucosal enhancement. Heterogeneous 4.0 x 4.1 x 3.6 cm area in the right uterine fundus which abuts the adjacent sigmoid colon is increased in size from the prior study. Bowel: Postsurgical changes of the bowel. No bowel obstruction. Mild colonic diverticulosis without acute diverticulitis. Vasculature: Atherosclerosis. No abdominal aortic aneurysm.   Patent portal vein. Bones and Soft Tissues: Degenerative changes in the visualized spine and pelvis. Retroperitoneum/Mesentery: No intraperitoneal free air, ascites or fluid collection. No lymphadenopathy in the abdomen or pelvis. 1.  Postsurgical changes of the bowel. No bowel obstruction. 2.  Diffuse urinary bladder wall thickening with mucosal enhancement may relate to underlying cystitis. 3.  Heterogeneous 4.1 cm masslike area in the right uterine fundus is increased in size from the prior study and involves the adjacent sigmoid colon. Given the interval increase in size this is concerning for a neoplastic process. It is unclear whether this is centered in the uterus or sigmoid colon. Underlying infection is not excluded. Surgical consultation and/or follow-up pelvic ultrasound may be helpful for further evaluation. 4.  Small right lower lobe clustered nodular opacities are incompletely imaged and may relate to bronchiolitis.        Electronically signed by Usama Baker MD on 11/1/2022 at 12:55 PM

## 2022-11-01 NOTE — H&P
History and Physical      Name:  Cortes Pantoja /Age/Sex: 1934  (9 y.o. female)   MRN & CSN:  [id number] & [id number] Encounter Date/Time: 10/31/2022 8:49 PM EDT   Location:  ED14/ED-14 PCP: Mónica Mckeon[address] Day: 1    Assessment and Plan:     #. Acute cystitis  -UA-turbid, leukocyte esterase large, nitrate positive, WBC 2697, many WBC clumps, bacteria many.  -Patient admits to dysuria, incontinence, frequency.  -Patient afebrile, leukocytosis. -Urine culture sent from ER.  -Initially ciprofloxacin was ordered in ER-however patient started itching after antibiotic was started. Ciprofloxacin was discontinued and patient was given a dose of Bactrim. -Patient has tolerated Zosyn in the past.  Continue until cultures are reported. #.  Abdominal pain  -Diffuse urinary bladder wall thickening.  -Heterogenous 4.1 cm masslike area in the right uterine fundus-increased in size from the prior study and involves the adjacent sigmoid colon. Given the interval increase in size this is concerning for a neoplastic process. -Consult oncology, general surgery. #.  Bradycardia-patient asymptomatic. #.  DVT of the right subclavian vein from PICC line- was on Xarelto. -Patient reports completing the treatment 3 weeks ago    #. Hypertension  -Patient is on metoprolol, spironolactone  -Holding metoprolol due to bradycardia    #. Hyperlipidemia-atorvastatin    #. Diabetes mellitus type 2, on long-term insulin  -Low intensity sliding scale, hypoglycemia protocol.  -Holding Lantus, resume when appropriate. #.  Diabetic neuropathy-on gabapentin    #. Chronic diastolic congestive heart failure  -Patient takes Lasix as needed  -Echo-2022-EF 38-65%, grade 2 diastolic dysfunction. #.  Hypothyroidism-continue levothyroxine    #. Restless leg syndrome-on ropinirole 3 mg nightly    #. GERD-famotidine, sucralfate    #. Urine incontinence-on oxybutynin.     #.  Vitamin B12 deficiency    #. Depression-on escitalopram.    #.  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  -Consult Dr. Blade Harmon.    Disposition:   Current Living situation: home    Diet Regular   DVT Prophylaxis [x] Lovenox, []  Heparin, [] SCDs, [] Ambulation,  [] Eliquis, [] Xarelto   Code Status  FULL   Surrogate Decision Maker/ POA      History from:   EMR, patient, daughters at bed side. History of Present Illness:     Chief Complaint: UTI (urinary tract infection)  Mancel Husbands is a 80 y.o. female with recently diagnosed ileal adenocarcinoma s/p right hemicolectomy (5/27/2022), currently on treatment, hypertension, diabetes mellitus type 2, diabetic neuropathy, hypothyroidism, GERD, restless leg syndrome, history of right subclavian DVT, completed anticoagulation treatment presented to ED with complaints of abdominal pain. Patient reported having diffuse abdominal pain on Friday, currently in the right lower quadrant, constant, 5/10 intensity. Patient denied any nausea, vomiting, fever, chills, admitted to having urine incontinence, frequency, dysuria. Denied any blood in urine. Patient denied any constipation or diarrhea. At presentation patient was noted to have /65, HR 76, RR 18, temp 98.6, saturating 97% on room air. CBC, CMP within normal range, lactic acid 1, random glucose 106, UA suggestive of infection, CT abdomen/pelvis-diffuse urinary bladder wall thickening, with mucosal enhancement, heterogeneous 4.1 cm masslike area in the right uterine fundus, increased in size from the prior study. Patient received Bactrim, 500 cc fluid bolus, fentanyl, Zofran in ER. Review of Systems: Need 10 Elements   10 point review of systems conducted and pertinent positives and negatives as per HPI.     Objective:   No intake or output data in the 24 hours ending 10/31/22 2049   Vitals:   Vitals:    10/31/22 1524 10/31/22 1835 10/31/22 2019 10/31/22 2022   BP: (!) 143/50 (!) 156/80  (!) 165/105   Pulse: (!) 49 55 57    Resp: 16 14 18    Temp: 98 °F (36.7 °C)      TempSrc: Oral      SpO2: 97% 97% 98%    Weight: 138 lb (62.6 kg)      Height: 5' (1.524 m)          Medications Prior to Admission   Reviewed medications with patient    Prior to Admission medications    Medication Sig Start Date End Date Taking? Authorizing Provider   spironolactone (ALDACTONE) 25 MG tablet  8/13/22   Historical Provider, MD   famotidine (PEPCID) 20 MG tablet Take 1 tablet by mouth 2 times daily 9/20/22   Elie iDaz MD   potassium chloride (KLOR-CON M) 20 MEQ extended release tablet Take 1 tablet by mouth daily as needed (with Lasix) 8/16/22 9/15/22  LAURE Cuellar CNP   furosemide (LASIX) 20 MG tablet Take 1 tablet by mouth as needed (for weight gain of 3 lbs in a day or  5 in a week) 8/16/22 9/15/22  LAURE Cuellar CNP   gabapentin (NEURONTIN) 100 MG capsule  8/1/22   Historical Provider, MD   rivaroxaban (XARELTO) 20 MG TABS tablet Take 1 tablet by mouth daily (with breakfast) 8/9/22   Elie Diaz MD   Magic Mouthwash (MIRACLE MOUTHWASH) Equal parts of Benadryl, Maalox, 2% Viscous Xylocaine and Dexamethasone. Take 5 mL 4 times daily. 8/9/22   Elie Diaz MD   montelukast (SINGULAIR) 10 MG tablet Take 10 mg by mouth nightly    Historical Provider, MD   metoprolol tartrate (LOPRESSOR) 50 MG tablet Take 1 tablet by mouth in the morning and 1 tablet before bedtime. 3 pills in the AM : 150 mg dose  2 pills in the PM: 100 mg dose. 7/28/22   LAURE Cuellar CNP   ondansetron (ZOFRAN) 8 MG tablet Take 1 tablet by mouth every 8 hours as needed for Nausea or Vomiting 7/25/22   LAURE Noe CNP   rivaroxaban (XARELTO) 15 MG TABS tablet Take 15 mg by mouth    Historical Provider, MD   sodium chloride 1 g tablet Take 1 g by mouth 2 times daily    Historical Provider, MD   capecitabine (XELODA) 500 MG chemo tablet Take 3 tablets (1,500 mg) by mouth two times daily for 14 days then 7 days off every 21 days. 7/8/22   Galileo Sahu MD   escitalopram (LEXAPRO) 10 MG tablet Take 1 tablet by mouth daily 6/24/22   TEO Jaeger MD   sucralfate (CARAFATE) 1 GM tablet Take 1 tablet by mouth 4 times daily (before meals and nightly) 6/23/22   C Albert Jaeger MD   melatonin 3 MG TABS tablet Take 3 mg by mouth nightly as needed    Historical Provider, MD   vitamin B-12 (CYANOCOBALAMIN) 100 MCG tablet Take 100 mcg by mouth daily    Historical Provider, MD   LANTUS SOLOSTAR 100 UNIT/ML injection pen  1/31/19   Historical Provider, MD   Cholecalciferol (VITAMIN D PO) Take by mouth    Historical Provider, MD   oxybutynin (DITROPAN) 5 MG tablet Take 5 mg by mouth 2 times daily    Historical Provider, MD   atorvastatin (LIPITOR) 20 MG tablet Take 20 mg by mouth daily    Historical Provider, MD   rOPINIRole (REQUIP) 3 MG tablet Take 3 mg by mouth nightly    Historical Provider, MD   levothyroxine (SYNTHROID) 137 MCG tablet Take 137 mcg by mouth daily  3/13/17   Historical Provider, MD   Insulin Pen Needle (PEN NEEDLES 31GX5/16\") 31G X 8 MM MISC  3/11/16   Historical Provider, MD       Physical Exam: Need 8 Elements   Physical Exam     GEN  -Awake, alert, NAD.   EYES   -PERRL. HENT  -MM are moist.   RESP  -LS CTA equal bilat, no wheezes, rales or rhonchi. Symmetric chest movement. C/V  -S1/S2 auscultated, bradycardia without appreciable M/R/G. No JVD or carotid bruits. Peripheral pulses equal bilaterally and palpable. No peripheral edema. GI  -Abdomen is soft, non-distended, RLQ tenderness. No masses or guarding. + BS in all quadrants. Rectal exam deferred.   -No CVA tenderness. Arreguin catheter is not present. MS  -B/L extremities- No gross joint deformities. No swelling, intact sensation symmetrical.   SKIN  -Normal coloration, warm, dry. NEURO  - Awake, alert, oriented x 3, no focal deficits. PSYC  - Appropriate affect.        Past Medical History: Reviewed patient's past medical, surgical, social, family history and allergies. PMHx   Past Medical History:   Diagnosis Date    Cancer of right colon ([facility] [address] 7/5/2022    Diabetes mellitus ([facility] [address]     H/O cardiac catheterization 05/18/2021    no significant CAD in main vessels, has severe stenosis in small  branch diagonal vessel    H/O cardiovascular stress test 3/22/18,03/30/2017    ECG portion of the stress test is negative for ischemia EF >70% Normal stress myocardial perfusion.  H/O cardiovascular stress test 04/07/2021    abnormal stress    H/O Doppler ultrasound (Abdomimal Aorta) 03/29/2017    No evidence of abdominal aortic aneurysm.  H/O echocardiogram 07/02/2014    Normal left ventricular size, wall motion and systolic function. [place] elevated pulmonary artery systolic pressure. Mild MR and TR and trace AR EF 55 to 60%    Hx of echocardiogram 03/29/2017    EF 55-60%. Grade I diastolic dysfunction. Mildly dilated left atrium. No evidence of pericardial effusion.  Hyperlipidemia     Hypertension     Sleep apnea     Thyroid disease      PSHX:  has a past surgical history that includes Cholecystectomy; Dilation and curettage of uterus; Cataract removal; other surgical history; Breast biopsy (Right); Colonoscopy (N/A, 5/25/2022); and hemicolectomy (N/A, 5/27/2022). Allergies:    Allergies   Allergen Reactions    Lopid [Gemfibrozil] Shortness Of Breath and Other (See Comments)     Cough    Bystolic [Nebivolol Hcl]     Cefdinir     Coreg [Carvedilol]     Crestor [Rosuvastatin]     Fenofibrate     Glucophage [Metformin]     Hydrochlorothiazide Other (See Comments)    Metronidazole     Norvasc [Amlodipine Besylate]     Tribenzor [Olmesartan-Amlodipine-Hctz]      Pt states that her chest felt funny and achy when she took the medication    Zocor [Simvastatin]     Ciprofloxacin Itching     Fam HX: family history includes Arrhythmia in her sister; Breast Cancer in her sister; Pacemaker in her sister. Soc HX:   Social History     Socioeconomic History    Marital status:      Spouse name: None    Number of children: None    Years of education: None    Highest education level: None   Tobacco Use    Smoking status: Never    Smokeless tobacco: Never   Vaping Use    Vaping Use: Never used   Substance and Sexual Activity    Alcohol use: Not Currently     Alcohol/week: 1.0 standard drink     Types: 1 Glasses of wine per week     Comment: rarely. 1-2cups coffee daily    Drug use: No       Medications:   Medications:    Infusions:   PRN Meds:     Labs      CBC:   Recent Labs     10/31/22  1438   WBC 9.0   HGB 13.1        BMP:    Recent Labs     10/31/22  1438      K 4.2      CO2 26   BUN 18   CREATININE 1.0   GLUCOSE 106*     Hepatic:   Recent Labs     10/31/22  1438   AST 30   ALT 22   BILITOT 0.3   ALKPHOS 70     Lipids:   Lab Results   Component Value Date/Time    CHOL 138 05/02/2019 08:00 AM    HDL 54 05/02/2019 08:00 AM    TRIG 161 06/01/2022 04:34 AM     Hemoglobin A1C:   Lab Results   Component Value Date/Time    LABA1C 5.9 05/22/2022 09:08 AM     TSH: No results found for: TSH  Troponin:   Lab Results   Component Value Date/Time    TROPONINT <0.010 03/14/2018 09:30 PM    TROPONINT <0.010 03/14/2018 06:00 PM    TROPONINT <0.010 03/14/2018 01:45 PM     Lactic Acid: No results for input(s): LACTA in the last 72 hours. BNP: No results for input(s): PROBNP in the last 72 hours.   UA:  Lab Results   Component Value Date/Time    NITRU POSITIVE 10/31/2022 03:31 PM    COLORU YELLOW 10/31/2022 03:31 PM    [address] 10/31/2022 03:31 PM    RBCUA 151 10/31/2022 03:31 PM    MUCUS RARE 06/11/2022 10:40 PM    TRICHOMONAS NONE SEEN 10/31/2022 03:31 PM    BACTERIA MANY 10/31/2022 03:31 PM    CLARITYU TURBID 10/31/2022 03:31 PM    SPECGRAV 1.020 10/31/2022 03:31 PM    LEUKOCYTESUR LARGE 10/31/2022 03:31 PM    UROBILINOGEN 0.2 10/31/2022 03:31 PM    BILIRUBINUR NEGATIVE 10/31/2022 03:31 PM    BLOODU SMALL 10/31/2022 03:31 PM    KETUA TRACE 10/31/2022 03:31 PM     Urine Cultures: No results found for: Kanatis Severance  Blood Cultures: No results found for: BC  No results found for: BLOODCULT2  Organism:   Lab Results   Component Value Date/Time    ORG ENC 03/05/2015 11:00 AM    ORG ECOL 03/05/2015 11:00 AM       Imaging/Diagnostics Last 24 Hours   CT ABDOMEN PELVIS W IV CONTRAST Additional Contrast? None    Result Date: 10/31/2022  EXAMINATION: CT OF THE ABDOMEN AND PELVIS WITH CONTRAST 10/31/2022 5:12 pm TECHNIQUE: CT of the abdomen and pelvis was performed with the administration of intravenous contrast. Multiplanar reformatted images are provided for review. Automated exposure control, iterative reconstruction, and/or weight based adjustment of the mA/kV was utilized to reduce the radiation dose to as low as reasonably achievable. COMPARISON: CT abdomen pelvis done May 21, 2022. HISTORY: ORDERING SYSTEM PROVIDED HISTORY: rlq abdominal pain TECHNOLOGIST PROVIDED HISTORY: Reason for exam:->rlq abdominal pain Additional Contrast?->None Decision Support Exception - unselect if not a suspected or confirmed emergency medical condition->Emergency Medical Condition (MA) Reason for Exam: rlq abdominal pain Additional signs and symptoms: no Relevant Medical/Surgical History: 70 ml isovue used FINDINGS: Lower Chest: Small right lower lobe clustered nodular opacities are incompletely imaged. No pericardial or pleural effusion. Coronary artery calcifications. Mitral annular calcification. Atherosclerosis in the visualized thoracic aorta. Liver: Normal. Gallbladder and Bile Ducts: Normal. Spleen: Normal. Adrenal Glands: Normal. Pancreas: Normal. Genitourinary: Both kidneys are symmetric in size and enhancement. No urinary stones or hydronephrosis. The urinary bladder demonstrates diffuse wall thickening and mucosal enhancement.   Heterogeneous 4.0 x 4.1 x 3.6 cm area in the right uterine fundus which abuts the adjacent sigmoid colon is increased in size from the prior study. Bowel: Postsurgical changes of the bowel. No bowel obstruction. Mild colonic diverticulosis without acute diverticulitis. Vasculature: Atherosclerosis. No abdominal aortic aneurysm. Patent portal vein. Bones and Soft Tissues: Degenerative changes in the visualized spine and pelvis. Retroperitoneum/Mesentery: No intraperitoneal free air, ascites or fluid collection. No lymphadenopathy in the abdomen or pelvis. 1.  Postsurgical changes of the bowel. No bowel obstruction. 2.  Diffuse urinary bladder wall thickening with mucosal enhancement may relate to underlying cystitis. 3.  Heterogeneous 4.1 cm masslike area in the right uterine fundus is increased in size from the prior study and involves the adjacent sigmoid colon. Given the interval increase in size this is concerning for a neoplastic process. It is unclear whether this is centered in the uterus or sigmoid colon. Underlying infection is not excluded. Surgical consultation and/or follow-up pelvic ultrasound may be helpful for further evaluation. 4.  Small right lower lobe clustered nodular opacities are incompletely imaged and may relate to bronchiolitis. Personally reviewed Lab Studies, Imaging, and discussed case with ED physician.     Electronically signed by Ludmila Gallegos MD on 10/31/2022 at 8:49 PM

## 2022-11-01 NOTE — CONSULTS
[address] [facility]    PATIENT: Quinton Markham 1934, 80 y.o., female  MRN: [id number]    Physician: Kathrin Nicholson MD    Date: 11/1/22    Reason for Evaluation & Chief Complaint:     Chief Complaint   Patient presents with    Abdominal Pain     RLQ abdominal pain an d\"cannot hold urine\"     Requesting Emanuel Morris MD     History Obtained From:  patient, electronic medical record    HISTORY OF PRESENT ILLNESS:    Melvin Madrid is a 80 y.o. 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 She has recovered well from her surgery 5/2022, and is planning on starting a new chemotherapy regimen soon as she did not tolerate Xeloda. She has had a few days of abdominal pain which was more generalized but now is mostly in the pelvis and right lower quadrant. She has had constipation for which she took stool softeners and since then has had bowel movements, last was 2 days ago. No nausea or vomiting. She also has had urinary incontinence. On imaging, she was noted to have cystitis and a 4cm masslike area at the uterine fundus. She previously had an MRI 5/26/22 showing a cystic lesion in the right adnexa favored to be a complex cyst for which follow up was recommended in 6 months. She thinks she has a follow up MRI planned in December. Past Medical History:    Past Medical History:   Diagnosis Date    Cancer of right colon ([address] 7/5/2022    Diabetes mellitus ([facility] [address]     H/O cardiac catheterization 05/18/2021    no significant CAD in main vessels, has severe stenosis in small  branch diagonal vessel    H/O cardiovascular stress test 3/22/18,03/30/2017    ECG portion of the stress test is negative for ischemia EF >70% Normal stress myocardial perfusion.      H/O cardiovascular stress test 04/07/2021    abnormal stress    H/O Doppler ultrasound (Abdomimal Aorta) 03/29/2017    No evidence of abdominal aortic aneurysm. H/O echocardiogram 07/02/2014    Normal left ventricular size, wall motion and systolic function. Mildle elevated pulmonary artery systolic pressure. Mild MR and TR and trace AR EF 55 to 60%    Hx of echocardiogram 03/29/2017    EF 55-60%. Grade I diastolic dysfunction. Mildly dilated left atrium. No evidence of pericardial effusion.      Hyperlipidemia     Hypertension     Sleep apnea     Thyroid disease        Past Surgical History:    Past Surgical History:   Procedure Laterality Date    BREAST BIOPSY Right     approx age 76, benign    CATARACT REMOVAL      CHOLECYSTECTOMY      COLONOSCOPY N/A 5/25/2022    COLONOSCOPY POLYPECTOMY SNARE/COLD BIOPSY performed by Marissa Carr MD at [facility] 5/27/2022    BOWEL RESECTION RIGHT HEMICOLECTOMY LAPAROSCOPIC ROBOTIC XI performed by Sebastian Ruff MD at [facility]      eye lift       Current Medications:   Current Facility-Administered Medications   Medication Dose Route Frequency Provider Last Rate Last Admin    montelukast (SINGULAIR) tablet 10 mg  10 mg Oral Nightly Raciel Chaves MD   10 mg at 11/01/22 0038    levothyroxine (SYNTHROID) tablet 137 mcg  137 mcg Oral Daily Raciel Chaves MD   137 mcg at 11/01/22 0534    sucralfate (CARAFATE) tablet 1 g  1 g Oral 4x Daily AC & HS Raciel Chaves MD   1 g at 11/01/22 1115    sodium chloride tablet 1 g  1 g Oral BID Raciel Chaves MD   1 g at 11/01/22 1107    sodium chloride flush 0.9 % injection 5-40 mL  5-40 mL IntraVENous 2 times per day Raciel Chaves MD   10 mL at 11/01/22 1106    sodium chloride flush 0.9 % injection 5-40 mL  5-40 mL IntraVENous PRN Raciel Chaves MD        0.9 % sodium chloride infusion   IntraVENous PRN Raciel Chaves MD        ondansetron (ZOFRAN-ODT) disintegrating tablet 4 mg  4 mg Oral Q8H PRN Aye Macdonald MD        Or    ondansetron [facility]) injection 4 mg  4 mg IntraVENous Q6H PRN Aye Macdonald MD        acetaminophen (TYLENOL) tablet 650 mg  650 mg Oral Q6H PRN Aye Macdonald MD        Or    acetaminophen (TYLENOL) suppository 650 mg  650 mg Rectal Q6H PRN Aye Macdonald MD        polyethylene glycol (GLYCOLAX) packet 17 g  17 g Oral Daily PRN Aye Macdonald MD        atorvastatin (LIPITOR) tablet 20 mg  20 mg Oral Daily Aye Macdonald MD   20 mg at 11/01/22 1048    escitalopram (LEXAPRO) tablet 10 mg  10 mg Oral Daily Aye Macdonald MD   10 mg at 11/01/22 1057    famotidine (PEPCID) tablet 20 mg  20 mg Oral Daily Aye Macdonald MD   20 mg at 11/01/22 1039    gabapentin (NEURONTIN) capsule 100 mg  100 mg Oral Daily with breakfast Aye Macdonald MD   100 mg at 11/01/22 1048    melatonin tablet 3 mg  3 mg Oral Nightly PRN Aye Macdonald MD        oxybutynin (DITROPAN) tablet 5 mg  5 mg Oral BID Aye Macdonald MD   5 mg at 11/01/22 1051    rOPINIRole (REQUIP) tablet 3 mg  3 mg Oral Nightly Aye Macdonald MD   3 mg at 11/01/22 2745    spironolactone (ALDACTONE) tablet 25 mg  25 mg Oral Daily Aye Macdonald MD   25 mg at 11/01/22 1046    vitamin B-12 (CYANOCOBALAMIN) tablet 100 mcg  100 mcg Oral Daily Aye Macdonald MD   100 mcg at 11/01/22 1105    glucose chewable tablet 16 g  4 tablet Oral PRN Aye Macdonald MD        dextrose bolus 10% 125 mL  125 mL IntraVENous PRN Aye Macdonald MD        Or    dextrose bolus 10% 250 mL  250 mL IntraVENous PRN Aye Macdonald MD        glucagon (rDNA) injection 1 mg  1 mg SubCUTAneous PRN Aye Macdonald MD        dextrose 10 % infusion   IntraVENous Continuous PRN Aye Macdonald MD        piperacillin-tazobactam (ZOSYN) 3,375 mg in dextrose 5 % 50 mL IVPB extended infusion (mini-bag)  3,375 mg IntraVENous Q8H Aye Macdonald MD 12.5 mL/hr at 11/01/22 1104 3,375 mg at 11/01/22 1104    hydrALAZINE (APRESOLINE) injection 10 mg  10 mg IntraVENous Q6H PRN Kathy Gray MD           Allergies:  Lopid [gemfibrozil], Bystolic [nebivolol hcl], Cefdinir, Coreg [carvedilol], Crestor [rosuvastatin], Fenofibrate, Glucophage [metformin], Hydrochlorothiazide, Metronidazole, Norvasc [amlodipine besylate], Tribenzor [olmesartan-amlodipine-hctz], Zocor [simvastatin], and Ciprofloxacin    Social History:   Social History     Socioeconomic History    Marital status:      Spouse name: None    Number of children: None    Years of education: None    Highest education level: None   Tobacco Use    Smoking status: Never    Smokeless tobacco: Never   Vaping Use    Vaping Use: Never used   Substance and Sexual Activity    Alcohol use: Not Currently     Alcohol/week: 1.0 standard drink     Types: 1 Glasses of wine per week     Comment: rarely. 1-2cups coffee daily    Drug use: No       Family History:   Family History   Problem Relation Age of Onset    Pacemaker Sister     Arrhythmia Sister     Breast Cancer Sister         pre menopausal    Ovarian Cancer Neg Hx        REVIEW OF SYSTEMS:    Review of Systems   Constitutional:  Negative for chills and fever. HENT:  Negative for congestion and sore throat. Eyes:  Negative for discharge and redness. Respiratory:  Negative for chest tightness and shortness of breath. Cardiovascular:  Negative for chest pain and palpitations. Gastrointestinal:  Positive for abdominal pain and constipation. Negative for abdominal distention, diarrhea, nausea and vomiting. Genitourinary:  Positive for frequency, pelvic pain and urgency. Negative for dysuria, flank pain and hematuria. Musculoskeletal:  Positive for arthralgias. Negative for myalgias. Skin:  Negative for color change and rash. Neurological:  Negative for dizziness and numbness. Psychiatric/Behavioral:  Negative for confusion. The patient is not nervous/anxious. I have reviewed the patient's information pertinent to this visit, including medical history, family history, social history and review of systems. PHYSICAL EXAM:    Vitals:    10/31/22 2019 10/31/22 2022 10/31/22 2245 11/01/22 0738   BP:  (!) 165/105 (!) 155/75 (!) 161/54   Pulse: 57  53 63   Resp: 18  16 13   Temp:   98.2 °F (36.8 °C) 98.1 °F (36.7 °C)   TempSrc:   Oral Oral   SpO2: 98%   97%   Weight:       Height:         Physical Exam  Constitutional:       General: She is not in acute distress. Appearance: She is well-developed. She is not diaphoretic. HENT:      Head: Normocephalic and atraumatic. Eyes:      Pupils: Pupils are equal, round, and reactive to light. Cardiovascular:      Rate and Rhythm: Normal rate and regular rhythm. Pulmonary:      Effort: Pulmonary effort is normal. No respiratory distress. Abdominal:      Palpations: Abdomen is soft. Tenderness: There is abdominal tenderness (suprapubic and right lower quadrant tenderness). There is no guarding or rebound. Comments: Incision(s) well healed, no erythema or drainage    Neurological:      General: No focal deficit present. Mental Status: She is alert.    Psychiatric:         Behavior: Behavior normal.       DATA:    Lab Results   Component Value Date    WBC 9.0 10/31/2022    HGB 13.1 10/31/2022    HCT 40.7 10/31/2022     10/31/2022     10/31/2022    K 4.2 10/31/2022     10/31/2022    CO2 26 10/31/2022    BUN 18 10/31/2022    CREATININE 1.0 10/31/2022    GLUCOSE 106 (H) 10/31/2022    CALCIUM 9.5 10/31/2022    PROT 7.3 10/31/2022    BILITOT 0.3 10/31/2022    AST 30 10/31/2022    ALT 22 10/31/2022    ALKPHOS 70 10/31/2022    AMYLASE 56 03/10/2017    LIPASE 20 10/31/2022    INR 2.14 07/05/2022    GLUF 121 (H) 12/06/2017    LABA1C 5.9 05/22/2022       Imaging:   CT ABDOMEN PELVIS W IV CONTRAST Additional Contrast? None    Result Date: 10/31/2022  EXAMINATION: CT OF THE ABDOMEN AND PELVIS WITH CONTRAST 10/31/2022 5:12 pm TECHNIQUE: CT of the abdomen and pelvis was performed with the administration of intravenous contrast. Multiplanar reformatted images are provided for review. Automated exposure control, iterative reconstruction, and/or weight based adjustment of the mA/kV was utilized to reduce the radiation dose to as low as reasonably achievable. COMPARISON: CT abdomen pelvis done May 21, 2022. HISTORY: ORDERING SYSTEM PROVIDED HISTORY: rlq abdominal pain TECHNOLOGIST PROVIDED HISTORY: Reason for exam:->rlq abdominal pain Additional Contrast?->None Decision Support Exception - unselect if not a suspected or confirmed emergency medical condition->Emergency Medical Condition (MA) Reason for Exam: rlq abdominal pain Additional signs and symptoms: no Relevant Medical/Surgical History: 70 ml isovue used FINDINGS: Lower Chest: Small right lower lobe clustered nodular opacities are incompletely imaged. No pericardial or pleural effusion. Coronary artery calcifications. Mitral annular calcification. Atherosclerosis in the visualized thoracic aorta. Liver: Normal. Gallbladder and Bile Ducts: Normal. Spleen: Normal. Adrenal Glands: Normal. Pancreas: Normal. Genitourinary: Both kidneys are symmetric in size and enhancement. No urinary stones or hydronephrosis. The urinary bladder demonstrates diffuse wall thickening and mucosal enhancement. Heterogeneous 4.0 x 4.1 x 3.6 cm area in the right uterine fundus which abuts the adjacent sigmoid colon is increased in size from the prior study. Bowel: Postsurgical changes of the bowel. No bowel obstruction. Mild colonic diverticulosis without acute diverticulitis. Vasculature: Atherosclerosis. No abdominal aortic aneurysm. Patent portal vein. Bones and Soft Tissues: Degenerative changes in the visualized spine and pelvis. Retroperitoneum/Mesentery: No intraperitoneal free air, ascites or fluid collection. No lymphadenopathy in the abdomen or pelvis.      1. Postsurgical changes of the bowel. No bowel obstruction. 2.  Diffuse urinary bladder wall thickening with mucosal enhancement may relate to underlying cystitis. 3.  Heterogeneous 4.1 cm masslike area in the right uterine fundus is increased in size from the prior study and involves the adjacent sigmoid colon. Given the interval increase in size this is concerning for a neoplastic process. It is unclear whether this is centered in the uterus or sigmoid colon. Underlying infection is not excluded. Surgical consultation and/or follow-up pelvic ultrasound may be helpful for further evaluation. 4.  Small right lower lobe clustered nodular opacities are incompletely imaged and may relate to bronchiolitis. Pertinent laboratory and imaging studies were personally reviewed if available. IMPRESSION:    Florin Land is a 80 y.o. 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 Lower urinary tract infection    2. Uterine mass    3. Colonic mass      PLAN:  Discussed findings and options with Maikel Whiting and her family at bedside. Constipation has resolved. Continue bowel regimen as needed. Pelvic cyst - MRI 5/26/22 showing a cystic lesion in the right adnexa favored to be a complex cyst for which follow up was recommended in 6 months. She thinks she has a follow up MRI planned in December. Likely can follow outpatient. Cystic lesion has increased in size based on CT 5/2021, may benefit from a GYN referral - she does not have a preference of what group she sees, previously has gone to a GYN in [facility], but now wishes to have her care locally.   Agree with Oncology referral. Appreciate recommendations  Thank you for the consultation and the opportunity to care for Maikel Whiting    Electronically signed by Anatoliy Thapa MD, 11/1/2022, 11:26 AM

## 2022-11-02 PROBLEM — N83.8 COMPLEX CYST OF UTERINE ADNEXA: Status: ACTIVE | Noted: 2022-11-02

## 2022-11-02 PROBLEM — K59.01 CONSTIPATION BY DELAYED COLONIC TRANSIT: Status: ACTIVE | Noted: 2022-11-02

## 2022-11-02 LAB
BASOPHILS ABSOLUTE: 0 K/CU MM
BASOPHILS RELATIVE PERCENT: 0.5 % (ref 0–1)
CULTURE: ABNORMAL
CULTURE: ABNORMAL
DIFFERENTIAL TYPE: ABNORMAL
EOSINOPHILS ABSOLUTE: 0.3 K/CU MM
EOSINOPHILS RELATIVE PERCENT: 4.1 % (ref 0–3)
GLUCOSE BLD-MCNC: 131 MG/DL (ref 70–99)
GLUCOSE BLD-MCNC: 137 MG/DL (ref 70–99)
HCT VFR BLD CALC: 37.9 % (ref 37–47)
HEMOGLOBIN: 12.1 GM/DL (ref 12.5–16)
IMMATURE NEUTROPHIL %: 0.3 % (ref 0–0.43)
LYMPHOCYTES ABSOLUTE: 0.9 K/CU MM
LYMPHOCYTES RELATIVE PERCENT: 14.4 % (ref 24–44)
Lab: ABNORMAL
MCH RBC QN AUTO: 30.5 PG (ref 27–31)
MCHC RBC AUTO-ENTMCNC: 31.9 % (ref 32–36)
MCV RBC AUTO: 95.5 FL (ref 78–100)
MONOCYTES ABSOLUTE: 0.4 K/CU MM
MONOCYTES RELATIVE PERCENT: 6.1 % (ref 0–4)
NUCLEATED RBC %: 0 %
PDW BLD-RTO: 16.5 % (ref 11.7–14.9)
PLATELET # BLD: 152 K/CU MM (ref 140–440)
PMV BLD AUTO: 10 FL (ref 7.5–11.1)
RBC # BLD: 3.97 M/CU MM (ref 4.2–5.4)
SEGMENTED NEUTROPHILS ABSOLUTE COUNT: 4.9 K/CU MM
SEGMENTED NEUTROPHILS RELATIVE PERCENT: 74.6 % (ref 36–66)
SPECIMEN: ABNORMAL
TOTAL IMMATURE NEUTOROPHIL: 0.02 K/CU MM
TOTAL NUCLEATED RBC: 0 K/CU MM
WBC # BLD: 6.5 K/CU MM (ref 4–10.5)

## 2022-11-02 PROCEDURE — 6360000002 HC RX W HCPCS: Performed by: INTERNAL MEDICINE

## 2022-11-02 PROCEDURE — 1200000000 HC SEMI PRIVATE

## 2022-11-02 PROCEDURE — 36415 COLL VENOUS BLD VENIPUNCTURE: CPT

## 2022-11-02 PROCEDURE — 82270 OCCULT BLOOD FECES: CPT

## 2022-11-02 PROCEDURE — 6370000000 HC RX 637 (ALT 250 FOR IP): Performed by: INTERNAL MEDICINE

## 2022-11-02 PROCEDURE — 94761 N-INVAS EAR/PLS OXIMETRY MLT: CPT

## 2022-11-02 PROCEDURE — 99232 SBSQ HOSP IP/OBS MODERATE 35: CPT | Performed by: SURGERY

## 2022-11-02 PROCEDURE — 85025 COMPLETE CBC W/AUTO DIFF WBC: CPT

## 2022-11-02 PROCEDURE — 97116 GAIT TRAINING THERAPY: CPT

## 2022-11-02 PROCEDURE — 82962 GLUCOSE BLOOD TEST: CPT

## 2022-11-02 PROCEDURE — 97162 PT EVAL MOD COMPLEX 30 MIN: CPT

## 2022-11-02 PROCEDURE — 99232 SBSQ HOSP IP/OBS MODERATE 35: CPT | Performed by: INTERNAL MEDICINE

## 2022-11-02 PROCEDURE — 6370000000 HC RX 637 (ALT 250 FOR IP): Performed by: STUDENT IN AN ORGANIZED HEALTH CARE EDUCATION/TRAINING PROGRAM

## 2022-11-02 PROCEDURE — 97166 OT EVAL MOD COMPLEX 45 MIN: CPT

## 2022-11-02 PROCEDURE — 97535 SELF CARE MNGMENT TRAINING: CPT

## 2022-11-02 PROCEDURE — 2580000003 HC RX 258: Performed by: INTERNAL MEDICINE

## 2022-11-02 PROCEDURE — 86304 IMMUNOASSAY TUMOR CA 125: CPT

## 2022-11-02 RX ORDER — SULFAMETHOXAZOLE AND TRIMETHOPRIM 800; 160 MG/1; MG/1
1 TABLET ORAL 2 TIMES DAILY
Status: DISCONTINUED | OUTPATIENT
Start: 2022-11-02 | End: 2022-11-04 | Stop reason: HOSPADM

## 2022-11-02 RX ADMIN — SPIRONOLACTONE 25 MG: 50 TABLET ORAL at 08:56

## 2022-11-02 RX ADMIN — SODIUM CHLORIDE TAB 1 GM 1 G: 1 TAB at 08:56

## 2022-11-02 RX ADMIN — ESCITALOPRAM OXALATE 10 MG: 10 TABLET ORAL at 08:56

## 2022-11-02 RX ADMIN — ATORVASTATIN CALCIUM 20 MG: 10 TABLET, FILM COATED ORAL at 08:56

## 2022-11-02 RX ADMIN — ROPINIROLE HYDROCHLORIDE 3 MG: 1 TABLET, FILM COATED ORAL at 22:09

## 2022-11-02 RX ADMIN — MONTELUKAST 10 MG: 10 TABLET, FILM COATED ORAL at 22:09

## 2022-11-02 RX ADMIN — SUCRALFATE 1 G: 1 TABLET ORAL at 11:41

## 2022-11-02 RX ADMIN — SUCRALFATE 1 G: 1 TABLET ORAL at 05:33

## 2022-11-02 RX ADMIN — OXYBUTYNIN CHLORIDE 5 MG: 5 TABLET ORAL at 22:09

## 2022-11-02 RX ADMIN — SUCRALFATE 1 G: 1 TABLET ORAL at 17:55

## 2022-11-02 RX ADMIN — SODIUM CHLORIDE, PRESERVATIVE FREE 10 ML: 5 INJECTION INTRAVENOUS at 22:12

## 2022-11-02 RX ADMIN — SUCRALFATE 1 G: 1 TABLET ORAL at 22:09

## 2022-11-02 RX ADMIN — SULFAMETHOXAZOLE AND TRIMETHOPRIM 1 TABLET: 800; 160 TABLET ORAL at 22:09

## 2022-11-02 RX ADMIN — SULFAMETHOXAZOLE AND TRIMETHOPRIM 1 TABLET: 800; 160 TABLET ORAL at 15:06

## 2022-11-02 RX ADMIN — FAMOTIDINE 20 MG: 20 TABLET ORAL at 08:56

## 2022-11-02 RX ADMIN — PIPERACILLIN AND TAZOBACTAM 3375 MG: 3; .375 INJECTION, POWDER, LYOPHILIZED, FOR SOLUTION INTRAVENOUS at 09:05

## 2022-11-02 RX ADMIN — SODIUM CHLORIDE, PRESERVATIVE FREE 10 ML: 5 INJECTION INTRAVENOUS at 08:57

## 2022-11-02 RX ADMIN — GABAPENTIN 100 MG: 100 CAPSULE ORAL at 08:56

## 2022-11-02 RX ADMIN — SODIUM CHLORIDE TAB 1 GM 1 G: 1 TAB at 22:09

## 2022-11-02 RX ADMIN — OXYBUTYNIN CHLORIDE 5 MG: 5 TABLET ORAL at 08:56

## 2022-11-02 RX ADMIN — LEVOTHYROXINE SODIUM 137 MCG: 25 TABLET ORAL at 05:33

## 2022-11-02 RX ADMIN — PIPERACILLIN AND TAZOBACTAM 3375 MG: 3; .375 INJECTION, POWDER, LYOPHILIZED, FOR SOLUTION INTRAVENOUS at 02:31

## 2022-11-02 ASSESSMENT — ENCOUNTER SYMPTOMS
COUGH: 0
WHEEZING: 0
DIARRHEA: 0
ABDOMINAL DISTENTION: 0
SORE THROAT: 0
EYE DISCHARGE: 0
BACK PAIN: 0
CONSTIPATION: 0
SHORTNESS OF BREATH: 0

## 2022-11-02 NOTE — CARE COORDINATION
Spoke with patient  , her  and 2 daughters regarding discharge planning. Patient is from home with her  and is independent. Patient has PCP and insurance to assist with RX coverage. Patient stated that she just completed [facility] with [facility] and does not feel like she needs any more HHC at this time .  Patient plans to return home with her  and denied having any discharge needs

## 2022-11-02 NOTE — PROGRESS NOTES
HEMATOLOGY ONCOLOGY  Progress Note    Dorie Ba is a 80 y.o. female with past medical history significant for CKD stage IIIa, diverticulitis, IDDM 2 with peripheral neuropathy, hypertension, hyperlipidemia, KEVIN, hypothyroidism, vitamin B12 deficiency, stage III adenocarcinoma of the ileum who presents with worsening abdominal pain. She reports several days of abdominal discomfort, constipation and urinary inconvenience. She reported progressive weakness. She took stool softeners and was able to have a bm. Abdominal pain has become more centered in her pelvis. She denies fevers, chills, nausea, vomiting. Review of imaging showed 4.0 x 4.1 x 3.6 cm area in the right uterine fundus abutting the adjacent sigmoid colon which is increased from prior study. Additionally noted to have underlying cystitis. Due to uterine mass, we were called to evaluate. US pelvis done yesterday were reviewed. 11/2/22, still has some discomfort in right side of pelvis with urinary incontinence. Will have MRI pelvis w wo contrast and will request GYN to see her today. Oncology history:  Colonoscopy 5/25/22 with large mass extending from ileocecal valve into the cecum mass origination at ileocecal valve with pathology consistent with invasive moderately differentiated adenocarcinoma. She is s/p       5/27/22 with stage III terminal ileum adenocarinoma (pT3, pN1c)    She had adjuvant chemotherapy with xeloda from 7/28/22 and stpped 9/6/22 due to severe mucositis. She is due to start 5FU/leucovorin.     PHYSICAL EXAM    Vitals: BP (!) 166/63   Pulse 67   Temp 98.4 °F (36.9 °C) (Oral)   Resp 21   Ht 5' (1.524 m)   Wt 144 lb 6.4 oz (65.5 kg)   SpO2 97%   BMI 28.20 kg/m²   CONSTITUTIONAL: awake, alert, cooperative, no apparent distress   EYES: pupils equal, round  sclera clear and conjunctiva normal  ENT: Normocephalic, without obvious abnormality, atraumatic  NECK: supple, symmetrical  HEMATOLOGIC/LYMPHATIC: no cervical, supraclavicular or axillary lymphadenopathy   LUNGS:  VBS, no increased work of breathing and clear to auscultation   CARDIOVASCULAR: regular rate and rhythm, normal S1 and S2, no murmur noted  ABDOMEN: normal bowel sounds x 4, soft, TTP  MUSCULOSKELETAL: full range of motion noted, tone is normal  NEUROLOGIC: awake, alert, oriented to name, place and time. Motor skills grossly intact. SKIN: Normal skin color, texture, turgor and no jaundice. Skin appears intact   EXTREMITIES: no LE edema, no cyanosis, no clubbing,    LABORATORY RESULTS  CBC:   Recent Labs     10/31/22  1438   WBC 9.0   HGB 13.1        BMP:    Recent Labs     10/31/22  1438      K 4.2      CO2 26   BUN 18   CREATININE 1.0   GLUCOSE 106*     Hepatic:   Recent Labs     10/31/22  1438   AST 30   ALT 22   BILITOT 0.3   ALKPHOS 70     INR: No results for input(s): INR in the last 72 hours. ASSESSMENT/RECOMMENDATION    Uterine mass- 4.1 cm, increased in size from last scan in May 2022. Pelvic ultrasound was reviewed and it is still indeterminate. Will have MRI pelvis w wo contrast today. Will also request GYN evaluation. Colon cancer- s/p right hemicolectomy. Plan to initiate treatment with fluorouracil/leucovorin after acute issues are resolved. Acute cystitis- on antimicrobials    We will follow the patient. Thank you.

## 2022-11-02 NOTE — CONSULTS
Department of Gynecology  Consult Note      Reason for Consult:  low abd pain with pelvic mass  Requesting Physician:  Hospitalist    CHIEF COMPLAINT:   low abd discomfort with urinary incontinence     History obtained from patient, electronic medical record    HISTORY OF PRESENT ILLNESS:     The patient is a 80 y.o. 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 She has been menopausal for decades, retains all of Gyn organs, and has had no PMB or GYN complaints. SHe sees a gynecologist in [place], but hasn't been seen is 3+ years and wants to get established here in [facility]. Past Medical History:        Diagnosis Date    Cancer of right colon ([facility] [address] 7/5/2022    Diabetes mellitus ([facility] [address]     H/O cardiac catheterization 05/18/2021    no significant CAD in main vessels, has severe stenosis in small  branch diagonal vessel    H/O cardiovascular stress test 3/22/18,03/30/2017    ECG portion of the stress test is negative for ischemia EF >70% Normal stress myocardial perfusion. H/O cardiovascular stress test 04/07/2021    abnormal stress    H/O Doppler ultrasound (Abdomimal Aorta) 03/29/2017    No evidence of abdominal aortic aneurysm. H/O echocardiogram 07/02/2014    Normal left ventricular size, wall motion and systolic function. Mildle elevated pulmonary artery systolic pressure. Mild MR and TR and trace AR EF 55 to 60%    Hx of echocardiogram 03/29/2017    EF 55-60%. Grade I diastolic dysfunction. Mildly dilated left atrium. No evidence of pericardial effusion.      Hyperlipidemia     Hypertension     Sleep apnea     Thyroid disease      Past Surgical History:        Procedure Laterality Date    BREAST BIOPSY Right     approx age 76, benign    CATARACT REMOVAL      CHOLECYSTECTOMY      COLONOSCOPY N/A 5/25/2022 Grisel Morales MD    0.9 % sodium chloride infusion, , IntraVENous, PRN, Grisel Morales MD, Last Rate: 100 mL/hr at 11/01/22 1753, 100 mL/hr at 11/01/22 1753    ondansetron (ZOFRAN-ODT) disintegrating tablet 4 mg, 4 mg, Oral, Q8H PRN **OR** ondansetron (ZOFRAN) injection 4 mg, 4 mg, IntraVENous, Q6H PRN, Grisel Morales MD    acetaminophen (TYLENOL) tablet 650 mg, 650 mg, Oral, Q6H PRN **OR** acetaminophen (TYLENOL) suppository 650 mg, 650 mg, Rectal, Q6H PRN, Grisel Morales MD    polyethylene glycol (GLYCOLAX) packet 17 g, 17 g, Oral, Daily PRN, Grisel Morales MD    atorvastatin (LIPITOR) tablet 20 mg, 20 mg, Oral, Daily, Grisel Morales MD, 20 mg at 11/02/22 0856    escitalopram (LEXAPRO) tablet 10 mg, 10 mg, Oral, Daily, Grisel Morales MD, 10 mg at 11/02/22 0856    famotidine (PEPCID) tablet 20 mg, 20 mg, Oral, Daily, Grisel Morales MD, 20 mg at 11/02/22 0856    gabapentin (NEURONTIN) capsule 100 mg, 100 mg, Oral, Daily with breakfast, Grisel Morales MD, 100 mg at 11/02/22 0856    melatonin tablet 3 mg, 3 mg, Oral, Nightly PRN, Grisel Morales MD    oxybutynin (DITROPAN) tablet 5 mg, 5 mg, Oral, BID, Grisel Morales MD, 5 mg at 11/02/22 0856    rOPINIRole (REQUIP) tablet 3 mg, 3 mg, Oral, Nightly, Grisel Morales MD, 3 mg at 11/01/22 2011    spironolactone (ALDACTONE) tablet 25 mg, 25 mg, Oral, Daily, Grisel Morales MD, 25 mg at 11/02/22 2667    vitamin B-12 (CYANOCOBALAMIN) tablet 100 mcg, 100 mcg, Oral, Daily, Grisel Morales MD, 100 mcg at 11/01/22 1105    glucose chewable tablet 16 g, 4 tablet, Oral, PRN, Grisel Morales MD    dextrose bolus 10% 125 mL, 125 mL, IntraVENous, PRN **OR** dextrose bolus 10% 250 mL, 250 mL, IntraVENous, PRN, Grisel Morales MD    glucagon (rDNA) injection 1 mg, 1 mg, SubCUTAneous, PRN, Grisel Morales MD    dextrose 10 % infusion, , IntraVENous, Continuous PRN, Grisel Morales MD hydrALAZINE (APRESOLINE) injection 10 mg, 10 mg, IntraVENous, Q6H PRN, Tyrel Cortes MD       Allergies:  Lopid [gemfibrozil], Bystolic [nebivolol hcl], Cefdinir, Coreg [carvedilol], Crestor [rosuvastatin], Fenofibrate, Glucophage [metformin], Hydrochlorothiazide, Metronidazole, Norvasc [amlodipine besylate], Tribenzor [olmesartan-amlodipine-hctz], Zocor [simvastatin], and Ciprofloxacin     Social History:  TOBACCO:  Never used tobacco    Family History:       Problem Relation Age of Onset    Pacemaker Sister     Arrhythmia Sister     Breast Cancer Sister         pre menopausal    Ovarian Cancer Neg Hx         PHYSICAL EXAM:    Vitals:  BP (!) 166/63   Pulse 67   Temp 98.4 °F (36.9 °C) (Oral)   Resp 21   Ht 5' (1.524 m)   Wt 144 lb 6.4 oz (65.5 kg)   SpO2 97%   BMI 28.20 kg/m²     CONSTITUTIONAL:  awake, alert, cooperative, no apparent distress, and appears stated age  BACK:  Symmetric, no curvature, spinous processes are non-tender on palpation, paraspinous muscles are non-tender on palpation, no costal vertebral tenderness  LUNGS:  No increased work of breathing, good air exchange, clear to auscultation bilaterally, no crackles or wheezing  CARDIOVASCULAR:  Normal apical impulse, regular rate and rhythm, normal S1 and S2, no S3 or S4, and no murmur noted  ABDOMEN:  No scars, normal bowel sounds, soft, non-distended, non-tender, no masses palpated, no hepatosplenomegally  Pelvic exam is deferred as she has multiple visitors with her at this time    DATA:  Labs:  CBC:   Lab Results   Component Value Date/Time    WBC 6.5 11/02/2022 10:57 AM    RBC 3.97 11/02/2022 10:57 AM    HGB 12.1 11/02/2022 10:57 AM    HCT 37.9 11/02/2022 10:57 AM    MCV 95.5 11/02/2022 10:57 AM    RDW 16.5 11/02/2022 10:57 AM     11/02/2022 10:57 AM     BMP:    Lab Results   Component Value Date/Time     10/31/2022 02:38 PM    K 4.2 10/31/2022 02:38 PM    K 4.4 03/15/2018 05:28 AM     10/31/2022 02:38 PM    CO2 26 10/31/2022 02:38 PM    BUN 18 10/31/2022 02:38 PM     CMP:    Lab Results   Component Value Date/Time     10/31/2022 02:38 PM    K 4.2 10/31/2022 02:38 PM    K 4.4 03/15/2018 05:28 AM     10/31/2022 02:38 PM    CO2 26 10/31/2022 02:38 PM    BUN 18 10/31/2022 02:38 PM    PROT 7.3 10/31/2022 02:38 PM     Urine culture shows Klebsiella >100,000 CFU- sensitive to Zosyn    EXAMINATION:   PELVIC ULTRASOUND; DOPPLER EVALUATION OF THE PELVIS       11/1/2022       TECHNIQUE:   Transabdominal pelvic ultrasound duplex ultrasound using B-mode/gray scaled   imaging, Doppler spectral analysis and color flow Doppler was obtained. COMPARISON:   10/31/2022, 05/22/2022       HISTORY:   ORDERING SYSTEM PROVIDED HISTORY: further evaluate mass right uterine fundus-   determine if it is centered in uterus or sigmoid colon vs infection   TECHNOLOGIST PROVIDED HISTORY:       Reason for exam:->further evaluate mass right uterine fundus- determine if it   is centered in uterus or sigmoid colon vs infection       FINDINGS:       Measurements:       Uterus: 8.1 x 6.2 x 4.3 cm. Endometrial stripe: 1.4 cm       Right Ovary:4.0 x 2.8 x 2.9 cm       Left Ovary: 3.3 x 3.2 x 3.1 cm           Ultrasound Findings:       Uterus: Hypoechoic mass of the uterine fundus measuring 3.0 cm, poorly   characterized by transabdominal ultrasound. Endometrial stripe: Possibly thickened with internal flow on color Doppler   imaging. Right Ovary: Possible cyst measuring 1.9 cm. There is normal arterial and   venous Doppler flow. Left Ovary:  Left ovary is within normal limits. There is normal arterial and   venous Doppler flow. Free Fluid: No evidence of free fluid. Impression   Hypoechoic mass at the uterine fundus measuring 3 cm, possible right adnexal   cyst measuring 1.9 cm, and possibly thickened endometrium with internal flow   on color Doppler imaging.   This is all poorly characterized by transabdominal   ultrasound. Given the CT findings, a nonemergent contrast-enhanced pelvic   MRI is warranted. The normal Doppler flow within the ovaries. EXAMINATION:   CT OF THE ABDOMEN AND PELVIS WITH CONTRAST 10/31/2022 5:12 pm       TECHNIQUE:   CT of the abdomen and pelvis was performed with the administration of   intravenous contrast. Multiplanar reformatted images are provided for review. Automated exposure control, iterative reconstruction, and/or weight based   adjustment of the mA/kV was utilized to reduce the radiation dose to as low   as reasonably achievable. COMPARISON:   CT abdomen pelvis done May 21, 2022. HISTORY:   ORDERING SYSTEM PROVIDED HISTORY: rlq abdominal pain   TECHNOLOGIST PROVIDED HISTORY:   Reason for exam:->rlq abdominal pain   Additional Contrast?->None   Decision Support Exception - unselect if not a suspected or confirmed   emergency medical condition->Emergency Medical Condition (MA)   Reason for Exam: rlq abdominal pain   Additional signs and symptoms: no   Relevant Medical/Surgical History: 70 ml isovue used       FINDINGS:   Lower Chest: Small right lower lobe clustered nodular opacities are   incompletely imaged. No pericardial or pleural effusion. Coronary artery   calcifications. Mitral annular calcification. Atherosclerosis in the   visualized thoracic aorta. Liver: Normal.       Gallbladder and Bile Ducts: Normal.       Spleen: Normal.       Adrenal Glands: Normal.       Pancreas: Normal.       Genitourinary: Both kidneys are symmetric in size and enhancement. No   urinary stones or hydronephrosis. The urinary bladder demonstrates diffuse   wall thickening and mucosal enhancement. Heterogeneous 4.0 x 4.1 x 3.6 cm   area in the right uterine fundus which abuts the adjacent sigmoid colon is   increased in size from the prior study. Bowel: Postsurgical changes of the bowel. No bowel obstruction.   Mild   colonic diverticulosis without acute diverticulitis. Vasculature: Atherosclerosis. No abdominal aortic aneurysm. Patent portal   vein. Bones and Soft Tissues: Degenerative changes in the visualized spine and   pelvis. Retroperitoneum/Mesentery: No intraperitoneal free air, ascites or fluid   collection. No lymphadenopathy in the abdomen or pelvis. Impression   1. Postsurgical changes of the bowel. No bowel obstruction. 2.  Diffuse urinary bladder wall thickening with mucosal enhancement may   relate to underlying cystitis. 3.  Heterogeneous 4.1 cm masslike area in the right uterine fundus is   increased in size from the prior study and involves the adjacent sigmoid   colon. Given the interval increase in size this is concerning for a   neoplastic process. It is unclear whether this is centered in the uterus or   sigmoid colon. Underlying infection is not excluded. Surgical consultation   and/or follow-up pelvic ultrasound may be helpful for further evaluation. 4.  Small right lower lobe clustered nodular opacities are incompletely   imaged and may relate to bronchiolitis. IMPRESSION/RECOMMENDATIONS:          UTI (urinary tract infection)  Plan: agree with continued antibiotic therapy.  -----------------------------------------------------------------    There are three interesting GYN findings that are noted in the above imaging:   US yesterday shows a possibly thickened EM lining of 1.4cm. Her prior US from 5/23/22 was not able to distinguish and give us an EM thickness. This can be evaluated by hysteroscopy D&C or by endometrial pipelle biopsy in the office if needed. 2. Complex cyst of right ovary  The right ovary appears to have mildly decreased in size or at worst, remained the same in comparison with the prior pelvic US from 5/23/22. On this hospitalization the ovary measures 4cm x 2.8 cm x 2.9 cm and the cyst within it measures 1.9 cm.  The prior measurements from 5/23/22 were 3.9 x 2.5 x 3.3 cm right ovary. The ovary was felt to have a 2.7 cm cyst within it at that time. This does not look concerning to me when the pictures and measures of both US are compared. It actually represents a probable small regression.  is ordered    3. Fundal uterine mass- I think this is most likely an old fibroid. The fundal uterine mass that is read on the above CT scan as 4.1 cm, appears on the transabdominal pelvic US from yesterday to measure 3 cm and so I dont think this represents actual growth either, when compared to the Pelvic US from 5/23/22, where it is measured at 2.7 cm. Will wait for the Pelvic MRI results and make comparison to the prior pelvic MRI that was also done 5/26/22. Will follow with you, but I am not inclined to believe much has changed at all in her pelvis over the intervening 5 months.

## 2022-11-02 NOTE — PROGRESS NOTES
[facility]    PATIENT: Lily Mccain, 80 y.o., female, MRN: [id number]    Hospital Day:  LOS: 2 days     Lily Mccain is a 80 y.o. female with history of a cecal mass/adenocarcinoma and pelvic cyst, now s/p right hemicolectomy 2022, presenting with urinary incontinence and abdominal pain              Subjective:  Chief Complaint: lower abdominal discomfort  Pain: controlled  BM: yes   Diet: ADULT DIET; Regular  Activity: as tolerated    Stable overnight. Possible blood in stool - she takes iron at home, wasn't sure if it was bloody or just dark. Cystitis discomfort is improving.      Objective:    Vitals: BP (!) 166/63   Pulse 67   Temp 98.4 °F (36.9 °C) (Oral)   Resp 21   Ht 5' (1.524 m)   Wt 144 lb 6.4 oz (65.5 kg)   SpO2 97%   BMI 28.20 kg/m²   Vital Signs (Last 24 Hours)  Temp  Av.4 °F (36.9 °C)  Min: 97.8 °F (36.6 °C)  Max: 98.8 °F (37.1 °C)  Pulse  Av.8  Min: 60  Max: 85  BP  Min: 137/50  Max: 166/63  Resp  Avg: 15.8  Min: 13  Max: 21  SpO2  Av.8 %  Min: 96 %  Max: 97 %  Wt Readings from Last 3 Encounters:   22 144 lb 6.4 oz (65.5 kg)   10/06/22 139 lb (63 kg)   22 136 lb (61.7 kg)       I/O: 10/31 1901 -  0700  In: -   Out: 1750 [Urine:1750]    IV Fluids:   sodium chloride Last Rate: 100 mL/hr (22 1753)    dextrose    Scheduled Meds:   montelukast, 10 mg, Oral, Nightly    levothyroxine, 137 mcg, Oral, Daily    sucralfate, 1 g, Oral, 4x Daily AC & HS    sodium chloride, 1 g, Oral, BID    enoxaparin, 30 mg, SubCUTAneous, Daily    sodium chloride flush, 5-40 mL, IntraVENous, 2 times per day    atorvastatin, 20 mg, Oral, Daily    escitalopram, 10 mg, Oral, Daily    famotidine, 20 mg, Oral, Daily    gabapentin, 100 mg, Oral, Daily with breakfast    oxybutynin, 5 mg, Oral, BID    rOPINIRole, 3 mg, Oral, Nightly    spironolactone, 25 mg, Oral, Daily    vitamin B-12, 100 mcg, Oral, Daily piperacillin-tazobactam, 3,375 mg, IntraVENous, Q8H    Physical Exam:  General Appearance:   Alert, cooperative, no distress    Head:   Normocephalic, atraumatic    Lungs:    Equal chest rise, respirations unlabored   Heart:   Regular rate and rhythm    Abdomen:    Soft, mild lower abdominal tenderness, no rebound or guarding   Healed surgical scars   Extremities:  No cyanosis or edema    Neurologic:  Nonfocal, grossly intact        Labs/Imaging Results:   Recent Results (from the past 24 hour(s))   POCT Glucose    Collection Time: 11/01/22  1:08 PM   Result Value Ref Range    POC Glucose 149 (H) 70 - 99 MG/DL   POCT Glucose    Collection Time: 11/01/22  4:03 PM   Result Value Ref Range    POC Glucose 223 (H) 70 - 99 MG/DL   POCT Glucose    Collection Time: 11/01/22  8:03 PM   Result Value Ref Range    POC Glucose 153 (H) 70 - 99 MG/DL   POCT Glucose    Collection Time: 11/02/22  9:28 AM   Result Value Ref Range    POC Glucose 137 (H) 70 - 99 MG/DL         Assessment:  Maikel Whiting is a 80 y.o. female with history of a cecal mass/adenocarcinoma and pelvic cyst, now s/p right hemicolectomy 5/2022, presenting with urinary incontinence and abdominal pain     Principal Problem:    UTI (urinary tract infection)  Resolved Problems:    * No resolved hospital problems. *      Plan:  Discussed findings and options with Maikel Whiting and her family at bedside. Constipation has resolved. Continue bowel regimen as needed. Monitor for possible blood in the stool vs being dark colored from her history of iron supplementation  Pelvic cyst - MRI 5/26/22 showing a cystic lesion in the right adnexa favored to be a complex cyst for which follow up was recommended in 6 months. Cystic lesion has increased in size based on CT 5/2021, agree with GYN referral - she does not have a preference of what group she sees, previously has gone to a GYN in [place], but now wishes to have her care locally.  Per notes, also planning on MRI as inpatient to guide further treatment  Agree with Oncology referral. Appreciate recommendations  No immediate General Surgery plans, but will continue to follow loosely  Thank you for the consultation and the opportunity to care for Eino     Electronically signed: Johnny Henry MD 11/2/2022 11:09 AM

## 2022-11-02 NOTE — PROGRESS NOTES
V2.0  [facility] [place] Hospitalist Progress Note      Name:  Tennille Wright /Age/Sex: 1934  (80 y.o. female)   MRN & CSN:  [id number] & [id number] Encounter Date/Time: 2022 12:55 PM EDT    Location:  [address] [place] [address]-A PCP: Ana Robertsonkiley, [address] [place] [address] Day: 3    Assessment and Plan:   Tennille Wright is a 80 y.o. female with pmh of ileal adenocarcinoma s/p right hemicolectomy (2022), currently on treatment, hypertension, diabetes mellitus type 2, diabetic neuropathy, hypothyroidism, GERD, restless leg syndrome, history of right subclavian DVT, completed anticoagulation treatment  who presents with UTI (urinary tract infection)      Plan:  #. Acute cystitis  -Patient admitted to dysuria, incontinence, frequency on admission but on my interview patient denies any dysuria. Patient has a longstanding history of incontinence and frequency due to her overactive bladder for which she is on oxybutynin.  -Patient afebrile, leukocytosis. -Urine culture sent from ER, growing Klebsiella pneumonia.  -Initially ciprofloxacin was ordered in ER-however patient started itching after antibiotic was started. Ciprofloxacin was discontinued and patient was given a dose of Bactrim. -Patient has tolerated Zosyn in the past.  Continue until cultures are reported. -Urine culture growing Klebsiella pneumonia, pansensitive, will switch antibiotics to Bactrim     #. Abdominal pain  -Diffuse urinary bladder wall thickening.  -Heterogenous 4.1 cm masslike area in the right uterine fundus-increased in size from the prior study and involves the adjacent sigmoid colon. Given the interval increase in size this is concerning for a neoplastic process.   -Consult oncology, pelvic ultrasound confirms presence of right adnexal cyst measuring 1.9 cm but is poorly visualized, patient now recommended MRI for better characterization of the adnexal mass  -consult general surgery, recommended to follow-up outpatient with GYN referral #. Bradycardia  -patient asymptomatic. #.  DVT of the right subclavian vein from PICC line- was on Xarelto. -Patient reports completing the treatment 3 weeks ago     #. Hypertension  -Patient is on metoprolol, spironolactone  -Holding metoprolol due to bradycardia     #. Hyperlipidemia-atorvastatin     #. Diabetes mellitus type 2, on long-term insulin  -Low intensity sliding scale, hypoglycemia protocol.  -Holding Lantus, resume when appropriate. #.  Diabetic neuropathy-on gabapentin     #. Chronic diastolic congestive heart failure  -Patient takes Lasix as needed  -Echo-8/23/2022-EF 47-51%, grade 2 diastolic dysfunction. #.  Hypothyroidism-continue levothyroxine     #. Restless leg syndrome-on ropinirole 3 mg nightly     #. GERD-famotidine, sucralfate     #. Urine incontinence-on oxybutynin. #.  Vitamin B12 deficiency     #. Depression-on escitalopram.     #.  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  -Consult Dr. Ryne Rosa.  -Plan to initiate treatment with fluorouracil/leucovorin after acute issues are resolved. Diet ADULT DIET; Regular   DVT Prophylaxis [] Lovenox, []  Heparin, [] SCDs, [] Ambulation,  [] Eliquis, [] Xarelto  [] Coumadin   Code Status Full Code   Disposition From: Home  Expected Disposition: Home  Estimated Date of Discharge: 11/2  Patient requires continued admission due to evaluation for right adnexal cyst.          Subjective:     Chief Complaint: Abdominal Pain (RLQ abdominal pain an d\"cannot hold urine\")     Patient evaluated at bedside. Denies any active complaints. Tolerating diet. Patient is having BM. Advised to ambulate today. Review of Systems:    Review of Systems   Constitutional:  Positive for activity change. Negative for appetite change, diaphoresis and fatigue. HENT:  Negative for congestion and sore throat. Eyes:  Negative for discharge and visual disturbance. Respiratory:  Negative for cough, shortness of breath and wheezing. Cardiovascular:  Negative for chest pain, palpitations and leg swelling. Gastrointestinal:  Negative for abdominal distention, constipation and diarrhea. Genitourinary:  Positive for frequency. Negative for difficulty urinating, dysuria, flank pain, hematuria, pelvic pain and urgency. Musculoskeletal:  Positive for arthralgias. Negative for back pain and gait problem. Neurological:  Negative for dizziness, syncope and speech difficulty. Hematological:  Negative for adenopathy. Psychiatric/Behavioral:  Negative for agitation and confusion. All other systems reviewed and are negative. Objective: Intake/Output Summary (Last 24 hours) at 11/2/2022 1342  Last data filed at 11/2/2022 3770  Gross per 24 hour   Intake --   Output 1750 ml   Net -1750 ml          Vitals:   Vitals:    11/02/22 0738   BP: (!) 166/63   Pulse: 67   Resp: 21   Temp: 98.4 °F (36.9 °C)   SpO2: 97%       Physical Exam:   Physical Exam  Vitals and nursing note reviewed. Constitutional:       General: She is not in acute distress. Appearance: She is obese. She is not ill-appearing. HENT:      Head: Normocephalic and atraumatic. Nose: Nose normal.      Mouth/Throat:      Mouth: Mucous membranes are moist.   Eyes:      Extraocular Movements: Extraocular movements intact. Pupils: Pupils are equal, round, and reactive to light. Cardiovascular:      Rate and Rhythm: Normal rate and regular rhythm. Pulses: Normal pulses. Heart sounds: Normal heart sounds. Pulmonary:      Effort: Pulmonary effort is normal.      Breath sounds: Normal breath sounds. Abdominal:      Palpations: Abdomen is soft. Musculoskeletal:         General: Normal range of motion. Cervical back: Normal range of motion. Skin:     General: Skin is warm. Capillary Refill: Capillary refill takes less than 2 seconds.    Neurological:      General: No focal deficit present. Mental Status: She is alert and oriented to person, place, and time.    Psychiatric:         Mood and Affect: Mood normal.         Behavior: Behavior normal.          Medications:   Medications:    montelukast  10 mg Oral Nightly    levothyroxine  137 mcg Oral Daily    sucralfate  1 g Oral 4x Daily AC & HS    sodium chloride  1 g Oral BID    enoxaparin  30 mg SubCUTAneous Daily    sodium chloride flush  5-40 mL IntraVENous 2 times per day    atorvastatin  20 mg Oral Daily    escitalopram  10 mg Oral Daily    famotidine  20 mg Oral Daily    gabapentin  100 mg Oral Daily with breakfast    oxybutynin  5 mg Oral BID    rOPINIRole  3 mg Oral Nightly    spironolactone  25 mg Oral Daily    vitamin B-12  100 mcg Oral Daily    piperacillin-tazobactam  3,375 mg IntraVENous Q8H      Infusions:    sodium chloride 100 mL/hr (11/01/22 1713)    dextrose       PRN Meds: sodium chloride flush, 5-40 mL, PRN  sodium chloride, , PRN  ondansetron, 4 mg, Q8H PRN   Or  ondansetron, 4 mg, Q6H PRN  acetaminophen, 650 mg, Q6H PRN   Or  acetaminophen, 650 mg, Q6H PRN  polyethylene glycol, 17 g, Daily PRN  melatonin, 3 mg, Nightly PRN  glucose, 4 tablet, PRN  dextrose bolus, 125 mL, PRN   Or  dextrose bolus, 250 mL, PRN  glucagon (rDNA), 1 mg, PRN  dextrose, , Continuous PRN  hydrALAZINE, 10 mg, Q6H PRN      Labs      Recent Results (from the past 24 hour(s))   POCT Glucose    Collection Time: 11/01/22  4:03 PM   Result Value Ref Range    POC Glucose 223 (H) 70 - 99 MG/DL   POCT Glucose    Collection Time: 11/01/22  8:03 PM   Result Value Ref Range    POC Glucose 153 (H) 70 - 99 MG/DL   POCT Glucose    Collection Time: 11/02/22  9:28 AM   Result Value Ref Range    POC Glucose 137 (H) 70 - 99 MG/DL   CBC with Auto Differential    Collection Time: 11/02/22 10:57 AM   Result Value Ref Range    WBC 6.5 4.0 - 10.5 K/CU MM    RBC 3.97 (L) 4.2 - 5.4 M/CU MM    Hemoglobin 12.1 (L) 12.5 - 16.0 GM/DL Hematocrit 37.9 37 - 47 %    MCV 95.5 78 - 100 FL    MCH 30.5 27 - 31 PG    MCHC 31.9 (L) 32.0 - 36.0 %    RDW 16.5 (H) 11.7 - 14.9 %    Platelets 009 532 - 123 K/CU MM    MPV 10.0 7.5 - 11.1 FL    Differential Type AUTOMATED DIFFERENTIAL     Segs Relative 74.6 (H) 36 - 66 %    Lymphocytes % 14.4 (L) 24 - 44 %    Monocytes % 6.1 (H) 0 - 4 %    Eosinophils % 4.1 (H) 0 - 3 %    Basophils % 0.5 0 - 1 %    Segs Absolute 4.9 K/CU MM    Lymphocytes Absolute 0.9 K/CU MM    Monocytes Absolute 0.4 K/CU MM    Eosinophils Absolute 0.3 K/CU MM    Basophils Absolute 0.0 K/CU MM    Nucleated RBC % 0.0 %    Total Nucleated RBC 0.0 K/CU MM    Total Immature Neutrophil 0.02 K/CU MM    Immature Neutrophil % 0.3 0 - 0.43 %        Imaging/Diagnostics Last 24 Hours   CT ABDOMEN PELVIS W IV CONTRAST Additional Contrast? None    Result Date: 10/31/2022  EXAMINATION: CT OF THE ABDOMEN AND PELVIS WITH CONTRAST 10/31/2022 5:12 pm TECHNIQUE: CT of the abdomen and pelvis was performed with the administration of intravenous contrast. Multiplanar reformatted images are provided for review. Automated exposure control, iterative reconstruction, and/or weight based adjustment of the mA/kV was utilized to reduce the radiation dose to as low as reasonably achievable. COMPARISON: CT abdomen pelvis done May 21, 2022. HISTORY: ORDERING SYSTEM PROVIDED HISTORY: rlq abdominal pain TECHNOLOGIST PROVIDED HISTORY: Reason for exam:->rlq abdominal pain Additional Contrast?->None Decision Support Exception - unselect if not a suspected or confirmed emergency medical condition->Emergency Medical Condition (MA) Reason for Exam: rlq abdominal pain Additional signs and symptoms: no Relevant Medical/Surgical History: 70 ml isovue used FINDINGS: Lower Chest: Small right lower lobe clustered nodular opacities are incompletely imaged. No pericardial or pleural effusion. Coronary artery calcifications. Mitral annular calcification.   Atherosclerosis in the visualized thoracic aorta. Liver: Normal. Gallbladder and Bile Ducts: Normal. Spleen: Normal. Adrenal Glands: Normal. Pancreas: Normal. Genitourinary: Both kidneys are symmetric in size and enhancement. No urinary stones or hydronephrosis. The urinary bladder demonstrates diffuse wall thickening and mucosal enhancement. Heterogeneous 4.0 x 4.1 x 3.6 cm area in the right uterine fundus which abuts the adjacent sigmoid colon is increased in size from the prior study. Bowel: Postsurgical changes of the bowel. No bowel obstruction. Mild colonic diverticulosis without acute diverticulitis. Vasculature: Atherosclerosis. No abdominal aortic aneurysm. Patent portal vein. Bones and Soft Tissues: Degenerative changes in the visualized spine and pelvis. Retroperitoneum/Mesentery: No intraperitoneal free air, ascites or fluid collection. No lymphadenopathy in the abdomen or pelvis. 1.  Postsurgical changes of the bowel. No bowel obstruction. 2.  Diffuse urinary bladder wall thickening with mucosal enhancement may relate to underlying cystitis. 3.  Heterogeneous 4.1 cm masslike area in the right uterine fundus is increased in size from the prior study and involves the adjacent sigmoid colon. Given the interval increase in size this is concerning for a neoplastic process. It is unclear whether this is centered in the uterus or sigmoid colon. Underlying infection is not excluded. Surgical consultation and/or follow-up pelvic ultrasound may be helpful for further evaluation. 4.  Small right lower lobe clustered nodular opacities are incompletely imaged and may relate to bronchiolitis.        Electronically signed by Debra Santana MD on 11/2/2022 at 1:42 PM

## 2022-11-02 NOTE — CONSULTS
[facility], 1934, 1109/1109-A, 11/2/2022    History  [place]:  The primary encounter diagnosis was Lower urinary tract infection. Diagnoses of Uterine mass and Colonic mass were also pertinent to this visit. Patient  has a past medical history of Cancer of right colon ([address] Diabetes mellitus ([address] H/O cardiac catheterization, H/O cardiovascular stress test, H/O cardiovascular stress test, H/O Doppler ultrasound (Abdomimal Aorta), H/O echocardiogram, Hx of echocardiogram, Hyperlipidemia, Hypertension, Sleep apnea, and Thyroid disease. Patient  has a past surgical history that includes Cholecystectomy; Dilation and curettage of uterus; Cataract removal; other surgical history; Breast biopsy (Right); Colonoscopy (N/A, 5/25/2022); and hemicolectomy (N/A, 5/27/2022). Subjective:  Patient states:  \"I think I may need to start using a cane. \"    Pain:  denies pain. Communication with other providers:  Handoff to RN, OT  Restrictions: general precautions, fall risk    Home Setup/Prior level of function       Examination of body systems (includes body structures/functions, activity/participation limitations):  Observation:  Pt supine in bed with family present upon arrival and agreeable to therapy  Vision:  Wears glasses  Hearing:  [facility]  Cardiopulmonary:  no O2 needs  Cognition: WFL, see OT/SLP note for further evaluation. Musculoskeletal  ROM R/L:  WFL. Strength R/L:  4+/5, minimal impairment in function and endurance. Neuro:  WFL      Mobility:  Rolling L/R:  mod I  Supine to sit:  SBA  Transfers: pt completed STS from EOB, to/from commode, and to chair CGA with cues for hand placement  Sitting balance:  good. Standing balance:  fair+. Pt stood at sink ~5 minutes performing dynamic UE activities SBA  Gait: pt ambulated 10' without a device CGA with decreased amisha and slight unsteadiness.  Pt then ambulated an additional 100' with SPC with decreased amisha but improved steadiness  Stairs: pt ascended/descended 3 stairs with HR CGA with safe sequencing    AMPA 6 Clicks Inpatient Mobility:  AM-PAC Inpatient Mobility Raw Score : 19    Safety: patient left in chair with family in room, educated on only getting up with staff (no alarm available), call light within reach, RN notified, gait belt used. Assessment:  Pt is an 80 y.o. female admitted to the hospital for a UTI. Pt is typically independent with all ambulation and transfers without a device. Pt is currently performing bed mobility SBA, transferring CGA, and ambulating 100' with SPC SBA. Pt is presenting with decreased endurance, impaired balance, impaired gait, and decreased strength. Pt would benefit [facility] continued acute care PT [facility] to continue to address impairments. Complexity: moderate    Prognosis: Good, no significant barriers to participation at this time.      General Plan: 2-3 times per week/week     Equipment: Pt will need single point cane upon discharge    Goals:  Short Term Goals  Time Frame for Short Term Goals: 1 week  Short Term Goal 1: Pt to complete all bed mobility mod I  Short Term Goal 2: Pt to complete all STS transfers to/from bed, commode, and chair mod I  Short Term Goal 3: Pt to ambulate 100' with LRAD mod I       Treatment plan:  Bed mobility, transfers, balance, gait, TA, TX    Recommendations for NURSING mobility: amb with gait belt and cane    Time:   Time in: 1008  Time out: 1030  Timed treatment minutes: 11  Total time: 22    Electronically signed by:    Renee Lo PT  11/2/2022, 11:49 AM

## 2022-11-02 NOTE — PROGRESS NOTES
Occupational [address] [place] [address] ACUTE CARE OCCUPATIONAL THERAPY EVALUATION    Sofia Moe, 1934, 1109/1109-A, 11/2/2022    Discharge Recommendation: Home with initial 24 hour supervision/assistance, Home Health OT services (S Level 2)      History:  [place]:  The primary encounter diagnosis was Lower urinary tract infection. Diagnoses of Uterine mass and Colonic mass were also pertinent to this visit. Subjective:  Patient states: \"I did rehab that one time and they made me get in and out of a car! \"   Pain: Pt denied pain this date  Communication with other providers: PT Caity Fierro  Restrictions: General Precautions, Low Fall Risk, IV, Bed/chair alarm    Home Setup/Prior level of function:  Lives With: Spouse  Type of Home: House  Home Layout: One level  Home Access: Stairs to enter with rails  Entrance Stairs - Number of Steps: 2+1  Entrance Stairs - Rails: Both  Bathroom Shower/Tub: Walk-in shower  Bathroom Toilet: Standard  Bathroom Equipment: Built-in shower seat,Grab bars in shower,3-in-1 commode  Bathroom Accessibility: Accessible  Home Equipment: Reacher, Cane, [address] Help From: Family (2 daughters)  ADL Assistance: Independent  Homemaking Assistance: Independent  Homemaking Responsibilities: Yes  Ambulation Assistance: Independent (uses no AD)  Transfer Assistance: Independent  Active : Yes  Mode of Transportation: SUV  Occupation: Retired  Type of Occupation: [employer] (air[employer])  Leisure & Hobbies: Square dancing and crafts    Examination:  Observation: Supine in bed upon arrival. Pleasant and agreeable to evaluation.  and two daughters present and supportive.   Vision: WFL (Glasses)  Hearing: WFL  Vitals: Stable vitals throughout session    Body Systems and functions:  ROM: WFL all joints in BL UEs  Strength: 4/5 MMT all major muscle groups BL UEs  Sensation: WFL (denies numbness/tingling)  Tone: Normal  Coordination: WNL   Perception: WNL    Activities of Daily Living (ADLs):  Feeding: Independent   Grooming: SBA (completed hand hygiene and grooming task of brushing hair in standing at sink; min cues for safe body positioning during task)  UB bathing: SBA   LB bathing: CGA (dynamic standing balance for safety with reaching bottom)  UB dressing: SBA (donning clean robe seated EOB)  LB dressing: CGA (dynamic standing balance for safety with brief mgmt to hips in standing, able to lean forward to thread legs through openings without assist; donned BL socks seated EOB SBA by crossing legs into \"figure 4 position\")  Toileting: CGA (pt had loose bowel movement while seated on regular toilet; CGA for steadying with brief mgmt both directions, able to complete seated barbara care without assist)    Cognitive and Psychosocial Functioning:  Overall cognitive status: WFL  Affect: Normal     Balance:   Sitting: SBA in unsupported sitting EOB and on toilet  Standing: SBA with cane and with ADLs at sink, CGA for safety with dynamic standing tasks    Functional Mobility:  Bed Mobility: SBA supine to sitting EOB (HOB elevated to 30', utilized bed rail)  Transfers: CGA to/from bed, recliner, and toilet (min cues for safe hand placement/use of grab bars each direction)  Ambulation: CGA progressing to SBA with cane to/from bathroom for toileting + 100 ft in hallway (See PT evaluation for gait assessment)      AM-PAC 6 click short form for inpatient daily activity:   How much help from another person does the patient currently need. .. Unable  Dep A Lot  Max A A Lot   Mod A A Little  Min A A Little   CGA  SBA None   Mod I  Indep  Sup   1. Putting on and taking off regular lower body clothing? [] 1    [] 2   [] 2   [] 3   [x] 3   [] 4      2. Bathing (including washing, rinsing, drying)? [] 1   [] 2   [] 2 [] 3 [x] 3 [] 4   3. Toileting, which includes using toilet, bedpan, or urinal? [] 1    [] 2   [] 2   [] 3   [x] 3   [] 4     4.  Putting on and taking off regular upper body clothing? [] 1   [] 2   [] 2   [] 3   [x] 3    [] 4      5. Taking care of personal grooming such as brushing teeth? [] 1   [] 2    [] 2 [] 3    [x] 3   [] 4      6. Eating meals? [] 1   [] 2   [] 2   [] 3   [] 3   [x] 4      Raw Score:  19     [24=0% impaired(CH), 23=1-19%(CI), 20-22=20-39%(CJ), 15-19=40-59%(CK), 10-14=60-79%(CL), 7-9=80-99%(CM), 6=100%(CN)]     Treatment:  Self Care Training:   Cues were given for safety, sequence, UE/LE placement, visual cues, and balance. Activities performed today included UB/LB dressing tasks, toileting, hand hygiene, grooming at sink    Safety Measures: Gait belt used, Left in Chair, Alarm in place    Assessment:  Pt is an 80year old female with a past medical history of Cancer of right colon ([facility] Diabetes mellitus ([facility] [address] H/O cardiac catheterization, H/O cardiovascular stress test, H/O cardiovascular stress test, H/O Doppler ultrasound (Abdominal Aorta), H/O echocardiogram, Hx of echocardiogram, Hyperlipidemia, Hypertension, Sleep apnea, and Thyroid disease. Pt admitted with Rt lower quadrant abdominal pain and diagnosed with a UTI. Pt also with history of uterine and colon masses, has underwent previous Rt hemicolectomy in May, and currently getting ready to start chemotherapy. Pt lives at home with her  and at baseline is independent with ADLs, IADLs, mobility, and driving. Pt performed well this date, and from a self-care and mobility standpoint, is safe to return home at discharge with initial 24 hour supervision and [facility] OT services recommended. Complexity: Moderate  Prognosis: Good  Plan: 2+x/week    Goals:  1. Pt will complete all aspects of bed mobility for EOB/OOB ADLs mod I with HOB flat  2. Pt will complete UB/LB bathing with supervision/setup  3. Pt will complete all aspects of LB dressing with supervision/setup  4. Pt will complete all functional transfers to and from bed, chair, toilet, shower chair mod I with use of grab bars PRN  5. Pt will ambulate HH distance to bathroom for toileting mod I with cane  6. Pt will complete all aspects of toileting task with supervision  7.  Pt will complete oral hygiene/grooming routine in standing at sink with supervision    Time:   Time in: 1005  Time out: 1030  Timed treatment minutes: 10  Total time: 25      Electronically signed by:    GARRETT Wilcox, [address] [id number]

## 2022-11-03 ENCOUNTER — APPOINTMENT (OUTPATIENT)
Dept: MRI IMAGING | Age: 87
DRG: 690 | End: 2022-11-03
Payer: MEDICARE

## 2022-11-03 LAB
ANION GAP SERPL CALCULATED.3IONS-SCNC: 9 MMOL/L (ref 4–16)
BASOPHILS ABSOLUTE: 0 K/CU MM
BASOPHILS RELATIVE PERCENT: 0.5 % (ref 0–1)
BUN BLDV-MCNC: 10 MG/DL (ref 6–23)
CALCIUM SERPL-MCNC: 9.4 MG/DL (ref 8.3–10.6)
CHLORIDE BLD-SCNC: 100 MMOL/L (ref 99–110)
CO2: 26 MMOL/L (ref 21–32)
CREAT SERPL-MCNC: 1 MG/DL (ref 0.6–1.1)
DIFFERENTIAL TYPE: ABNORMAL
EOSINOPHILS ABSOLUTE: 0.3 K/CU MM
EOSINOPHILS RELATIVE PERCENT: 4.2 % (ref 0–3)
GFR SERPL CREATININE-BSD FRML MDRD: 54 ML/MIN/1.73M2
GLUCOSE BLD-MCNC: 103 MG/DL (ref 70–99)
GLUCOSE BLD-MCNC: 106 MG/DL (ref 70–99)
GLUCOSE BLD-MCNC: 130 MG/DL (ref 70–99)
GLUCOSE BLD-MCNC: 144 MG/DL (ref 70–99)
GLUCOSE BLD-MCNC: 147 MG/DL (ref 70–99)
HCT VFR BLD CALC: 39.7 % (ref 37–47)
HEMOCCULT SP1 STL QL: POSITIVE
HEMOGLOBIN: 12.3 GM/DL (ref 12.5–16)
IMMATURE NEUTROPHIL %: 0.4 % (ref 0–0.43)
LYMPHOCYTES ABSOLUTE: 1.3 K/CU MM
LYMPHOCYTES RELATIVE PERCENT: 16.1 % (ref 24–44)
MCH RBC QN AUTO: 29.6 PG (ref 27–31)
MCHC RBC AUTO-ENTMCNC: 31 % (ref 32–36)
MCV RBC AUTO: 95.7 FL (ref 78–100)
MONOCYTES ABSOLUTE: 0.5 K/CU MM
MONOCYTES RELATIVE PERCENT: 6.5 % (ref 0–4)
NUCLEATED RBC %: 0 %
PDW BLD-RTO: 16.3 % (ref 11.7–14.9)
PLATELET # BLD: 160 K/CU MM (ref 140–440)
PMV BLD AUTO: 9.9 FL (ref 7.5–11.1)
POTASSIUM SERPL-SCNC: 4 MMOL/L (ref 3.5–5.1)
RBC # BLD: 4.15 M/CU MM (ref 4.2–5.4)
SEGMENTED NEUTROPHILS ABSOLUTE COUNT: 5.7 K/CU MM
SEGMENTED NEUTROPHILS RELATIVE PERCENT: 72.3 % (ref 36–66)
SODIUM BLD-SCNC: 135 MMOL/L (ref 135–145)
TOTAL IMMATURE NEUTOROPHIL: 0.03 K/CU MM
TOTAL NUCLEATED RBC: 0 K/CU MM
WBC # BLD: 7.9 K/CU MM (ref 4–10.5)

## 2022-11-03 PROCEDURE — 1200000000 HC SEMI PRIVATE

## 2022-11-03 PROCEDURE — 6360000004 HC RX CONTRAST MEDICATION: Performed by: INTERNAL MEDICINE

## 2022-11-03 PROCEDURE — 85025 COMPLETE CBC W/AUTO DIFF WBC: CPT

## 2022-11-03 PROCEDURE — 6370000000 HC RX 637 (ALT 250 FOR IP): Performed by: STUDENT IN AN ORGANIZED HEALTH CARE EDUCATION/TRAINING PROGRAM

## 2022-11-03 PROCEDURE — 82962 GLUCOSE BLOOD TEST: CPT

## 2022-11-03 PROCEDURE — 72197 MRI PELVIS W/O & W/DYE: CPT

## 2022-11-03 PROCEDURE — 6370000000 HC RX 637 (ALT 250 FOR IP): Performed by: INTERNAL MEDICINE

## 2022-11-03 PROCEDURE — A9577 INJ MULTIHANCE: HCPCS | Performed by: INTERNAL MEDICINE

## 2022-11-03 PROCEDURE — 97530 THERAPEUTIC ACTIVITIES: CPT

## 2022-11-03 PROCEDURE — 36415 COLL VENOUS BLD VENIPUNCTURE: CPT

## 2022-11-03 PROCEDURE — 80048 BASIC METABOLIC PNL TOTAL CA: CPT

## 2022-11-03 PROCEDURE — 94761 N-INVAS EAR/PLS OXIMETRY MLT: CPT

## 2022-11-03 PROCEDURE — 99232 SBSQ HOSP IP/OBS MODERATE 35: CPT | Performed by: INTERNAL MEDICINE

## 2022-11-03 RX ORDER — METOPROLOL TARTRATE 50 MG/1
50 TABLET, FILM COATED ORAL 2 TIMES DAILY
Status: DISCONTINUED | OUTPATIENT
Start: 2022-11-03 | End: 2022-11-04 | Stop reason: HOSPADM

## 2022-11-03 RX ADMIN — ESCITALOPRAM OXALATE 10 MG: 10 TABLET ORAL at 08:25

## 2022-11-03 RX ADMIN — SODIUM CHLORIDE TAB 1 GM 1 G: 1 TAB at 08:26

## 2022-11-03 RX ADMIN — GABAPENTIN 100 MG: 100 CAPSULE ORAL at 08:25

## 2022-11-03 RX ADMIN — SULFAMETHOXAZOLE AND TRIMETHOPRIM 1 TABLET: 800; 160 TABLET ORAL at 08:27

## 2022-11-03 RX ADMIN — OXYBUTYNIN CHLORIDE 5 MG: 5 TABLET ORAL at 21:55

## 2022-11-03 RX ADMIN — ATORVASTATIN CALCIUM 20 MG: 10 TABLET, FILM COATED ORAL at 08:25

## 2022-11-03 RX ADMIN — SUCRALFATE 1 G: 1 TABLET ORAL at 05:39

## 2022-11-03 RX ADMIN — OXYBUTYNIN CHLORIDE 5 MG: 5 TABLET ORAL at 08:25

## 2022-11-03 RX ADMIN — VITAM B12 100 MCG: 100 TAB at 08:26

## 2022-11-03 RX ADMIN — METOPROLOL TARTRATE 50 MG: 50 TABLET, FILM COATED ORAL at 21:55

## 2022-11-03 RX ADMIN — MONTELUKAST 10 MG: 10 TABLET, FILM COATED ORAL at 21:55

## 2022-11-03 RX ADMIN — SPIRONOLACTONE 25 MG: 50 TABLET ORAL at 08:25

## 2022-11-03 RX ADMIN — ROPINIROLE HYDROCHLORIDE 3 MG: 1 TABLET, FILM COATED ORAL at 21:54

## 2022-11-03 RX ADMIN — SUCRALFATE 1 G: 1 TABLET ORAL at 21:55

## 2022-11-03 RX ADMIN — LEVOTHYROXINE SODIUM 137 MCG: 25 TABLET ORAL at 05:39

## 2022-11-03 RX ADMIN — SUCRALFATE 1 G: 1 TABLET ORAL at 11:26

## 2022-11-03 RX ADMIN — METOPROLOL TARTRATE 50 MG: 50 TABLET, FILM COATED ORAL at 08:25

## 2022-11-03 RX ADMIN — FAMOTIDINE 20 MG: 20 TABLET ORAL at 08:25

## 2022-11-03 RX ADMIN — SULFAMETHOXAZOLE AND TRIMETHOPRIM 1 TABLET: 800; 160 TABLET ORAL at 21:55

## 2022-11-03 RX ADMIN — GADOBENATE DIMEGLUMINE 7 ML: 529 INJECTION, SOLUTION INTRAVENOUS at 13:10

## 2022-11-03 RX ADMIN — SUCRALFATE 1 G: 1 TABLET ORAL at 17:26

## 2022-11-03 ASSESSMENT — ENCOUNTER SYMPTOMS
BACK PAIN: 0
SORE THROAT: 0
SHORTNESS OF BREATH: 0
EYE DISCHARGE: 0
ABDOMINAL DISTENTION: 0
COUGH: 0
WHEEZING: 0
CONSTIPATION: 0
DIARRHEA: 0

## 2022-11-03 NOTE — PROGRESS NOTES
V2.0  [facility] Hospitalist Progress Note      Name:  Carol Miguel /Age/Sex: 1934  (80 y.o. female)   MRN & CSN:  [id number] & [id number] Encounter Date/Time: 11/3/2022 12:55 PM EDT    Location:  [facility]9/1109-A PCP: Kim Aj DO       Hospital Day: 4    Assessment and Plan:   Carol Miguel is a 80 y.o. female with pmh of ileal adenocarcinoma s/p right hemicolectomy (2022), currently on treatment, hypertension, diabetes mellitus type 2, diabetic neuropathy, hypothyroidism, GERD, restless leg syndrome, history of right subclavian DVT, completed anticoagulation treatment  who presents with UTI (urinary tract infection)      Plan:  #. Acute cystitis  -Patient admitted to dysuria, incontinence, frequency on admission but on my interview patient denies any dysuria. Patient has a longstanding history of incontinence and frequency due to her overactive bladder for which she is on oxybutynin.  -Patient afebrile, no leukocytosis. -Urine culture sent from ER, growing Klebsiella pneumonia.  -Initially ciprofloxacin was ordered in ER-however patient started itching after antibiotic was started. Ciprofloxacin was discontinued and patient was given   -Urine culture growing Klebsiella pneumonia, pansensitive, will switch antibiotics to Bactrim     #. Uterine mass  -Diffuse urinary bladder wall thickening.  -Heterogenous 4.1 cm masslike area in the right uterine fundus-increased in size from the prior study and involves the adjacent sigmoid colon. Given the interval increase in size this is concerning for a neoplastic process.   -Consult oncology, pelvic ultrasound confirms presence of right adnexal cyst measuring 1.9 cm but is poorly visualized, patient now recommended MRI for better characterization of the adnexal mass  -consult general surgery, recommended to follow-up outpatient with GYN referral  -GYN saw the patient, no indication for any acute intervention, f/up MRI     #.  Bradycardia  -patient asymptomatic. #.  DVT of the right subclavian vein from PICC line- was on Xarelto. -Patient reports completing the treatment 3 weeks before admission     #. Hypertension  -Patient is on metoprolol, spironolactone  -resume home HTN meds     #. Hyperlipidemia-atorvastatin     #. Diabetes mellitus type 2, on long-term insulin  -Low intensity sliding scale, hypoglycemia protocol.  -Holding Lantus, resume when appropriate. #.  Diabetic neuropathy-on gabapentin     #. Chronic diastolic congestive heart failure  -Patient takes Lasix as needed  -Echo-8/23/2022-EF 44-58%, grade 2 diastolic dysfunction. #.  Hypothyroidism-continue levothyroxine     #. Restless leg syndrome-on ropinirole 3 mg nightly     #. GERD-famotidine, sucralfate     #. Urine incontinence-on oxybutynin. #.  Vitamin B12 deficiency     #. Depression-on escitalopram.     #.  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  -Consult Dr. Oneal Mcguire.  -Plan to initiate treatment with fluorouracil/leucovorin after acute issues are resolved. Diet ADULT DIET; Regular   DVT Prophylaxis [x] Lovenox, []  Heparin, [] SCDs, [] Ambulation,  [] Eliquis, [] Xarelto  [] Coumadin   Code Status Full Code   Disposition From: Home  Expected Disposition: Home  Estimated Date of Discharge: 11/3  Patient requires continued admission due to evaluation for right adnexal cyst.         Subjective:     Chief Complaint: Abdominal Pain (RLQ abdominal pain an d\"cannot hold urine\")     Patient evaluated at bedside. Denies any active complaints. Tolerating diet. Patient is having BM. Advised to ambulate today. Review of Systems:    Review of Systems   Constitutional:  Positive for activity change. Negative for appetite change, diaphoresis and fatigue. HENT:  Negative for congestion and sore throat. Eyes:  Negative for discharge and visual disturbance.    Respiratory:  Negative for cough, shortness of breath and wheezing. Cardiovascular:  Negative for chest pain, palpitations and leg swelling. Gastrointestinal:  Negative for abdominal distention, constipation and diarrhea. Genitourinary:  Positive for frequency. Negative for difficulty urinating, dysuria, flank pain, hematuria, pelvic pain and urgency. Musculoskeletal:  Positive for arthralgias. Negative for back pain and gait problem. Neurological:  Negative for dizziness, syncope and speech difficulty. Hematological:  Negative for adenopathy. Psychiatric/Behavioral:  Negative for agitation and confusion. All other systems reviewed and are negative. Objective:   No intake or output data in the 24 hours ending 11/03/22 0743       Vitals:   Vitals:    11/03/22 0728   BP: (!) 162/71   Pulse: 70   Resp: 16   Temp: 98.1 °F (36.7 °C)   SpO2: 98%       Physical Exam:   Physical Exam  Vitals and nursing note reviewed. Constitutional:       General: She is not in acute distress. Appearance: She is obese. She is not ill-appearing. HENT:      Head: Normocephalic and atraumatic. Nose: Nose normal.      Mouth/Throat:      Mouth: Mucous membranes are moist.   Eyes:      Extraocular Movements: Extraocular movements intact. Pupils: Pupils are equal, round, and reactive to light. Cardiovascular:      Rate and Rhythm: Normal rate and regular rhythm. Pulses: Normal pulses. Heart sounds: Normal heart sounds. Pulmonary:      Effort: Pulmonary effort is normal.      Breath sounds: Normal breath sounds. Abdominal:      Palpations: Abdomen is soft. Musculoskeletal:         General: Normal range of motion. Cervical back: Normal range of motion. Skin:     General: Skin is warm. Capillary Refill: Capillary refill takes less than 2 seconds. Neurological:      General: No focal deficit present. Mental Status: She is alert and oriented to person, place, and time.    Psychiatric:         Mood and Affect: Mood normal.         Behavior: Behavior normal.          Medications:   Medications:    sulfamethoxazole-trimethoprim  1 tablet Oral BID    montelukast  10 mg Oral Nightly    levothyroxine  137 mcg Oral Daily    sucralfate  1 g Oral 4x Daily AC & HS    sodium chloride  1 g Oral BID    enoxaparin  30 mg SubCUTAneous Daily    sodium chloride flush  5-40 mL IntraVENous 2 times per day    atorvastatin  20 mg Oral Daily    escitalopram  10 mg Oral Daily    famotidine  20 mg Oral Daily    gabapentin  100 mg Oral Daily with breakfast    oxybutynin  5 mg Oral BID    rOPINIRole  3 mg Oral Nightly    spironolactone  25 mg Oral Daily    vitamin B-12  100 mcg Oral Daily      Infusions:    sodium chloride 100 mL/hr (11/01/22 7483)    dextrose       PRN Meds: sodium chloride flush, 5-40 mL, PRN  sodium chloride, , PRN  ondansetron, 4 mg, Q8H PRN   Or  ondansetron, 4 mg, Q6H PRN  acetaminophen, 650 mg, Q6H PRN   Or  acetaminophen, 650 mg, Q6H PRN  polyethylene glycol, 17 g, Daily PRN  melatonin, 3 mg, Nightly PRN  glucose, 4 tablet, PRN  dextrose bolus, 125 mL, PRN   Or  dextrose bolus, 250 mL, PRN  glucagon (rDNA), 1 mg, PRN  dextrose, , Continuous PRN  hydrALAZINE, 10 mg, Q6H PRN      Labs      Recent Results (from the past 24 hour(s))   POCT Glucose    Collection Time: 11/02/22  9:28 AM   Result Value Ref Range    POC Glucose 137 (H) 70 - 99 MG/DL   CBC with Auto Differential    Collection Time: 11/02/22 10:57 AM   Result Value Ref Range    WBC 6.5 4.0 - 10.5 K/CU MM    RBC 3.97 (L) 4.2 - 5.4 M/CU MM    Hemoglobin 12.1 (L) 12.5 - 16.0 GM/DL    Hematocrit 37.9 37 - 47 %    MCV 95.5 78 - 100 FL    MCH 30.5 27 - 31 PG    MCHC 31.9 (L) 32.0 - 36.0 %    RDW 16.5 (H) 11.7 - 14.9 %    Platelets 433 597 - 798 K/CU MM    MPV 10.0 7.5 - 11.1 FL    Differential Type AUTOMATED DIFFERENTIAL     Segs Relative 74.6 (H) 36 - 66 %    Lymphocytes % 14.4 (L) 24 - 44 %    Monocytes % 6.1 (H) 0 - 4 %    Eosinophils % 4.1 (H) 0 - 3 %    Basophils % 0.5 0 - 1 %    Segs Absolute 4.9 K/CU MM    Lymphocytes Absolute 0.9 K/CU MM    Monocytes Absolute 0.4 K/CU MM    Eosinophils Absolute 0.3 K/CU MM    Basophils Absolute 0.0 K/CU MM    Nucleated RBC % 0.0 %    Total Nucleated RBC 0.0 K/CU MM    Total Immature Neutrophil 0.02 K/CU MM    Immature Neutrophil % 0.3 0 - 0.43 %   POCT Glucose    Collection Time: 11/02/22  9:05 PM   Result Value Ref Range    POC Glucose 131 (H) 70 - 99 MG/DL   POCT Glucose    Collection Time: 11/03/22  6:55 AM   Result Value Ref Range    POC Glucose 103 (H) 70 - 99 MG/DL        Imaging/Diagnostics Last 24 Hours   CT ABDOMEN PELVIS W IV CONTRAST Additional Contrast? None    Result Date: 10/31/2022  EXAMINATION: CT OF THE ABDOMEN AND PELVIS WITH CONTRAST 10/31/2022 5:12 pm TECHNIQUE: CT of the abdomen and pelvis was performed with the administration of intravenous contrast. Multiplanar reformatted images are provided for review. Automated exposure control, iterative reconstruction, and/or weight based adjustment of the mA/kV was utilized to reduce the radiation dose to as low as reasonably achievable. COMPARISON: CT abdomen pelvis done May 21, 2022. HISTORY: ORDERING SYSTEM PROVIDED HISTORY: rlq abdominal pain TECHNOLOGIST PROVIDED HISTORY: Reason for exam:->rlq abdominal pain Additional Contrast?->None Decision Support Exception - unselect if not a suspected or confirmed emergency medical condition->Emergency Medical Condition (MA) Reason for Exam: rlq abdominal pain Additional signs and symptoms: no Relevant Medical/Surgical History: 70 ml isovue used FINDINGS: Lower Chest: Small right lower lobe clustered nodular opacities are incompletely imaged. No pericardial or pleural effusion. Coronary artery calcifications. Mitral annular calcification. Atherosclerosis in the visualized thoracic aorta.  Liver: Normal. Gallbladder and Bile Ducts: Normal. Spleen: Normal. Adrenal Glands: Normal. Pancreas: Normal. Genitourinary: Both kidneys are symmetric in size and enhancement. No urinary stones or hydronephrosis. The urinary bladder demonstrates diffuse wall thickening and mucosal enhancement. Heterogeneous 4.0 x 4.1 x 3.6 cm area in the right uterine fundus which abuts the adjacent sigmoid colon is increased in size from the prior study. Bowel: Postsurgical changes of the bowel. No bowel obstruction. Mild colonic diverticulosis without acute diverticulitis. Vasculature: Atherosclerosis. No abdominal aortic aneurysm. Patent portal vein. Bones and Soft Tissues: Degenerative changes in the visualized spine and pelvis. Retroperitoneum/Mesentery: No intraperitoneal free air, ascites or fluid collection. No lymphadenopathy in the abdomen or pelvis. 1.  Postsurgical changes of the bowel. No bowel obstruction. 2.  Diffuse urinary bladder wall thickening with mucosal enhancement may relate to underlying cystitis. 3.  Heterogeneous 4.1 cm masslike area in the right uterine fundus is increased in size from the prior study and involves the adjacent sigmoid colon. Given the interval increase in size this is concerning for a neoplastic process. It is unclear whether this is centered in the uterus or sigmoid colon. Underlying infection is not excluded. Surgical consultation and/or follow-up pelvic ultrasound may be helpful for further evaluation. 4.  Small right lower lobe clustered nodular opacities are incompletely imaged and may relate to bronchiolitis.        Electronically signed by Nura Soriano MD on 11/3/2022 at 7:43 AM

## 2022-11-03 NOTE — PROGRESS NOTES
HEMATOLOGY ONCOLOGY  Progress Note    Shira Boggs is a 80 y.o. female with past medical history significant for CKD stage IIIa, diverticulitis, IDDM 2 with peripheral neuropathy, hypertension, hyperlipidemia, KEVIN, hypothyroidism, vitamin B12 deficiency, stage III adenocarcinoma of the ileum who presents with worsening abdominal pain. She reports several days of abdominal discomfort, constipation and urinary inconvenience. She reported progressive weakness. She took stool softeners and was able to have a bm. Abdominal pain has become more centered in her pelvis. She denies fevers, chills, nausea, vomiting. Review of imaging showed 4.0 x 4.1 x 3.6 cm area in the right uterine fundus abutting the adjacent sigmoid colon which is increased from prior study. Additionally noted to have underlying cystitis. Due to uterine mass, we were called to evaluate. US pelvis done yesterday were reviewed. 11/2/22, still has some discomfort in right side of pelvis with urinary incontinence. Will have MRI pelvis w wo contrast and will request GYN to see her today. 11/3/2022:  Pt continues to have some discomfort however improving. She walked on the floor and is tolerating oral intake. MRI has not been performed. Agreeable to plan to hopefully starting chemotherapy early next week. Oncology history:  Colonoscopy 5/25/22 with large mass extending from ileocecal valve into the cecum mass origination at ileocecal valve with pathology consistent with invasive moderately differentiated adenocarcinoma. She is s/p 5/27/22 with stage III terminal ileum adenocarinoma (pT3, pN1c)    She had adjuvant chemotherapy with xeloda from 7/28/22 and stpped 9/6/22 due to severe mucositis. She is due to start 5FU/leucovorin.     PHYSICAL EXAM    Vitals: BP (!) 162/71   Pulse 70   Temp 98.1 °F (36.7 °C) (Oral)   Resp 16   Ht 5' (1.524 m)   Wt 144 lb 6.4 oz (65.5 kg)   SpO2 98%   BMI 28.20 kg/m²     CONSTITUTIONAL: awake, alert, cooperative, no apparent distress   EYES: EOM grossly intact, no conjunctival pallor, no scleral icterus  ENT: Normocephalic, without obvious abnormality, atraumatic  NECK: supple, symmetrical, no jugular venous distension  HEMATOLOGIC/LYMPHATIC: no cervical, supraclavicular or axillary lymphadenopathy   LUNGS: CTA bilaterally, no wheezes/rhonchi/rales, unlabored on RA   CARDIOVASCULAR: regular rate and rhythm, normal S1 and S2, no murmur noted  ABDOMEN: +mild TTP in lower abdomen, no peritoneal signs non-distended, NABS  MUSCULOSKELETAL: full range of motion noted, tone is normal  NEUROLOGIC: awake, alert, oriented to name, place and time. Motor skills grossly intact. SKIN: warm and dry, no jaundice, no bruising or petechiae. EXTREMITIES: no peripheral edema, no clubbing or cyanosis      LABORATORY RESULTS  CBC:   Recent Labs     10/31/22  1438 11/02/22  1057   WBC 9.0 6.5   HGB 13.1 12.1*    152       BMP:    Recent Labs     10/31/22  1438      K 4.2      CO2 26   BUN 18   CREATININE 1.0   GLUCOSE 106*       Hepatic:   Recent Labs     10/31/22  1438   AST 30   ALT 22   BILITOT 0.3   ALKPHOS 70       INR: No results for input(s): INR in the last 72 hours. ASSESSMENT/RECOMMENDATION    Uterine mass- 4.1 cm, increased in size from last scan in May 2022. Pelvic ultrasound was reviewed and it is still indeterminate. MRI pelvis w/wo ordered and pending. Appreciate Gyn evaluation: feel R ovarian lesion is stable to improving cysts. Fundal uterine mass is ?old fibroid however still indeterminate. Growth appreciated on CT could be related to difference in imaging modality, pelvis US closer to stable measurement. Will await MRI for further recommendation. Colon cancer- s/p right hemicolectomy. Plan to initiate treatment with fluorouracil/leucovorin after acute issues are resolved. Message sent to staff to tentatively plan for next week.     Acute cystitis- on antimicrobials    Pt was seen and discussed with Dr. Naz Serrano PA-C    Portions of this note are copied forward from previous clinic note. Interval history, ROS, physical exam, assessment and plan has been reviewed and updated for accuracy by this provider. I have independently evaluated and examined this patient today. I have reviewed radiologic and biochemical tests on this patient. Management Plan is developed mutually with Issac Baker NP.  I have reviewed above note and agree with assessment and plan

## 2022-11-03 NOTE — PROGRESS NOTES
Occupational Therapy      Occupational Therapy Treatment Note    Name: Heron Hanley MRN: [id number] :   1934   Date:  11/3/2022   Admission Date: 10/31/2022 Room:  [address] [place] [address]-A     Primary Problem: UTI, History of uterine and colon masses    Restrictions/Precautions: General Precautions, Fall Risk, Telemetry, Bed/chair alarm    Communication with other providers: RN    Subjective:  Patient states: \"It'd be good to get up for a few minutes! \"   Pain: Pt reported 3/10 pain in lower abdomen at rest    Objective:    Observation: Pt received sitting in chair upon OT arrival. Pleasant and agreeable to treatment.  and daughter present and supportive. Objective Measures: Stable HR throughout session, A & O x 4    Treatment, including education:  Therapeutic Activity Training:   Therapeutic activity training was instructed today. Cues were given for safety, sequence, UE/LE placement, awareness, and balance. Pt received sitting in chair upon OT arrival. Pt re-educated on role of OT, POC, and importance of OOB activity. Pt donned BL socks seated at chair level SBA by crossing legs into \"figure 4 position. \" Pt completed sit to stand transfer from chair CGA with min cues for pushing through arms. Pt requesting to ambulate, and ambulated ~150 ft in hallway CGA with cane. Pt with mostly steady gait, but had one small episode of LOB requiring min A for postural correction when pt was attempting to show therapist a bruise on her arm. Therapist educated pt to avoid multitasking when ambulating for safety. Pt returned to room and declined any further ADLs this date. Pt transferred stand to sit to recliner CGA with cues for reaching back with arms. Pt left positioned for comfort in recliner with all lines intact, all needs within reach, and chair alarm on.     Assessment / Impression:    Patient's tolerance of treatment: Well  Adverse Reaction: None  Significant change in status and impact: Similar presentation as during initial evaluation  Barriers to improvement: None noted    Plan for Next Session:    Continue per OT POC. Continue to recommend home with 24 hour support and  OT services at discharge.     Time in: 1340  Time out: 1350  Timed treatment minutes: 10  Total treatment time: 10    Electronically signed by:    NOHELIA Blanchard/L, [address] [place] [address], .[id number]

## 2022-11-04 VITALS
RESPIRATION RATE: 16 BRPM | HEIGHT: 60 IN | DIASTOLIC BLOOD PRESSURE: 54 MMHG | BODY MASS INDEX: 28.35 KG/M2 | HEART RATE: 56 BPM | SYSTOLIC BLOOD PRESSURE: 117 MMHG | WEIGHT: 144.4 LBS | TEMPERATURE: 98.2 F | OXYGEN SATURATION: 97 %

## 2022-11-04 LAB
CA 125: 49 U/ML
GLUCOSE BLD-MCNC: 83 MG/DL (ref 70–99)
GLUCOSE BLD-MCNC: 94 MG/DL (ref 70–99)

## 2022-11-04 PROCEDURE — 6360000002 HC RX W HCPCS: Performed by: STUDENT IN AN ORGANIZED HEALTH CARE EDUCATION/TRAINING PROGRAM

## 2022-11-04 PROCEDURE — 99232 SBSQ HOSP IP/OBS MODERATE 35: CPT | Performed by: INTERNAL MEDICINE

## 2022-11-04 PROCEDURE — 94761 N-INVAS EAR/PLS OXIMETRY MLT: CPT

## 2022-11-04 PROCEDURE — 6370000000 HC RX 637 (ALT 250 FOR IP): Performed by: INTERNAL MEDICINE

## 2022-11-04 PROCEDURE — 2580000003 HC RX 258: Performed by: INTERNAL MEDICINE

## 2022-11-04 PROCEDURE — 6370000000 HC RX 637 (ALT 250 FOR IP): Performed by: STUDENT IN AN ORGANIZED HEALTH CARE EDUCATION/TRAINING PROGRAM

## 2022-11-04 PROCEDURE — 82962 GLUCOSE BLOOD TEST: CPT

## 2022-11-04 RX ORDER — SULFAMETHOXAZOLE AND TRIMETHOPRIM 800; 160 MG/1; MG/1
1 TABLET ORAL 2 TIMES DAILY
Qty: 4 TABLET | Refills: 0 | Status: SHIPPED | OUTPATIENT
Start: 2022-11-04 | End: 2022-11-06

## 2022-11-04 RX ORDER — SODIUM PHOSPHATE, DIBASIC AND SODIUM PHOSPHATE, MONOBASIC 7; 19 G/133ML; G/133ML
1 ENEMA RECTAL
Status: COMPLETED | OUTPATIENT
Start: 2022-11-04 | End: 2022-11-04

## 2022-11-04 RX ORDER — POLYETHYLENE GLYCOL 3350 17 G/17G
17 POWDER, FOR SOLUTION ORAL DAILY PRN
Qty: 527 G | Refills: 1 | Status: SHIPPED | OUTPATIENT
Start: 2022-11-04 | End: 2022-12-04

## 2022-11-04 RX ADMIN — SODIUM CHLORIDE TAB 1 GM 1 G: 1 TAB at 03:49

## 2022-11-04 RX ADMIN — SODIUM PHOSPHATE, DIBASIC AND SODIUM PHOSPHATE, MONOBASIC 1 ENEMA: 7; 19 ENEMA RECTAL at 13:03

## 2022-11-04 RX ADMIN — SUCRALFATE 1 G: 1 TABLET ORAL at 13:01

## 2022-11-04 RX ADMIN — METOPROLOL TARTRATE 50 MG: 50 TABLET, FILM COATED ORAL at 09:40

## 2022-11-04 RX ADMIN — SODIUM CHLORIDE, PRESERVATIVE FREE 10 ML: 5 INJECTION INTRAVENOUS at 09:46

## 2022-11-04 RX ADMIN — OXYBUTYNIN CHLORIDE 5 MG: 5 TABLET ORAL at 09:40

## 2022-11-04 RX ADMIN — VITAM B12 100 MCG: 100 TAB at 09:42

## 2022-11-04 RX ADMIN — SODIUM CHLORIDE TAB 1 GM 1 G: 1 TAB at 09:41

## 2022-11-04 RX ADMIN — SUCRALFATE 1 G: 1 TABLET ORAL at 06:27

## 2022-11-04 RX ADMIN — ACETAMINOPHEN 650 MG: 325 TABLET ORAL at 09:38

## 2022-11-04 RX ADMIN — SPIRONOLACTONE 25 MG: 50 TABLET ORAL at 09:39

## 2022-11-04 RX ADMIN — ATORVASTATIN CALCIUM 20 MG: 10 TABLET, FILM COATED ORAL at 09:40

## 2022-11-04 RX ADMIN — ENOXAPARIN SODIUM 30 MG: 100 INJECTION SUBCUTANEOUS at 09:39

## 2022-11-04 RX ADMIN — LEVOTHYROXINE SODIUM 137 MCG: 25 TABLET ORAL at 06:27

## 2022-11-04 RX ADMIN — SULFAMETHOXAZOLE AND TRIMETHOPRIM 1 TABLET: 800; 160 TABLET ORAL at 09:39

## 2022-11-04 RX ADMIN — ESCITALOPRAM OXALATE 10 MG: 10 TABLET ORAL at 09:41

## 2022-11-04 RX ADMIN — FAMOTIDINE 20 MG: 20 TABLET ORAL at 09:41

## 2022-11-04 RX ADMIN — GABAPENTIN 100 MG: 100 CAPSULE ORAL at 09:41

## 2022-11-04 ASSESSMENT — PAIN DESCRIPTION - LOCATION: LOCATION: HEAD

## 2022-11-04 ASSESSMENT — PAIN DESCRIPTION - DESCRIPTORS: DESCRIPTORS: ACHING;DISCOMFORT

## 2022-11-04 ASSESSMENT — PAIN SCALES - GENERAL: PAINLEVEL_OUTOF10: 5

## 2022-11-04 NOTE — DISCHARGE SUMMARY
measuring 1.9 cm but is poorly visualized, patient now recommended MRI for better characterization of the adnexal mass  -consult general surgery, recommended to follow-up outpatient with GYN referral  -GYN saw the patient, no indication for any acute intervention, f/up MRI     #.  Bradycardia  -patient asymptomatic. #.  DVT of the right subclavian vein from PICC line- was on Xarelto. -Patient reports completing the treatment 3 weeks before admission     #. Hypertension  -Patient is on metoprolol, spironolactone  -resume home HTN meds     #. Hyperlipidemia-atorvastatin     #. Diabetes mellitus type 2, on long-term insulin  -Low intensity sliding scale, hypoglycemia protocol.  -Holding Lantus, resume when appropriate. #.  Diabetic neuropathy-on gabapentin     #. Chronic diastolic congestive heart failure  -Patient takes Lasix as needed  -Echo-8/23/2022-EF 26-85%, grade 2 diastolic dysfunction. #.  Hypothyroidism-continue levothyroxine     #. Restless leg syndrome-on ropinirole 3 mg nightly     #. GERD-famotidine, sucralfate     #. Urine incontinence-on oxybutynin. #.  Vitamin B12 deficiency     #. Depression-on escitalopram.     #.  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  -Consult Dr. Carlos Alberto Hernandez.  -Plan to initiate treatment with fluorouracil/leucovorin after acute issues are resolved. The patient expressed appropriate understanding of, and agreement with the discharge recommendations, medications, and plan.      Consults this admission:  IP CONSULT TO HOSPITALIST  IP CONSULT TO ONCOLOGY  IP CONSULT TO GENERAL SURGERY  IP CONSULT TO OB GYN  IP CONSULT TO IV TEAM    Discharge Diagnosis:   UTI (urinary tract infection)        Discharge Instruction:   Follow up appointments: Oncology  Primary care physician: Carin Aj DO within 2 weeks  Diet: cardiac diet   Activity: activity as tolerated  Disposition: Discharged to: [x]Home, [][facility], []SNF, []Acute Rehab, []Hospice   Condition on discharge: Stable  Labs and Tests to be Followed up as an outpatient by PCP or Specialist:     Discharge Medications:        Medication List        START taking these medications      polyethylene glycol 17 g packet  Commonly known as: GLYCOLAX  Take 17 g by mouth daily as needed for Constipation     sulfamethoxazole-trimethoprim 800-160 MG per tablet  Commonly known as: BACTRIM DS;SEPTRA DS  Take 1 tablet by mouth in the morning and at bedtime for 2 days            CONTINUE taking these medications      atorvastatin 20 MG tablet  Commonly known as: LIPITOR     escitalopram 10 MG tablet  Commonly known as: LEXAPRO  Take 1 tablet by mouth daily     furosemide 20 MG tablet  Commonly known as: LASIX  Take 1 tablet by mouth as needed (for weight gain of 3 lbs in a day or  5 in a week)     gabapentin 100 MG capsule  Commonly known as: NEURONTIN     Lantus SoloStar 100 UNIT/ML injection pen  Generic drug: insulin glargine     levothyroxine 137 MCG tablet  Commonly known as: SYNTHROID     Magic Mouthwash  Commonly known as: Miracle Mouthwash  Equal parts of Benadryl, Maalox, 2% Viscous Xylocaine and Dexamethasone. Take 5 mL 4 times daily. melatonin 3 MG Tabs tablet     metoprolol tartrate 50 MG tablet  Commonly known as: LOPRESSOR  Take 1 tablet by mouth in the morning and 1 tablet before bedtime. 3 pills in the AM : 150 mg dose  2 pills in the PM: 100 mg dose.      montelukast 10 MG tablet  Commonly known as: SINGULAIR     oxybutynin 5 MG tablet  Commonly known as: DITROPAN     PEN NEEDLES 31GX5/16\" 31G X 8 MM Misc     potassium chloride 20 MEQ extended release tablet  Commonly known as: KLOR-CON M  Take 1 tablet by mouth daily as needed (with Lasix)     rOPINIRole 3 MG tablet  Commonly known as: REQUIP     sodium chloride 1 g tablet     spironolactone 25 MG tablet  Commonly known as: ALDACTONE     sucralfate 1 GM tablet  Commonly known as: CARAFATE  Take 1 tablet by mouth 4 times daily (before meals and nightly)     vitamin B-12 100 MCG tablet  Commonly known as: CYANOCOBALAMIN     VITAMIN D PO            STOP taking these medications      capecitabine 500 MG chemo tablet  Commonly known as: Xeloda     famotidine 20 MG tablet  Commonly known as: PEPCID     ondansetron 8 MG tablet  Commonly known as: Zofran     rivaroxaban 15 MG Tabs tablet  Commonly known as: XARELTO     rivaroxaban 20 MG Tabs tablet  Commonly known as: Xarelto               Where to Get Your Medications        These medications were sent to [facility] [id number]      Phone: [phone]   polyethylene glycol 17 g packet  sulfamethoxazole-trimethoprim 800-160 MG per tablet        Objective Findings at Discharge:   BP (!) 144/54   Pulse 61   Temp 97.9 °F (36.6 °C) (Oral)   Resp 18   Ht 5' (1.524 m)   Wt 144 lb 6.4 oz (65.5 kg)   SpO2 95%   BMI 28.20 kg/m²       Physical Exam:   Physical Exam  Vitals and nursing note reviewed. Constitutional:       General: She is not in acute distress. Appearance: Normal appearance. She is not ill-appearing. HENT:      Head: Normocephalic and atraumatic. Nose: Nose normal.      Mouth/Throat:      Mouth: Mucous membranes are moist.   Eyes:      Extraocular Movements: Extraocular movements intact. Pupils: Pupils are equal, round, and reactive to light. Cardiovascular:      Rate and Rhythm: Normal rate and regular rhythm. Pulses: Normal pulses. Heart sounds: Normal heart sounds. Pulmonary:      Effort: Pulmonary effort is normal.      Breath sounds: Normal breath sounds. Abdominal:      Palpations: Abdomen is soft. Musculoskeletal:         General: Normal range of motion. Cervical back: Normal range of motion. Skin:     General: Skin is warm. Capillary Refill: Capillary refill takes less than 2 seconds. Neurological:      General: No focal deficit present. Mental Status: She is alert and oriented to person, place, and time. Psychiatric:         Mood and Affect: Mood normal.         Behavior: Behavior normal.            Labs and Imaging   US PELVIS COMPLETE    Result Date: 11/1/2022  EXAMINATION: PELVIC ULTRASOUND; DOPPLER EVALUATION OF THE PELVIS 11/1/2022 TECHNIQUE: Transabdominal pelvic ultrasound duplex ultrasound using B-mode/gray scaled imaging, Doppler spectral analysis and color flow Doppler was obtained. COMPARISON: 10/31/2022, 05/22/2022 HISTORY: ORDERING SYSTEM PROVIDED HISTORY: further evaluate mass right uterine fundus- determine if it is centered in uterus or sigmoid colon vs infection TECHNOLOGIST PROVIDED HISTORY: Reason for exam:->further evaluate mass right uterine fundus- determine if it is centered in uterus or sigmoid colon vs infection FINDINGS: Measurements: Uterus: 8.1 x 6.2 x 4.3 cm. Endometrial stripe: 1.4 cm Right Ovary:4.0 x 2.8 x 2.9 cm Left Ovary: 3.3 x 3.2 x 3.1 cm Ultrasound Findings: Uterus: Hypoechoic mass of the uterine fundus measuring 3.0 cm, poorly characterized by transabdominal ultrasound. Endometrial stripe: Possibly thickened with internal flow on color Doppler imaging. Right Ovary: Possible cyst measuring 1.9 cm. There is normal arterial and venous Doppler flow. Left Ovary:  Left ovary is within normal limits. There is normal arterial and venous Doppler flow. Free Fluid: No evidence of free fluid. Hypoechoic mass at the uterine fundus measuring 3 cm, possible right adnexal cyst measuring 1.9 cm, and possibly thickened endometrium with internal flow on color Doppler imaging. This is all poorly characterized by transabdominal ultrasound. Given the CT findings, a nonemergent contrast-enhanced pelvic MRI is warranted. The normal Doppler flow within the ovaries.      CT ABDOMEN PELVIS W IV CONTRAST Additional Contrast? None    Result Date: 10/31/2022  EXAMINATION: CT OF THE ABDOMEN AND PELVIS WITH CONTRAST 10/31/2022 5:12 pm TECHNIQUE: CT of the abdomen and pelvis was performed with the administration of intravenous contrast. Multiplanar reformatted images are provided for review. Automated exposure control, iterative reconstruction, and/or weight based adjustment of the mA/kV was utilized to reduce the radiation dose to as low as reasonably achievable. COMPARISON: CT abdomen pelvis done May 21, 2022. HISTORY: ORDERING SYSTEM PROVIDED HISTORY: rlq abdominal pain TECHNOLOGIST PROVIDED HISTORY: Reason for exam:->rlq abdominal pain Additional Contrast?->None Decision Support Exception - unselect if not a suspected or confirmed emergency medical condition->Emergency Medical Condition (MA) Reason for Exam: rlq abdominal pain Additional signs and symptoms: no Relevant Medical/Surgical History: 70 ml isovue used FINDINGS: Lower Chest: Small right lower lobe clustered nodular opacities are incompletely imaged. No pericardial or pleural effusion. Coronary artery calcifications. Mitral annular calcification. Atherosclerosis in the visualized thoracic aorta. Liver: Normal. Gallbladder and Bile Ducts: Normal. Spleen: Normal. Adrenal Glands: Normal. Pancreas: Normal. Genitourinary: Both kidneys are symmetric in size and enhancement. No urinary stones or hydronephrosis. The urinary bladder demonstrates diffuse wall thickening and mucosal enhancement. Heterogeneous 4.0 x 4.1 x 3.6 cm area in the right uterine fundus which abuts the adjacent sigmoid colon is increased in size from the prior study. Bowel: Postsurgical changes of the bowel. No bowel obstruction. Mild colonic diverticulosis without acute diverticulitis. Vasculature: Atherosclerosis. No abdominal aortic aneurysm. Patent portal vein. Bones and Soft Tissues: Degenerative changes in the visualized spine and pelvis. Retroperitoneum/Mesentery: No intraperitoneal free air, ascites or fluid collection.  No lymphadenopathy in the abdomen or pelvis. 1.  Postsurgical changes of the bowel. No bowel obstruction. 2.  Diffuse urinary bladder wall thickening with mucosal enhancement may relate to underlying cystitis. 3.  Heterogeneous 4.1 cm masslike area in the right uterine fundus is increased in size from the prior study and involves the adjacent sigmoid colon. Given the interval increase in size this is concerning for a neoplastic process. It is unclear whether this is centered in the uterus or sigmoid colon. Underlying infection is not excluded. Surgical consultation and/or follow-up pelvic ultrasound may be helpful for further evaluation. 4.  Small right lower lobe clustered nodular opacities are incompletely imaged and may relate to bronchiolitis. US DUP ABD PEL RETRO SCROT COMPLETE    Result Date: 11/1/2022  EXAMINATION: PELVIC ULTRASOUND; DOPPLER EVALUATION OF THE PELVIS 11/1/2022 TECHNIQUE: Transabdominal pelvic ultrasound duplex ultrasound using B-mode/gray scaled imaging, Doppler spectral analysis and color flow Doppler was obtained. COMPARISON: 10/31/2022, 05/22/2022 HISTORY: ORDERING SYSTEM PROVIDED HISTORY: further evaluate mass right uterine fundus- determine if it is centered in uterus or sigmoid colon vs infection TECHNOLOGIST PROVIDED HISTORY: Reason for exam:->further evaluate mass right uterine fundus- determine if it is centered in uterus or sigmoid colon vs infection FINDINGS: Measurements: Uterus: 8.1 x 6.2 x 4.3 cm. Endometrial stripe: 1.4 cm Right Ovary:4.0 x 2.8 x 2.9 cm Left Ovary: 3.3 x 3.2 x 3.1 cm Ultrasound Findings: Uterus: Hypoechoic mass of the uterine fundus measuring 3.0 cm, poorly characterized by transabdominal ultrasound. Endometrial stripe: Possibly thickened with internal flow on color Doppler imaging. Right Ovary: Possible cyst measuring 1.9 cm. There is normal arterial and venous Doppler flow. Left Ovary:  Left ovary is within normal limits.  There is normal arterial and venous Doppler flow. Free Fluid: No evidence of free fluid. Hypoechoic mass at the uterine fundus measuring 3 cm, possible right adnexal cyst measuring 1.9 cm, and possibly thickened endometrium with internal flow on color Doppler imaging. This is all poorly characterized by transabdominal ultrasound. Given the CT findings, a nonemergent contrast-enhanced pelvic MRI is warranted. The normal Doppler flow within the ovaries. CBC:   Recent Labs     11/02/22  1057 11/03/22  0928   WBC 6.5 7.9   HGB 12.1* 12.3*    160     BMP:    Recent Labs     11/03/22  0928      K 4.0      CO2 26   BUN 10   CREATININE 1.0   GLUCOSE 130*     Hepatic: No results for input(s): AST, ALT, ALB, BILITOT, ALKPHOS in the last 72 hours. Lipids:   Lab Results   Component Value Date/Time    CHOL 138 05/02/2019 08:00 AM    HDL 54 05/02/2019 08:00 AM    TRIG 161 06/01/2022 04:34 AM     Hemoglobin A1C:   Lab Results   Component Value Date/Time    LABA1C 5.9 05/22/2022 09:08 AM     TSH: No results found for: TSH  Troponin:   Lab Results   Component Value Date/Time    TROPONINT <0.010 03/14/2018 09:30 PM    TROPONINT <0.010 03/14/2018 06:00 PM    TROPONINT <0.010 03/14/2018 01:45 PM     Lactic Acid: No results for input(s): LACTA in the last 72 hours. BNP: No results for input(s): PROBNP in the last 72 hours.   UA:  Lab Results   Component Value Date/Time    NITRU POSITIVE 10/31/2022 03:31 PM    COLORU YELLOW 10/31/2022 03:31 PM    [address] 10/31/2022 03:31 PM    RBCUA 151 10/31/2022 03:31 PM    MUCUS RARE 06/11/2022 10:40 PM    TRICHOMONAS NONE SEEN 10/31/2022 03:31 PM    BACTERIA MANY 10/31/2022 03:31 PM    CLARITYU TURBID 10/31/2022 03:31 PM    SPECGRAV 1.020 10/31/2022 03:31 PM    LEUKOCYTESUR LARGE 10/31/2022 03:31 PM    UROBILINOGEN 0.2 10/31/2022 03:31 PM    BILIRUBINUR NEGATIVE 10/31/2022 03:31 PM    BLOODU SMALL 10/31/2022 03:31 PM    KETUA TRACE 10/31/2022 03:31 PM     Urine Cultures: No results found for: Garrett Mendoza Cultures: No results found for: BC  No results found for: BLOODCULT2  Organism:   Lab Results   Component Value Date/Time    ORG ENC 03/05/2015 11:00 AM    ORG ECOL 03/05/2015 11:00 AM       Time Spent Discharging patient 35 minutes    Electronically signed by Merlin Catalina, MD on 11/4/2022 at 1:07 PM

## 2022-11-04 NOTE — PROGRESS NOTES
HEMATOLOGY ONCOLOGY  Progress Note    Ovi Reddy is a 80 y.o. female with past medical history significant for CKD stage IIIa, diverticulitis, IDDM 2 with peripheral neuropathy, hypertension, hyperlipidemia, KEVIN, hypothyroidism, vitamin B12 deficiency, stage III adenocarcinoma of the ileum who presents with worsening abdominal pain. She reports several days of abdominal discomfort, constipation and urinary inconvenience. She reported progressive weakness. She took stool softeners and was able to have a bm. Abdominal pain has become more centered in her pelvis. She denies fevers, chills, nausea, vomiting. Review of imaging showed 4.0 x 4.1 x 3.6 cm area in the right uterine fundus abutting the adjacent sigmoid colon which is increased from prior study. Additionally noted to have underlying cystitis. Due to uterine mass, we were called to evaluate. US pelvis done yesterday were reviewed. 11/2/22, still has some discomfort in right side of pelvis with urinary incontinence. Will have MRI pelvis w wo contrast and will request GYN to see her today. 11/3/2022:  Pt continues to have some discomfort however improving. She walked on the floor and is tolerating oral intake. MRI has not been performed. Agreeable to plan to hopefully starting chemotherapy early next week. 11/4/2022: Pt feels improved, still with urinary incontinence. Lower abdominal cramping, having daily BM, tolerating oral diet. Discussed MRI results with pt. Plan for chemo new start date 11/9. Oncology history:  Colonoscopy 5/25/22 with large mass extending from ileocecal valve into the cecum mass origination at ileocecal valve with pathology consistent with invasive moderately differentiated adenocarcinoma. She is s/p 5/27/22 with stage III terminal ileum adenocarinoma (pT3, pN1c)    She had adjuvant chemotherapy with xeloda from 7/28/22 and stpped 9/6/22 due to severe mucositis.  She is due to start 5FU/leucovorin. PHYSICAL EXAM    Vitals: BP (!) 144/54   Pulse 61   Temp 97.9 °F (36.6 °C) (Oral)   Resp 18   Ht 5' (1.524 m)   Wt 144 lb 6.4 oz (65.5 kg)   SpO2 95%   BMI 28.20 kg/m²     CONSTITUTIONAL: awake, alert, cooperative, no apparent distress   EYES: EOM grossly intact, no conjunctival pallor, no scleral icterus  ENT: Normocephalic, without obvious abnormality, atraumatic  NECK: supple, symmetrical, no jugular venous distension  HEMATOLOGIC/LYMPHATIC: no cervical, supraclavicular or axillary lymphadenopathy   LUNGS: CTA bilaterally, no wheezes/rhonchi/rales, unlabored on RA   CARDIOVASCULAR: regular rate and rhythm, normal S1 and S2, no murmur noted  ABDOMEN: +mild TTP in lower abdomen, no peritoneal signs non-distended, NABS  MUSCULOSKELETAL: full range of motion noted, tone is normal  NEUROLOGIC: awake, alert, oriented to name, place and time. Motor skills grossly intact. SKIN: warm and dry, no jaundice, no bruising or petechiae. EXTREMITIES: no peripheral edema, no clubbing or cyanosis      LABORATORY RESULTS  CBC:   Recent Labs     11/02/22  1057 11/03/22  0928   WBC 6.5 7.9   HGB 12.1* 12.3*    160       BMP:    Recent Labs     11/03/22  0928      K 4.0      CO2 26   BUN 10   CREATININE 1.0   GLUCOSE 130*       Hepatic:   No results for input(s): AST, ALT, ALB, BILITOT, ALKPHOS in the last 72 hours. INR: No results for input(s): INR in the last 72 hours. MRI PELVIS W WO CONTRAST   Preliminary Result   Previously seen right adnexal lesion has resolved. No evidence of adnexal   mass. No uterine mass or acute abnormality of the uterus. Redundant sigmoid colon filled with stool extending into the pelvis and right   adnexa. This limits evaluation of the adnexa.          US DUP ABD PEL RETRO SCROT COMPLETE   Final Result   Hypoechoic mass at the uterine fundus measuring 3 cm, possible right adnexal   cyst measuring 1.9 cm, and possibly thickened endometrium with internal flow   on color Doppler imaging. This is all poorly characterized by transabdominal   ultrasound. Given the CT findings, a nonemergent contrast-enhanced pelvic   MRI is warranted. The normal Doppler flow within the ovaries. US PELVIS COMPLETE   Final Result   Hypoechoic mass at the uterine fundus measuring 3 cm, possible right adnexal   cyst measuring 1.9 cm, and possibly thickened endometrium with internal flow   on color Doppler imaging. This is all poorly characterized by transabdominal   ultrasound. Given the CT findings, a nonemergent contrast-enhanced pelvic   MRI is warranted. The normal Doppler flow within the ovaries. CT ABDOMEN PELVIS W IV CONTRAST Additional Contrast? None   Final Result   1. Postsurgical changes of the bowel. No bowel obstruction. 2.  Diffuse urinary bladder wall thickening with mucosal enhancement may   relate to underlying cystitis. 3.  Heterogeneous 4.1 cm masslike area in the right uterine fundus is   increased in size from the prior study and involves the adjacent sigmoid   colon. Given the interval increase in size this is concerning for a   neoplastic process. It is unclear whether this is centered in the uterus or   sigmoid colon. Underlying infection is not excluded. Surgical consultation   and/or follow-up pelvic ultrasound may be helpful for further evaluation. 4.  Small right lower lobe clustered nodular opacities are incompletely   imaged and may relate to bronchiolitis. ASSESSMENT/RECOMMENDATION    Uterine mass- 4.1 cm, increased in size from last scan in May 2022. Pelvic ultrasound was reviewed and it is still indeterminate. MRI pelvis w/wo ordered and pending. Appreciate Gyn evaluation: feel R ovarian lesion is stable to improving cysts. Fundal uterine mass is ?old fibroid however still indeterminate.   Growth appreciated on CT could be related to difference in imaging modality, pelvis US closer to stable measurement. MRI pelvis w/wo with resolved adenexal lesion, no uterine abnormality, redundant sigmoid colon in R adenexa does limit evaluation. Colon cancer- s/p right hemicolectomy. Plan to initiate treatment with fluorouracil/leucovorin after acute issues are resolved. New start date 11/9/2022. Acute cystitis- on antimicrobials    Pt was seen and discussed with Dr. Jacob Anderson, [facility]    Portions of this note are copied forward from previous clinic note. Interval history, ROS, physical exam, assessment and plan has been reviewed and updated for accuracy by this provider. I have independently evaluated and examined this patient today. I have reviewed radiologic and biochemical tests on this patient. Management Plan is developed mutually with Oz Marsh NP.  I have reviewed above note and agree with assessment and plan

## 2022-11-04 NOTE — PROGRESS NOTES
Feeling better. She has occasional crampy right LQ pain that continues. Would like to go home. Afebrile, with some HTN  Abd is soft. Mild discomfort in the RLQ. No rebound or guarding. The  was mildly elevated at 45, but with no visible ovarian mass on MRI and essentially unchanged findings on prior US, I think this is likely a result of whatever process is causing the crampy RLQ pain, as  is quite non-specific for peritoneal irritation. Pelvic CT, MRI, Abdominal US and  are all reviewed with patient and family. I am comfortable she does not have an active GYN problem at this time. Of benefit, the MRI was able to show us that the lining of her uterus was 2 mm, so she wont need endometrial sampling. I am happy to have her f/u with me in the office should she develop new GYN issues.

## 2022-11-07 ENCOUNTER — CLINICAL DOCUMENTATION (OUTPATIENT)
Dept: ONCOLOGY | Age: 87
End: 2022-11-07

## 2022-11-07 ENCOUNTER — TELEPHONE (OUTPATIENT)
Dept: ONCOLOGY | Age: 87
End: 2022-11-07

## 2022-11-07 NOTE — TELEPHONE ENCOUNTER
Patient to start tx on Wednesday 11/09/2022. Called patient at [phone] to notify that she can get influenza vaccine at PCP office tomorrow 11/08/2022 per CNP instruction. Patient voices understanding. No further needs identified at this time.

## 2022-11-07 NOTE — PROGRESS NOTES
Patient scheduled for education tomorrow 11/08/2022 @ 1100. First tx scheduled for 11/09/2022 @ 0900. Preparing calendar for patient.

## 2022-11-08 ENCOUNTER — HOSPITAL ENCOUNTER (OUTPATIENT)
Dept: INFUSION THERAPY | Age: 87
Discharge: HOME | End: 2022-11-08
Payer: MEDICARE

## 2022-11-08 ENCOUNTER — NURSE ONLY (OUTPATIENT)
Dept: ONCOLOGY | Age: 87
End: 2022-11-08
Payer: MEDICARE

## 2022-11-08 VITALS
HEART RATE: 75 BPM | WEIGHT: 138.4 LBS | BODY MASS INDEX: 27.17 KG/M2 | HEIGHT: 60 IN | TEMPERATURE: 97.6 F | DIASTOLIC BLOOD PRESSURE: 53 MMHG | SYSTOLIC BLOOD PRESSURE: 109 MMHG | OXYGEN SATURATION: 97 %

## 2022-11-08 DIAGNOSIS — C18.2 MALIGNANT NEOPLASM OF ASCENDING COLON (HCC): ICD-10-CM

## 2022-11-08 DIAGNOSIS — C17.2 ADENOCARCINOMA OF ILEUM (HCC): ICD-10-CM

## 2022-11-08 DIAGNOSIS — C18.2 MALIGNANT NEOPLASM OF ASCENDING COLON (HCC): Primary | ICD-10-CM

## 2022-11-08 LAB
ALBUMIN SERPL-MCNC: 3.8 GM/DL (ref 3.4–5)
ALP BLD-CCNC: 62 IU/L (ref 40–129)
ALT SERPL-CCNC: 36 U/L (ref 10–40)
ANION GAP SERPL CALCULATED.3IONS-SCNC: 8 MMOL/L (ref 4–16)
AST SERPL-CCNC: 39 IU/L (ref 15–37)
BASOPHILS ABSOLUTE: 0 K/CU MM
BASOPHILS RELATIVE PERCENT: 0.3 % (ref 0–1)
BILIRUB SERPL-MCNC: 0.3 MG/DL (ref 0–1)
BUN BLDV-MCNC: 24 MG/DL (ref 6–23)
CALCIUM SERPL-MCNC: 9.4 MG/DL (ref 8.3–10.6)
CHLORIDE BLD-SCNC: 100 MMOL/L (ref 99–110)
CO2: 25 MMOL/L (ref 21–32)
CREAT SERPL-MCNC: 1.2 MG/DL (ref 0.6–1.1)
DIFFERENTIAL TYPE: ABNORMAL
EOSINOPHILS ABSOLUTE: 0.6 K/CU MM
EOSINOPHILS RELATIVE PERCENT: 6.3 % (ref 0–3)
GFR SERPL CREATININE-BSD FRML MDRD: 44 ML/MIN/1.73M2
GLUCOSE BLD-MCNC: 82 MG/DL (ref 70–99)
HCT VFR BLD CALC: 39.2 % (ref 37–47)
HEMOGLOBIN: 12.4 GM/DL (ref 12.5–16)
LYMPHOCYTES ABSOLUTE: 2.1 K/CU MM
LYMPHOCYTES RELATIVE PERCENT: 21.3 % (ref 24–44)
MCH RBC QN AUTO: 30 PG (ref 27–31)
MCHC RBC AUTO-ENTMCNC: 31.6 % (ref 32–36)
MCV RBC AUTO: 94.7 FL (ref 78–100)
MONOCYTES ABSOLUTE: 0.7 K/CU MM
MONOCYTES RELATIVE PERCENT: 7.1 % (ref 0–4)
PDW BLD-RTO: 16 % (ref 11.7–14.9)
PLATELET # BLD: 211 K/CU MM (ref 140–440)
PMV BLD AUTO: 10 FL (ref 7.5–11.1)
POTASSIUM SERPL-SCNC: 5.4 MMOL/L (ref 3.5–5.1)
RBC # BLD: 4.14 M/CU MM (ref 4.2–5.4)
SEGMENTED NEUTROPHILS ABSOLUTE COUNT: 6.3 K/CU MM
SEGMENTED NEUTROPHILS RELATIVE PERCENT: 65 % (ref 36–66)
SODIUM BLD-SCNC: 133 MMOL/L (ref 135–145)
TOTAL PROTEIN: 6.4 GM/DL (ref 6.4–8.2)
WBC # BLD: 9.6 K/CU MM (ref 4–10.5)

## 2022-11-08 PROCEDURE — 36415 COLL VENOUS BLD VENIPUNCTURE: CPT

## 2022-11-08 PROCEDURE — 85025 COMPLETE CBC W/AUTO DIFF WBC: CPT

## 2022-11-08 PROCEDURE — 99211 OFF/OP EST MAY X REQ PHY/QHP: CPT | Performed by: INTERNAL MEDICINE

## 2022-11-08 PROCEDURE — 99213 OFFICE O/P EST LOW 20 MIN: CPT

## 2022-11-08 PROCEDURE — 80053 COMPREHEN METABOLIC PANEL: CPT

## 2022-11-08 RX ORDER — ONDANSETRON HYDROCHLORIDE 8 MG/1
8 TABLET, FILM COATED ORAL EVERY 8 HOURS PRN
Qty: 90 TABLET | Refills: 3 | Status: SHIPPED | OUTPATIENT
Start: 2022-11-08

## 2022-11-08 NOTE — PROGRESS NOTES
Patient arrived with  and 2 daughters for chemotherapy education. Discussed treatment plan, potential side effects, prevention and symptom management. Reviewed in detail chemotherapy education folder and drug monograph. Patient's regimen will be Fluorouracil + Leucovorin (D1,8,15,22,29,36) 56-Day Cycle x7 Cycles. Chemotherapy consent signed by patient and will be scanned into patient's chart. Copy given to patient. RX for zofran 8 mg PO Q8 Hours PRN e-scribed to [facility] in [facility]. Patient voices understanding on how and when to take this medication. November calendar provided with tx regimen, appointment times and written instructions. Contact information to [facility] and this RN as well as on-call phone number for after office hours/weekends provided to patient. CBC and CMP will be drawn today 11/08/2022. Confirmed first appointment at 0900 on 11/09/2022. Encouraged patient to ask questions and allowed patient to express feelings during visit. All needs addressed. Patient denies any further questions or concerns at this time.

## 2022-11-09 ENCOUNTER — HOSPITAL ENCOUNTER (OUTPATIENT)
Dept: INFUSION THERAPY | Age: 87
Discharge: HOME | End: 2022-11-09
Payer: MEDICARE

## 2022-11-09 VITALS
TEMPERATURE: 97 F | BODY MASS INDEX: 27.09 KG/M2 | HEIGHT: 60 IN | WEIGHT: 138 LBS | OXYGEN SATURATION: 94 % | DIASTOLIC BLOOD PRESSURE: 57 MMHG | HEART RATE: 57 BPM | SYSTOLIC BLOOD PRESSURE: 107 MMHG

## 2022-11-09 DIAGNOSIS — C17.2 ADENOCARCINOMA OF ILEUM (HCC): Primary | ICD-10-CM

## 2022-11-09 DIAGNOSIS — C18.2 MALIGNANT NEOPLASM OF ASCENDING COLON (HCC): Primary | ICD-10-CM

## 2022-11-09 PROCEDURE — 2580000003 HC RX 258: Performed by: INTERNAL MEDICINE

## 2022-11-09 PROCEDURE — 96366 THER/PROPH/DIAG IV INF ADDON: CPT

## 2022-11-09 PROCEDURE — 96413 CHEMO IV INFUSION 1 HR: CPT

## 2022-11-09 PROCEDURE — 96367 TX/PROPH/DG ADDL SEQ IV INF: CPT

## 2022-11-09 PROCEDURE — 96375 TX/PRO/DX INJ NEW DRUG ADDON: CPT

## 2022-11-09 PROCEDURE — 6360000002 HC RX W HCPCS: Performed by: INTERNAL MEDICINE

## 2022-11-09 RX ORDER — ACETAMINOPHEN 325 MG/1
650 TABLET ORAL
OUTPATIENT
Start: 2022-12-14

## 2022-11-09 RX ORDER — SODIUM CHLORIDE 0.9 % (FLUSH) 0.9 %
5-40 SYRINGE (ML) INJECTION PRN
Status: CANCELLED | OUTPATIENT
Start: 2022-11-30

## 2022-11-09 RX ORDER — SODIUM CHLORIDE 9 MG/ML
5-250 INJECTION, SOLUTION INTRAVENOUS PRN
Status: CANCELLED | OUTPATIENT
Start: 2022-11-16

## 2022-11-09 RX ORDER — ALBUTEROL SULFATE 90 UG/1
4 AEROSOL, METERED RESPIRATORY (INHALATION) PRN
Status: CANCELLED | OUTPATIENT
Start: 2022-11-30

## 2022-11-09 RX ORDER — HEPARIN SODIUM (PORCINE) LOCK FLUSH IV SOLN 100 UNIT/ML 100 UNIT/ML
500 SOLUTION INTRAVENOUS PRN
Status: CANCELLED | OUTPATIENT
Start: 2022-11-09

## 2022-11-09 RX ORDER — SODIUM CHLORIDE 9 MG/ML
5-250 INJECTION, SOLUTION INTRAVENOUS PRN
Status: CANCELLED | OUTPATIENT
Start: 2022-11-30

## 2022-11-09 RX ORDER — SODIUM CHLORIDE 9 MG/ML
5-250 INJECTION, SOLUTION INTRAVENOUS PRN
Status: CANCELLED | OUTPATIENT
Start: 2022-11-23

## 2022-11-09 RX ORDER — SODIUM CHLORIDE 0.9 % (FLUSH) 0.9 %
5-40 SYRINGE (ML) INJECTION PRN
Status: CANCELLED | OUTPATIENT
Start: 2022-11-09

## 2022-11-09 RX ORDER — SODIUM CHLORIDE 9 MG/ML
5-40 INJECTION INTRAVENOUS PRN
OUTPATIENT
Start: 2022-12-14

## 2022-11-09 RX ORDER — MEPERIDINE HYDROCHLORIDE 50 MG/ML
12.5 INJECTION INTRAMUSCULAR; INTRAVENOUS; SUBCUTANEOUS PRN
OUTPATIENT
Start: 2022-12-07

## 2022-11-09 RX ORDER — DIPHENHYDRAMINE HYDROCHLORIDE 50 MG/ML
50 INJECTION INTRAMUSCULAR; INTRAVENOUS
OUTPATIENT
Start: 2022-12-14

## 2022-11-09 RX ORDER — SODIUM CHLORIDE 9 MG/ML
5-250 INJECTION, SOLUTION INTRAVENOUS PRN
OUTPATIENT
Start: 2022-12-14

## 2022-11-09 RX ORDER — SODIUM CHLORIDE 9 MG/ML
INJECTION, SOLUTION INTRAVENOUS CONTINUOUS
Status: CANCELLED | OUTPATIENT
Start: 2022-11-30

## 2022-11-09 RX ORDER — DIPHENHYDRAMINE HYDROCHLORIDE 50 MG/ML
50 INJECTION INTRAMUSCULAR; INTRAVENOUS
OUTPATIENT
Start: 2022-12-07

## 2022-11-09 RX ORDER — SODIUM CHLORIDE 9 MG/ML
INJECTION, SOLUTION INTRAVENOUS CONTINUOUS
Status: CANCELLED | OUTPATIENT
Start: 2022-11-23

## 2022-11-09 RX ORDER — SODIUM CHLORIDE 9 MG/ML
5-40 INJECTION INTRAVENOUS PRN
Status: CANCELLED | OUTPATIENT
Start: 2022-11-30

## 2022-11-09 RX ORDER — SODIUM CHLORIDE 9 MG/ML
5-40 INJECTION INTRAVENOUS PRN
Status: CANCELLED | OUTPATIENT
Start: 2022-11-23

## 2022-11-09 RX ORDER — HEPARIN SODIUM (PORCINE) LOCK FLUSH IV SOLN 100 UNIT/ML 100 UNIT/ML
500 SOLUTION INTRAVENOUS PRN
OUTPATIENT
Start: 2022-12-14

## 2022-11-09 RX ORDER — EPINEPHRINE 1 MG/ML
0.3 INJECTION, SOLUTION, CONCENTRATE INTRAVENOUS PRN
Status: CANCELLED | OUTPATIENT
Start: 2022-11-23

## 2022-11-09 RX ORDER — DIPHENHYDRAMINE HYDROCHLORIDE 50 MG/ML
50 INJECTION INTRAMUSCULAR; INTRAVENOUS
Status: CANCELLED | OUTPATIENT
Start: 2022-11-30

## 2022-11-09 RX ORDER — SODIUM CHLORIDE 0.9 % (FLUSH) 0.9 %
5-40 SYRINGE (ML) INJECTION PRN
OUTPATIENT
Start: 2022-12-14

## 2022-11-09 RX ORDER — ACETAMINOPHEN 325 MG/1
650 TABLET ORAL
Status: CANCELLED | OUTPATIENT
Start: 2022-11-09

## 2022-11-09 RX ORDER — DEXAMETHASONE SODIUM PHOSPHATE 4 MG/ML
8 INJECTION, SOLUTION INTRA-ARTICULAR; INTRALESIONAL; INTRAMUSCULAR; INTRAVENOUS; SOFT TISSUE ONCE
Status: COMPLETED | OUTPATIENT
Start: 2022-11-09 | End: 2022-11-09

## 2022-11-09 RX ORDER — ALBUTEROL SULFATE 90 UG/1
4 AEROSOL, METERED RESPIRATORY (INHALATION) PRN
OUTPATIENT
Start: 2022-12-07

## 2022-11-09 RX ORDER — SODIUM CHLORIDE 9 MG/ML
INJECTION, SOLUTION INTRAVENOUS CONTINUOUS
OUTPATIENT
Start: 2022-12-14

## 2022-11-09 RX ORDER — SODIUM CHLORIDE 0.9 % (FLUSH) 0.9 %
5-40 SYRINGE (ML) INJECTION PRN
Status: CANCELLED | OUTPATIENT
Start: 2022-11-23

## 2022-11-09 RX ORDER — ACETAMINOPHEN 325 MG/1
650 TABLET ORAL
OUTPATIENT
Start: 2022-12-07

## 2022-11-09 RX ORDER — ONDANSETRON 2 MG/ML
8 INJECTION INTRAMUSCULAR; INTRAVENOUS
Status: CANCELLED | OUTPATIENT
Start: 2022-11-23

## 2022-11-09 RX ORDER — HEPARIN SODIUM (PORCINE) LOCK FLUSH IV SOLN 100 UNIT/ML 100 UNIT/ML
500 SOLUTION INTRAVENOUS PRN
Status: CANCELLED | OUTPATIENT
Start: 2022-11-30

## 2022-11-09 RX ORDER — ONDANSETRON 2 MG/ML
8 INJECTION INTRAMUSCULAR; INTRAVENOUS
Status: CANCELLED | OUTPATIENT
Start: 2022-11-09

## 2022-11-09 RX ORDER — FAMOTIDINE 10 MG/ML
20 INJECTION, SOLUTION INTRAVENOUS
Status: CANCELLED | OUTPATIENT
Start: 2022-11-09

## 2022-11-09 RX ORDER — EPINEPHRINE 1 MG/ML
0.3 INJECTION, SOLUTION, CONCENTRATE INTRAVENOUS PRN
Status: CANCELLED | OUTPATIENT
Start: 2022-11-30

## 2022-11-09 RX ORDER — ALBUTEROL SULFATE 90 UG/1
4 AEROSOL, METERED RESPIRATORY (INHALATION) PRN
Status: CANCELLED | OUTPATIENT
Start: 2022-11-23

## 2022-11-09 RX ORDER — ALBUTEROL SULFATE 90 UG/1
4 AEROSOL, METERED RESPIRATORY (INHALATION) PRN
Status: CANCELLED | OUTPATIENT
Start: 2022-11-16

## 2022-11-09 RX ORDER — FAMOTIDINE 10 MG/ML
20 INJECTION, SOLUTION INTRAVENOUS
Status: CANCELLED | OUTPATIENT
Start: 2022-11-16

## 2022-11-09 RX ORDER — ACETAMINOPHEN 325 MG/1
650 TABLET ORAL
Status: CANCELLED | OUTPATIENT
Start: 2022-11-16

## 2022-11-09 RX ORDER — MEPERIDINE HYDROCHLORIDE 50 MG/ML
12.5 INJECTION INTRAMUSCULAR; INTRAVENOUS; SUBCUTANEOUS PRN
Status: CANCELLED | OUTPATIENT
Start: 2022-11-23

## 2022-11-09 RX ORDER — MEPERIDINE HYDROCHLORIDE 50 MG/ML
12.5 INJECTION INTRAMUSCULAR; INTRAVENOUS; SUBCUTANEOUS PRN
Status: CANCELLED | OUTPATIENT
Start: 2022-11-30

## 2022-11-09 RX ORDER — HEPARIN SODIUM (PORCINE) LOCK FLUSH IV SOLN 100 UNIT/ML 100 UNIT/ML
500 SOLUTION INTRAVENOUS PRN
Status: CANCELLED | OUTPATIENT
Start: 2022-11-23

## 2022-11-09 RX ORDER — SODIUM CHLORIDE 9 MG/ML
5-40 INJECTION INTRAVENOUS PRN
OUTPATIENT
Start: 2022-12-07

## 2022-11-09 RX ORDER — ALBUTEROL SULFATE 90 UG/1
4 AEROSOL, METERED RESPIRATORY (INHALATION) PRN
Status: CANCELLED | OUTPATIENT
Start: 2022-11-09

## 2022-11-09 RX ORDER — ONDANSETRON 2 MG/ML
8 INJECTION INTRAMUSCULAR; INTRAVENOUS
OUTPATIENT
Start: 2022-12-14

## 2022-11-09 RX ORDER — DIPHENHYDRAMINE HYDROCHLORIDE 50 MG/ML
50 INJECTION INTRAMUSCULAR; INTRAVENOUS
Status: CANCELLED | OUTPATIENT
Start: 2022-11-23

## 2022-11-09 RX ORDER — SODIUM CHLORIDE 9 MG/ML
INJECTION, SOLUTION INTRAVENOUS CONTINUOUS
Status: CANCELLED | OUTPATIENT
Start: 2022-11-16

## 2022-11-09 RX ORDER — SODIUM CHLORIDE 9 MG/ML
5-250 INJECTION, SOLUTION INTRAVENOUS PRN
Status: CANCELLED | OUTPATIENT
Start: 2022-11-09

## 2022-11-09 RX ORDER — ACETAMINOPHEN 325 MG/1
650 TABLET ORAL
Status: CANCELLED | OUTPATIENT
Start: 2022-11-23

## 2022-11-09 RX ORDER — SODIUM CHLORIDE 9 MG/ML
5-250 INJECTION, SOLUTION INTRAVENOUS PRN
OUTPATIENT
Start: 2022-12-07

## 2022-11-09 RX ORDER — EPINEPHRINE 1 MG/ML
0.3 INJECTION, SOLUTION, CONCENTRATE INTRAVENOUS PRN
OUTPATIENT
Start: 2022-12-07

## 2022-11-09 RX ORDER — EPINEPHRINE 1 MG/ML
0.3 INJECTION, SOLUTION, CONCENTRATE INTRAVENOUS PRN
Status: CANCELLED | OUTPATIENT
Start: 2022-11-16

## 2022-11-09 RX ORDER — ONDANSETRON 2 MG/ML
8 INJECTION INTRAMUSCULAR; INTRAVENOUS
OUTPATIENT
Start: 2022-12-07

## 2022-11-09 RX ORDER — EPINEPHRINE 1 MG/ML
0.3 INJECTION, SOLUTION, CONCENTRATE INTRAVENOUS PRN
Status: CANCELLED | OUTPATIENT
Start: 2022-11-09

## 2022-11-09 RX ORDER — ONDANSETRON 2 MG/ML
8 INJECTION INTRAMUSCULAR; INTRAVENOUS
Status: CANCELLED | OUTPATIENT
Start: 2022-11-16

## 2022-11-09 RX ORDER — SODIUM CHLORIDE 9 MG/ML
5-250 INJECTION, SOLUTION INTRAVENOUS PRN
Status: DISCONTINUED | OUTPATIENT
Start: 2022-11-09 | End: 2022-11-10 | Stop reason: HOSPADM

## 2022-11-09 RX ORDER — FAMOTIDINE 10 MG/ML
20 INJECTION, SOLUTION INTRAVENOUS
Status: CANCELLED | OUTPATIENT
Start: 2022-11-23

## 2022-11-09 RX ORDER — ALBUTEROL SULFATE 90 UG/1
4 AEROSOL, METERED RESPIRATORY (INHALATION) PRN
OUTPATIENT
Start: 2022-12-14

## 2022-11-09 RX ORDER — HEPARIN SODIUM (PORCINE) LOCK FLUSH IV SOLN 100 UNIT/ML 100 UNIT/ML
500 SOLUTION INTRAVENOUS PRN
OUTPATIENT
Start: 2022-12-07

## 2022-11-09 RX ORDER — SODIUM CHLORIDE 0.9 % (FLUSH) 0.9 %
5-40 SYRINGE (ML) INJECTION PRN
OUTPATIENT
Start: 2022-12-07

## 2022-11-09 RX ORDER — SODIUM CHLORIDE 9 MG/ML
5-40 INJECTION INTRAVENOUS PRN
Status: CANCELLED | OUTPATIENT
Start: 2022-11-09

## 2022-11-09 RX ORDER — DIPHENHYDRAMINE HYDROCHLORIDE 50 MG/ML
50 INJECTION INTRAMUSCULAR; INTRAVENOUS
Status: CANCELLED | OUTPATIENT
Start: 2022-11-09

## 2022-11-09 RX ORDER — SODIUM CHLORIDE 9 MG/ML
INJECTION, SOLUTION INTRAVENOUS CONTINUOUS
Status: CANCELLED | OUTPATIENT
Start: 2022-11-09

## 2022-11-09 RX ORDER — FAMOTIDINE 10 MG/ML
20 INJECTION, SOLUTION INTRAVENOUS
OUTPATIENT
Start: 2022-12-07

## 2022-11-09 RX ORDER — EPINEPHRINE 1 MG/ML
0.3 INJECTION, SOLUTION, CONCENTRATE INTRAVENOUS PRN
OUTPATIENT
Start: 2022-12-14

## 2022-11-09 RX ORDER — DIPHENHYDRAMINE HYDROCHLORIDE 50 MG/ML
50 INJECTION INTRAMUSCULAR; INTRAVENOUS
Status: CANCELLED | OUTPATIENT
Start: 2022-11-16

## 2022-11-09 RX ORDER — FAMOTIDINE 10 MG/ML
20 INJECTION, SOLUTION INTRAVENOUS
Status: CANCELLED | OUTPATIENT
Start: 2022-11-30

## 2022-11-09 RX ORDER — MEPERIDINE HYDROCHLORIDE 50 MG/ML
12.5 INJECTION INTRAMUSCULAR; INTRAVENOUS; SUBCUTANEOUS PRN
Status: CANCELLED | OUTPATIENT
Start: 2022-11-09

## 2022-11-09 RX ORDER — MEPERIDINE HYDROCHLORIDE 50 MG/ML
12.5 INJECTION INTRAMUSCULAR; INTRAVENOUS; SUBCUTANEOUS PRN
OUTPATIENT
Start: 2022-12-14

## 2022-11-09 RX ORDER — FAMOTIDINE 10 MG/ML
20 INJECTION, SOLUTION INTRAVENOUS
OUTPATIENT
Start: 2022-12-14

## 2022-11-09 RX ORDER — SODIUM CHLORIDE 0.9 % (FLUSH) 0.9 %
5-40 SYRINGE (ML) INJECTION PRN
Status: CANCELLED | OUTPATIENT
Start: 2022-11-16

## 2022-11-09 RX ORDER — MEPERIDINE HYDROCHLORIDE 50 MG/ML
12.5 INJECTION INTRAMUSCULAR; INTRAVENOUS; SUBCUTANEOUS PRN
Status: CANCELLED | OUTPATIENT
Start: 2022-11-16

## 2022-11-09 RX ORDER — ONDANSETRON 2 MG/ML
8 INJECTION INTRAMUSCULAR; INTRAVENOUS
Status: CANCELLED | OUTPATIENT
Start: 2022-11-30

## 2022-11-09 RX ORDER — SODIUM CHLORIDE 9 MG/ML
INJECTION, SOLUTION INTRAVENOUS CONTINUOUS
OUTPATIENT
Start: 2022-12-07

## 2022-11-09 RX ORDER — ACETAMINOPHEN 325 MG/1
650 TABLET ORAL
Status: CANCELLED | OUTPATIENT
Start: 2022-11-30

## 2022-11-09 RX ORDER — HEPARIN SODIUM (PORCINE) LOCK FLUSH IV SOLN 100 UNIT/ML 100 UNIT/ML
500 SOLUTION INTRAVENOUS PRN
Status: CANCELLED | OUTPATIENT
Start: 2022-11-16

## 2022-11-09 RX ORDER — SODIUM CHLORIDE 9 MG/ML
5-40 INJECTION INTRAVENOUS PRN
Status: CANCELLED | OUTPATIENT
Start: 2022-11-16

## 2022-11-09 RX ADMIN — DEXAMETHASONE SODIUM PHOSPHATE 8 MG: 4 INJECTION, SOLUTION INTRAMUSCULAR; INTRAVENOUS at 09:49

## 2022-11-09 RX ADMIN — SODIUM CHLORIDE 20 ML/HR: 9 INJECTION, SOLUTION INTRAVENOUS at 09:49

## 2022-11-09 RX ADMIN — FLUOROURACIL 800 MG: 50 INJECTION, SOLUTION INTRAVENOUS at 11:13

## 2022-11-09 RX ADMIN — LEUCOVORIN CALCIUM 800 MG: 200 INJECTION, POWDER, LYOPHILIZED, FOR SUSPENSION INTRAMUSCULAR; INTRAVENOUS at 10:09

## 2022-11-09 NOTE — PROGRESS NOTES
Pt here for first chemotherapy tx. PIV started  with blood return noted. Labs  reviewed from 11/8/22 and CBC within defined limits. I reviewed with Dr Ivory Yu results of K+ of 5.4 and creatinine of 1.2, pt does have CKD stage 3 and pt does take k-dur and lasix at home prn. Per Dr Lidya Cary pt not to take k-dur for 3 days post tx and ok to treat today. I informed pt of Dr Lidya Cary order and pt verbalized understanding. I also spoke with pt about staying hydrated- drinking plenty of water, eating small frequent meals, and to notify us if she would experience any SOB, rash,  swelling, dizziness, numbness and tingling to hands and feet, constipation or  urinary problems. Pt verbalized understanding. Pt and pt's  given [facility] of on call after hours number of [phone]. Pt states left hand has chronic tingling pt states she needs carpal tunnel surgery. I also verified pt did get her Zofran that was prescribed and how to take it. Per pt she does have it and knows use of it. Patient's status assessed and documented appropriately. All labs and required results were also reviewed today. Treatment parameters have been reviewed. Today's treatment has been approved by the provider. Treatment orders and medication sequencing (when applicable) was verified by 2 registered nurses. The treatment plan was confirmed with the patient prior to administration, and the patient understands the need to report any treatment-related symptoms. Prior to administration, when applicable, the following 8 elements of medication administration were reviewed with 2nd Registered Nurse prior to dosing: drug name, drug dose, infusion volume when prepared in a syringe, rate of administration, expiration dates and/or times, appearance and integrity of drug(s), and rate of pump for infusion. The 5 rights of medication administration have been verified.        Pt tolerated tx with no complaints left ambulatory discharge instructions given

## 2022-11-16 ENCOUNTER — HOSPITAL ENCOUNTER (OUTPATIENT)
Dept: INFUSION THERAPY | Age: 87
Discharge: HOME | End: 2022-11-16
Payer: MEDICARE

## 2022-11-16 VITALS
WEIGHT: 142.2 LBS | TEMPERATURE: 96.2 F | SYSTOLIC BLOOD PRESSURE: 143 MMHG | DIASTOLIC BLOOD PRESSURE: 64 MMHG | RESPIRATION RATE: 16 BRPM | HEART RATE: 77 BPM | HEIGHT: 60 IN | BODY MASS INDEX: 27.92 KG/M2

## 2022-11-16 DIAGNOSIS — C18.2 MALIGNANT NEOPLASM OF ASCENDING COLON (HCC): Primary | ICD-10-CM

## 2022-11-16 DIAGNOSIS — C17.2 ADENOCARCINOMA OF ILEUM (HCC): ICD-10-CM

## 2022-11-16 LAB
ALBUMIN SERPL-MCNC: 4.1 GM/DL (ref 3.4–5)
ALP BLD-CCNC: 67 IU/L (ref 40–128)
ALT SERPL-CCNC: 33 U/L (ref 10–40)
ANION GAP SERPL CALCULATED.3IONS-SCNC: 9 MMOL/L (ref 4–16)
AST SERPL-CCNC: 30 IU/L (ref 15–37)
BASOPHILS ABSOLUTE: 0 K/CU MM
BASOPHILS RELATIVE PERCENT: 0.2 % (ref 0–1)
BILIRUB SERPL-MCNC: 0.2 MG/DL (ref 0–1)
BUN BLDV-MCNC: 21 MG/DL (ref 6–23)
CALCIUM SERPL-MCNC: 9.7 MG/DL (ref 8.3–10.6)
CHLORIDE BLD-SCNC: 99 MMOL/L (ref 99–110)
CO2: 29 MMOL/L (ref 21–32)
CREAT SERPL-MCNC: 0.8 MG/DL (ref 0.6–1.1)
DIFFERENTIAL TYPE: ABNORMAL
EOSINOPHILS ABSOLUTE: 0.3 K/CU MM
EOSINOPHILS RELATIVE PERCENT: 3.8 % (ref 0–3)
GFR SERPL CREATININE-BSD FRML MDRD: >60 ML/MIN/1.73M2
GLUCOSE BLD-MCNC: 96 MG/DL (ref 70–99)
HCT VFR BLD CALC: 41.2 % (ref 37–47)
HEMOGLOBIN: 13.2 GM/DL (ref 12.5–16)
LYMPHOCYTES ABSOLUTE: 1.3 K/CU MM
LYMPHOCYTES RELATIVE PERCENT: 16.3 % (ref 24–44)
MCH RBC QN AUTO: 30.3 PG (ref 27–31)
MCHC RBC AUTO-ENTMCNC: 32 % (ref 32–36)
MCV RBC AUTO: 94.7 FL (ref 78–100)
MONOCYTES ABSOLUTE: 0.5 K/CU MM
MONOCYTES RELATIVE PERCENT: 6.5 % (ref 0–4)
PDW BLD-RTO: 15 % (ref 11.7–14.9)
PLATELET # BLD: 185 K/CU MM (ref 140–440)
PMV BLD AUTO: 9.3 FL (ref 7.5–11.1)
POTASSIUM SERPL-SCNC: 4.4 MMOL/L (ref 3.5–5.1)
RBC # BLD: 4.35 M/CU MM (ref 4.2–5.4)
SEGMENTED NEUTROPHILS ABSOLUTE COUNT: 6 K/CU MM
SEGMENTED NEUTROPHILS RELATIVE PERCENT: 73.2 % (ref 36–66)
SODIUM BLD-SCNC: 137 MMOL/L (ref 135–145)
TOTAL PROTEIN: 7 GM/DL (ref 6.4–8.2)
WBC # BLD: 8.2 K/CU MM (ref 4–10.5)

## 2022-11-16 PROCEDURE — 96366 THER/PROPH/DIAG IV INF ADDON: CPT

## 2022-11-16 PROCEDURE — 96367 TX/PROPH/DG ADDL SEQ IV INF: CPT

## 2022-11-16 PROCEDURE — 2580000003 HC RX 258: Performed by: INTERNAL MEDICINE

## 2022-11-16 PROCEDURE — 96413 CHEMO IV INFUSION 1 HR: CPT

## 2022-11-16 PROCEDURE — 85025 COMPLETE CBC W/AUTO DIFF WBC: CPT

## 2022-11-16 PROCEDURE — 6360000002 HC RX W HCPCS: Performed by: INTERNAL MEDICINE

## 2022-11-16 PROCEDURE — 96375 TX/PRO/DX INJ NEW DRUG ADDON: CPT

## 2022-11-16 PROCEDURE — 80053 COMPREHEN METABOLIC PANEL: CPT

## 2022-11-16 RX ORDER — DEXAMETHASONE SODIUM PHOSPHATE 4 MG/ML
8 INJECTION, SOLUTION INTRA-ARTICULAR; INTRALESIONAL; INTRAMUSCULAR; INTRAVENOUS; SOFT TISSUE ONCE
Status: COMPLETED | OUTPATIENT
Start: 2022-11-16 | End: 2022-11-16

## 2022-11-16 RX ORDER — SODIUM CHLORIDE 9 MG/ML
5-250 INJECTION, SOLUTION INTRAVENOUS PRN
Status: DISCONTINUED | OUTPATIENT
Start: 2022-11-16 | End: 2022-11-17 | Stop reason: HOSPADM

## 2022-11-16 RX ORDER — SODIUM CHLORIDE 0.9 % (FLUSH) 0.9 %
5-40 SYRINGE (ML) INJECTION PRN
Status: DISCONTINUED | OUTPATIENT
Start: 2022-11-16 | End: 2022-11-17 | Stop reason: HOSPADM

## 2022-11-16 RX ADMIN — FLUOROURACIL 800 MG: 50 INJECTION, SOLUTION INTRAVENOUS at 11:43

## 2022-11-16 RX ADMIN — SODIUM CHLORIDE 20 ML/HR: 9 INJECTION, SOLUTION INTRAVENOUS at 10:03

## 2022-11-16 RX ADMIN — LEUCOVORIN CALCIUM 800 MG: 200 INJECTION, POWDER, LYOPHILIZED, FOR SUSPENSION INTRAMUSCULAR; INTRAVENOUS at 10:37

## 2022-11-16 RX ADMIN — DEXAMETHASONE SODIUM PHOSPHATE 8 MG: 4 INJECTION, SOLUTION INTRAMUSCULAR; INTRAVENOUS at 10:06

## 2022-11-16 NOTE — PROGRESS NOTES
Patient arrived to treatment suite with spouse for blood draw, pre-medications and chemotherapy infusion. PIV started in left wrist by student nurse, blood drawn from site and sent to lab for processing. Patient has no questions or concerns for the doctor at this time. Treatment approved and given. Patient tolerated well. Left treatment suite ambulatory. Discharge instructions provided. Patient's status assessed and documented appropriately. All labs and required results were also reviewed today. Treatment parameters have been reviewed. Today's treatment has been approved by the provider. Treatment orders and medication sequencing (when applicable) was verified by 2 registered nurses. The treatment plan was confirmed with the patient prior to administration, and the patient understands the need to report any treatment-related symptoms. Prior to administration, when applicable, the following 8 elements of medication administration were reviewed with 2nd Registered Nurse prior to dosing: drug name, drug dose, infusion volume when prepared in a syringe, rate of administration, expiration dates and/or times, appearance and integrity of drug(s), and rate of pump for infusion. The 5 rights of medication administration have been verified.

## 2022-11-17 ENCOUNTER — HOSPITAL ENCOUNTER (OUTPATIENT)
Dept: INFUSION THERAPY | Age: 87
Discharge: HOME | End: 2022-11-17
Payer: MEDICARE

## 2022-11-17 ENCOUNTER — OFFICE VISIT (OUTPATIENT)
Dept: ONCOLOGY | Age: 87
End: 2022-11-17
Payer: MEDICARE

## 2022-11-17 VITALS
RESPIRATION RATE: 18 BRPM | HEART RATE: 79 BPM | HEIGHT: 60 IN | WEIGHT: 144 LBS | BODY MASS INDEX: 28.27 KG/M2 | SYSTOLIC BLOOD PRESSURE: 120 MMHG | TEMPERATURE: 96.8 F | DIASTOLIC BLOOD PRESSURE: 72 MMHG

## 2022-11-17 DIAGNOSIS — C18.2 MALIGNANT NEOPLASM OF ASCENDING COLON (HCC): Primary | ICD-10-CM

## 2022-11-17 DIAGNOSIS — C18.2 MALIGNANT NEOPLASM OF ASCENDING COLON (HCC): ICD-10-CM

## 2022-11-17 LAB
ALBUMIN SERPL-MCNC: 3.7 GM/DL (ref 3.4–5)
ALP BLD-CCNC: 58 IU/L (ref 40–128)
ALT SERPL-CCNC: 23 U/L (ref 10–40)
ANION GAP SERPL CALCULATED.3IONS-SCNC: 11 MMOL/L (ref 4–16)
AST SERPL-CCNC: 18 IU/L (ref 15–37)
BASOPHILS ABSOLUTE: 0 K/CU MM
BASOPHILS RELATIVE PERCENT: 0.1 % (ref 0–1)
BILIRUB SERPL-MCNC: 0.2 MG/DL (ref 0–1)
BUN BLDV-MCNC: 24 MG/DL (ref 6–23)
CALCIUM SERPL-MCNC: 8.9 MG/DL (ref 8.3–10.6)
CHLORIDE BLD-SCNC: 103 MMOL/L (ref 99–110)
CO2: 26 MMOL/L (ref 21–32)
CREAT SERPL-MCNC: 1 MG/DL (ref 0.6–1.1)
DIFFERENTIAL TYPE: ABNORMAL
EOSINOPHILS ABSOLUTE: 0 K/CU MM
EOSINOPHILS RELATIVE PERCENT: 0.1 % (ref 0–3)
GFR SERPL CREATININE-BSD FRML MDRD: 54 ML/MIN/1.73M2
GLUCOSE BLD-MCNC: 88 MG/DL (ref 70–99)
HCT VFR BLD CALC: 37.6 % (ref 37–47)
HEMOGLOBIN: 14.1 GM/DL (ref 12.5–16)
LYMPHOCYTES ABSOLUTE: 1.9 K/CU MM
LYMPHOCYTES RELATIVE PERCENT: 14 % (ref 24–44)
MCH RBC QN AUTO: 36 PG (ref 27–31)
MCHC RBC AUTO-ENTMCNC: 37.5 % (ref 32–36)
MCV RBC AUTO: 95.9 FL (ref 78–100)
MONOCYTES ABSOLUTE: 0.8 K/CU MM
MONOCYTES RELATIVE PERCENT: 5.7 % (ref 0–4)
PDW BLD-RTO: 15 % (ref 11.7–14.9)
PLATELET # BLD: 200 K/CU MM (ref 140–440)
PMV BLD AUTO: 9.6 FL (ref 7.5–11.1)
POTASSIUM SERPL-SCNC: 4.1 MMOL/L (ref 3.5–5.1)
RBC # BLD: 3.92 M/CU MM (ref 4.2–5.4)
SEGMENTED NEUTROPHILS ABSOLUTE COUNT: 10.9 K/CU MM
SEGMENTED NEUTROPHILS RELATIVE PERCENT: 80.1 % (ref 36–66)
SODIUM BLD-SCNC: 140 MMOL/L (ref 135–145)
TOTAL PROTEIN: 6.1 GM/DL (ref 6.4–8.2)
WBC # BLD: 13.6 K/CU MM (ref 4–10.5)

## 2022-11-17 PROCEDURE — 1124F ACP DISCUSS-NO DSCNMKR DOCD: CPT | Performed by: INTERNAL MEDICINE

## 2022-11-17 PROCEDURE — 85025 COMPLETE CBC W/AUTO DIFF WBC: CPT

## 2022-11-17 PROCEDURE — 99211 OFF/OP EST MAY X REQ PHY/QHP: CPT

## 2022-11-17 PROCEDURE — 80053 COMPREHEN METABOLIC PANEL: CPT

## 2022-11-17 PROCEDURE — 99214 OFFICE O/P EST MOD 30 MIN: CPT | Performed by: INTERNAL MEDICINE

## 2022-11-17 PROCEDURE — 36415 COLL VENOUS BLD VENIPUNCTURE: CPT

## 2022-11-17 NOTE — PROGRESS NOTES
MA Rooming Questions  Patient: Meka Buckner  MRN: [id number]    Date: 11/17/2022        1. Do you have any new issues?   no         2. Do you need any refills on medications?    no    3. Have you had any imaging done since your last visit? yes - US 11/1 & MRI 11/3    4. Have you been hospitalized or seen in the emergency room since your last visit here?   yes - ER 10/31    5. Did the patient have a depression screening completed today?  No    No data recorded     PHQ-9 Given to (if applicable):               PHQ-9 Score (if applicable):                     [] Positive     []  Negative              Does question #9 need addressed (if applicable)                     [] Yes    []  No               Priscilla Solorio MA

## 2022-11-17 NOTE — PROGRESS NOTES
Patient Name:  Gianfranco Morley  Patient :  1934  Patient MRN:  [id number]     Primary Oncologist: Kendall Salgado MD  Referring Provider: Ildefonso Aj DO     Date of Service: 2022      Chief Complaint:    Chief Complaint   Patient presents with    Follow-up     Patient Active Problem List:     Long-term insulin use in type 2 diabetes [facility])     Essential hypertension     Dyslipidemia     Abnormal EKG     Abnormal stress test     Cecum mass     Severe malnutrition (HCC)     Partial small bowel obstruction ([address]     Adenocarcinoma ([address]     Generalized weakness     Uncontrolled pain     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 female with medical history significant for CKD stage IIIa, diverticulitis, IDDM 2 with peripheral neuropathy, HTN, HLD, G1DD, KEVIN, hypothyroidism, and vitamin B12 deficiency, presented on 22 with complaints of abdominal pain. She has been treated recently for abdominal pain and suspected diverticulitis, but was not improving after getting antibiotics which prompted her to return to the ED. She denies any personal or family history of colon cancer. Her sister had breast cancer in her 42's. She has had colonoscopies in the past which were normal, but thinks her last one was more than 10 years ago. She denies any previous problems with ovarian cysts or adnexal lesions. She underwent colonoscopy on 22 and it showed large mass extending from the ileocecal valve into the cecum mass originating at ileocecal valve appears to be more tubulovillous, mass in the cecum appears to be more firm fixed ulcerated consistent with malignancy. Biopsies were obtained.  Mild to moderate sigmoid diverticulosis and grade 2 hemorrhoids and incidental lipoma in transverse colon     Biopsy from cecal mass showed invasive moderately differentiated adenocarcinoma. MRI pelvis showed complex ovarian cyst without internal enhancement to suggest solid mass. Radiologist recommend f/u imaging with CT or MRI in 6 months. She is s/p surgery on 5/27/22. Final pathology revealed that she has stage III terminal ileum adenocarcinoma (pT3, pN1c). She has lost about 25 lbs over last 2 - 3 months before surgery. Her CEA on 5/22/22 was 3.7. Adjuvant chemotherapy with xeloda was started on 7/26/2022 and we stopped it after 9/6/22 due to severe oral mucositis. We changed her chemo to weekly 5Fu and leukovorin. It was started on 11/9/22. On November 17, 2022, she presented to me for follow-up. 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 I reviewed final path report with her and her family. We also reviewed NCCN guidelines. I also let her know that we ideally recommend adjuvant chemotherapy with either CapeOx (capecitabine + oxaliplatin) for 3 months, FOLFOX for 6 months or single agent capecitabine (in frail patient) for six months. I also discussed with her and her family all the potential side effects as well as benefits of adjuvant chemotherapy. After long discussion with her and family, they are in agreement to start adjuvant chemotherapy with capecitabine. It was started on 7/26/22. She is in her second cycle of chemotherapy (day 6). She has significant oral mucositis and it interfere with her eating and drinking. She is on xeloda 1500 mg BID. I stopped it since 9/6/22. Since she couldn't tolerate xeloda, we changed it to 5Fu and leukovorin. It was started on 11/9/22. She is tolerating 5-FU leucovorin well and I recommended to continue with it for now. She is going to see gynecologist next week and I will follow-up with their recommendations.     Chemo induced mucositis - recommend to continue °C) (Infrared)   Resp 18   Ht 5' (1.524 m)   Wt 144 lb (65.3 kg)   BMI 28.12 kg/m²      Physical Exam:  CONSTITUTIONAL: awake, alert, cooperative, no apparent distress   EYES: pupils equal, round and reactive to light, sclera clear, normal conjunctiva  ENT: Normocephalic, without obvious abnormality, atraumatic  NECK: supple, symmetrical, no jugular venous distension, no carotid bruits   HEMATOLOGIC/LYMPHATIC: no cervical, supraclavicular or axillary lymphadenopathy   LUNGS: VBS, no wheezes, no increased work of breathing, no rhonchi, clear to auscultation, no crackles,    CARDIOVASCULAR: regular rate and rhythm, normal S1 and S2, no murmur noted  ABDOMEN: normal bowel sounds x 4, soft, non-distended, non-tender, no masses palpated, no hepatosplenomegaly   MUSCULOSKELETAL: full range of motion noted, tone is normal  NEUROLOGIC: awake, alert, oriented to name, place and time. Motor skills grossly intact. SKIN: appears intact, normal skin color, normal texture, normal turgor, no jaundice.    EXTREMITIES: no clubbing, no leg swelling, no LE edema, no cyanosis,       Labs:  Hematology:  Lab Results   Component Value Date    WBC 13.6 (H) 11/17/2022    RBC 3.92 (L) 11/17/2022    HGB 14.1 11/17/2022    HCT 37.6 11/17/2022    MCV 95.9 11/17/2022    MCH 36.0 (H) 11/17/2022    MCHC 37.5 (H) 11/17/2022    RDW 15.0 (H) 11/17/2022     11/17/2022    MPV 9.6 11/17/2022    SEGSPCT 80.1 (H) 11/17/2022    EOSRELPCT 0.1 11/17/2022    BASOPCT 0.1 11/17/2022    LYMPHOPCT 14.0 (L) 11/17/2022    MONOPCT 5.7 (H) 11/17/2022    SEGSABS 10.9 11/17/2022    EOSABS 0.0 11/17/2022    BASOSABS 0.0 11/17/2022    LYMPHSABS 1.9 11/17/2022    MONOSABS 0.8 11/17/2022    DIFFTYPE AUTOMATED DIFFERENTIAL 11/17/2022     Lab Results   Component Value Date    ESR 17 07/08/2022     Chemistry:  Lab Results   Component Value Date     11/17/2022    K 4.1 11/17/2022     11/17/2022    CO2 26 11/17/2022    BUN 24 (H) 11/17/2022    CREATININE 1.0 11/17/2022    GLUCOSE 88 11/17/2022    CALCIUM 8.9 11/17/2022    PROT 6.1 (L) 11/17/2022    LABALBU 3.7 11/17/2022    BILITOT 0.2 11/17/2022    ALKPHOS 58 11/17/2022    AST 18 11/17/2022    ALT 23 11/17/2022    LABGLOM 54 (L) 11/17/2022    GFRAA >60 08/09/2022    PHOS 3.0 06/08/2022    MG 1.7 (L) 06/08/2022    POCGLU 83 11/04/2022     Lab Results   Component Value Date     07/08/2022     No components found for: LD  Lab Results   Component Value Date    TSHHS 0.095 (L) 07/08/2022    T4FREE 1.24 06/12/2022    FT3 2.3 12/06/2017     Immunology:  Lab Results   Component Value Date    PROT 6.1 (L) 11/17/2022     No results found for: Lillette Keepers, KLFLCR  No results found for: B2M  Coagulation Panel:  Lab Results   Component Value Date    PROTIME 26.8 (H) 07/05/2022    INR 2.14 07/05/2022    APTT 29.5 05/14/2021    DDIMER <200 01/24/2016     Anemia Panel:  Lab Results   Component Value Date    ITJMVNRJ81 1035 (H) 07/08/2022    FOLATE 11.3 07/08/2022     Tumor Markers:  Lab Results   Component Value Date     49 (H) 11/02/2022    CEA 5.1 08/09/2022        Observations:  No data recorded     Assessment   Stage III terminal ileum adenocarcinoma. Plan:  I reviewed with her findings on CT scan, US pelvis, colonoscopy and labs. Cecal lesion is concerning for malignant process and right adnexa cystic lesion needs further evaluation with MRI. 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 MRI pelvis showed complex ovarian cyst without internal enhancement to suggest solid mass. Radiologist recommend f/u imaging with CT or MRI in 6 months. She is s/p surgery on 5/27/22. Final pathology revealed that she has stage III terminal ileum adenocarcinoma (pT3, pN1c). She has lost about 25 lbs over last 2 - 3 months before surgery. Her CEA on 5/22/22 was 3.7.     Adjuvant chemotherapy with xeloda was started on 7/26/2022 and we stopped it after 9/6/22 due to severe oral mucositis. We changed her chemo to weekly 5Fu and leukovorin. It was started on 11/9/22. On November 17, 2022, she presented to me for follow-up. 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 I reviewed final path report with her and her family. We also reviewed NCCN guidelines. I also let her know that we ideally recommend adjuvant chemotherapy with either CapeOx (capecitabine + oxaliplatin) for 3 months, FOLFOX for 6 months or single agent capecitabine (in frail patient) for six months. I also discussed with her and her family all the potential side effects as well as benefits of adjuvant chemotherapy. After long discussion with her and family, they are in agreement to start adjuvant chemotherapy with capecitabine. It was started on 7/26/22. She is in her second cycle of chemotherapy (day 6). She has significant oral mucositis and it interfere with her eating and drinking. She is on xeloda 1500 mg BID. I stopped it since 9/6/22. Since she couldn't tolerate xeloda, we changed it to 5Fu and leukovorin. It was started on 11/9/22. She is tolerating 5-FU leucovorin well and I recommended to continue with it for now. She is going to see gynecologist next week and I will follow-up with their recommendations. Chemo induced mucositis - recommend to continue with magic mouth wash with dexamethasone. Chemo induced nausea - mild symptom only. Recommend her to take zofran prn. I answered all her questions and concerns for today. Recent imaging and labs were reviewed and discussed with the patient.

## 2022-11-18 ENCOUNTER — OFFICE VISIT (OUTPATIENT)
Dept: CARDIOLOGY CLINIC | Age: 87
End: 2022-11-18
Payer: MEDICARE

## 2022-11-18 VITALS
WEIGHT: 145 LBS | SYSTOLIC BLOOD PRESSURE: 124 MMHG | OXYGEN SATURATION: 95 % | BODY MASS INDEX: 28.47 KG/M2 | HEIGHT: 60 IN | DIASTOLIC BLOOD PRESSURE: 60 MMHG | HEART RATE: 60 BPM

## 2022-11-18 DIAGNOSIS — I10 ESSENTIAL HYPERTENSION: Chronic | ICD-10-CM

## 2022-11-18 DIAGNOSIS — E78.5 DYSLIPIDEMIA: ICD-10-CM

## 2022-11-18 DIAGNOSIS — Z86.718 H/O DEEP VENOUS THROMBOSIS: ICD-10-CM

## 2022-11-18 DIAGNOSIS — I25.10 CORONARY ARTERY DISEASE INVOLVING NATIVE CORONARY ARTERY OF NATIVE HEART WITHOUT ANGINA PECTORIS: Primary | ICD-10-CM

## 2022-11-18 PROCEDURE — 99214 OFFICE O/P EST MOD 30 MIN: CPT | Performed by: NURSE PRACTITIONER

## 2022-11-18 PROCEDURE — 1124F ACP DISCUSS-NO DSCNMKR DOCD: CPT | Performed by: NURSE PRACTITIONER

## 2022-11-18 RX ORDER — LINEZOLID 600 MG/1
600 TABLET, FILM COATED ORAL DAILY
COMMUNITY
Start: 2022-11-14

## 2022-11-18 NOTE — PROGRESS NOTES
elevated pulmonary artery systolic pressure. Mild MR and TR and trace AR EF 55 to 60%    Hx of echocardiogram 03/29/2017    EF 55-60%. Grade I diastolic dysfunction. Mildly dilated left atrium. No evidence of pericardial effusion. Hyperlipidemia     Hypertension     Sleep apnea     Thyroid disease        Current Outpatient Medications   Medication Sig Dispense Refill    ondansetron (ZOFRAN) 8 MG tablet Take 1 tablet by mouth every 8 hours as needed for Nausea or Vomiting 90 tablet 3    polyethylene glycol (GLYCOLAX) 17 g packet Take 17 g by mouth daily as needed for Constipation 527 g 1    potassium chloride (KLOR-CON M) 20 MEQ extended release tablet Take 1 tablet by mouth daily as needed (with Lasix) 30 tablet 0    furosemide (LASIX) 20 MG tablet Take 1 tablet by mouth as needed (for weight gain of 3 lbs in a day or  5 in a week) (Patient taking differently: Take 20 mg by mouth as needed (for weight gain of 3 lbs in a day or  5 in a week) Pt takes one once a week) 30 tablet 1    gabapentin (NEURONTIN) 100 MG capsule       Magic Mouthwash (MIRACLE MOUTHWASH) Equal parts of Benadryl, Maalox, 2% Viscous Xylocaine and Dexamethasone. Take 5 mL 4 times daily. 300 mL 3    metoprolol tartrate (LOPRESSOR) 50 MG tablet Take 1 tablet by mouth in the morning and 1 tablet before bedtime. 3 pills in the AM : 150 mg dose  2 pills in the PM: 100 mg dose.  60 tablet 3    sodium chloride 1 g tablet Take 1 g by mouth 2 times daily      sucralfate (CARAFATE) 1 GM tablet Take 1 tablet by mouth 4 times daily (before meals and nightly) 120 tablet 0    melatonin 3 MG TABS tablet Take 3 mg by mouth nightly as needed      vitamin B-12 (CYANOCOBALAMIN) 100 MCG tablet Take 100 mcg by mouth daily      LANTUS SOLOSTAR 100 UNIT/ML injection pen       Cholecalciferol (VITAMIN D PO) Take by mouth      atorvastatin (LIPITOR) 20 MG tablet Take 20 mg by mouth daily      rOPINIRole (REQUIP) 3 MG tablet Take 3 mg by mouth nightly levothyroxine (SYNTHROID) 137 MCG tablet Take 137 mcg by mouth daily   3    spironolactone (ALDACTONE) 25 MG tablet  (Patient not taking: Reported on 11/18/2022)      montelukast (SINGULAIR) 10 MG tablet Take 10 mg by mouth nightly (Patient not taking: Reported on 11/18/2022)      escitalopram (LEXAPRO) 10 MG tablet Take 1 tablet by mouth daily (Patient not taking: Reported on 11/18/2022) 30 tablet 0    oxybutynin (DITROPAN) 5 MG tablet Take 5 mg by mouth 2 times daily (Patient not taking: Reported on 11/18/2022)      Insulin Pen Needle (PEN NEEDLES 31GX5/16\") 31G X 8 MM MISC        No current facility-administered medications for this visit. Review of Systems:  Review of Systems   Cardiovascular:  Negative for chest pain, palpitations and leg swelling. All other systems reviewed and are negative. Objective:      Physical Exam:  /60 (Site: Right Upper Arm, Position: Sitting, Cuff Size: Medium Adult)   Pulse 60   Ht 5' (1.524 m)   Wt 145 lb (65.8 kg)   SpO2 95%   BMI 28.32 kg/m²   Wt Readings from Last 3 Encounters:   11/18/22 145 lb (65.8 kg)   11/17/22 144 lb (65.3 kg)   11/16/22 142 lb 3.2 oz (64.5 kg)     Body mass index is 28.32 kg/m². Physical exam:  Physical Exam  Vitals reviewed. Constitutional:       Appearance: She is well-developed. HENT:      Head: Normocephalic. Eyes:      Conjunctiva/sclera: Conjunctivae normal.      Pupils: Pupils are equal, round, and reactive to light. Cardiovascular:      Rate and Rhythm: Normal rate and regular rhythm. Pulmonary:      Effort: Pulmonary effort is normal.      Breath sounds: Normal breath sounds. Abdominal:      General: Bowel sounds are normal.      Palpations: Abdomen is soft. Musculoskeletal:         General: Normal range of motion. Cervical back: Normal range of motion. Skin:     General: Skin is warm and dry. Neurological:      Mental Status: She is alert and oriented to person, place, and time. DATA:  Lab Results   Component Value Date/Time    CKTOTAL 74 02/11/2011 10:15 AM    CKMB 1.5 02/11/2011 10:15 AM    TROPONINI 0.012 02/11/2011 10:15 AM     BNP:    Lab Results   Component Value Date/Time    BNP 75.3 02/11/2011 10:15 AM     PT/INR:  No results found for: PTINR  Lab Results   Component Value Date    LABA1C 5.9 05/22/2022    LABA1C 7.0 (H) 03/14/2018     Lab Results   Component Value Date    CHOL 138 05/02/2019    TRIG 161 (H) 06/01/2022    HDL 54 05/02/2019    LDLDIRECT 78 05/02/2019     Lab Results   Component Value Date    ALT 23 11/17/2022    AST 18 11/17/2022     TSH:  No results found for: TSH    Vitals:    11/18/22 1057   BP: 124/60   Pulse: 60   SpO2: 95%         Echo:8/13/22. Left ventricular systolic function is normal.   Ejection fraction is visually estimated at 50-55%. Grade II diastolic dysfunction. Moderately dilated left atrium. No evidence of any pericardial effusion. Stress Test:4/7/21  Abnormal stress, small size, moderate intensity, reversible perfusion defect in anterior wall. LHC:5/18/21  LMCA: Normal.     LAD: Normal.normal LAD, diagonal branch has severe multiple stenosis, its  small vessel     LCx: Normal.     RCA: Mild Lumen Irregularity. The ASCVD Risk score (James DK, et al., 2019) failed to calculate for the following reasons: The 2019 ASCVD risk score is only valid for ages 36 to 78      Assessment/ Plan:     Coronary artery disease involving native coronary artery of native heart without angina pectoris   - Patient had abnormal EKG leading to abnormal stress test.  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 No intervention due to size of vessel. Essential hypertension  -Stable, continue with Lopressor to 50 mg BID. Dyslipidemia  --At or near goal Yes    -She is to continue current medications (Lipitor 20 mg) Hepatic function panel WNL. No abdominal pain.  No myalgias.     -The nature of cardiac risk has been fully discussed with this patient. I have made her aware of her LDL target goal given her cardiovascular risk analysis. I have discussed the appropriate diet. The need for lifelong compliance in order to reduce risk is stressed. A regular exercise program is recommended to help achieve and maintain normal body weight, fitness and improve lipid balance. Patient seen, interviewed and examined. Testing was reviewed. H/O DVT  -Right subclavian DVT post PICC removal. Patient is now off Xarelto. Patient is encouraged to exercise even a brisk walk for 30 minutes at least 3 to 4 times a week. Lifestyle and risk factor modificatons discussed. Various goals are discussed and questions answered. Continue current medications. Appropriate prescriptions are addressed. Questions answered and patient verbalizes understanding. Call for any problems, questions, or concerns. Pt is to follow up in 6 months for Cardiac management    Electronically signed by Pérez Valderrama.  LAURE Pugh CNP on 11/18/2022 at 11:01 AM

## 2022-11-18 NOTE — PATIENT INSTRUCTIONS
Please be informed that if you contact our office outside of normal business hours the physician on call cannot help with any scheduling or rescheduling issues, procedure instruction questions or any type of medication issue. We advise you for any urgent/emergency that you go to the nearest emergency room! PLEASE CALL OUR OFFICE DURING NORMAL BUSINESS HOURS    Monday - Friday   8 am to 5 pm    [facility] [address]: [phone]    [place]:  [phone]    **It is YOUR responsibilty to bring medication bottles and/or updated medication list to [address] [place] [address]. This will allow us to better serve you and all your healthcare needs**    Thank you for allowing us to care for you today! We want to ensure we can follow your treatment plan and we strive to give you the best outcomes and experience possible. If you ever have a life threatening emergency and call 911 - for an ambulance (EMS)   Our providers can only care for you at:   [facility] or [facility]. Even if you have someone take you or you drive yourself we can only care for you in a [facility] facility. Our providers are not setup at the other healthcare locations!

## 2022-11-23 ENCOUNTER — HOSPITAL ENCOUNTER (OUTPATIENT)
Dept: INFUSION THERAPY | Age: 87
Discharge: HOME | End: 2022-11-23
Payer: MEDICARE

## 2022-11-23 VITALS
OXYGEN SATURATION: 96 % | TEMPERATURE: 97.3 F | SYSTOLIC BLOOD PRESSURE: 143 MMHG | WEIGHT: 141.2 LBS | HEART RATE: 85 BPM | DIASTOLIC BLOOD PRESSURE: 62 MMHG | HEIGHT: 60 IN | BODY MASS INDEX: 27.72 KG/M2

## 2022-11-23 DIAGNOSIS — C18.2 MALIGNANT NEOPLASM OF ASCENDING COLON (HCC): Primary | ICD-10-CM

## 2022-11-23 DIAGNOSIS — C17.2 ADENOCARCINOMA OF ILEUM (HCC): ICD-10-CM

## 2022-11-23 LAB
ALBUMIN SERPL-MCNC: 3.5 GM/DL (ref 3.4–5)
ALP BLD-CCNC: 51 IU/L (ref 40–128)
ALT SERPL-CCNC: 21 U/L (ref 10–40)
ANION GAP SERPL CALCULATED.3IONS-SCNC: 9 MMOL/L (ref 4–16)
AST SERPL-CCNC: 28 IU/L (ref 15–37)
BASOPHILS ABSOLUTE: 0 K/CU MM
BASOPHILS RELATIVE PERCENT: 0.3 % (ref 0–1)
BILIRUB SERPL-MCNC: 0.3 MG/DL (ref 0–1)
BUN BLDV-MCNC: 23 MG/DL (ref 6–23)
CALCIUM SERPL-MCNC: 8.7 MG/DL (ref 8.3–10.6)
CHLORIDE BLD-SCNC: 107 MMOL/L (ref 99–110)
CO2: 22 MMOL/L (ref 21–32)
CREAT SERPL-MCNC: 1 MG/DL (ref 0.6–1.1)
DIFFERENTIAL TYPE: ABNORMAL
EOSINOPHILS ABSOLUTE: 0.3 K/CU MM
EOSINOPHILS RELATIVE PERCENT: 2.6 % (ref 0–3)
GFR SERPL CREATININE-BSD FRML MDRD: 54 ML/MIN/1.73M2
GLUCOSE BLD-MCNC: 113 MG/DL (ref 70–99)
HCT VFR BLD CALC: 36.7 % (ref 37–47)
HEMOGLOBIN: 11.9 GM/DL (ref 12.5–16)
LYMPHOCYTES ABSOLUTE: 1.2 K/CU MM
LYMPHOCYTES RELATIVE PERCENT: 11.7 % (ref 24–44)
MCH RBC QN AUTO: 30.4 PG (ref 27–31)
MCHC RBC AUTO-ENTMCNC: 32.4 % (ref 32–36)
MCV RBC AUTO: 93.6 FL (ref 78–100)
MONOCYTES ABSOLUTE: 0.4 K/CU MM
MONOCYTES RELATIVE PERCENT: 4.4 % (ref 0–4)
PDW BLD-RTO: 14.4 % (ref 11.7–14.9)
PLATELET # BLD: 167 K/CU MM (ref 140–440)
PMV BLD AUTO: 9.4 FL (ref 7.5–11.1)
POTASSIUM SERPL-SCNC: 4 MMOL/L (ref 3.5–5.1)
RBC # BLD: 3.92 M/CU MM (ref 4.2–5.4)
SEGMENTED NEUTROPHILS ABSOLUTE COUNT: 8.1 K/CU MM
SEGMENTED NEUTROPHILS RELATIVE PERCENT: 81 % (ref 36–66)
SODIUM BLD-SCNC: 138 MMOL/L (ref 135–145)
TOTAL PROTEIN: 5.9 GM/DL (ref 6.4–8.2)
WBC # BLD: 10 K/CU MM (ref 4–10.5)

## 2022-11-23 PROCEDURE — 85025 COMPLETE CBC W/AUTO DIFF WBC: CPT

## 2022-11-23 PROCEDURE — 6360000002 HC RX W HCPCS: Performed by: INTERNAL MEDICINE

## 2022-11-23 PROCEDURE — 96375 TX/PRO/DX INJ NEW DRUG ADDON: CPT

## 2022-11-23 PROCEDURE — 2580000003 HC RX 258: Performed by: INTERNAL MEDICINE

## 2022-11-23 PROCEDURE — 96413 CHEMO IV INFUSION 1 HR: CPT

## 2022-11-23 PROCEDURE — 80053 COMPREHEN METABOLIC PANEL: CPT

## 2022-11-23 PROCEDURE — 96367 TX/PROPH/DG ADDL SEQ IV INF: CPT

## 2022-11-23 RX ORDER — DEXAMETHASONE SODIUM PHOSPHATE 4 MG/ML
8 INJECTION, SOLUTION INTRA-ARTICULAR; INTRALESIONAL; INTRAMUSCULAR; INTRAVENOUS; SOFT TISSUE ONCE
Status: COMPLETED | OUTPATIENT
Start: 2022-11-23 | End: 2022-11-23

## 2022-11-23 RX ORDER — SODIUM CHLORIDE 0.9 % (FLUSH) 0.9 %
5-40 SYRINGE (ML) INJECTION PRN
Status: DISCONTINUED | OUTPATIENT
Start: 2022-11-23 | End: 2022-11-24 | Stop reason: HOSPADM

## 2022-11-23 RX ORDER — SODIUM CHLORIDE 9 MG/ML
5-250 INJECTION, SOLUTION INTRAVENOUS PRN
Status: DISCONTINUED | OUTPATIENT
Start: 2022-11-23 | End: 2022-11-24 | Stop reason: HOSPADM

## 2022-11-23 RX ADMIN — SODIUM CHLORIDE 20 ML/HR: 9 INJECTION, SOLUTION INTRAVENOUS at 10:33

## 2022-11-23 RX ADMIN — DEXAMETHASONE SODIUM PHOSPHATE 8 MG: 4 INJECTION, SOLUTION INTRAMUSCULAR; INTRAVENOUS at 10:33

## 2022-11-23 RX ADMIN — LEUCOVORIN CALCIUM 800 MG: 100 INJECTION, POWDER, LYOPHILIZED, FOR SUSPENSION INTRAMUSCULAR; INTRAVENOUS at 10:54

## 2022-11-23 RX ADMIN — FLUOROURACIL 800 MG: 50 INJECTION, SOLUTION INTRAVENOUS at 11:57

## 2022-11-23 NOTE — PROGRESS NOTES
Patient arrived to treatment suite with spouse for blood draw, pre-medications and chemotherapy infusion. PIV started in left arm, blood drawn from site and sent to lab for processing. Patient states having diarrhea 3-4x/day since starting an antibiotic. Has let the prescribing doctor know, but they did not discontinue. Treatment approved and given. Patient tolerated well. Left treatment suite ambulatory. Discharge instructions provided. Patient's status assessed and documented appropriately. All labs and required results were also reviewed today. Treatment parameters have been reviewed. Today's treatment has been approved by the provider. Treatment orders and medication sequencing (when applicable) was verified by 2 registered nurses. The treatment plan was confirmed with the patient prior to administration, and the patient understands the need to report any treatment-related symptoms. Prior to administration, when applicable, the following 8 elements of medication administration were reviewed with 2nd Registered Nurse prior to dosing: drug name, drug dose, infusion volume when prepared in a syringe, rate of administration, expiration dates and/or times, appearance and integrity of drug(s), and rate of pump for infusion. The 5 rights of medication administration have been verified.

## 2022-11-30 ENCOUNTER — HOSPITAL ENCOUNTER (OUTPATIENT)
Dept: INFUSION THERAPY | Age: 87
Discharge: HOME | End: 2022-11-30
Payer: MEDICARE

## 2022-11-30 VITALS
OXYGEN SATURATION: 95 % | BODY MASS INDEX: 28.03 KG/M2 | HEIGHT: 60 IN | SYSTOLIC BLOOD PRESSURE: 123 MMHG | DIASTOLIC BLOOD PRESSURE: 69 MMHG | TEMPERATURE: 97.1 F | HEART RATE: 65 BPM | WEIGHT: 142.8 LBS

## 2022-11-30 DIAGNOSIS — C17.2 ADENOCARCINOMA OF ILEUM (HCC): ICD-10-CM

## 2022-11-30 DIAGNOSIS — C18.2 MALIGNANT NEOPLASM OF ASCENDING COLON (HCC): Primary | ICD-10-CM

## 2022-11-30 PROBLEM — N39.0 UTI (URINARY TRACT INFECTION): Status: RESOLVED | Noted: 2022-10-31 | Resolved: 2022-11-30

## 2022-11-30 LAB
ALBUMIN SERPL-MCNC: 3.6 GM/DL (ref 3.4–5)
ALP BLD-CCNC: 53 IU/L (ref 40–128)
ALT SERPL-CCNC: 27 U/L (ref 10–40)
ANION GAP SERPL CALCULATED.3IONS-SCNC: 11 MMOL/L (ref 4–16)
AST SERPL-CCNC: 26 IU/L (ref 15–37)
BASOPHILS ABSOLUTE: 0 K/CU MM
BASOPHILS RELATIVE PERCENT: 0.2 % (ref 0–1)
BILIRUB SERPL-MCNC: 0.3 MG/DL (ref 0–1)
BUN BLDV-MCNC: 15 MG/DL (ref 6–23)
CALCIUM SERPL-MCNC: 8.7 MG/DL (ref 8.3–10.6)
CHLORIDE BLD-SCNC: 101 MMOL/L (ref 99–110)
CO2: 24 MMOL/L (ref 21–32)
CREAT SERPL-MCNC: 0.9 MG/DL (ref 0.6–1.1)
DIFFERENTIAL TYPE: ABNORMAL
EOSINOPHILS ABSOLUTE: 0.3 K/CU MM
EOSINOPHILS RELATIVE PERCENT: 3.6 % (ref 0–3)
GFR SERPL CREATININE-BSD FRML MDRD: >60 ML/MIN/1.73M2
GLUCOSE BLD-MCNC: 106 MG/DL (ref 70–99)
HCT VFR BLD CALC: 38.7 % (ref 37–47)
HEMOGLOBIN: 12.6 GM/DL (ref 12.5–16)
LYMPHOCYTES ABSOLUTE: 1.5 K/CU MM
LYMPHOCYTES RELATIVE PERCENT: 17.7 % (ref 24–44)
MCH RBC QN AUTO: 30.6 PG (ref 27–31)
MCHC RBC AUTO-ENTMCNC: 32.6 % (ref 32–36)
MCV RBC AUTO: 93.9 FL (ref 78–100)
MONOCYTES ABSOLUTE: 0.6 K/CU MM
MONOCYTES RELATIVE PERCENT: 7.6 % (ref 0–4)
PDW BLD-RTO: 14.8 % (ref 11.7–14.9)
PLATELET # BLD: 132 K/CU MM (ref 140–440)
PMV BLD AUTO: 9.2 FL (ref 7.5–11.1)
POTASSIUM SERPL-SCNC: 3.6 MMOL/L (ref 3.5–5.1)
RBC # BLD: 4.12 M/CU MM (ref 4.2–5.4)
SEGMENTED NEUTROPHILS ABSOLUTE COUNT: 5.8 K/CU MM
SEGMENTED NEUTROPHILS RELATIVE PERCENT: 70.9 % (ref 36–66)
SODIUM BLD-SCNC: 136 MMOL/L (ref 135–145)
TOTAL PROTEIN: 5.7 GM/DL (ref 6.4–8.2)
WBC # BLD: 8.2 K/CU MM (ref 4–10.5)

## 2022-11-30 PROCEDURE — 80053 COMPREHEN METABOLIC PANEL: CPT

## 2022-11-30 PROCEDURE — 85025 COMPLETE CBC W/AUTO DIFF WBC: CPT

## 2022-11-30 PROCEDURE — 96409 CHEMO IV PUSH SNGL DRUG: CPT

## 2022-11-30 PROCEDURE — 96411 CHEMO IV PUSH ADDL DRUG: CPT

## 2022-11-30 PROCEDURE — 2580000003 HC RX 258: Performed by: INTERNAL MEDICINE

## 2022-11-30 PROCEDURE — 96367 TX/PROPH/DG ADDL SEQ IV INF: CPT

## 2022-11-30 PROCEDURE — 96375 TX/PRO/DX INJ NEW DRUG ADDON: CPT

## 2022-11-30 PROCEDURE — 96366 THER/PROPH/DIAG IV INF ADDON: CPT

## 2022-11-30 PROCEDURE — 6360000002 HC RX W HCPCS: Performed by: INTERNAL MEDICINE

## 2022-11-30 RX ORDER — DEXAMETHASONE SODIUM PHOSPHATE 4 MG/ML
8 INJECTION, SOLUTION INTRA-ARTICULAR; INTRALESIONAL; INTRAMUSCULAR; INTRAVENOUS; SOFT TISSUE ONCE
Status: COMPLETED | OUTPATIENT
Start: 2022-11-30 | End: 2022-11-30

## 2022-11-30 RX ORDER — SODIUM CHLORIDE 9 MG/ML
5-250 INJECTION, SOLUTION INTRAVENOUS PRN
Status: DISCONTINUED | OUTPATIENT
Start: 2022-11-30 | End: 2022-12-01 | Stop reason: HOSPADM

## 2022-11-30 RX ADMIN — SODIUM CHLORIDE 20 ML/HR: 9 INJECTION, SOLUTION INTRAVENOUS at 11:01

## 2022-11-30 RX ADMIN — DEXAMETHASONE SODIUM PHOSPHATE 8 MG: 4 INJECTION, SOLUTION INTRAMUSCULAR; INTRAVENOUS at 10:05

## 2022-11-30 RX ADMIN — FLUOROURACIL 800 MG: 50 INJECTION, SOLUTION INTRAVENOUS at 12:16

## 2022-11-30 RX ADMIN — LEUCOVORIN CALCIUM 800 MG: 200 INJECTION, POWDER, LYOPHILIZED, FOR SUSPENSION INTRAMUSCULAR; INTRAVENOUS at 11:01

## 2022-12-02 ENCOUNTER — HOSPITAL ENCOUNTER (OUTPATIENT)
Dept: CT IMAGING | Age: 87
Discharge: HOME | End: 2022-12-02
Payer: MEDICARE

## 2022-12-02 DIAGNOSIS — N83.201 CYST OF RIGHT OVARY: ICD-10-CM

## 2022-12-02 PROCEDURE — 6360000004 HC RX CONTRAST MEDICATION: Performed by: FAMILY MEDICINE

## 2022-12-02 PROCEDURE — 74177 CT ABD & PELVIS W/CONTRAST: CPT

## 2022-12-02 RX ADMIN — IOPAMIDOL 100 ML: 755 INJECTION, SOLUTION INTRAVENOUS at 15:32

## 2022-12-05 ENCOUNTER — OFFICE VISIT (OUTPATIENT)
Dept: SURGERY | Age: 87
End: 2022-12-05

## 2022-12-05 VITALS
HEIGHT: 60 IN | WEIGHT: 142 LBS | BODY MASS INDEX: 27.88 KG/M2 | SYSTOLIC BLOOD PRESSURE: 126 MMHG | DIASTOLIC BLOOD PRESSURE: 74 MMHG

## 2022-12-05 DIAGNOSIS — C17.2 ADENOCARCINOMA OF ILEUM (HCC): ICD-10-CM

## 2022-12-05 DIAGNOSIS — N83.8 COMPLEX CYST OF UTERINE ADNEXA: Primary | ICD-10-CM

## 2022-12-05 DIAGNOSIS — K63.89 CECUM MASS: ICD-10-CM

## 2022-12-05 ASSESSMENT — ENCOUNTER SYMPTOMS
SHORTNESS OF BREATH: 0
BLOOD IN STOOL: 0
NAUSEA: 0
CONSTIPATION: 0
DIARRHEA: 0
VOMITING: 0
ABDOMINAL PAIN: 0

## 2022-12-05 ASSESSMENT — PATIENT HEALTH QUESTIONNAIRE - PHQ9
2. FEELING DOWN, DEPRESSED OR HOPELESS: 0
SUM OF ALL RESPONSES TO PHQ9 QUESTIONS 1 & 2: 0
SUM OF ALL RESPONSES TO PHQ QUESTIONS 1-9: 0
SUM OF ALL RESPONSES TO PHQ QUESTIONS 1-9: 0
1. LITTLE INTEREST OR PLEASURE IN DOING THINGS: 0
SUM OF ALL RESPONSES TO PHQ QUESTIONS 1-9: 0
SUM OF ALL RESPONSES TO PHQ QUESTIONS 1-9: 0

## 2022-12-05 NOTE — PROGRESS NOTES
GENERAL SURGERY NOTE - Outpatient Post-Op  [facility]) Physicians    PATIENT: Aminah Pruett 1934, 80 y.o., female   Date: 12/5/2022  MRN: [id number]   Requesting Provider:   No ref. provider found History Obtained From:  patient, electronic medical record     Reason for Evaluation & Chief Complaint:    Chief Complaint   Patient presents with    Follow-up     FU CT ABD S/P Bowel Resection R Hemicollectomy @ [facility] 5/27/22     HISTORY OF PRESENT ILLNESS:      Wilman Wheeler is a 80 y.o. female presenting postoperatively after robotic assisted right hemicolectomy for ileocecal mass. Since the procedure, she has been doing well overall. She saw Dr. Tomás Treviño on 11/17/22. Adjuvant therapy was started on 7/26/22. She was suppose to see GYN last month but did not go. Will assist with getting her scheduled for follow up on repeat Ct. Past Medical History:    Past Medical History:   Diagnosis Date    Cancer of right colon ([facility] 7/5/2022    Diabetes mellitus ([facility]     H/O cardiac catheterization 05/18/2021    no significant CAD in main vessels, has severe stenosis in small  branch diagonal vessel    H/O cardiovascular stress test 3/22/18,03/30/2017    ECG portion of the stress test is negative for ischemia EF >70% Normal stress myocardial perfusion. H/O cardiovascular stress test 04/07/2021    abnormal stress    H/O Doppler ultrasound (Abdomimal Aorta) 03/29/2017    No evidence of abdominal aortic aneurysm. H/O echocardiogram 07/02/2014    Normal left ventricular size, wall motion and systolic function. Mildle elevated pulmonary artery systolic pressure. Mild MR and TR and trace AR EF 55 to 60%    Hx of echocardiogram 03/29/2017    EF 55-60%. Grade I diastolic dysfunction. Mildly dilated left atrium. No evidence of pericardial effusion.      Hyperlipidemia     Hypertension     Sleep apnea     Thyroid disease        Past Surgical History:    Past Surgical History:   Procedure Laterality Date    BREAST BIOPSY Right     approx age 76, benign    CATARACT REMOVAL      CHOLECYSTECTOMY      COLONOSCOPY N/A 5/25/2022    COLONOSCOPY POLYPECTOMY SNARE/COLD BIOPSY performed by Marissa Carr MD at [address] [facility] [address] 5/27/2022    BOWEL RESECTION RIGHT HEMICOLECTOMY LAPAROSCOPIC ROBOTIC XI performed by Sebastian Ruff MD at [address]      eye lift       Current Medications:   Current Outpatient Medications   Medication Sig Dispense Refill    linezolid (ZYVOX) 600 MG tablet Take 600 mg by mouth daily      ondansetron (ZOFRAN) 8 MG tablet Take 1 tablet by mouth every 8 hours as needed for Nausea or Vomiting 90 tablet 3    gabapentin (NEURONTIN) 100 MG capsule       Magic Mouthwash (MIRACLE MOUTHWASH) Equal parts of Benadryl, Maalox, 2% Viscous Xylocaine and Dexamethasone. Take 5 mL 4 times daily. 300 mL 3    metoprolol tartrate (LOPRESSOR) 50 MG tablet Take 1 tablet by mouth in the morning and 1 tablet before bedtime. 3 pills in the AM : 150 mg dose  2 pills in the PM: 100 mg dose.  60 tablet 3    sodium chloride 1 g tablet Take 1 g by mouth 2 times daily      sucralfate (CARAFATE) 1 GM tablet Take 1 tablet by mouth 4 times daily (before meals and nightly) 120 tablet 0    melatonin 3 MG TABS tablet Take 3 mg by mouth nightly as needed      vitamin B-12 (CYANOCOBALAMIN) 100 MCG tablet Take 100 mcg by mouth daily      LANTUS SOLOSTAR 100 UNIT/ML injection pen       Cholecalciferol (VITAMIN D PO) Take by mouth      atorvastatin (LIPITOR) 20 MG tablet Take 20 mg by mouth daily      rOPINIRole (REQUIP) 3 MG tablet Take 3 mg by mouth nightly      levothyroxine (SYNTHROID) 137 MCG tablet Take 137 mcg by mouth daily   3    Insulin Pen Needle (PEN NEEDLES 31GX5/16\") 31G X 8 MM MISC       furosemide (LASIX) 20 MG tablet TAKE 1 TABLET BY MOUTH AS NEEDED FOR WEIGHT GAIN OF 3LBS IN A DAY OR 5 LBS IN A WEEK 30 tablet 10    potassium chloride (KLOR-CON M) 20 MEQ extended release tablet Take 1 tablet by mouth daily as needed (with Lasix) 30 tablet 0     No current facility-administered medications for this visit. Allergies:  Lopid [gemfibrozil], Bystolic [nebivolol hcl], Cefdinir, Coreg [carvedilol], Crestor [rosuvastatin], Fenofibrate, Glucophage [metformin], Hydrochlorothiazide, Metronidazole, Norvasc [amlodipine besylate], Tribenzor [olmesartan-amlodipine-hctz], Zocor [simvastatin], and Ciprofloxacin    Social History:   Social History     Socioeconomic History    Marital status:      Spouse name: None    Number of children: None    Years of education: None    Highest education level: None   Tobacco Use    Smoking status: Never    Smokeless tobacco: Never   Vaping Use    Vaping Use: Never used   Substance and Sexual Activity    Alcohol use: Not Currently     Alcohol/week: 1.0 standard drink     Types: 1 Glasses of wine per week     Comment: rarely. 1-2cups coffee daily    Drug use: No       Family History:   Family History   Problem Relation Age of Onset    Pacemaker Sister     Arrhythmia Sister     Breast Cancer Sister         pre menopausal    Ovarian Cancer Neg Hx        REVIEW OF SYSTEMS:    Review of Systems   Constitutional:  Negative for appetite change, chills and fever. Respiratory:  Negative for shortness of breath. Cardiovascular:  Negative for chest pain. Gastrointestinal:  Negative for abdominal pain, blood in stool, constipation, diarrhea, nausea and vomiting. Genitourinary:  Positive for difficulty urinating and frequency. I have reviewed the patient's information pertinent to this visit, including medical history, family history, social history and review of systems. PHYSICAL EXAM:    Vitals:    12/05/22 1501   BP: 126/74   Site: Left Upper Arm   Position: Sitting   Cuff Size: Medium Adult   Weight: 142 lb (64.4 kg)   Height: 5' (1.524 m)       Physical Exam  Constitutional:       General: She is not in acute distress. Appearance: She is well-developed. She is not diaphoretic. HENT:      Head: Normocephalic and atraumatic. Eyes:      Pupils: Pupils are equal, round, and reactive to light. Cardiovascular:      Rate and Rhythm: Normal rate and regular rhythm. Pulmonary:      Effort: Pulmonary effort is normal. No respiratory distress. Abdominal:      Palpations: Abdomen is soft. Tenderness: There is no abdominal tenderness. There is no guarding or rebound. Comments: Incision(s) well healed, no erythema or drainage    Neurological:      General: No focal deficit present. Mental Status: She is alert. Psychiatric:         Behavior: Behavior normal.     Labs:  Lab Results   Component Value Date    WBC 7.0 12/07/2022    HGB 11.7 (L) 12/07/2022    HCT 36.4 (L) 12/07/2022     12/07/2022     11/30/2022    K 3.6 11/30/2022     11/30/2022    CO2 24 11/30/2022    BUN 15 11/30/2022    CREATININE 0.9 11/30/2022    GLUCOSE 106 (H) 11/30/2022    CALCIUM 8.7 11/30/2022    PROT 5.7 (L) 11/30/2022    BILITOT 0.3 11/30/2022    AST 26 11/30/2022    ALT 27 11/30/2022    ALKPHOS 53 11/30/2022    AMYLASE 56 03/10/2017    LIPASE 20 10/31/2022    INR 2.14 07/05/2022    GLUF 121 (H) 12/06/2017    LABA1C 5.9 05/22/2022       Imaging:     Pertinent laboratory and imaging studies were personally reviewed if available. IMPRESSION:    Lily Mccain is a 80 y.o. 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 She is following up after repeat CT abdomen/pelvis    Visit Diagnoses:  1. Complex cyst of uterine adnexa    2. Adenocarcinoma of ileum ([address]    3.  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     Moderate malnutrition (HCC)     Chronic kidney disease, stage 3a ([facility]     Acquired hypothyroidism     Reactive depression     Adenocarcinoma ([facility] 06/09/2022    Partial small bowel obstruction (HCC)     Severe malnutrition ([facility] 05/31/2022    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    Abnormal stress test     Dyslipidemia 04/06/2021    Abnormal EKG 04/06/2021    Long-term insulin use in type 2 diabetes ([facility] 03/14/2018    Essential hypertension 03/14/2018       PLAN:    Doing well post op, called to schedule with GYN for her abnormal CT. Follow up with Oncology, Dr. Juan Perez as scheduled  Follow Up: Return in about 3 months (around 3/5/2023).     Electronically signed by LAURE George CNP, 12/8/2022, 7:24 PM.

## 2022-12-07 ENCOUNTER — HOSPITAL ENCOUNTER (OUTPATIENT)
Dept: INFUSION THERAPY | Age: 87
Discharge: HOME | End: 2022-12-07
Payer: MEDICARE

## 2022-12-07 VITALS
OXYGEN SATURATION: 95 % | HEART RATE: 83 BPM | WEIGHT: 142 LBS | TEMPERATURE: 97.9 F | SYSTOLIC BLOOD PRESSURE: 105 MMHG | BODY MASS INDEX: 27.88 KG/M2 | HEIGHT: 60 IN | DIASTOLIC BLOOD PRESSURE: 65 MMHG

## 2022-12-07 DIAGNOSIS — C17.2 ADENOCARCINOMA OF ILEUM (HCC): ICD-10-CM

## 2022-12-07 DIAGNOSIS — C18.2 MALIGNANT NEOPLASM OF ASCENDING COLON (HCC): Primary | ICD-10-CM

## 2022-12-07 LAB
BASOPHILS ABSOLUTE: 0 K/CU MM
BASOPHILS RELATIVE PERCENT: 0.6 % (ref 0–1)
DIFFERENTIAL TYPE: ABNORMAL
EOSINOPHILS ABSOLUTE: 0.4 K/CU MM
EOSINOPHILS RELATIVE PERCENT: 5.2 % (ref 0–3)
HCT VFR BLD CALC: 36.4 % (ref 37–47)
HEMOGLOBIN: 11.7 GM/DL (ref 12.5–16)
LYMPHOCYTES ABSOLUTE: 1.3 K/CU MM
LYMPHOCYTES RELATIVE PERCENT: 18.5 % (ref 24–44)
MCH RBC QN AUTO: 30.5 PG (ref 27–31)
MCHC RBC AUTO-ENTMCNC: 32.1 % (ref 32–36)
MCV RBC AUTO: 94.8 FL (ref 78–100)
MONOCYTES ABSOLUTE: 0.5 K/CU MM
MONOCYTES RELATIVE PERCENT: 6.4 % (ref 0–4)
PDW BLD-RTO: 15.1 % (ref 11.7–14.9)
PLATELET # BLD: 169 K/CU MM (ref 140–440)
PMV BLD AUTO: 9.5 FL (ref 7.5–11.1)
RBC # BLD: 3.84 M/CU MM (ref 4.2–5.4)
SEGMENTED NEUTROPHILS ABSOLUTE COUNT: 4.9 K/CU MM
SEGMENTED NEUTROPHILS RELATIVE PERCENT: 69.3 % (ref 36–66)
WBC # BLD: 7 K/CU MM (ref 4–10.5)

## 2022-12-07 PROCEDURE — 96375 TX/PRO/DX INJ NEW DRUG ADDON: CPT

## 2022-12-07 PROCEDURE — 2580000003 HC RX 258: Performed by: INTERNAL MEDICINE

## 2022-12-07 PROCEDURE — 96413 CHEMO IV INFUSION 1 HR: CPT

## 2022-12-07 PROCEDURE — 85025 COMPLETE CBC W/AUTO DIFF WBC: CPT

## 2022-12-07 PROCEDURE — 96361 HYDRATE IV INFUSION ADD-ON: CPT

## 2022-12-07 PROCEDURE — 6360000002 HC RX W HCPCS: Performed by: INTERNAL MEDICINE

## 2022-12-07 RX ORDER — DEXAMETHASONE SODIUM PHOSPHATE 4 MG/ML
8 INJECTION, SOLUTION INTRA-ARTICULAR; INTRALESIONAL; INTRAMUSCULAR; INTRAVENOUS; SOFT TISSUE ONCE
Status: COMPLETED | OUTPATIENT
Start: 2022-12-07 | End: 2022-12-07

## 2022-12-07 RX ORDER — SODIUM CHLORIDE 9 MG/ML
5-250 INJECTION, SOLUTION INTRAVENOUS PRN
Status: DISCONTINUED | OUTPATIENT
Start: 2022-12-07 | End: 2022-12-08 | Stop reason: HOSPADM

## 2022-12-07 RX ADMIN — DEXAMETHASONE SODIUM PHOSPHATE 8 MG: 4 INJECTION, SOLUTION INTRAMUSCULAR; INTRAVENOUS at 11:08

## 2022-12-07 RX ADMIN — FLUOROURACIL 800 MG: 50 INJECTION, SOLUTION INTRAVENOUS at 12:57

## 2022-12-07 RX ADMIN — LEUCOVORIN CALCIUM 800 MG: 200 INJECTION, POWDER, LYOPHILIZED, FOR SUSPENSION INTRAMUSCULAR; INTRAVENOUS at 11:54

## 2022-12-07 NOTE — PROGRESS NOTES
Ambulated to infusion area, accompanied by . Here for treatment. Occasional Nausea managed with Zofran. Labs drawn. CBC within defined parameters, reviewed with patient. Chemo administered as ordered. Call light within reach. Tolerated infusion without incident. AVS provided. Discharged in stable condition.

## 2022-12-08 RX ORDER — FUROSEMIDE 20 MG/1
TABLET ORAL
Qty: 30 TABLET | Refills: 10 | Status: SHIPPED | OUTPATIENT
Start: 2022-12-08

## 2022-12-09 RX ORDER — ONDANSETRON HYDROCHLORIDE 8 MG/1
8 TABLET, FILM COATED ORAL EVERY 8 HOURS PRN
Qty: 90 TABLET | Refills: 3 | Status: SHIPPED | OUTPATIENT
Start: 2022-12-09

## 2022-12-09 NOTE — TELEPHONE ENCOUNTER
[facility] pharmacy called for a refill for Zofran 8mg. Pending RX to Provider to send to the pharmacy.

## 2022-12-14 ENCOUNTER — HOSPITAL ENCOUNTER (OUTPATIENT)
Dept: INFUSION THERAPY | Age: 87
Discharge: HOME | End: 2022-12-14
Payer: MEDICARE

## 2022-12-14 ENCOUNTER — OFFICE VISIT (OUTPATIENT)
Dept: ONCOLOGY | Age: 87
End: 2022-12-14
Payer: MEDICARE

## 2022-12-14 DIAGNOSIS — C18.2 MALIGNANT NEOPLASM OF ASCENDING COLON (HCC): Primary | ICD-10-CM

## 2022-12-14 DIAGNOSIS — C17.2 ADENOCARCINOMA OF ILEUM (HCC): ICD-10-CM

## 2022-12-14 DIAGNOSIS — T45.1X5A CHEMOTHERAPY INDUCED DIARRHEA: ICD-10-CM

## 2022-12-14 DIAGNOSIS — K52.1 CHEMOTHERAPY INDUCED DIARRHEA: ICD-10-CM

## 2022-12-14 LAB
ALBUMIN SERPL-MCNC: 3.9 GM/DL (ref 3.4–5)
ALP BLD-CCNC: 54 IU/L (ref 40–128)
ALT SERPL-CCNC: 30 U/L (ref 10–40)
ANION GAP SERPL CALCULATED.3IONS-SCNC: 10 MMOL/L (ref 4–16)
AST SERPL-CCNC: 25 IU/L (ref 15–37)
BASOPHILS ABSOLUTE: 0 K/CU MM
BASOPHILS RELATIVE PERCENT: 0.3 % (ref 0–1)
BILIRUB SERPL-MCNC: 0.4 MG/DL (ref 0–1)
BUN BLDV-MCNC: 21 MG/DL (ref 6–23)
CALCIUM SERPL-MCNC: 9 MG/DL (ref 8.3–10.6)
CHLORIDE BLD-SCNC: 99 MMOL/L (ref 99–110)
CO2: 25 MMOL/L (ref 21–32)
CREAT SERPL-MCNC: 0.9 MG/DL (ref 0.6–1.1)
DIFFERENTIAL TYPE: ABNORMAL
EOSINOPHILS ABSOLUTE: 0.3 K/CU MM
EOSINOPHILS RELATIVE PERCENT: 3.2 % (ref 0–3)
GFR SERPL CREATININE-BSD FRML MDRD: >60 ML/MIN/1.73M2
GLUCOSE BLD-MCNC: 114 MG/DL (ref 70–99)
HCT VFR BLD CALC: 38.8 % (ref 37–47)
HEMOGLOBIN: 12.6 GM/DL (ref 12.5–16)
LYMPHOCYTES ABSOLUTE: 1.7 K/CU MM
LYMPHOCYTES RELATIVE PERCENT: 18.3 % (ref 24–44)
MCH RBC QN AUTO: 30.9 PG (ref 27–31)
MCHC RBC AUTO-ENTMCNC: 32.5 % (ref 32–36)
MCV RBC AUTO: 95.1 FL (ref 78–100)
MONOCYTES ABSOLUTE: 0.6 K/CU MM
MONOCYTES RELATIVE PERCENT: 6.6 % (ref 0–4)
PDW BLD-RTO: 16 % (ref 11.7–14.9)
PLATELET # BLD: 175 K/CU MM (ref 140–440)
PMV BLD AUTO: 9.6 FL (ref 7.5–11.1)
POTASSIUM SERPL-SCNC: 4 MMOL/L (ref 3.5–5.1)
RBC # BLD: 4.08 M/CU MM (ref 4.2–5.4)
SEGMENTED NEUTROPHILS ABSOLUTE COUNT: 6.6 K/CU MM
SEGMENTED NEUTROPHILS RELATIVE PERCENT: 71.6 % (ref 36–66)
SODIUM BLD-SCNC: 134 MMOL/L (ref 135–145)
TOTAL PROTEIN: 6.2 GM/DL (ref 6.4–8.2)
WBC # BLD: 9.2 K/CU MM (ref 4–10.5)

## 2022-12-14 PROCEDURE — 85025 COMPLETE CBC W/AUTO DIFF WBC: CPT

## 2022-12-14 PROCEDURE — 1124F ACP DISCUSS-NO DSCNMKR DOCD: CPT | Performed by: NURSE PRACTITIONER

## 2022-12-14 PROCEDURE — 96415 CHEMO IV INFUSION ADDL HR: CPT

## 2022-12-14 PROCEDURE — 96375 TX/PRO/DX INJ NEW DRUG ADDON: CPT

## 2022-12-14 PROCEDURE — 96413 CHEMO IV INFUSION 1 HR: CPT

## 2022-12-14 PROCEDURE — 96417 CHEMO IV INFUS EACH ADDL SEQ: CPT

## 2022-12-14 PROCEDURE — 80053 COMPREHEN METABOLIC PANEL: CPT

## 2022-12-14 PROCEDURE — 2580000003 HC RX 258: Performed by: INTERNAL MEDICINE

## 2022-12-14 PROCEDURE — 99214 OFFICE O/P EST MOD 30 MIN: CPT | Performed by: NURSE PRACTITIONER

## 2022-12-14 PROCEDURE — 6360000002 HC RX W HCPCS: Performed by: INTERNAL MEDICINE

## 2022-12-14 RX ORDER — SODIUM CHLORIDE 0.9 % (FLUSH) 0.9 %
5-40 SYRINGE (ML) INJECTION PRN
Status: DISCONTINUED | OUTPATIENT
Start: 2022-12-14 | End: 2022-12-15 | Stop reason: HOSPADM

## 2022-12-14 RX ORDER — DEXAMETHASONE SODIUM PHOSPHATE 4 MG/ML
8 INJECTION, SOLUTION INTRA-ARTICULAR; INTRALESIONAL; INTRAMUSCULAR; INTRAVENOUS; SOFT TISSUE ONCE
Status: COMPLETED | OUTPATIENT
Start: 2022-12-14 | End: 2022-12-14

## 2022-12-14 RX ORDER — MIRABEGRON 25 MG/1
TABLET, FILM COATED, EXTENDED RELEASE ORAL
COMMUNITY
Start: 2022-11-07

## 2022-12-14 RX ORDER — SODIUM CHLORIDE 9 MG/ML
5-250 INJECTION, SOLUTION INTRAVENOUS PRN
Status: DISCONTINUED | OUTPATIENT
Start: 2022-12-14 | End: 2022-12-15 | Stop reason: HOSPADM

## 2022-12-14 RX ADMIN — SODIUM CHLORIDE 20 ML/HR: 9 INJECTION, SOLUTION INTRAVENOUS at 10:57

## 2022-12-14 RX ADMIN — DEXAMETHASONE SODIUM PHOSPHATE 8 MG: 4 INJECTION, SOLUTION INTRAMUSCULAR; INTRAVENOUS at 10:57

## 2022-12-14 RX ADMIN — LEUCOVORIN CALCIUM 800 MG: 500 INJECTION, POWDER, LYOPHILIZED, FOR SOLUTION INTRAMUSCULAR; INTRAVENOUS at 11:37

## 2022-12-14 RX ADMIN — FLUOROURACIL 800 MG: 50 INJECTION, SOLUTION INTRAVENOUS at 12:41

## 2022-12-14 NOTE — PROGRESS NOTES
Ambulated to treatment suite for Adrucil and Leucovorin infusion. PIV #24 placed in right forearm without difficulty. Labs obtained. Flushed and locked with normal saline. Labs reviewed and resulted. Treatment plan approved and released. Pre meds and chemo given as ordered. Pt tolerated well. AVS provided. PIV disconnected per protocol. Discharge in stable condition.  Darion Leung RN

## 2022-12-14 NOTE — PROGRESS NOTES
MA Rooming Questions  Patient: Ovi Reddy  MRN: [id number]    Date: 12/14/2022        1. Do you have any new issues? yes - tired, not sleeping well-past few weeks. 2. Do you need any refills on medications?    no    3. Have you had any imaging done since your last visit?   no    4. Have you been hospitalized or seen in the emergency room since your last visit here?   no    5. Did the patient have a depression screening completed today?  No    No data recorded     PHQ-9 Given to (if applicable):               PHQ-9 Score (if applicable):                     [] Positive     []  Negative              Does question #9 need addressed (if applicable)                     [] Yes    []  No               Cassandra العراقي CMA

## 2022-12-14 NOTE — PROGRESS NOTES
Patient Name:  Gianfranco Morley  Patient :  1934  Patient MRN:  [id number]     Primary Oncologist: Kendall Salgado MD  Referring Provider: Ildefonso Aj DO     Date of Service: 2022      Chief Complaint:    Chief Complaint   Patient presents with    Follow-up    Chemotherapy     Patient Active Problem List:     Long-term insulin use in type 2 diabetes [facility])     Essential hypertension     Dyslipidemia     Abnormal EKG     Abnormal stress test     Cecum mass     Severe malnutrition (HCC)     Partial small bowel obstruction ([address]     Adenocarcinoma ([address]     Generalized weakness     Uncontrolled pain     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 female with medical history significant for CKD stage IIIa, diverticulitis, IDDM 2 with peripheral neuropathy, HTN, HLD, G1DD, KEVIN, hypothyroidism, and vitamin B12 deficiency, presented on 22 with complaints of abdominal pain. She has been treated recently for abdominal pain and suspected diverticulitis, but was not improving after getting antibiotics which prompted her to return to the ED. She denies any personal or family history of colon cancer. Her sister had breast cancer in her 42's. She has had colonoscopies in the past which were normal, but thinks her last one was more than 10 years ago. She denies any previous problems with ovarian cysts or adnexal lesions. She underwent colonoscopy on 22 and it showed large mass extending from the ileocecal valve into the cecum mass originating at ileocecal valve appears to be more tubulovillous, mass in the cecum appears to be more firm fixed ulcerated consistent with malignancy. Biopsies were obtained.  Mild to moderate sigmoid diverticulosis and grade 2 hemorrhoids and incidental lipoma in transverse colon     Biopsy from cecal mass showed invasive moderately differentiated adenocarcinoma. MRI pelvis showed complex ovarian cyst without internal enhancement to suggest solid mass. Radiologist recommend f/u imaging with CT or MRI in 6 months. She is s/p surgery on 5/27/22. Final pathology revealed that she has stage III terminal ileum adenocarcinoma (pT3, pN1c). She has lost about 25 lbs over last 2 - 3 months before surgery. Her CEA on 5/22/22 was 3.7. Adjuvant chemotherapy with xeloda was started on 7/26/2022 and we stopped it after 9/6/22 due to severe oral mucositis. We changed her chemo to weekly 5Fu and leukovorin. It was started on 11/9/22. On December 14, 2022, she presented to me for follow-up. 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 I reviewed final path report with her and her family. We also reviewed NCCN guidelines. I also let her know that we ideally recommend adjuvant chemotherapy with either CapeOx (capecitabine + oxaliplatin) for 3 months, FOLFOX for 6 months or single agent capecitabine (in frail patient) for six months. I also discussed with her and her family all the potential side effects as well as benefits of adjuvant chemotherapy. After long discussion with her and family, they are in agreement to start adjuvant chemotherapy with capecitabine. It was started on 7/26/22. She is in her second cycle of chemotherapy (day 6). She has significant oral mucositis and it interfere with her eating and drinking. She is on xeloda 1500 mg BID. I stopped it since 9/6/22. Since she couldn't tolerate xeloda, we changed it to 5Fu and leukovorin. It was started on 11/9/22. She is tolerating 5-FU leucovorin well and I recommended to continue with it for now. Labs are within range for treatment. She will have C1D36 today.      She is going to see gynecologist tomorrow to receive results from her ultrasound. Chemo induced mucositis - resolved     Chemo induced nausea - mild symptom only. Recommend her to take zofran prn. Chemo induced diarrhea- using imodium with good control of symptoms. Encouraged hydration. She does not have any other significant symptoms at today's visit. Past Medical History:     Significant for  1. Hypertension  2. Hyperlipidemia  3. Diabetes mellitus  4. Hypothyroidism  5. Obstructive sleep apnea  6. Coronary artery disease    Past Surgery History:    Significant for  1. Cholecystectomy  2. Dilatation and curettage  3. Cataract surgery  4. Right breast biopsy [benign]  5. Right hemicolectomy    Social History:   She denies smoking or illicit drug abuse. She socially drinks alcohol. Family History:    Significant for breast cancer in one of her sister. Allergies   Allergen Reactions    Lopid [Gemfibrozil] Shortness Of Breath and Other (See Comments)     Cough    Amoxicillin     Bystolic [Nebivolol Hcl]     Cefdinir     Coreg [Carvedilol]     Crestor [Rosuvastatin]     Fenofibrate     Glucophage [Metformin]     Hydrochlorothiazide Other (See Comments)    Metronidazole     Norvasc [Amlodipine Besylate]     Tribenzor [Olmesartan-Amlodipine-Hctz]      Pt states that her chest felt funny and achy when she took the medication    Zocor [Simvastatin]     Ciprofloxacin Itching     Review of Systems: \"Per interval history; otherwise 10 point ROS is negative. \"  Reports poor sleep, ongoing fatigue. Reports chronic neuropathy to left hand with carpal tunnel but ? Slightly worse. Denies fever, chills, night sweats, no dizziness, visual changes, or headache. Denies mucositis, dysphagia, no cough, chest pain, hemoptysis, shortness of breath, no nausea, vomiting, +diarrhea, no constipation, no melena or hematochezia, no dysuria, hematuria, no lower extremity edema or calf pain. Denies acute pain. No worsening depression. Vital Signs:  There were no vitals taken for this visit. Physical Exam:  CONSTITUTIONAL: awake, alert, cooperative, no apparent distress   EYES:EOM intact, sclera clear, normal conjunctiva  ENT: Normocephalic, without obvious abnormality, atraumatic  NECK: supple, symmetrical  HEMATOLOGIC/LYMPHATIC: no cervical, supraclavicular or axillary lymphadenopathy   LUNGS: VBS, no wheezes, no increased work of breathing, no rhonchi, clear to auscultation, no crackles,    CARDIOVASCULAR: regular rate and rhythm, normal S1 and S2, no murmur noted  ABDOMEN: normal bowel sounds x 4, soft, non-distended, non-tender, no masses palpated, no hepatosplenomegaly   MUSCULOSKELETAL: full range of motion noted, tone is normal  NEUROLOGIC: awake, alert, oriented to name, place and time. Motor skills grossly intact. SKIN: appears intact, normal skin color, normal texture, normal turgor, no jaundice.    EXTREMITIES: no clubbing     Labs:  Hematology:  Lab Results   Component Value Date    WBC 7.0 12/07/2022    RBC 3.84 (L) 12/07/2022    HGB 11.7 (L) 12/07/2022    HCT 36.4 (L) 12/07/2022    MCV 94.8 12/07/2022    MCH 30.5 12/07/2022    MCHC 32.1 12/07/2022    RDW 15.1 (H) 12/07/2022     12/07/2022    MPV 9.5 12/07/2022    SEGSPCT 69.3 (H) 12/07/2022    EOSRELPCT 5.2 (H) 12/07/2022    BASOPCT 0.6 12/07/2022    LYMPHOPCT 18.5 (L) 12/07/2022    MONOPCT 6.4 (H) 12/07/2022    SEGSABS 4.9 12/07/2022    EOSABS 0.4 12/07/2022    BASOSABS 0.0 12/07/2022    LYMPHSABS 1.3 12/07/2022    MONOSABS 0.5 12/07/2022    DIFFTYPE AUTOMATED DIFFERENTIAL 12/07/2022     Lab Results   Component Value Date    ESR 17 07/08/2022     Chemistry:  Lab Results   Component Value Date     11/30/2022    K 3.6 11/30/2022     11/30/2022    CO2 24 11/30/2022    BUN 15 11/30/2022    CREATININE 0.9 11/30/2022    GLUCOSE 106 (H) 11/30/2022    CALCIUM 8.7 11/30/2022    PROT 5.7 (L) 11/30/2022    LABALBU 3.6 11/30/2022    BILITOT 0.3 11/30/2022    ALKPHOS 53 11/30/2022    AST 26 11/30/2022    ALT 27 11/30/2022    LABGLOM >60 11/30/2022    GFRAA >60 08/09/2022    PHOS 3.0 06/08/2022    MG 1.7 (L) 06/08/2022    POCGLU 83 11/04/2022     Lab Results   Component Value Date     07/08/2022     No components found for: LD  Lab Results   Component Value Date    TSHHS 0.095 (L) 07/08/2022    T4FREE 1.24 06/12/2022    FT3 2.3 12/06/2017     Immunology:  Lab Results   Component Value Date    PROT 5.7 (L) 11/30/2022     No results found for: Shen Samira, KLFLCR  No results found for: B2M  Coagulation Panel:  Lab Results   Component Value Date    PROTIME 26.8 (H) 07/05/2022    INR 2.14 07/05/2022    APTT 29.5 05/14/2021    DDIMER <200 01/24/2016     Anemia Panel:  Lab Results   Component Value Date    PZEVROFF39 1035 (H) 07/08/2022    FOLATE 11.3 07/08/2022     Tumor Markers:  Lab Results   Component Value Date     49 (H) 11/02/2022    CEA 5.1 08/09/2022        Observations:  No data recorded     Assessment   Stage III terminal ileum adenocarcinoma. Plan:  I reviewed with her findings on CT scan, US pelvis, colonoscopy and labs. Cecal lesion is concerning for malignant process and right adnexa cystic lesion needs further evaluation with MRI. 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 MRI pelvis showed complex ovarian cyst without internal enhancement to suggest solid mass. Radiologist recommend f/u imaging with CT or MRI in 6 months. She is s/p surgery on 5/27/22. Final pathology revealed that she has stage III terminal ileum adenocarcinoma (pT3, pN1c). She has lost about 25 lbs over last 2 - 3 months before surgery. Her CEA on 5/22/22 was 3.7. Adjuvant chemotherapy with xeloda was started on 7/26/2022 and we stopped it after 9/6/22 due to severe oral mucositis. We changed her chemo to weekly 5Fu and leukovorin. It was started on 11/9/22. On December 14, 2022, she presented to me for follow-up.   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 I reviewed final path report with her and her family. We also reviewed NCCN guidelines. I also let her know that we ideally recommend adjuvant chemotherapy with either CapeOx (capecitabine + oxaliplatin) for 3 months, FOLFOX for 6 months or single agent capecitabine (in frail patient) for six months. I also discussed with her and her family all the potential side effects as well as benefits of adjuvant chemotherapy. After long discussion with her and family, they are in agreement to start adjuvant chemotherapy with capecitabine. It was started on 7/26/22. She is in her second cycle of chemotherapy (day 6). She has significant oral mucositis and it interfere with her eating and drinking. She is on xeloda 1500 mg BID. I stopped it since 9/6/22. Since she couldn't tolerate xeloda, we changed it to 5Fu and leukovorin. It was started on 11/9/22. She is tolerating 5-FU leucovorin well and I recommended to continue with it for now. Labs are within range for treatment. She will have C1D36 today. She is going to see gynecologist tomorrow to receive results from her ultrasound. Chemo induced mucositis - resolved     Chemo induced nausea - mild symptom only. Recommend her to take zofran prn. Chemo induced diarrhea- using imodium with good control of symptoms. Encouraged hydration. I answered all her questions and concerns for today. Recent imaging and labs were reviewed and discussed with the patient.

## 2023-01-03 NOTE — PROGRESS NOTES
Patient Name:  Maikel Whiting  Patient :  10/20/1934  Patient MRN:  [id number]     Primary Oncologist: Pamela Young MD  Referring Provider: Zeina Aj DO     Date of Service: 2023      Chief Complaint:    Chief Complaint   Patient presents with    Follow-up    Chemotherapy     Patient Active Problem List:     Long-term insulin use in type 2 diabetes [facility])     Essential hypertension     Dyslipidemia     Abnormal EKG     Abnormal stress test     Cecum mass     Severe malnutrition (HCC)     Partial small bowel obstruction (HCC)     Adenocarcinoma (HCC)     Generalized weakness     Uncontrolled pain     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 She has been treated recently for abdominal pain and suspected diverticulitis, but was not improving after getting antibiotics which prompted her to return to the ED. She denies any personal or family history of colon cancer. Her sister had breast cancer in her 42's. She has had colonoscopies in the past which were normal, but thinks her last one was more than 10 years ago. She denies any previous problems with ovarian cysts or adnexal lesions. She underwent colonoscopy on 22 and it showed large mass extending from the ileocecal valve into the cecum mass originating at ileocecal valve appears to be more tubulovillous, mass in the cecum appears to be more firm fixed ulcerated consistent with malignancy. Biopsies were obtained.  Mild to moderate sigmoid diverticulosis and grade 2 hemorrhoids and incidental lipoma in transverse colon     Biopsy from cecal mass showed invasive moderately differentiated adenocarcinoma. MRI pelvis showed complex ovarian cyst without internal enhancement to suggest solid mass. Radiologist recommend f/u imaging with CT or MRI in 6 months. She is s/p surgery on 5/27/22. Final pathology revealed that she has stage III terminal ileum adenocarcinoma (pT3, pN1c). She has lost about 25 lbs over last 2 - 3 months before surgery. Her CEA on 5/22/22 was 3.7. Adjuvant chemotherapy with xeloda was started on 7/26/2022 and we stopped it after 9/6/22 due to severe oral mucositis. We changed her chemo to weekly 5Fu and leukovorin. It was started on 11/9/22. CT abdomen and pelvis with IV contrast on 12/2/2022 showed further evaluation and growth of the pelvic mass to the right of midline which currently measures up to 5.1 x 4.2 cm in largest axial diameter compared to 4.4 0.1 cm previously. This mass has a malignant appearing with malignant colonic stricturing of the sigmoid colon. This mass may originate from the sigmoid colon with exophytic extension and invasion of the right adnexa and the right uterus. No obvious metastatic disease. No evidence of significant lymphadenopathy. On January 11, 2022, she presented to me for follow-up. 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 I reviewed final path report with her and her family. We also reviewed NCCN guidelines. I also let her know that we ideally recommend adjuvant chemotherapy with either CapeOx (capecitabine + oxaliplatin) for 3 months, FOLFOX for 6 months or single agent capecitabine (in frail patient) for six months. I also discussed with her and her family all the potential side effects as well as benefits of adjuvant chemotherapy. After long discussion with her and family, they are in agreement to start adjuvant chemotherapy with capecitabine.  It was started on 7/26/22. She developed significant oral mucositis on Day 6 of the therapy and it interfere with her eating and drinking. She is on xeloda 1500 mg BID. I stopped it since 9/6/22. Since she couldn't tolerate xeloda, we changed it to 5Fu and leukovorin. It was started on 11/9/22. She is tolerating 5-FU leucovorin well and I recommended to continue with it for now. She was seen by GYN at [facility] and they are planning to have CT scan. She had all work ups here with Dr. Valerie Jaime and I recommend her to f/u with him soon. I will f/u with his recommendations. Chemo induced mucositis - recommend to continue with magic mouth wash with dexamethasone. Chemo induced nausea - mild symptom only. Recommend her to take zofran prn. She does not have any other significant symptoms at today's visit. Past Medical History:     Significant for  1. Hypertension  2. Hyperlipidemia  3. Diabetes mellitus  4. Hypothyroidism  5. Obstructive sleep apnea  6. Coronary artery disease    Past Surgery History:    Significant for  1. Cholecystectomy  2. Dilatation and curettage  3. Cataract surgery  4. Right breast biopsy [benign]  5. Right hemicolectomy    Social History:   She denies smoking or illicit drug abuse. She socially drinks alcohol. Family History:    Significant for breast cancer in one of her sister. Allergies   Allergen Reactions    Lopid [Gemfibrozil] Shortness Of Breath and Other (See Comments)     Cough    Amoxicillin     Bystolic [Nebivolol Hcl]     Cefdinir     Coreg [Carvedilol]     Crestor [Rosuvastatin]     Fenofibrate     Glucophage [Metformin]     Hydrochlorothiazide Other (See Comments)    Metronidazole     Norvasc [Amlodipine Besylate]     Tribenzor [Olmesartan-Amlodipine-Hctz]      Pt states that her chest felt funny and achy when she took the medication    Zocor [Simvastatin]     Ciprofloxacin Itching     Review of Systems:   \"Per interval history; otherwise 10 point ROS is negative. \"  Her energy level is okay and her sleep is fine. She doesn't have fever, chills, night sweats, cough, SOB, chest pain, hemoptysis or palpitations. Her bowel and bladder functions are normal, except oral mucositis. She denies nausea, vomiting, abdominal pain, diarrhea, constipation, dysuria, loss of appetite or weight loss. She doesn't have neuropathy and she denies bleeding or clotting issues. She denies any pain in her body. No anxiety or depression. The rest of the systems are unremarkable. Vital Signs: BP (!) 148/61 (Site: Right Upper Arm, Position: Sitting, Cuff Size: Medium Adult)   Pulse 67   Temp 97.4 °F (36.3 °C) (Temporal)   Resp 16   Ht 5' (1.524 m)   Wt 140 lb 9.6 oz (63.8 kg)   SpO2 96%   BMI 27.46 kg/m²      Physical Exam:  CONSTITUTIONAL: awake, alert, cooperative, no apparent distress   EYES: pupils equal, round and reactive to light, sclera clear, normal conjunctiva  ENT: Normocephalic, without obvious abnormality, atraumatic  NECK: supple, symmetrical, no jugular venous distension, no carotid bruits   HEMATOLOGIC/LYMPHATIC: no cervical, supraclavicular or axillary lymphadenopathy   LUNGS: VBS, no wheezes, clear to auscultation, no crackles, no increased work of breathing, no rhonchi,    CARDIOVASCULAR: regular rate and rhythm, normal S1 and S2, no murmur noted  ABDOMEN: normal bowel sounds x 4, soft, non-distended, non-tender, no masses palpated, no hepatosplenomegaly   MUSCULOSKELETAL: full range of motion noted, tone is normal  NEUROLOGIC: awake, alert, oriented to name, place and time. Motor skills grossly intact. SKIN: appears intact, normal skin color, normal texture, normal turgor, no jaundice.    EXTREMITIES: no leg swelling, no LE edema, no clubbing, no cyanosis,       Labs:  Hematology:  Lab Results   Component Value Date    WBC 9.5 01/11/2023    RBC 3.84 (L) 01/11/2023    HGB 12.0 (L) 01/11/2023    HCT 37.9 01/11/2023    MCV 98.7 01/11/2023 MCH 31.3 (H) 01/11/2023    MCHC 31.7 (L) 01/11/2023    RDW 17.9 (H) 01/11/2023     01/11/2023    MPV 9.2 01/11/2023    SEGSPCT 78.3 (H) 01/11/2023    EOSRELPCT 2.7 01/11/2023    BASOPCT 0.2 01/11/2023    LYMPHOPCT 13.4 (L) 01/11/2023    MONOPCT 5.4 (H) 01/11/2023    SEGSABS 7.4 01/11/2023    EOSABS 0.3 01/11/2023    BASOSABS 0.0 01/11/2023    LYMPHSABS 1.3 01/11/2023    MONOSABS 0.5 01/11/2023    DIFFTYPE AUTOMATED DIFFERENTIAL 01/11/2023     Lab Results   Component Value Date    ESR 17 07/08/2022     Chemistry:  Lab Results   Component Value Date     01/11/2023    K 3.9 01/11/2023     01/11/2023    CO2 24 01/11/2023    BUN 22 01/11/2023    CREATININE 0.9 01/11/2023    GLUCOSE 126 (H) 01/11/2023    CALCIUM 8.6 01/11/2023    PROT 5.4 (L) 01/11/2023    LABALBU 3.2 (L) 01/11/2023    BILITOT 0.2 01/11/2023    ALKPHOS 49 01/11/2023    AST 18 01/11/2023    ALT 17 01/11/2023    LABGLOM >60 01/11/2023    GFRAA >60 08/09/2022    PHOS 3.0 06/08/2022    MG 1.7 (L) 06/08/2022    POCGLU 83 11/04/2022     Lab Results   Component Value Date     07/08/2022     No components found for: LD  Lab Results   Component Value Date    TSHHS 0.095 (L) 07/08/2022    T4FREE 1.24 06/12/2022    FT3 2.3 12/06/2017     Immunology:  Lab Results   Component Value Date    PROT 5.4 (L) 01/11/2023     No results found for: Pia Lorri, KLFLCR  No results found for: B2M  Coagulation Panel:  Lab Results   Component Value Date    PROTIME 26.8 (H) 07/05/2022    INR 2.14 07/05/2022    APTT 29.5 05/14/2021    DDIMER <200 01/24/2016     Anemia Panel:  Lab Results   Component Value Date    HENTHZUI58 1035 (H) 07/08/2022    FOLATE 11.3 07/08/2022     Tumor Markers:  Lab Results   Component Value Date     49 (H) 11/02/2022    CEA 8.0 01/11/2023        Observations:  PHQ-9 Total Score: 0 (1/11/2023  9:32 AM)     Assessment   Stage III terminal ileum adenocarcinoma.      Plan:  I reviewed with her findings on CT scan, US pelvis, colonoscopy and labs. Cecal lesion is concerning for malignant process and right adnexa cystic lesion needs further evaluation with MRI. 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 MRI pelvis showed complex ovarian cyst without internal enhancement to suggest solid mass. Radiologist recommend f/u imaging with CT or MRI in 6 months. She is s/p surgery on 5/27/22. Final pathology revealed that she has stage III terminal ileum adenocarcinoma (pT3, pN1c). She has lost about 25 lbs over last 2 - 3 months before surgery. Her CEA on 5/22/22 was 3.7. Adjuvant chemotherapy with xeloda was started on 7/26/2022 and we stopped it after 9/6/22 due to severe oral mucositis. We changed her chemo to weekly 5Fu and leukovorin. It was started on 11/9/22. CT abdomen and pelvis with IV contrast on 12/2/2022 showed further evaluation and growth of the pelvic mass to the right of midline which currently measures up to 5.1 x 4.2 cm in largest axial diameter compared to 4.4 0.1 cm previously. This mass has a malignant appearing with malignant colonic stricturing of the sigmoid colon. This mass may originate from the sigmoid colon with exophytic extension and invasion of the right adnexa and the right uterus. No obvious metastatic disease. No evidence of significant lymphadenopathy. On January 11, 2022, she presented to me for follow-up. 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 I reviewed final path report with her and her family. We also reviewed NCCN guidelines. I also let her know that we ideally recommend adjuvant chemotherapy with either CapeOx (capecitabine + oxaliplatin) for 3 months, FOLFOX for 6 months or single agent capecitabine (in frail patient) for six months. I also discussed with her and her family all the potential side effects as well as benefits of adjuvant chemotherapy.   After long discussion with her and family, they are in agreement to start adjuvant chemotherapy with capecitabine. It was started on 7/26/22. She developed significant oral mucositis on Day 6 of the therapy and it interfere with her eating and drinking. She is on xeloda 1500 mg BID. I stopped it since 9/6/22. Since she couldn't tolerate xeloda, we changed it to 5Fu and leukovorin. It was started on 11/9/22. She is tolerating 5-FU leucovorin well and I recommended to continue with it for now. She was seen by GYN at [facility] and they are planning to have CT scan. She had all work ups here with Dr. Kishore Chneg and I recommend her to f/u with him soon. I will f/u with his recommendations. Chemo induced mucositis - recommend to continue with magic mouth wash with dexamethasone. Chemo induced nausea - mild symptom only. Recommend her to take zofran prn. I answered all her questions and concerns for today. Recent imaging and labs were reviewed and discussed with the patient.

## 2023-01-04 ENCOUNTER — HOSPITAL ENCOUNTER (OUTPATIENT)
Dept: INFUSION THERAPY | Age: 88
Discharge: HOME | End: 2023-01-04
Payer: MEDICARE

## 2023-01-04 DIAGNOSIS — C18.2 MALIGNANT NEOPLASM OF ASCENDING COLON (HCC): Primary | ICD-10-CM

## 2023-01-04 DIAGNOSIS — C17.2 ADENOCARCINOMA OF ILEUM (HCC): ICD-10-CM

## 2023-01-04 LAB
ALBUMIN SERPL-MCNC: 3.3 GM/DL (ref 3.4–5)
ALP BLD-CCNC: 57 IU/L (ref 40–129)
ALT SERPL-CCNC: 17 U/L (ref 10–40)
ANION GAP SERPL CALCULATED.3IONS-SCNC: 13 MMOL/L (ref 4–16)
AST SERPL-CCNC: 21 IU/L (ref 15–37)
BASOPHILS ABSOLUTE: 0 K/CU MM
BASOPHILS RELATIVE PERCENT: 0.1 % (ref 0–1)
BILIRUB SERPL-MCNC: 0.6 MG/DL (ref 0–1)
BUN BLDV-MCNC: 16 MG/DL (ref 6–23)
CALCIUM SERPL-MCNC: 8.4 MG/DL (ref 8.3–10.6)
CHLORIDE BLD-SCNC: 105 MMOL/L (ref 99–110)
CO2: 20 MMOL/L (ref 21–32)
CREAT SERPL-MCNC: 1.2 MG/DL (ref 0.6–1.1)
DIFFERENTIAL TYPE: ABNORMAL
EOSINOPHILS ABSOLUTE: 0.1 K/CU MM
EOSINOPHILS RELATIVE PERCENT: 1.9 % (ref 0–3)
GFR SERPL CREATININE-BSD FRML MDRD: 44 ML/MIN/1.73M2
GLUCOSE BLD-MCNC: 119 MG/DL (ref 70–99)
HCT VFR BLD CALC: 34.8 % (ref 37–47)
HEMOGLOBIN: 11.5 GM/DL (ref 12.5–16)
LYMPHOCYTES ABSOLUTE: 0.9 K/CU MM
LYMPHOCYTES RELATIVE PERCENT: 13.1 % (ref 24–44)
MCH RBC QN AUTO: 31.6 PG (ref 27–31)
MCHC RBC AUTO-ENTMCNC: 33 % (ref 32–36)
MCV RBC AUTO: 95.6 FL (ref 78–100)
MONOCYTES ABSOLUTE: 0.7 K/CU MM
MONOCYTES RELATIVE PERCENT: 10.2 % (ref 0–4)
PDW BLD-RTO: 18.2 % (ref 11.7–14.9)
PLATELET # BLD: 179 K/CU MM (ref 140–440)
PMV BLD AUTO: 9.9 FL (ref 7.5–11.1)
POTASSIUM SERPL-SCNC: 3.9 MMOL/L (ref 3.5–5.1)
RBC # BLD: 3.64 M/CU MM (ref 4.2–5.4)
SEGMENTED NEUTROPHILS ABSOLUTE COUNT: 5.2 K/CU MM
SEGMENTED NEUTROPHILS RELATIVE PERCENT: 74.7 % (ref 36–66)
SODIUM BLD-SCNC: 138 MMOL/L (ref 135–145)
TOTAL PROTEIN: 5.7 GM/DL (ref 6.4–8.2)
WBC # BLD: 6.9 K/CU MM (ref 4–10.5)

## 2023-01-04 PROCEDURE — 2580000003 HC RX 258: Performed by: INTERNAL MEDICINE

## 2023-01-04 PROCEDURE — 85025 COMPLETE CBC W/AUTO DIFF WBC: CPT

## 2023-01-04 PROCEDURE — 6360000002 HC RX W HCPCS: Performed by: NURSE PRACTITIONER

## 2023-01-04 PROCEDURE — 96368 THER/DIAG CONCURRENT INF: CPT

## 2023-01-04 PROCEDURE — 96413 CHEMO IV INFUSION 1 HR: CPT

## 2023-01-04 PROCEDURE — 96366 THER/PROPH/DIAG IV INF ADDON: CPT

## 2023-01-04 PROCEDURE — 2580000003 HC RX 258: Performed by: NURSE PRACTITIONER

## 2023-01-04 PROCEDURE — 80053 COMPREHEN METABOLIC PANEL: CPT

## 2023-01-04 PROCEDURE — 96375 TX/PRO/DX INJ NEW DRUG ADDON: CPT

## 2023-01-04 RX ORDER — DIPHENHYDRAMINE HYDROCHLORIDE 50 MG/ML
50 INJECTION INTRAMUSCULAR; INTRAVENOUS
Status: CANCELLED | OUTPATIENT
Start: 2023-01-04

## 2023-01-04 RX ORDER — EPINEPHRINE 1 MG/ML
0.3 INJECTION, SOLUTION, CONCENTRATE INTRAVENOUS PRN
Status: CANCELLED | OUTPATIENT
Start: 2023-01-04

## 2023-01-04 RX ORDER — 0.9 % SODIUM CHLORIDE 0.9 %
500 INTRAVENOUS SOLUTION INTRAVENOUS ONCE
Status: COMPLETED | OUTPATIENT
Start: 2023-01-04 | End: 2023-01-04

## 2023-01-04 RX ORDER — SODIUM CHLORIDE 9 MG/ML
INJECTION, SOLUTION INTRAVENOUS CONTINUOUS
Status: CANCELLED | OUTPATIENT
Start: 2023-01-04

## 2023-01-04 RX ORDER — ALBUTEROL SULFATE 90 UG/1
4 AEROSOL, METERED RESPIRATORY (INHALATION) PRN
Status: CANCELLED | OUTPATIENT
Start: 2023-01-04

## 2023-01-04 RX ORDER — SODIUM CHLORIDE 9 MG/ML
5-250 INJECTION, SOLUTION INTRAVENOUS PRN
Status: DISCONTINUED | OUTPATIENT
Start: 2023-01-04 | End: 2023-01-05 | Stop reason: HOSPADM

## 2023-01-04 RX ORDER — ACETAMINOPHEN 325 MG/1
650 TABLET ORAL
Status: CANCELLED | OUTPATIENT
Start: 2023-01-04

## 2023-01-04 RX ORDER — DEXAMETHASONE SODIUM PHOSPHATE 4 MG/ML
8 INJECTION, SOLUTION INTRA-ARTICULAR; INTRALESIONAL; INTRAMUSCULAR; INTRAVENOUS; SOFT TISSUE ONCE
Status: COMPLETED | OUTPATIENT
Start: 2023-01-04 | End: 2023-01-04

## 2023-01-04 RX ORDER — MEPERIDINE HYDROCHLORIDE 25 MG/ML
12.5 INJECTION INTRAMUSCULAR; INTRAVENOUS; SUBCUTANEOUS PRN
Status: CANCELLED | OUTPATIENT
Start: 2023-01-04

## 2023-01-04 RX ORDER — SODIUM CHLORIDE 9 MG/ML
5-40 INJECTION INTRAVENOUS PRN
Status: CANCELLED | OUTPATIENT
Start: 2023-01-04

## 2023-01-04 RX ORDER — SODIUM CHLORIDE 9 MG/ML
5-250 INJECTION, SOLUTION INTRAVENOUS PRN
Status: CANCELLED | OUTPATIENT
Start: 2023-01-04

## 2023-01-04 RX ORDER — ONDANSETRON 2 MG/ML
8 INJECTION INTRAMUSCULAR; INTRAVENOUS
Status: CANCELLED | OUTPATIENT
Start: 2023-01-04

## 2023-01-04 RX ORDER — SODIUM CHLORIDE 0.9 % (FLUSH) 0.9 %
5-40 SYRINGE (ML) INJECTION PRN
Status: CANCELLED | OUTPATIENT
Start: 2023-01-04

## 2023-01-04 RX ORDER — FAMOTIDINE 10 MG/ML
20 INJECTION, SOLUTION INTRAVENOUS
Status: CANCELLED | OUTPATIENT
Start: 2023-01-04

## 2023-01-04 RX ORDER — HEPARIN SODIUM (PORCINE) LOCK FLUSH IV SOLN 100 UNIT/ML 100 UNIT/ML
500 SOLUTION INTRAVENOUS PRN
Status: DISCONTINUED | OUTPATIENT
Start: 2023-01-04 | End: 2023-01-05 | Stop reason: HOSPADM

## 2023-01-04 RX ADMIN — LEUCOVORIN CALCIUM 800 MG: 200 INJECTION, POWDER, LYOPHILIZED, FOR SUSPENSION INTRAMUSCULAR; INTRAVENOUS at 11:36

## 2023-01-04 RX ADMIN — DEXAMETHASONE SODIUM PHOSPHATE 8 MG: 4 INJECTION, SOLUTION INTRAMUSCULAR; INTRAVENOUS at 10:28

## 2023-01-04 RX ADMIN — SODIUM CHLORIDE 500 ML: 9 INJECTION, SOLUTION INTRAVENOUS at 10:29

## 2023-01-04 RX ADMIN — FLUOROURACIL 800 MG: 50 INJECTION, SOLUTION INTRAVENOUS at 12:41

## 2023-01-04 NOTE — PROGRESS NOTES
Ambulated to infusion area, accompanied by . Here for treatment. No concerns voiced. BP 94/54, admits to not drinking much fluids lately. Dr. Miracle Degroot notified, IV bolus of 500 NS added to today's treatment. Reports checking BP at home before taking hypertensive medication and will hold medication if BP is low. Labs drawn. CBC within defined parameters, reviewed with patient. Chemo and hydration administered as ordered. Call light within reach. Tolerated infusion without incident. Post infusion /73. AVS provided. Discharged in stable condition.

## 2023-01-10 RX ORDER — FAMOTIDINE 10 MG/ML
20 INJECTION, SOLUTION INTRAVENOUS
Status: CANCELLED | OUTPATIENT
Start: 2023-01-11

## 2023-01-10 RX ORDER — ONDANSETRON 2 MG/ML
8 INJECTION INTRAMUSCULAR; INTRAVENOUS
Status: CANCELLED | OUTPATIENT
Start: 2023-01-11

## 2023-01-10 RX ORDER — SODIUM CHLORIDE 9 MG/ML
INJECTION, SOLUTION INTRAVENOUS CONTINUOUS
Status: CANCELLED | OUTPATIENT
Start: 2023-01-11

## 2023-01-10 RX ORDER — MEPERIDINE HYDROCHLORIDE 25 MG/ML
12.5 INJECTION INTRAMUSCULAR; INTRAVENOUS; SUBCUTANEOUS PRN
Status: CANCELLED | OUTPATIENT
Start: 2023-01-11

## 2023-01-10 RX ORDER — ACETAMINOPHEN 325 MG/1
650 TABLET ORAL
Status: CANCELLED | OUTPATIENT
Start: 2023-01-11

## 2023-01-10 RX ORDER — EPINEPHRINE 1 MG/ML
0.3 INJECTION, SOLUTION, CONCENTRATE INTRAVENOUS PRN
Status: CANCELLED | OUTPATIENT
Start: 2023-01-11

## 2023-01-10 RX ORDER — HEPARIN SODIUM (PORCINE) LOCK FLUSH IV SOLN 100 UNIT/ML 100 UNIT/ML
500 SOLUTION INTRAVENOUS PRN
Status: CANCELLED | OUTPATIENT
Start: 2023-01-11

## 2023-01-10 RX ORDER — SODIUM CHLORIDE 9 MG/ML
5-40 INJECTION INTRAVENOUS PRN
Status: CANCELLED | OUTPATIENT
Start: 2023-01-11

## 2023-01-10 RX ORDER — SODIUM CHLORIDE 9 MG/ML
5-250 INJECTION, SOLUTION INTRAVENOUS PRN
Status: CANCELLED | OUTPATIENT
Start: 2023-01-11

## 2023-01-10 RX ORDER — DIPHENHYDRAMINE HYDROCHLORIDE 50 MG/ML
50 INJECTION INTRAMUSCULAR; INTRAVENOUS
Status: CANCELLED | OUTPATIENT
Start: 2023-01-11

## 2023-01-10 RX ORDER — ALBUTEROL SULFATE 90 UG/1
4 AEROSOL, METERED RESPIRATORY (INHALATION) PRN
Status: CANCELLED | OUTPATIENT
Start: 2023-01-11

## 2023-01-11 ENCOUNTER — HOSPITAL ENCOUNTER (OUTPATIENT)
Dept: INFUSION THERAPY | Age: 88
Discharge: HOME | End: 2023-01-11
Payer: MEDICARE

## 2023-01-11 ENCOUNTER — OFFICE VISIT (OUTPATIENT)
Dept: ONCOLOGY | Age: 88
End: 2023-01-11

## 2023-01-11 VITALS
HEIGHT: 60 IN | HEART RATE: 67 BPM | WEIGHT: 140.6 LBS | SYSTOLIC BLOOD PRESSURE: 148 MMHG | BODY MASS INDEX: 27.61 KG/M2 | RESPIRATION RATE: 16 BRPM | DIASTOLIC BLOOD PRESSURE: 61 MMHG | OXYGEN SATURATION: 96 % | TEMPERATURE: 97.4 F

## 2023-01-11 DIAGNOSIS — C17.2 ADENOCARCINOMA OF ILEUM (HCC): Primary | ICD-10-CM

## 2023-01-11 DIAGNOSIS — C18.2 MALIGNANT NEOPLASM OF ASCENDING COLON (HCC): ICD-10-CM

## 2023-01-11 LAB
ALBUMIN SERPL-MCNC: 3.2 GM/DL (ref 3.4–5)
ALP BLD-CCNC: 49 IU/L (ref 40–128)
ALT SERPL-CCNC: 17 U/L (ref 10–40)
ANION GAP SERPL CALCULATED.3IONS-SCNC: 12 MMOL/L (ref 4–16)
AST SERPL-CCNC: 18 IU/L (ref 15–37)
BASOPHILS ABSOLUTE: 0 K/CU MM
BASOPHILS RELATIVE PERCENT: 0.2 % (ref 0–1)
BILIRUB SERPL-MCNC: 0.2 MG/DL (ref 0–1)
BUN BLDV-MCNC: 22 MG/DL (ref 6–23)
CALCIUM SERPL-MCNC: 8.6 MG/DL (ref 8.3–10.6)
CEA: 8 NG/ML
CHLORIDE BLD-SCNC: 103 MMOL/L (ref 99–110)
CO2: 24 MMOL/L (ref 21–32)
CREAT SERPL-MCNC: 0.9 MG/DL (ref 0.6–1.1)
DIFFERENTIAL TYPE: ABNORMAL
EOSINOPHILS ABSOLUTE: 0.3 K/CU MM
EOSINOPHILS RELATIVE PERCENT: 2.7 % (ref 0–3)
GFR SERPL CREATININE-BSD FRML MDRD: >60 ML/MIN/1.73M2
GLUCOSE BLD-MCNC: 126 MG/DL (ref 70–99)
HCT VFR BLD CALC: 37.9 % (ref 37–47)
HEMOGLOBIN: 12 GM/DL (ref 12.5–16)
LYMPHOCYTES ABSOLUTE: 1.3 K/CU MM
LYMPHOCYTES RELATIVE PERCENT: 13.4 % (ref 24–44)
MCH RBC QN AUTO: 31.3 PG (ref 27–31)
MCHC RBC AUTO-ENTMCNC: 31.7 % (ref 32–36)
MCV RBC AUTO: 98.7 FL (ref 78–100)
MONOCYTES ABSOLUTE: 0.5 K/CU MM
MONOCYTES RELATIVE PERCENT: 5.4 % (ref 0–4)
PDW BLD-RTO: 17.9 % (ref 11.7–14.9)
PLATELET # BLD: 214 K/CU MM (ref 140–440)
PMV BLD AUTO: 9.2 FL (ref 7.5–11.1)
POTASSIUM SERPL-SCNC: 3.9 MMOL/L (ref 3.5–5.1)
RBC # BLD: 3.84 M/CU MM (ref 4.2–5.4)
SEGMENTED NEUTROPHILS ABSOLUTE COUNT: 7.4 K/CU MM
SEGMENTED NEUTROPHILS RELATIVE PERCENT: 78.3 % (ref 36–66)
SODIUM BLD-SCNC: 139 MMOL/L (ref 135–145)
TOTAL PROTEIN: 5.4 GM/DL (ref 6.4–8.2)
WBC # BLD: 9.5 K/CU MM (ref 4–10.5)

## 2023-01-11 PROCEDURE — 82378 CARCINOEMBRYONIC ANTIGEN: CPT

## 2023-01-11 PROCEDURE — 6360000002 HC RX W HCPCS: Performed by: NURSE PRACTITIONER

## 2023-01-11 PROCEDURE — 96366 THER/PROPH/DIAG IV INF ADDON: CPT

## 2023-01-11 PROCEDURE — 80053 COMPREHEN METABOLIC PANEL: CPT

## 2023-01-11 PROCEDURE — 2580000003 HC RX 258: Performed by: NURSE PRACTITIONER

## 2023-01-11 PROCEDURE — 6360000002 HC RX W HCPCS

## 2023-01-11 PROCEDURE — 96368 THER/DIAG CONCURRENT INF: CPT

## 2023-01-11 PROCEDURE — 96413 CHEMO IV INFUSION 1 HR: CPT

## 2023-01-11 PROCEDURE — 85025 COMPLETE CBC W/AUTO DIFF WBC: CPT

## 2023-01-11 RX ORDER — HEPARIN SODIUM (PORCINE) LOCK FLUSH IV SOLN 100 UNIT/ML 100 UNIT/ML
SOLUTION INTRAVENOUS
Status: DISCONTINUED
Start: 2023-01-11 | End: 2023-01-11 | Stop reason: WASHOUT

## 2023-01-11 RX ORDER — MONTELUKAST SODIUM 10 MG/1
10 TABLET ORAL NIGHTLY
COMMUNITY

## 2023-01-11 RX ORDER — SODIUM CHLORIDE 0.9 % (FLUSH) 0.9 %
5-40 SYRINGE (ML) INJECTION PRN
Status: DISCONTINUED | OUTPATIENT
Start: 2023-01-11 | End: 2023-01-12 | Stop reason: HOSPADM

## 2023-01-11 RX ORDER — DEXAMETHASONE SODIUM PHOSPHATE 4 MG/ML
8 INJECTION, SOLUTION INTRA-ARTICULAR; INTRALESIONAL; INTRAMUSCULAR; INTRAVENOUS; SOFT TISSUE ONCE
Status: COMPLETED | OUTPATIENT
Start: 2023-01-11 | End: 2023-01-11

## 2023-01-11 RX ORDER — TIZANIDINE 2 MG/1
2 TABLET ORAL NIGHTLY
COMMUNITY

## 2023-01-11 RX ORDER — SODIUM CHLORIDE 9 MG/ML
5-250 INJECTION, SOLUTION INTRAVENOUS PRN
Status: DISCONTINUED | OUTPATIENT
Start: 2023-01-11 | End: 2023-01-12 | Stop reason: HOSPADM

## 2023-01-11 RX ADMIN — LEUCOVORIN CALCIUM 800 MG: 200 INJECTION, POWDER, LYOPHILIZED, FOR SUSPENSION INTRAMUSCULAR; INTRAVENOUS at 11:40

## 2023-01-11 RX ADMIN — DEXAMETHASONE SODIUM PHOSPHATE 8 MG: 4 INJECTION, SOLUTION INTRAMUSCULAR; INTRAVENOUS at 10:38

## 2023-01-11 RX ADMIN — FLUOROURACIL 800 MG: 50 INJECTION, SOLUTION INTRAVENOUS at 12:44

## 2023-01-11 ASSESSMENT — PATIENT HEALTH QUESTIONNAIRE - PHQ9
SUM OF ALL RESPONSES TO PHQ QUESTIONS 1-9: 0
SUM OF ALL RESPONSES TO PHQ QUESTIONS 1-9: 0
SUM OF ALL RESPONSES TO PHQ9 QUESTIONS 1 & 2: 0
SUM OF ALL RESPONSES TO PHQ QUESTIONS 1-9: 0
1. LITTLE INTEREST OR PLEASURE IN DOING THINGS: 0
SUM OF ALL RESPONSES TO PHQ QUESTIONS 1-9: 0
2. FEELING DOWN, DEPRESSED OR HOPELESS: 0

## 2023-01-11 NOTE — PROGRESS NOTES
MA Rooming Questions  Patient: Melvin Madrid  MRN: [id number]    Date: 1/11/2023        1. Do you have any new issues?   no         2. Do you need any refills on medications?    no    3. Have you had any imaging done since your last visit?   no    4. Have you been hospitalized or seen in the emergency room since your last visit here?   no    5. Did the patient have a depression screening completed today?  Yes    PHQ-9 Total Score: 0 (1/11/2023  9:32 AM)       PHQ-9 Given to (if applicable):               PHQ-9 Score (if applicable):                     [] Positive     []  Negative              Does question #9 need addressed (if applicable)                     [] Yes    []  No               Shawn Ferguson CMA

## 2023-01-11 NOTE — PROGRESS NOTES
Ambulated to infusion area after office visit, accompanied by daughter. Here for treatment. Labs drawn. CBC within defined parameters, reviewed with patient. Chemo administered as ordered. Daughter at chairside. Tolerated infusion without incident. AVS provided. Discharged in stable condition.

## 2023-01-12 ENCOUNTER — TRANSCRIBE ORDERS (OUTPATIENT)
Dept: ADMINISTRATIVE | Age: 88
End: 2023-01-12

## 2023-01-12 DIAGNOSIS — R19.00 PELVIC MASS: Primary | ICD-10-CM

## 2023-01-18 ENCOUNTER — HOSPITAL ENCOUNTER (OUTPATIENT)
Dept: INFUSION THERAPY | Age: 88
Discharge: HOME | End: 2023-01-18
Payer: MEDICARE

## 2023-01-18 VITALS
HEART RATE: 67 BPM | DIASTOLIC BLOOD PRESSURE: 62 MMHG | SYSTOLIC BLOOD PRESSURE: 150 MMHG | HEIGHT: 60 IN | OXYGEN SATURATION: 97 % | BODY MASS INDEX: 28 KG/M2 | WEIGHT: 142.6 LBS | TEMPERATURE: 97 F

## 2023-01-18 DIAGNOSIS — C17.2 ADENOCARCINOMA OF ILEUM (HCC): ICD-10-CM

## 2023-01-18 DIAGNOSIS — C18.2 MALIGNANT NEOPLASM OF ASCENDING COLON (HCC): Primary | ICD-10-CM

## 2023-01-18 LAB
BASOPHILS ABSOLUTE: 0 K/CU MM
BASOPHILS RELATIVE PERCENT: 0.3 % (ref 0–1)
DIFFERENTIAL TYPE: ABNORMAL
EOSINOPHILS ABSOLUTE: 0.4 K/CU MM
EOSINOPHILS RELATIVE PERCENT: 3.4 % (ref 0–3)
HCT VFR BLD CALC: 34.8 % (ref 37–47)
HEMOGLOBIN: 11.2 GM/DL (ref 12.5–16)
LYMPHOCYTES ABSOLUTE: 1.8 K/CU MM
LYMPHOCYTES RELATIVE PERCENT: 15.8 % (ref 24–44)
MCH RBC QN AUTO: 32 PG (ref 27–31)
MCHC RBC AUTO-ENTMCNC: 32.2 % (ref 32–36)
MCV RBC AUTO: 99.4 FL (ref 78–100)
MONOCYTES ABSOLUTE: 0.7 K/CU MM
MONOCYTES RELATIVE PERCENT: 6.4 % (ref 0–4)
PDW BLD-RTO: 18 % (ref 11.7–14.9)
PLATELET # BLD: 168 K/CU MM (ref 140–440)
PMV BLD AUTO: 9.6 FL (ref 7.5–11.1)
RBC # BLD: 3.5 M/CU MM (ref 4.2–5.4)
SEGMENTED NEUTROPHILS ABSOLUTE COUNT: 8.2 K/CU MM
SEGMENTED NEUTROPHILS RELATIVE PERCENT: 74.1 % (ref 36–66)
WBC # BLD: 11.1 K/CU MM (ref 4–10.5)

## 2023-01-18 PROCEDURE — 96367 TX/PROPH/DG ADDL SEQ IV INF: CPT

## 2023-01-18 PROCEDURE — 96375 TX/PRO/DX INJ NEW DRUG ADDON: CPT

## 2023-01-18 PROCEDURE — 96366 THER/PROPH/DIAG IV INF ADDON: CPT

## 2023-01-18 PROCEDURE — 6360000002 HC RX W HCPCS: Performed by: NURSE PRACTITIONER

## 2023-01-18 PROCEDURE — 85025 COMPLETE CBC W/AUTO DIFF WBC: CPT

## 2023-01-18 PROCEDURE — 96368 THER/DIAG CONCURRENT INF: CPT

## 2023-01-18 PROCEDURE — 2580000003 HC RX 258: Performed by: NURSE PRACTITIONER

## 2023-01-18 PROCEDURE — 96413 CHEMO IV INFUSION 1 HR: CPT

## 2023-01-18 RX ORDER — ACETAMINOPHEN 325 MG/1
650 TABLET ORAL
Status: CANCELLED | OUTPATIENT
Start: 2023-01-18

## 2023-01-18 RX ORDER — ALBUTEROL SULFATE 90 UG/1
4 AEROSOL, METERED RESPIRATORY (INHALATION) PRN
Status: CANCELLED | OUTPATIENT
Start: 2023-01-18

## 2023-01-18 RX ORDER — DEXAMETHASONE SODIUM PHOSPHATE 4 MG/ML
8 INJECTION, SOLUTION INTRA-ARTICULAR; INTRALESIONAL; INTRAMUSCULAR; INTRAVENOUS; SOFT TISSUE ONCE
Status: COMPLETED | OUTPATIENT
Start: 2023-01-18 | End: 2023-01-18

## 2023-01-18 RX ORDER — HEPARIN SODIUM (PORCINE) LOCK FLUSH IV SOLN 100 UNIT/ML 100 UNIT/ML
500 SOLUTION INTRAVENOUS PRN
Status: CANCELLED | OUTPATIENT
Start: 2023-01-18

## 2023-01-18 RX ORDER — SODIUM CHLORIDE 9 MG/ML
5-250 INJECTION, SOLUTION INTRAVENOUS PRN
Status: CANCELLED | OUTPATIENT
Start: 2023-01-18

## 2023-01-18 RX ORDER — EPINEPHRINE 1 MG/ML
0.3 INJECTION, SOLUTION, CONCENTRATE INTRAVENOUS PRN
Status: CANCELLED | OUTPATIENT
Start: 2023-01-18

## 2023-01-18 RX ORDER — ONDANSETRON 2 MG/ML
8 INJECTION INTRAMUSCULAR; INTRAVENOUS
Status: CANCELLED | OUTPATIENT
Start: 2023-01-18

## 2023-01-18 RX ORDER — SODIUM CHLORIDE 0.9 % (FLUSH) 0.9 %
5-40 SYRINGE (ML) INJECTION PRN
Status: DISCONTINUED | OUTPATIENT
Start: 2023-01-18 | End: 2023-01-19 | Stop reason: HOSPADM

## 2023-01-18 RX ORDER — DIPHENHYDRAMINE HYDROCHLORIDE 50 MG/ML
50 INJECTION INTRAMUSCULAR; INTRAVENOUS
Status: CANCELLED | OUTPATIENT
Start: 2023-01-18

## 2023-01-18 RX ORDER — MEPERIDINE HYDROCHLORIDE 25 MG/ML
12.5 INJECTION INTRAMUSCULAR; INTRAVENOUS; SUBCUTANEOUS PRN
Status: CANCELLED | OUTPATIENT
Start: 2023-01-18

## 2023-01-18 RX ORDER — FAMOTIDINE 10 MG/ML
20 INJECTION, SOLUTION INTRAVENOUS
Status: CANCELLED | OUTPATIENT
Start: 2023-01-18

## 2023-01-18 RX ORDER — SODIUM CHLORIDE 9 MG/ML
INJECTION, SOLUTION INTRAVENOUS CONTINUOUS
Status: CANCELLED | OUTPATIENT
Start: 2023-01-18

## 2023-01-18 RX ORDER — SODIUM CHLORIDE 9 MG/ML
5-40 INJECTION INTRAVENOUS PRN
Status: CANCELLED | OUTPATIENT
Start: 2023-01-18

## 2023-01-18 RX ORDER — SODIUM CHLORIDE 9 MG/ML
5-250 INJECTION, SOLUTION INTRAVENOUS PRN
Status: DISCONTINUED | OUTPATIENT
Start: 2023-01-18 | End: 2023-01-19 | Stop reason: HOSPADM

## 2023-01-18 RX ADMIN — LEUCOVORIN CALCIUM 800 MG: 200 INJECTION, POWDER, LYOPHILIZED, FOR SUSPENSION INTRAMUSCULAR; INTRAVENOUS at 12:03

## 2023-01-18 RX ADMIN — FLUOROURACIL 800 MG: 50 INJECTION, SOLUTION INTRAVENOUS at 13:09

## 2023-01-18 RX ADMIN — DEXAMETHASONE SODIUM PHOSPHATE 8 MG: 4 INJECTION, SOLUTION INTRAMUSCULAR; INTRAVENOUS at 11:15

## 2023-01-22 NOTE — PROGRESS NOTES
Patient Name:  Lena Thrasher  Patient :  10/20/1934  Patient MRN:  [id number]     Primary Oncologist: Rajani Jack MD  Referring Provider: Jane Aj DO     Date of Service: 2023      Chief Complaint:    Chief Complaint   Patient presents with    Follow-up    Treatment     Patient Active Problem List:     Long-term insulin use in type 2 diabetes [facility])     Essential hypertension     Dyslipidemia     Abnormal EKG     Abnormal stress test     Cecum mass     Severe malnutrition (HCC)     Partial small bowel obstruction ([address]     Adenocarcinoma (HCC)     Generalized weakness     Uncontrolled pain     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 She has been treated recently for abdominal pain and suspected diverticulitis, but was not improving after getting antibiotics which prompted her to return to the ED. She denies any personal or family history of colon cancer. Her sister had breast cancer in her 42's. She has had colonoscopies in the past which were normal, but thinks her last one was more than 10 years ago. She denies any previous problems with ovarian cysts or adnexal lesions. She underwent colonoscopy on 22 and it showed large mass extending from the ileocecal valve into the cecum mass originating at ileocecal valve appears to be more tubulovillous, mass in the cecum appears to be more firm fixed ulcerated consistent with malignancy. Biopsies were obtained.  Mild to moderate sigmoid diverticulosis and grade 2 hemorrhoids and incidental lipoma in transverse colon     Biopsy from cecal mass showed invasive moderately differentiated adenocarcinoma. MRI pelvis showed complex ovarian cyst without internal enhancement to suggest solid mass. Radiologist recommend f/u imaging with CT or MRI in 6 months. She is s/p surgery on 5/27/22. Final pathology revealed that she has stage III terminal ileum adenocarcinoma (pT3, pN1c). She has lost about 25 lbs over last 2 - 3 months before surgery. Her CEA on 5/22/22 was 3.7. Adjuvant chemotherapy with xeloda was started on 7/26/2022 and we stopped it after 9/6/22 due to severe oral mucositis. We changed her chemo to weekly 5Fu and leukovorin. It was started on 11/9/22. CT abdomen and pelvis with IV contrast on 12/2/2022 showed further evaluation and growth of the pelvic mass to the right of midline which currently measures up to 5.1 x 4.2 cm in largest axial diameter compared to 4.4 0.1 cm previously. This mass has a malignant appearing with malignant colonic stricturing of the sigmoid colon. This mass may originate from the sigmoid colon with exophytic extension and invasion of the right adnexa and the right uterus. No obvious metastatic disease. No evidence of significant lymphadenopathy. On February 1, 2023, she presented to me for follow-up. 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 I reviewed final path report with her and her family. We also reviewed NCCN guidelines. I also let her know that we ideally recommend adjuvant chemotherapy with either CapeOx (capecitabine + oxaliplatin) for 3 months, FOLFOX for 6 months or single agent capecitabine (in frail patient) for six months. I also discussed with her and her family all the potential side effects as well as benefits of adjuvant chemotherapy. After long discussion with her and family, they are in agreement to start adjuvant chemotherapy with capecitabine.  It was started on 7/26/22. She developed significant oral mucositis on Day 6 of the therapy and it interfere with her eating and drinking. She is on xeloda 1500 mg BID. I stopped it since 9/6/22. Since she couldn't tolerate xeloda, we changed it to 5Fu and leukovorin. It was started on 11/9/22. She is tolerating 5-FU leucovorin well and I recommended to continue with it for now. She was seen by surgical oncology at [facility]. I have discussed her case with Dr. Marie Ashraf and Dr. Deanna Witt. Dr. Marie Ashraf reviewed her MRI pelvis with radiologist. Radiologist believe that she has pelvis mass which he isn't sure coming from colon, uterus or fallopian tube. We decided to have colonoscopy first. Dr. Deanna Witt is going to see her soon. I let her know that we are concerned about possible malignant process there since she is also noted to have rising tumor marker. I explained all these to her and her husbands. I encouraged them to see surgical oncologist at [facility] as soon as possible. Chemo induced mucositis - recommend to continue with magic mouth wash with dexamethasone. Chemo induced nausea - mild symptom only. Recommend her to take zofran prn. She does not have any other significant symptoms at today's visit. Past Medical History:     Significant for  1. Hypertension  2. Hyperlipidemia  3. Diabetes mellitus  4. Hypothyroidism  5. Obstructive sleep apnea  6. Coronary artery disease    Past Surgery History:    Significant for  1. Cholecystectomy  2. Dilatation and curettage  3. Cataract surgery  4. Right breast biopsy [benign]  5. Right hemicolectomy    Social History:   She denies smoking or illicit drug abuse. She socially drinks alcohol. Family History:    Significant for breast cancer in one of her sister.     Allergies   Allergen Reactions    Lopid [Gemfibrozil] Shortness Of Breath and Other (See Comments)     Cough    Amoxicillin     Bystolic [Nebivolol Hcl]     Cefdinir Coreg [Carvedilol]     Crestor [Rosuvastatin]     Fenofibrate     Glucophage [Metformin]     Hydrochlorothiazide Other (See Comments)    Metronidazole     Norvasc [Amlodipine Parvezy Soulier [Olmesartan-Amlodipine-Hctz]      Pt states that her chest felt funny and achy when she took the medication    Zocor [Simvastatin]     Ciprofloxacin Itching     Review of Systems: \"Per interval history; otherwise 10 point ROS is negative. \"  Her energy level is fair and her sleep is fine. She doesn't have fever, chills, night sweats, cough, SOB, chest pain, hemoptysis or palpitations. Her bowel and bladder functions are normal, except lower abdominal pain. She denies nausea, vomiting, diarrhea, constipation, dysuria, loss of appetite or weight loss. She doesn't have neuropathy and she denies bleeding or clotting issues. No anxiety or depression. The rest of the systems are unremarkable. Vital Signs: BP (!) 134/53 (Site: Right Upper Arm, Position: Sitting, Cuff Size: Medium Adult)   Pulse 90   Temp 97.4 °F (36.3 °C) (Temporal)   Ht 5' (1.524 m)   Wt 139 lb (63 kg)   SpO2 97%   BMI 27.15 kg/m²      Physical Exam:  CONSTITUTIONAL: awake, alert, cooperative, no apparent distress   EYES: pupils equal, round and reactive to light, sclera clear, normal conjunctiva  ENT: Normocephalic, without obvious abnormality, atraumatic  NECK: supple, symmetrical, no jugular venous distension, no carotid bruits   HEMATOLOGIC/LYMPHATIC: no cervical, supraclavicular or axillary lymphadenopathy   LUNGS: VBS, no wheezes, clear to auscultation, no crackles, no increased work of breathing, no rhonchi,    CARDIOVASCULAR: regular rate and rhythm, normal S1 and S2, no murmur noted  ABDOMEN: normal bowel sounds x 4, soft, non-distended, non-tender, no masses palpated, no hepatosplenomegaly   MUSCULOSKELETAL: full range of motion noted, tone is normal  NEUROLOGIC: awake, alert, oriented to name, place and time.  Motor skills grossly intact. SKIN: appears intact, normal skin color, normal texture, normal turgor, no jaundice.    EXTREMITIES: no LE edema, no clubbing, no leg swelling, no cyanosis,       Labs:  Hematology:  Lab Results   Component Value Date    WBC 11.2 (H) 02/01/2023    RBC 3.64 (L) 02/01/2023    HGB 11.8 (L) 02/01/2023    HCT 36.4 (L) 02/01/2023    .0 02/01/2023    MCH 32.4 (H) 02/01/2023    MCHC 32.4 02/01/2023    RDW 18.6 (H) 02/01/2023     02/01/2023    MPV 9.8 02/01/2023    SEGSPCT 81.4 (H) 02/01/2023    EOSRELPCT 2.1 02/01/2023    BASOPCT 0.4 02/01/2023    LYMPHOPCT 10.5 (L) 02/01/2023    MONOPCT 5.6 (H) 02/01/2023    SEGSABS 9.1 02/01/2023    EOSABS 0.2 02/01/2023    BASOSABS 0.0 02/01/2023    LYMPHSABS 1.2 02/01/2023    MONOSABS 0.6 02/01/2023    DIFFTYPE AUTOMATED DIFFERENTIAL 02/01/2023     Lab Results   Component Value Date    ESR 17 07/08/2022     Chemistry:  Lab Results   Component Value Date     02/01/2023    K 3.7 02/01/2023     02/01/2023    CO2 26 02/01/2023    BUN 19 02/01/2023    CREATININE 1.0 02/01/2023    GLUCOSE 98 02/01/2023    CALCIUM 8.9 02/01/2023    PROT 5.5 (L) 02/01/2023    LABALBU 3.6 02/01/2023    BILITOT 0.5 02/01/2023    ALKPHOS 51 02/01/2023    AST 21 02/01/2023    ALT 14 02/01/2023    LABGLOM 54 (L) 02/01/2023    GFRAA >60 08/09/2022    PHOS 3.0 06/08/2022    MG 1.7 (L) 06/08/2022    POCGLU 83 11/04/2022     Lab Results   Component Value Date     07/08/2022     No components found for: LD  Lab Results   Component Value Date    TSHHS 0.095 (L) 07/08/2022    T4FREE 1.24 06/12/2022    FT3 2.3 12/06/2017     Immunology:  Lab Results   Component Value Date    PROT 5.5 (L) 02/01/2023     No results found for: ZA Cosme  No results found for: B2M  Coagulation Panel:  Lab Results   Component Value Date    PROTIME 26.8 (H) 07/05/2022    INR 2.14 07/05/2022    APTT 29.5 05/14/2021    DDIMER <200 01/24/2016     Anemia Panel:  Lab Results   Component Value Date    HTZMMYGP65 7547 (H) 07/08/2022    FOLATE 11.3 07/08/2022     Tumor Markers:  Lab Results   Component Value Date     49 (H) 11/02/2022    CEA 8.0 01/11/2023        Observations:  PHQ-9 Total Score: 0 (2/1/2023 10:07 AM)     Assessment   Stage III terminal ileum adenocarcinoma. Plan:  I reviewed with her findings on CT scan, US pelvis, colonoscopy and labs. Cecal lesion is concerning for malignant process and right adnexa cystic lesion needs further evaluation with MRI. 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 MRI pelvis showed complex ovarian cyst without internal enhancement to suggest solid mass. Radiologist recommend f/u imaging with CT or MRI in 6 months. She is s/p surgery on 5/27/22. Final pathology revealed that she has stage III terminal ileum adenocarcinoma (pT3, pN1c). She has lost about 25 lbs over last 2 - 3 months before surgery. Her CEA on 5/22/22 was 3.7. Adjuvant chemotherapy with xeloda was started on 7/26/2022 and we stopped it after 9/6/22 due to severe oral mucositis. We changed her chemo to weekly 5Fu and leukovorin. It was started on 11/9/22. CT abdomen and pelvis with IV contrast on 12/2/2022 showed further evaluation and growth of the pelvic mass to the right of midline which currently measures up to 5.1 x 4.2 cm in largest axial diameter compared to 4.4 0.1 cm previously. This mass has a malignant appearing with malignant colonic stricturing of the sigmoid colon. This mass may originate from the sigmoid colon with exophytic extension and invasion of the right adnexa and the right uterus. No obvious metastatic disease. No evidence of significant lymphadenopathy. On February 1, 2023, she presented to me for follow-up. 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 I reviewed final path report with her and her family.  We also reviewed NCCN guidelines. I also let her know that we ideally recommend adjuvant chemotherapy with either CapeOx (capecitabine + oxaliplatin) for 3 months, FOLFOX for 6 months or single agent capecitabine (in frail patient) for six months.    I also discussed with her and her family all the potential side effects as well as benefits of adjuvant chemotherapy.  After long discussion with her and family, they are in agreement to start adjuvant chemotherapy with capecitabine. It was started on 7/26/22.     She developed significant oral mucositis on Day 6 of the therapy and it interfere with her eating and drinking.     She is on xeloda 1500 mg BID. I stopped it since 9/6/22. Since she couldn't tolerate xeloda, we changed it to 5Fu and leukovorin. It was started on 11/9/22.     She is tolerating 5-FU leucovorin well and I recommended to continue with it for now.    She was seen by surgical oncology at [facility]. I have discussed her case with Dr. Antunez and Dr. Helm. Dr. Antunez reviewed her MRI pelvis with radiologist. Radiologist believe that she has pelvis mass which he isn't sure coming from colon, uterus or fallopian tube. We decided to have colonoscopy first. Dr. Helm is going to see her soon. I let her know that we are concerned about possible malignant process there since she is also noted to have rising tumor marker.     I explained all these to her and her husbands. I encouraged them to see surgical oncologist at [facility] as soon as possible.      Chemo induced mucositis - recommend to continue with magic mouth wash with dexamethasone.     Chemo induced nausea - mild symptom only. Recommend her to take zofran prn.     I answered all her questions and concerns for today. Recent imaging and labs were reviewed and discussed with the patient.

## 2023-01-25 ENCOUNTER — TELEPHONE (OUTPATIENT)
Dept: INFUSION THERAPY | Age: 88
End: 2023-01-25

## 2023-01-25 NOTE — TELEPHONE ENCOUNTER
Pt called & cancelled tx due to the weather.  She is already scheduled for tx next wk & is aware of the date + time

## 2023-02-01 ENCOUNTER — OFFICE VISIT (OUTPATIENT)
Dept: ONCOLOGY | Age: 88
End: 2023-02-01
Payer: MEDICARE

## 2023-02-01 ENCOUNTER — HOSPITAL ENCOUNTER (OUTPATIENT)
Dept: INFUSION THERAPY | Age: 88
Discharge: HOME | End: 2023-02-01
Payer: MEDICARE

## 2023-02-01 VITALS
WEIGHT: 139 LBS | SYSTOLIC BLOOD PRESSURE: 134 MMHG | BODY MASS INDEX: 27.29 KG/M2 | TEMPERATURE: 97.4 F | HEART RATE: 90 BPM | OXYGEN SATURATION: 97 % | DIASTOLIC BLOOD PRESSURE: 53 MMHG | HEIGHT: 60 IN

## 2023-02-01 DIAGNOSIS — C17.2 ADENOCARCINOMA OF ILEUM (HCC): Primary | ICD-10-CM

## 2023-02-01 DIAGNOSIS — C17.2 ADENOCARCINOMA OF ILEUM (HCC): ICD-10-CM

## 2023-02-01 DIAGNOSIS — C18.2 MALIGNANT NEOPLASM OF ASCENDING COLON (HCC): Primary | ICD-10-CM

## 2023-02-01 LAB
ALBUMIN SERPL-MCNC: 3.6 GM/DL (ref 3.4–5)
ALP BLD-CCNC: 51 IU/L (ref 40–128)
ALT SERPL-CCNC: 14 U/L (ref 10–40)
ANION GAP SERPL CALCULATED.3IONS-SCNC: 9 MMOL/L (ref 4–16)
AST SERPL-CCNC: 21 IU/L (ref 15–37)
BASOPHILS ABSOLUTE: 0 K/CU MM
BASOPHILS RELATIVE PERCENT: 0.4 % (ref 0–1)
BILIRUB SERPL-MCNC: 0.5 MG/DL (ref 0–1)
BUN SERPL-MCNC: 19 MG/DL (ref 6–23)
CALCIUM SERPL-MCNC: 8.9 MG/DL (ref 8.3–10.6)
CHLORIDE BLD-SCNC: 105 MMOL/L (ref 99–110)
CO2: 26 MMOL/L (ref 21–32)
CREAT SERPL-MCNC: 1 MG/DL (ref 0.6–1.1)
DIFFERENTIAL TYPE: ABNORMAL
EOSINOPHILS ABSOLUTE: 0.2 K/CU MM
EOSINOPHILS RELATIVE PERCENT: 2.1 % (ref 0–3)
GFR SERPL CREATININE-BSD FRML MDRD: 54 ML/MIN/1.73M2
GLUCOSE SERPL-MCNC: 98 MG/DL (ref 70–99)
HCT VFR BLD CALC: 36.4 % (ref 37–47)
HEMOGLOBIN: 11.8 GM/DL (ref 12.5–16)
LYMPHOCYTES ABSOLUTE: 1.2 K/CU MM
LYMPHOCYTES RELATIVE PERCENT: 10.5 % (ref 24–44)
MCH RBC QN AUTO: 32.4 PG (ref 27–31)
MCHC RBC AUTO-ENTMCNC: 32.4 % (ref 32–36)
MCV RBC AUTO: 100 FL (ref 78–100)
MONOCYTES ABSOLUTE: 0.6 K/CU MM
MONOCYTES RELATIVE PERCENT: 5.6 % (ref 0–4)
PDW BLD-RTO: 18.6 % (ref 11.7–14.9)
PLATELET # BLD: 180 K/CU MM (ref 140–440)
PMV BLD AUTO: 9.8 FL (ref 7.5–11.1)
POTASSIUM SERPL-SCNC: 3.7 MMOL/L (ref 3.5–5.1)
RBC # BLD: 3.64 M/CU MM (ref 4.2–5.4)
SEGMENTED NEUTROPHILS ABSOLUTE COUNT: 9.1 K/CU MM
SEGMENTED NEUTROPHILS RELATIVE PERCENT: 81.4 % (ref 36–66)
SODIUM BLD-SCNC: 140 MMOL/L (ref 135–145)
TOTAL PROTEIN: 5.5 GM/DL (ref 6.4–8.2)
WBC # BLD: 11.2 K/CU MM (ref 4–10.5)

## 2023-02-01 PROCEDURE — 96375 TX/PRO/DX INJ NEW DRUG ADDON: CPT

## 2023-02-01 PROCEDURE — 96413 CHEMO IV INFUSION 1 HR: CPT

## 2023-02-01 PROCEDURE — 99214 OFFICE O/P EST MOD 30 MIN: CPT | Performed by: INTERNAL MEDICINE

## 2023-02-01 PROCEDURE — 1124F ACP DISCUSS-NO DSCNMKR DOCD: CPT | Performed by: INTERNAL MEDICINE

## 2023-02-01 PROCEDURE — 2580000003 HC RX 258: Performed by: NURSE PRACTITIONER

## 2023-02-01 PROCEDURE — 96366 THER/PROPH/DIAG IV INF ADDON: CPT

## 2023-02-01 PROCEDURE — 85025 COMPLETE CBC W/AUTO DIFF WBC: CPT

## 2023-02-01 PROCEDURE — 96367 TX/PROPH/DG ADDL SEQ IV INF: CPT

## 2023-02-01 PROCEDURE — 80053 COMPREHEN METABOLIC PANEL: CPT

## 2023-02-01 PROCEDURE — 6360000002 HC RX W HCPCS: Performed by: NURSE PRACTITIONER

## 2023-02-01 RX ORDER — SODIUM CHLORIDE 0.9 % (FLUSH) 0.9 %
5-40 SYRINGE (ML) INJECTION PRN
Status: DISCONTINUED | OUTPATIENT
Start: 2023-02-01 | End: 2023-02-02 | Stop reason: HOSPADM

## 2023-02-01 RX ORDER — SODIUM CHLORIDE 9 MG/ML
5-250 INJECTION, SOLUTION INTRAVENOUS PRN
Status: DISCONTINUED | OUTPATIENT
Start: 2023-02-01 | End: 2023-02-02 | Stop reason: HOSPADM

## 2023-02-01 RX ORDER — DEXAMETHASONE SODIUM PHOSPHATE 4 MG/ML
8 INJECTION, SOLUTION INTRA-ARTICULAR; INTRALESIONAL; INTRAMUSCULAR; INTRAVENOUS; SOFT TISSUE ONCE
Status: COMPLETED | OUTPATIENT
Start: 2023-02-01 | End: 2023-02-01

## 2023-02-01 RX ADMIN — SODIUM CHLORIDE 20 ML/HR: 9 INJECTION, SOLUTION INTRAVENOUS at 11:03

## 2023-02-01 RX ADMIN — LEUCOVORIN CALCIUM 800 MG: 500 INJECTION, POWDER, LYOPHILIZED, FOR SOLUTION INTRAMUSCULAR; INTRAVENOUS at 11:40

## 2023-02-01 RX ADMIN — DEXAMETHASONE SODIUM PHOSPHATE 8 MG: 4 INJECTION, SOLUTION INTRAMUSCULAR; INTRAVENOUS at 11:02

## 2023-02-01 RX ADMIN — FLUOROURACIL 800 MG: 50 INJECTION, SOLUTION INTRAVENOUS at 13:57

## 2023-02-01 RX ADMIN — SODIUM CHLORIDE 20 ML/HR: 9 INJECTION, SOLUTION INTRAVENOUS at 11:41

## 2023-02-01 ASSESSMENT — PATIENT HEALTH QUESTIONNAIRE - PHQ9
1. LITTLE INTEREST OR PLEASURE IN DOING THINGS: 0
SUM OF ALL RESPONSES TO PHQ9 QUESTIONS 1 & 2: 0
SUM OF ALL RESPONSES TO PHQ QUESTIONS 1-9: 0
2. FEELING DOWN, DEPRESSED OR HOPELESS: 0
SUM OF ALL RESPONSES TO PHQ QUESTIONS 1-9: 0

## 2023-02-01 NOTE — PROGRESS NOTES
MA Rooming Questions  Patient: Wilman Wheeler  MRN: [id number]    Date: 2/1/2023        1. Do you have any new issues? yes - nausea          2. Do you need any refills on medications?    no    3. Have you had any imaging done since your last visit? yes - labs 1/11    4. Have you been hospitalized or seen in the emergency room since your last visit here?   no    5. Did the patient have a depression screening completed today?  Yes    PHQ-9 Total Score: 0 (2/1/2023 10:07 AM)       PHQ-9 Given to (if applicable):               PHQ-9 Score (if applicable):                     [] Positive     []  Negative              Does question #9 need addressed (if applicable)                     [] Yes    []  No               Artelia Sera, CMA

## 2023-02-01 NOTE — PROGRESS NOTES
Ambulated to treatment suite for Leucovorin and Adrucil infusion from OV. PIV #24 placed in left wrist without difficulty. Labs obtained. Positive blood return noted. Flushed and locked with normal saline. Assessment complete. Denies concerns at this time.  at chairside. Labs reviewed, treatment plan approved and released. Treatment given as ordered. Treatment completed. PIV discontinued to left wrist per protocol. Pt tolerated well. AVS provided. Discharge in stable condition.   Tatianna Hodgson RN

## 2023-02-04 NOTE — PROGRESS NOTES
Patient Name:  Christiane Freeman  Patient :  10/20/1934  Patient MRN:  [id number]     Primary Oncologist: Iván Balderas MD  Referring Provider: Christian Aj DO     Date of Service: 2023      Chief Complaint:    Chief Complaint   Patient presents with    Follow-up    Chemotherapy     Patient Active Problem List:     Long-term insulin use in type 2 diabetes [facility])     Essential hypertension     Dyslipidemia     Abnormal EKG     Abnormal stress test     Cecum mass     Severe malnutrition (HCC)     Partial small bowel obstruction ([address]     Adenocarcinoma (HCC)     Generalized weakness     Uncontrolled pain     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 She has been treated recently for abdominal pain and suspected diverticulitis, but was not improving after getting antibiotics which prompted her to return to the ED. She denies any personal or family history of colon cancer. Her sister had breast cancer in her 42's. She has had colonoscopies in the past which were normal, but thinks her last one was more than 10 years ago. She denies any previous problems with ovarian cysts or adnexal lesions. She underwent colonoscopy on 22 and it showed large mass extending from the ileocecal valve into the cecum mass originating at ileocecal valve appears to be more tubulovillous, mass in the cecum appears to be more firm fixed ulcerated consistent with malignancy. Biopsies were obtained.  Mild to moderate sigmoid diverticulosis and grade 2 hemorrhoids and incidental lipoma in transverse colon     Biopsy from cecal mass showed invasive moderately differentiated adenocarcinoma. MRI pelvis showed complex ovarian cyst without internal enhancement to suggest solid mass. Radiologist recommend f/u imaging with CT or MRI in 6 months. She is s/p surgery on 5/27/22. Final pathology revealed that she has stage III terminal ileum adenocarcinoma (pT3, pN1c). She has lost about 25 lbs over last 2 - 3 months before surgery. Her CEA on 5/22/22 was 3.7. Adjuvant chemotherapy with xeloda was started on 7/26/2022 and we stopped it after 9/6/22 due to severe oral mucositis. We changed her chemo to weekly 5Fu and leukovorin. It was started on 11/9/22. CT abdomen and pelvis with IV contrast on 12/2/2022 showed further evaluation and growth of the pelvic mass to the right of midline which currently measures up to 5.1 x 4.2 cm in largest axial diameter compared to 4.4 0.1 cm previously. This mass has a malignant appearing with malignant colonic stricturing of the sigmoid colon. This mass may originate from the sigmoid colon with exophytic extension and invasion of the right adnexa and the right uterus. No obvious metastatic disease. No evidence of significant lymphadenopathy. On February 8, 2023, she presented to me for follow-up. 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 I reviewed final path report with her and her family. We also reviewed NCCN guidelines. I also let her know that we ideally recommend adjuvant chemotherapy with either CapeOx (capecitabine + oxaliplatin) for 3 months, FOLFOX for 6 months or single agent capecitabine (in frail patient) for six months. I also discussed with her and her family all the potential side effects as well as benefits of adjuvant chemotherapy. After long discussion with her and family, they are in agreement to start adjuvant chemotherapy with capecitabine.  It was started on 7/26/22. She developed significant oral mucositis on Day 6 of the therapy and it interfere with her eating and drinking. She is on xeloda 1500 mg BID. I stopped it since 9/6/22. Since she couldn't tolerate xeloda, we changed it to 5Fu and leukovorin. It was started on 11/9/22. She is tolerating 5-FU leucovorin well and I recommended to continue with it for now. She was seen by surgical oncology at [facility]. I have discussed her case with Dr. Marcellus Michaud and Dr. Argelia Peralta. Dr. Marcellus Michaud reviewed her MRI pelvis with radiologist. Radiologist believe that she has pelvis mass which he isn't sure coming from colon, uterus or fallopian tube. We decided to have colonoscopy first.     I spoke with Dr. Argelia Peralta and requested him to do colonoscopy soon. I let her know that we are concerned about possible malignant process there since she is also noted to have rising tumor marker. I explained all these to her, her  and daughters. I encouraged them to see surgical oncologist at [facility] as soon as possible. Malignancy related pain - will start tramadol today. Will follow her pain closely. Chemo induced mucositis - recommend to continue with magic mouth wash with dexamethasone. Chemo induced nausea - mild symptom only. Recommend her to take zofran prn. She does not have any other significant symptoms at today's visit. Past Medical History:     Significant for  1. Hypertension  2. Hyperlipidemia  3. Diabetes mellitus  4. Hypothyroidism  5. Obstructive sleep apnea  6. Coronary artery disease    Past Surgery History:    Significant for  1. Cholecystectomy  2. Dilatation and curettage  3. Cataract surgery  4. Right breast biopsy [benign]  5. Right hemicolectomy    Social History:   She denies smoking or illicit drug abuse. She socially drinks alcohol. Family History:    Significant for breast cancer in one of her sister.     Allergies   Allergen Reactions Lopid [Gemfibrozil] Shortness Of Breath and Other (See Comments)     Cough    Amoxicillin     Bystolic [Nebivolol Hcl]     Cefdinir     Coreg [Carvedilol]     Crestor [Rosuvastatin]     Fenofibrate     Glucophage [Metformin]     Hydrochlorothiazide Other (See Comments)    Metronidazole     Norvasc [Amlodipine Besylate]     Tribenzor [Olmesartan-Amlodipine-Hctz]      Pt states that her chest felt funny and achy when she took the medication    Zocor [Simvastatin]     Ciprofloxacin Itching     Review of Systems: \"Per interval history; otherwise 10 point ROS is negative. \"  Her energy level is stable and her sleep is fine. She doesn't have fever, chills, night sweats, cough, SOB, chest pain, hemoptysis or palpitations. Her bowel and bladder functions are normal, except lower abdominal pain. She denies nausea, vomiting, diarrhea, constipation, dysuria, loss of appetite or weight loss. She denies neuropathy and she doesn't have bleeding or clotting issues. No anxiety or depression. The rest of the systems are unremarkable.      Vital Signs: BP (!) 114/57 (Site: Left Upper Arm, Position: Sitting, Cuff Size: Medium Adult)   Pulse 61   Temp 97.7 °F (36.5 °C) (Infrared)   Ht 5' (1.524 m)   Wt 140 lb 12.8 oz (63.9 kg)   SpO2 97%   BMI 27.50 kg/m²      Physical Exam:  CONSTITUTIONAL: awake, alert, cooperative, no apparent distress   EYES: pupils equal, round and reactive to light, sclera clear, normal conjunctiva  ENT: Normocephalic, without obvious abnormality, atraumatic  NECK: supple, symmetrical, no jugular venous distension, no carotid bruits   HEMATOLOGIC/LYMPHATIC: no cervical, supraclavicular or axillary lymphadenopathy   LUNGS: VBS, no wheezes, clear to auscultation, no crackles, no increased work of breathing, no rhonchi,    CARDIOVASCULAR: regular rate and rhythm, normal S1 and S2, no murmur noted  ABDOMEN: normal bowel sounds x 4, soft, non-distended, non-tender, no masses palpated, no hepatosplenomegaly MUSCULOSKELETAL: full range of motion noted, tone is normal  NEUROLOGIC: awake, alert, oriented to name, place and time. Motor skills grossly intact. SKIN: appears intact, normal skin color, normal texture, normal turgor, no jaundice.    EXTREMITIES: no clubbing, no leg swelling, no LE edema, no cyanosis,       Labs:  Hematology:  Lab Results   Component Value Date    WBC 9.8 02/08/2023    RBC 3.49 (L) 02/08/2023    HGB 11.4 (L) 02/08/2023    HCT 35.8 (L) 02/08/2023    .6 (H) 02/08/2023    MCH 32.7 (H) 02/08/2023    MCHC 31.8 (L) 02/08/2023    RDW 17.7 (H) 02/08/2023     02/08/2023    MPV 10.1 02/08/2023    SEGSPCT 76.5 (H) 02/08/2023    EOSRELPCT 2.7 02/08/2023    BASOPCT 0.2 02/08/2023    LYMPHOPCT 12.9 (L) 02/08/2023    MONOPCT 7.7 (H) 02/08/2023    SEGSABS 7.5 02/08/2023    EOSABS 0.3 02/08/2023    BASOSABS 0.0 02/08/2023    LYMPHSABS 1.3 02/08/2023    MONOSABS 0.8 02/08/2023    DIFFTYPE AUTOMATED DIFFERENTIAL 02/08/2023     Lab Results   Component Value Date    ESR 17 07/08/2022     Chemistry:  Lab Results   Component Value Date     02/01/2023    K 3.7 02/01/2023     02/01/2023    CO2 26 02/01/2023    BUN 19 02/01/2023    CREATININE 1.0 02/01/2023    GLUCOSE 98 02/01/2023    CALCIUM 8.9 02/01/2023    PROT 5.5 (L) 02/01/2023    LABALBU 3.6 02/01/2023    BILITOT 0.5 02/01/2023    ALKPHOS 51 02/01/2023    AST 21 02/01/2023    ALT 14 02/01/2023    LABGLOM 54 (L) 02/01/2023    GFRAA >60 08/09/2022    PHOS 3.0 06/08/2022    MG 1.7 (L) 06/08/2022    POCGLU 83 11/04/2022     Lab Results   Component Value Date     07/08/2022     No components found for: LD  Lab Results   Component Value Date    TSHHS 0.095 (L) 07/08/2022    T4FREE 1.24 06/12/2022    FT3 2.3 12/06/2017     Immunology:  Lab Results   Component Value Date    PROT 5.5 (L) 02/01/2023     No results found for: Aubrie Coffee, KLFLCR  No results found for: B2M  Coagulation Panel:  Lab Results   Component Value Date PROTIME 26.8 (H) 07/05/2022    INR 2.14 07/05/2022    APTT 29.5 05/14/2021    DDIMER <200 01/24/2016     Anemia Panel:  Lab Results   Component Value Date    GUQXYMEZ60 3830 (H) 07/08/2022    FOLATE 11.3 07/08/2022     Tumor Markers:  Lab Results   Component Value Date     49 (H) 11/02/2022    CEA 8.0 01/11/2023        Observations:  No data recorded     Assessment   Stage III terminal ileum adenocarcinoma. Plan:  I reviewed with her findings on CT scan, US pelvis, colonoscopy and labs. Cecal lesion is concerning for malignant process and right adnexa cystic lesion needs further evaluation with MRI. 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 MRI pelvis showed complex ovarian cyst without internal enhancement to suggest solid mass. Radiologist recommend f/u imaging with CT or MRI in 6 months. She is s/p surgery on 5/27/22. Final pathology revealed that she has stage III terminal ileum adenocarcinoma (pT3, pN1c). She has lost about 25 lbs over last 2 - 3 months before surgery. Her CEA on 5/22/22 was 3.7. Adjuvant chemotherapy with xeloda was started on 7/26/2022 and we stopped it after 9/6/22 due to severe oral mucositis. We changed her chemo to weekly 5Fu and leukovorin. It was started on 11/9/22. CT abdomen and pelvis with IV contrast on 12/2/2022 showed further evaluation and growth of the pelvic mass to the right of midline which currently measures up to 5.1 x 4.2 cm in largest axial diameter compared to 4.4 0.1 cm previously. This mass has a malignant appearing with malignant colonic stricturing of the sigmoid colon. This mass may originate from the sigmoid colon with exophytic extension and invasion of the right adnexa and the right uterus. No obvious metastatic disease. No evidence of significant lymphadenopathy. On February 8, 2023, she presented to me for follow-up.   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 I reviewed final path report with her and her family. We also reviewed NCCN guidelines. I also let her know that we ideally recommend adjuvant chemotherapy with either CapeOx (capecitabine + oxaliplatin) for 3 months, FOLFOX for 6 months or single agent capecitabine (in frail patient) for six months. I also discussed with her and her family all the potential side effects as well as benefits of adjuvant chemotherapy. After long discussion with her and family, they are in agreement to start adjuvant chemotherapy with capecitabine. It was started on 7/26/22. She developed significant oral mucositis on Day 6 of the therapy and it interfere with her eating and drinking. She is on xeloda 1500 mg BID. I stopped it since 9/6/22. Since she couldn't tolerate xeloda, we changed it to 5Fu and leukovorin. It was started on 11/9/22. She is tolerating 5-FU leucovorin well and I recommended to continue with it for now. She was seen by surgical oncology at [facility]. I have discussed her case with Dr. Karly Iyer and Dr. Charisse Jimenez. Dr. Karly Iyer reviewed her MRI pelvis with radiologist. Radiologist believe that she has pelvis mass which he isn't sure coming from colon, uterus or fallopian tube. We decided to have colonoscopy first.     I spoke with Dr. Charisse Jimenez and requested him to do colonoscopy soon. I let her know that we are concerned about possible malignant process there since she is also noted to have rising tumor marker. I explained all these to her, her  and daughters. I encouraged them to see surgical oncologist at [facility] as soon as possible. Malignancy related pain - will start tramadol today. Will follow her pain closely. Chemo induced mucositis - recommend to continue with magic mouth wash with dexamethasone. Chemo induced nausea - mild symptom only. Recommend her to take zofran prn. I answered all her questions and concerns for today. Recent imaging and labs were reviewed and discussed with the patient.

## 2023-02-08 ENCOUNTER — OFFICE VISIT (OUTPATIENT)
Dept: ONCOLOGY | Age: 88
End: 2023-02-08
Payer: MEDICARE

## 2023-02-08 ENCOUNTER — HOSPITAL ENCOUNTER (OUTPATIENT)
Dept: INFUSION THERAPY | Age: 88
Discharge: HOME | End: 2023-02-08
Payer: MEDICARE

## 2023-02-08 VITALS
SYSTOLIC BLOOD PRESSURE: 114 MMHG | WEIGHT: 140.8 LBS | DIASTOLIC BLOOD PRESSURE: 57 MMHG | TEMPERATURE: 97.7 F | BODY MASS INDEX: 27.64 KG/M2 | HEIGHT: 60 IN | HEART RATE: 61 BPM | OXYGEN SATURATION: 97 %

## 2023-02-08 VITALS
DIASTOLIC BLOOD PRESSURE: 57 MMHG | TEMPERATURE: 97.7 F | HEART RATE: 61 BPM | WEIGHT: 140.8 LBS | BODY MASS INDEX: 27.64 KG/M2 | HEIGHT: 60 IN | OXYGEN SATURATION: 97 % | SYSTOLIC BLOOD PRESSURE: 114 MMHG

## 2023-02-08 DIAGNOSIS — C18.2 MALIGNANT NEOPLASM OF ASCENDING COLON (HCC): Primary | ICD-10-CM

## 2023-02-08 DIAGNOSIS — C17.2 ADENOCARCINOMA OF ILEUM (HCC): ICD-10-CM

## 2023-02-08 LAB
BASOPHILS ABSOLUTE: 0 K/CU MM
BASOPHILS RELATIVE PERCENT: 0.2 % (ref 0–1)
DIFFERENTIAL TYPE: ABNORMAL
EOSINOPHILS ABSOLUTE: 0.3 K/CU MM
EOSINOPHILS RELATIVE PERCENT: 2.7 % (ref 0–3)
HCT VFR BLD CALC: 35.8 % (ref 37–47)
HEMOGLOBIN: 11.4 GM/DL (ref 12.5–16)
LYMPHOCYTES ABSOLUTE: 1.3 K/CU MM
LYMPHOCYTES RELATIVE PERCENT: 12.9 % (ref 24–44)
MCH RBC QN AUTO: 32.7 PG (ref 27–31)
MCHC RBC AUTO-ENTMCNC: 31.8 % (ref 32–36)
MCV RBC AUTO: 102.6 FL (ref 78–100)
MONOCYTES ABSOLUTE: 0.8 K/CU MM
MONOCYTES RELATIVE PERCENT: 7.7 % (ref 0–4)
PDW BLD-RTO: 17.7 % (ref 11.7–14.9)
PLATELET # BLD: 168 K/CU MM (ref 140–440)
PMV BLD AUTO: 10.1 FL (ref 7.5–11.1)
RBC # BLD: 3.49 M/CU MM (ref 4.2–5.4)
SEGMENTED NEUTROPHILS ABSOLUTE COUNT: 7.5 K/CU MM
SEGMENTED NEUTROPHILS RELATIVE PERCENT: 76.5 % (ref 36–66)
WBC # BLD: 9.8 K/CU MM (ref 4–10.5)

## 2023-02-08 PROCEDURE — 96366 THER/PROPH/DIAG IV INF ADDON: CPT

## 2023-02-08 PROCEDURE — 1124F ACP DISCUSS-NO DSCNMKR DOCD: CPT | Performed by: INTERNAL MEDICINE

## 2023-02-08 PROCEDURE — 96367 TX/PROPH/DG ADDL SEQ IV INF: CPT

## 2023-02-08 PROCEDURE — 6360000002 HC RX W HCPCS: Performed by: PHYSICIAN ASSISTANT

## 2023-02-08 PROCEDURE — 99214 OFFICE O/P EST MOD 30 MIN: CPT | Performed by: INTERNAL MEDICINE

## 2023-02-08 PROCEDURE — 2580000003 HC RX 258: Performed by: PHYSICIAN ASSISTANT

## 2023-02-08 PROCEDURE — 85025 COMPLETE CBC W/AUTO DIFF WBC: CPT

## 2023-02-08 PROCEDURE — 96375 TX/PRO/DX INJ NEW DRUG ADDON: CPT

## 2023-02-08 PROCEDURE — 96413 CHEMO IV INFUSION 1 HR: CPT

## 2023-02-08 RX ORDER — TRAMADOL HYDROCHLORIDE 50 MG/1
50 TABLET ORAL EVERY 6 HOURS PRN
Qty: 60 TABLET | Refills: 0 | Status: SHIPPED | OUTPATIENT
Start: 2023-02-08 | End: 2023-02-23

## 2023-02-08 RX ORDER — SODIUM CHLORIDE 9 MG/ML
5-250 INJECTION, SOLUTION INTRAVENOUS PRN
Status: DISCONTINUED | OUTPATIENT
Start: 2023-02-08 | End: 2023-02-09 | Stop reason: HOSPADM

## 2023-02-08 RX ORDER — SODIUM CHLORIDE 9 MG/ML
INJECTION, SOLUTION INTRAVENOUS CONTINUOUS
Status: CANCELLED | OUTPATIENT
Start: 2023-02-08

## 2023-02-08 RX ORDER — SODIUM CHLORIDE 9 MG/ML
5-250 INJECTION, SOLUTION INTRAVENOUS PRN
Status: CANCELLED | OUTPATIENT
Start: 2023-02-08

## 2023-02-08 RX ORDER — ONDANSETRON 2 MG/ML
8 INJECTION INTRAMUSCULAR; INTRAVENOUS
Status: CANCELLED | OUTPATIENT
Start: 2023-02-08

## 2023-02-08 RX ORDER — ACETAMINOPHEN 325 MG/1
650 TABLET ORAL
Status: CANCELLED | OUTPATIENT
Start: 2023-02-08

## 2023-02-08 RX ORDER — DIPHENHYDRAMINE HYDROCHLORIDE 50 MG/ML
50 INJECTION INTRAMUSCULAR; INTRAVENOUS
Status: CANCELLED | OUTPATIENT
Start: 2023-02-08

## 2023-02-08 RX ORDER — SODIUM CHLORIDE 0.9 % (FLUSH) 0.9 %
5-40 SYRINGE (ML) INJECTION PRN
Status: DISCONTINUED | OUTPATIENT
Start: 2023-02-08 | End: 2023-02-09 | Stop reason: HOSPADM

## 2023-02-08 RX ORDER — SODIUM CHLORIDE 9 MG/ML
5-40 INJECTION INTRAVENOUS PRN
Status: CANCELLED | OUTPATIENT
Start: 2023-02-08

## 2023-02-08 RX ORDER — FAMOTIDINE 10 MG/ML
20 INJECTION, SOLUTION INTRAVENOUS
Status: CANCELLED | OUTPATIENT
Start: 2023-02-08

## 2023-02-08 RX ORDER — DEXAMETHASONE SODIUM PHOSPHATE 4 MG/ML
8 INJECTION, SOLUTION INTRA-ARTICULAR; INTRALESIONAL; INTRAMUSCULAR; INTRAVENOUS; SOFT TISSUE ONCE
Status: COMPLETED | OUTPATIENT
Start: 2023-02-08 | End: 2023-02-08

## 2023-02-08 RX ORDER — EPINEPHRINE 1 MG/ML
0.3 INJECTION, SOLUTION, CONCENTRATE INTRAVENOUS PRN
Status: CANCELLED | OUTPATIENT
Start: 2023-02-08

## 2023-02-08 RX ORDER — ALBUTEROL SULFATE 90 UG/1
4 AEROSOL, METERED RESPIRATORY (INHALATION) PRN
Status: CANCELLED | OUTPATIENT
Start: 2023-02-08

## 2023-02-08 RX ORDER — HEPARIN SODIUM (PORCINE) LOCK FLUSH IV SOLN 100 UNIT/ML 100 UNIT/ML
500 SOLUTION INTRAVENOUS PRN
Status: CANCELLED | OUTPATIENT
Start: 2023-02-08

## 2023-02-08 RX ADMIN — LEUCOVORIN CALCIUM 800 MG: 500 INJECTION, POWDER, LYOPHILIZED, FOR SOLUTION INTRAMUSCULAR; INTRAVENOUS at 12:25

## 2023-02-08 RX ADMIN — DEXAMETHASONE SODIUM PHOSPHATE 8 MG: 4 INJECTION, SOLUTION INTRAMUSCULAR; INTRAVENOUS at 11:35

## 2023-02-08 RX ADMIN — FLUOROURACIL 800 MG: 50 INJECTION, SOLUTION INTRAVENOUS at 13:28

## 2023-02-08 ASSESSMENT — PAIN DESCRIPTION - LOCATION: LOCATION: ABDOMEN

## 2023-02-08 ASSESSMENT — PAIN SCALES - GENERAL: PAINLEVEL_OUTOF10: 2

## 2023-02-08 NOTE — PROGRESS NOTES
Ambulated to infusion area after office visit, accompanied by   Here for treatment. Labs drawn. CBC within defined parameters, reviewed with patient. Chemo administered as ordered. Tolerated infusion without incident. AVS provided. Discharged in stable condition.

## 2023-02-08 NOTE — PROGRESS NOTES
MA Rooming Questions  Patient: Kierra Stone  MRN: [id number]    Date: 2/8/2023        1. Do you have any new issues?   no         2. Do you need any refills on medications?    no    3. Have you had any imaging done since your last visit?   no    4. Have you been hospitalized or seen in the emergency room since your last visit here?   no    5. Did the patient have a depression screening completed today?  No    No data recorded     PHQ-9 Given to (if applicable):               PHQ-9 Score (if applicable):                     [] Positive     []  Negative              Does question #9 need addressed (if applicable)                     [] Yes    []  No               Tete Gillis CMA

## 2023-02-15 ENCOUNTER — HOSPITAL ENCOUNTER (OUTPATIENT)
Age: 88
Setting detail: SPECIMEN
Discharge: HOME | End: 2023-02-15
Payer: MEDICARE

## 2023-02-15 DIAGNOSIS — C18.2 MALIGNANT NEOPLASM OF ASCENDING COLON (HCC): Primary | ICD-10-CM

## 2023-02-15 PROCEDURE — 88342 IMHCHEM/IMCYTCHM 1ST ANTB: CPT | Performed by: PATHOLOGY

## 2023-02-15 PROCEDURE — 88341 IMHCHEM/IMCYTCHM EA ADD ANTB: CPT | Performed by: PATHOLOGY

## 2023-02-15 PROCEDURE — 88305 TISSUE EXAM BY PATHOLOGIST: CPT | Performed by: PATHOLOGY

## 2023-02-15 RX ORDER — ALBUTEROL SULFATE 90 UG/1
4 AEROSOL, METERED RESPIRATORY (INHALATION) PRN
Status: CANCELLED | OUTPATIENT
Start: 2023-02-16

## 2023-02-15 RX ORDER — ACETAMINOPHEN 325 MG/1
650 TABLET ORAL
Status: CANCELLED | OUTPATIENT
Start: 2023-02-16

## 2023-02-15 RX ORDER — DIPHENHYDRAMINE HYDROCHLORIDE 50 MG/ML
50 INJECTION INTRAMUSCULAR; INTRAVENOUS
Status: CANCELLED | OUTPATIENT
Start: 2023-02-16

## 2023-02-15 RX ORDER — SODIUM CHLORIDE 9 MG/ML
5-250 INJECTION, SOLUTION INTRAVENOUS PRN
Status: CANCELLED | OUTPATIENT
Start: 2023-02-16

## 2023-02-15 RX ORDER — HEPARIN SODIUM (PORCINE) LOCK FLUSH IV SOLN 100 UNIT/ML 100 UNIT/ML
500 SOLUTION INTRAVENOUS PRN
Status: CANCELLED | OUTPATIENT
Start: 2023-02-16

## 2023-02-15 RX ORDER — ONDANSETRON 2 MG/ML
8 INJECTION INTRAMUSCULAR; INTRAVENOUS
Status: CANCELLED | OUTPATIENT
Start: 2023-02-16

## 2023-02-15 RX ORDER — SODIUM CHLORIDE 9 MG/ML
INJECTION, SOLUTION INTRAVENOUS CONTINUOUS
Status: CANCELLED | OUTPATIENT
Start: 2023-02-16

## 2023-02-15 RX ORDER — FAMOTIDINE 10 MG/ML
20 INJECTION, SOLUTION INTRAVENOUS
Status: CANCELLED | OUTPATIENT
Start: 2023-02-16

## 2023-02-15 RX ORDER — EPINEPHRINE 1 MG/ML
0.3 INJECTION, SOLUTION, CONCENTRATE INTRAVENOUS PRN
Status: CANCELLED | OUTPATIENT
Start: 2023-02-16

## 2023-02-15 RX ORDER — SODIUM CHLORIDE 9 MG/ML
5-40 INJECTION INTRAVENOUS PRN
Status: CANCELLED | OUTPATIENT
Start: 2023-02-16

## 2023-02-16 ENCOUNTER — CLINICAL DOCUMENTATION (OUTPATIENT)
Dept: ONCOLOGY | Age: 88
End: 2023-02-16

## 2023-02-16 ENCOUNTER — OFFICE VISIT (OUTPATIENT)
Dept: ONCOLOGY | Age: 88
End: 2023-02-16

## 2023-02-16 ENCOUNTER — HOSPITAL ENCOUNTER (OUTPATIENT)
Dept: INFUSION THERAPY | Age: 88
Discharge: HOME | End: 2023-02-16
Payer: MEDICARE

## 2023-02-16 VITALS
HEART RATE: 67 BPM | DIASTOLIC BLOOD PRESSURE: 64 MMHG | BODY MASS INDEX: 27.09 KG/M2 | TEMPERATURE: 97 F | SYSTOLIC BLOOD PRESSURE: 153 MMHG | HEIGHT: 60 IN | OXYGEN SATURATION: 95 % | WEIGHT: 138 LBS

## 2023-02-16 DIAGNOSIS — C18.2 MALIGNANT NEOPLASM OF ASCENDING COLON (HCC): Primary | ICD-10-CM

## 2023-02-16 DIAGNOSIS — C17.2 ADENOCARCINOMA OF ILEUM (HCC): Primary | ICD-10-CM

## 2023-02-16 DIAGNOSIS — C17.2 ADENOCARCINOMA OF ILEUM (HCC): ICD-10-CM

## 2023-02-16 LAB
ALBUMIN SERPL-MCNC: 3.6 GM/DL (ref 3.4–5)
ALP BLD-CCNC: 58 IU/L (ref 40–128)
ALT SERPL-CCNC: 16 U/L (ref 10–40)
ANION GAP SERPL CALCULATED.3IONS-SCNC: 15 MMOL/L (ref 4–16)
AST SERPL-CCNC: 29 IU/L (ref 15–37)
BASOPHILS ABSOLUTE: 0 K/CU MM
BASOPHILS RELATIVE PERCENT: 0.2 % (ref 0–1)
BILIRUB SERPL-MCNC: 0.3 MG/DL (ref 0–1)
BUN SERPL-MCNC: 19 MG/DL (ref 6–23)
CALCIUM SERPL-MCNC: 8.3 MG/DL (ref 8.3–10.6)
CHLORIDE BLD-SCNC: 104 MMOL/L (ref 99–110)
CO2: 19 MMOL/L (ref 21–32)
CREAT SERPL-MCNC: 1.3 MG/DL (ref 0.6–1.1)
DIFFERENTIAL TYPE: ABNORMAL
EOSINOPHILS ABSOLUTE: 0.2 K/CU MM
EOSINOPHILS RELATIVE PERCENT: 1.6 % (ref 0–3)
GFR SERPL CREATININE-BSD FRML MDRD: 40 ML/MIN/1.73M2
GLUCOSE SERPL-MCNC: 125 MG/DL (ref 70–99)
HCT VFR BLD CALC: 32.3 % (ref 37–47)
HEMOGLOBIN: 10.6 GM/DL (ref 12.5–16)
LYMPHOCYTES ABSOLUTE: 1.4 K/CU MM
LYMPHOCYTES RELATIVE PERCENT: 14.8 % (ref 24–44)
MCH RBC QN AUTO: 33.4 PG (ref 27–31)
MCHC RBC AUTO-ENTMCNC: 32.8 % (ref 32–36)
MCV RBC AUTO: 101.9 FL (ref 78–100)
MONOCYTES ABSOLUTE: 0.6 K/CU MM
MONOCYTES RELATIVE PERCENT: 5.9 % (ref 0–4)
PDW BLD-RTO: 17.8 % (ref 11.7–14.9)
PLATELET # BLD: 172 K/CU MM (ref 140–440)
PMV BLD AUTO: 10.3 FL (ref 7.5–11.1)
POTASSIUM SERPL-SCNC: 4 MMOL/L (ref 3.5–5.1)
RBC # BLD: 3.17 M/CU MM (ref 4.2–5.4)
SEGMENTED NEUTROPHILS ABSOLUTE COUNT: 7.5 K/CU MM
SEGMENTED NEUTROPHILS RELATIVE PERCENT: 77.5 % (ref 36–66)
SODIUM BLD-SCNC: 138 MMOL/L (ref 135–145)
TOTAL PROTEIN: 5.9 GM/DL (ref 6.4–8.2)
WBC # BLD: 9.7 K/CU MM (ref 4–10.5)

## 2023-02-16 PROCEDURE — 85025 COMPLETE CBC W/AUTO DIFF WBC: CPT

## 2023-02-16 PROCEDURE — 6360000002 HC RX W HCPCS: Performed by: NURSE PRACTITIONER

## 2023-02-16 PROCEDURE — 96367 TX/PROPH/DG ADDL SEQ IV INF: CPT

## 2023-02-16 PROCEDURE — 96375 TX/PRO/DX INJ NEW DRUG ADDON: CPT

## 2023-02-16 PROCEDURE — 2580000003 HC RX 258: Performed by: NURSE PRACTITIONER

## 2023-02-16 PROCEDURE — 96366 THER/PROPH/DIAG IV INF ADDON: CPT

## 2023-02-16 PROCEDURE — 96413 CHEMO IV INFUSION 1 HR: CPT

## 2023-02-16 PROCEDURE — 80053 COMPREHEN METABOLIC PANEL: CPT

## 2023-02-16 RX ORDER — SODIUM CHLORIDE 0.9 % (FLUSH) 0.9 %
5-40 SYRINGE (ML) INJECTION PRN
Status: DISCONTINUED | OUTPATIENT
Start: 2023-02-16 | End: 2023-02-17 | Stop reason: HOSPADM

## 2023-02-16 RX ORDER — SODIUM CHLORIDE 9 MG/ML
5-250 INJECTION, SOLUTION INTRAVENOUS PRN
Status: DISCONTINUED | OUTPATIENT
Start: 2023-02-16 | End: 2023-02-17 | Stop reason: HOSPADM

## 2023-02-16 RX ORDER — DEXAMETHASONE SODIUM PHOSPHATE 4 MG/ML
8 INJECTION, SOLUTION INTRA-ARTICULAR; INTRALESIONAL; INTRAMUSCULAR; INTRAVENOUS; SOFT TISSUE ONCE
Status: COMPLETED | OUTPATIENT
Start: 2023-02-16 | End: 2023-02-16

## 2023-02-16 RX ADMIN — DEXAMETHASONE SODIUM PHOSPHATE 8 MG: 4 INJECTION, SOLUTION INTRAMUSCULAR; INTRAVENOUS at 10:41

## 2023-02-16 RX ADMIN — SODIUM CHLORIDE, PRESERVATIVE FREE 10 ML: 5 INJECTION INTRAVENOUS at 13:24

## 2023-02-16 RX ADMIN — FLUOROURACIL 800 MG: 50 INJECTION, SOLUTION INTRAVENOUS at 12:08

## 2023-02-16 RX ADMIN — LEUCOVORIN CALCIUM 800 MG: 200 INJECTION, POWDER, LYOPHILIZED, FOR SUSPENSION INTRAMUSCULAR; INTRAVENOUS at 11:04

## 2023-02-16 RX ADMIN — SODIUM CHLORIDE 20 ML/HR: 9 INJECTION, SOLUTION INTRAVENOUS at 10:41

## 2023-02-16 NOTE — PROGRESS NOTES
MA Rooming Questions  Patient: Lilian Marquespool  MRN: [id number]    Date: 2/16/2023        1. Do you have any new issues? yes - She states \" she feels like her Sodium is low         2. Do you need any refills on medications?    no    3. Have you had any imaging done since your last visit?   no    4. Have you been hospitalized or seen in the emergency room since your last visit here?   no    5. Did the patient have a depression screening completed today?  No    No data recorded     PHQ-9 Given to (if applicable):               PHQ-9 Score (if applicable):                     [] Positive     []  Negative              Does question #9 need addressed (if applicable)                     [] Yes    []  No               Simba Mariano CMA

## 2023-02-16 NOTE — PROGRESS NOTES
----- Message from Toby Obando DO sent at 11/1/2017  9:05 AM CDT -----  Call patient and notify that all lab results and/or xray results were reviewed and are normal.  Please mail a copy in the mail.   This nurse called the office of Dr Tamie Agarwal at [facility] @ [phone] to notify them that Dr Joslyn Babb would like to speak the physician in regards to this mutual patient. This nurse provided Dr Bailey Encinas cell phone number.

## 2023-02-17 ENCOUNTER — CLINICAL DOCUMENTATION (OUTPATIENT)
Dept: ONCOLOGY | Age: 88
End: 2023-02-17

## 2023-02-17 NOTE — PROGRESS NOTES
This nurse called the patient @ [phone] to review lab results. Patient was notified of elevated kidney function test and encouraged to drink more water. Patient denies diarrhea or feeling bad. This nurse informed the patient to call back if that changes and she can receive IVF's. Patient verbalized understanding and denies further needs at this time.

## 2023-03-02 ENCOUNTER — HOSPITAL ENCOUNTER (OUTPATIENT)
Dept: PET IMAGING | Age: 88
Discharge: HOME | End: 2023-03-02
Payer: MEDICARE

## 2023-03-02 ENCOUNTER — HOSPITAL ENCOUNTER (OUTPATIENT)
Dept: CT IMAGING | Age: 88
Discharge: HOME | End: 2023-03-02
Payer: MEDICARE

## 2023-03-02 DIAGNOSIS — R19.00 PELVIC MASS: ICD-10-CM

## 2023-03-02 DIAGNOSIS — C17.2 ADENOCARCINOMA OF ILEUM (HCC): ICD-10-CM

## 2023-03-02 LAB
EGFR, POC: 29 ML/MIN/1.73M2
POC CREATININE: 1.7 MG/DL (ref 0.6–1.1)

## 2023-03-02 PROCEDURE — 78815 PET IMAGE W/CT SKULL-THIGH: CPT

## 2023-03-02 PROCEDURE — 74176 CT ABD & PELVIS W/O CONTRAST: CPT

## 2023-03-02 PROCEDURE — 3430000000 HC RX DIAGNOSTIC RADIOPHARMACEUTICAL: Performed by: INTERNAL MEDICINE

## 2023-03-02 PROCEDURE — A9552 F18 FDG: HCPCS | Performed by: INTERNAL MEDICINE

## 2023-03-02 PROCEDURE — 82565 ASSAY OF CREATININE: CPT

## 2023-03-02 RX ORDER — SODIUM CHLORIDE 0.9 % (FLUSH) 0.9 %
5-40 SYRINGE (ML) INJECTION PRN
Status: DISCONTINUED | OUTPATIENT
Start: 2023-03-02 | End: 2023-03-03 | Stop reason: HOSPADM

## 2023-03-02 RX ORDER — FLUDEOXYGLUCOSE F 18 200 MCI/ML
15.77 INJECTION, SOLUTION INTRAVENOUS
Status: COMPLETED | OUTPATIENT
Start: 2023-03-02 | End: 2023-03-02

## 2023-03-02 RX ADMIN — FLUDEOXYGLUCOSE F 18 15.77 MILLICURIE: 200 INJECTION, SOLUTION INTRAVENOUS at 08:25

## 2023-03-03 DIAGNOSIS — C17.2 ADENOCARCINOMA OF ILEUM (HCC): Primary | ICD-10-CM

## 2023-03-03 RX ORDER — MEPERIDINE HYDROCHLORIDE 50 MG/ML
12.5 INJECTION INTRAMUSCULAR; INTRAVENOUS; SUBCUTANEOUS PRN
OUTPATIENT
Start: 2023-03-03

## 2023-03-03 RX ORDER — ONDANSETRON 2 MG/ML
8 INJECTION INTRAMUSCULAR; INTRAVENOUS
OUTPATIENT
Start: 2023-03-03

## 2023-03-03 RX ORDER — SODIUM CHLORIDE 9 MG/ML
5-40 INJECTION INTRAVENOUS PRN
OUTPATIENT
Start: 2023-03-03

## 2023-03-03 RX ORDER — EPINEPHRINE 1 MG/ML
0.3 INJECTION, SOLUTION, CONCENTRATE INTRAVENOUS PRN
OUTPATIENT
Start: 2023-03-03

## 2023-03-03 RX ORDER — SODIUM CHLORIDE 9 MG/ML
5-250 INJECTION, SOLUTION INTRAVENOUS PRN
OUTPATIENT
Start: 2023-03-03

## 2023-03-03 RX ORDER — ALBUTEROL SULFATE 90 UG/1
4 AEROSOL, METERED RESPIRATORY (INHALATION) PRN
OUTPATIENT
Start: 2023-03-03

## 2023-03-03 RX ORDER — FAMOTIDINE 10 MG/ML
20 INJECTION, SOLUTION INTRAVENOUS
OUTPATIENT
Start: 2023-03-03

## 2023-03-03 RX ORDER — HEPARIN SODIUM (PORCINE) LOCK FLUSH IV SOLN 100 UNIT/ML 100 UNIT/ML
500 SOLUTION INTRAVENOUS PRN
OUTPATIENT
Start: 2023-03-03

## 2023-03-03 RX ORDER — DIPHENHYDRAMINE HYDROCHLORIDE 50 MG/ML
50 INJECTION INTRAMUSCULAR; INTRAVENOUS
OUTPATIENT
Start: 2023-03-03

## 2023-03-03 RX ORDER — SODIUM CHLORIDE 9 MG/ML
INJECTION, SOLUTION INTRAVENOUS CONTINUOUS
OUTPATIENT
Start: 2023-03-03

## 2023-03-03 RX ORDER — ACETAMINOPHEN 325 MG/1
650 TABLET ORAL
OUTPATIENT
Start: 2023-03-03

## 2023-03-06 ENCOUNTER — CLINICAL DOCUMENTATION (OUTPATIENT)
Dept: ONCOLOGY | Age: 88
End: 2023-03-06

## 2023-03-06 NOTE — PROGRESS NOTES
This nurse called the patient's daughter @ [phone] to notify her of the cancellation of OV on 3/8/2023 however there was no answer, this nurse left a VM with the cancellation information and this RN's direct number provided.

## 2023-03-16 ENCOUNTER — CLINICAL DOCUMENTATION (OUTPATIENT)
Dept: ONCOLOGY | Age: 88
End: 2023-03-16

## 2023-03-16 NOTE — PROGRESS NOTES
This nurse called the patient @ [phone] to obtain an update. This nurse spoke with her  who reports that she has surgery at [facility] on 3/10 and is expected to be discharged by 3/20. This nurse will update Dr Dakotah Jeffers.

## 2023-04-04 ENCOUNTER — CLINICAL DOCUMENTATION (OUTPATIENT)
Dept: ONCOLOGY | Age: 88
End: 2023-04-04

## 2023-04-04 NOTE — PROGRESS NOTES
This nurse received a call from the patient's [place] [id number] nurse reporting the patient did not know about what was going on. This nurse explained that this nurse had spoke with the  and he was to call the office with her discharge date. [place] [address] nurse reports that the patient stated she has called several times and has not received a call back. This nurse spoke with Dr Blade Harmon who would like the patient to see him and have her chemo education and treatment all the same day. Dr Blade Harmon had an available appointment for Thursday April 6 @ 9, the patient can be placed for education and treatment that day if she is available. This nurse called the patient @ [phone]. She reports being discharged on 3/19 and states that she is recovering fine and denies any needs at this time. This nurse explained to the patient that the doctor would like her to have her treatment, education and OV the same day if she is  available and willing to have them all the same jp. The patient is in agreement to this plan. This nurse gave the patient her appointment dates and times.

## 2023-04-06 ENCOUNTER — NURSE ONLY (OUTPATIENT)
Dept: ONCOLOGY | Age: 88
End: 2023-04-06
Payer: MEDICARE

## 2023-04-06 ENCOUNTER — HOSPITAL ENCOUNTER (OUTPATIENT)
Dept: INFUSION THERAPY | Age: 88
Discharge: HOME | End: 2023-04-06
Payer: MEDICARE

## 2023-04-06 ENCOUNTER — OFFICE VISIT (OUTPATIENT)
Dept: ONCOLOGY | Age: 88
End: 2023-04-06
Payer: MEDICARE

## 2023-04-06 VITALS
TEMPERATURE: 97.2 F | DIASTOLIC BLOOD PRESSURE: 56 MMHG | SYSTOLIC BLOOD PRESSURE: 116 MMHG | HEIGHT: 60 IN | WEIGHT: 131.2 LBS | RESPIRATION RATE: 16 BRPM | BODY MASS INDEX: 25.76 KG/M2 | HEART RATE: 96 BPM

## 2023-04-06 DIAGNOSIS — C17.2 ADENOCARCINOMA OF ILEUM (HCC): Primary | ICD-10-CM

## 2023-04-06 LAB
ALBUMIN SERPL-MCNC: 3.4 GM/DL (ref 3.4–5)
ALP BLD-CCNC: 63 IU/L (ref 40–129)
ALT SERPL-CCNC: 14 U/L (ref 10–40)
ANION GAP SERPL CALCULATED.3IONS-SCNC: 10 MMOL/L (ref 4–16)
AST SERPL-CCNC: 20 IU/L (ref 15–37)
BASOPHILS ABSOLUTE: 0 K/CU MM
BASOPHILS RELATIVE PERCENT: 0.2 % (ref 0–1)
BILIRUB SERPL-MCNC: 0.2 MG/DL (ref 0–1)
BUN SERPL-MCNC: 18 MG/DL (ref 6–23)
CALCIUM SERPL-MCNC: 8.8 MG/DL (ref 8.3–10.6)
CEA: 9.6 NG/ML (ref 0–5)
CHLORIDE BLD-SCNC: 102 MMOL/L (ref 99–110)
CO2: 25 MMOL/L (ref 21–32)
CORTISOL, PLASMA: 11.03
CREAT SERPL-MCNC: 1.1 MG/DL (ref 0.6–1.1)
DIFFERENTIAL TYPE: ABNORMAL
EOSINOPHILS ABSOLUTE: 0.4 K/CU MM
EOSINOPHILS RELATIVE PERCENT: 3.8 % (ref 0–3)
GFR SERPL CREATININE-BSD FRML MDRD: 48 ML/MIN/1.73M2
GLUCOSE SERPL-MCNC: 79 MG/DL (ref 70–99)
HCT VFR BLD CALC: 36.7 % (ref 37–47)
HEMOGLOBIN: 11.5 GM/DL (ref 12.5–16)
LYMPHOCYTES ABSOLUTE: 1.6 K/CU MM
LYMPHOCYTES RELATIVE PERCENT: 16.4 % (ref 24–44)
MCH RBC QN AUTO: 30.6 PG (ref 27–31)
MCHC RBC AUTO-ENTMCNC: 31.3 % (ref 32–36)
MCV RBC AUTO: 97.6 FL (ref 78–100)
MONOCYTES ABSOLUTE: 0.6 K/CU MM
MONOCYTES RELATIVE PERCENT: 6.3 % (ref 0–4)
PDW BLD-RTO: 14.5 % (ref 11.7–14.9)
PLATELET # BLD: 182 K/CU MM (ref 140–440)
PMV BLD AUTO: 10.6 FL (ref 7.5–11.1)
POTASSIUM SERPL-SCNC: 4.5 MMOL/L (ref 3.5–5.1)
RBC # BLD: 3.76 M/CU MM (ref 4.2–5.4)
SEGMENTED NEUTROPHILS ABSOLUTE COUNT: 6.9 K/CU MM
SEGMENTED NEUTROPHILS RELATIVE PERCENT: 73.3 % (ref 36–66)
SODIUM BLD-SCNC: 137 MMOL/L (ref 135–145)
TOTAL PROTEIN: 5.8 GM/DL (ref 6.4–8.2)
TSH SERPL DL<=0.005 MIU/L-ACNC: 4.46 UIU/ML (ref 0.27–4.2)
WBC # BLD: 9.4 K/CU MM (ref 4–10.5)

## 2023-04-06 PROCEDURE — 1124F ACP DISCUSS-NO DSCNMKR DOCD: CPT | Performed by: INTERNAL MEDICINE

## 2023-04-06 PROCEDURE — 96413 CHEMO IV INFUSION 1 HR: CPT

## 2023-04-06 PROCEDURE — 85025 COMPLETE CBC W/AUTO DIFF WBC: CPT

## 2023-04-06 PROCEDURE — 82533 TOTAL CORTISOL: CPT

## 2023-04-06 PROCEDURE — 80053 COMPREHEN METABOLIC PANEL: CPT

## 2023-04-06 PROCEDURE — 84443 ASSAY THYROID STIM HORMONE: CPT

## 2023-04-06 PROCEDURE — 6360000002 HC RX W HCPCS: Performed by: INTERNAL MEDICINE

## 2023-04-06 PROCEDURE — 2580000003 HC RX 258: Performed by: INTERNAL MEDICINE

## 2023-04-06 PROCEDURE — 99215 OFFICE O/P EST HI 40 MIN: CPT | Performed by: INTERNAL MEDICINE

## 2023-04-06 PROCEDURE — 82378 CARCINOEMBRYONIC ANTIGEN: CPT

## 2023-04-06 PROCEDURE — 99211 OFF/OP EST MAY X REQ PHY/QHP: CPT | Performed by: INTERNAL MEDICINE

## 2023-04-06 RX ORDER — SODIUM CHLORIDE 9 MG/ML
5-250 INJECTION, SOLUTION INTRAVENOUS PRN
Status: DISCONTINUED | OUTPATIENT
Start: 2023-04-06 | End: 2023-04-07 | Stop reason: HOSPADM

## 2023-04-06 RX ADMIN — SODIUM CHLORIDE 20 ML/HR: 9 INJECTION, SOLUTION INTRAVENOUS at 11:39

## 2023-04-06 RX ADMIN — SODIUM CHLORIDE 200 MG: 9 INJECTION, SOLUTION INTRAVENOUS at 11:39

## 2023-04-06 NOTE — PROGRESS NOTES
Ambulated to infusion area, here today to begin new immunotherapy following treatment planning. Consent signed and treatment authorized. No concerns at this time. PIV placed in right wrist by PATRICIA Fay, positive blood return noted. Labs drawn. Labs within defined limits. Chemo administered as ordered. Call light within reach. Tolerated infusion without incident. Discharge instructions provided. RTC 04/27 for next infusion and OV with Dr. Alexandra Avery.    Status appropriately assessed and documented. All required labs and results reviewed. Treatment approved by provider. Treatment orders and medications verified by 2 Registered Nurses where applicable. Treatment plan was confirmed with patient prior to administration, and educated the need to report any treatment-related symptoms      Prior to administration, when applicable, the following 8 elements of medication administration were reviewed with 2nd Registered Nurse prior to dosing: drug name, drug dose, infusion volume when prepared in a syringe, rate of administration, expiration dates and/or times, appearance and integrity of drug(s), and rate of pump for infusion. The 5 rights of medication administration have been verified.

## 2023-04-06 NOTE — PROGRESS NOTES
MA Rooming Questions  Patient: Giovanna Jones  MRN: [id number]    Date: 4/6/2023        1. Do you have any new issues?   no         2. Do you need any refills on medications?    no    3. Have you had any imaging done since your last visit? yes [facility]    4. Have you been hospitalized or seen in the emergency room since your last visit here?   yes - surgery @ [facility] 3/10    5. Did the patient have a depression screening completed today?  No    No data recorded     PHQ-9 Given to (if applicable):               PHQ-9 Score (if applicable):                     [] Positive     []  Negative              Does question #9 need addressed (if applicable)                     [] Yes    []  No               Nahomy Greenwood MA
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 Extensive serosal adhesions with tumor present at the paracervical and parametrial margins. Right parametrium, right pelvis peritoneum, right ureteral were positive for carcinoma. 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 The neoplastic cells demonstrate focal staining for CK7. CDX2 is positive. CK20, PAX8 and arginase are negative. 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 On April 6, 2023, she presented to me for follow-up. 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 I reviewed final path report with her and her family. We also reviewed NCCN guidelines. I also let her know that we ideally recommend adjuvant chemotherapy with either CapeOx (capecitabine + oxaliplatin) for 3 months, FOLFOX for 6 months or single agent capecitabine (in frail patient) for six months. I also discussed with her and her family all the potential side effects as well as benefits of adjuvant chemotherapy. After long discussion with her and family, they are in agreement to start adjuvant chemotherapy with capecitabine. It was started on 7/26/22. She developed significant oral mucositis on Day 6 of the therapy and it interfere with her eating and drinking. She is on xeloda 1500 mg BID. I stopped it since 9/6/22. Since she couldn't tolerate xeloda, we changed it to 5Fu and leukovorin. It was started on 11/9/22. She was seen by surgical oncology at [facility]. I have discussed her case with Dr. Kamilla Luke and Dr. Jacqueline Ortega.  Dr. Kamilla Luke reviewed her MRI pelvis with

## 2023-04-06 NOTE — PROGRESS NOTES
Patient arrived with  for chemotherapy education. Discussed treatment plan, potential side effects, prevention and symptom management. Reviewed in detail chemotherapy education folder and drug monograph. Patient's regimen will be Keytruda Q21D. Consent signed by patient and will be scanned into patient's chart. Copy given to patient. Patient already has RX  for zofran. Advised to call office when refill is needed. Calendar provided for C1 and C2 including tx regimen, appointment times and written instructions. [facility] and this RN as well as on-call phone number for after office hours/weekends provided to patient. CBC and CMP will be drawn today 04/06/2023 prior to tx. NCCN distress thermometer completed with patient. Patient denies distress 0/10. Patient aware of support services offered and declines at this time. Encouraged patient to ask questions and allowed patient to express feelings during visit. All needs addressed. Patient denies any further questions or concerns at this time.

## 2023-04-18 DIAGNOSIS — E44.0 MODERATE MALNUTRITION (HCC): ICD-10-CM

## 2023-04-18 DIAGNOSIS — C80.1 ADENOCARCINOMA (HCC): Primary | ICD-10-CM

## 2023-04-27 ENCOUNTER — HOSPITAL ENCOUNTER (OUTPATIENT)
Dept: INFUSION THERAPY | Age: 88
Discharge: HOME | End: 2023-04-27
Payer: MEDICARE

## 2023-04-27 ENCOUNTER — OFFICE VISIT (OUTPATIENT)
Dept: ONCOLOGY | Age: 88
End: 2023-04-27
Payer: MEDICARE

## 2023-04-27 VITALS
BODY MASS INDEX: 25.8 KG/M2 | SYSTOLIC BLOOD PRESSURE: 115 MMHG | HEART RATE: 71 BPM | TEMPERATURE: 98 F | WEIGHT: 131.4 LBS | RESPIRATION RATE: 16 BRPM | OXYGEN SATURATION: 96 % | HEIGHT: 60 IN | DIASTOLIC BLOOD PRESSURE: 58 MMHG

## 2023-04-27 DIAGNOSIS — E44.0 MODERATE MALNUTRITION (HCC): ICD-10-CM

## 2023-04-27 DIAGNOSIS — C17.2 ADENOCARCINOMA OF ILEUM (HCC): Primary | ICD-10-CM

## 2023-04-27 DIAGNOSIS — C80.1 ADENOCARCINOMA (HCC): ICD-10-CM

## 2023-04-27 LAB
ALBUMIN SERPL-MCNC: 3.2 GM/DL (ref 3.4–5)
ALP BLD-CCNC: 69 IU/L (ref 40–128)
ALT SERPL-CCNC: 21 U/L (ref 10–40)
ANION GAP SERPL CALCULATED.3IONS-SCNC: 10 MMOL/L (ref 4–16)
AST SERPL-CCNC: 27 IU/L (ref 15–37)
BASOPHILS ABSOLUTE: 0 K/CU MM
BASOPHILS RELATIVE PERCENT: 0.3 % (ref 0–1)
BILIRUB SERPL-MCNC: 0.2 MG/DL (ref 0–1)
BUN SERPL-MCNC: 19 MG/DL (ref 6–23)
CALCIUM SERPL-MCNC: 8.8 MG/DL (ref 8.3–10.6)
CHLORIDE BLD-SCNC: 109 MMOL/L (ref 99–110)
CO2: 19 MMOL/L (ref 21–32)
CREAT SERPL-MCNC: 1 MG/DL (ref 0.6–1.1)
DIFFERENTIAL TYPE: ABNORMAL
EOSINOPHILS ABSOLUTE: 0.4 K/CU MM
EOSINOPHILS RELATIVE PERCENT: 4.7 % (ref 0–3)
GFR SERPL CREATININE-BSD FRML MDRD: 54 ML/MIN/1.73M2
GLUCOSE SERPL-MCNC: 86 MG/DL (ref 70–99)
HCT VFR BLD CALC: 37 % (ref 37–47)
HEMOGLOBIN: 11.6 GM/DL (ref 12.5–16)
LYMPHOCYTES ABSOLUTE: 1.8 K/CU MM
LYMPHOCYTES RELATIVE PERCENT: 22.1 % (ref 24–44)
MCH RBC QN AUTO: 29.4 PG (ref 27–31)
MCHC RBC AUTO-ENTMCNC: 31.4 % (ref 32–36)
MCV RBC AUTO: 93.9 FL (ref 78–100)
MONOCYTES ABSOLUTE: 0.7 K/CU MM
MONOCYTES RELATIVE PERCENT: 8.8 % (ref 0–4)
PDW BLD-RTO: 14.6 % (ref 11.7–14.9)
PLATELET # BLD: 187 K/CU MM (ref 140–440)
PMV BLD AUTO: 10.1 FL (ref 7.5–11.1)
POTASSIUM SERPL-SCNC: 4.8 MMOL/L (ref 3.5–5.1)
RBC # BLD: 3.94 M/CU MM (ref 4.2–5.4)
SEGMENTED NEUTROPHILS ABSOLUTE COUNT: 5.1 K/CU MM
SEGMENTED NEUTROPHILS RELATIVE PERCENT: 64.1 % (ref 36–66)
SODIUM BLD-SCNC: 138 MMOL/L (ref 135–145)
TOTAL PROTEIN: 6 GM/DL (ref 6.4–8.2)
TSH SERPL DL<=0.005 MIU/L-ACNC: 2.35 UIU/ML (ref 0.27–4.2)
WBC # BLD: 7.9 K/CU MM (ref 4–10.5)

## 2023-04-27 PROCEDURE — 1124F ACP DISCUSS-NO DSCNMKR DOCD: CPT | Performed by: INTERNAL MEDICINE

## 2023-04-27 PROCEDURE — 2580000003 HC RX 258: Performed by: NURSE PRACTITIONER

## 2023-04-27 PROCEDURE — 85025 COMPLETE CBC W/AUTO DIFF WBC: CPT

## 2023-04-27 PROCEDURE — 96413 CHEMO IV INFUSION 1 HR: CPT

## 2023-04-27 PROCEDURE — 84443 ASSAY THYROID STIM HORMONE: CPT

## 2023-04-27 PROCEDURE — 99214 OFFICE O/P EST MOD 30 MIN: CPT | Performed by: INTERNAL MEDICINE

## 2023-04-27 PROCEDURE — 80053 COMPREHEN METABOLIC PANEL: CPT

## 2023-04-27 PROCEDURE — 6360000002 HC RX W HCPCS: Performed by: NURSE PRACTITIONER

## 2023-04-27 RX ORDER — SODIUM CHLORIDE 9 MG/ML
5-250 INJECTION, SOLUTION INTRAVENOUS PRN
Status: CANCELLED | OUTPATIENT
Start: 2023-04-27

## 2023-04-27 RX ORDER — SODIUM CHLORIDE 9 MG/ML
5-250 INJECTION, SOLUTION INTRAVENOUS PRN
Status: DISCONTINUED | OUTPATIENT
Start: 2023-04-27 | End: 2023-04-28 | Stop reason: HOSPADM

## 2023-04-27 RX ORDER — EPINEPHRINE 1 MG/ML
0.3 INJECTION, SOLUTION, CONCENTRATE INTRAVENOUS PRN
Status: CANCELLED | OUTPATIENT
Start: 2023-04-27

## 2023-04-27 RX ORDER — SODIUM CHLORIDE 9 MG/ML
INJECTION, SOLUTION INTRAVENOUS CONTINUOUS
Status: CANCELLED | OUTPATIENT
Start: 2023-04-27

## 2023-04-27 RX ORDER — ONDANSETRON 2 MG/ML
8 INJECTION INTRAMUSCULAR; INTRAVENOUS
Status: CANCELLED | OUTPATIENT
Start: 2023-04-27

## 2023-04-27 RX ORDER — ACETAMINOPHEN 325 MG/1
650 TABLET ORAL
Status: CANCELLED | OUTPATIENT
Start: 2023-04-27

## 2023-04-27 RX ORDER — ALBUTEROL SULFATE 90 UG/1
4 AEROSOL, METERED RESPIRATORY (INHALATION) PRN
Status: CANCELLED | OUTPATIENT
Start: 2023-04-27

## 2023-04-27 RX ORDER — SODIUM CHLORIDE 0.9 % (FLUSH) 0.9 %
5-40 SYRINGE (ML) INJECTION PRN
Status: CANCELLED | OUTPATIENT
Start: 2023-04-27

## 2023-04-27 RX ORDER — MEPERIDINE HYDROCHLORIDE 25 MG/ML
12.5 INJECTION INTRAMUSCULAR; INTRAVENOUS; SUBCUTANEOUS PRN
Status: CANCELLED | OUTPATIENT
Start: 2023-04-27

## 2023-04-27 RX ORDER — FAMOTIDINE 10 MG/ML
20 INJECTION, SOLUTION INTRAVENOUS
Status: CANCELLED | OUTPATIENT
Start: 2023-04-27

## 2023-04-27 RX ORDER — HEPARIN SODIUM (PORCINE) LOCK FLUSH IV SOLN 100 UNIT/ML 100 UNIT/ML
500 SOLUTION INTRAVENOUS PRN
Status: CANCELLED | OUTPATIENT
Start: 2023-04-27

## 2023-04-27 RX ORDER — DIPHENHYDRAMINE HYDROCHLORIDE 50 MG/ML
50 INJECTION INTRAMUSCULAR; INTRAVENOUS
Status: CANCELLED | OUTPATIENT
Start: 2023-04-27

## 2023-04-27 RX ADMIN — SODIUM CHLORIDE 20 ML/HR: 9 INJECTION, SOLUTION INTRAVENOUS at 11:56

## 2023-04-27 RX ADMIN — SODIUM CHLORIDE 200 MG: 9 INJECTION, SOLUTION INTRAVENOUS at 11:57

## 2023-04-27 NOTE — PROGRESS NOTES
MA Rooming Questions  Patient: Maggie Chavez  MRN: [id number]    Date: 4/27/2023        1. Do you have any new issues?   no         2. Do you need any refills on medications?    no    3. Have you had any imaging done since your last visit?   no    4. Have you been hospitalized or seen in the emergency room since your last visit here?   no    5. Did the patient have a depression screening completed today?  No    No data recorded     PHQ-9 Given to (if applicable):               PHQ-9 Score (if applicable):                     [] Positive     []  Negative              Does question #9 need addressed (if applicable)                     [] Yes    []  No               Jose A Ashraf CMA

## 2023-04-27 NOTE — PROGRESS NOTES
Pt here for Keytruda infusion after OV. PIV placed blood return noted. Labs sent. Pt has no concerns at this time. .     Patient's status assessed and documented appropriately. All labs and required results were also reviewed today. Treatment parameters have been reviewed. Today's treatment has been approved by the provider. Treatment orders and medication sequencing (when applicable) was verified by 2 registered nurses. The treatment plan was confirmed with the patient prior to administration, and the patient understands the need to report any treatment-related symptoms. Prior to administration, when applicable, the following 8 elements of medication administration were reviewed with 2nd Registered Nurse prior to dosing: drug name, drug dose, infusion volume when prepared in a syringe, rate of administration, expiration dates and/or times, appearance and integrity of drug(s), and rate of pump for infusion. The 5 rights of medication administration have been verified.        Pt tolerated tx without incident left ambulatory discharge instructions given

## 2023-05-02 ENCOUNTER — TELEPHONE (OUTPATIENT)
Dept: ONCOLOGY | Age: 88
End: 2023-05-02

## 2023-05-02 NOTE — TELEPHONE ENCOUNTER
5/2/23 - spoke w/ pt for the 5/12/23 CT scan at [facility] arrival time of 10:00 am and NPO 4 hours prior

## 2023-05-11 ENCOUNTER — TELEPHONE (OUTPATIENT)
Dept: ONCOLOGY | Age: 88
End: 2023-05-11

## 2023-05-11 NOTE — TELEPHONE ENCOUNTER
Patient called and indicated she is having vaginal discharge and itching. Patient states OTC medication does not seem to be helping. Would [facility].

## 2023-05-12 ENCOUNTER — HOSPITAL ENCOUNTER (OUTPATIENT)
Dept: CT IMAGING | Age: 88
Discharge: HOME | End: 2023-05-12
Payer: MEDICARE

## 2023-05-12 DIAGNOSIS — C17.2 ADENOCARCINOMA OF ILEUM (HCC): ICD-10-CM

## 2023-05-12 PROCEDURE — 71260 CT THORAX DX C+: CPT

## 2023-05-12 PROCEDURE — 6360000004 HC RX CONTRAST MEDICATION: Performed by: INTERNAL MEDICINE

## 2023-05-12 PROCEDURE — 74177 CT ABD & PELVIS W/CONTRAST: CPT

## 2023-05-12 RX ORDER — FLUCONAZOLE 150 MG/1
150 TABLET ORAL ONCE
Qty: 1 TABLET | Refills: 0 | Status: SHIPPED | OUTPATIENT
Start: 2023-05-12 | End: 2023-05-12

## 2023-05-12 RX ADMIN — IOPAMIDOL 100 ML: 755 INJECTION, SOLUTION INTRAVENOUS at 09:27

## 2023-05-12 NOTE — TELEPHONE ENCOUNTER
This nurse spoke with Salome Dietrich NP, verbal order for Diflucan 150 mg received and e-scribed. This nurse called the patient to advise her of the RX and advise her to follow up with the GYN due to this being recurrent.

## 2023-05-17 RX ORDER — ONDANSETRON 2 MG/ML
8 INJECTION INTRAMUSCULAR; INTRAVENOUS
Status: CANCELLED | OUTPATIENT
Start: 2023-05-18

## 2023-05-17 RX ORDER — MEPERIDINE HYDROCHLORIDE 25 MG/ML
12.5 INJECTION INTRAMUSCULAR; INTRAVENOUS; SUBCUTANEOUS PRN
Status: CANCELLED | OUTPATIENT
Start: 2023-05-18

## 2023-05-17 RX ORDER — ALBUTEROL SULFATE 90 UG/1
4 AEROSOL, METERED RESPIRATORY (INHALATION) PRN
Status: CANCELLED | OUTPATIENT
Start: 2023-05-18

## 2023-05-17 RX ORDER — SODIUM CHLORIDE 0.9 % (FLUSH) 0.9 %
5-40 SYRINGE (ML) INJECTION PRN
Status: CANCELLED | OUTPATIENT
Start: 2023-05-18

## 2023-05-17 RX ORDER — FAMOTIDINE 10 MG/ML
20 INJECTION, SOLUTION INTRAVENOUS
Status: CANCELLED | OUTPATIENT
Start: 2023-05-18

## 2023-05-17 RX ORDER — DIPHENHYDRAMINE HYDROCHLORIDE 50 MG/ML
50 INJECTION INTRAMUSCULAR; INTRAVENOUS
Status: CANCELLED | OUTPATIENT
Start: 2023-05-18

## 2023-05-17 RX ORDER — SODIUM CHLORIDE 9 MG/ML
5-250 INJECTION, SOLUTION INTRAVENOUS PRN
Status: CANCELLED | OUTPATIENT
Start: 2023-05-18

## 2023-05-17 RX ORDER — SODIUM CHLORIDE 9 MG/ML
INJECTION, SOLUTION INTRAVENOUS CONTINUOUS
Status: CANCELLED | OUTPATIENT
Start: 2023-05-18

## 2023-05-17 RX ORDER — EPINEPHRINE 1 MG/ML
0.3 INJECTION, SOLUTION, CONCENTRATE INTRAVENOUS PRN
Status: CANCELLED | OUTPATIENT
Start: 2023-05-18

## 2023-05-17 RX ORDER — HEPARIN SODIUM (PORCINE) LOCK FLUSH IV SOLN 100 UNIT/ML 100 UNIT/ML
500 SOLUTION INTRAVENOUS PRN
Status: CANCELLED | OUTPATIENT
Start: 2023-05-18

## 2023-05-17 RX ORDER — ACETAMINOPHEN 325 MG/1
650 TABLET ORAL
Status: CANCELLED | OUTPATIENT
Start: 2023-05-18

## 2023-05-18 ENCOUNTER — HOSPITAL ENCOUNTER (OUTPATIENT)
Dept: INFUSION THERAPY | Age: 88
Discharge: HOME | End: 2023-05-18
Payer: MEDICARE

## 2023-05-18 ENCOUNTER — OFFICE VISIT (OUTPATIENT)
Dept: ONCOLOGY | Age: 88
End: 2023-05-18
Payer: MEDICARE

## 2023-05-18 VITALS
DIASTOLIC BLOOD PRESSURE: 74 MMHG | HEART RATE: 67 BPM | WEIGHT: 134 LBS | HEIGHT: 60 IN | SYSTOLIC BLOOD PRESSURE: 113 MMHG | BODY MASS INDEX: 26.31 KG/M2 | TEMPERATURE: 97.8 F

## 2023-05-18 VITALS
BODY MASS INDEX: 26.31 KG/M2 | HEIGHT: 60 IN | DIASTOLIC BLOOD PRESSURE: 74 MMHG | WEIGHT: 134 LBS | SYSTOLIC BLOOD PRESSURE: 113 MMHG | TEMPERATURE: 97.8 F | HEART RATE: 67 BPM

## 2023-05-18 DIAGNOSIS — C17.2 ADENOCARCINOMA OF ILEUM (HCC): Primary | ICD-10-CM

## 2023-05-18 LAB
ALBUMIN SERPL-MCNC: 3.3 GM/DL (ref 3.4–5)
ALP BLD-CCNC: 65 IU/L (ref 40–128)
ALT SERPL-CCNC: 20 U/L (ref 10–40)
ANION GAP SERPL CALCULATED.3IONS-SCNC: 10 MMOL/L (ref 4–16)
AST SERPL-CCNC: 27 IU/L (ref 15–37)
BACTERIA: NEGATIVE /HPF
BASOPHILS ABSOLUTE: 0 K/CU MM
BASOPHILS RELATIVE PERCENT: 0.5 % (ref 0–1)
BILIRUB SERPL-MCNC: 0.3 MG/DL (ref 0–1)
BILIRUBIN URINE: NEGATIVE MG/DL
BLOOD, URINE: NEGATIVE
BUN SERPL-MCNC: 19 MG/DL (ref 6–23)
CALCIUM SERPL-MCNC: 8.5 MG/DL (ref 8.3–10.6)
CHLORIDE BLD-SCNC: 107 MMOL/L (ref 99–110)
CLARITY: CLEAR
CO2: 21 MMOL/L (ref 21–32)
COLOR: YELLOW
CREAT SERPL-MCNC: 0.8 MG/DL (ref 0.6–1.1)
DIFFERENTIAL TYPE: ABNORMAL
EOSINOPHILS ABSOLUTE: 0.3 K/CU MM
EOSINOPHILS RELATIVE PERCENT: 3.9 % (ref 0–3)
GFR SERPL CREATININE-BSD FRML MDRD: >60 ML/MIN/1.73M2
GLUCOSE SERPL-MCNC: 96 MG/DL (ref 70–99)
GLUCOSE, URINE: NEGATIVE MG/DL
HCT VFR BLD CALC: 38 % (ref 37–47)
HEMOGLOBIN: 11.8 GM/DL (ref 12.5–16)
KETONES, URINE: NEGATIVE MG/DL
LEUKOCYTE ESTERASE, URINE: ABNORMAL
LYMPHOCYTES ABSOLUTE: 1.4 K/CU MM
LYMPHOCYTES RELATIVE PERCENT: 22 % (ref 24–44)
MCH RBC QN AUTO: 29.1 PG (ref 27–31)
MCHC RBC AUTO-ENTMCNC: 31.1 % (ref 32–36)
MCV RBC AUTO: 93.8 FL (ref 78–100)
MONOCYTES ABSOLUTE: 0.4 K/CU MM
MONOCYTES RELATIVE PERCENT: 6.8 % (ref 0–4)
MUCUS: ABNORMAL HPF
NITRITE URINE, QUANTITATIVE: NEGATIVE
PDW BLD-RTO: 14.4 % (ref 11.7–14.9)
PH, URINE: 5 (ref 5–8)
PLATELET # BLD: 146 K/CU MM (ref 140–440)
PMV BLD AUTO: 10.7 FL (ref 7.5–11.1)
POTASSIUM SERPL-SCNC: 4.7 MMOL/L (ref 3.5–5.1)
PROTEIN UA: NEGATIVE MG/DL
RBC # BLD: 4.05 M/CU MM (ref 4.2–5.4)
RBC URINE: 1 /HPF (ref 0–6)
SEGMENTED NEUTROPHILS ABSOLUTE COUNT: 4.3 K/CU MM
SEGMENTED NEUTROPHILS RELATIVE PERCENT: 66.8 % (ref 36–66)
SODIUM BLD-SCNC: 138 MMOL/L (ref 135–145)
SPECIFIC GRAVITY UA: 1.01 (ref 1–1.03)
SQUAMOUS EPITHELIAL: <1 /HPF
TOTAL PROTEIN: 6 GM/DL (ref 6.4–8.2)
TRICHOMONAS: ABNORMAL /HPF
UROBILINOGEN, URINE: 0.2 MG/DL (ref 0.2–1)
WBC # BLD: 6.4 K/CU MM (ref 4–10.5)
WBC UA: 2 /HPF (ref 0–5)

## 2023-05-18 PROCEDURE — 96413 CHEMO IV INFUSION 1 HR: CPT

## 2023-05-18 PROCEDURE — 85025 COMPLETE CBC W/AUTO DIFF WBC: CPT

## 2023-05-18 PROCEDURE — 2580000003 HC RX 258: Performed by: NURSE PRACTITIONER

## 2023-05-18 PROCEDURE — 1124F ACP DISCUSS-NO DSCNMKR DOCD: CPT | Performed by: INTERNAL MEDICINE

## 2023-05-18 PROCEDURE — 81001 URINALYSIS AUTO W/SCOPE: CPT

## 2023-05-18 PROCEDURE — 6360000002 HC RX W HCPCS: Performed by: NURSE PRACTITIONER

## 2023-05-18 PROCEDURE — 99214 OFFICE O/P EST MOD 30 MIN: CPT | Performed by: INTERNAL MEDICINE

## 2023-05-18 PROCEDURE — 81003 URINALYSIS AUTO W/O SCOPE: CPT | Performed by: INTERNAL MEDICINE

## 2023-05-18 PROCEDURE — 80053 COMPREHEN METABOLIC PANEL: CPT

## 2023-05-18 RX ORDER — SODIUM CHLORIDE 9 MG/ML
5-250 INJECTION, SOLUTION INTRAVENOUS PRN
Status: DISCONTINUED | OUTPATIENT
Start: 2023-05-18 | End: 2023-05-19 | Stop reason: HOSPADM

## 2023-05-18 RX ORDER — LEVOFLOXACIN 250 MG/1
250 TABLET ORAL DAILY
Qty: 10 TABLET | Refills: 0 | Status: SHIPPED | OUTPATIENT
Start: 2023-05-18 | End: 2023-05-28

## 2023-05-18 RX ADMIN — SODIUM CHLORIDE 20 ML/HR: 9 INJECTION, SOLUTION INTRAVENOUS at 11:30

## 2023-05-18 RX ADMIN — SODIUM CHLORIDE 200 MG: 9 INJECTION, SOLUTION INTRAVENOUS at 11:31

## 2023-05-18 NOTE — PROGRESS NOTES
MA Rooming Questions  Patient: Dorie Ba  MRN: [id number]    Date: 5/18/2023        1. Do you have any new issues?   no         2. Do you need any refills on medications?    no    3. Have you had any imaging done since your last visit? Yes - CT scan 5/12, u/s 5/10 at [facility]    4. Have you been hospitalized or seen in the emergency room since your last visit here?   no    5. Did the patient have a depression screening completed today?  No    No data recorded     PHQ-9 Given to (if applicable):               PHQ-9 Score (if applicable):                     [] Positive     []  Negative              Does question #9 need addressed (if applicable)                     [] Yes    []  No               Filomena Barrera CMA

## 2023-05-18 NOTE — PROGRESS NOTES
Pt here for Keytruda infusion and OV. PIV placed with blood return noted. Labs sent. Pt has  no concerns at this time. Per Dr Milana Uribe pt to have UA culture ordered. I placed order and specimen sent to lab. Patient's status assessed and documented appropriately. All labs and required results were also reviewed today. Treatment parameters have been reviewed. Today's treatment has been approved by the provider. Treatment orders and medication sequencing (when applicable) was verified by 2 registered nurses. The treatment plan was confirmed with the patient prior to administration, and the patient understands the need to report any treatment-related symptoms. Prior to administration, when applicable, the following 8 elements of medication administration were reviewed with 2nd Registered Nurse prior to dosing: drug name, drug dose, infusion volume when prepared in a syringe, rate of administration, expiration dates and/or times, appearance and integrity of drug(s), and rate of pump for infusion. The 5 rights of medication administration have been verified. Pt tolerated tx without incident left ambulatory pt instructed to stop at check out desk for next OV and discharge paperwork.

## 2023-05-23 ENCOUNTER — OFFICE VISIT (OUTPATIENT)
Dept: CARDIOLOGY CLINIC | Age: 88
End: 2023-05-23
Payer: MEDICARE

## 2023-05-23 VITALS
SYSTOLIC BLOOD PRESSURE: 132 MMHG | OXYGEN SATURATION: 95 % | DIASTOLIC BLOOD PRESSURE: 60 MMHG | WEIGHT: 133 LBS | HEIGHT: 60 IN | HEART RATE: 61 BPM | BODY MASS INDEX: 26.11 KG/M2

## 2023-05-23 DIAGNOSIS — I10 ESSENTIAL HYPERTENSION: Chronic | ICD-10-CM

## 2023-05-23 DIAGNOSIS — I25.10 CORONARY ARTERY DISEASE INVOLVING NATIVE CORONARY ARTERY OF NATIVE HEART WITHOUT ANGINA PECTORIS: ICD-10-CM

## 2023-05-23 DIAGNOSIS — E78.5 DYSLIPIDEMIA: ICD-10-CM

## 2023-05-23 DIAGNOSIS — Z86.718 H/O DEEP VENOUS THROMBOSIS: Primary | ICD-10-CM

## 2023-05-23 PROCEDURE — 1124F ACP DISCUSS-NO DSCNMKR DOCD: CPT | Performed by: NURSE PRACTITIONER

## 2023-05-23 PROCEDURE — 99214 OFFICE O/P EST MOD 30 MIN: CPT | Performed by: NURSE PRACTITIONER

## 2023-05-23 NOTE — PATIENT INSTRUCTIONS
Please be informed that if you contact our office outside of normal business hours the physician on call cannot help with any scheduling or rescheduling issues, procedure instruction questions or any type of medication issue. We advise you for any urgent/emergency that you go to the nearest emergency room! PLEASE CALL OUR OFFICE DURING NORMAL BUSINESS HOURS    Monday - Friday   8 am to 5 pm    [place]: [phone]    [place]:  [phone]    **It is YOUR responsibilty to bring medication bottles and/or updated medication list to [address] [place] [address]. This will allow us to better serve you and all your healthcare needs**    [facility] Locations - No appointment necessary. Sites open Monday to Friday. Call your preferred location for test preparation, business   hours and other information you need. [employer] [facility] [employer]. [address] [facility]  Dequan Mena, [address] [facility]  Phone: [phone] Tegan De La Cruz  [address] [facility]  [address] Tegan De La Cruz, [address] [facility]  Phone: [phone]

## 2023-05-23 NOTE — PROGRESS NOTES
[facility] ([facility]  Gilberto Garcia  Phone: [phone]    Fax [phone]                  José Miguel Tena MD, Yazmin Villavicencio MD, Bennie Mohs, MD, MD Anastasia Del Valle MD Lolita Shine, MD Lezlie Sage, APRN      Bre Hightower, APRKENDRICK Clancy, APRN     Jonny [place], APRN        Cardiology Progress Note      5/23/2023    RE: Margarita Waller  (10/20/1934)                             Primary cardiologist: Dr. Anastasia Spence       Subjective:  CC:   1. H/O deep venous thrombosis    2. Essential hypertension    3. Dyslipidemia    4. Coronary artery disease involving native coronary artery of native heart without angina pectoris        HPI: Margarita Waller, who is a  80y.o. year old female with a past medical history as listed below. Patient presents to the office for follow up on CAD, HTN, and hyperlipidemia. 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 Patient is on chemotherapy infusion every three weeks. Patient also developed DVT of right subclavian vein from PICC line and was placed on anticoagulation. Patient denies any chest pain, shortness of breath, dizziness, syncope, or palpitations. Past Medical History:   Diagnosis Date    Diabetes mellitus ([address]     H/O cardiac catheterization 05/18/2021    no significant CAD in main vessels, has severe stenosis in small  branch diagonal vessel    H/O cardiovascular stress test 3/22/18,03/30/2017    ECG portion of the stress test is negative for ischemia EF >70% Normal stress myocardial perfusion. H/O cardiovascular stress test 04/07/2021    abnormal stress    H/O Doppler ultrasound (Abdomimal Aorta) 03/29/2017    No evidence of abdominal aortic aneurysm.     H/O echocardiogram 07/02/2014    Normal left ventricular size, wall

## 2023-06-07 RX ORDER — EPINEPHRINE 1 MG/ML
0.3 INJECTION, SOLUTION, CONCENTRATE INTRAVENOUS PRN
OUTPATIENT
Start: 2023-06-08

## 2023-06-07 RX ORDER — FAMOTIDINE 10 MG/ML
20 INJECTION, SOLUTION INTRAVENOUS
OUTPATIENT
Start: 2023-06-08

## 2023-06-07 RX ORDER — SODIUM CHLORIDE 9 MG/ML
INJECTION, SOLUTION INTRAVENOUS CONTINUOUS
OUTPATIENT
Start: 2023-06-08

## 2023-06-07 RX ORDER — DIPHENHYDRAMINE HYDROCHLORIDE 50 MG/ML
50 INJECTION INTRAMUSCULAR; INTRAVENOUS
OUTPATIENT
Start: 2023-06-08

## 2023-06-07 RX ORDER — MEPERIDINE HYDROCHLORIDE 25 MG/ML
12.5 INJECTION INTRAMUSCULAR; INTRAVENOUS; SUBCUTANEOUS PRN
OUTPATIENT
Start: 2023-06-08

## 2023-06-07 RX ORDER — ONDANSETRON 2 MG/ML
8 INJECTION INTRAMUSCULAR; INTRAVENOUS
OUTPATIENT
Start: 2023-06-08

## 2023-06-07 RX ORDER — ALBUTEROL SULFATE 90 UG/1
4 AEROSOL, METERED RESPIRATORY (INHALATION) PRN
OUTPATIENT
Start: 2023-06-08

## 2023-06-07 RX ORDER — ACETAMINOPHEN 325 MG/1
650 TABLET ORAL
OUTPATIENT
Start: 2023-06-08

## 2023-06-07 RX ORDER — HEPARIN SODIUM (PORCINE) LOCK FLUSH IV SOLN 100 UNIT/ML 100 UNIT/ML
500 SOLUTION INTRAVENOUS PRN
Status: CANCELLED | OUTPATIENT
Start: 2023-06-08

## 2023-06-07 RX ORDER — SODIUM CHLORIDE 0.9 % (FLUSH) 0.9 %
5-40 SYRINGE (ML) INJECTION PRN
OUTPATIENT
Start: 2023-06-08

## 2023-06-07 RX ORDER — SODIUM CHLORIDE 9 MG/ML
5-250 INJECTION, SOLUTION INTRAVENOUS PRN
OUTPATIENT
Start: 2023-06-08

## 2023-06-08 ENCOUNTER — OFFICE VISIT (OUTPATIENT)
Dept: ONCOLOGY | Age: 88
End: 2023-06-08
Payer: MEDICARE

## 2023-06-08 ENCOUNTER — HOSPITAL ENCOUNTER (OUTPATIENT)
Dept: INFUSION THERAPY | Age: 88
Discharge: HOME | End: 2023-06-08
Payer: MEDICARE

## 2023-06-08 VITALS
DIASTOLIC BLOOD PRESSURE: 69 MMHG | BODY MASS INDEX: 26.7 KG/M2 | WEIGHT: 136 LBS | SYSTOLIC BLOOD PRESSURE: 175 MMHG | HEART RATE: 52 BPM | HEIGHT: 60 IN | OXYGEN SATURATION: 97 % | TEMPERATURE: 97.8 F

## 2023-06-08 VITALS
WEIGHT: 136.2 LBS | TEMPERATURE: 97.8 F | SYSTOLIC BLOOD PRESSURE: 175 MMHG | HEIGHT: 60 IN | BODY MASS INDEX: 26.74 KG/M2 | RESPIRATION RATE: 16 BRPM | DIASTOLIC BLOOD PRESSURE: 69 MMHG | OXYGEN SATURATION: 97 % | HEART RATE: 52 BPM

## 2023-06-08 DIAGNOSIS — C17.2 ADENOCARCINOMA OF ILEUM (HCC): Primary | ICD-10-CM

## 2023-06-08 DIAGNOSIS — E44.0 MODERATE MALNUTRITION (HCC): ICD-10-CM

## 2023-06-08 DIAGNOSIS — C80.1 ADENOCARCINOMA (HCC): ICD-10-CM

## 2023-06-08 LAB
ALBUMIN SERPL-MCNC: 3.4 GM/DL (ref 3.4–5)
ALP BLD-CCNC: 68 IU/L (ref 40–128)
ALT SERPL-CCNC: 32 U/L (ref 10–40)
ANION GAP SERPL CALCULATED.3IONS-SCNC: 12 MMOL/L (ref 4–16)
AST SERPL-CCNC: 40 IU/L (ref 15–37)
BASOPHILS ABSOLUTE: 0 K/CU MM
BASOPHILS RELATIVE PERCENT: 0.1 % (ref 0–1)
BILIRUB SERPL-MCNC: 0.3 MG/DL (ref 0–1)
BUN SERPL-MCNC: 16 MG/DL (ref 6–23)
CALCIUM SERPL-MCNC: 8.8 MG/DL (ref 8.3–10.6)
CHLORIDE BLD-SCNC: 101 MMOL/L (ref 99–110)
CO2: 22 MMOL/L (ref 21–32)
CREAT SERPL-MCNC: 0.9 MG/DL (ref 0.6–1.1)
DIFFERENTIAL TYPE: ABNORMAL
EOSINOPHILS ABSOLUTE: 0.3 K/CU MM
EOSINOPHILS RELATIVE PERCENT: 3.2 % (ref 0–3)
GFR SERPL CREATININE-BSD FRML MDRD: >60 ML/MIN/1.73M2
GLUCOSE SERPL-MCNC: 90 MG/DL (ref 70–99)
HCT VFR BLD CALC: 39.1 % (ref 37–47)
HEMOGLOBIN: 12.2 GM/DL (ref 12.5–16)
LYMPHOCYTES ABSOLUTE: 1.5 K/CU MM
LYMPHOCYTES RELATIVE PERCENT: 18.6 % (ref 24–44)
MCH RBC QN AUTO: 28.6 PG (ref 27–31)
MCHC RBC AUTO-ENTMCNC: 31.2 % (ref 32–36)
MCV RBC AUTO: 91.6 FL (ref 78–100)
MONOCYTES ABSOLUTE: 0.7 K/CU MM
MONOCYTES RELATIVE PERCENT: 8.6 % (ref 0–4)
PDW BLD-RTO: 14.2 % (ref 11.7–14.9)
PLATELET # BLD: 150 K/CU MM (ref 140–440)
PMV BLD AUTO: 11.1 FL (ref 7.5–11.1)
POTASSIUM SERPL-SCNC: 4.3 MMOL/L (ref 3.5–5.1)
RBC # BLD: 4.27 M/CU MM (ref 4.2–5.4)
SEGMENTED NEUTROPHILS ABSOLUTE COUNT: 5.6 K/CU MM
SEGMENTED NEUTROPHILS RELATIVE PERCENT: 69.5 % (ref 36–66)
SODIUM BLD-SCNC: 135 MMOL/L (ref 135–145)
TOTAL PROTEIN: 6.2 GM/DL (ref 6.4–8.2)
TSH SERPL DL<=0.005 MIU/L-ACNC: 1.25 UIU/ML (ref 0.27–4.2)
WBC # BLD: 8.1 K/CU MM (ref 4–10.5)

## 2023-06-08 PROCEDURE — 84443 ASSAY THYROID STIM HORMONE: CPT

## 2023-06-08 PROCEDURE — 6360000002 HC RX W HCPCS: Performed by: NURSE PRACTITIONER

## 2023-06-08 PROCEDURE — 85025 COMPLETE CBC W/AUTO DIFF WBC: CPT

## 2023-06-08 PROCEDURE — 1124F ACP DISCUSS-NO DSCNMKR DOCD: CPT | Performed by: INTERNAL MEDICINE

## 2023-06-08 PROCEDURE — 96413 CHEMO IV INFUSION 1 HR: CPT

## 2023-06-08 PROCEDURE — 99214 OFFICE O/P EST MOD 30 MIN: CPT | Performed by: INTERNAL MEDICINE

## 2023-06-08 PROCEDURE — 2580000003 HC RX 258: Performed by: NURSE PRACTITIONER

## 2023-06-08 PROCEDURE — 80053 COMPREHEN METABOLIC PANEL: CPT

## 2023-06-08 PROCEDURE — 36415 COLL VENOUS BLD VENIPUNCTURE: CPT

## 2023-06-08 RX ORDER — SODIUM CHLORIDE 9 MG/ML
5-250 INJECTION, SOLUTION INTRAVENOUS PRN
Status: DISCONTINUED | OUTPATIENT
Start: 2023-06-08 | End: 2023-06-09 | Stop reason: HOSPADM

## 2023-06-08 RX ADMIN — SODIUM CHLORIDE 20 ML/HR: 9 INJECTION, SOLUTION INTRAVENOUS at 12:25

## 2023-06-08 RX ADMIN — SODIUM CHLORIDE 200 MG: 9 INJECTION, SOLUTION INTRAVENOUS at 12:26

## 2023-06-08 NOTE — PROGRESS NOTES
MA Rooming Questions  Patient: Aquilino Webber  MRN: [id number]    Date: 6/8/2023        1. Do you have any new issues? yes - since her last chemo treatment she has had itching on her back and waistline         2. Do you need any refills on medications?    no    3. Have you had any imaging done since your last visit?   no    4. Have you been hospitalized or seen in the emergency room since your last visit here?   no    5. Did the patient have a depression screening completed today?  No    No data recorded     PHQ-9 Given to (if applicable):               PHQ-9 Score (if applicable):                     [] Positive     []  Negative              Does question #9 need addressed (if applicable)                     [] Yes    []  No               Stalin Mccoy CMA

## 2023-06-08 NOTE — PROGRESS NOTES
Patient Name:  Clifton Sifuentes  Patient :  10/20/1934  Patient MRN:  [id number]     Primary Oncologist: Rene Grullon MD  Referring Provider: Jelani Aj DO     Date of Service: 2023      Chief Complaint:    Chief Complaint   Patient presents with    Follow-up    Chemotherapy     Patient Active Problem List:     Long-term insulin use in type 2 diabetes 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 She has been treated recently for abdominal pain and suspected diverticulitis, but was not improving after getting antibiotics which prompted her to return to the ED. She denies any personal or family history of colon cancer. Her sister had breast cancer in her 42's. She has had colonoscopies in the past which were normal, but thinks her last one was more than 10 years ago. She denies any previous problems with ovarian cysts or adnexal lesions. She underwent colonoscopy on 22 and it showed large mass extending from the ileocecal valve into the cecum mass originating at ileocecal valve appears to be more tubulovillous, mass in the cecum appears to be more firm fixed ulcerated consistent with malignancy. Biopsies were obtained.  Mild to moderate sigmoid diverticulosis and grade 2 hemorrhoids and

## 2023-06-28 DIAGNOSIS — C17.2 ADENOCARCINOMA OF ILEUM (HCC): Primary | ICD-10-CM

## 2023-06-28 RX ORDER — SODIUM CHLORIDE 9 MG/ML
INJECTION, SOLUTION INTRAVENOUS CONTINUOUS
OUTPATIENT
Start: 2023-08-10

## 2023-06-28 RX ORDER — EPINEPHRINE 1 MG/ML
0.3 INJECTION, SOLUTION, CONCENTRATE INTRAVENOUS PRN
OUTPATIENT
Start: 2023-07-20

## 2023-06-28 RX ORDER — ONDANSETRON 2 MG/ML
8 INJECTION INTRAMUSCULAR; INTRAVENOUS
OUTPATIENT
Start: 2023-08-31

## 2023-06-28 RX ORDER — ALBUTEROL SULFATE 90 UG/1
4 AEROSOL, METERED RESPIRATORY (INHALATION) PRN
OUTPATIENT
Start: 2023-08-10

## 2023-06-28 RX ORDER — ACETAMINOPHEN 325 MG/1
650 TABLET ORAL
OUTPATIENT
Start: 2023-08-10

## 2023-06-28 RX ORDER — HEPARIN SODIUM (PORCINE) LOCK FLUSH IV SOLN 100 UNIT/ML 100 UNIT/ML
500 SOLUTION INTRAVENOUS PRN
OUTPATIENT
Start: 2023-08-10

## 2023-06-28 RX ORDER — FAMOTIDINE 10 MG/ML
20 INJECTION, SOLUTION INTRAVENOUS
Status: CANCELLED | OUTPATIENT
Start: 2023-06-29

## 2023-06-28 RX ORDER — SODIUM CHLORIDE 0.9 % (FLUSH) 0.9 %
5-40 SYRINGE (ML) INJECTION PRN
OUTPATIENT
Start: 2023-08-10

## 2023-06-28 RX ORDER — HEPARIN SODIUM (PORCINE) LOCK FLUSH IV SOLN 100 UNIT/ML 100 UNIT/ML
500 SOLUTION INTRAVENOUS PRN
OUTPATIENT
Start: 2023-08-31

## 2023-06-28 RX ORDER — ONDANSETRON 2 MG/ML
8 INJECTION INTRAMUSCULAR; INTRAVENOUS
OUTPATIENT
Start: 2023-08-10

## 2023-06-28 RX ORDER — ONDANSETRON 2 MG/ML
8 INJECTION INTRAMUSCULAR; INTRAVENOUS
OUTPATIENT
Start: 2023-09-21

## 2023-06-28 RX ORDER — SODIUM CHLORIDE 9 MG/ML
5-250 INJECTION, SOLUTION INTRAVENOUS PRN
OUTPATIENT
Start: 2023-07-20

## 2023-06-28 RX ORDER — SODIUM CHLORIDE 0.9 % (FLUSH) 0.9 %
5-40 SYRINGE (ML) INJECTION PRN
OUTPATIENT
Start: 2023-08-31

## 2023-06-28 RX ORDER — SODIUM CHLORIDE 9 MG/ML
5-250 INJECTION, SOLUTION INTRAVENOUS PRN
OUTPATIENT
Start: 2023-08-10

## 2023-06-28 RX ORDER — EPINEPHRINE 1 MG/ML
0.3 INJECTION, SOLUTION, CONCENTRATE INTRAVENOUS PRN
OUTPATIENT
Start: 2023-09-21

## 2023-06-28 RX ORDER — SODIUM CHLORIDE 9 MG/ML
5-250 INJECTION, SOLUTION INTRAVENOUS PRN
OUTPATIENT
Start: 2023-09-21

## 2023-06-28 RX ORDER — ONDANSETRON 2 MG/ML
8 INJECTION INTRAMUSCULAR; INTRAVENOUS
Status: CANCELLED | OUTPATIENT
Start: 2023-06-29

## 2023-06-28 RX ORDER — ACETAMINOPHEN 325 MG/1
650 TABLET ORAL
Status: CANCELLED | OUTPATIENT
Start: 2023-06-29

## 2023-06-28 RX ORDER — ACETAMINOPHEN 325 MG/1
650 TABLET ORAL
OUTPATIENT
Start: 2023-08-31

## 2023-06-28 RX ORDER — DIPHENHYDRAMINE HYDROCHLORIDE 50 MG/ML
50 INJECTION INTRAMUSCULAR; INTRAVENOUS
OUTPATIENT
Start: 2023-09-21

## 2023-06-28 RX ORDER — SODIUM CHLORIDE 9 MG/ML
INJECTION, SOLUTION INTRAVENOUS CONTINUOUS
OUTPATIENT
Start: 2023-08-31

## 2023-06-28 RX ORDER — SODIUM CHLORIDE 9 MG/ML
5-250 INJECTION, SOLUTION INTRAVENOUS PRN
Status: CANCELLED | OUTPATIENT
Start: 2023-06-29

## 2023-06-28 RX ORDER — ONDANSETRON 2 MG/ML
8 INJECTION INTRAMUSCULAR; INTRAVENOUS
OUTPATIENT
Start: 2023-07-20

## 2023-06-28 RX ORDER — ALBUTEROL SULFATE 90 UG/1
4 AEROSOL, METERED RESPIRATORY (INHALATION) PRN
OUTPATIENT
Start: 2023-09-21

## 2023-06-28 RX ORDER — ALBUTEROL SULFATE 90 UG/1
4 AEROSOL, METERED RESPIRATORY (INHALATION) PRN
OUTPATIENT
Start: 2023-08-31

## 2023-06-28 RX ORDER — ALBUTEROL SULFATE 90 UG/1
4 AEROSOL, METERED RESPIRATORY (INHALATION) PRN
OUTPATIENT
Start: 2023-07-20

## 2023-06-28 RX ORDER — EPINEPHRINE 1 MG/ML
0.3 INJECTION, SOLUTION, CONCENTRATE INTRAVENOUS PRN
OUTPATIENT
Start: 2023-08-31

## 2023-06-28 RX ORDER — MEPERIDINE HYDROCHLORIDE 50 MG/ML
12.5 INJECTION INTRAMUSCULAR; INTRAVENOUS; SUBCUTANEOUS PRN
OUTPATIENT
Start: 2023-09-21

## 2023-06-28 RX ORDER — HEPARIN SODIUM (PORCINE) LOCK FLUSH IV SOLN 100 UNIT/ML 100 UNIT/ML
500 SOLUTION INTRAVENOUS PRN
Status: CANCELLED | OUTPATIENT
Start: 2023-06-29

## 2023-06-28 RX ORDER — SODIUM CHLORIDE 9 MG/ML
5-250 INJECTION, SOLUTION INTRAVENOUS PRN
OUTPATIENT
Start: 2023-08-31

## 2023-06-28 RX ORDER — EPINEPHRINE 1 MG/ML
0.3 INJECTION, SOLUTION, CONCENTRATE INTRAVENOUS PRN
Status: CANCELLED | OUTPATIENT
Start: 2023-06-29

## 2023-06-28 RX ORDER — SODIUM CHLORIDE 9 MG/ML
INJECTION, SOLUTION INTRAVENOUS CONTINUOUS
OUTPATIENT
Start: 2023-09-21

## 2023-06-28 RX ORDER — SODIUM CHLORIDE 0.9 % (FLUSH) 0.9 %
5-40 SYRINGE (ML) INJECTION PRN
OUTPATIENT
Start: 2023-07-20

## 2023-06-28 RX ORDER — EPINEPHRINE 1 MG/ML
0.3 INJECTION, SOLUTION, CONCENTRATE INTRAVENOUS PRN
OUTPATIENT
Start: 2023-08-10

## 2023-06-28 RX ORDER — FAMOTIDINE 10 MG/ML
20 INJECTION, SOLUTION INTRAVENOUS
OUTPATIENT
Start: 2023-07-20

## 2023-06-28 RX ORDER — MEPERIDINE HYDROCHLORIDE 50 MG/ML
12.5 INJECTION INTRAMUSCULAR; INTRAVENOUS; SUBCUTANEOUS PRN
OUTPATIENT
Start: 2023-07-20

## 2023-06-28 RX ORDER — FAMOTIDINE 10 MG/ML
20 INJECTION, SOLUTION INTRAVENOUS
OUTPATIENT
Start: 2023-09-21

## 2023-06-28 RX ORDER — SODIUM CHLORIDE 9 MG/ML
INJECTION, SOLUTION INTRAVENOUS CONTINUOUS
OUTPATIENT
Start: 2023-07-20

## 2023-06-28 RX ORDER — MEPERIDINE HYDROCHLORIDE 50 MG/ML
12.5 INJECTION INTRAMUSCULAR; INTRAVENOUS; SUBCUTANEOUS PRN
OUTPATIENT
Start: 2023-08-10

## 2023-06-28 RX ORDER — DIPHENHYDRAMINE HYDROCHLORIDE 50 MG/ML
50 INJECTION INTRAMUSCULAR; INTRAVENOUS
OUTPATIENT
Start: 2023-08-31

## 2023-06-28 RX ORDER — ACETAMINOPHEN 325 MG/1
650 TABLET ORAL
OUTPATIENT
Start: 2023-09-21

## 2023-06-28 RX ORDER — ACETAMINOPHEN 325 MG/1
650 TABLET ORAL
OUTPATIENT
Start: 2023-07-20

## 2023-06-28 RX ORDER — SODIUM CHLORIDE 0.9 % (FLUSH) 0.9 %
5-40 SYRINGE (ML) INJECTION PRN
Status: CANCELLED | OUTPATIENT
Start: 2023-06-29

## 2023-06-28 RX ORDER — MEPERIDINE HYDROCHLORIDE 50 MG/ML
12.5 INJECTION INTRAMUSCULAR; INTRAVENOUS; SUBCUTANEOUS PRN
OUTPATIENT
Start: 2023-08-31

## 2023-06-28 RX ORDER — SODIUM CHLORIDE 9 MG/ML
INJECTION, SOLUTION INTRAVENOUS CONTINUOUS
Status: CANCELLED | OUTPATIENT
Start: 2023-06-29

## 2023-06-28 RX ORDER — DIPHENHYDRAMINE HYDROCHLORIDE 50 MG/ML
50 INJECTION INTRAMUSCULAR; INTRAVENOUS
OUTPATIENT
Start: 2023-07-20

## 2023-06-28 RX ORDER — DIPHENHYDRAMINE HYDROCHLORIDE 50 MG/ML
50 INJECTION INTRAMUSCULAR; INTRAVENOUS
Status: CANCELLED | OUTPATIENT
Start: 2023-06-29

## 2023-06-28 RX ORDER — HEPARIN SODIUM (PORCINE) LOCK FLUSH IV SOLN 100 UNIT/ML 100 UNIT/ML
500 SOLUTION INTRAVENOUS PRN
OUTPATIENT
Start: 2023-07-20

## 2023-06-28 RX ORDER — FAMOTIDINE 10 MG/ML
20 INJECTION, SOLUTION INTRAVENOUS
OUTPATIENT
Start: 2023-08-10

## 2023-06-28 RX ORDER — ALBUTEROL SULFATE 90 UG/1
4 AEROSOL, METERED RESPIRATORY (INHALATION) PRN
Status: CANCELLED | OUTPATIENT
Start: 2023-06-29

## 2023-06-28 RX ORDER — SODIUM CHLORIDE 0.9 % (FLUSH) 0.9 %
5-40 SYRINGE (ML) INJECTION PRN
OUTPATIENT
Start: 2023-09-21

## 2023-06-28 RX ORDER — FAMOTIDINE 10 MG/ML
20 INJECTION, SOLUTION INTRAVENOUS
OUTPATIENT
Start: 2023-08-31

## 2023-06-28 RX ORDER — HEPARIN SODIUM (PORCINE) LOCK FLUSH IV SOLN 100 UNIT/ML 100 UNIT/ML
500 SOLUTION INTRAVENOUS PRN
OUTPATIENT
Start: 2023-09-21

## 2023-06-28 RX ORDER — MEPERIDINE HYDROCHLORIDE 50 MG/ML
12.5 INJECTION INTRAMUSCULAR; INTRAVENOUS; SUBCUTANEOUS PRN
Status: CANCELLED | OUTPATIENT
Start: 2023-06-29

## 2023-06-28 RX ORDER — DIPHENHYDRAMINE HYDROCHLORIDE 50 MG/ML
50 INJECTION INTRAMUSCULAR; INTRAVENOUS
OUTPATIENT
Start: 2023-08-10

## 2023-06-29 ENCOUNTER — OFFICE VISIT (OUTPATIENT)
Dept: ONCOLOGY | Age: 88
End: 2023-06-29
Payer: MEDICARE

## 2023-06-29 ENCOUNTER — HOSPITAL ENCOUNTER (OUTPATIENT)
Dept: INFUSION THERAPY | Age: 88
Discharge: HOME | End: 2023-06-29
Payer: MEDICARE

## 2023-06-29 VITALS
HEART RATE: 85 BPM | WEIGHT: 132.6 LBS | HEIGHT: 60 IN | TEMPERATURE: 97.4 F | DIASTOLIC BLOOD PRESSURE: 62 MMHG | BODY MASS INDEX: 26.03 KG/M2 | RESPIRATION RATE: 14 BRPM | SYSTOLIC BLOOD PRESSURE: 141 MMHG | OXYGEN SATURATION: 90 %

## 2023-06-29 DIAGNOSIS — C17.2 ADENOCARCINOMA OF ILEUM (HCC): Primary | ICD-10-CM

## 2023-06-29 LAB
ALBUMIN SERPL-MCNC: 3.6 GM/DL (ref 3.4–5)
ALP BLD-CCNC: 71 IU/L (ref 40–129)
ALT SERPL-CCNC: 26 U/L (ref 10–40)
ANION GAP SERPL CALCULATED.3IONS-SCNC: 11 MMOL/L (ref 4–16)
AST SERPL-CCNC: 25 IU/L (ref 15–37)
BASOPHILS ABSOLUTE: 0 K/CU MM
BASOPHILS RELATIVE PERCENT: 0.3 % (ref 0–1)
BILIRUB SERPL-MCNC: 0.3 MG/DL (ref 0–1)
BUN SERPL-MCNC: 22 MG/DL (ref 6–23)
CALCIUM SERPL-MCNC: 8.7 MG/DL (ref 8.3–10.6)
CHLORIDE BLD-SCNC: 105 MMOL/L (ref 99–110)
CO2: 21 MMOL/L (ref 21–32)
CREAT SERPL-MCNC: 1.1 MG/DL (ref 0.6–1.1)
DIFFERENTIAL TYPE: ABNORMAL
EOSINOPHILS ABSOLUTE: 0.2 K/CU MM
EOSINOPHILS RELATIVE PERCENT: 2.4 % (ref 0–3)
GFR SERPL CREATININE-BSD FRML MDRD: 48 ML/MIN/1.73M2
GLUCOSE SERPL-MCNC: 90 MG/DL (ref 70–99)
HCT VFR BLD CALC: 39.4 % (ref 37–47)
HEMOGLOBIN: 12.5 GM/DL (ref 12.5–16)
LYMPHOCYTES ABSOLUTE: 1.5 K/CU MM
LYMPHOCYTES RELATIVE PERCENT: 21.4 % (ref 24–44)
MCH RBC QN AUTO: 28.1 PG (ref 27–31)
MCHC RBC AUTO-ENTMCNC: 31.7 % (ref 32–36)
MCV RBC AUTO: 88.5 FL (ref 78–100)
MONOCYTES ABSOLUTE: 0.5 K/CU MM
MONOCYTES RELATIVE PERCENT: 7 % (ref 0–4)
PDW BLD-RTO: 14.1 % (ref 11.7–14.9)
PLATELET # BLD: 156 K/CU MM (ref 140–440)
PMV BLD AUTO: 10.2 FL (ref 7.5–11.1)
POTASSIUM SERPL-SCNC: 4.5 MMOL/L (ref 3.5–5.1)
RBC # BLD: 4.45 M/CU MM (ref 4.2–5.4)
SEGMENTED NEUTROPHILS ABSOLUTE COUNT: 4.8 K/CU MM
SEGMENTED NEUTROPHILS RELATIVE PERCENT: 68.9 % (ref 36–66)
SODIUM BLD-SCNC: 137 MMOL/L (ref 135–145)
TOTAL PROTEIN: 6.1 GM/DL (ref 6.4–8.2)
WBC # BLD: 7 K/CU MM (ref 4–10.5)

## 2023-06-29 PROCEDURE — 6360000002 HC RX W HCPCS: Performed by: INTERNAL MEDICINE

## 2023-06-29 PROCEDURE — 99214 OFFICE O/P EST MOD 30 MIN: CPT | Performed by: INTERNAL MEDICINE

## 2023-06-29 PROCEDURE — 80053 COMPREHEN METABOLIC PANEL: CPT

## 2023-06-29 PROCEDURE — 85025 COMPLETE CBC W/AUTO DIFF WBC: CPT

## 2023-06-29 PROCEDURE — 2580000003 HC RX 258: Performed by: INTERNAL MEDICINE

## 2023-06-29 PROCEDURE — 1124F ACP DISCUSS-NO DSCNMKR DOCD: CPT | Performed by: INTERNAL MEDICINE

## 2023-06-29 PROCEDURE — 96413 CHEMO IV INFUSION 1 HR: CPT

## 2023-06-29 RX ORDER — SODIUM CHLORIDE 9 MG/ML
5-250 INJECTION, SOLUTION INTRAVENOUS PRN
Status: DISCONTINUED | OUTPATIENT
Start: 2023-06-29 | End: 2023-06-30 | Stop reason: HOSPADM

## 2023-06-29 RX ORDER — OXYBUTYNIN CHLORIDE 5 MG/1
5 TABLET ORAL 3 TIMES DAILY
COMMUNITY

## 2023-06-29 RX ORDER — SODIUM CHLORIDE 0.9 % (FLUSH) 0.9 %
5-40 SYRINGE (ML) INJECTION PRN
Status: DISCONTINUED | OUTPATIENT
Start: 2023-06-29 | End: 2023-06-30 | Stop reason: HOSPADM

## 2023-06-29 RX ADMIN — SODIUM CHLORIDE 200 MG: 9 INJECTION, SOLUTION INTRAVENOUS at 12:14

## 2023-06-29 RX ADMIN — SODIUM CHLORIDE 20 ML/HR: 9 INJECTION, SOLUTION INTRAVENOUS at 12:15

## 2023-07-18 ENCOUNTER — HOSPITAL ENCOUNTER (OUTPATIENT)
Dept: INFUSION THERAPY | Age: 88
Discharge: HOME | End: 2023-07-18
Payer: MEDICARE

## 2023-07-18 DIAGNOSIS — C17.2 ADENOCARCINOMA OF ILEUM (HCC): ICD-10-CM

## 2023-07-18 DIAGNOSIS — E03.9 ACQUIRED HYPOTHYROIDISM: ICD-10-CM

## 2023-07-18 LAB
ALBUMIN SERPL-MCNC: 3.6 GM/DL (ref 3.4–5)
ALP BLD-CCNC: 71 IU/L (ref 40–128)
ALT SERPL-CCNC: 14 U/L (ref 10–40)
ANION GAP SERPL CALCULATED.3IONS-SCNC: 7 MMOL/L (ref 4–16)
AST SERPL-CCNC: 20 IU/L (ref 15–37)
BASOPHILS ABSOLUTE: 0 K/CU MM
BASOPHILS RELATIVE PERCENT: 0.3 % (ref 0–1)
BILIRUB SERPL-MCNC: 0.3 MG/DL (ref 0–1)
BUN SERPL-MCNC: 17 MG/DL (ref 6–23)
CALCIUM SERPL-MCNC: 9.2 MG/DL (ref 8.3–10.6)
CHLORIDE BLD-SCNC: 100 MMOL/L (ref 99–110)
CO2: 27 MMOL/L (ref 21–32)
CREAT SERPL-MCNC: 1 MG/DL (ref 0.6–1.1)
DIFFERENTIAL TYPE: ABNORMAL
EOSINOPHILS ABSOLUTE: 0.3 K/CU MM
EOSINOPHILS RELATIVE PERCENT: 3.5 % (ref 0–3)
GFR SERPL CREATININE-BSD FRML MDRD: 54 ML/MIN/1.73M2
GLUCOSE SERPL-MCNC: 114 MG/DL (ref 70–99)
HCT VFR BLD CALC: 38.4 % (ref 37–47)
HEMOGLOBIN: 12.3 GM/DL (ref 12.5–16)
LYMPHOCYTES ABSOLUTE: 1.5 K/CU MM
LYMPHOCYTES RELATIVE PERCENT: 20.2 % (ref 24–44)
MCH RBC QN AUTO: 28.5 PG (ref 27–31)
MCHC RBC AUTO-ENTMCNC: 32 % (ref 32–36)
MCV RBC AUTO: 88.9 FL (ref 78–100)
MONOCYTES ABSOLUTE: 0.6 K/CU MM
MONOCYTES RELATIVE PERCENT: 7.7 % (ref 0–4)
PDW BLD-RTO: 14.4 % (ref 11.7–14.9)
PLATELET # BLD: 160 K/CU MM (ref 140–440)
PMV BLD AUTO: 10.1 FL (ref 7.5–11.1)
POTASSIUM SERPL-SCNC: 4.7 MMOL/L (ref 3.5–5.1)
RBC # BLD: 4.32 M/CU MM (ref 4.2–5.4)
SEGMENTED NEUTROPHILS ABSOLUTE COUNT: 5.2 K/CU MM
SEGMENTED NEUTROPHILS RELATIVE PERCENT: 68.3 % (ref 36–66)
SODIUM BLD-SCNC: 134 MMOL/L (ref 135–145)
TOTAL PROTEIN: 6.1 GM/DL (ref 6.4–8.2)
WBC # BLD: 7.6 K/CU MM (ref 4–10.5)

## 2023-07-18 PROCEDURE — 80053 COMPREHEN METABOLIC PANEL: CPT

## 2023-07-18 PROCEDURE — 85025 COMPLETE CBC W/AUTO DIFF WBC: CPT

## 2023-07-18 PROCEDURE — 36415 COLL VENOUS BLD VENIPUNCTURE: CPT

## 2023-07-20 ENCOUNTER — OFFICE VISIT (OUTPATIENT)
Dept: ONCOLOGY | Age: 88
End: 2023-07-20
Payer: MEDICARE

## 2023-07-20 ENCOUNTER — HOSPITAL ENCOUNTER (OUTPATIENT)
Dept: INFUSION THERAPY | Age: 88
Discharge: HOME | End: 2023-07-20
Payer: MEDICARE

## 2023-07-20 VITALS
WEIGHT: 132.6 LBS | DIASTOLIC BLOOD PRESSURE: 81 MMHG | RESPIRATION RATE: 16 BRPM | TEMPERATURE: 98 F | SYSTOLIC BLOOD PRESSURE: 175 MMHG | OXYGEN SATURATION: 97 % | BODY MASS INDEX: 26.03 KG/M2 | HEIGHT: 60 IN | HEART RATE: 73 BPM

## 2023-07-20 DIAGNOSIS — E03.9 ACQUIRED HYPOTHYROIDISM: ICD-10-CM

## 2023-07-20 DIAGNOSIS — C17.2 ADENOCARCINOMA OF ILEUM (HCC): Primary | ICD-10-CM

## 2023-07-20 PROCEDURE — 96413 CHEMO IV INFUSION 1 HR: CPT

## 2023-07-20 PROCEDURE — 6360000002 HC RX W HCPCS: Performed by: INTERNAL MEDICINE

## 2023-07-20 PROCEDURE — 2580000003 HC RX 258: Performed by: INTERNAL MEDICINE

## 2023-07-20 PROCEDURE — 1124F ACP DISCUSS-NO DSCNMKR DOCD: CPT | Performed by: INTERNAL MEDICINE

## 2023-07-20 PROCEDURE — 99214 OFFICE O/P EST MOD 30 MIN: CPT | Performed by: INTERNAL MEDICINE

## 2023-07-20 RX ORDER — SODIUM CHLORIDE 9 MG/ML
5-250 INJECTION, SOLUTION INTRAVENOUS PRN
Status: DISCONTINUED | OUTPATIENT
Start: 2023-07-20 | End: 2023-07-21 | Stop reason: HOSPADM

## 2023-07-20 RX ORDER — SODIUM CHLORIDE 0.9 % (FLUSH) 0.9 %
5-40 SYRINGE (ML) INJECTION PRN
Status: DISCONTINUED | OUTPATIENT
Start: 2023-07-20 | End: 2023-07-21 | Stop reason: HOSPADM

## 2023-07-20 RX ADMIN — SODIUM CHLORIDE 200 MG: 9 INJECTION, SOLUTION INTRAVENOUS at 11:28

## 2023-07-20 ASSESSMENT — PATIENT HEALTH QUESTIONNAIRE - PHQ9
SUM OF ALL RESPONSES TO PHQ QUESTIONS 1-9: 0
2. FEELING DOWN, DEPRESSED OR HOPELESS: 0
SUM OF ALL RESPONSES TO PHQ QUESTIONS 1-9: 0
SUM OF ALL RESPONSES TO PHQ QUESTIONS 1-9: 0
SUM OF ALL RESPONSES TO PHQ9 QUESTIONS 1 & 2: 0
1. LITTLE INTEREST OR PLEASURE IN DOING THINGS: 0
SUM OF ALL RESPONSES TO PHQ QUESTIONS 1-9: 0

## 2023-07-20 NOTE — PROGRESS NOTES
MA Rooming Questions  Patient: Patricia Postal  MRN: [id number]    Date: 7/20/2023        1. Do you have any new issues? yes - states she feels a lump- left side chest area, noticed a few days ago. 2. Do you need any refills on medications?    no    3. Have you had any imaging done since your last visit?   no    4. Have you been hospitalized or seen in the emergency room since your last visit here?   no    5. Did the patient have a depression screening completed today?  Yes    PHQ-9 Total Score: 0 (7/20/2023 10:41 AM)       PHQ-9 Given to (if applicable):               PHQ-9 Score (if applicable):                     [] Positive     []  Negative              Does question #9 need addressed (if applicable)                     [] Yes    []  No               Vaughn Peace CMA

## 2023-07-20 NOTE — PROGRESS NOTES
Pt. Here for treatment following office visit. Peripheral IV started in left forearm, good blood return noted. Labs reviewed and treatment administered without incident. Discharge AVS given. Patient's status assessed and documented appropriately. All labs and required results were also reviewed today. Treatment parameters have been reviewed. Today's treatment has been approved by the provider. Treatment orders and medication sequencing (when applicable) was verified by 2 registered nurses. The treatment plan was confirmed with the patient prior to administration, and the patient understands the need to report any treatment-related symptoms. Prior to administration, when applicable, the following 8 elements of medication administration were reviewed with 2nd Registered Nurse prior to dosing: drug name, drug dose, infusion volume when prepared in a syringe, rate of administration, expiration dates and/or times, appearance and integrity of drug(s), and rate of pump for infusion. The 5 rights of medication administration have been verified.

## 2023-07-26 ENCOUNTER — CLINICAL DOCUMENTATION (OUTPATIENT)
Facility: HOSPITAL | Age: 88
End: 2023-07-26

## 2023-07-26 NOTE — PROGRESS NOTES
Faxed Clinical Notes and Itemized Statements from [facility] to patients [employer], per request. Sent to:    [facility]  Attn: Claims Dept  [address] [facility], [address] [place] [address],[address]  Fax- [phone]

## 2023-08-08 ENCOUNTER — HOSPITAL ENCOUNTER (OUTPATIENT)
Dept: INFUSION THERAPY | Age: 88
Discharge: HOME | End: 2023-08-08
Payer: MEDICARE

## 2023-08-08 DIAGNOSIS — E03.9 ACQUIRED HYPOTHYROIDISM: ICD-10-CM

## 2023-08-08 DIAGNOSIS — C17.2 ADENOCARCINOMA OF ILEUM (HCC): ICD-10-CM

## 2023-08-08 LAB
ALBUMIN SERPL-MCNC: 3.7 GM/DL (ref 3.4–5)
ALP BLD-CCNC: 61 IU/L (ref 40–129)
ALT SERPL-CCNC: 27 U/L (ref 10–40)
ANION GAP SERPL CALCULATED.3IONS-SCNC: 10 MMOL/L (ref 4–16)
AST SERPL-CCNC: 27 IU/L (ref 15–37)
BASOPHILS ABSOLUTE: 0 K/CU MM
BASOPHILS RELATIVE PERCENT: 0.3 % (ref 0–1)
BILIRUB SERPL-MCNC: 0.4 MG/DL (ref 0–1)
BUN SERPL-MCNC: 17 MG/DL (ref 6–23)
CALCIUM SERPL-MCNC: 9 MG/DL (ref 8.3–10.6)
CHLORIDE BLD-SCNC: 102 MMOL/L (ref 99–110)
CO2: 25 MMOL/L (ref 21–32)
CREAT SERPL-MCNC: 0.9 MG/DL (ref 0.6–1.1)
DIFFERENTIAL TYPE: ABNORMAL
EOSINOPHILS ABSOLUTE: 0.2 K/CU MM
EOSINOPHILS RELATIVE PERCENT: 2.2 % (ref 0–3)
GFR SERPL CREATININE-BSD FRML MDRD: >60 ML/MIN/1.73M2
GLUCOSE SERPL-MCNC: 106 MG/DL (ref 70–99)
HCT VFR BLD CALC: 38.8 % (ref 37–47)
HEMOGLOBIN: 12.7 GM/DL (ref 12.5–16)
LYMPHOCYTES ABSOLUTE: 1.4 K/CU MM
LYMPHOCYTES RELATIVE PERCENT: 18.3 % (ref 24–44)
MCH RBC QN AUTO: 29 PG (ref 27–31)
MCHC RBC AUTO-ENTMCNC: 32.7 % (ref 32–36)
MCV RBC AUTO: 88.6 FL (ref 78–100)
MONOCYTES ABSOLUTE: 0.6 K/CU MM
MONOCYTES RELATIVE PERCENT: 7.3 % (ref 0–4)
PDW BLD-RTO: 15 % (ref 11.7–14.9)
PLATELET # BLD: 123 K/CU MM (ref 140–440)
PMV BLD AUTO: 9.8 FL (ref 7.5–11.1)
POTASSIUM SERPL-SCNC: 4.3 MMOL/L (ref 3.5–5.1)
RBC # BLD: 4.38 M/CU MM (ref 4.2–5.4)
SEGMENTED NEUTROPHILS ABSOLUTE COUNT: 5.5 K/CU MM
SEGMENTED NEUTROPHILS RELATIVE PERCENT: 71.9 % (ref 36–66)
SODIUM BLD-SCNC: 137 MMOL/L (ref 135–145)
TOTAL PROTEIN: 6.1 GM/DL (ref 6.4–8.2)
TSH SERPL DL<=0.005 MIU/L-ACNC: 0.12 UIU/ML (ref 0.27–4.2)
WBC # BLD: 7.7 K/CU MM (ref 4–10.5)

## 2023-08-08 PROCEDURE — 84443 ASSAY THYROID STIM HORMONE: CPT

## 2023-08-08 PROCEDURE — 85025 COMPLETE CBC W/AUTO DIFF WBC: CPT

## 2023-08-08 PROCEDURE — 36415 COLL VENOUS BLD VENIPUNCTURE: CPT

## 2023-08-08 PROCEDURE — 80053 COMPREHEN METABOLIC PANEL: CPT

## 2023-08-10 ENCOUNTER — HOSPITAL ENCOUNTER (OUTPATIENT)
Dept: INFUSION THERAPY | Age: 88
Discharge: HOME | End: 2023-08-10
Payer: MEDICARE

## 2023-08-10 ENCOUNTER — OFFICE VISIT (OUTPATIENT)
Dept: ONCOLOGY | Age: 88
End: 2023-08-10
Payer: MEDICARE

## 2023-08-10 VITALS
TEMPERATURE: 97 F | HEIGHT: 60 IN | OXYGEN SATURATION: 97 % | WEIGHT: 131.8 LBS | BODY MASS INDEX: 25.87 KG/M2 | HEART RATE: 52 BPM | RESPIRATION RATE: 18 BRPM | SYSTOLIC BLOOD PRESSURE: 143 MMHG | DIASTOLIC BLOOD PRESSURE: 99 MMHG

## 2023-08-10 VITALS
HEART RATE: 52 BPM | HEIGHT: 60 IN | OXYGEN SATURATION: 97 % | DIASTOLIC BLOOD PRESSURE: 99 MMHG | SYSTOLIC BLOOD PRESSURE: 143 MMHG | TEMPERATURE: 97 F | WEIGHT: 131 LBS | BODY MASS INDEX: 25.72 KG/M2

## 2023-08-10 DIAGNOSIS — E03.9 ACQUIRED HYPOTHYROIDISM: ICD-10-CM

## 2023-08-10 DIAGNOSIS — C17.2 ADENOCARCINOMA OF ILEUM (HCC): Primary | ICD-10-CM

## 2023-08-10 DIAGNOSIS — C18.2 MALIGNANT NEOPLASM OF ASCENDING COLON (HCC): Primary | ICD-10-CM

## 2023-08-10 PROCEDURE — 2580000003 HC RX 258: Performed by: INTERNAL MEDICINE

## 2023-08-10 PROCEDURE — 1124F ACP DISCUSS-NO DSCNMKR DOCD: CPT | Performed by: INTERNAL MEDICINE

## 2023-08-10 PROCEDURE — 6360000002 HC RX W HCPCS: Performed by: INTERNAL MEDICINE

## 2023-08-10 PROCEDURE — 99214 OFFICE O/P EST MOD 30 MIN: CPT | Performed by: INTERNAL MEDICINE

## 2023-08-10 PROCEDURE — 96413 CHEMO IV INFUSION 1 HR: CPT

## 2023-08-10 RX ORDER — SODIUM CHLORIDE 0.9 % (FLUSH) 0.9 %
5-40 SYRINGE (ML) INJECTION PRN
Status: DISCONTINUED | OUTPATIENT
Start: 2023-08-10 | End: 2023-08-11 | Stop reason: HOSPADM

## 2023-08-10 RX ORDER — LORATADINE 10 MG/1
10 TABLET ORAL 3 TIMES DAILY
COMMUNITY

## 2023-08-10 RX ORDER — TRIAMCINOLONE ACETONIDE 1 MG/G
CREAM TOPICAL
COMMUNITY
Start: 2023-08-07

## 2023-08-10 RX ADMIN — SODIUM CHLORIDE 200 MG: 9 INJECTION, SOLUTION INTRAVENOUS at 12:16

## 2023-08-10 NOTE — PROGRESS NOTES
MA Rooming Questions  Patient: Tapan Johnston  MRN: [id number]    Date: 8/10/2023        1. Do you have any new issues?   no         2. Do you need any refills on medications?    no    3. Have you had any imaging done since your last visit?   no    4. Have you been hospitalized or seen in the emergency room since your last visit here?   no    5. Did the patient have a depression screening completed today?  No    No data recorded     PHQ-9 Given to (if applicable):               PHQ-9 Score (if applicable):                     [] Positive     []  Negative              Does question #9 need addressed (if applicable)                     [] Yes    []  No               Marcelino Hay CMA
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 CT chest, abdomen and pelvis on 5/12/23 showed postsurgical changes from interval sigmoidectomy as well as hysterectomy. Previously noted but metabolically active nodule in the right lower  quadrant/mesentery is also no longer visualized and has likely been removed. Slightly increasing size of subcentimeter ABRIL lymph nodes, nonspecific and could be reactive due to recent surgery. Metastatic disease is not excluded. Attention on follow-up exams is recommended. Interval improvement in right hydronephrosis. There is mild residual urothelial thickening and enhancement the right renal pelvis which may be related to superimposed infection or inflammation. Few subtle 2-4 mm solid and sub solid nodules in the anteromedial leftupper lobe, nonspecific. Attention on follow-up exams is recommended. On August 10, 2023, she presented to me for follow-up. 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 I reviewed final path report with her and her family. We also reviewed NCCN guidelines. I also let her know that we ideally recommend adjuvant chemotherapy with either CapeOx (capecitabine + oxaliplatin) for 3 months, FOLFOX for 6 months or single agent capecitabine (in frail patient) for six months. I also discussed with her and her family all the potential side effects as well as benefits of adjuvant chemotherapy. After long discussion with her and family, they are in agreement to start adjuvant chemotherapy with capecitabine. It was started on 7/26/22. She developed significant oral mucositis on Day 6 of the therapy and it interfere with her eating and drinking. She is on xeloda 1500 mg BID. I stopped it since 9/6/22. Since she couldn't tolerate xeloda, we changed it to 5Fu and leukovorin. It was started on 11/9/22.  We

## 2023-08-10 NOTE — PROGRESS NOTES
Patient arrived to treatment suite with spouse for blood draw, pre-medications and chemotherapy infusion. PIV started in left arm, blood drawn from site and sent to lab for processing. Patient states no questions or concerns for the doctor at this time. Treatment approved and given. Patient tolerated well. Left treatment suite ambulatory. Discharge instructions provided. Patient's status assessed and documented appropriately. All labs and required results were also reviewed today. Treatment parameters have been reviewed. Today's treatment has been approved by the provider. Treatment orders and medication sequencing (when applicable) was verified by 2 registered nurses. The treatment plan was confirmed with the patient prior to administration, and the patient understands the need to report any treatment-related symptoms. Prior to administration, when applicable, the following 8 elements of medication administration were reviewed with 2nd Registered Nurse prior to dosing: drug name, drug dose, infusion volume when prepared in a syringe, rate of administration, expiration dates and/or times, appearance and integrity of drug(s), and rate of pump for infusion. The 5 rights of medication administration have been verified.

## 2023-08-11 ENCOUNTER — TELEPHONE (OUTPATIENT)
Dept: ONCOLOGY | Age: 88
End: 2023-08-11

## 2023-08-11 NOTE — TELEPHONE ENCOUNTER
Left message with CT scan time and prep to be done at [facility] 8/25/23 arrival at 10:30 AM. Informed to call with any questions.

## 2023-08-14 RX ORDER — SUCRALFATE ORAL 1 G/10ML
1 SUSPENSION ORAL 4 TIMES DAILY
Qty: 1200 ML | Refills: 0 | Status: SHIPPED | OUTPATIENT
Start: 2023-08-14

## 2023-08-14 RX ORDER — SUCRALFATE ORAL 1 G/10ML
1 SUSPENSION ORAL 4 TIMES DAILY
Qty: 1200 ML | Refills: 3 | Status: SHIPPED | OUTPATIENT
Start: 2023-09-14

## 2023-08-14 NOTE — TELEPHONE ENCOUNTER
Patient contacted our office in need of refill for [address]. Patient has a scheduled appointment on 8/31/23. Patients preferred pharmacy is [id number].  Pending for patients chart provider TK [email or web address].

## 2023-08-25 ENCOUNTER — HOSPITAL ENCOUNTER (OUTPATIENT)
Dept: CT IMAGING | Age: 88
Discharge: HOME | End: 2023-08-25
Attending: INTERNAL MEDICINE
Payer: MEDICARE

## 2023-08-25 DIAGNOSIS — C18.2 MALIGNANT NEOPLASM OF ASCENDING COLON (HCC): ICD-10-CM

## 2023-08-25 PROCEDURE — 71260 CT THORAX DX C+: CPT

## 2023-08-25 PROCEDURE — 74177 CT ABD & PELVIS W/CONTRAST: CPT

## 2023-08-25 PROCEDURE — 6360000004 HC RX CONTRAST MEDICATION: Performed by: INTERNAL MEDICINE

## 2023-08-25 RX ADMIN — IOPAMIDOL 100 ML: 755 INJECTION, SOLUTION INTRAVENOUS at 11:59

## 2023-08-25 RX ADMIN — IOPAMIDOL 20 ML: 755 INJECTION, SOLUTION INTRAVENOUS at 10:38

## 2023-08-29 ENCOUNTER — HOSPITAL ENCOUNTER (OUTPATIENT)
Dept: INFUSION THERAPY | Age: 88
Discharge: HOME | End: 2023-08-29
Payer: MEDICARE

## 2023-08-29 DIAGNOSIS — E03.9 ACQUIRED HYPOTHYROIDISM: ICD-10-CM

## 2023-08-29 DIAGNOSIS — C17.2 ADENOCARCINOMA OF ILEUM (HCC): ICD-10-CM

## 2023-08-29 LAB
ALBUMIN SERPL-MCNC: 3.6 GM/DL (ref 3.4–5)
ALP BLD-CCNC: 60 IU/L (ref 40–128)
ALT SERPL-CCNC: 23 U/L (ref 10–40)
ANION GAP SERPL CALCULATED.3IONS-SCNC: 10 MMOL/L (ref 4–16)
AST SERPL-CCNC: 28 IU/L (ref 15–37)
BASOPHILS ABSOLUTE: 0 K/CU MM
BASOPHILS RELATIVE PERCENT: 0.3 % (ref 0–1)
BILIRUB SERPL-MCNC: 0.4 MG/DL (ref 0–1)
BUN SERPL-MCNC: 14 MG/DL (ref 6–23)
CALCIUM SERPL-MCNC: 8.9 MG/DL (ref 8.3–10.6)
CHLORIDE BLD-SCNC: 101 MMOL/L (ref 99–110)
CO2: 24 MMOL/L (ref 21–32)
CREAT SERPL-MCNC: 0.9 MG/DL (ref 0.6–1.1)
DIFFERENTIAL TYPE: ABNORMAL
EOSINOPHILS ABSOLUTE: 0.2 K/CU MM
EOSINOPHILS RELATIVE PERCENT: 3.6 % (ref 0–3)
GFR SERPL CREATININE-BSD FRML MDRD: >60 ML/MIN/1.73M2
GLUCOSE SERPL-MCNC: 96 MG/DL (ref 70–99)
HCT VFR BLD CALC: 38.8 % (ref 37–47)
HEMOGLOBIN: 12.5 GM/DL (ref 12.5–16)
LYMPHOCYTES ABSOLUTE: 1.2 K/CU MM
LYMPHOCYTES RELATIVE PERCENT: 18.2 % (ref 24–44)
MCH RBC QN AUTO: 29 PG (ref 27–31)
MCHC RBC AUTO-ENTMCNC: 32.2 % (ref 32–36)
MCV RBC AUTO: 90 FL (ref 78–100)
MONOCYTES ABSOLUTE: 0.5 K/CU MM
MONOCYTES RELATIVE PERCENT: 7.2 % (ref 0–4)
PDW BLD-RTO: 14.9 % (ref 11.7–14.9)
PLATELET # BLD: 148 K/CU MM (ref 140–440)
PMV BLD AUTO: 9.7 FL (ref 7.5–11.1)
POTASSIUM SERPL-SCNC: 4.8 MMOL/L (ref 3.5–5.1)
RBC # BLD: 4.31 M/CU MM (ref 4.2–5.4)
SEGMENTED NEUTROPHILS ABSOLUTE COUNT: 4.6 K/CU MM
SEGMENTED NEUTROPHILS RELATIVE PERCENT: 70.7 % (ref 36–66)
SODIUM BLD-SCNC: 135 MMOL/L (ref 135–145)
TOTAL PROTEIN: 5.8 GM/DL (ref 6.4–8.2)
WBC # BLD: 6.4 K/CU MM (ref 4–10.5)

## 2023-08-29 PROCEDURE — 36415 COLL VENOUS BLD VENIPUNCTURE: CPT

## 2023-08-29 PROCEDURE — 85025 COMPLETE CBC W/AUTO DIFF WBC: CPT

## 2023-08-29 PROCEDURE — 80053 COMPREHEN METABOLIC PANEL: CPT

## 2023-08-31 ENCOUNTER — OFFICE VISIT (OUTPATIENT)
Dept: ONCOLOGY | Age: 88
End: 2023-08-31
Payer: MEDICARE

## 2023-08-31 ENCOUNTER — HOSPITAL ENCOUNTER (OUTPATIENT)
Dept: INFUSION THERAPY | Age: 88
Discharge: HOME | End: 2023-08-31
Payer: MEDICARE

## 2023-08-31 VITALS
TEMPERATURE: 97.3 F | WEIGHT: 134 LBS | SYSTOLIC BLOOD PRESSURE: 170 MMHG | HEART RATE: 76 BPM | OXYGEN SATURATION: 99 % | DIASTOLIC BLOOD PRESSURE: 67 MMHG | HEIGHT: 60 IN | BODY MASS INDEX: 26.31 KG/M2

## 2023-08-31 DIAGNOSIS — C17.2 ADENOCARCINOMA OF ILEUM (HCC): Primary | ICD-10-CM

## 2023-08-31 DIAGNOSIS — E03.9 ACQUIRED HYPOTHYROIDISM: ICD-10-CM

## 2023-08-31 PROCEDURE — 96413 CHEMO IV INFUSION 1 HR: CPT

## 2023-08-31 PROCEDURE — 2580000003 HC RX 258: Performed by: INTERNAL MEDICINE

## 2023-08-31 PROCEDURE — 6360000002 HC RX W HCPCS: Performed by: INTERNAL MEDICINE

## 2023-08-31 PROCEDURE — 99214 OFFICE O/P EST MOD 30 MIN: CPT | Performed by: INTERNAL MEDICINE

## 2023-08-31 PROCEDURE — 1124F ACP DISCUSS-NO DSCNMKR DOCD: CPT | Performed by: INTERNAL MEDICINE

## 2023-08-31 RX ORDER — SODIUM CHLORIDE 9 MG/ML
5-250 INJECTION, SOLUTION INTRAVENOUS PRN
Status: DISCONTINUED | OUTPATIENT
Start: 2023-08-31 | End: 2023-09-01 | Stop reason: HOSPADM

## 2023-08-31 RX ADMIN — SODIUM CHLORIDE 200 MG: 9 INJECTION, SOLUTION INTRAVENOUS at 12:15

## 2023-08-31 RX ADMIN — SODIUM CHLORIDE 20 ML/HR: 9 INJECTION, SOLUTION INTRAVENOUS at 12:13

## 2023-08-31 ASSESSMENT — PATIENT HEALTH QUESTIONNAIRE - PHQ9
SUM OF ALL RESPONSES TO PHQ QUESTIONS 1-9: 0
1. LITTLE INTEREST OR PLEASURE IN DOING THINGS: 0
SUM OF ALL RESPONSES TO PHQ9 QUESTIONS 1 & 2: 0
SUM OF ALL RESPONSES TO PHQ QUESTIONS 1-9: 0
2. FEELING DOWN, DEPRESSED OR HOPELESS: 0
SUM OF ALL RESPONSES TO PHQ QUESTIONS 1-9: 0
SUM OF ALL RESPONSES TO PHQ QUESTIONS 1-9: 0

## 2023-08-31 NOTE — PROGRESS NOTES
Pt here for tx. After OV. PIV placed with blood return noted. CBC CMP reviewed from 8/29 and within defined limits. Pt has no concerns or issues at this time     Patient's status assessed and documented appropriately. All labs and required results were also reviewed today. Treatment parameters have been reviewed. Today's treatment has been approved by the provider. Treatment orders and medication sequencing (when applicable) was verified by 2 registered nurses. The treatment plan was confirmed with the patient prior to administration, and the patient understands the need to report any treatment-related symptoms. Prior to administration, when applicable, the following 8 elements of medication administration were reviewed with 2nd Registered Nurse prior to dosing: drug name, drug dose, infusion volume when prepared in a syringe, rate of administration, expiration dates and/or times, appearance and integrity of drug(s), and rate of pump for infusion. The 5 rights of medication administration have been verified. Pt tolerated tx without incident left Ambulatory instructed to stop at check out desk for next OV and discharge paperwork.

## 2023-08-31 NOTE — PROGRESS NOTES
MA Rooming Questions  Patient: Chiquis Del Valle  MRN: [id number]    Date: 8/31/2023        1. Do you have any new issues?   no         2. Do you need any refills on medications?    no    3. Have you had any imaging done since your last visit? yes - ct chest abd pelvis 8/25 labs 8/8    4. Have you been hospitalized or seen in the emergency room since your last visit here?   no    5. Did the patient have a depression screening completed today?  Yes    PHQ-9 Total Score: 0 (8/31/2023 11:29 AM)       PHQ-9 Given to (if applicable):               PHQ-9 Score (if applicable):                     [] Positive     []  Negative              Does question #9 need addressed (if applicable)                     [] Yes    []  No               Bigg Mccarty CMA
DIFFERENTIAL 08/29/2023     Lab Results   Component Value Date    ESR 17 07/08/2022     Chemistry:  Lab Results   Component Value Date     08/29/2023    K 4.8 08/29/2023     08/29/2023    CO2 24 08/29/2023    BUN 14 08/29/2023    CREATININE 0.9 08/29/2023    GLUCOSE 96 08/29/2023    CALCIUM 8.9 08/29/2023    PROT 5.8 (L) 08/29/2023    LABALBU 3.6 08/29/2023    BILITOT 0.4 08/29/2023    ALKPHOS 60 08/29/2023    AST 28 08/29/2023    ALT 23 08/29/2023    LABGLOM >60 08/29/2023    GFRAA >60 08/09/2022    PHOS 3.0 06/08/2022    MG 1.7 (L) 06/08/2022    POCGLU 83 11/04/2022     Lab Results   Component Value Date     07/08/2022     No results found for: LD  Lab Results   Component Value Date    TSHHS 0.121 (L) 08/08/2023    T4FREE 1.24 06/12/2022    FT3 2.3 12/06/2017     Immunology:  Lab Results   Component Value Date    PROT 5.8 (L) 08/29/2023     No results found for: CEDRICK QuirosR  No results found for: B2M  Coagulation Panel:  Lab Results   Component Value Date    PROTIME 26.8 (H) 07/05/2022    INR 2.14 07/05/2022    APTT 29.5 05/14/2021    DDIMER <200 01/24/2016     Anemia Panel:  Lab Results   Component Value Date    AXZMWCON88 1035 (H) 07/08/2022    FOLATE 11.3 07/08/2022     Tumor Markers:  Lab Results   Component Value Date     49 (H) 11/02/2022    CEA 9.6 (H) 04/06/2023     Observations:  PHQ-9 Total Score: 0 (8/31/2023 11:29 AM)    Assessment   Stage III terminal ileum adenocarcinoma. Plan:  I reviewed with her findings on CT scan, US pelvis, colonoscopy and labs. Cecal lesion is concerning for malignant process and right adnexa cystic lesion needs further evaluation with MRI. 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 MRI pelvis showed complex ovarian cyst without internal enhancement to suggest solid mass. Radiologist recommend f/u imaging with CT or MRI in 6 months. She is s/p surgery on 5/27/22.  Final pathology

## 2023-09-19 ENCOUNTER — HOSPITAL ENCOUNTER (OUTPATIENT)
Dept: INFUSION THERAPY | Age: 88
Discharge: HOME | End: 2023-09-19
Payer: MEDICARE

## 2023-09-19 DIAGNOSIS — E03.9 ACQUIRED HYPOTHYROIDISM: ICD-10-CM

## 2023-09-19 DIAGNOSIS — C17.2 ADENOCARCINOMA OF ILEUM (HCC): ICD-10-CM

## 2023-09-19 LAB
ALBUMIN SERPL-MCNC: 3.7 GM/DL (ref 3.4–5)
ALP BLD-CCNC: 67 IU/L (ref 40–128)
ALT SERPL-CCNC: 21 U/L (ref 10–40)
ANION GAP SERPL CALCULATED.3IONS-SCNC: 12 MMOL/L (ref 4–16)
AST SERPL-CCNC: 23 IU/L (ref 15–37)
BASOPHILS ABSOLUTE: 0 K/CU MM
BASOPHILS RELATIVE PERCENT: 0.3 % (ref 0–1)
BILIRUB SERPL-MCNC: 0.3 MG/DL (ref 0–1)
BUN SERPL-MCNC: 19 MG/DL (ref 6–23)
CALCIUM SERPL-MCNC: 9.2 MG/DL (ref 8.3–10.6)
CHLORIDE BLD-SCNC: 105 MMOL/L (ref 99–110)
CO2: 21 MMOL/L (ref 21–32)
CREAT SERPL-MCNC: 0.9 MG/DL (ref 0.6–1.1)
DIFFERENTIAL TYPE: ABNORMAL
EOSINOPHILS ABSOLUTE: 0.2 K/CU MM
EOSINOPHILS RELATIVE PERCENT: 2.9 % (ref 0–3)
GFR SERPL CREATININE-BSD FRML MDRD: >60 ML/MIN/1.73M2
GLUCOSE SERPL-MCNC: 95 MG/DL (ref 70–99)
HCT VFR BLD CALC: 38 % (ref 37–47)
HEMOGLOBIN: 12.3 GM/DL (ref 12.5–16)
LYMPHOCYTES ABSOLUTE: 1.2 K/CU MM
LYMPHOCYTES RELATIVE PERCENT: 17.8 % (ref 24–44)
MCH RBC QN AUTO: 29.6 PG (ref 27–31)
MCHC RBC AUTO-ENTMCNC: 32.4 % (ref 32–36)
MCV RBC AUTO: 91.6 FL (ref 78–100)
MONOCYTES ABSOLUTE: 0.5 K/CU MM
MONOCYTES RELATIVE PERCENT: 7.1 % (ref 0–4)
PDW BLD-RTO: 15.1 % (ref 11.7–14.9)
PLATELET # BLD: 140 K/CU MM (ref 140–440)
PMV BLD AUTO: 9.8 FL (ref 7.5–11.1)
POTASSIUM SERPL-SCNC: 4.4 MMOL/L (ref 3.5–5.1)
RBC # BLD: 4.15 M/CU MM (ref 4.2–5.4)
SEGMENTED NEUTROPHILS ABSOLUTE COUNT: 5 K/CU MM
SEGMENTED NEUTROPHILS RELATIVE PERCENT: 71.9 % (ref 36–66)
SODIUM BLD-SCNC: 138 MMOL/L (ref 135–145)
TOTAL PROTEIN: 6.2 GM/DL (ref 6.4–8.2)
TSH SERPL DL<=0.005 MIU/L-ACNC: 0.05 UIU/ML (ref 0.27–4.2)
WBC # BLD: 6.9 K/CU MM (ref 4–10.5)

## 2023-09-19 PROCEDURE — 80053 COMPREHEN METABOLIC PANEL: CPT

## 2023-09-19 PROCEDURE — 84443 ASSAY THYROID STIM HORMONE: CPT

## 2023-09-19 PROCEDURE — 36415 COLL VENOUS BLD VENIPUNCTURE: CPT

## 2023-09-19 PROCEDURE — 85025 COMPLETE CBC W/AUTO DIFF WBC: CPT

## 2023-09-21 ENCOUNTER — HOSPITAL ENCOUNTER (OUTPATIENT)
Dept: INFUSION THERAPY | Age: 88
Discharge: HOME | End: 2023-09-21
Payer: MEDICARE

## 2023-09-21 ENCOUNTER — OFFICE VISIT (OUTPATIENT)
Dept: ONCOLOGY | Age: 88
End: 2023-09-21
Payer: MEDICARE

## 2023-09-21 VITALS
DIASTOLIC BLOOD PRESSURE: 68 MMHG | RESPIRATION RATE: 16 BRPM | WEIGHT: 136 LBS | TEMPERATURE: 95.6 F | HEART RATE: 64 BPM | OXYGEN SATURATION: 96 % | BODY MASS INDEX: 26.56 KG/M2 | SYSTOLIC BLOOD PRESSURE: 145 MMHG

## 2023-09-21 VITALS
HEIGHT: 60 IN | RESPIRATION RATE: 16 BRPM | DIASTOLIC BLOOD PRESSURE: 68 MMHG | OXYGEN SATURATION: 96 % | SYSTOLIC BLOOD PRESSURE: 145 MMHG | BODY MASS INDEX: 26.7 KG/M2 | HEART RATE: 64 BPM | WEIGHT: 136 LBS

## 2023-09-21 DIAGNOSIS — C17.2 ADENOCARCINOMA OF ILEUM (HCC): Primary | ICD-10-CM

## 2023-09-21 DIAGNOSIS — E03.9 ACQUIRED HYPOTHYROIDISM: ICD-10-CM

## 2023-09-21 PROCEDURE — 99214 OFFICE O/P EST MOD 30 MIN: CPT | Performed by: INTERNAL MEDICINE

## 2023-09-21 PROCEDURE — 6360000002 HC RX W HCPCS: Performed by: INTERNAL MEDICINE

## 2023-09-21 PROCEDURE — 2580000003 HC RX 258: Performed by: INTERNAL MEDICINE

## 2023-09-21 PROCEDURE — 96413 CHEMO IV INFUSION 1 HR: CPT

## 2023-09-21 PROCEDURE — 1124F ACP DISCUSS-NO DSCNMKR DOCD: CPT | Performed by: INTERNAL MEDICINE

## 2023-09-21 RX ORDER — SODIUM CHLORIDE 9 MG/ML
5-250 INJECTION, SOLUTION INTRAVENOUS PRN
Status: DISCONTINUED | OUTPATIENT
Start: 2023-09-21 | End: 2023-09-22 | Stop reason: HOSPADM

## 2023-09-21 RX ORDER — LEVOTHYROXINE SODIUM 0.1 MG/1
100 TABLET ORAL DAILY
Qty: 90 TABLET | Refills: 1 | Status: SHIPPED | OUTPATIENT
Start: 2023-09-21 | End: 2023-12-20

## 2023-09-21 RX ADMIN — SODIUM CHLORIDE 200 MG: 9 INJECTION, SOLUTION INTRAVENOUS at 13:05

## 2023-09-21 NOTE — PROGRESS NOTES
Ambulated to infusion area, here today for chemotherapy and OV with Dr. Dennis Almaraz. No concerns at this time. PIV placed in LFA, positive blood return noted. Labs within defined limits. Chemo administered as ordered. Call light within reach. Tolerated infusion without incident. Discharge instructions provided. RTC 10/10 for labs. Status appropriately assessed and documented. All required labs and results reviewed. Treatment approved by provider. Treatment orders and medications verified by 2 Registered Nurses where applicable. Treatment plan was confirmed with patient prior to administration, and educated the need to report any treatment-related symptoms      Prior to administration, when applicable, the following 8 elements of medication administration were reviewed with 2nd Registered Nurse prior to dosing: drug name, drug dose, infusion volume when prepared in a syringe, rate of administration, expiration dates and/or times, appearance and integrity of drug(s), and rate of pump for infusion. The 5 rights of medication administration have been verified.

## 2023-09-21 NOTE — PROGRESS NOTES
MA Rooming Questions  Patient: Chandra Liang  MRN: [id number]    Date: 9/21/2023        1. Do you have any new issues?   no         2. Do you need any refills on medications?    no    3. Have you had any imaging done since your last visit?   no    4. Have you been hospitalized or seen in the emergency room since your last visit here?   no    5. Did the patient have a depression screening completed today?  No    No data recorded     PHQ-9 Given to (if applicable):               PHQ-9 Score (if applicable):                     [] Positive     []  Negative              Does question #9 need addressed (if applicable)                     [] Yes    []  No               Jessica Rice CMA

## 2023-09-21 NOTE — PROGRESS NOTES
Patient Name:  Mariela Byrne  Patient :  10/20/1934  Patient MRN:  [id number]     Primary Oncologist: Kiana Vasquez MD  Referring Provider: Fatuma Aj DO     Date of Service: 2023      Chief Complaint:    Chief Complaint   Patient presents with    Follow-up     Patient Active Problem List:     Long-term insulin use in type 2 diabetes 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 She has been treated recently for abdominal pain and suspected diverticulitis, but was not improving after getting antibiotics which prompted her to return to the ED. She denies any personal or family history of colon cancer. Her sister had breast cancer in her 42's. She has had colonoscopies in the past which were normal, but thinks her last one was more than 10 years ago. She denies any previous problems with ovarian cysts or adnexal lesions. She underwent colonoscopy on 22 and it showed large mass extending from the ileocecal valve into the cecum mass originating at ileocecal valve appears to be more tubulovillous, mass in the cecum appears to be more firm fixed ulcerated consistent with malignancy. Biopsies were obtained.  Mild to moderate sigmoid diverticulosis and grade 2 hemorrhoids and incidental lipoma

## 2023-10-04 ENCOUNTER — TELEPHONE (OUTPATIENT)
Dept: CARDIOLOGY CLINIC | Age: 88
End: 2023-10-04

## 2023-10-04 NOTE — TELEPHONE ENCOUNTER
Patient needs cardiac Clearance. Wanting to know if an appt is needed? She is having Cubicile tunnel and Carpial tunnel done by Dr. Adeline Guerrero.

## 2023-10-05 DIAGNOSIS — E03.9 ACQUIRED HYPOTHYROIDISM: ICD-10-CM

## 2023-10-05 DIAGNOSIS — C17.2 ADENOCARCINOMA OF ILEUM (HCC): Primary | ICD-10-CM

## 2023-10-05 NOTE — TELEPHONE ENCOUNTER
[facility] called they have not seen the patient , she must have been seen in the [facility]ULT [facility] office asked that we call the patient

## 2023-10-10 ENCOUNTER — HOSPITAL ENCOUNTER (OUTPATIENT)
Dept: INFUSION THERAPY | Age: 88
Discharge: HOME | End: 2023-10-10
Payer: MEDICARE

## 2023-10-10 DIAGNOSIS — E03.9 ACQUIRED HYPOTHYROIDISM: ICD-10-CM

## 2023-10-10 DIAGNOSIS — C17.2 ADENOCARCINOMA OF ILEUM (HCC): ICD-10-CM

## 2023-10-10 LAB
ALBUMIN SERPL-MCNC: 3.7 GM/DL (ref 3.4–5)
ALP BLD-CCNC: 63 IU/L (ref 40–128)
ALT SERPL-CCNC: 19 U/L (ref 10–40)
ANION GAP SERPL CALCULATED.3IONS-SCNC: 11 MMOL/L (ref 4–16)
AST SERPL-CCNC: 26 IU/L (ref 15–37)
BASOPHILS ABSOLUTE: 0 K/CU MM
BASOPHILS RELATIVE PERCENT: 0.6 % (ref 0–1)
BILIRUB SERPL-MCNC: 0.4 MG/DL (ref 0–1)
BUN SERPL-MCNC: 16 MG/DL (ref 6–23)
CALCIUM SERPL-MCNC: 9.4 MG/DL (ref 8.3–10.6)
CHLORIDE BLD-SCNC: 98 MMOL/L (ref 99–110)
CO2: 23 MMOL/L (ref 21–32)
CREAT SERPL-MCNC: 0.8 MG/DL (ref 0.6–1.1)
DIFFERENTIAL TYPE: ABNORMAL
EOSINOPHILS ABSOLUTE: 0.2 K/CU MM
EOSINOPHILS RELATIVE PERCENT: 3 % (ref 0–3)
GFR SERPL CREATININE-BSD FRML MDRD: >60 ML/MIN/1.73M2
GLUCOSE SERPL-MCNC: 120 MG/DL (ref 70–99)
HCT VFR BLD CALC: 40.8 % (ref 37–47)
HEMOGLOBIN: 13.3 GM/DL (ref 12.5–16)
LYMPHOCYTES ABSOLUTE: 1.7 K/CU MM
LYMPHOCYTES RELATIVE PERCENT: 25.8 % (ref 24–44)
MCH RBC QN AUTO: 30.3 PG (ref 27–31)
MCHC RBC AUTO-ENTMCNC: 32.6 % (ref 32–36)
MCV RBC AUTO: 92.9 FL (ref 78–100)
MONOCYTES ABSOLUTE: 0.6 K/CU MM
MONOCYTES RELATIVE PERCENT: 8.9 % (ref 0–4)
PDW BLD-RTO: 14.3 % (ref 11.7–14.9)
PLATELET # BLD: 140 K/CU MM (ref 140–440)
PMV BLD AUTO: 10.1 FL (ref 7.5–11.1)
POTASSIUM SERPL-SCNC: 4.4 MMOL/L (ref 3.5–5.1)
RBC # BLD: 4.39 M/CU MM (ref 4.2–5.4)
SEGMENTED NEUTROPHILS ABSOLUTE COUNT: 4.1 K/CU MM
SEGMENTED NEUTROPHILS RELATIVE PERCENT: 61.7 % (ref 36–66)
SODIUM BLD-SCNC: 132 MMOL/L (ref 135–145)
TOTAL PROTEIN: 6.1 GM/DL (ref 6.4–8.2)
WBC # BLD: 6.7 K/CU MM (ref 4–10.5)

## 2023-10-10 PROCEDURE — 36415 COLL VENOUS BLD VENIPUNCTURE: CPT

## 2023-10-10 PROCEDURE — 80053 COMPREHEN METABOLIC PANEL: CPT

## 2023-10-10 PROCEDURE — 85025 COMPLETE CBC W/AUTO DIFF WBC: CPT

## 2023-10-11 RX ORDER — ACETAMINOPHEN 325 MG/1
650 TABLET ORAL
Status: CANCELLED | OUTPATIENT
Start: 2023-10-12

## 2023-10-11 RX ORDER — EPINEPHRINE 1 MG/ML
0.3 INJECTION, SOLUTION, CONCENTRATE INTRAVENOUS PRN
Status: CANCELLED | OUTPATIENT
Start: 2023-10-12

## 2023-10-11 RX ORDER — MEPERIDINE HYDROCHLORIDE 50 MG/ML
12.5 INJECTION INTRAMUSCULAR; INTRAVENOUS; SUBCUTANEOUS PRN
Status: CANCELLED | OUTPATIENT
Start: 2023-10-12

## 2023-10-11 RX ORDER — FAMOTIDINE 10 MG/ML
20 INJECTION, SOLUTION INTRAVENOUS
Status: CANCELLED | OUTPATIENT
Start: 2023-10-12

## 2023-10-11 RX ORDER — SODIUM CHLORIDE 0.9 % (FLUSH) 0.9 %
5-40 SYRINGE (ML) INJECTION PRN
Status: CANCELLED | OUTPATIENT
Start: 2023-10-12

## 2023-10-11 RX ORDER — ONDANSETRON 2 MG/ML
8 INJECTION INTRAMUSCULAR; INTRAVENOUS
Status: CANCELLED | OUTPATIENT
Start: 2023-10-12

## 2023-10-11 RX ORDER — SODIUM CHLORIDE 9 MG/ML
INJECTION, SOLUTION INTRAVENOUS CONTINUOUS
Status: CANCELLED | OUTPATIENT
Start: 2023-10-12

## 2023-10-11 RX ORDER — DIPHENHYDRAMINE HYDROCHLORIDE 50 MG/ML
50 INJECTION INTRAMUSCULAR; INTRAVENOUS
Status: CANCELLED | OUTPATIENT
Start: 2023-10-12

## 2023-10-11 RX ORDER — SODIUM CHLORIDE 9 MG/ML
5-250 INJECTION, SOLUTION INTRAVENOUS PRN
Status: CANCELLED | OUTPATIENT
Start: 2023-10-12

## 2023-10-11 RX ORDER — HEPARIN SODIUM (PORCINE) LOCK FLUSH IV SOLN 100 UNIT/ML 100 UNIT/ML
500 SOLUTION INTRAVENOUS PRN
Status: CANCELLED | OUTPATIENT
Start: 2023-10-12

## 2023-10-11 RX ORDER — ALBUTEROL SULFATE 90 UG/1
4 AEROSOL, METERED RESPIRATORY (INHALATION) PRN
Status: CANCELLED | OUTPATIENT
Start: 2023-10-12

## 2023-10-12 ENCOUNTER — HOSPITAL ENCOUNTER (OUTPATIENT)
Dept: INFUSION THERAPY | Age: 88
Discharge: HOME | End: 2023-10-12
Payer: MEDICARE

## 2023-10-12 ENCOUNTER — OFFICE VISIT (OUTPATIENT)
Dept: ONCOLOGY | Age: 88
End: 2023-10-12
Payer: MEDICARE

## 2023-10-12 VITALS
HEART RATE: 64 BPM | OXYGEN SATURATION: 95 % | BODY MASS INDEX: 26.56 KG/M2 | DIASTOLIC BLOOD PRESSURE: 66 MMHG | SYSTOLIC BLOOD PRESSURE: 125 MMHG | WEIGHT: 136 LBS | TEMPERATURE: 97.9 F

## 2023-10-12 VITALS
DIASTOLIC BLOOD PRESSURE: 66 MMHG | OXYGEN SATURATION: 95 % | HEART RATE: 64 BPM | TEMPERATURE: 97.9 F | SYSTOLIC BLOOD PRESSURE: 125 MMHG | HEIGHT: 60 IN | WEIGHT: 136 LBS | BODY MASS INDEX: 26.7 KG/M2

## 2023-10-12 DIAGNOSIS — C17.2 ADENOCARCINOMA OF ILEUM (HCC): Primary | ICD-10-CM

## 2023-10-12 DIAGNOSIS — E03.9 ACQUIRED HYPOTHYROIDISM: ICD-10-CM

## 2023-10-12 DIAGNOSIS — C18.2 MALIGNANT NEOPLASM OF ASCENDING COLON (HCC): Primary | ICD-10-CM

## 2023-10-12 PROCEDURE — 2580000003 HC RX 258: Performed by: INTERNAL MEDICINE

## 2023-10-12 PROCEDURE — 99214 OFFICE O/P EST MOD 30 MIN: CPT | Performed by: INTERNAL MEDICINE

## 2023-10-12 PROCEDURE — 6360000002 HC RX W HCPCS: Performed by: INTERNAL MEDICINE

## 2023-10-12 PROCEDURE — 96413 CHEMO IV INFUSION 1 HR: CPT

## 2023-10-12 PROCEDURE — 1124F ACP DISCUSS-NO DSCNMKR DOCD: CPT | Performed by: INTERNAL MEDICINE

## 2023-10-12 RX ORDER — SODIUM CHLORIDE 9 MG/ML
5-250 INJECTION, SOLUTION INTRAVENOUS PRN
Status: DISCONTINUED | OUTPATIENT
Start: 2023-10-12 | End: 2023-10-13 | Stop reason: HOSPADM

## 2023-10-12 RX ADMIN — SODIUM CHLORIDE 200 MG: 9 INJECTION, SOLUTION INTRAVENOUS at 11:51

## 2023-10-12 NOTE — PROGRESS NOTES
MA Rooming Questions  Patient: Devora Kwon  MRN: [id number]    Date: 10/12/2023        1. Do you have any new issues?   no         2. Do you need any refills on medications?    no    3. Have you had any imaging done since your last visit?   no    4. Have you been hospitalized or seen in the emergency room since your last visit here?   no    5. Did the patient have a depression screening completed today?  No    No data recorded     PHQ-9 Given to (if applicable):               PHQ-9 Score (if applicable):                     [] Positive     []  Negative              Does question #9 need addressed (if applicable)                     [] Yes    []  No               Yamileth Eldridge CMA

## 2023-10-12 NOTE — PROGRESS NOTES
Dermatologist also put her on claritin 3 times per day    I answered all her questions and concerns for today. Recent imaging and labs were reviewed and discussed with the patient.

## 2023-10-12 NOTE — PROGRESS NOTES
Pt here for tx. And OV. PIV placed with blood return noted. CBC CMP reviewed from 10/10 and within defined limits. Pt states she has been feeling tired lately. Pt encouraged to take rest periods at home and to make sure she is staying hydrated drinking plenty of water or protein shakes. Pt verbalized understanding     Patient's status assessed and documented appropriately. All labs and required results were also reviewed today. Treatment parameters have been reviewed. Today's treatment has been approved by the provider. Treatment orders and medication sequencing (when applicable) was verified by 2 registered nurses. The treatment plan was confirmed with the patient prior to administration, and the patient understands the need to report any treatment-related symptoms. Prior to administration, when applicable, the following 8 elements of medication administration were reviewed with 2nd Registered Nurse prior to dosing: drug name, drug dose, infusion volume when prepared in a syringe, rate of administration, expiration dates and/or times, appearance and integrity of drug(s), and rate of pump for infusion. The 5 rights of medication administration have been verified.        Pt tolerated tx without incident left ambulatory instructed to stop at check out desk for next OV and discharge paperwork

## 2023-10-18 ENCOUNTER — TELEPHONE (OUTPATIENT)
Dept: CARDIOLOGY CLINIC | Age: 88
End: 2023-10-18

## 2023-10-18 NOTE — TELEPHONE ENCOUNTER
Cardiologist: Dr. Selena Shankar  Surgeon: Dr. Truong Diez   Surgery: Left Cubital Tunnel Release vx Anterior Ulnar Nerve Transposition and Left Open Carpal Tunnel Release   Anesthesia: Unk  Date: TBD  FAX# [phone]    Ph# [phone]    Last OV 5/23/2023 [place]

## 2023-10-27 DIAGNOSIS — C17.2 ADENOCARCINOMA OF ILEUM (HCC): Primary | ICD-10-CM

## 2023-10-27 DIAGNOSIS — E03.9 ACQUIRED HYPOTHYROIDISM: ICD-10-CM

## 2023-10-29 PROBLEM — C18.2 MALIGNANT NEOPLASM OF ASCENDING COLON (HCC): Status: ACTIVE | Noted: 2023-10-29

## 2023-10-29 NOTE — PROGRESS NOTES
Patient Name:  Balta Boyd  Patient :  10/20/1934  Patient MRN:  [id number]     Primary Oncologist: Kayla Martin MD  Referring Provider: Lily Johnston DO     Date of Service: 2023      Chief Complaint:    Chief Complaint   Patient presents with    Follow-up     Patient Active Problem List:     Long-term insulin use in type 2 diabetes [facility])     Essential hypertension     Dyslipidemia     Abnormal EKG     Abnormal stress test     Cecum mass     Severe malnutrition (HCC)     Partial small bowel obstruction (HCC)     Adenocarcinoma (HCC)     Generalized weakness     Uncontrolled pain     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 She has been treated recently for abdominal pain and suspected diverticulitis, but was not improving after getting antibiotics which prompted her to return to the ED. She denies any personal or family history of colon cancer. Her sister had breast cancer in her 42's. She has had colonoscopies in the past which were normal, but thinks her last one was more than 10 years ago. She denies any previous problems with ovarian cysts or adnexal lesions. She underwent colonoscopy on 22 and it showed large mass extending from the ileocecal valve into the cecum mass originating at ileocecal valve appears to be more tubulovillous, mass in the cecum appears to be more firm fixed ulcerated consistent with malignancy. Biopsies were obtained.  Mild to moderate sigmoid diverticulosis and grade 2 hemorrhoids and incidental lipoma

## 2023-10-31 ENCOUNTER — HOSPITAL ENCOUNTER (OUTPATIENT)
Dept: INFUSION THERAPY | Age: 88
Discharge: HOME | End: 2023-10-31
Payer: MEDICARE

## 2023-10-31 DIAGNOSIS — E03.9 ACQUIRED HYPOTHYROIDISM: ICD-10-CM

## 2023-10-31 DIAGNOSIS — C17.2 ADENOCARCINOMA OF ILEUM (HCC): ICD-10-CM

## 2023-10-31 LAB
BASOPHILS ABSOLUTE: 0 K/CU MM
BASOPHILS RELATIVE PERCENT: 0.2 % (ref 0–1)
DIFFERENTIAL TYPE: ABNORMAL
EOSINOPHILS ABSOLUTE: 0.2 K/CU MM
EOSINOPHILS RELATIVE PERCENT: 2.4 % (ref 0–3)
HCT VFR BLD CALC: 39.1 % (ref 37–47)
HEMOGLOBIN: 12.7 GM/DL (ref 12.5–16)
LYMPHOCYTES ABSOLUTE: 1.6 K/CU MM
LYMPHOCYTES RELATIVE PERCENT: 18.8 % (ref 24–44)
MCH RBC QN AUTO: 30.5 PG (ref 27–31)
MCHC RBC AUTO-ENTMCNC: 32.5 % (ref 32–36)
MCV RBC AUTO: 93.8 FL (ref 78–100)
MONOCYTES ABSOLUTE: 0.7 K/CU MM
MONOCYTES RELATIVE PERCENT: 8.1 % (ref 0–4)
PDW BLD-RTO: 13.7 % (ref 11.7–14.9)
PLATELET # BLD: 160 K/CU MM (ref 140–440)
PMV BLD AUTO: 9.8 FL (ref 7.5–11.1)
RBC # BLD: 4.17 M/CU MM (ref 4.2–5.4)
SEGMENTED NEUTROPHILS ABSOLUTE COUNT: 6.1 K/CU MM
SEGMENTED NEUTROPHILS RELATIVE PERCENT: 70.5 % (ref 36–66)
TSH SERPL DL<=0.005 MIU/L-ACNC: 0.37 UIU/ML (ref 0.27–4.2)
WBC # BLD: 8.6 K/CU MM (ref 4–10.5)

## 2023-10-31 PROCEDURE — 85025 COMPLETE CBC W/AUTO DIFF WBC: CPT

## 2023-10-31 PROCEDURE — 84443 ASSAY THYROID STIM HORMONE: CPT

## 2023-10-31 PROCEDURE — 80053 COMPREHEN METABOLIC PANEL: CPT

## 2023-10-31 PROCEDURE — 36415 COLL VENOUS BLD VENIPUNCTURE: CPT

## 2023-11-01 DIAGNOSIS — E03.9 ACQUIRED HYPOTHYROIDISM: ICD-10-CM

## 2023-11-01 DIAGNOSIS — C17.2 ADENOCARCINOMA OF ILEUM (HCC): Primary | ICD-10-CM

## 2023-11-01 RX ORDER — DIPHENHYDRAMINE HYDROCHLORIDE 50 MG/ML
50 INJECTION INTRAMUSCULAR; INTRAVENOUS
OUTPATIENT
Start: 2023-12-14

## 2023-11-01 RX ORDER — EPINEPHRINE 1 MG/ML
0.3 INJECTION, SOLUTION, CONCENTRATE INTRAVENOUS PRN
OUTPATIENT
Start: 2023-11-23

## 2023-11-01 RX ORDER — ONDANSETRON 2 MG/ML
8 INJECTION INTRAMUSCULAR; INTRAVENOUS
OUTPATIENT
Start: 2023-12-14

## 2023-11-01 RX ORDER — MEPERIDINE HYDROCHLORIDE 50 MG/ML
12.5 INJECTION INTRAMUSCULAR; INTRAVENOUS; SUBCUTANEOUS PRN
OUTPATIENT
Start: 2023-12-14

## 2023-11-01 RX ORDER — MEPERIDINE HYDROCHLORIDE 50 MG/ML
12.5 INJECTION INTRAMUSCULAR; INTRAVENOUS; SUBCUTANEOUS PRN
OUTPATIENT
Start: 2023-11-23

## 2023-11-01 RX ORDER — ONDANSETRON 2 MG/ML
8 INJECTION INTRAMUSCULAR; INTRAVENOUS
OUTPATIENT
Start: 2023-11-23

## 2023-11-01 RX ORDER — SODIUM CHLORIDE 9 MG/ML
5-250 INJECTION, SOLUTION INTRAVENOUS PRN
OUTPATIENT
Start: 2023-12-14

## 2023-11-01 RX ORDER — MEPERIDINE HYDROCHLORIDE 50 MG/ML
12.5 INJECTION INTRAMUSCULAR; INTRAVENOUS; SUBCUTANEOUS PRN
Status: CANCELLED | OUTPATIENT
Start: 2023-11-02

## 2023-11-01 RX ORDER — EPINEPHRINE 1 MG/ML
0.3 INJECTION, SOLUTION, CONCENTRATE INTRAVENOUS PRN
OUTPATIENT
Start: 2023-12-14

## 2023-11-01 RX ORDER — SODIUM CHLORIDE 9 MG/ML
5-250 INJECTION, SOLUTION INTRAVENOUS PRN
Status: CANCELLED | OUTPATIENT
Start: 2023-11-02

## 2023-11-01 RX ORDER — ALBUTEROL SULFATE 90 UG/1
4 AEROSOL, METERED RESPIRATORY (INHALATION) PRN
OUTPATIENT
Start: 2023-11-23

## 2023-11-01 RX ORDER — FAMOTIDINE 10 MG/ML
20 INJECTION, SOLUTION INTRAVENOUS
OUTPATIENT
Start: 2023-11-23

## 2023-11-01 RX ORDER — SODIUM CHLORIDE 9 MG/ML
INJECTION, SOLUTION INTRAVENOUS CONTINUOUS
OUTPATIENT
Start: 2023-11-23

## 2023-11-01 RX ORDER — SODIUM CHLORIDE 9 MG/ML
INJECTION, SOLUTION INTRAVENOUS CONTINUOUS
Status: CANCELLED | OUTPATIENT
Start: 2023-11-02

## 2023-11-01 RX ORDER — ACETAMINOPHEN 325 MG/1
650 TABLET ORAL
OUTPATIENT
Start: 2023-11-23

## 2023-11-01 RX ORDER — EPINEPHRINE 1 MG/ML
0.3 INJECTION, SOLUTION, CONCENTRATE INTRAVENOUS PRN
Status: CANCELLED | OUTPATIENT
Start: 2023-11-02

## 2023-11-01 RX ORDER — HEPARIN SODIUM (PORCINE) LOCK FLUSH IV SOLN 100 UNIT/ML 100 UNIT/ML
500 SOLUTION INTRAVENOUS PRN
OUTPATIENT
Start: 2023-12-14

## 2023-11-01 RX ORDER — ONDANSETRON 2 MG/ML
8 INJECTION INTRAMUSCULAR; INTRAVENOUS
Status: CANCELLED | OUTPATIENT
Start: 2023-11-02

## 2023-11-01 RX ORDER — SODIUM CHLORIDE 0.9 % (FLUSH) 0.9 %
5-40 SYRINGE (ML) INJECTION PRN
OUTPATIENT
Start: 2023-11-23

## 2023-11-01 RX ORDER — FAMOTIDINE 10 MG/ML
20 INJECTION, SOLUTION INTRAVENOUS
OUTPATIENT
Start: 2023-12-14

## 2023-11-01 RX ORDER — ACETAMINOPHEN 325 MG/1
650 TABLET ORAL
Status: CANCELLED | OUTPATIENT
Start: 2023-11-02

## 2023-11-01 RX ORDER — ALBUTEROL SULFATE 90 UG/1
4 AEROSOL, METERED RESPIRATORY (INHALATION) PRN
OUTPATIENT
Start: 2023-12-14

## 2023-11-01 RX ORDER — SODIUM CHLORIDE 0.9 % (FLUSH) 0.9 %
5-40 SYRINGE (ML) INJECTION PRN
OUTPATIENT
Start: 2023-12-14

## 2023-11-01 RX ORDER — HEPARIN SODIUM (PORCINE) LOCK FLUSH IV SOLN 100 UNIT/ML 100 UNIT/ML
500 SOLUTION INTRAVENOUS PRN
Status: CANCELLED | OUTPATIENT
Start: 2023-11-02

## 2023-11-01 RX ORDER — ALBUTEROL SULFATE 90 UG/1
4 AEROSOL, METERED RESPIRATORY (INHALATION) PRN
Status: CANCELLED | OUTPATIENT
Start: 2023-11-02

## 2023-11-01 RX ORDER — SODIUM CHLORIDE 0.9 % (FLUSH) 0.9 %
5-40 SYRINGE (ML) INJECTION PRN
Status: CANCELLED | OUTPATIENT
Start: 2023-11-02

## 2023-11-01 RX ORDER — FAMOTIDINE 10 MG/ML
20 INJECTION, SOLUTION INTRAVENOUS
Status: CANCELLED | OUTPATIENT
Start: 2023-11-02

## 2023-11-01 RX ORDER — SODIUM CHLORIDE 9 MG/ML
5-250 INJECTION, SOLUTION INTRAVENOUS PRN
OUTPATIENT
Start: 2023-11-23

## 2023-11-01 RX ORDER — DIPHENHYDRAMINE HYDROCHLORIDE 50 MG/ML
50 INJECTION INTRAMUSCULAR; INTRAVENOUS
OUTPATIENT
Start: 2023-11-23

## 2023-11-01 RX ORDER — ACETAMINOPHEN 325 MG/1
650 TABLET ORAL
OUTPATIENT
Start: 2023-12-14

## 2023-11-01 RX ORDER — HEPARIN SODIUM (PORCINE) LOCK FLUSH IV SOLN 100 UNIT/ML 100 UNIT/ML
500 SOLUTION INTRAVENOUS PRN
OUTPATIENT
Start: 2023-11-23

## 2023-11-01 RX ORDER — SODIUM CHLORIDE 9 MG/ML
INJECTION, SOLUTION INTRAVENOUS CONTINUOUS
OUTPATIENT
Start: 2023-12-14

## 2023-11-01 RX ORDER — DIPHENHYDRAMINE HYDROCHLORIDE 50 MG/ML
50 INJECTION INTRAMUSCULAR; INTRAVENOUS
Status: CANCELLED | OUTPATIENT
Start: 2023-11-02

## 2023-11-02 ENCOUNTER — OFFICE VISIT (OUTPATIENT)
Dept: ONCOLOGY | Age: 88
End: 2023-11-02
Payer: MEDICARE

## 2023-11-02 ENCOUNTER — HOSPITAL ENCOUNTER (OUTPATIENT)
Dept: INFUSION THERAPY | Age: 88
Discharge: HOME | End: 2023-11-02
Payer: MEDICARE

## 2023-11-02 VITALS
RESPIRATION RATE: 16 BRPM | WEIGHT: 137 LBS | DIASTOLIC BLOOD PRESSURE: 69 MMHG | BODY MASS INDEX: 26.76 KG/M2 | OXYGEN SATURATION: 96 % | TEMPERATURE: 97.8 F | HEART RATE: 57 BPM | SYSTOLIC BLOOD PRESSURE: 169 MMHG

## 2023-11-02 VITALS
OXYGEN SATURATION: 96 % | WEIGHT: 137 LBS | DIASTOLIC BLOOD PRESSURE: 69 MMHG | HEIGHT: 60 IN | SYSTOLIC BLOOD PRESSURE: 169 MMHG | HEART RATE: 57 BPM | TEMPERATURE: 97.8 F | BODY MASS INDEX: 26.9 KG/M2

## 2023-11-02 DIAGNOSIS — C17.2 ADENOCARCINOMA OF ILEUM (HCC): Primary | ICD-10-CM

## 2023-11-02 DIAGNOSIS — C18.2 MALIGNANT NEOPLASM OF ASCENDING COLON (HCC): ICD-10-CM

## 2023-11-02 DIAGNOSIS — E03.9 ACQUIRED HYPOTHYROIDISM: ICD-10-CM

## 2023-11-02 LAB
ALBUMIN SERPL-MCNC: 3.8 GM/DL (ref 3.4–5)
ALP BLD-CCNC: 56 IU/L (ref 40–129)
ALT SERPL-CCNC: 27 U/L (ref 10–40)
ANION GAP SERPL CALCULATED.3IONS-SCNC: 15 MMOL/L (ref 4–16)
AST SERPL-CCNC: 32 IU/L (ref 15–37)
BILIRUB SERPL-MCNC: 0.4 MG/DL (ref 0–1)
BUN SERPL-MCNC: 15 MG/DL (ref 6–23)
CALCIUM SERPL-MCNC: 9.7 MG/DL (ref 8.3–10.6)
CHLORIDE BLD-SCNC: 102 MMOL/L (ref 99–110)
CO2: 27 MMOL/L (ref 21–32)
CREAT SERPL-MCNC: 0.8 MG/DL (ref 0.6–1.1)
GFR SERPL CREATININE-BSD FRML MDRD: >60 ML/MIN/1.73M2
GLUCOSE SERPL-MCNC: 81 MG/DL (ref 70–99)
POTASSIUM SERPL-SCNC: 4.5 MMOL/L (ref 3.5–5.1)
SODIUM BLD-SCNC: 136 MMOL/L (ref 135–145)
TOTAL PROTEIN: 6.3 GM/DL (ref 6.4–8.2)

## 2023-11-02 PROCEDURE — 96413 CHEMO IV INFUSION 1 HR: CPT

## 2023-11-02 PROCEDURE — 2580000003 HC RX 258: Performed by: INTERNAL MEDICINE

## 2023-11-02 PROCEDURE — 6360000002 HC RX W HCPCS: Performed by: INTERNAL MEDICINE

## 2023-11-02 PROCEDURE — 99214 OFFICE O/P EST MOD 30 MIN: CPT | Performed by: INTERNAL MEDICINE

## 2023-11-02 PROCEDURE — 1124F ACP DISCUSS-NO DSCNMKR DOCD: CPT | Performed by: INTERNAL MEDICINE

## 2023-11-02 RX ORDER — SODIUM CHLORIDE 9 MG/ML
5-250 INJECTION, SOLUTION INTRAVENOUS PRN
Status: DISCONTINUED | OUTPATIENT
Start: 2023-11-02 | End: 2023-11-03 | Stop reason: HOSPADM

## 2023-11-02 RX ADMIN — SODIUM CHLORIDE 200 MG: 9 INJECTION, SOLUTION INTRAVENOUS at 11:51

## 2023-11-02 RX ADMIN — SODIUM CHLORIDE 20 ML/HR: 9 INJECTION, SOLUTION INTRAVENOUS at 11:50

## 2023-11-02 NOTE — PROGRESS NOTES
Pt here for tx and OV. PIV placed with blood return noted. CBC CMP reviewed from 10/31 and within defined limits. Pt has no new concerns or issues to discuss with physician at this time. Pt states has intermittent itching which is not new and Dr Ash Mcnair is aware. Pt states she takes benadryl TID prn which helps with the itching. Patient's status assessed and documented appropriately. All labs and required results were also reviewed today. Treatment parameters have been reviewed. Today's treatment has been approved by the provider. Treatment orders and medication sequencing (when applicable) was verified by 2 registered nurses. The treatment plan was confirmed with the patient prior to administration, and the patient understands the need to report any treatment-related symptoms. Prior to administration, when applicable, the following 8 elements of medication administration were reviewed with 2nd Registered Nurse prior to dosing: drug name, drug dose, infusion volume when prepared in a syringe, rate of administration, expiration dates and/or times, appearance and integrity of drug(s), and rate of pump for infusion. The 5 rights of medication administration have been verified. Pt tolerated tx without incident left ambulatory instructed to stop at check out desk for next OV and discharge paperwork.

## 2023-11-02 NOTE — PROGRESS NOTES
MA Rooming Questions  Patient: Chiquis Del Valle  MRN: [id number]    Date: 11/2/2023        1. Do you have any new issues?   no         2. Do you need any refills on medications?    no    3. Have you had any imaging done since your last visit?   no    4. Have you been hospitalized or seen in the emergency room since your last visit here?   no    5. Did the patient have a depression screening completed today?  No    No data recorded     PHQ-9 Given to (if applicable):               PHQ-9 Score (if applicable):                     [] Positive     []  Negative              Does question #9 need addressed (if applicable)                     [] Yes    []  No               Frandy Lomeli MA

## 2023-11-14 ENCOUNTER — TELEPHONE (OUTPATIENT)
Dept: CARDIOLOGY CLINIC | Age: 88
End: 2023-11-14

## 2023-11-14 NOTE — TELEPHONE ENCOUNTER
Faxed medical records to [facility] PAT tests reports- EKG, Echo and NM. To [phone].

## 2023-11-27 ENCOUNTER — OFFICE VISIT (OUTPATIENT)
Dept: CARDIOLOGY CLINIC | Age: 88
End: 2023-11-27
Payer: MEDICARE

## 2023-11-27 VITALS
SYSTOLIC BLOOD PRESSURE: 136 MMHG | HEIGHT: 60 IN | WEIGHT: 138 LBS | OXYGEN SATURATION: 100 % | BODY MASS INDEX: 27.09 KG/M2 | DIASTOLIC BLOOD PRESSURE: 62 MMHG | HEART RATE: 73 BPM

## 2023-11-27 DIAGNOSIS — I25.10 CORONARY ARTERY DISEASE INVOLVING NATIVE CORONARY ARTERY OF NATIVE HEART WITHOUT ANGINA PECTORIS: ICD-10-CM

## 2023-11-27 DIAGNOSIS — I10 ESSENTIAL HYPERTENSION: ICD-10-CM

## 2023-11-27 DIAGNOSIS — E78.5 DYSLIPIDEMIA: ICD-10-CM

## 2023-11-27 DIAGNOSIS — C17.2 ADENOCARCINOMA OF ILEUM (HCC): ICD-10-CM

## 2023-11-27 DIAGNOSIS — Z86.718 H/O DEEP VENOUS THROMBOSIS: Primary | ICD-10-CM

## 2023-11-27 PROCEDURE — 99214 OFFICE O/P EST MOD 30 MIN: CPT | Performed by: INTERNAL MEDICINE

## 2023-11-27 PROCEDURE — 1124F ACP DISCUSS-NO DSCNMKR DOCD: CPT | Performed by: INTERNAL MEDICINE

## 2023-11-27 NOTE — PROGRESS NOTES
Supple. No jugular venous distention noted. No carotid bruits, no apical -carotid delay  Respiratory:  Normal breath sounds, No respiratory distress, No wheezing, No chest tenderness. ,no use of accessory muscles, diaphragm movement is normal  Cardiovascular: (PMI) apex non displaced,no lifts no thrills, no s3,no s4, Normal heart rate, Normal rhythm, No murmurs, No rubs, No gallops. Carotid arteries pulse and amplitude are normal no bruit, no abdominal bruit noted ( normal abdominal aorta ausculation),   Extremities - No cyanosis, clubbing, or significant edema, no varicose veins    Abdomen - No masses, tenderness, or organomegaly, no hepato-splenomegally, no bruits  Musculoskeletal - No significant edema, no kyphosis or scoliosis, no deformity in any extremity noted, muscle strength and tone are normal  Skin: no ulcer,no scar,no stasis dermatitis   Neurologic - alert oriented times 3,Cranial nerves II through XII are grossly intact. There were no gross focal neurologic abnormalities. Psychiatric: normal mood and affect    Lab Results   Component Value Date/Time    CKTOTAL 74 02/11/2011 10:15 AM    CKMB 1.5 02/11/2011 10:15 AM    TROPONINI 0.012 02/11/2011 10:15 AM     BNP:    Lab Results   Component Value Date/Time    BNP 75.3 02/11/2011 10:15 AM     PT/INR:  No results found for: \"PTINR\"  Lab Results   Component Value Date    LABA1C 5.9 05/22/2022    LABA1C 7.0 (H) 03/14/2018     Lab Results   Component Value Date    CHOL 138 05/02/2019    TRIG 161 (H) 06/01/2022    HDL 54 05/02/2019    LDLDIRECT 78 05/02/2019     Lab Results   Component Value Date    ALT 27 10/31/2023    AST 32 10/31/2023     TSH:  No results found for: \"TSH\"    Impression:  Wes Garcia is a 80 y. o.year old who  has a past medical history of Diabetes mellitus ([address]), H/O cardiac catheterization, H/O cardiovascular stress test, H/O cardiovascular stress test, H/O Doppler ultrasound (Abdomimal Aorta), H/O echocardiogram, Hx of echocardiogram,

## 2023-11-28 ENCOUNTER — HOSPITAL ENCOUNTER (OUTPATIENT)
Dept: INFUSION THERAPY | Age: 88
Discharge: HOME | End: 2023-11-28
Payer: MEDICARE

## 2023-11-28 DIAGNOSIS — E03.9 ACQUIRED HYPOTHYROIDISM: ICD-10-CM

## 2023-11-28 DIAGNOSIS — C17.2 ADENOCARCINOMA OF ILEUM (HCC): Primary | ICD-10-CM

## 2023-11-28 DIAGNOSIS — C17.2 ADENOCARCINOMA OF ILEUM (HCC): ICD-10-CM

## 2023-11-28 LAB
ALBUMIN SERPL-MCNC: 3.6 GM/DL (ref 3.4–5)
ALP BLD-CCNC: 57 IU/L (ref 40–128)
ALT SERPL-CCNC: 38 U/L (ref 10–40)
ANION GAP SERPL CALCULATED.3IONS-SCNC: 9 MMOL/L (ref 4–16)
AST SERPL-CCNC: 33 IU/L (ref 15–37)
BASOPHILS ABSOLUTE: 0 K/CU MM
BASOPHILS RELATIVE PERCENT: 0.4 % (ref 0–1)
BILIRUB SERPL-MCNC: 0.3 MG/DL (ref 0–1)
BUN SERPL-MCNC: 19 MG/DL (ref 6–23)
CALCIUM SERPL-MCNC: 9.6 MG/DL (ref 8.3–10.6)
CHLORIDE BLD-SCNC: 102 MMOL/L (ref 99–110)
CO2: 26 MMOL/L (ref 21–32)
CREAT SERPL-MCNC: 1 MG/DL (ref 0.6–1.1)
DIFFERENTIAL TYPE: ABNORMAL
EOSINOPHILS ABSOLUTE: 0.3 K/CU MM
EOSINOPHILS RELATIVE PERCENT: 4.1 % (ref 0–3)
GFR SERPL CREATININE-BSD FRML MDRD: 54 ML/MIN/1.73M2
GLUCOSE SERPL-MCNC: 92 MG/DL (ref 70–99)
HCT VFR BLD CALC: 38.8 % (ref 37–47)
HEMOGLOBIN: 12.8 GM/DL (ref 12.5–16)
LYMPHOCYTES ABSOLUTE: 1.4 K/CU MM
LYMPHOCYTES RELATIVE PERCENT: 20.7 % (ref 24–44)
MCH RBC QN AUTO: 30.5 PG (ref 27–31)
MCHC RBC AUTO-ENTMCNC: 33 % (ref 32–36)
MCV RBC AUTO: 92.6 FL (ref 78–100)
MONOCYTES ABSOLUTE: 0.6 K/CU MM
MONOCYTES RELATIVE PERCENT: 8.7 % (ref 0–4)
PDW BLD-RTO: 13.5 % (ref 11.7–14.9)
PLATELET # BLD: 146 K/CU MM (ref 140–440)
PMV BLD AUTO: 9.3 FL (ref 7.5–11.1)
POTASSIUM SERPL-SCNC: 4.6 MMOL/L (ref 3.5–5.1)
RBC # BLD: 4.19 M/CU MM (ref 4.2–5.4)
SEGMENTED NEUTROPHILS ABSOLUTE COUNT: 4.6 K/CU MM
SEGMENTED NEUTROPHILS RELATIVE PERCENT: 66.1 % (ref 36–66)
SODIUM BLD-SCNC: 137 MMOL/L (ref 135–145)
TOTAL PROTEIN: 6 GM/DL (ref 6.4–8.2)
WBC # BLD: 6.9 K/CU MM (ref 4–10.5)

## 2023-11-28 PROCEDURE — 85025 COMPLETE CBC W/AUTO DIFF WBC: CPT

## 2023-11-28 PROCEDURE — 80053 COMPREHEN METABOLIC PANEL: CPT

## 2023-11-28 PROCEDURE — 36415 COLL VENOUS BLD VENIPUNCTURE: CPT

## 2023-11-30 ENCOUNTER — HOSPITAL ENCOUNTER (OUTPATIENT)
Dept: INFUSION THERAPY | Age: 88
Discharge: HOME | End: 2023-11-30
Payer: MEDICARE

## 2023-11-30 ENCOUNTER — OFFICE VISIT (OUTPATIENT)
Dept: ONCOLOGY | Age: 88
End: 2023-11-30
Payer: MEDICARE

## 2023-11-30 VITALS
OXYGEN SATURATION: 97 % | HEART RATE: 61 BPM | RESPIRATION RATE: 14 BRPM | HEIGHT: 60 IN | WEIGHT: 139.4 LBS | TEMPERATURE: 97.3 F | DIASTOLIC BLOOD PRESSURE: 84 MMHG | SYSTOLIC BLOOD PRESSURE: 159 MMHG | BODY MASS INDEX: 27.37 KG/M2

## 2023-11-30 DIAGNOSIS — C17.2 ADENOCARCINOMA OF ILEUM (HCC): Primary | ICD-10-CM

## 2023-11-30 DIAGNOSIS — E03.9 ACQUIRED HYPOTHYROIDISM: ICD-10-CM

## 2023-11-30 PROCEDURE — 99214 OFFICE O/P EST MOD 30 MIN: CPT | Performed by: INTERNAL MEDICINE

## 2023-11-30 PROCEDURE — 6360000002 HC RX W HCPCS: Performed by: INTERNAL MEDICINE

## 2023-11-30 PROCEDURE — 96413 CHEMO IV INFUSION 1 HR: CPT

## 2023-11-30 PROCEDURE — 1124F ACP DISCUSS-NO DSCNMKR DOCD: CPT | Performed by: INTERNAL MEDICINE

## 2023-11-30 PROCEDURE — 2580000003 HC RX 258: Performed by: INTERNAL MEDICINE

## 2023-11-30 RX ORDER — SODIUM CHLORIDE 9 MG/ML
5-250 INJECTION, SOLUTION INTRAVENOUS PRN
Status: DISCONTINUED | OUTPATIENT
Start: 2023-11-30 | End: 2023-12-01 | Stop reason: HOSPADM

## 2023-11-30 RX ADMIN — SODIUM CHLORIDE 200 MG: 9 INJECTION, SOLUTION INTRAVENOUS at 12:29

## 2023-11-30 RX ADMIN — SODIUM CHLORIDE 20 ML/HR: 9 INJECTION, SOLUTION INTRAVENOUS at 12:28

## 2023-11-30 NOTE — PROGRESS NOTES
Pt here for tx. PIV placed with blood return noted. CBC CMP reviewed from 11/29 and within defined limits. Pt has no concerns or issues to discuss with physician at this time. Patient's status assessed and documented appropriately. All labs and required results were also reviewed today. Treatment parameters have been reviewed. Today's treatment has been approved by the provider. Treatment orders and medication sequencing (when applicable) was verified by 2 registered nurses. The treatment plan was confirmed with the patient prior to administration, and the patient understands the need to report any treatment-related symptoms. Prior to administration, when applicable, the following 8 elements of medication administration were reviewed with 2nd Registered Nurse prior to dosing: drug name, drug dose, infusion volume when prepared in a syringe, rate of administration, expiration dates and/or times, appearance and integrity of drug(s), and rate of pump for infusion. The 5 rights of medication administration have been verified.        Pt tolerated tx without incident left ambulatory discharge instructions given

## 2023-11-30 NOTE — PROGRESS NOTES
MA Rooming Questions  Patient: Rony Hanna  MRN: [id number]    Date: 11/30/2023        1. Do you have any new issues?   no         2. Do you need any refills on medications?    no    3. Have you had any imaging done since your last visit?   no    4. Have you been hospitalized or seen in the emergency room since your last visit here?   yes - [facility] for carpool tunnel surgery    5. Did the patient have a depression screening completed today?  No    No data recorded     PHQ-9 Given to (if applicable):               PHQ-9 Score (if applicable):                     [] Positive     []  Negative              Does question #9 need addressed (if applicable)                     [] Yes    []  No               Taylor Chapa MA
ureteral were positive for carcinoma. 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 The neoplastic cells demonstrate focal staining for CK7. CDX2 is positive. CK20, PAX8 and arginase are negative. 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 CT chest, abdomen and pelvis on 5/12/23 showed postsurgical changes from interval sigmoidectomy as well as hysterectomy. Previously noted but metabolically active nodule in the right lower  quadrant/mesentery is also no longer visualized and has likely been removed. Slightly increasing size of subcentimeter ABRIL lymph nodes, nonspecific and could be reactive due to recent surgery. Metastatic disease is not excluded. Attention on follow-up exams is recommended. Interval improvement in right hydronephrosis. There is mild residual urothelial thickening and enhancement the right renal pelvis which may be related to superimposed infection or inflammation. Few subtle 2-4 mm solid and sub solid nodules in the anteromedial leftupper lobe, nonspecific. Attention on follow-up exams is recommended. CT chest, abdomen and pelvis done on 8/25/23 showed no evidence for metastatic disease in the chest, abdomen or pelvis. Findings of bronchiolitis in the medial lingula appear unchanged. Previously seen ABRIL chain lymph nodes have resolved. Large distal colonic and rectal stool burden. On November 30, 2023, she presented to me for follow-up. 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 I reviewed final path report with her and her family. We also reviewed NCCN guidelines.  I also let her know that we ideally

## 2023-12-13 ENCOUNTER — HOSPITAL ENCOUNTER (OUTPATIENT)
Dept: CT IMAGING | Age: 88
Discharge: HOME | End: 2023-12-13
Attending: INTERNAL MEDICINE
Payer: MEDICARE

## 2023-12-13 DIAGNOSIS — C17.2 ADENOCARCINOMA OF ILEUM (HCC): ICD-10-CM

## 2023-12-13 PROCEDURE — 6360000004 HC RX CONTRAST MEDICATION: Performed by: INTERNAL MEDICINE

## 2023-12-13 PROCEDURE — 74177 CT ABD & PELVIS W/CONTRAST: CPT

## 2023-12-13 PROCEDURE — 71260 CT THORAX DX C+: CPT

## 2023-12-13 RX ADMIN — IOPAMIDOL 20 ML: 755 INJECTION, SOLUTION INTRAVENOUS at 11:03

## 2023-12-13 RX ADMIN — IOPAMIDOL 100 ML: 755 INJECTION, SOLUTION INTRAVENOUS at 12:15

## 2023-12-14 ENCOUNTER — CLINICAL DOCUMENTATION (OUTPATIENT)
Facility: HOSPITAL | Age: 88
End: 2023-12-14

## 2023-12-14 NOTE — PROGRESS NOTES
Patient Assistance    Met with: Kacey Fernandez    Navigator Type: Infusion  Documentation Type: Assistance Review  Contact Type: Telephone  Navigation Status: New Patient  Status of Patient Insurance Coverage: Patient has active coverage          Additional notes: Introduced myself to Kacey Fernandez today. I went over her insurance benefits for her chemotherapy treatments and Kacey Fernandez informed me that her insurance will remain the same for 2024. I offered to help her apply for free Keytruda supplied by [employer] and she was agreeable. I verified her income. I also informed her that there was foundations that open periodically at the beginning of the year that can help her pay for her out of pocket costs for her [address] and would help her reach her out of pocket quicker. She was agreeable with me applying on her behalf. I will call her once I receive an approval from [employer] or I have her approved for a foundation.     Drug Name: [address]  Form of PAP Assistance: Free Drug - Pending

## 2023-12-19 ENCOUNTER — HOSPITAL ENCOUNTER (OUTPATIENT)
Dept: INFUSION THERAPY | Age: 88
Discharge: HOME | End: 2023-12-19
Payer: MEDICARE

## 2023-12-19 DIAGNOSIS — E03.9 ACQUIRED HYPOTHYROIDISM: ICD-10-CM

## 2023-12-19 DIAGNOSIS — C17.2 ADENOCARCINOMA OF ILEUM (HCC): ICD-10-CM

## 2023-12-19 LAB
ALBUMIN SERPL-MCNC: 3.7 GM/DL (ref 3.4–5)
ALP BLD-CCNC: 63 IU/L (ref 40–128)
ALT SERPL-CCNC: 62 U/L (ref 10–40)
ANION GAP SERPL CALCULATED.3IONS-SCNC: 12 MMOL/L (ref 4–16)
AST SERPL-CCNC: 55 IU/L (ref 15–37)
BASOPHILS ABSOLUTE: 0 K/CU MM
BASOPHILS RELATIVE PERCENT: 0.5 % (ref 0–1)
BILIRUB SERPL-MCNC: 0.2 MG/DL (ref 0–1)
BUN SERPL-MCNC: 19 MG/DL (ref 6–23)
CALCIUM SERPL-MCNC: 9.4 MG/DL (ref 8.3–10.6)
CEA: 5.2 NG/ML (ref 0–5)
CHLORIDE BLD-SCNC: 100 MMOL/L (ref 99–110)
CO2: 24 MMOL/L (ref 21–32)
CREAT SERPL-MCNC: 0.8 MG/DL (ref 0.6–1.1)
DIFFERENTIAL TYPE: ABNORMAL
EOSINOPHILS ABSOLUTE: 0.2 K/CU MM
EOSINOPHILS RELATIVE PERCENT: 3.1 % (ref 0–3)
GFR SERPL CREATININE-BSD FRML MDRD: >60 ML/MIN/1.73M2
GLUCOSE SERPL-MCNC: 98 MG/DL (ref 70–99)
HCT VFR BLD CALC: 40.4 % (ref 37–47)
HEMOGLOBIN: 13.6 GM/DL (ref 12.5–16)
LYMPHOCYTES ABSOLUTE: 1.2 K/CU MM
LYMPHOCYTES RELATIVE PERCENT: 19.5 % (ref 24–44)
MCH RBC QN AUTO: 31.1 PG (ref 27–31)
MCHC RBC AUTO-ENTMCNC: 33.7 % (ref 32–36)
MCV RBC AUTO: 92.4 FL (ref 78–100)
MONOCYTES ABSOLUTE: 0.5 K/CU MM
MONOCYTES RELATIVE PERCENT: 7.6 % (ref 0–4)
PDW BLD-RTO: 14 % (ref 11.7–14.9)
PLATELET # BLD: 138 K/CU MM (ref 140–440)
PMV BLD AUTO: 9.3 FL (ref 7.5–11.1)
POTASSIUM SERPL-SCNC: 4.2 MMOL/L (ref 3.5–5.1)
RBC # BLD: 4.37 M/CU MM (ref 4.2–5.4)
SEGMENTED NEUTROPHILS ABSOLUTE COUNT: 4.2 K/CU MM
SEGMENTED NEUTROPHILS RELATIVE PERCENT: 69.3 % (ref 36–66)
SODIUM BLD-SCNC: 136 MMOL/L (ref 135–145)
TOTAL PROTEIN: 6.1 GM/DL (ref 6.4–8.2)
TSH SERPL DL<=0.005 MIU/L-ACNC: 2.75 UIU/ML (ref 0.27–4.2)
WBC # BLD: 6.1 K/CU MM (ref 4–10.5)

## 2023-12-19 PROCEDURE — 84443 ASSAY THYROID STIM HORMONE: CPT

## 2023-12-19 PROCEDURE — 82378 CARCINOEMBRYONIC ANTIGEN: CPT

## 2023-12-19 PROCEDURE — 80053 COMPREHEN METABOLIC PANEL: CPT

## 2023-12-19 PROCEDURE — 36415 COLL VENOUS BLD VENIPUNCTURE: CPT

## 2023-12-19 PROCEDURE — 85025 COMPLETE CBC W/AUTO DIFF WBC: CPT

## 2023-12-21 ENCOUNTER — HOSPITAL ENCOUNTER (OUTPATIENT)
Dept: INFUSION THERAPY | Age: 88
Discharge: HOME | End: 2023-12-21
Payer: MEDICARE

## 2023-12-21 VITALS
HEIGHT: 60 IN | SYSTOLIC BLOOD PRESSURE: 141 MMHG | WEIGHT: 136.2 LBS | TEMPERATURE: 97.2 F | BODY MASS INDEX: 26.74 KG/M2 | DIASTOLIC BLOOD PRESSURE: 62 MMHG | HEART RATE: 74 BPM | OXYGEN SATURATION: 95 %

## 2023-12-21 DIAGNOSIS — E03.9 ACQUIRED HYPOTHYROIDISM: ICD-10-CM

## 2023-12-21 DIAGNOSIS — C17.2 ADENOCARCINOMA OF ILEUM (HCC): Primary | ICD-10-CM

## 2023-12-21 PROCEDURE — 2580000003 HC RX 258: Performed by: INTERNAL MEDICINE

## 2023-12-21 PROCEDURE — 6360000002 HC RX W HCPCS: Performed by: INTERNAL MEDICINE

## 2023-12-21 PROCEDURE — 96413 CHEMO IV INFUSION 1 HR: CPT

## 2023-12-21 RX ORDER — SODIUM CHLORIDE 0.9 % (FLUSH) 0.9 %
5-40 SYRINGE (ML) INJECTION PRN
Status: DISCONTINUED | OUTPATIENT
Start: 2023-12-21 | End: 2023-12-22 | Stop reason: HOSPADM

## 2023-12-21 RX ADMIN — SODIUM CHLORIDE 200 MG: 9 INJECTION, SOLUTION INTRAVENOUS at 11:44

## 2023-12-21 NOTE — PROGRESS NOTES
Patient arrived to treatment suite with spouse for blood draw, pre-medications and chemotherapy infusion. PIV started in right arm, good blood return noted. Patient states joint pain, but no questions or concerns for the doctor at this time. Treatment approved and given. Patient tolerated well. Left treatment suite ambulatory. Discharge instructions provided. Patient's status assessed and documented appropriately. All labs and required results were also reviewed today. Treatment parameters have been reviewed. Today's treatment has been approved by the provider. Treatment orders and medication sequencing (when applicable) was verified by 2 registered nurses. The treatment plan was confirmed with the patient prior to administration, and the patient understands the need to report any treatment-related symptoms. Prior to administration, when applicable, the following 8 elements of medication administration were reviewed with 2nd Registered Nurse prior to dosing: drug name, drug dose, infusion volume when prepared in a syringe, rate of administration, expiration dates and/or times, appearance and integrity of drug(s), and rate of pump for infusion. The 5 rights of medication administration have been verified.

## 2024-01-03 ENCOUNTER — CLINICAL DOCUMENTATION (OUTPATIENT)
Facility: HOSPITAL | Age: 89
End: 2024-01-03

## 2024-01-03 NOTE — PROGRESS NOTES
Patient Assistance      Navigator Type: Infusion  Documentation Type: Assistance Review  Contact Type: No Contact  Navigation Status: Follow-up  Status of Patient Insurance Coverage: Patient has active coverage          Additional notes: The patient has been approved for free Keytruda supplied by [employer] until 12/31/24    Drug Name: Keytruda  Form of PAP Assistance: Free Drug

## 2024-01-09 ENCOUNTER — HOSPITAL ENCOUNTER (OUTPATIENT)
Dept: INFUSION THERAPY | Age: 89
Discharge: HOME | End: 2024-01-09
Payer: MEDICARE

## 2024-01-09 DIAGNOSIS — C18.2 MALIGNANT NEOPLASM OF ASCENDING COLON (HCC): ICD-10-CM

## 2024-01-09 DIAGNOSIS — E03.9 ACQUIRED HYPOTHYROIDISM: ICD-10-CM

## 2024-01-09 DIAGNOSIS — C17.2 ADENOCARCINOMA OF ILEUM (HCC): ICD-10-CM

## 2024-01-09 DIAGNOSIS — E03.9 ACQUIRED HYPOTHYROIDISM: Primary | ICD-10-CM

## 2024-01-09 LAB
ALBUMIN SERPL-MCNC: 3.9 GM/DL (ref 3.4–5)
ALP BLD-CCNC: 57 IU/L (ref 40–128)
ALT SERPL-CCNC: 41 U/L (ref 10–40)
ANION GAP SERPL CALCULATED.3IONS-SCNC: 9 MMOL/L (ref 7–16)
AST SERPL-CCNC: 40 IU/L (ref 15–37)
BASOPHILS ABSOLUTE: 0.1 K/CU MM
BASOPHILS RELATIVE PERCENT: 0.9 % (ref 0–1)
BILIRUB SERPL-MCNC: 0.3 MG/DL (ref 0–1)
BUN SERPL-MCNC: 28 MG/DL (ref 6–23)
CALCIUM SERPL-MCNC: 9.3 MG/DL (ref 8.3–10.6)
CHLORIDE BLD-SCNC: 104 MMOL/L (ref 99–110)
CO2: 25 MMOL/L (ref 21–32)
CREAT SERPL-MCNC: 1.2 MG/DL (ref 0.6–1.1)
DIFFERENTIAL TYPE: ABNORMAL
EOSINOPHILS ABSOLUTE: 0.3 K/CU MM
EOSINOPHILS RELATIVE PERCENT: 4.3 % (ref 0–3)
GFR SERPL CREATININE-BSD FRML MDRD: 43 ML/MIN/1.73M2
GLUCOSE SERPL-MCNC: 120 MG/DL (ref 70–99)
HCT VFR BLD CALC: 41.6 % (ref 37–47)
HEMOGLOBIN: 13 GM/DL (ref 12.5–16)
IMMATURE NEUTROPHIL %: 0.2 % (ref 0–0.43)
LYMPHOCYTES ABSOLUTE: 1.4 K/CU MM
LYMPHOCYTES RELATIVE PERCENT: 23.5 % (ref 24–44)
MAGNESIUM: 1.5 MG/DL (ref 1.8–2.4)
MCH RBC QN AUTO: 30.8 PG (ref 27–31)
MCHC RBC AUTO-ENTMCNC: 31.3 % (ref 32–36)
MCV RBC AUTO: 98.6 FL (ref 78–100)
MONOCYTES ABSOLUTE: 0.5 K/CU MM
MONOCYTES RELATIVE PERCENT: 7.9 % (ref 0–4)
NUCLEATED RBC %: 0 %
PDW BLD-RTO: 13.4 % (ref 11.7–14.9)
PHOSPHORUS: 3.9 MG/DL (ref 2.5–4.9)
PLATELET # BLD: 173 K/CU MM (ref 140–440)
PMV BLD AUTO: 9.8 FL (ref 7.5–11.1)
POTASSIUM SERPL-SCNC: 4.5 MMOL/L (ref 3.5–5.1)
RBC # BLD: 4.22 M/CU MM (ref 4.2–5.4)
SEGMENTED NEUTROPHILS ABSOLUTE COUNT: 3.7 K/CU MM
SEGMENTED NEUTROPHILS RELATIVE PERCENT: 63.2 % (ref 36–66)
SODIUM BLD-SCNC: 138 MMOL/L (ref 135–145)
TOTAL IMMATURE NEUTOROPHIL: 0.01 K/CU MM
TOTAL NUCLEATED RBC: 0 K/CU MM
TOTAL PROTEIN: 6 GM/DL (ref 6.4–8.2)
TOTAL RETICULOCYTE COUNT: 0.08 K/CU MM
WBC # BLD: 5.8 K/CU MM (ref 4–10.5)

## 2024-01-09 PROCEDURE — 85025 COMPLETE CBC W/AUTO DIFF WBC: CPT

## 2024-01-09 PROCEDURE — 36415 COLL VENOUS BLD VENIPUNCTURE: CPT

## 2024-01-09 PROCEDURE — 80053 COMPREHEN METABOLIC PANEL: CPT

## 2024-01-09 PROCEDURE — 84100 ASSAY OF PHOSPHORUS: CPT

## 2024-01-09 PROCEDURE — 83735 ASSAY OF MAGNESIUM: CPT

## 2024-01-10 NOTE — PROGRESS NOTES
Please see the attached refill request.   Pt returned from UGI with SBFT and reports she began vomited in radiology and upon returning to room vomited 500 ml of chunky greenish brown liquid. PS to Dr. Will Sanderson to alert her and she responded: She hasn't been eating much anyways, so let's wait and see what the UGI shows. Hold off on an NG tube for now. Pt was medicated with zofran IV and her vomiting subsided. Pt shared with this nurse and her family she is not interested in another NGT at all. Support provided to patient,  and daughter at bedside.

## 2024-01-11 ENCOUNTER — OFFICE VISIT (OUTPATIENT)
Dept: ONCOLOGY | Age: 89
End: 2024-01-11
Payer: MEDICARE

## 2024-01-11 ENCOUNTER — HOSPITAL ENCOUNTER (OUTPATIENT)
Dept: INFUSION THERAPY | Age: 89
Discharge: HOME | End: 2024-01-11
Payer: MEDICARE

## 2024-01-11 VITALS
HEART RATE: 86 BPM | HEIGHT: 60 IN | BODY MASS INDEX: 27.25 KG/M2 | WEIGHT: 138.8 LBS | DIASTOLIC BLOOD PRESSURE: 65 MMHG | TEMPERATURE: 97.8 F | OXYGEN SATURATION: 96 % | SYSTOLIC BLOOD PRESSURE: 141 MMHG

## 2024-01-11 DIAGNOSIS — E03.9 ACQUIRED HYPOTHYROIDISM: Primary | ICD-10-CM

## 2024-01-11 DIAGNOSIS — C17.2 ADENOCARCINOMA OF ILEUM (HCC): Primary | ICD-10-CM

## 2024-01-11 DIAGNOSIS — C17.2 ADENOCARCINOMA OF ILEUM (HCC): ICD-10-CM

## 2024-01-11 DIAGNOSIS — K52.1 CHEMOTHERAPY INDUCED DIARRHEA: ICD-10-CM

## 2024-01-11 DIAGNOSIS — E03.9 ACQUIRED HYPOTHYROIDISM: ICD-10-CM

## 2024-01-11 DIAGNOSIS — T45.1X5A CHEMOTHERAPY INDUCED DIARRHEA: ICD-10-CM

## 2024-01-11 PROCEDURE — 99214 OFFICE O/P EST MOD 30 MIN: CPT | Performed by: INTERNAL MEDICINE

## 2024-01-11 PROCEDURE — 2580000003 HC RX 258: Performed by: INTERNAL MEDICINE

## 2024-01-11 PROCEDURE — 96413 CHEMO IV INFUSION 1 HR: CPT

## 2024-01-11 PROCEDURE — 6360000002 HC RX W HCPCS: Performed by: INTERNAL MEDICINE

## 2024-01-11 PROCEDURE — 1124F ACP DISCUSS-NO DSCNMKR DOCD: CPT | Performed by: INTERNAL MEDICINE

## 2024-01-11 PROCEDURE — 96361 HYDRATE IV INFUSION ADD-ON: CPT

## 2024-01-11 RX ORDER — ONDANSETRON 2 MG/ML
8 INJECTION INTRAMUSCULAR; INTRAVENOUS
OUTPATIENT
Start: 2024-02-01

## 2024-01-11 RX ORDER — SODIUM CHLORIDE 9 MG/ML
INJECTION, SOLUTION INTRAVENOUS CONTINUOUS
OUTPATIENT
Start: 2024-02-01

## 2024-01-11 RX ORDER — FAMOTIDINE 10 MG/ML
20 INJECTION, SOLUTION INTRAVENOUS
OUTPATIENT
Start: 2024-02-01

## 2024-01-11 RX ORDER — SODIUM CHLORIDE 0.9 % (FLUSH) 0.9 %
5-40 SYRINGE (ML) INJECTION PRN
OUTPATIENT
Start: 2024-02-22

## 2024-01-11 RX ORDER — ONDANSETRON 2 MG/ML
8 INJECTION INTRAMUSCULAR; INTRAVENOUS
Status: CANCELLED | OUTPATIENT
Start: 2024-01-11

## 2024-01-11 RX ORDER — SODIUM CHLORIDE 0.9 % (FLUSH) 0.9 %
5-40 SYRINGE (ML) INJECTION PRN
OUTPATIENT
Start: 2024-02-01

## 2024-01-11 RX ORDER — 0.9 % SODIUM CHLORIDE 0.9 %
1000 INTRAVENOUS SOLUTION INTRAVENOUS ONCE
Status: COMPLETED | OUTPATIENT
Start: 2024-01-11 | End: 2024-01-11

## 2024-01-11 RX ORDER — HEPARIN SODIUM (PORCINE) LOCK FLUSH IV SOLN 100 UNIT/ML 100 UNIT/ML
500 SOLUTION INTRAVENOUS PRN
OUTPATIENT
Start: 2024-02-22

## 2024-01-11 RX ORDER — ALBUTEROL SULFATE 90 UG/1
4 AEROSOL, METERED RESPIRATORY (INHALATION) PRN
Status: CANCELLED | OUTPATIENT
Start: 2024-01-11

## 2024-01-11 RX ORDER — ACETAMINOPHEN 325 MG/1
650 TABLET ORAL
OUTPATIENT
Start: 2024-02-01

## 2024-01-11 RX ORDER — SODIUM CHLORIDE 9 MG/ML
5-250 INJECTION, SOLUTION INTRAVENOUS PRN
OUTPATIENT
Start: 2024-03-14

## 2024-01-11 RX ORDER — ACETAMINOPHEN 325 MG/1
650 TABLET ORAL
OUTPATIENT
Start: 2024-03-14

## 2024-01-11 RX ORDER — HEPARIN SODIUM (PORCINE) LOCK FLUSH IV SOLN 100 UNIT/ML 100 UNIT/ML
500 SOLUTION INTRAVENOUS PRN
Status: CANCELLED | OUTPATIENT
Start: 2024-01-11

## 2024-01-11 RX ORDER — EPINEPHRINE 1 MG/ML
0.3 INJECTION, SOLUTION, CONCENTRATE INTRAVENOUS PRN
Status: CANCELLED | OUTPATIENT
Start: 2024-01-11

## 2024-01-11 RX ORDER — SODIUM CHLORIDE 9 MG/ML
INJECTION, SOLUTION INTRAVENOUS CONTINUOUS
OUTPATIENT
Start: 2024-03-14

## 2024-01-11 RX ORDER — HEPARIN SODIUM (PORCINE) LOCK FLUSH IV SOLN 100 UNIT/ML 100 UNIT/ML
500 SOLUTION INTRAVENOUS PRN
OUTPATIENT
Start: 2024-03-14

## 2024-01-11 RX ORDER — EPINEPHRINE 1 MG/ML
0.3 INJECTION, SOLUTION, CONCENTRATE INTRAVENOUS PRN
OUTPATIENT
Start: 2024-02-01

## 2024-01-11 RX ORDER — HEPARIN SODIUM (PORCINE) LOCK FLUSH IV SOLN 100 UNIT/ML 100 UNIT/ML
500 SOLUTION INTRAVENOUS PRN
OUTPATIENT
Start: 2024-02-01

## 2024-01-11 RX ORDER — ONDANSETRON 2 MG/ML
8 INJECTION INTRAMUSCULAR; INTRAVENOUS
OUTPATIENT
Start: 2024-03-14

## 2024-01-11 RX ORDER — MAGNESIUM OXIDE 400 MG/1
400 TABLET ORAL DAILY
Qty: 30 TABLET | Refills: 1 | Status: SHIPPED | OUTPATIENT
Start: 2024-01-11 | End: 2024-02-10

## 2024-01-11 RX ORDER — ALBUTEROL SULFATE 90 UG/1
4 AEROSOL, METERED RESPIRATORY (INHALATION) PRN
OUTPATIENT
Start: 2024-02-01

## 2024-01-11 RX ORDER — EPINEPHRINE 1 MG/ML
0.3 INJECTION, SOLUTION, CONCENTRATE INTRAVENOUS PRN
OUTPATIENT
Start: 2024-02-22

## 2024-01-11 RX ORDER — MEPERIDINE HYDROCHLORIDE 50 MG/ML
12.5 INJECTION INTRAMUSCULAR; INTRAVENOUS; SUBCUTANEOUS PRN
OUTPATIENT
Start: 2024-02-22

## 2024-01-11 RX ORDER — SODIUM CHLORIDE 9 MG/ML
5-250 INJECTION, SOLUTION INTRAVENOUS PRN
OUTPATIENT
Start: 2024-02-22

## 2024-01-11 RX ORDER — SODIUM CHLORIDE 9 MG/ML
5-250 INJECTION, SOLUTION INTRAVENOUS PRN
Status: CANCELLED | OUTPATIENT
Start: 2024-01-11

## 2024-01-11 RX ORDER — SODIUM CHLORIDE 9 MG/ML
5-250 INJECTION, SOLUTION INTRAVENOUS PRN
OUTPATIENT
Start: 2024-02-01

## 2024-01-11 RX ORDER — SODIUM CHLORIDE 0.9 % (FLUSH) 0.9 %
5-40 SYRINGE (ML) INJECTION PRN
OUTPATIENT
Start: 2024-03-14

## 2024-01-11 RX ORDER — FAMOTIDINE 10 MG/ML
20 INJECTION, SOLUTION INTRAVENOUS
OUTPATIENT
Start: 2024-03-14

## 2024-01-11 RX ORDER — ONDANSETRON 2 MG/ML
8 INJECTION INTRAMUSCULAR; INTRAVENOUS
OUTPATIENT
Start: 2024-02-22

## 2024-01-11 RX ORDER — MEPERIDINE HYDROCHLORIDE 50 MG/ML
12.5 INJECTION INTRAMUSCULAR; INTRAVENOUS; SUBCUTANEOUS PRN
OUTPATIENT
Start: 2024-02-01

## 2024-01-11 RX ORDER — MEPERIDINE HYDROCHLORIDE 50 MG/ML
12.5 INJECTION INTRAMUSCULAR; INTRAVENOUS; SUBCUTANEOUS PRN
Status: CANCELLED | OUTPATIENT
Start: 2024-01-11

## 2024-01-11 RX ORDER — MEPERIDINE HYDROCHLORIDE 50 MG/ML
12.5 INJECTION INTRAMUSCULAR; INTRAVENOUS; SUBCUTANEOUS PRN
OUTPATIENT
Start: 2024-03-14

## 2024-01-11 RX ORDER — ACETAMINOPHEN 325 MG/1
650 TABLET ORAL
OUTPATIENT
Start: 2024-02-22

## 2024-01-11 RX ORDER — ALBUTEROL SULFATE 90 UG/1
4 AEROSOL, METERED RESPIRATORY (INHALATION) PRN
OUTPATIENT
Start: 2024-02-22

## 2024-01-11 RX ORDER — ACETAMINOPHEN 325 MG/1
650 TABLET ORAL
Status: CANCELLED | OUTPATIENT
Start: 2024-01-11

## 2024-01-11 RX ORDER — EPINEPHRINE 1 MG/ML
0.3 INJECTION, SOLUTION, CONCENTRATE INTRAVENOUS PRN
OUTPATIENT
Start: 2024-03-14

## 2024-01-11 RX ORDER — FAMOTIDINE 10 MG/ML
20 INJECTION, SOLUTION INTRAVENOUS
Status: CANCELLED | OUTPATIENT
Start: 2024-01-11

## 2024-01-11 RX ORDER — DIPHENHYDRAMINE HYDROCHLORIDE 50 MG/ML
50 INJECTION INTRAMUSCULAR; INTRAVENOUS
OUTPATIENT
Start: 2024-02-01

## 2024-01-11 RX ORDER — FAMOTIDINE 10 MG/ML
20 INJECTION, SOLUTION INTRAVENOUS
OUTPATIENT
Start: 2024-02-22

## 2024-01-11 RX ORDER — DIPHENHYDRAMINE HYDROCHLORIDE 50 MG/ML
50 INJECTION INTRAMUSCULAR; INTRAVENOUS
OUTPATIENT
Start: 2024-02-22

## 2024-01-11 RX ORDER — SODIUM CHLORIDE 9 MG/ML
INJECTION, SOLUTION INTRAVENOUS CONTINUOUS
Status: CANCELLED | OUTPATIENT
Start: 2024-01-11

## 2024-01-11 RX ORDER — DIPHENHYDRAMINE HYDROCHLORIDE 50 MG/ML
50 INJECTION INTRAMUSCULAR; INTRAVENOUS
OUTPATIENT
Start: 2024-03-14

## 2024-01-11 RX ORDER — DIPHENHYDRAMINE HYDROCHLORIDE 50 MG/ML
50 INJECTION INTRAMUSCULAR; INTRAVENOUS
Status: CANCELLED | OUTPATIENT
Start: 2024-01-11

## 2024-01-11 RX ORDER — ALBUTEROL SULFATE 90 UG/1
4 AEROSOL, METERED RESPIRATORY (INHALATION) PRN
OUTPATIENT
Start: 2024-03-14

## 2024-01-11 RX ORDER — SODIUM CHLORIDE 0.9 % (FLUSH) 0.9 %
5-40 SYRINGE (ML) INJECTION PRN
Status: CANCELLED | OUTPATIENT
Start: 2024-01-11

## 2024-01-11 RX ORDER — SODIUM CHLORIDE 9 MG/ML
INJECTION, SOLUTION INTRAVENOUS CONTINUOUS
OUTPATIENT
Start: 2024-02-22

## 2024-01-11 RX ADMIN — SODIUM CHLORIDE 1000 ML: 9 INJECTION, SOLUTION INTRAVENOUS at 12:08

## 2024-01-11 RX ADMIN — SODIUM CHLORIDE 200 MG: 9 INJECTION, SOLUTION INTRAVENOUS at 12:32

## 2024-01-11 NOTE — PROGRESS NOTES
MA Rooming Questions  Patient: Rylie Monaco  MRN: [id number]    Date: 1/11/2024        1. Do you have any new issues?   no         2. Do you need any refills on medications?    no    3. Have you had any imaging done since your last visit?   yes - labs here     4. Have you been hospitalized or seen in the emergency room since your last visit here?   no    5. Did the patient have a depression screening completed today? No    No data recorded     PHQ-9 Given to (if applicable):               PHQ-9 Score (if applicable):                     [] Positive     []  Negative              Does question #9 need addressed (if applicable)                     [] Yes    []  No               Cristal Walter CMA      
line therapy with keytruda. It was started since 4/6/23.     Reviewed with her findings on CT scan done on 12/13/23. There is no sign of metastatic disease noted on scans. I recommend her to have repeat scans in 4 - 6 months.     She is here for close monitoring of toxicity and side effects of chemotherapy. She is s/p 13th cycle of chemo and she is tolerating it well. She doesn't encounter any major side effects from chemotherapy. I will give her 14th cycle of chemo today.     Elevated serum creatinine - 1.2 on 1/9/24. Will give extra fluid today and asked her to hydrate well. If continues to go up higher, will need to hold immunotherapy.    Low magnesium - will start oral magnesium supplement.     Her TSH was normal on 12/19/23 and will continue to monitor TSH every 6 weekly.     Malignancy related pain - on tramadol prn. She is not needing pain medications for a while and she does well.      Chemo induced skin rashes - she was seen by dermatologist and they have been managing her rash. Dermatologist also put her on claritin 3 times per day    She does not have any other significant symptoms at today's visit.    Past Medical History:     Significant for  1.  Hypertension  2.  Hyperlipidemia  3.  Diabetes mellitus  4.  Hypothyroidism  5.  Obstructive sleep apnea  6.  Coronary artery disease    Past Surgery History:    Significant for  1.  Cholecystectomy  2.  Dilatation and curettage  3.  Cataract surgery  4.  Right breast biopsy [benign]  5.  Right hemicolectomy    Social History:   She denies smoking or illicit drug abuse.  She socially drinks alcohol.    Family History:    Significant for breast cancer in one of her sister.    Allergies   Allergen Reactions    Lopid [Gemfibrozil] Shortness Of Breath and Other (See Comments)     Cough    Amoxicillin     Bystolic [Nebivolol Hcl]     Cefdinir     Coreg [Carvedilol]     Crestor [Rosuvastatin]     Fenofibrate     Glucophage [Metformin]     Hydrochlorothiazide Other

## 2024-01-11 NOTE — PROGRESS NOTES
Ambulated to infusion area after OV with Dr. Cueva, here today for chemotherapy. No concerns at this time. PIV placed in right forearm by PATRICIA Islas, positive blood return noted. Labs reviewed, Creat 1.2, Magnesium 1.5. Per Dr. Cueva, to give 1L with treatment today. Prescription for magnsium already sent.  Chemo and 1L bolus administered as ordered. Call light within reach. Tolerated infusion without incident.  Discharge instructions provided. RTC 01/30 for labs.    Status appropriately assessed and documented. All required labs and results reviewed. Treatment approved by provider. Treatment orders and medications verified by 2 Registered Nurses where applicable. Treatment plan was confirmed with patient prior to administration, and educated the need to report any treatment-related symptoms      Prior to administration, when applicable, the following 8 elements of medication administration were reviewed with 2nd Registered Nurse prior to dosing: drug name, drug dose, infusion volume when prepared in a syringe, rate of administration, expiration dates and/or times, appearance and integrity of drug(s), and rate of pump for infusion.  The 5 rights of medication administration have been verified.

## 2024-01-15 ENCOUNTER — TELEPHONE (OUTPATIENT)
Dept: ONCOLOGY | Age: 89
End: 2024-01-15

## 2024-01-15 NOTE — TELEPHONE ENCOUNTER
Patient would like to know if it is okay to get a joint injection in  her thumb while so is on chemo treatment.

## 2024-01-16 NOTE — TELEPHONE ENCOUNTER
Called patient @  [phone] and advised that physician agreeable for her to have injection any time to thumb joint. Patient voices understanding. No further needs addressed at this time.

## 2024-01-22 ENCOUNTER — CLINICAL DOCUMENTATION (OUTPATIENT)
Facility: HOSPITAL | Age: 89
End: 2024-01-22

## 2024-01-22 NOTE — PROGRESS NOTES
Patient Assistance    Met with: Rylie Alba Type: Infusion  Documentation Type: Assistance Review  Contact Type: Telephone  Navigation Status: Follow-up  Status of Patient Insurance Coverage: Patient has active coverage          Additional notes: The patient has been approved for foundation assistance through [employer] for $6000 to pay her cost of her Keytruda. She will no longer need free drug.     Drug Name: Keytruda  Form of PAP Assistance: Foundation Assistance

## 2024-01-28 NOTE — PROGRESS NOTES
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      She has been treated recently for abdominal pain and suspected diverticulitis, but was not improving after getting antibiotics which prompted her to return to the ED. She denies any personal or family history of colon cancer. Her sister had breast cancer in her 40's. She has had colonoscopies in the past which were normal, but thinks her last one was more than 10 years ago. She denies any previous problems with ovarian cysts or adnexal lesions.       She underwent colonoscopy on 22 and it showed large mass extending from the ileocecal valve into the cecum mass originating at ileocecal valve appears to be more tubulovillous, mass in the cecum appears to be more firm fixed ulcerated consistent with malignancy. Biopsies were obtained. Mild to moderate sigmoid diverticulosis and grade 2 hemorrhoids and incidental lipoma

## 2024-01-30 ENCOUNTER — HOSPITAL ENCOUNTER (OUTPATIENT)
Dept: INFUSION THERAPY | Age: 89
Discharge: HOME | End: 2024-01-30
Payer: MEDICARE

## 2024-01-30 DIAGNOSIS — E03.9 ACQUIRED HYPOTHYROIDISM: ICD-10-CM

## 2024-01-30 DIAGNOSIS — C17.2 ADENOCARCINOMA OF ILEUM (HCC): ICD-10-CM

## 2024-01-30 LAB
ALBUMIN SERPL-MCNC: 4 GM/DL (ref 3.4–5)
ALP BLD-CCNC: 52 IU/L (ref 40–128)
ALT SERPL-CCNC: 25 U/L (ref 10–40)
ANION GAP SERPL CALCULATED.3IONS-SCNC: 9 MMOL/L (ref 7–16)
AST SERPL-CCNC: 25 IU/L (ref 15–37)
BASOPHILS ABSOLUTE: 0 K/CU MM
BASOPHILS RELATIVE PERCENT: 0.4 % (ref 0–1)
BILIRUB SERPL-MCNC: 0.4 MG/DL (ref 0–1)
BUN SERPL-MCNC: 15 MG/DL (ref 6–23)
CALCIUM SERPL-MCNC: 9.2 MG/DL (ref 8.3–10.6)
CHLORIDE BLD-SCNC: 100 MMOL/L (ref 99–110)
CO2: 27 MMOL/L (ref 21–32)
CREAT SERPL-MCNC: 0.9 MG/DL (ref 0.6–1.1)
DIFFERENTIAL TYPE: ABNORMAL
EOSINOPHILS ABSOLUTE: 0.2 K/CU MM
EOSINOPHILS RELATIVE PERCENT: 1.9 % (ref 0–3)
GFR SERPL CREATININE-BSD FRML MDRD: >60 ML/MIN/1.73M2
GLUCOSE SERPL-MCNC: 103 MG/DL (ref 70–99)
HCT VFR BLD CALC: 40.2 % (ref 37–47)
HEMOGLOBIN: 13.1 GM/DL (ref 12.5–16)
LYMPHOCYTES ABSOLUTE: 1.5 K/CU MM
LYMPHOCYTES RELATIVE PERCENT: 18 % (ref 24–44)
MCH RBC QN AUTO: 31.4 PG (ref 27–31)
MCHC RBC AUTO-ENTMCNC: 32.6 % (ref 32–36)
MCV RBC AUTO: 96.4 FL (ref 78–100)
MONOCYTES ABSOLUTE: 0.6 K/CU MM
MONOCYTES RELATIVE PERCENT: 7.8 % (ref 0–4)
PDW BLD-RTO: 13.3 % (ref 11.7–14.9)
PLATELET # BLD: 134 K/CU MM (ref 140–440)
PMV BLD AUTO: 9.7 FL (ref 7.5–11.1)
POTASSIUM SERPL-SCNC: 4.5 MMOL/L (ref 3.5–5.1)
RBC # BLD: 4.17 M/CU MM (ref 4.2–5.4)
SEGMENTED NEUTROPHILS ABSOLUTE COUNT: 5.9 K/CU MM
SEGMENTED NEUTROPHILS RELATIVE PERCENT: 71.9 % (ref 36–66)
SODIUM BLD-SCNC: 136 MMOL/L (ref 135–145)
TOTAL PROTEIN: 6.2 GM/DL (ref 6.4–8.2)
TSH SERPL DL<=0.005 MIU/L-ACNC: 1.5 UIU/ML (ref 0.27–4.2)
WBC # BLD: 8.2 K/CU MM (ref 4–10.5)

## 2024-01-30 PROCEDURE — 84443 ASSAY THYROID STIM HORMONE: CPT

## 2024-01-30 PROCEDURE — 85025 COMPLETE CBC W/AUTO DIFF WBC: CPT

## 2024-01-30 PROCEDURE — 80053 COMPREHEN METABOLIC PANEL: CPT

## 2024-01-30 PROCEDURE — 36415 COLL VENOUS BLD VENIPUNCTURE: CPT

## 2024-02-01 ENCOUNTER — OFFICE VISIT (OUTPATIENT)
Dept: ONCOLOGY | Age: 89
End: 2024-02-01
Payer: MEDICARE

## 2024-02-01 ENCOUNTER — HOSPITAL ENCOUNTER (OUTPATIENT)
Dept: INFUSION THERAPY | Age: 89
Discharge: HOME | End: 2024-02-01
Payer: MEDICARE

## 2024-02-01 VITALS
SYSTOLIC BLOOD PRESSURE: 152 MMHG | RESPIRATION RATE: 16 BRPM | BODY MASS INDEX: 27.68 KG/M2 | DIASTOLIC BLOOD PRESSURE: 84 MMHG | HEART RATE: 50 BPM | OXYGEN SATURATION: 97 % | WEIGHT: 141 LBS | HEIGHT: 60 IN

## 2024-02-01 DIAGNOSIS — C17.2 ADENOCARCINOMA OF ILEUM (HCC): Primary | ICD-10-CM

## 2024-02-01 DIAGNOSIS — E03.9 ACQUIRED HYPOTHYROIDISM: ICD-10-CM

## 2024-02-01 PROCEDURE — 96413 CHEMO IV INFUSION 1 HR: CPT

## 2024-02-01 PROCEDURE — 96375 TX/PRO/DX INJ NEW DRUG ADDON: CPT

## 2024-02-01 PROCEDURE — 1124F ACP DISCUSS-NO DSCNMKR DOCD: CPT | Performed by: INTERNAL MEDICINE

## 2024-02-01 PROCEDURE — 6360000002 HC RX W HCPCS: Performed by: INTERNAL MEDICINE

## 2024-02-01 PROCEDURE — 99214 OFFICE O/P EST MOD 30 MIN: CPT | Performed by: INTERNAL MEDICINE

## 2024-02-01 PROCEDURE — 96361 HYDRATE IV INFUSION ADD-ON: CPT

## 2024-02-01 PROCEDURE — 2580000003 HC RX 258: Performed by: INTERNAL MEDICINE

## 2024-02-01 RX ORDER — DEXAMETHASONE SODIUM PHOSPHATE 4 MG/ML
4 INJECTION, SOLUTION INTRA-ARTICULAR; INTRALESIONAL; INTRAMUSCULAR; INTRAVENOUS; SOFT TISSUE ONCE
Status: COMPLETED | OUTPATIENT
Start: 2024-02-01 | End: 2024-02-01

## 2024-02-01 RX ORDER — 0.9 % SODIUM CHLORIDE 0.9 %
500-1000 INTRAVENOUS SOLUTION INTRAVENOUS ONCE
Status: COMPLETED | OUTPATIENT
Start: 2024-02-01 | End: 2024-02-01

## 2024-02-01 RX ADMIN — SODIUM CHLORIDE 1000 ML: 0.9 INJECTION, SOLUTION INTRAVENOUS at 11:42

## 2024-02-01 RX ADMIN — DEXAMETHASONE SODIUM PHOSPHATE 4 MG: 4 INJECTION, SOLUTION INTRAMUSCULAR; INTRAVENOUS at 11:42

## 2024-02-01 RX ADMIN — SODIUM CHLORIDE 200 MG: 9 INJECTION, SOLUTION INTRAVENOUS at 12:16

## 2024-02-01 NOTE — PROGRESS NOTES
MA Rooming Questions  Patient: Rylie Monaco  MRN: [id number]    Date: 2/1/2024        1. Do you have any new issues?   yes - pt states her energy level is down; more so this week          2. Do you need any refills on medications?    no    3. Have you had any imaging done since your last visit?   no    4. Have you been hospitalized or seen in the emergency room since your last visit here?   no    5. Did the patient have a depression screening completed today? No    No data recorded     PHQ-9 Given to (if applicable):               PHQ-9 Score (if applicable):                     [] Positive     []  Negative              Does question #9 need addressed (if applicable)                     [] Yes    []  No               Emma Conrad MA

## 2024-02-01 NOTE — PROGRESS NOTES
Ambulated to infusion area, here today for chemotherapy and OV with Dr. Cueva. Patient reports feeling very tired. PIV placed in left forearm, positive blood return noted. Labs reviewed, Labs within defined limits. Upon returning from OV, Dr. Cueva ordered 4mg Decadron and 1L of fluids with hydration today. Pharmacist updated.  Chemo, hydration and 4mg Decadron administered as ordered. Call light within reach. Tolerated infusion without incident.  Discharge instructions provided. RTC 02/20 for labs.    Status appropriately assessed and documented. All required labs and results reviewed. Treatment approved by provider. Treatment orders and medications verified by 2 Registered Nurses where applicable. Treatment plan was confirmed with patient prior to administration, and educated the need to report any treatment-related symptoms      Prior to administration, when applicable, the following 8 elements of medication administration were reviewed with 2nd Registered Nurse prior to dosing: drug name, drug dose, infusion volume when prepared in a syringe, rate of administration, expiration dates and/or times, appearance and integrity of drug(s), and rate of pump for infusion.  The 5 rights of medication administration have been verified.

## 2024-02-08 ENCOUNTER — HOSPITAL ENCOUNTER (OUTPATIENT)
Age: 89
Discharge: HOME | End: 2024-02-08
Payer: MEDICARE

## 2024-02-08 DIAGNOSIS — C17.2 ADENOCARCINOMA OF ILEUM (HCC): ICD-10-CM

## 2024-02-08 LAB — MAGNESIUM: 1.2 MG/DL (ref 1.8–2.4)

## 2024-02-08 PROCEDURE — 36415 COLL VENOUS BLD VENIPUNCTURE: CPT

## 2024-02-08 PROCEDURE — 83735 ASSAY OF MAGNESIUM: CPT

## 2024-02-12 ENCOUNTER — CLINICAL DOCUMENTATION (OUTPATIENT)
Dept: ONCOLOGY | Age: 89
End: 2024-02-12

## 2024-02-12 RX ORDER — MAGNESIUM OXIDE 400 MG/1
400 TABLET ORAL DAILY
Qty: 30 TABLET | Refills: 0 | Status: SHIPPED | OUTPATIENT
Start: 2024-02-12

## 2024-02-12 NOTE — PROGRESS NOTES
Lab results from 02/08/2024 reviewed. Hypomagnesemia noted (1.2). Physician recommending that patient take daily magnesium supplement. RX fo mag ox 400 mg e-scribed to the [facility] in [place]. Attempted to call patient @ [phone] to notify; however, no answer. VM left with this RN direct number should patient have any questions.

## 2024-02-19 NOTE — PROGRESS NOTES
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      She has been treated recently for abdominal pain and suspected diverticulitis, but was not improving after getting antibiotics which prompted her to return to the ED. She denies any personal or family history of colon cancer. Her sister had breast cancer in her 40's. She has had colonoscopies in the past which were normal, but thinks her last one was more than 10 years ago. She denies any previous problems with ovarian cysts or adnexal lesions.       She underwent colonoscopy on 22 and it showed large mass extending from the ileocecal valve into the cecum mass originating at ileocecal valve appears to be more tubulovillous, mass in the cecum appears to be more firm fixed ulcerated consistent with malignancy. Biopsies were obtained. Mild to moderate sigmoid diverticulosis and grade 2 hemorrhoids and incidental lipoma

## 2024-02-20 ENCOUNTER — HOSPITAL ENCOUNTER (OUTPATIENT)
Dept: INFUSION THERAPY | Age: 89
Discharge: HOME | End: 2024-02-20
Payer: MEDICARE

## 2024-02-20 DIAGNOSIS — E03.9 ACQUIRED HYPOTHYROIDISM: ICD-10-CM

## 2024-02-20 DIAGNOSIS — C17.2 ADENOCARCINOMA OF ILEUM (HCC): ICD-10-CM

## 2024-02-20 LAB
ALBUMIN SERPL-MCNC: 4.2 GM/DL (ref 3.4–5)
ALP BLD-CCNC: 58 IU/L (ref 40–129)
ALT SERPL-CCNC: 32 U/L (ref 10–40)
ANION GAP SERPL CALCULATED.3IONS-SCNC: 15 MMOL/L (ref 7–16)
AST SERPL-CCNC: 28 IU/L (ref 15–37)
BASOPHILS ABSOLUTE: 0 K/CU MM
BASOPHILS RELATIVE PERCENT: 0.3 % (ref 0–1)
BILIRUB SERPL-MCNC: 0.5 MG/DL (ref 0–1)
BUN SERPL-MCNC: 27 MG/DL (ref 6–23)
CALCIUM SERPL-MCNC: 9.2 MG/DL (ref 8.3–10.6)
CHLORIDE BLD-SCNC: 99 MMOL/L (ref 99–110)
CO2: 19 MMOL/L (ref 21–32)
CREAT SERPL-MCNC: 1 MG/DL (ref 0.6–1.1)
DIFFERENTIAL TYPE: ABNORMAL
EOSINOPHILS ABSOLUTE: 0.2 K/CU MM
EOSINOPHILS RELATIVE PERCENT: 1.6 % (ref 0–3)
GFR SERPL CREATININE-BSD FRML MDRD: 54 ML/MIN/1.73M2
GLUCOSE SERPL-MCNC: 143 MG/DL (ref 70–99)
HCT VFR BLD CALC: 44 % (ref 37–47)
HEMOGLOBIN: 14.5 GM/DL (ref 12.5–16)
LYMPHOCYTES ABSOLUTE: 1.8 K/CU MM
LYMPHOCYTES RELATIVE PERCENT: 18.5 % (ref 24–44)
MCH RBC QN AUTO: 31.4 PG (ref 27–31)
MCHC RBC AUTO-ENTMCNC: 33 % (ref 32–36)
MCV RBC AUTO: 95.2 FL (ref 78–100)
MONOCYTES ABSOLUTE: 0.7 K/CU MM
MONOCYTES RELATIVE PERCENT: 7.7 % (ref 0–4)
PDW BLD-RTO: 13.5 % (ref 11.7–14.9)
PLATELET # BLD: 161 K/CU MM (ref 140–440)
PMV BLD AUTO: 9.8 FL (ref 7.5–11.1)
POTASSIUM SERPL-SCNC: 4.3 MMOL/L (ref 3.5–5.1)
RBC # BLD: 4.62 M/CU MM (ref 4.2–5.4)
SEGMENTED NEUTROPHILS ABSOLUTE COUNT: 6.9 K/CU MM
SEGMENTED NEUTROPHILS RELATIVE PERCENT: 71.9 % (ref 36–66)
SODIUM BLD-SCNC: 133 MMOL/L (ref 135–145)
TOTAL PROTEIN: 6.8 GM/DL (ref 6.4–8.2)
WBC # BLD: 9.6 K/CU MM (ref 4–10.5)

## 2024-02-20 PROCEDURE — 36415 COLL VENOUS BLD VENIPUNCTURE: CPT

## 2024-02-20 PROCEDURE — 80053 COMPREHEN METABOLIC PANEL: CPT

## 2024-02-20 PROCEDURE — 85025 COMPLETE CBC W/AUTO DIFF WBC: CPT

## 2024-02-22 ENCOUNTER — HOSPITAL ENCOUNTER (OUTPATIENT)
Dept: INFUSION THERAPY | Age: 89
Discharge: HOME | End: 2024-02-22
Payer: MEDICARE

## 2024-02-22 ENCOUNTER — OFFICE VISIT (OUTPATIENT)
Dept: ONCOLOGY | Age: 89
End: 2024-02-22
Payer: MEDICARE

## 2024-02-22 VITALS
HEART RATE: 72 BPM | BODY MASS INDEX: 27.33 KG/M2 | DIASTOLIC BLOOD PRESSURE: 70 MMHG | WEIGHT: 139.2 LBS | SYSTOLIC BLOOD PRESSURE: 163 MMHG | OXYGEN SATURATION: 97 % | HEIGHT: 60 IN | TEMPERATURE: 97 F

## 2024-02-22 VITALS
OXYGEN SATURATION: 97 % | TEMPERATURE: 97 F | DIASTOLIC BLOOD PRESSURE: 70 MMHG | SYSTOLIC BLOOD PRESSURE: 163 MMHG | WEIGHT: 139 LBS | HEART RATE: 72 BPM | BODY MASS INDEX: 27.29 KG/M2 | RESPIRATION RATE: 16 BRPM | HEIGHT: 60 IN

## 2024-02-22 DIAGNOSIS — C17.2 ADENOCARCINOMA OF ILEUM (HCC): Primary | ICD-10-CM

## 2024-02-22 DIAGNOSIS — E03.9 ACQUIRED HYPOTHYROIDISM: ICD-10-CM

## 2024-02-22 PROCEDURE — 2580000003 HC RX 258: Performed by: INTERNAL MEDICINE

## 2024-02-22 PROCEDURE — 1124F ACP DISCUSS-NO DSCNMKR DOCD: CPT | Performed by: INTERNAL MEDICINE

## 2024-02-22 PROCEDURE — 96361 HYDRATE IV INFUSION ADD-ON: CPT

## 2024-02-22 PROCEDURE — 99214 OFFICE O/P EST MOD 30 MIN: CPT | Performed by: INTERNAL MEDICINE

## 2024-02-22 PROCEDURE — 96413 CHEMO IV INFUSION 1 HR: CPT

## 2024-02-22 PROCEDURE — 6360000002 HC RX W HCPCS: Performed by: INTERNAL MEDICINE

## 2024-02-22 RX ORDER — 0.9 % SODIUM CHLORIDE 0.9 %
1000 INTRAVENOUS SOLUTION INTRAVENOUS ONCE
Status: COMPLETED | OUTPATIENT
Start: 2024-02-22 | End: 2024-02-22

## 2024-02-22 RX ORDER — SODIUM CHLORIDE 0.9 % (FLUSH) 0.9 %
5-40 SYRINGE (ML) INJECTION PRN
Status: DISCONTINUED | OUTPATIENT
Start: 2024-02-22 | End: 2024-02-23 | Stop reason: HOSPADM

## 2024-02-22 RX ORDER — SODIUM CHLORIDE 9 MG/ML
5-250 INJECTION, SOLUTION INTRAVENOUS PRN
Status: DISCONTINUED | OUTPATIENT
Start: 2024-02-22 | End: 2024-02-23 | Stop reason: HOSPADM

## 2024-02-22 RX ADMIN — SODIUM CHLORIDE 200 MG: 9 INJECTION, SOLUTION INTRAVENOUS at 12:24

## 2024-02-22 RX ADMIN — SODIUM CHLORIDE 700 ML/HR: 9 INJECTION, SOLUTION INTRAVENOUS at 12:24

## 2024-02-22 RX ADMIN — SODIUM CHLORIDE 1000 ML: 9 INJECTION, SOLUTION INTRAVENOUS at 12:40

## 2024-02-22 ASSESSMENT — PAIN SCALES - GENERAL: PAINLEVEL_OUTOF10: 0

## 2024-02-22 NOTE — PROGRESS NOTES
MA Rooming Questions  Patient: Rylie Monaco  MRN: [id number]    Date: 2/22/2024        1. Do you have any new issues?   no         2. Do you need any refills on medications?    no    3. Have you had any imaging done since your last visit?   no    4. Have you been hospitalized or seen in the emergency room since your last visit here?   no    5. Did the patient have a depression screening completed today? No    No data recorded     PHQ-9 Given to (if applicable):               PHQ-9 Score (if applicable):                     [] Positive     []  Negative              Does question #9 need addressed (if applicable)                     [] Yes    []  No               Emma Conrad MA

## 2024-02-22 NOTE — PROGRESS NOTES
Patient arrived to treatment suite for chemotherapy infusion.  PIV started in left arm, good blood return noted.  Patient has no questions or concerns for the doctor at this time.  1 liter of NS added to today's treatment for hydration.  Treatment approved and given.  Patient tolerated well.  Left treatment suite ambulatory.  Discharge instructions provided.     Patient's status assessed and documented appropriately.  All labs and required results were also reviewed today.  Treatment parameters have been reviewed.  Today's treatment has been approved by the provider.  Treatment orders and medication sequencing (when applicable) was verified by 2 registered nurses.  The treatment plan was confirmed with the patient prior to administration, and the patient understands the need to report any treatment-related symptoms.     Prior to administration, when applicable, the following 8 elements of medication administration were reviewed with 2nd Registered Nurse prior to dosing: drug name, drug dose, infusion volume when prepared in a syringe, rate of administration, expiration dates and/or times, appearance and integrity of drug(s), and rate of pump for infusion.  The 5 rights of medication administration have been verified.

## 2024-03-11 ENCOUNTER — HOSPITAL ENCOUNTER (OUTPATIENT)
Age: 89
Discharge: HOME | End: 2024-03-11
Payer: MEDICARE

## 2024-03-11 DIAGNOSIS — C17.2 ADENOCARCINOMA OF ILEUM (HCC): ICD-10-CM

## 2024-03-11 DIAGNOSIS — E03.9 ACQUIRED HYPOTHYROIDISM: ICD-10-CM

## 2024-03-11 LAB
ALBUMIN SERPL-MCNC: 3.3 GM/DL (ref 3.4–5)
ALP BLD-CCNC: 47 IU/L (ref 40–129)
ALT SERPL-CCNC: 23 U/L (ref 10–40)
ANION GAP SERPL CALCULATED.3IONS-SCNC: 9 MMOL/L (ref 7–16)
AST SERPL-CCNC: 24 IU/L (ref 15–37)
BASOPHILS ABSOLUTE: 0 K/CU MM
BASOPHILS RELATIVE PERCENT: 0.7 % (ref 0–1)
BILIRUB SERPL-MCNC: 0.4 MG/DL (ref 0–1)
BUN SERPL-MCNC: 18 MG/DL (ref 6–23)
CALCIUM SERPL-MCNC: 8.6 MG/DL (ref 8.3–10.6)
CHLORIDE BLD-SCNC: 99 MMOL/L (ref 99–110)
CO2: 27 MMOL/L (ref 21–32)
CREAT SERPL-MCNC: 1 MG/DL (ref 0.6–1.1)
DIFFERENTIAL TYPE: ABNORMAL
EOSINOPHILS ABSOLUTE: 0.3 K/CU MM
EOSINOPHILS RELATIVE PERCENT: 4.5 % (ref 0–3)
GFR SERPL CREATININE-BSD FRML MDRD: 54 ML/MIN/1.73M2
GLUCOSE SERPL-MCNC: 102 MG/DL (ref 70–99)
HCT VFR BLD CALC: 39.7 % (ref 37–47)
HEMOGLOBIN: 13.1 GM/DL (ref 12.5–16)
IMMATURE NEUTROPHIL %: 0.4 % (ref 0–0.43)
LYMPHOCYTES ABSOLUTE: 1.3 K/CU MM
LYMPHOCYTES RELATIVE PERCENT: 22.6 % (ref 24–44)
MCH RBC QN AUTO: 31.4 PG (ref 27–31)
MCHC RBC AUTO-ENTMCNC: 33 % (ref 32–36)
MCV RBC AUTO: 95.2 FL (ref 78–100)
MONOCYTES ABSOLUTE: 0.4 K/CU MM
MONOCYTES RELATIVE PERCENT: 7.8 % (ref 0–4)
PDW BLD-RTO: 12.6 % (ref 11.7–14.9)
PLATELET # BLD: 143 K/CU MM (ref 140–440)
PMV BLD AUTO: 9.6 FL (ref 7.5–11.1)
POTASSIUM SERPL-SCNC: 5 MMOL/L (ref 3.5–5.1)
RBC # BLD: 4.17 M/CU MM (ref 4.2–5.4)
SEGMENTED NEUTROPHILS ABSOLUTE COUNT: 3.6 K/CU MM
SEGMENTED NEUTROPHILS RELATIVE PERCENT: 64 % (ref 36–66)
SODIUM BLD-SCNC: 135 MMOL/L (ref 135–145)
T4 FREE SERPL-MCNC: 1.45 NG/DL (ref 0.9–1.8)
TOTAL IMMATURE NEUTOROPHIL: 0.02 K/CU MM
TOTAL PROTEIN: 6.2 GM/DL (ref 6.4–8.2)
TSH SERPL DL<=0.005 MIU/L-ACNC: 0.21 UIU/ML (ref 0.27–4.2)
WBC # BLD: 5.6 K/CU MM (ref 4–10.5)

## 2024-03-11 PROCEDURE — 84443 ASSAY THYROID STIM HORMONE: CPT

## 2024-03-11 PROCEDURE — 84439 ASSAY OF FREE THYROXINE: CPT

## 2024-03-11 PROCEDURE — 80053 COMPREHEN METABOLIC PANEL: CPT

## 2024-03-11 PROCEDURE — 36415 COLL VENOUS BLD VENIPUNCTURE: CPT

## 2024-03-11 PROCEDURE — 85025 COMPLETE CBC W/AUTO DIFF WBC: CPT

## 2024-03-12 ENCOUNTER — HOSPITAL ENCOUNTER (OUTPATIENT)
Dept: INFUSION THERAPY | Age: 89
Discharge: HOME | End: 2024-03-12

## 2024-03-14 ENCOUNTER — OFFICE VISIT (OUTPATIENT)
Dept: ONCOLOGY | Age: 89
End: 2024-03-14

## 2024-03-14 ENCOUNTER — HOSPITAL ENCOUNTER (OUTPATIENT)
Dept: INFUSION THERAPY | Age: 89
Discharge: HOME | End: 2024-03-14
Payer: MEDICARE

## 2024-03-14 VITALS
RESPIRATION RATE: 18 BRPM | TEMPERATURE: 97.5 F | DIASTOLIC BLOOD PRESSURE: 76 MMHG | OXYGEN SATURATION: 96 % | SYSTOLIC BLOOD PRESSURE: 154 MMHG | HEART RATE: 64 BPM | WEIGHT: 142 LBS | BODY MASS INDEX: 27.73 KG/M2

## 2024-03-14 VITALS
SYSTOLIC BLOOD PRESSURE: 154 MMHG | HEIGHT: 60 IN | DIASTOLIC BLOOD PRESSURE: 76 MMHG | BODY MASS INDEX: 27.88 KG/M2 | HEART RATE: 64 BPM | OXYGEN SATURATION: 96 % | WEIGHT: 142 LBS | TEMPERATURE: 97.5 F

## 2024-03-14 DIAGNOSIS — C17.2 ADENOCARCINOMA OF ILEUM (HCC): Primary | ICD-10-CM

## 2024-03-14 DIAGNOSIS — T45.1X5A CHEMOTHERAPY INDUCED DIARRHEA: ICD-10-CM

## 2024-03-14 DIAGNOSIS — E03.9 ACQUIRED HYPOTHYROIDISM: ICD-10-CM

## 2024-03-14 DIAGNOSIS — K52.1 CHEMOTHERAPY INDUCED DIARRHEA: ICD-10-CM

## 2024-03-14 PROCEDURE — 96413 CHEMO IV INFUSION 1 HR: CPT

## 2024-03-14 PROCEDURE — 6360000002 HC RX W HCPCS: Performed by: INTERNAL MEDICINE

## 2024-03-14 PROCEDURE — 2580000003 HC RX 258: Performed by: INTERNAL MEDICINE

## 2024-03-14 RX ORDER — LEVOTHYROXINE SODIUM 88 UG/1
88 TABLET ORAL DAILY
Qty: 30 TABLET | Refills: 0 | Status: SHIPPED | OUTPATIENT
Start: 2024-03-14

## 2024-03-14 RX ORDER — SODIUM CHLORIDE 0.9 % (FLUSH) 0.9 %
5-40 SYRINGE (ML) INJECTION PRN
Status: DISCONTINUED | OUTPATIENT
Start: 2024-03-14 | End: 2024-03-15 | Stop reason: HOSPADM

## 2024-03-14 RX ORDER — MAGNESIUM OXIDE 400 MG/1
400 TABLET ORAL DAILY
Qty: 30 TABLET | Refills: 0 | Status: SHIPPED | OUTPATIENT
Start: 2024-03-14

## 2024-03-14 RX ORDER — ONDANSETRON HYDROCHLORIDE 8 MG/1
8 TABLET, FILM COATED ORAL EVERY 8 HOURS PRN
Qty: 90 TABLET | Refills: 3 | Status: SHIPPED | OUTPATIENT
Start: 2024-03-14

## 2024-03-14 RX ADMIN — SODIUM CHLORIDE 200 MG: 9 INJECTION, SOLUTION INTRAVENOUS at 12:12

## 2024-03-14 NOTE — PROGRESS NOTES
MA Rooming Questions  Patient: Rylie Monaco  MRN: [id number]    Date: 3/14/2024        1. Do you have any new issues?   no         2. Do you need any refills on medications?    Yes - magnesium & Zofran     3. Have you had any imaging done since your last visit?   no    4. Have you been hospitalized or seen in the emergency room since your last visit here?   no    5. Did the patient have a depression screening completed today? No    No data recorded     PHQ-9 Given to (if applicable):               PHQ-9 Score (if applicable):                     [] Positive     []  Negative              Does question #9 need addressed (if applicable)                     [] Yes    []  No               Rebekah Armstrong MA      
first.     I spoke with Dr. Helm and requested him to do colonoscopy soon. I let her know that we are concerned about possible malignant process there since she is also noted to have rising tumor marker.     She finally had surgery and pathology was as mentioned above. Since she has recurrent cancer and it is MSI high, I recommend her to start second line therapy with keytruda. It was started since 4/6/23.     Reviewed with her findings on CT scan done on 12/13/23. There is no sign of metastatic disease noted on scans. I recommend her to have repeat scans in 4 - 6 months.     She is here for close monitoring of toxicity and side effects of chemotherapy. She is s/p 16th cycle of chemo and she is tolerating it well. She doesn't encounter any major side effects from chemotherapy. I will give her 17th cycle of chemo today.     Dehydration - she is feeling tired and fatigue. Her tongue is dry. Will give additional IV fluid today.     Low magnesium - on magnesium supplement. Will repeat magnesium level next time.     Her TSH was 0.206 on 3/11/24. Her T4 was normal. She is on levothyroxine 100 mcg daily now. I will decrease it to 88 mcg daily starting from 3/15/24. Will continue to monitor TSH every 6 weekly.     Malignancy related pain - on tramadol prn. She is not needing pain medications for a while and she does well.      Chemo induced skin rashes - she was seen by dermatologist and they have been managing her rash. Dermatologist also put her on claritin 3 times per day.     I answered all her questions and concerns for today. Recent imaging and labs were reviewed and discussed with the patient.

## 2024-03-14 NOTE — PROGRESS NOTES
Patient arrived to treatment suite for chemotherapy infusion.  PIV started in left arm, good blood return noted.  Patient has no questions or concerns for the doctor at this time.  Treatment approved and given.  Patient tolerated well.  Left treatment suite ambulatory.  Discharge instructions provided.     Patient's status assessed and documented appropriately.  All labs and required results were also reviewed today.  Treatment parameters have been reviewed.  Today's treatment has been approved by the provider.  Treatment orders and medication sequencing (when applicable) was verified by 2 registered nurses.  The treatment plan was confirmed with the patient prior to administration, and the patient understands the need to report any treatment-related symptoms.     Prior to administration, when applicable, the following 8 elements of medication administration were reviewed with 2nd Registered Nurse prior to dosing: drug name, drug dose, infusion volume when prepared in a syringe, rate of administration, expiration dates and/or times, appearance and integrity of drug(s), and rate of pump for infusion.  The 5 rights of medication administration have been verified.

## 2024-04-02 ENCOUNTER — HOSPITAL ENCOUNTER (OUTPATIENT)
Dept: INFUSION THERAPY | Age: 89
Discharge: HOME | End: 2024-04-02
Payer: MEDICARE

## 2024-04-02 DIAGNOSIS — C17.2 ADENOCARCINOMA OF ILEUM (HCC): ICD-10-CM

## 2024-04-02 DIAGNOSIS — E03.9 ACQUIRED HYPOTHYROIDISM: Primary | ICD-10-CM

## 2024-04-02 DIAGNOSIS — E03.9 ACQUIRED HYPOTHYROIDISM: ICD-10-CM

## 2024-04-02 LAB
ALBUMIN SERPL-MCNC: 3.9 GM/DL (ref 3.4–5)
ALP BLD-CCNC: 62 IU/L (ref 40–129)
ALT SERPL-CCNC: 30 U/L (ref 10–40)
ANION GAP SERPL CALCULATED.3IONS-SCNC: 9 MMOL/L (ref 7–16)
AST SERPL-CCNC: 29 IU/L (ref 15–37)
BASOPHILS ABSOLUTE: 0 K/CU MM
BASOPHILS RELATIVE PERCENT: 0.5 % (ref 0–1)
BILIRUB SERPL-MCNC: 0.3 MG/DL (ref 0–1)
BUN SERPL-MCNC: 16 MG/DL (ref 6–23)
CALCIUM SERPL-MCNC: 9.6 MG/DL (ref 8.3–10.6)
CHLORIDE BLD-SCNC: 103 MMOL/L (ref 99–110)
CO2: 26 MMOL/L (ref 21–32)
CREAT SERPL-MCNC: 0.9 MG/DL (ref 0.6–1.1)
DIFFERENTIAL TYPE: ABNORMAL
EOSINOPHILS ABSOLUTE: 0.3 K/CU MM
EOSINOPHILS RELATIVE PERCENT: 4.4 % (ref 0–3)
GFR SERPL CREATININE-BSD FRML MDRD: 61 ML/MIN/1.73M2
GLUCOSE SERPL-MCNC: 91 MG/DL (ref 70–99)
HCT VFR BLD CALC: 41.6 % (ref 37–47)
HEMOGLOBIN: 13.8 GM/DL (ref 12.5–16)
LYMPHOCYTES ABSOLUTE: 1.2 K/CU MM
LYMPHOCYTES RELATIVE PERCENT: 18.8 % (ref 24–44)
MCH RBC QN AUTO: 31.6 PG (ref 27–31)
MCHC RBC AUTO-ENTMCNC: 33.2 % (ref 32–36)
MCV RBC AUTO: 95.2 FL (ref 78–100)
MONOCYTES ABSOLUTE: 0.5 K/CU MM
MONOCYTES RELATIVE PERCENT: 8 % (ref 0–4)
PDW BLD-RTO: 13.2 % (ref 11.7–14.9)
PLATELET # BLD: 154 K/CU MM (ref 140–440)
PMV BLD AUTO: 9.9 FL (ref 7.5–11.1)
POTASSIUM SERPL-SCNC: 4.6 MMOL/L (ref 3.5–5.1)
RBC # BLD: 4.37 M/CU MM (ref 4.2–5.4)
SEGMENTED NEUTROPHILS ABSOLUTE COUNT: 4.2 K/CU MM
SEGMENTED NEUTROPHILS RELATIVE PERCENT: 68.3 % (ref 36–66)
SODIUM BLD-SCNC: 138 MMOL/L (ref 135–145)
TOTAL PROTEIN: 6.6 GM/DL (ref 6.4–8.2)
WBC # BLD: 6.1 K/CU MM (ref 4–10.5)

## 2024-04-02 PROCEDURE — 80053 COMPREHEN METABOLIC PANEL: CPT

## 2024-04-02 PROCEDURE — 36415 COLL VENOUS BLD VENIPUNCTURE: CPT

## 2024-04-02 PROCEDURE — 85025 COMPLETE CBC W/AUTO DIFF WBC: CPT

## 2024-04-04 ENCOUNTER — HOSPITAL ENCOUNTER (OUTPATIENT)
Dept: INFUSION THERAPY | Age: 89
Discharge: HOME | End: 2024-04-04
Payer: MEDICARE

## 2024-04-04 ENCOUNTER — OFFICE VISIT (OUTPATIENT)
Dept: ONCOLOGY | Age: 89
End: 2024-04-04

## 2024-04-04 VITALS
BODY MASS INDEX: 27.45 KG/M2 | SYSTOLIC BLOOD PRESSURE: 161 MMHG | HEIGHT: 60 IN | TEMPERATURE: 97.9 F | HEART RATE: 88 BPM | WEIGHT: 139.8 LBS | OXYGEN SATURATION: 95 % | DIASTOLIC BLOOD PRESSURE: 73 MMHG

## 2024-04-04 DIAGNOSIS — C17.2 ADENOCARCINOMA OF ILEUM (HCC): Primary | ICD-10-CM

## 2024-04-04 DIAGNOSIS — E03.9 ACQUIRED HYPOTHYROIDISM: Primary | ICD-10-CM

## 2024-04-04 DIAGNOSIS — C17.2 ADENOCARCINOMA OF ILEUM (HCC): ICD-10-CM

## 2024-04-04 DIAGNOSIS — E03.9 ACQUIRED HYPOTHYROIDISM: ICD-10-CM

## 2024-04-04 PROCEDURE — 6360000002 HC RX W HCPCS: Performed by: INTERNAL MEDICINE

## 2024-04-04 PROCEDURE — 2580000003 HC RX 258: Performed by: INTERNAL MEDICINE

## 2024-04-04 PROCEDURE — 96413 CHEMO IV INFUSION 1 HR: CPT

## 2024-04-04 RX ORDER — HEPARIN SODIUM (PORCINE) LOCK FLUSH IV SOLN 100 UNIT/ML 100 UNIT/ML
500 SOLUTION INTRAVENOUS PRN
Status: CANCELLED | OUTPATIENT
Start: 2024-04-04

## 2024-04-04 RX ORDER — FAMOTIDINE 10 MG/ML
20 INJECTION, SOLUTION INTRAVENOUS
OUTPATIENT
Start: 2024-05-16

## 2024-04-04 RX ORDER — EPINEPHRINE 1 MG/ML
0.3 INJECTION, SOLUTION, CONCENTRATE INTRAVENOUS PRN
OUTPATIENT
Start: 2024-04-25

## 2024-04-04 RX ORDER — SODIUM CHLORIDE 0.9 % (FLUSH) 0.9 %
5-40 SYRINGE (ML) INJECTION PRN
Status: CANCELLED | OUTPATIENT
Start: 2024-04-04

## 2024-04-04 RX ORDER — ONDANSETRON 2 MG/ML
8 INJECTION INTRAMUSCULAR; INTRAVENOUS
Status: CANCELLED | OUTPATIENT
Start: 2024-04-04

## 2024-04-04 RX ORDER — SODIUM CHLORIDE 0.9 % (FLUSH) 0.9 %
5-40 SYRINGE (ML) INJECTION PRN
OUTPATIENT
Start: 2024-06-06

## 2024-04-04 RX ORDER — ACETAMINOPHEN 325 MG/1
650 TABLET ORAL
OUTPATIENT
Start: 2024-04-25

## 2024-04-04 RX ORDER — SODIUM CHLORIDE 9 MG/ML
INJECTION, SOLUTION INTRAVENOUS CONTINUOUS
OUTPATIENT
Start: 2024-06-06

## 2024-04-04 RX ORDER — MEPERIDINE HYDROCHLORIDE 50 MG/ML
12.5 INJECTION INTRAMUSCULAR; INTRAVENOUS; SUBCUTANEOUS PRN
OUTPATIENT
Start: 2024-05-16

## 2024-04-04 RX ORDER — ACETAMINOPHEN 325 MG/1
650 TABLET ORAL
OUTPATIENT
Start: 2024-05-16

## 2024-04-04 RX ORDER — SODIUM CHLORIDE 9 MG/ML
5-250 INJECTION, SOLUTION INTRAVENOUS PRN
OUTPATIENT
Start: 2024-06-06

## 2024-04-04 RX ORDER — SODIUM CHLORIDE 9 MG/ML
5-250 INJECTION, SOLUTION INTRAVENOUS PRN
OUTPATIENT
Start: 2024-05-16

## 2024-04-04 RX ORDER — ONDANSETRON 2 MG/ML
8 INJECTION INTRAMUSCULAR; INTRAVENOUS
OUTPATIENT
Start: 2024-04-25

## 2024-04-04 RX ORDER — MEPERIDINE HYDROCHLORIDE 50 MG/ML
12.5 INJECTION INTRAMUSCULAR; INTRAVENOUS; SUBCUTANEOUS PRN
OUTPATIENT
Start: 2024-06-06

## 2024-04-04 RX ORDER — ALBUTEROL SULFATE 90 UG/1
4 AEROSOL, METERED RESPIRATORY (INHALATION) PRN
OUTPATIENT
Start: 2024-04-25

## 2024-04-04 RX ORDER — MAGNESIUM OXIDE 400 MG/1
400 TABLET ORAL DAILY
Qty: 90 TABLET | Refills: 1 | Status: SHIPPED | OUTPATIENT
Start: 2024-04-04

## 2024-04-04 RX ORDER — DIPHENHYDRAMINE HYDROCHLORIDE 50 MG/ML
50 INJECTION INTRAMUSCULAR; INTRAVENOUS
Status: CANCELLED | OUTPATIENT
Start: 2024-04-04

## 2024-04-04 RX ORDER — ACETAMINOPHEN 325 MG/1
650 TABLET ORAL
OUTPATIENT
Start: 2024-06-06

## 2024-04-04 RX ORDER — ACETAMINOPHEN 325 MG/1
650 TABLET ORAL
Status: CANCELLED | OUTPATIENT
Start: 2024-04-04

## 2024-04-04 RX ORDER — EPINEPHRINE 1 MG/ML
0.3 INJECTION, SOLUTION, CONCENTRATE INTRAVENOUS PRN
OUTPATIENT
Start: 2024-06-06

## 2024-04-04 RX ORDER — MEPERIDINE HYDROCHLORIDE 50 MG/ML
12.5 INJECTION INTRAMUSCULAR; INTRAVENOUS; SUBCUTANEOUS PRN
Status: CANCELLED | OUTPATIENT
Start: 2024-04-04

## 2024-04-04 RX ORDER — EPINEPHRINE 1 MG/ML
0.3 INJECTION, SOLUTION, CONCENTRATE INTRAVENOUS PRN
Status: CANCELLED | OUTPATIENT
Start: 2024-04-04

## 2024-04-04 RX ORDER — DIPHENHYDRAMINE HYDROCHLORIDE 50 MG/ML
50 INJECTION INTRAMUSCULAR; INTRAVENOUS
OUTPATIENT
Start: 2024-04-25

## 2024-04-04 RX ORDER — ALBUTEROL SULFATE 90 UG/1
4 AEROSOL, METERED RESPIRATORY (INHALATION) PRN
OUTPATIENT
Start: 2024-05-16

## 2024-04-04 RX ORDER — DIPHENHYDRAMINE HYDROCHLORIDE 50 MG/ML
50 INJECTION INTRAMUSCULAR; INTRAVENOUS
OUTPATIENT
Start: 2024-06-06

## 2024-04-04 RX ORDER — ONDANSETRON 2 MG/ML
8 INJECTION INTRAMUSCULAR; INTRAVENOUS
OUTPATIENT
Start: 2024-05-16

## 2024-04-04 RX ORDER — FAMOTIDINE 10 MG/ML
20 INJECTION, SOLUTION INTRAVENOUS
Status: CANCELLED | OUTPATIENT
Start: 2024-04-04

## 2024-04-04 RX ORDER — FAMOTIDINE 10 MG/ML
20 INJECTION, SOLUTION INTRAVENOUS
OUTPATIENT
Start: 2024-04-25

## 2024-04-04 RX ORDER — HEPARIN SODIUM (PORCINE) LOCK FLUSH IV SOLN 100 UNIT/ML 100 UNIT/ML
500 SOLUTION INTRAVENOUS PRN
OUTPATIENT
Start: 2024-04-25

## 2024-04-04 RX ORDER — ALBUTEROL SULFATE 90 UG/1
4 AEROSOL, METERED RESPIRATORY (INHALATION) PRN
Status: CANCELLED | OUTPATIENT
Start: 2024-04-04

## 2024-04-04 RX ORDER — SODIUM CHLORIDE 9 MG/ML
INJECTION, SOLUTION INTRAVENOUS CONTINUOUS
Status: CANCELLED | OUTPATIENT
Start: 2024-04-04

## 2024-04-04 RX ORDER — ALBUTEROL SULFATE 90 UG/1
4 AEROSOL, METERED RESPIRATORY (INHALATION) PRN
OUTPATIENT
Start: 2024-06-06

## 2024-04-04 RX ORDER — SODIUM CHLORIDE 9 MG/ML
5-250 INJECTION, SOLUTION INTRAVENOUS PRN
OUTPATIENT
Start: 2024-04-25

## 2024-04-04 RX ORDER — ONDANSETRON 2 MG/ML
8 INJECTION INTRAMUSCULAR; INTRAVENOUS
OUTPATIENT
Start: 2024-06-06

## 2024-04-04 RX ORDER — SODIUM CHLORIDE 9 MG/ML
5-250 INJECTION, SOLUTION INTRAVENOUS PRN
Status: CANCELLED | OUTPATIENT
Start: 2024-04-04

## 2024-04-04 RX ORDER — SODIUM CHLORIDE 0.9 % (FLUSH) 0.9 %
5-40 SYRINGE (ML) INJECTION PRN
OUTPATIENT
Start: 2024-04-25

## 2024-04-04 RX ORDER — SODIUM CHLORIDE 9 MG/ML
INJECTION, SOLUTION INTRAVENOUS CONTINUOUS
OUTPATIENT
Start: 2024-04-25

## 2024-04-04 RX ORDER — HEPARIN SODIUM (PORCINE) LOCK FLUSH IV SOLN 100 UNIT/ML 100 UNIT/ML
500 SOLUTION INTRAVENOUS PRN
OUTPATIENT
Start: 2024-06-06

## 2024-04-04 RX ORDER — MEPERIDINE HYDROCHLORIDE 50 MG/ML
12.5 INJECTION INTRAMUSCULAR; INTRAVENOUS; SUBCUTANEOUS PRN
OUTPATIENT
Start: 2024-04-25

## 2024-04-04 RX ORDER — FAMOTIDINE 10 MG/ML
20 INJECTION, SOLUTION INTRAVENOUS
OUTPATIENT
Start: 2024-06-06

## 2024-04-04 RX ORDER — SODIUM CHLORIDE 0.9 % (FLUSH) 0.9 %
5-40 SYRINGE (ML) INJECTION PRN
OUTPATIENT
Start: 2024-05-16

## 2024-04-04 RX ORDER — EPINEPHRINE 1 MG/ML
0.3 INJECTION, SOLUTION, CONCENTRATE INTRAVENOUS PRN
OUTPATIENT
Start: 2024-05-16

## 2024-04-04 RX ORDER — SODIUM CHLORIDE 9 MG/ML
INJECTION, SOLUTION INTRAVENOUS CONTINUOUS
OUTPATIENT
Start: 2024-05-16

## 2024-04-04 RX ORDER — DIPHENHYDRAMINE HYDROCHLORIDE 50 MG/ML
50 INJECTION INTRAMUSCULAR; INTRAVENOUS
OUTPATIENT
Start: 2024-05-16

## 2024-04-04 RX ORDER — HEPARIN SODIUM (PORCINE) LOCK FLUSH IV SOLN 100 UNIT/ML 100 UNIT/ML
500 SOLUTION INTRAVENOUS PRN
OUTPATIENT
Start: 2024-05-16

## 2024-04-04 RX ADMIN — SODIUM CHLORIDE 200 MG: 9 INJECTION, SOLUTION INTRAVENOUS at 12:25

## 2024-04-04 ASSESSMENT — PATIENT HEALTH QUESTIONNAIRE - PHQ9
1. LITTLE INTEREST OR PLEASURE IN DOING THINGS: NOT AT ALL
SUM OF ALL RESPONSES TO PHQ QUESTIONS 1-9: 0
SUM OF ALL RESPONSES TO PHQ9 QUESTIONS 1 & 2: 0
2. FEELING DOWN, DEPRESSED OR HOPELESS: NOT AT ALL
SUM OF ALL RESPONSES TO PHQ QUESTIONS 1-9: 0

## 2024-04-04 NOTE — PROGRESS NOTES
Ambulated to infusion area, here today for immunotherapy.Had an OV with Dr. Cueva today. No concerns at this time. PIV placed in left forearm, positive blood return noted. Labs reviewed, Labs within defined limits.  Chemo administered as ordered. Call light within reach. Tolerated infusion without incident.  Discharge instructions provided. RTC 04/23 for labs.    Status appropriately assessed and documented. All required labs and results reviewed. Treatment approved by provider. Treatment orders and medications verified by 2 Registered Nurses where applicable. Treatment plan was confirmed with patient prior to administration, and educated the need to report any treatment-related symptoms

## 2024-04-04 NOTE — PROGRESS NOTES
MA Rooming Questions  Patient: Rylie Monaco  MRN: [id number]    Date: 4/4/2024        1. Do you have any new issues?   no         2. Do you need any refills on medications?    yes - magnesium    3. Have you had any imaging done since your last visit?   no    4. Have you been hospitalized or seen in the emergency room since your last visit here?   no    5. Did the patient have a depression screening completed today? Yes    PHQ-9 Total Score: 0 (4/4/2024 11:48 AM)       PHQ-9 Given to (if applicable):               PHQ-9 Score (if applicable):                     [] Positive     [x]  Negative              Does question #9 need addressed (if applicable)                     [] Yes    []  No               Yaritza Meza CMA

## 2024-04-04 NOTE — PROGRESS NOTES
Patient Name:  Rylie Monaco  Patient :  10/20/1934  Patient MRN:  [id number]     Primary Oncologist: Earle Cueva MD  Referring Provider: Keri Aj DO     Date of Service: 2024      Chief Complaint:    Chief Complaint   Patient presents with    Follow-up    Chemotherapy     Patient Active Problem List:     Long-term insulin use in type 2 diabetes (HCC)     Essential hypertension     Dyslipidemia     Abnormal EKG     Abnormal stress test     Cecum mass     Severe malnutrition (HCC)     Partial small bowel obstruction (HCC)     Adenocarcinoma (HCC)     Generalized weakness     Uncontrolled pain     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      She has been treated recently for abdominal pain and suspected diverticulitis, but was not improving after getting antibiotics which prompted her to return to the ED. She denies any personal or family history of colon cancer. Her sister had breast cancer in her 40's. She has had colonoscopies in the past which were normal, but thinks her last one was more than 10 years ago. She denies any previous problems with ovarian cysts or adnexal lesions.       She underwent colonoscopy on 22 and it showed large mass extending from the ileocecal valve into the cecum mass originating at ileocecal valve appears to be more tubulovillous, mass in the cecum appears to be more firm fixed ulcerated consistent with malignancy. Biopsies were obtained. Mild to moderate sigmoid diverticulosis and grade 2 hemorrhoids and

## 2024-04-05 ENCOUNTER — TELEPHONE (OUTPATIENT)
Dept: ONCOLOGY | Age: 89
End: 2024-04-05

## 2024-04-05 NOTE — TELEPHONE ENCOUNTER
4/5/24 - spoke w/ pt'  for the 4/18/24 CT scans at [facility] arrival time of 9:30 am  for an 11:00 am appt and NPO 4 hours prior.

## 2024-04-16 RX ORDER — LEVOTHYROXINE SODIUM 88 UG/1
88 TABLET ORAL DAILY
Qty: 30 TABLET | Refills: 0 | Status: SHIPPED | OUTPATIENT
Start: 2024-04-16

## 2024-04-16 NOTE — TELEPHONE ENCOUNTER
Patient left message requesting a refill for synthroid to be sent to [facility]. Pending RX to Provider to be sent to pharmacy.

## 2024-04-17 RX ORDER — LEVOTHYROXINE SODIUM 88 UG/1
88 TABLET ORAL DAILY
Qty: 30 TABLET | Refills: 0 | OUTPATIENT
Start: 2024-04-17

## 2024-04-18 ENCOUNTER — HOSPITAL ENCOUNTER (OUTPATIENT)
Dept: CT IMAGING | Age: 89
Discharge: HOME | End: 2024-04-18
Attending: INTERNAL MEDICINE
Payer: MEDICARE

## 2024-04-18 DIAGNOSIS — C17.2 ADENOCARCINOMA OF ILEUM (HCC): ICD-10-CM

## 2024-04-18 DIAGNOSIS — E03.9 ACQUIRED HYPOTHYROIDISM: ICD-10-CM

## 2024-04-18 PROCEDURE — 74177 CT ABD & PELVIS W/CONTRAST: CPT

## 2024-04-18 PROCEDURE — 6360000004 HC RX CONTRAST MEDICATION: Performed by: INTERNAL MEDICINE

## 2024-04-18 PROCEDURE — 71260 CT THORAX DX C+: CPT

## 2024-04-18 RX ADMIN — IOPAMIDOL 100 ML: 755 INJECTION, SOLUTION INTRAVENOUS at 12:12

## 2024-04-23 ENCOUNTER — HOSPITAL ENCOUNTER (OUTPATIENT)
Dept: INFUSION THERAPY | Age: 89
Discharge: HOME | End: 2024-04-23
Payer: MEDICARE

## 2024-04-23 DIAGNOSIS — E03.9 ACQUIRED HYPOTHYROIDISM: ICD-10-CM

## 2024-04-23 DIAGNOSIS — C17.2 ADENOCARCINOMA OF ILEUM (HCC): ICD-10-CM

## 2024-04-23 LAB
ALBUMIN SERPL-MCNC: 3.8 GM/DL (ref 3.4–5)
ALP BLD-CCNC: 60 IU/L (ref 40–129)
ALT SERPL-CCNC: 22 U/L (ref 10–40)
ANION GAP SERPL CALCULATED.3IONS-SCNC: 10 MMOL/L (ref 7–16)
AST SERPL-CCNC: 23 IU/L (ref 15–37)
BASOPHILS ABSOLUTE: 0 K/CU MM
BASOPHILS RELATIVE PERCENT: 0.3 % (ref 0–1)
BILIRUB SERPL-MCNC: 0.4 MG/DL (ref 0–1)
BUN SERPL-MCNC: 33 MG/DL (ref 6–23)
CALCIUM SERPL-MCNC: 9.4 MG/DL (ref 8.3–10.6)
CHLORIDE BLD-SCNC: 100 MMOL/L (ref 99–110)
CO2: 28 MMOL/L (ref 21–32)
CREAT SERPL-MCNC: 0.9 MG/DL (ref 0.6–1.1)
DIFFERENTIAL TYPE: ABNORMAL
EOSINOPHILS ABSOLUTE: 0.3 K/CU MM
EOSINOPHILS RELATIVE PERCENT: 3.6 % (ref 0–3)
GFR SERPL CREATININE-BSD FRML MDRD: 61 ML/MIN/1.73M2
GLUCOSE SERPL-MCNC: 123 MG/DL (ref 70–99)
HCT VFR BLD CALC: 43 % (ref 37–47)
HEMOGLOBIN: 14 GM/DL (ref 12.5–16)
LYMPHOCYTES ABSOLUTE: 1.1 K/CU MM
LYMPHOCYTES RELATIVE PERCENT: 13.9 % (ref 24–44)
MCH RBC QN AUTO: 31.4 PG (ref 27–31)
MCHC RBC AUTO-ENTMCNC: 32.6 % (ref 32–36)
MCV RBC AUTO: 96.4 FL (ref 78–100)
MONOCYTES ABSOLUTE: 0.5 K/CU MM
MONOCYTES RELATIVE PERCENT: 7 % (ref 0–4)
NEUTROPHILS RELATIVE PERCENT: 75.2 % (ref 36–66)
PDW BLD-RTO: 13.4 % (ref 11.7–14.9)
PLATELET # BLD: 139 K/CU MM (ref 140–440)
PMV BLD AUTO: 10.2 FL (ref 7.5–11.1)
POTASSIUM SERPL-SCNC: 4.4 MMOL/L (ref 3.5–5.1)
RBC # BLD: 4.46 M/CU MM (ref 4.2–5.4)
SEGMENTED NEUTROPHILS ABSOLUTE COUNT: 5.7 K/CU MM
SODIUM BLD-SCNC: 138 MMOL/L (ref 135–145)
TOTAL PROTEIN: 6.6 GM/DL (ref 6.4–8.2)
TSH SERPL DL<=0.005 MIU/L-ACNC: 0.79 UIU/ML (ref 0.27–4.2)
WBC # BLD: 7.6 K/CU MM (ref 4–10.5)

## 2024-04-23 PROCEDURE — 84443 ASSAY THYROID STIM HORMONE: CPT

## 2024-04-23 PROCEDURE — 36415 COLL VENOUS BLD VENIPUNCTURE: CPT

## 2024-04-23 PROCEDURE — 85025 COMPLETE CBC W/AUTO DIFF WBC: CPT

## 2024-04-23 PROCEDURE — 80053 COMPREHEN METABOLIC PANEL: CPT

## 2024-04-25 ENCOUNTER — HOSPITAL ENCOUNTER (OUTPATIENT)
Dept: INFUSION THERAPY | Age: 89
Discharge: HOME | End: 2024-04-25
Payer: MEDICARE

## 2024-04-25 ENCOUNTER — OFFICE VISIT (OUTPATIENT)
Dept: ONCOLOGY | Age: 89
End: 2024-04-25
Payer: MEDICARE

## 2024-04-25 VITALS
TEMPERATURE: 97 F | RESPIRATION RATE: 18 BRPM | BODY MASS INDEX: 28.62 KG/M2 | DIASTOLIC BLOOD PRESSURE: 71 MMHG | OXYGEN SATURATION: 97 % | HEIGHT: 60 IN | HEART RATE: 69 BPM | WEIGHT: 145.8 LBS | SYSTOLIC BLOOD PRESSURE: 152 MMHG

## 2024-04-25 DIAGNOSIS — C17.2 ADENOCARCINOMA OF ILEUM (HCC): Primary | ICD-10-CM

## 2024-04-25 DIAGNOSIS — E03.9 ACQUIRED HYPOTHYROIDISM: ICD-10-CM

## 2024-04-25 PROCEDURE — 1124F ACP DISCUSS-NO DSCNMKR DOCD: CPT | Performed by: INTERNAL MEDICINE

## 2024-04-25 PROCEDURE — 96413 CHEMO IV INFUSION 1 HR: CPT

## 2024-04-25 PROCEDURE — 2580000003 HC RX 258: Performed by: INTERNAL MEDICINE

## 2024-04-25 PROCEDURE — 99214 OFFICE O/P EST MOD 30 MIN: CPT | Performed by: INTERNAL MEDICINE

## 2024-04-25 PROCEDURE — 6360000002 HC RX W HCPCS: Performed by: INTERNAL MEDICINE

## 2024-04-25 RX ORDER — AMOXICILLIN AND CLAVULANATE POTASSIUM 500; 125 MG/1; MG/1
1 TABLET, FILM COATED ORAL 3 TIMES DAILY
Qty: 21 TABLET | Refills: 0 | Status: SHIPPED | OUTPATIENT
Start: 2024-04-25 | End: 2024-05-02

## 2024-04-25 RX ORDER — LEVOTHYROXINE SODIUM 88 UG/1
88 TABLET ORAL DAILY
Qty: 30 TABLET | Refills: 4 | Status: SHIPPED | OUTPATIENT
Start: 2024-04-25

## 2024-04-25 RX ADMIN — SODIUM CHLORIDE 200 MG: 9 INJECTION, SOLUTION INTRAVENOUS at 12:17

## 2024-04-25 NOTE — PROGRESS NOTES
Ambulated to infusion area after OV with Dr. Cueva, here today for chemotherapy. No concerns at this time. PIV placed in right forearm, positive blood return noted. Labs reviewed, Labs within defined limits.  Chemo administered as ordered. Call light within reach. Tolerated infusion without incident.  Discharge instructions provided. [facility] 05/14 for labs.    Status appropriately assessed and documented. All required labs and results reviewed. Treatment approved by provider. Treatment orders and medications verified by 2 Registered Nurses where applicable. Treatment plan was confirmed with patient prior to administration, and educated the need to report any treatment-related symptoms

## 2024-04-25 NOTE — PROGRESS NOTES
MA Rooming Questions  Patient: Rylie Monaco  MRN: [id number]    Date: 4/25/2024        1. Do you have any new issues?   yes - coughing x 2 wks          2. Do you need any refills on medications?    no    3. Have you had any imaging done since your last visit?   yes - ct scan 4/18    4. Have you been hospitalized or seen in the emergency room since your last visit here?   no    5. Did the patient have a depression screening completed today? No    No data recorded     PHQ-9 Given to (if applicable):               PHQ-9 Score (if applicable):                     [] Positive     []  Negative              Does question #9 need addressed (if applicable)                     [] Yes    []  No               Emma Conrad MA      
supplement. Will repeat magnesium level next time.     Her TSH was 0.791 on 4/23/24. Her T4 was normal. She is on levothyroxine 88 mcg daily now. Will continue to monitor TSH every 6 weekly.     Malignancy related pain - on tramadol prn. She is not needing pain medications for a while and she does well.      Chemo induced skin rashes - she was seen by dermatologist and they have been managing her rash. Dermatologist also put her on claritin 3 times per day.     I answered all her questions and concerns for today. Recent imaging and labs were reviewed and discussed with the patient.

## 2024-05-13 RX ORDER — LANOLIN ALCOHOL/MO/W.PET/CERES
CREAM (GRAM) TOPICAL
Qty: 30 TABLET | Refills: 11 | OUTPATIENT
Start: 2024-05-13

## 2024-05-14 ENCOUNTER — HOSPITAL ENCOUNTER (OUTPATIENT)
Dept: INFUSION THERAPY | Age: 89
Discharge: HOME | End: 2024-05-14
Payer: MEDICARE

## 2024-05-14 DIAGNOSIS — C17.2 ADENOCARCINOMA OF ILEUM (HCC): ICD-10-CM

## 2024-05-14 DIAGNOSIS — E03.9 ACQUIRED HYPOTHYROIDISM: ICD-10-CM

## 2024-05-14 LAB
ALBUMIN SERPL-MCNC: 3.9 GM/DL (ref 3.4–5)
ALP BLD-CCNC: 57 IU/L (ref 40–129)
ALT SERPL-CCNC: 22 U/L (ref 10–40)
ANION GAP SERPL CALCULATED.3IONS-SCNC: 9 MMOL/L (ref 7–16)
AST SERPL-CCNC: 25 IU/L (ref 15–37)
BASOPHILS ABSOLUTE: 0 K/CU MM
BASOPHILS RELATIVE PERCENT: 0.5 % (ref 0–1)
BILIRUB SERPL-MCNC: 0.4 MG/DL (ref 0–1)
BUN SERPL-MCNC: 11 MG/DL (ref 6–23)
CALCIUM SERPL-MCNC: 9.4 MG/DL (ref 8.3–10.6)
CHLORIDE BLD-SCNC: 101 MMOL/L (ref 99–110)
CO2: 28 MMOL/L (ref 21–32)
CREAT SERPL-MCNC: 0.9 MG/DL (ref 0.6–1.1)
DIFFERENTIAL TYPE: ABNORMAL
EOSINOPHILS ABSOLUTE: 0.2 K/CU MM
EOSINOPHILS RELATIVE PERCENT: 3.1 % (ref 0–3)
GFR, ESTIMATED: 61 ML/MIN/1.73M2
GLUCOSE SERPL-MCNC: 120 MG/DL (ref 70–99)
HCT VFR BLD CALC: 42.2 % (ref 37–47)
HEMOGLOBIN: 14 GM/DL (ref 12.5–16)
LYMPHOCYTES ABSOLUTE: 1.2 K/CU MM
LYMPHOCYTES RELATIVE PERCENT: 19.4 % (ref 24–44)
MCH RBC QN AUTO: 31.5 PG (ref 27–31)
MCHC RBC AUTO-ENTMCNC: 33.2 % (ref 32–36)
MCV RBC AUTO: 94.8 FL (ref 78–100)
MONOCYTES ABSOLUTE: 0.4 K/CU MM
MONOCYTES RELATIVE PERCENT: 6.4 % (ref 0–4)
NEUTROPHILS ABSOLUTE: 4.5 K/CU MM
NEUTROPHILS RELATIVE PERCENT: 70.6 % (ref 36–66)
PDW BLD-RTO: 13 % (ref 11.7–14.9)
PLATELET # BLD: 144 K/CU MM (ref 140–440)
PMV BLD AUTO: 9.6 FL (ref 7.5–11.1)
POTASSIUM SERPL-SCNC: 4.6 MMOL/L (ref 3.5–5.1)
RBC # BLD: 4.45 M/CU MM (ref 4.2–5.4)
SODIUM BLD-SCNC: 138 MMOL/L (ref 135–145)
TOTAL PROTEIN: 6.6 GM/DL (ref 6.4–8.2)
WBC # BLD: 6.4 K/CU MM (ref 4–10.5)

## 2024-05-14 PROCEDURE — 36415 COLL VENOUS BLD VENIPUNCTURE: CPT

## 2024-05-14 PROCEDURE — 85025 COMPLETE CBC W/AUTO DIFF WBC: CPT

## 2024-05-14 PROCEDURE — 80053 COMPREHEN METABOLIC PANEL: CPT

## 2024-05-16 ENCOUNTER — OFFICE VISIT (OUTPATIENT)
Dept: ONCOLOGY | Age: 89
End: 2024-05-16
Payer: MEDICARE

## 2024-05-16 ENCOUNTER — HOSPITAL ENCOUNTER (OUTPATIENT)
Dept: INFUSION THERAPY | Age: 89
Discharge: HOME | End: 2024-05-16
Payer: MEDICARE

## 2024-05-16 VITALS
DIASTOLIC BLOOD PRESSURE: 93 MMHG | TEMPERATURE: 97 F | BODY MASS INDEX: 28.31 KG/M2 | HEIGHT: 60 IN | HEART RATE: 82 BPM | WEIGHT: 144.2 LBS | SYSTOLIC BLOOD PRESSURE: 159 MMHG | OXYGEN SATURATION: 97 %

## 2024-05-16 DIAGNOSIS — C17.2 ADENOCARCINOMA OF ILEUM (HCC): Primary | ICD-10-CM

## 2024-05-16 DIAGNOSIS — E03.9 ACQUIRED HYPOTHYROIDISM: ICD-10-CM

## 2024-05-16 DIAGNOSIS — T45.1X5A CHEMOTHERAPY INDUCED DIARRHEA: ICD-10-CM

## 2024-05-16 DIAGNOSIS — K52.1 CHEMOTHERAPY INDUCED DIARRHEA: ICD-10-CM

## 2024-05-16 PROCEDURE — 1124F ACP DISCUSS-NO DSCNMKR DOCD: CPT | Performed by: INTERNAL MEDICINE

## 2024-05-16 PROCEDURE — 2580000003 HC RX 258: Performed by: INTERNAL MEDICINE

## 2024-05-16 PROCEDURE — 6360000002 HC RX W HCPCS: Performed by: INTERNAL MEDICINE

## 2024-05-16 PROCEDURE — 96413 CHEMO IV INFUSION 1 HR: CPT

## 2024-05-16 PROCEDURE — 99214 OFFICE O/P EST MOD 30 MIN: CPT | Performed by: INTERNAL MEDICINE

## 2024-05-16 RX ORDER — SODIUM CHLORIDE 9 MG/ML
5-250 INJECTION, SOLUTION INTRAVENOUS PRN
Status: DISCONTINUED | OUTPATIENT
Start: 2024-05-16 | End: 2024-05-17 | Stop reason: HOSPADM

## 2024-05-16 RX ADMIN — SODIUM CHLORIDE 200 MG: 9 INJECTION, SOLUTION INTRAVENOUS at 11:45

## 2024-05-16 RX ADMIN — SODIUM CHLORIDE 20 ML/HR: 9 INJECTION, SOLUTION INTRAVENOUS at 11:45

## 2024-05-16 NOTE — PROGRESS NOTES
MA Rooming Questions  Patient: Rylie Monaco  MRN: [id number]    Date: 5/16/2024        1. Do you have any new issues?   Yes - she sometimes has a hard time swallowing/getting food in esophagus      2. Do you need any refills on medications?    no    3. Have you had any imaging done since your last visit?   no    4. Have you been hospitalized or seen in the emergency room since your last visit here?   no    5. Did the patient have a depression screening completed today? No    No data recorded     PHQ-9 Given to (if applicable):               PHQ-9 Score (if applicable):                     [] Positive     []  Negative              Does question #9 need addressed (if applicable)                     [] Yes    []  No               Yaritza Meza CMA      
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  Margins are negative for neoplasm.  Metastatic carcinoma involving 3 of 14 lymph nodes.  1 tumor deposit present.  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  Extensive serosal adhesions with tumor present at the paracervical and parametrial margins.  Right parametrium, right pelvis peritoneum, right ureteral were positive for carcinoma.  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  The neoplastic cells demonstrate focal staining for CK7.  CDX2 is positive.  CK20, PAX8 and arginase are negative.  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     CT chest, abdomen and pelvis on 4/1824 showed no evidence of metastatic disease.     On May 16, 2024, she presented to me for follow-up. 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      I reviewed final path report with her and her family. We also reviewed NCCN guidelines. I also let her know that we ideally recommend adjuvant chemotherapy with either CapeOx (capecitabine + oxaliplatin) for 3 months, FOLFOX for 6 months or single agent capecitabine (in frail patient) for six months.    I also discussed with her and her family all the potential

## 2024-05-16 NOTE — PROGRESS NOTES
Pt here for tx after OV. PIV placed with blood return noted. CBC CMP reviewed from 5/14 and within defined limits. Pt has no concerns or issues to discuss at this time.     Patient's status assessed and documented appropriately.  All labs and required results were also reviewed today.  Treatment parameters have been reviewed.  Today's treatment has been approved by the provider.      Treatment orders and medication sequencing (when applicable) was verified by 2 registered nurses.  The treatment plan was confirmed with the patient prior to administration, and the patient understands the need to report any treatment-related symptoms.    Prior to administration, when applicable, the following 8 elements of medication administration were reviewed with 2nd Registered Nurse prior to dosing: drug name, drug dose, infusion volume when prepared in a syringe, rate of administration, expiration dates and/or times, appearance and integrity of drug(s), and rate of pump for infusion.  The 5 rights of medication administration have been verified.      Pt tolerated tx without incident left ambulatory instructed to stop at check out for next OV and discharge paperwork.

## 2024-05-29 ENCOUNTER — OFFICE VISIT (OUTPATIENT)
Dept: CARDIOLOGY CLINIC | Age: 89
End: 2024-05-29
Payer: MEDICARE

## 2024-05-29 VITALS
SYSTOLIC BLOOD PRESSURE: 138 MMHG | RESPIRATION RATE: 18 BRPM | WEIGHT: 143.2 LBS | BODY MASS INDEX: 28.11 KG/M2 | DIASTOLIC BLOOD PRESSURE: 66 MMHG | HEART RATE: 68 BPM | HEIGHT: 60 IN

## 2024-05-29 DIAGNOSIS — Z86.718 H/O DEEP VENOUS THROMBOSIS: Primary | ICD-10-CM

## 2024-05-29 DIAGNOSIS — C17.2 ADENOCARCINOMA OF ILEUM (HCC): ICD-10-CM

## 2024-05-29 DIAGNOSIS — I10 ESSENTIAL HYPERTENSION: ICD-10-CM

## 2024-05-29 DIAGNOSIS — E78.5 DYSLIPIDEMIA: ICD-10-CM

## 2024-05-29 PROCEDURE — 1124F ACP DISCUSS-NO DSCNMKR DOCD: CPT | Performed by: INTERNAL MEDICINE

## 2024-05-29 PROCEDURE — 99214 OFFICE O/P EST MOD 30 MIN: CPT | Performed by: INTERNAL MEDICINE

## 2024-05-29 NOTE — PROGRESS NOTES
5/27/2022    BOWEL RESECTION RIGHT HEMICOLECTOMY LAPAROSCOPIC ROBOTIC XI performed by Melody Antunez MD at [facility] OR    OTHER SURGICAL HISTORY      eye lift     Family History   Problem Relation Age of Onset    Pacemaker Sister     Arrhythmia Sister     Breast Cancer Sister         pre menopausal    Ovarian Cancer Neg Hx      Social History     Tobacco Use    Smoking status: Never    Smokeless tobacco: Never   Substance Use Topics    Alcohol use: Not Currently     Alcohol/week: 1.0 standard drink of alcohol     Types: 1 Glasses of wine per week     Comment: rarely. 1-2cups coffee daily, pop seldom      [unfilled]  Review of Systems:   Constitutional: No Fever or Weight Loss   Eyes: No Decreased Vision  ENT: No Headaches, Hearing Loss or Vertigo  Cardiovascular: No chest pain, dyspnea on exertion, palpitations or loss of consciousness  Respiratory: No cough or wheezing    Gastrointestinal: No abdominal pain, appetite loss, blood in stools, constipation, diarrhea or heartburn  Genitourinary: No dysuria, trouble voiding, or hematuria  Musculoskeletal:  No gait disturbance, weakness or joint complaints  Integumentary: No rash or pruritis  Neurological: No TIA or stroke symptoms  Psychiatric: No anxiety or depression  Endocrine: No malaise, fatigue or temperature intolerance  Hematologic/Lymphatic: No bleeding problems, blood clots or swollen lymph nodes  Allergic/Immunologic: No nasal congestion or hives  All systems negative except as marked.   Objective:  /66   Pulse 68   Resp 18   Ht 1.524 m (5')   Wt 65 kg (143 lb 3.2 oz)   BMI 27.97 kg/m²   Wt Readings from Last 3 Encounters:   05/29/24 65 kg (143 lb 3.2 oz)   05/16/24 65.4 kg (144 lb 3.2 oz)   04/25/24 66.1 kg (145 lb 12.8 oz)     Body mass index is 27.97 kg/m².  GENERAL - Alert, oriented, pleasant, in no apparent distress,normal grooming  HEENT - pupils are intact, cornea intact, conjunctive normal color, ears are normal in exam,  Neck - Supple.  No

## 2024-06-04 ENCOUNTER — HOSPITAL ENCOUNTER (OUTPATIENT)
Dept: INFUSION THERAPY | Age: 89
Discharge: HOME | End: 2024-06-04
Payer: MEDICARE

## 2024-06-04 DIAGNOSIS — C17.2 ADENOCARCINOMA OF ILEUM (HCC): ICD-10-CM

## 2024-06-04 DIAGNOSIS — E03.9 ACQUIRED HYPOTHYROIDISM: Primary | ICD-10-CM

## 2024-06-04 DIAGNOSIS — E03.9 ACQUIRED HYPOTHYROIDISM: ICD-10-CM

## 2024-06-04 LAB
ALBUMIN SERPL-MCNC: 3.9 GM/DL (ref 3.4–5)
ALP BLD-CCNC: 61 IU/L (ref 40–129)
ALT SERPL-CCNC: 21 U/L (ref 10–40)
ANION GAP SERPL CALCULATED.3IONS-SCNC: 9 MMOL/L (ref 7–16)
AST SERPL-CCNC: 24 IU/L (ref 15–37)
BASOPHILS ABSOLUTE: 0 K/CU MM
BASOPHILS RELATIVE PERCENT: 0.3 % (ref 0–1)
BILIRUB SERPL-MCNC: 0.4 MG/DL (ref 0–1)
BUN SERPL-MCNC: 15 MG/DL (ref 6–23)
CALCIUM SERPL-MCNC: 9.2 MG/DL (ref 8.3–10.6)
CHLORIDE BLD-SCNC: 100 MMOL/L (ref 99–110)
CO2: 27 MMOL/L (ref 21–32)
CREAT SERPL-MCNC: 1 MG/DL (ref 0.6–1.1)
DIFFERENTIAL TYPE: ABNORMAL
EOSINOPHILS ABSOLUTE: 0.3 K/CU MM
EOSINOPHILS RELATIVE PERCENT: 4.7 % (ref 0–3)
GFR, ESTIMATED: 54 ML/MIN/1.73M2
GLUCOSE SERPL-MCNC: 105 MG/DL (ref 70–99)
HCT VFR BLD CALC: 41 % (ref 37–47)
HEMOGLOBIN: 13.3 GM/DL (ref 12.5–16)
LYMPHOCYTES ABSOLUTE: 1.2 K/CU MM
LYMPHOCYTES RELATIVE PERCENT: 19.4 % (ref 24–44)
MCH RBC QN AUTO: 31.4 PG (ref 27–31)
MCHC RBC AUTO-ENTMCNC: 32.4 % (ref 32–36)
MCV RBC AUTO: 96.7 FL (ref 78–100)
MONOCYTES ABSOLUTE: 0.5 K/CU MM
MONOCYTES RELATIVE PERCENT: 7.7 % (ref 0–4)
NEUTROPHILS ABSOLUTE: 4.3 K/CU MM
NEUTROPHILS RELATIVE PERCENT: 67.9 % (ref 36–66)
PDW BLD-RTO: 13.1 % (ref 11.7–14.9)
PLATELET # BLD: 146 K/CU MM (ref 140–440)
PMV BLD AUTO: 10.1 FL (ref 7.5–11.1)
POTASSIUM SERPL-SCNC: 4.9 MMOL/L (ref 3.5–5.1)
RBC # BLD: 4.24 M/CU MM (ref 4.2–5.4)
SODIUM BLD-SCNC: 136 MMOL/L (ref 135–145)
TOTAL PROTEIN: 6.4 GM/DL (ref 6.4–8.2)
TSH SERPL DL<=0.005 MIU/L-ACNC: 1.3 UIU/ML (ref 0.27–4.2)
WBC # BLD: 6.4 K/CU MM (ref 4–10.5)

## 2024-06-04 PROCEDURE — 84443 ASSAY THYROID STIM HORMONE: CPT

## 2024-06-04 PROCEDURE — 80053 COMPREHEN METABOLIC PANEL: CPT

## 2024-06-04 PROCEDURE — 85025 COMPLETE CBC W/AUTO DIFF WBC: CPT

## 2024-06-04 PROCEDURE — 36415 COLL VENOUS BLD VENIPUNCTURE: CPT

## 2024-06-05 RX ORDER — HEPARIN 100 UNIT/ML
500 SYRINGE INTRAVENOUS PRN
Status: CANCELLED | OUTPATIENT
Start: 2024-06-06

## 2024-06-05 RX ORDER — SODIUM CHLORIDE 9 MG/ML
5-250 INJECTION, SOLUTION INTRAVENOUS PRN
Status: CANCELLED | OUTPATIENT
Start: 2024-06-06

## 2024-06-05 RX ORDER — DIPHENHYDRAMINE HYDROCHLORIDE 50 MG/ML
50 INJECTION INTRAMUSCULAR; INTRAVENOUS
Status: CANCELLED | OUTPATIENT
Start: 2024-06-06

## 2024-06-05 RX ORDER — SODIUM CHLORIDE 9 MG/ML
INJECTION, SOLUTION INTRAVENOUS CONTINUOUS
Status: CANCELLED | OUTPATIENT
Start: 2024-06-06

## 2024-06-05 RX ORDER — EPINEPHRINE 1 MG/ML
0.3 INJECTION, SOLUTION, CONCENTRATE INTRAVENOUS PRN
Status: CANCELLED | OUTPATIENT
Start: 2024-06-06

## 2024-06-05 RX ORDER — ONDANSETRON 2 MG/ML
8 INJECTION INTRAMUSCULAR; INTRAVENOUS
Status: CANCELLED | OUTPATIENT
Start: 2024-06-06

## 2024-06-05 RX ORDER — MEPERIDINE HYDROCHLORIDE 25 MG/ML
12.5 INJECTION INTRAMUSCULAR; INTRAVENOUS; SUBCUTANEOUS PRN
Status: CANCELLED | OUTPATIENT
Start: 2024-06-06

## 2024-06-05 RX ORDER — LANOLIN ALCOHOL/MO/W.PET/CERES
CREAM (GRAM) TOPICAL
Qty: 30 TABLET | Refills: 0 | Status: SHIPPED | OUTPATIENT
Start: 2024-06-05

## 2024-06-05 RX ORDER — ACETAMINOPHEN 325 MG/1
650 TABLET ORAL
Status: CANCELLED | OUTPATIENT
Start: 2024-06-06

## 2024-06-05 RX ORDER — ALBUTEROL SULFATE 90 UG/1
4 AEROSOL, METERED RESPIRATORY (INHALATION) PRN
Status: CANCELLED | OUTPATIENT
Start: 2024-06-06

## 2024-06-05 RX ORDER — SODIUM CHLORIDE 0.9 % (FLUSH) 0.9 %
5-40 SYRINGE (ML) INJECTION PRN
Status: CANCELLED | OUTPATIENT
Start: 2024-06-06

## 2024-06-05 RX ORDER — FAMOTIDINE 10 MG/ML
20 INJECTION, SOLUTION INTRAVENOUS
Status: CANCELLED | OUTPATIENT
Start: 2024-06-06

## 2024-06-06 ENCOUNTER — HOSPITAL ENCOUNTER (OUTPATIENT)
Dept: INFUSION THERAPY | Age: 89
Discharge: HOME | End: 2024-06-06
Payer: MEDICARE

## 2024-06-06 VITALS
SYSTOLIC BLOOD PRESSURE: 148 MMHG | RESPIRATION RATE: 16 BRPM | BODY MASS INDEX: 28.07 KG/M2 | TEMPERATURE: 97.8 F | DIASTOLIC BLOOD PRESSURE: 77 MMHG | HEART RATE: 71 BPM | WEIGHT: 143 LBS | HEIGHT: 60 IN

## 2024-06-06 DIAGNOSIS — C17.2 ADENOCARCINOMA OF ILEUM (HCC): ICD-10-CM

## 2024-06-06 DIAGNOSIS — E03.9 ACQUIRED HYPOTHYROIDISM: Primary | ICD-10-CM

## 2024-06-06 PROCEDURE — 6360000002 HC RX W HCPCS: Performed by: INTERNAL MEDICINE

## 2024-06-06 PROCEDURE — 96413 CHEMO IV INFUSION 1 HR: CPT

## 2024-06-06 PROCEDURE — 2580000003 HC RX 258: Performed by: INTERNAL MEDICINE

## 2024-06-06 RX ORDER — SODIUM CHLORIDE 9 MG/ML
5-250 INJECTION, SOLUTION INTRAVENOUS PRN
Status: DISCONTINUED | OUTPATIENT
Start: 2024-06-06 | End: 2024-06-07 | Stop reason: HOSPADM

## 2024-06-06 RX ADMIN — SODIUM CHLORIDE 400 MG: 9 INJECTION, SOLUTION INTRAVENOUS at 12:13

## 2024-06-06 RX ADMIN — SODIUM CHLORIDE 20 ML/HR: 9 INJECTION, SOLUTION INTRAVENOUS at 12:10

## 2024-06-06 NOTE — PROGRESS NOTES
Pt here for tx. PIV placed with blood return noted. CBC CMP TSH reviewed from 6/5 and within defined limits. Pt has no new concerns or issues to discuss at this time. Pt states intermittent nausea, takes Zofran for this which helps, also intermittent fatigue.     Patient's status assessed and documented appropriately.  All labs and required results were also reviewed today.  Treatment parameters have been reviewed.  Today's treatment has been approved by the provider.      Treatment orders and medication sequencing (when applicable) was verified by 2 registered nurses.  The treatment plan was confirmed with the patient prior to administration, and the patient understands the need to report any treatment-related symptoms.    Prior to administration, when applicable, the following 8 elements of medication administration were reviewed with 2nd Registered Nurse prior to dosing: drug name, drug dose, infusion volume when prepared in a syringe, rate of administration, expiration dates and/or times, appearance and integrity of drug(s), and rate of pump for infusion.  The 5 rights of medication administration have been verified.      Pt tolerated tx without incident left ambulatory discharge instructions given

## 2024-06-12 RX ORDER — LANOLIN ALCOHOL/MO/W.PET/CERES
CREAM (GRAM) TOPICAL
Qty: 30 TABLET | Refills: 11 | OUTPATIENT
Start: 2024-06-12

## 2024-07-16 ENCOUNTER — HOSPITAL ENCOUNTER (OUTPATIENT)
Dept: INFUSION THERAPY | Age: 89
Discharge: HOME | End: 2024-07-16
Payer: MEDICARE

## 2024-07-16 DIAGNOSIS — E03.9 ACQUIRED HYPOTHYROIDISM: ICD-10-CM

## 2024-07-16 DIAGNOSIS — E03.9 ACQUIRED HYPOTHYROIDISM: Primary | ICD-10-CM

## 2024-07-16 DIAGNOSIS — C17.2 ADENOCARCINOMA OF ILEUM (HCC): ICD-10-CM

## 2024-07-16 LAB
ALBUMIN SERPL-MCNC: 3.7 GM/DL (ref 3.4–5)
ALP BLD-CCNC: 58 IU/L (ref 40–129)
ALT SERPL-CCNC: 27 U/L (ref 10–40)
ANION GAP SERPL CALCULATED.3IONS-SCNC: 9 MMOL/L (ref 7–16)
AST SERPL-CCNC: 25 IU/L (ref 15–37)
BASOPHILS ABSOLUTE: 0 K/CU MM
BASOPHILS RELATIVE PERCENT: 0.3 % (ref 0–1)
BILIRUB SERPL-MCNC: 0.5 MG/DL (ref 0–1)
BUN SERPL-MCNC: 21 MG/DL (ref 6–23)
CALCIUM SERPL-MCNC: 9.5 MG/DL (ref 8.3–10.6)
CHLORIDE BLD-SCNC: 97 MMOL/L (ref 99–110)
CO2: 28 MMOL/L (ref 21–32)
CREAT SERPL-MCNC: 1 MG/DL (ref 0.6–1.1)
DIFFERENTIAL TYPE: ABNORMAL
EOSINOPHILS ABSOLUTE: 0.3 K/CU MM
EOSINOPHILS RELATIVE PERCENT: 3.7 % (ref 0–3)
GFR, ESTIMATED: 54 ML/MIN/1.73M2
GLUCOSE SERPL-MCNC: 98 MG/DL (ref 70–99)
HCT VFR BLD CALC: 40.4 % (ref 37–47)
HEMOGLOBIN: 13.4 GM/DL (ref 12.5–16)
LYMPHOCYTES ABSOLUTE: 1.5 K/CU MM
LYMPHOCYTES RELATIVE PERCENT: 22.5 % (ref 24–44)
MCH RBC QN AUTO: 31.4 PG (ref 27–31)
MCHC RBC AUTO-ENTMCNC: 33.2 % (ref 32–36)
MCV RBC AUTO: 94.6 FL (ref 78–100)
MONOCYTES ABSOLUTE: 0.6 K/CU MM
MONOCYTES RELATIVE PERCENT: 8.8 % (ref 0–4)
NEUTROPHILS ABSOLUTE: 4.4 K/CU MM
NEUTROPHILS RELATIVE PERCENT: 64.7 % (ref 36–66)
PDW BLD-RTO: 13 % (ref 11.7–14.9)
PLATELET # BLD: 143 K/CU MM (ref 140–440)
PMV BLD AUTO: 10.1 FL (ref 7.5–11.1)
POTASSIUM SERPL-SCNC: 4.9 MMOL/L (ref 3.5–5.1)
RBC # BLD: 4.27 M/CU MM (ref 4.2–5.4)
SODIUM BLD-SCNC: 134 MMOL/L (ref 135–145)
TOTAL PROTEIN: 6.2 GM/DL (ref 6.4–8.2)
TSH SERPL DL<=0.005 MIU/L-ACNC: 0.49 UIU/ML (ref 0.27–4.2)
WBC # BLD: 6.8 K/CU MM (ref 4–10.5)

## 2024-07-16 PROCEDURE — 85025 COMPLETE CBC W/AUTO DIFF WBC: CPT

## 2024-07-16 PROCEDURE — 84443 ASSAY THYROID STIM HORMONE: CPT

## 2024-07-16 PROCEDURE — 36415 COLL VENOUS BLD VENIPUNCTURE: CPT

## 2024-07-16 PROCEDURE — 80053 COMPREHEN METABOLIC PANEL: CPT

## 2024-07-16 RX ORDER — HYDROXYZINE HYDROCHLORIDE 25 MG/1
25 TABLET, FILM COATED ORAL EVERY 8 HOURS PRN
Qty: 90 TABLET | Refills: 1 | Status: SHIPPED | OUTPATIENT
Start: 2024-07-16 | End: 2024-08-15

## 2024-07-17 NOTE — PROGRESS NOTES
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      She has been treated recently for abdominal pain and suspected diverticulitis, but was not improving after getting antibiotics which prompted her to return to the ED. She denies any personal or family history of colon cancer. Her sister had breast cancer in her 40's. She has had colonoscopies in the past which were normal, but thinks her last one was more than 10 years ago. She denies any previous problems with ovarian cysts or adnexal lesions.       She underwent colonoscopy on 22 and it showed large mass extending from the ileocecal valve into the cecum mass originating at ileocecal valve appears to be more tubulovillous, mass in the cecum appears to be more firm fixed ulcerated consistent with malignancy. Biopsies were obtained. Mild to moderate sigmoid diverticulosis and grade 2 hemorrhoids and

## 2024-07-18 ENCOUNTER — HOSPITAL ENCOUNTER (OUTPATIENT)
Dept: INFUSION THERAPY | Age: 89
Discharge: HOME | End: 2024-07-18
Payer: MEDICARE

## 2024-07-18 ENCOUNTER — OFFICE VISIT (OUTPATIENT)
Dept: ONCOLOGY | Age: 89
End: 2024-07-18
Payer: MEDICARE

## 2024-07-18 VITALS
OXYGEN SATURATION: 99 % | BODY MASS INDEX: 27.79 KG/M2 | HEART RATE: 64 BPM | HEIGHT: 60 IN | DIASTOLIC BLOOD PRESSURE: 80 MMHG | SYSTOLIC BLOOD PRESSURE: 166 MMHG | WEIGHT: 141.54 LBS | TEMPERATURE: 97.4 F

## 2024-07-18 VITALS
TEMPERATURE: 97.4 F | OXYGEN SATURATION: 99 % | BODY MASS INDEX: 27.65 KG/M2 | HEART RATE: 64 BPM | RESPIRATION RATE: 16 BRPM | WEIGHT: 141.6 LBS | SYSTOLIC BLOOD PRESSURE: 166 MMHG | DIASTOLIC BLOOD PRESSURE: 80 MMHG

## 2024-07-18 DIAGNOSIS — E03.9 ACQUIRED HYPOTHYROIDISM: ICD-10-CM

## 2024-07-18 DIAGNOSIS — C17.2 ADENOCARCINOMA OF ILEUM (HCC): Primary | ICD-10-CM

## 2024-07-18 DIAGNOSIS — E03.9 ACQUIRED HYPOTHYROIDISM: Primary | ICD-10-CM

## 2024-07-18 DIAGNOSIS — C17.2 ADENOCARCINOMA OF ILEUM (HCC): ICD-10-CM

## 2024-07-18 PROCEDURE — 96413 CHEMO IV INFUSION 1 HR: CPT

## 2024-07-18 PROCEDURE — 99214 OFFICE O/P EST MOD 30 MIN: CPT | Performed by: INTERNAL MEDICINE

## 2024-07-18 PROCEDURE — 6360000002 HC RX W HCPCS: Performed by: INTERNAL MEDICINE

## 2024-07-18 PROCEDURE — 1124F ACP DISCUSS-NO DSCNMKR DOCD: CPT | Performed by: INTERNAL MEDICINE

## 2024-07-18 PROCEDURE — 2580000003 HC RX 258: Performed by: INTERNAL MEDICINE

## 2024-07-18 RX ORDER — MEPERIDINE HYDROCHLORIDE 50 MG/ML
12.5 INJECTION INTRAMUSCULAR; INTRAVENOUS; SUBCUTANEOUS PRN
OUTPATIENT
Start: 2024-08-29

## 2024-07-18 RX ORDER — SODIUM CHLORIDE 9 MG/ML
5-250 INJECTION, SOLUTION INTRAVENOUS PRN
OUTPATIENT
Start: 2024-08-29

## 2024-07-18 RX ORDER — ONDANSETRON 2 MG/ML
8 INJECTION INTRAMUSCULAR; INTRAVENOUS
OUTPATIENT
Start: 2024-08-29

## 2024-07-18 RX ORDER — SODIUM CHLORIDE 9 MG/ML
5-250 INJECTION, SOLUTION INTRAVENOUS PRN
Status: CANCELLED | OUTPATIENT
Start: 2024-07-18

## 2024-07-18 RX ORDER — ONDANSETRON 2 MG/ML
8 INJECTION INTRAMUSCULAR; INTRAVENOUS
Status: CANCELLED | OUTPATIENT
Start: 2024-07-18

## 2024-07-18 RX ORDER — ALBUTEROL SULFATE 90 UG/1
4 AEROSOL, METERED RESPIRATORY (INHALATION) PRN
OUTPATIENT
Start: 2024-08-29

## 2024-07-18 RX ORDER — ACETAMINOPHEN 325 MG/1
650 TABLET ORAL
Status: CANCELLED | OUTPATIENT
Start: 2024-07-18

## 2024-07-18 RX ORDER — HEPARIN SODIUM (PORCINE) LOCK FLUSH IV SOLN 100 UNIT/ML 100 UNIT/ML
500 SOLUTION INTRAVENOUS PRN
Status: CANCELLED | OUTPATIENT
Start: 2024-07-18

## 2024-07-18 RX ORDER — ALBUTEROL SULFATE 90 UG/1
4 AEROSOL, METERED RESPIRATORY (INHALATION) PRN
Status: CANCELLED | OUTPATIENT
Start: 2024-07-18

## 2024-07-18 RX ORDER — DIPHENHYDRAMINE HYDROCHLORIDE 50 MG/ML
50 INJECTION INTRAMUSCULAR; INTRAVENOUS
OUTPATIENT
Start: 2024-08-29

## 2024-07-18 RX ORDER — EPINEPHRINE 1 MG/ML
0.3 INJECTION, SOLUTION, CONCENTRATE INTRAVENOUS PRN
Status: CANCELLED | OUTPATIENT
Start: 2024-07-18

## 2024-07-18 RX ORDER — DIPHENHYDRAMINE HYDROCHLORIDE 50 MG/ML
50 INJECTION INTRAMUSCULAR; INTRAVENOUS
Status: CANCELLED | OUTPATIENT
Start: 2024-07-18

## 2024-07-18 RX ORDER — EPINEPHRINE 1 MG/ML
0.3 INJECTION, SOLUTION, CONCENTRATE INTRAVENOUS PRN
OUTPATIENT
Start: 2024-08-29

## 2024-07-18 RX ORDER — FAMOTIDINE 10 MG/ML
20 INJECTION, SOLUTION INTRAVENOUS
OUTPATIENT
Start: 2024-08-29

## 2024-07-18 RX ORDER — SODIUM CHLORIDE 9 MG/ML
INJECTION, SOLUTION INTRAVENOUS CONTINUOUS
OUTPATIENT
Start: 2024-08-29

## 2024-07-18 RX ORDER — MEPERIDINE HYDROCHLORIDE 50 MG/ML
12.5 INJECTION INTRAMUSCULAR; INTRAVENOUS; SUBCUTANEOUS PRN
Status: CANCELLED | OUTPATIENT
Start: 2024-07-18

## 2024-07-18 RX ORDER — SODIUM CHLORIDE 0.9 % (FLUSH) 0.9 %
5-40 SYRINGE (ML) INJECTION PRN
OUTPATIENT
Start: 2024-08-29

## 2024-07-18 RX ORDER — SODIUM CHLORIDE 0.9 % (FLUSH) 0.9 %
5-40 SYRINGE (ML) INJECTION PRN
Status: CANCELLED | OUTPATIENT
Start: 2024-07-18

## 2024-07-18 RX ORDER — ACETAMINOPHEN 325 MG/1
650 TABLET ORAL
OUTPATIENT
Start: 2024-08-29

## 2024-07-18 RX ORDER — FAMOTIDINE 10 MG/ML
20 INJECTION, SOLUTION INTRAVENOUS
Status: CANCELLED | OUTPATIENT
Start: 2024-07-18

## 2024-07-18 RX ORDER — HEPARIN SODIUM (PORCINE) LOCK FLUSH IV SOLN 100 UNIT/ML 100 UNIT/ML
500 SOLUTION INTRAVENOUS PRN
OUTPATIENT
Start: 2024-08-29

## 2024-07-18 RX ORDER — SODIUM CHLORIDE 9 MG/ML
5-250 INJECTION, SOLUTION INTRAVENOUS PRN
Status: DISCONTINUED | OUTPATIENT
Start: 2024-07-18 | End: 2024-07-19 | Stop reason: HOSPADM

## 2024-07-18 RX ORDER — SODIUM CHLORIDE 9 MG/ML
INJECTION, SOLUTION INTRAVENOUS CONTINUOUS
Status: CANCELLED | OUTPATIENT
Start: 2024-07-18

## 2024-07-18 RX ADMIN — SODIUM CHLORIDE 20 ML/HR: 9 INJECTION, SOLUTION INTRAVENOUS at 12:17

## 2024-07-18 RX ADMIN — SODIUM CHLORIDE 400 MG: 9 INJECTION, SOLUTION INTRAVENOUS at 12:17

## 2024-07-18 NOTE — PROGRESS NOTES
MA Rooming Questions  Patient: Rylie Monaco  MRN: [id number]    Date: 7/18/2024        1. Do you have any new issues?   no         2. Do you need any refills on medications?    no    3. Have you had any imaging done since your last visit?   yes - labs 6/4    4. Have you been hospitalized or seen in the emergency room since your last visit here?   no    5. Did the patient have a depression screening completed today? No    No data recorded     PHQ-9 Given to (if applicable):               PHQ-9 Score (if applicable):                     [] Positive     []  Negative              Does question #9 need addressed (if applicable)                     [] Yes    []  No               Cristal Walter CMA

## 2024-07-18 NOTE — PROGRESS NOTES
Pt here for tx. PIV placed with blood return noted. CBC CMP reviewed from 7/16 and within defined limits. Pt has no new concerns or issues to discuss at this time. Pt states still with itching all over body Dr is aware and prescribed pills which she started on 7/15.     Patient's status assessed and documented appropriately.  All labs and required results were also reviewed today.  Treatment parameters have been reviewed.  Today's treatment has been approved by the provider.        Treatment orders and medication sequencing (when applicable) was verified by 2 registered nurses.  The treatment plan was confirmed with the patient prior to administration, and the patient understands the need to report any treatment-related symptoms.    Prior to administration, when applicable, the following 8 elements of medication administration were reviewed with 2nd Registered Nurse prior to dosing: drug name, drug dose, infusion volume when prepared in a syringe, rate of administration, expiration dates and/or times, appearance and integrity of drug(s), and rate of pump for infusion.  The 5 rights of medication administration have been verified.      Pt tolerated tx without incident left ambulatory discharge instructions given

## 2024-08-13 ENCOUNTER — CLINICAL DOCUMENTATION (OUTPATIENT)
Dept: ONCOLOGY | Age: 89
End: 2024-08-13

## 2024-08-13 NOTE — PROGRESS NOTES
This nurse spoke to the physician about the patient's concerns with itching and it not being controlled by her Atarax. The physician would like for her to add Claritin to her Atarax and her Keytruda schedule for 8/29 will be delayed by 3 weeks.   This nurse called the patient and advised her of the plan of care. The patient verbalized understanding.

## 2024-09-10 RX ORDER — HYDROXYZINE HYDROCHLORIDE 25 MG/1
25 TABLET, FILM COATED ORAL EVERY 8 HOURS PRN
Qty: 90 TABLET | Refills: 0 | Status: SHIPPED | OUTPATIENT
Start: 2024-09-10 | End: 2024-10-10

## 2024-09-12 ENCOUNTER — HOSPITAL ENCOUNTER (OUTPATIENT)
Dept: INFUSION THERAPY | Age: 89
Discharge: HOME | End: 2024-09-12
Payer: MEDICARE

## 2024-09-12 DIAGNOSIS — C17.2 ADENOCARCINOMA OF ILEUM (HCC): ICD-10-CM

## 2024-09-12 DIAGNOSIS — E03.9 ACQUIRED HYPOTHYROIDISM: ICD-10-CM

## 2024-09-12 LAB
ALBUMIN SERPL-MCNC: 3.6 G/DL (ref 3.4–5)
ALBUMIN/GLOB SERPL: 1.5 {RATIO} (ref 1.1–2.2)
ALP SERPL-CCNC: 61 U/L (ref 40–129)
ALT SERPL-CCNC: 31 U/L (ref 10–40)
ANION GAP SERPL CALCULATED.3IONS-SCNC: 11 MMOL/L (ref 9–17)
AST SERPL-CCNC: 28 U/L (ref 15–37)
BASOPHILS # BLD: 0.03 K/UL
BASOPHILS NFR BLD: 0 % (ref 0–1)
BILIRUB SERPL-MCNC: 0.5 MG/DL (ref 0–1)
BUN SERPL-MCNC: 18 MG/DL (ref 7–20)
CALCIUM SERPL-MCNC: 9.3 MG/DL (ref 8.3–10.6)
CHLORIDE SERPL-SCNC: 103 MMOL/L (ref 99–110)
CO2 SERPL-SCNC: 23 MMOL/L (ref 21–32)
CREAT SERPL-MCNC: 1.1 MG/DL (ref 0.6–1.2)
EOSINOPHIL # BLD: 0.26 K/UL
EOSINOPHILS RELATIVE PERCENT: 3 % (ref 0–3)
ERYTHROCYTE [DISTWIDTH] IN BLOOD BY AUTOMATED COUNT: 12.5 % (ref 11.7–14.9)
GFR, ESTIMATED: 46 ML/MIN/1.73M2
GLUCOSE SERPL-MCNC: 120 MG/DL (ref 74–99)
HCT VFR BLD AUTO: 42.3 % (ref 37–47)
HGB BLD-MCNC: 13.7 G/DL (ref 12.5–16)
LYMPHOCYTES NFR BLD: 1.6 K/UL
LYMPHOCYTES RELATIVE PERCENT: 21 % (ref 24–44)
MCH RBC QN AUTO: 30.8 PG (ref 27–31)
MCHC RBC AUTO-ENTMCNC: 32.4 G/DL (ref 32–36)
MCV RBC AUTO: 95.1 FL (ref 78–100)
MONOCYTES NFR BLD: 0.56 K/UL
MONOCYTES NFR BLD: 7 % (ref 0–4)
NEUTROPHILS NFR BLD: 68 % (ref 36–66)
NEUTS SEG NFR BLD: 5.31 K/UL
PLATELET # BLD AUTO: 171 K/UL (ref 140–440)
PMV BLD AUTO: 9.6 FL (ref 7.5–11.1)
POTASSIUM SERPL-SCNC: 5 MMOL/L (ref 3.5–5.1)
PROT SERPL-MCNC: 6.1 G/DL (ref 6.4–8.2)
RBC # BLD AUTO: 4.45 M/UL (ref 4.2–5.4)
SODIUM SERPL-SCNC: 137 MMOL/L (ref 136–145)
TSH SERPL DL<=0.05 MIU/L-ACNC: 1.07 UIU/ML (ref 0.27–4.2)
WBC OTHER # BLD: 7.8 K/UL (ref 4–10.5)

## 2024-09-12 PROCEDURE — 85025 COMPLETE CBC W/AUTO DIFF WBC: CPT

## 2024-09-12 PROCEDURE — 36415 COLL VENOUS BLD VENIPUNCTURE: CPT

## 2024-09-12 PROCEDURE — 80053 COMPREHEN METABOLIC PANEL: CPT

## 2024-09-12 PROCEDURE — 84443 ASSAY THYROID STIM HORMONE: CPT

## 2024-09-13 ENCOUNTER — OFFICE VISIT (OUTPATIENT)
Dept: ONCOLOGY | Age: 89
End: 2024-09-13
Payer: MEDICARE

## 2024-09-13 ENCOUNTER — HOSPITAL ENCOUNTER (OUTPATIENT)
Dept: INFUSION THERAPY | Age: 89
Discharge: HOME | End: 2024-09-13
Payer: MEDICARE

## 2024-09-13 VITALS
BODY MASS INDEX: 27.29 KG/M2 | OXYGEN SATURATION: 97 % | HEART RATE: 70 BPM | HEIGHT: 60 IN | SYSTOLIC BLOOD PRESSURE: 174 MMHG | WEIGHT: 139 LBS | TEMPERATURE: 98.2 F | DIASTOLIC BLOOD PRESSURE: 75 MMHG

## 2024-09-13 VITALS
OXYGEN SATURATION: 97 % | TEMPERATURE: 98.2 F | HEART RATE: 70 BPM | DIASTOLIC BLOOD PRESSURE: 75 MMHG | BODY MASS INDEX: 27.26 KG/M2 | WEIGHT: 139.6 LBS | SYSTOLIC BLOOD PRESSURE: 174 MMHG

## 2024-09-13 DIAGNOSIS — E03.9 ACQUIRED HYPOTHYROIDISM: Primary | ICD-10-CM

## 2024-09-13 DIAGNOSIS — C17.2 ADENOCARCINOMA OF ILEUM (HCC): ICD-10-CM

## 2024-09-13 DIAGNOSIS — C17.2 ADENOCARCINOMA OF ILEUM (HCC): Primary | ICD-10-CM

## 2024-09-13 PROCEDURE — 2580000003 HC RX 258: Performed by: INTERNAL MEDICINE

## 2024-09-13 PROCEDURE — 6360000002 HC RX W HCPCS: Performed by: INTERNAL MEDICINE

## 2024-09-13 PROCEDURE — 1124F ACP DISCUSS-NO DSCNMKR DOCD: CPT | Performed by: INTERNAL MEDICINE

## 2024-09-13 PROCEDURE — 96413 CHEMO IV INFUSION 1 HR: CPT

## 2024-09-13 PROCEDURE — 99214 OFFICE O/P EST MOD 30 MIN: CPT | Performed by: INTERNAL MEDICINE

## 2024-09-13 RX ORDER — SODIUM CHLORIDE 9 MG/ML
5-250 INJECTION, SOLUTION INTRAVENOUS PRN
Status: DISCONTINUED | OUTPATIENT
Start: 2024-09-13 | End: 2024-09-14 | Stop reason: HOSPADM

## 2024-09-13 RX ADMIN — SODIUM CHLORIDE 20 ML/HR: 9 INJECTION, SOLUTION INTRAVENOUS at 12:37

## 2024-09-13 RX ADMIN — SODIUM CHLORIDE 400 MG: 9 INJECTION, SOLUTION INTRAVENOUS at 12:38

## 2024-10-23 ENCOUNTER — HOSPITAL ENCOUNTER (OUTPATIENT)
Dept: INFUSION THERAPY | Age: 89
Discharge: HOME | End: 2024-10-23
Payer: MEDICARE

## 2024-10-23 DIAGNOSIS — I10 ESSENTIAL HYPERTENSION: Chronic | ICD-10-CM

## 2024-10-23 DIAGNOSIS — I10 ESSENTIAL HYPERTENSION: Primary | Chronic | ICD-10-CM

## 2024-10-23 DIAGNOSIS — E03.9 ACQUIRED HYPOTHYROIDISM: ICD-10-CM

## 2024-10-23 LAB
ALBUMIN SERPL-MCNC: 3.4 G/DL (ref 3.4–5)
ALBUMIN/GLOB SERPL: 1.4 {RATIO} (ref 1.1–2.2)
ALP SERPL-CCNC: 59 U/L (ref 40–129)
ALT SERPL-CCNC: 27 U/L (ref 10–40)
ANION GAP SERPL CALCULATED.3IONS-SCNC: 10 MMOL/L (ref 9–17)
AST SERPL-CCNC: 30 U/L (ref 15–37)
BASOPHILS # BLD: 0.02 K/UL
BASOPHILS NFR BLD: 0 % (ref 0–1)
BILIRUB SERPL-MCNC: 0.4 MG/DL (ref 0–1)
BUN SERPL-MCNC: 15 MG/DL (ref 7–20)
CALCIUM SERPL-MCNC: 9 MG/DL (ref 8.3–10.6)
CHLORIDE SERPL-SCNC: 101 MMOL/L (ref 99–110)
CO2 SERPL-SCNC: 24 MMOL/L (ref 21–32)
CREAT SERPL-MCNC: 0.9 MG/DL (ref 0.6–1.2)
EOSINOPHIL # BLD: 0.27 K/UL
EOSINOPHILS RELATIVE PERCENT: 4 % (ref 0–3)
ERYTHROCYTE [DISTWIDTH] IN BLOOD BY AUTOMATED COUNT: 13.1 % (ref 11.7–14.9)
GFR, ESTIMATED: 56 ML/MIN/1.73M2
GLUCOSE SERPL-MCNC: 84 MG/DL (ref 74–99)
HCT VFR BLD AUTO: 36.8 % (ref 37–47)
HGB BLD-MCNC: 12.1 G/DL (ref 12.5–16)
LYMPHOCYTES NFR BLD: 1.04 K/UL
LYMPHOCYTES RELATIVE PERCENT: 17 % (ref 24–44)
MCH RBC QN AUTO: 31 PG (ref 27–31)
MCHC RBC AUTO-ENTMCNC: 32.9 G/DL (ref 32–36)
MCV RBC AUTO: 94.4 FL (ref 78–100)
MONOCYTES NFR BLD: 0.61 K/UL
MONOCYTES NFR BLD: 10 % (ref 0–4)
NEUTROPHILS NFR BLD: 69 % (ref 36–66)
NEUTS SEG NFR BLD: 4.37 K/UL
PLATELET # BLD AUTO: 154 K/UL (ref 140–440)
PMV BLD AUTO: 9.9 FL (ref 7.5–11.1)
POTASSIUM SERPL-SCNC: 4.3 MMOL/L (ref 3.5–5.1)
PROT SERPL-MCNC: 6 G/DL (ref 6.4–8.2)
RBC # BLD AUTO: 3.9 M/UL (ref 4.2–5.4)
SODIUM SERPL-SCNC: 135 MMOL/L (ref 136–145)
T4 FREE SERPL-MCNC: 1.5 NG/DL (ref 0.9–1.8)
TSH SERPL DL<=0.05 MIU/L-ACNC: 0.24 UIU/ML (ref 0.27–4.2)
WBC OTHER # BLD: 6.3 K/UL (ref 4–10.5)

## 2024-10-23 PROCEDURE — 80053 COMPREHEN METABOLIC PANEL: CPT

## 2024-10-23 PROCEDURE — 84439 ASSAY OF FREE THYROXINE: CPT

## 2024-10-23 PROCEDURE — 84443 ASSAY THYROID STIM HORMONE: CPT

## 2024-10-23 PROCEDURE — 36415 COLL VENOUS BLD VENIPUNCTURE: CPT

## 2024-10-23 PROCEDURE — 85025 COMPLETE CBC W/AUTO DIFF WBC: CPT

## 2024-11-08 ENCOUNTER — HOSPITAL ENCOUNTER (OUTPATIENT)
Dept: INFUSION THERAPY | Age: 89
Discharge: HOME | End: 2024-11-08
Payer: MEDICARE

## 2024-11-08 ENCOUNTER — CLINICAL DOCUMENTATION (OUTPATIENT)
Dept: RADIATION ONCOLOGY | Age: 89
End: 2024-11-08

## 2024-11-08 ENCOUNTER — OFFICE VISIT (OUTPATIENT)
Dept: ONCOLOGY | Age: 89
End: 2024-11-08

## 2024-11-08 VITALS
OXYGEN SATURATION: 94 % | BODY MASS INDEX: 25.72 KG/M2 | HEART RATE: 81 BPM | RESPIRATION RATE: 16 BRPM | HEIGHT: 60 IN | WEIGHT: 131 LBS | SYSTOLIC BLOOD PRESSURE: 149 MMHG | TEMPERATURE: 97.8 F | DIASTOLIC BLOOD PRESSURE: 60 MMHG

## 2024-11-08 VITALS
DIASTOLIC BLOOD PRESSURE: 60 MMHG | HEIGHT: 60 IN | HEART RATE: 81 BPM | WEIGHT: 131 LBS | BODY MASS INDEX: 25.72 KG/M2 | OXYGEN SATURATION: 94 % | TEMPERATURE: 97.8 F | SYSTOLIC BLOOD PRESSURE: 149 MMHG

## 2024-11-08 DIAGNOSIS — E03.9 ACQUIRED HYPOTHYROIDISM: Primary | ICD-10-CM

## 2024-11-08 DIAGNOSIS — C17.2 ADENOCARCINOMA OF ILEUM (HCC): ICD-10-CM

## 2024-11-08 DIAGNOSIS — C17.2 ADENOCARCINOMA OF ILEUM (HCC): Primary | ICD-10-CM

## 2024-11-08 LAB
ALBUMIN SERPL-MCNC: 3.2 G/DL (ref 3.4–5)
ALBUMIN/GLOB SERPL: 0.9 {RATIO} (ref 1.1–2.2)
ALP SERPL-CCNC: 59 U/L (ref 40–129)
ALT SERPL-CCNC: 17 U/L (ref 10–40)
ANION GAP SERPL CALCULATED.3IONS-SCNC: 12 MMOL/L (ref 9–17)
AST SERPL-CCNC: 22 U/L (ref 15–37)
BASOPHILS # BLD: 0.02 K/UL
BASOPHILS NFR BLD: 0 % (ref 0–1)
BILIRUB SERPL-MCNC: 0.4 MG/DL (ref 0–1)
BUN SERPL-MCNC: 18 MG/DL (ref 7–20)
CALCIUM SERPL-MCNC: 9.5 MG/DL (ref 8.3–10.6)
CHLORIDE SERPL-SCNC: 98 MMOL/L (ref 99–110)
CO2 SERPL-SCNC: 24 MMOL/L (ref 21–32)
CREAT SERPL-MCNC: 1 MG/DL (ref 0.6–1.2)
EOSINOPHIL # BLD: 0.19 K/UL
EOSINOPHILS RELATIVE PERCENT: 2 % (ref 0–3)
ERYTHROCYTE [DISTWIDTH] IN BLOOD BY AUTOMATED COUNT: 12.8 % (ref 11.7–14.9)
GFR, ESTIMATED: 54 ML/MIN/1.73M2
GLUCOSE SERPL-MCNC: 124 MG/DL (ref 74–99)
HCT VFR BLD AUTO: 35.8 % (ref 37–47)
HGB BLD-MCNC: 11.8 G/DL (ref 12.5–16)
LYMPHOCYTES NFR BLD: 0.69 K/UL
LYMPHOCYTES RELATIVE PERCENT: 8 % (ref 24–44)
MCH RBC QN AUTO: 30.4 PG (ref 27–31)
MCHC RBC AUTO-ENTMCNC: 33 G/DL (ref 32–36)
MCV RBC AUTO: 92.3 FL (ref 78–100)
MONOCYTES NFR BLD: 0.55 K/UL
MONOCYTES NFR BLD: 6 % (ref 0–4)
NEUTROPHILS NFR BLD: 83 % (ref 36–66)
NEUTS SEG NFR BLD: 7.27 K/UL
PLATELET # BLD AUTO: 263 K/UL (ref 140–440)
PMV BLD AUTO: 9.6 FL (ref 7.5–11.1)
POTASSIUM SERPL-SCNC: 3.7 MMOL/L (ref 3.5–5.1)
PROT SERPL-MCNC: 6.6 G/DL (ref 6.4–8.2)
RBC # BLD AUTO: 3.88 M/UL (ref 4.2–5.4)
SODIUM SERPL-SCNC: 133 MMOL/L (ref 136–145)
T4 FREE SERPL-MCNC: 1.5 NG/DL (ref 0.9–1.8)
TSH SERPL DL<=0.05 MIU/L-ACNC: 0.25 UIU/ML (ref 0.27–4.2)
WBC OTHER # BLD: 8.7 K/UL (ref 4–10.5)

## 2024-11-08 PROCEDURE — 85025 COMPLETE CBC W/AUTO DIFF WBC: CPT

## 2024-11-08 PROCEDURE — 84439 ASSAY OF FREE THYROXINE: CPT

## 2024-11-08 PROCEDURE — 6360000002 HC RX W HCPCS: Performed by: PHYSICIAN ASSISTANT

## 2024-11-08 PROCEDURE — 2580000003 HC RX 258: Performed by: PHYSICIAN ASSISTANT

## 2024-11-08 PROCEDURE — 96413 CHEMO IV INFUSION 1 HR: CPT

## 2024-11-08 PROCEDURE — 84443 ASSAY THYROID STIM HORMONE: CPT

## 2024-11-08 PROCEDURE — 80053 COMPREHEN METABOLIC PANEL: CPT

## 2024-11-08 RX ORDER — FAMOTIDINE 10 MG/ML
20 INJECTION, SOLUTION INTRAVENOUS
Status: CANCELLED | OUTPATIENT
Start: 2024-11-08

## 2024-11-08 RX ORDER — SODIUM CHLORIDE 9 MG/ML
5-250 INJECTION, SOLUTION INTRAVENOUS PRN
Status: CANCELLED | OUTPATIENT
Start: 2024-11-08

## 2024-11-08 RX ORDER — ONDANSETRON 2 MG/ML
8 INJECTION INTRAMUSCULAR; INTRAVENOUS
Status: CANCELLED | OUTPATIENT
Start: 2024-11-08

## 2024-11-08 RX ORDER — DIPHENHYDRAMINE HYDROCHLORIDE 50 MG/ML
50 INJECTION INTRAMUSCULAR; INTRAVENOUS
Status: CANCELLED | OUTPATIENT
Start: 2024-11-08

## 2024-11-08 RX ORDER — SODIUM CHLORIDE 0.9 % (FLUSH) 0.9 %
5-40 SYRINGE (ML) INJECTION PRN
Status: CANCELLED | OUTPATIENT
Start: 2024-11-08

## 2024-11-08 RX ORDER — HEPARIN 100 UNIT/ML
500 SYRINGE INTRAVENOUS PRN
Status: CANCELLED | OUTPATIENT
Start: 2024-11-08

## 2024-11-08 RX ORDER — SODIUM CHLORIDE 9 MG/ML
INJECTION, SOLUTION INTRAVENOUS CONTINUOUS
Status: CANCELLED | OUTPATIENT
Start: 2024-11-08

## 2024-11-08 RX ORDER — ALBUTEROL SULFATE 90 UG/1
4 INHALANT RESPIRATORY (INHALATION) PRN
Status: CANCELLED | OUTPATIENT
Start: 2024-11-08

## 2024-11-08 RX ORDER — ACETAMINOPHEN 325 MG/1
650 TABLET ORAL
Status: CANCELLED | OUTPATIENT
Start: 2024-11-08

## 2024-11-08 RX ORDER — EPINEPHRINE 1 MG/ML
0.3 INJECTION, SOLUTION, CONCENTRATE INTRAVENOUS PRN
Status: CANCELLED | OUTPATIENT
Start: 2024-11-08

## 2024-11-08 RX ADMIN — SODIUM CHLORIDE 400 MG: 9 INJECTION, SOLUTION INTRAVENOUS at 12:24

## 2024-11-08 NOTE — PROGRESS NOTES
MA Rooming Questions  Patient: Rylie Monaco  MRN: [id number]    Date: 11/8/2024        1. Do you have any new issues?   yes - decrease in appetite          2. Do you need any refills on medications?    no    3. Have you had any imaging done since your last visit?   no    4. Have you been hospitalized or seen in the emergency room since your last visit here?   no    5. Did the patient have a depression screening completed today? No    No data recorded     PHQ-9 Given to (if applicable):               PHQ-9 Score (if applicable):                     [] Positive     []  Negative              Does question #9 need addressed (if applicable)                     [] Yes    []  No               Rebekah Armstrong MA      
incidental lipoma in transverse colon     Biopsy from cecal mass showed invasive moderately differentiated adenocarcinoma. MRI pelvis showed complex ovarian cyst without internal enhancement to suggest solid mass. Radiologist recommend f/u imaging with CT or MRI in 6 months.      She is s/p surgery on 5/27/22. Final pathology revealed that she has stage III terminal ileum adenocarcinoma (pT3, pN1c).     She has lost about 25 lbs over last 2 - 3 months before surgery. Her CEA on 5/22/22 was 3.7.    Adjuvant chemotherapy with xeloda was started on 7/26/2022 and we stopped it after 9/6/22 due to severe oral mucositis.     We changed her chemo to weekly 5Fu and leukovorin. It was started on 11/9/22.     CT abdomen and pelvis with IV contrast on 12/2/2022 showed further evaluation and growth of the pelvic mass to the right of midline which currently measures up to 5.1 x 4.2 cm in largest axial diameter compared to 4.4 0.1 cm previously.  This mass has a malignant appearing with malignant colonic stricturing of the sigmoid colon.  This mass may originate from the sigmoid colon with exophytic extension and invasion of the right adnexa and the right uterus.  No obvious metastatic disease.  No evidence of significant lymphadenopathy.    She underwent exploratory laparotomy, BRAN, BSO, sigmoid colon resection with side to side anastomosis, resection of R ureter, ureteroneocystostomy, resection of mass for history of pelvic mass and colo-uterine fistula on 3/10/23.  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  Margins are negative for neoplasm.  Metastatic carcinoma involving 3 of 14 lymph nodes.  1 tumor deposit present.  Myometrium is extensively involved by

## 2024-11-08 NOTE — PROGRESS NOTES
Patient arrived stable and ambulatory for D1 C4 of Keytruda infusion. Patient denied any questions or concerns. PIV started in right forearm x1 attempt. Brisk blood return noted. Labs collected and sent to lab. Line flushed with normal saline and capped. Pt had skin rash in the past. Patient denies having rash today. Pt to have OV with Dr. Ceuva.    Labs reviewed - meets parameters for tx for today. Pt returned from OV. Okay to proceed with treatment. Orders released to pharmacy.    Keytruda infused as ordered and without incident. PIV flushed and removed by PATRICIA Marrero. Printed AVS given to patient. Patient Dc'd stable and ambulatory.

## 2024-11-08 NOTE — PROGRESS NOTES
Pt expressed interest in obtaining a wig from the [facility]. Pt was given wig information and encouraged to attend the November Look Good Feel Better program. A [facility] handout was provided.

## 2024-11-11 ENCOUNTER — TELEPHONE (OUTPATIENT)
Dept: ONCOLOGY | Age: 89
End: 2024-11-11

## 2024-11-11 NOTE — TELEPHONE ENCOUNTER
Spoke to patient regarding CT scheduled for 12/6 with 0930 arrival time at [facility] facility. NPO 4 hrs prior. Patient voiced understanding.

## 2024-11-12 ENCOUNTER — CLINICAL DOCUMENTATION (OUTPATIENT)
Dept: ONCOLOGY | Age: 89
End: 2024-11-12

## 2024-11-12 NOTE — PROGRESS NOTES
Patient Assistance    Spoke with Rylie about renewing [employer] Financial Assistance and also obtaining [facility] to help with OOP costs in 2025. She is approved.    Lyndsay Fung  Financial Navigator    Navigator Type: Infusion  Documentation Type: Assistance Review  Contact Type: Telephone  Status of Patient Insurance Coverage: Patient has active coverage  Form of PAP Assistance: [facility]  Patient Assistance Sponsor Name: [facility]              Drug Name: Keytruda  Form of PAP Assistance: Cop[employer] / [employer] (Approved)

## 2024-11-27 ENCOUNTER — CLINICAL DOCUMENTATION (OUTPATIENT)
Dept: ONCOLOGY | Age: 88
End: 2024-11-27

## 2024-11-27 NOTE — PROGRESS NOTES
11/27/24 spoke to Christa at [facility] authorizations she got the pt's CT chest approved and gave me the information for the denial of CT AP. Case # [id number]. Call [phone] the [facility] side to schedule the Peer to Peer.

## 2024-12-02 ENCOUNTER — TELEPHONE (OUTPATIENT)
Dept: ONCOLOGY | Age: 88
End: 2024-12-02

## 2024-12-02 NOTE — TELEPHONE ENCOUNTER
----- Message from Dr. Earle Cueva MD sent at 11/27/2024  5:12 PM EST -----  Dear Ms. Brandora,  I talked to her insurance and they approved for CT abdomen/pelvis. Authorization code is -   [id number]  Thank you so much,  Earle

## 2024-12-04 ENCOUNTER — OFFICE VISIT (OUTPATIENT)
Dept: CARDIOLOGY CLINIC | Age: 88
End: 2024-12-04
Payer: MEDICARE

## 2024-12-04 VITALS
DIASTOLIC BLOOD PRESSURE: 68 MMHG | HEIGHT: 60 IN | BODY MASS INDEX: 25.05 KG/M2 | WEIGHT: 127.6 LBS | HEART RATE: 90 BPM | SYSTOLIC BLOOD PRESSURE: 114 MMHG

## 2024-12-04 DIAGNOSIS — I25.10 CORONARY ARTERY DISEASE INVOLVING NATIVE CORONARY ARTERY OF NATIVE HEART WITHOUT ANGINA PECTORIS: ICD-10-CM

## 2024-12-04 DIAGNOSIS — I10 ESSENTIAL HYPERTENSION: Primary | ICD-10-CM

## 2024-12-04 PROCEDURE — 1159F MED LIST DOCD IN RCRD: CPT | Performed by: INTERNAL MEDICINE

## 2024-12-04 PROCEDURE — 99214 OFFICE O/P EST MOD 30 MIN: CPT | Performed by: INTERNAL MEDICINE

## 2024-12-04 PROCEDURE — 1124F ACP DISCUSS-NO DSCNMKR DOCD: CPT | Performed by: INTERNAL MEDICINE

## 2024-12-04 PROCEDURE — 93000 ELECTROCARDIOGRAM COMPLETE: CPT | Performed by: INTERNAL MEDICINE

## 2024-12-04 NOTE — PROGRESS NOTES
Mirella Genao MD        OFFICE  FOLLOWUP NOTE    Chief complaints: patient is here for management of stage 3 colon cancer s/p sx.DM, HTN, DYSLPIDEMIA, leg swelling, hypothyroidism     Subjective: patient feels better, no chest pain, no shortness of breath, no dizziness, no palpitations    HPI Rylie is a 89 y.o.year old who  has a past medical history of Cancer (HCC), Diabetes mellitus (HCC), H/O cardiac catheterization, H/O cardiovascular stress test, H/O cardiovascular stress test, H/O Doppler ultrasound (Abdomimal Aorta), H/O echocardiogram, Hx of echocardiogram, Hyperlipidemia, Hypertension, Sleep apnea, and Thyroid disease. and presents for management of stage 3 colon cancer s/p sx.DM, HTN, DYSLPIDEMIA, leg swelling, hypothyroidism  which are well controlled      Current Outpatient Medications   Medication Sig Dispense Refill    magnesium oxide (MAG-OX) 400 (240 Mg) MG tablet TAKE ONE TABLET BY MOUTH DAILY AT 9AM 30 tablet 0    levothyroxine (SYNTHROID) 88 MCG tablet Take 1 tablet by mouth daily 30 tablet 4    magnesium oxide (MAG-OX) 400 MG tablet Take 1 tablet by mouth daily 90 tablet 1    ondansetron (ZOFRAN) 8 MG tablet Take 1 tablet by mouth every 8 hours as needed for Nausea or Vomiting 90 tablet 3    Skin Protectants, Misc. (EUCERIN) cream Apply topically as needed. 500 mL 1    B-Complex CAPS Take by mouth daily      triamcinolone (KENALOG) 0.1 % cream       loratadine (CLARITIN) 10 MG tablet Take 1 tablet by mouth 3 times daily      oxybutynin (DITROPAN) 5 MG tablet Take 1 tablet by mouth 3 times daily      furosemide (LASIX) 20 MG tablet TAKE 1 TABLET BY MOUTH AS NEEDED FOR WEIGHT GAIN OF 3LBS IN A DAY OR 5 LBS IN A WEEK 30 tablet 10    potassium chloride (KLOR-CON M) 20 MEQ extended release tablet Take 1 tablet by mouth daily as needed (with Lasix) 30 tablet 0    gabapentin (NEURONTIN) 100 MG capsule       metoprolol tartrate (LOPRESSOR) 50 MG tablet Take 1 tablet by mouth in the

## 2024-12-06 ENCOUNTER — HOSPITAL ENCOUNTER (OUTPATIENT)
Dept: CT IMAGING | Age: 88
Discharge: HOME | End: 2024-12-06
Attending: INTERNAL MEDICINE
Payer: MEDICARE

## 2024-12-06 DIAGNOSIS — C17.2 ADENOCARCINOMA OF ILEUM (HCC): ICD-10-CM

## 2024-12-06 PROCEDURE — 6360000004 HC RX CONTRAST MEDICATION: Performed by: INTERNAL MEDICINE

## 2024-12-06 PROCEDURE — 71260 CT THORAX DX C+: CPT

## 2024-12-06 PROCEDURE — 74177 CT ABD & PELVIS W/CONTRAST: CPT

## 2024-12-06 RX ORDER — IOPAMIDOL 755 MG/ML
20 INJECTION, SOLUTION INTRAVASCULAR
Status: COMPLETED | OUTPATIENT
Start: 2024-12-06 | End: 2024-12-06

## 2024-12-06 RX ORDER — IOPAMIDOL 755 MG/ML
100 INJECTION, SOLUTION INTRAVASCULAR
Status: COMPLETED | OUTPATIENT
Start: 2024-12-06 | End: 2024-12-06

## 2024-12-06 RX ADMIN — IOPAMIDOL 20 ML: 755 INJECTION, SOLUTION INTRAVENOUS at 10:43

## 2024-12-06 RX ADMIN — IOPAMIDOL 100 ML: 755 INJECTION, SOLUTION INTRAVENOUS at 12:15

## 2024-12-13 DIAGNOSIS — N12 PYELONEPHRITIS: ICD-10-CM

## 2024-12-13 DIAGNOSIS — N30.90 CYSTITIS: Primary | ICD-10-CM

## 2024-12-13 RX ORDER — LEVOFLOXACIN 250 MG/1
250 TABLET, FILM COATED ORAL DAILY
Qty: 10 TABLET | Refills: 0 | Status: SHIPPED | OUTPATIENT
Start: 2024-12-13 | End: 2024-12-23

## 2024-12-13 NOTE — TELEPHONE ENCOUNTER
CT AP results reviewed and indicate possible cystitis and pyelonephritis. UA needed. Called patient @ [phone] to notify and inquire if patient symptomatic. Patient c/o mild dysuria (pain and burning). Patient states she is unable to come to clinic to provide urine sample today 12/12/2024 but will be available Monday 12/16/2024. UA with reflex to culture ordered. Physician updated and recommending to prescribe keflex 500 mg BID; however, patient has allergies to cefdinir and amoxicillin. RX for levaquin 250 mg daily x10 days e-scribed to [facility] pharmacy in [place] (Patient has allergy to Cipro. Called patient to inquire if she can tolerate levaquin since she has taken in the past. Patient does not recall any issues with levaquin). No further needs addressed at this time.

## 2024-12-16 ENCOUNTER — HOSPITAL ENCOUNTER (OUTPATIENT)
Dept: INFUSION THERAPY | Age: 88
Discharge: HOME | End: 2024-12-16
Payer: MEDICARE

## 2024-12-16 DIAGNOSIS — N12 PYELONEPHRITIS: ICD-10-CM

## 2024-12-16 DIAGNOSIS — N30.90 CYSTITIS: Primary | ICD-10-CM

## 2024-12-16 DIAGNOSIS — N30.90 CYSTITIS: ICD-10-CM

## 2024-12-16 LAB
ALBUMIN SERPL-MCNC: 3.1 G/DL (ref 3.4–5)
ALBUMIN/GLOB SERPL: 1.2 {RATIO} (ref 1.1–2.2)
ALP SERPL-CCNC: 66 U/L (ref 40–129)
ALT SERPL-CCNC: 17 U/L (ref 10–40)
ANION GAP SERPL CALCULATED.3IONS-SCNC: 13 MMOL/L (ref 9–17)
AST SERPL-CCNC: 22 U/L (ref 15–37)
BASOPHILS # BLD: 0.02 K/UL
BASOPHILS NFR BLD: 0 % (ref 0–1)
BILIRUB SERPL-MCNC: 0.2 MG/DL (ref 0–1)
BUN SERPL-MCNC: 15 MG/DL (ref 7–20)
CALCIUM SERPL-MCNC: 8.9 MG/DL (ref 8.3–10.6)
CHLORIDE SERPL-SCNC: 101 MMOL/L (ref 99–110)
CO2 SERPL-SCNC: 20 MMOL/L (ref 21–32)
CREAT SERPL-MCNC: 1.1 MG/DL (ref 0.6–1.2)
EOSINOPHIL # BLD: 0.23 K/UL
EOSINOPHILS RELATIVE PERCENT: 3 % (ref 0–3)
ERYTHROCYTE [DISTWIDTH] IN BLOOD BY AUTOMATED COUNT: 13.8 % (ref 11.7–14.9)
GFR, ESTIMATED: 45 ML/MIN/1.73M2
GLUCOSE SERPL-MCNC: 116 MG/DL (ref 74–99)
HCT VFR BLD AUTO: 37.4 % (ref 37–47)
HGB BLD-MCNC: 11.7 G/DL (ref 12.5–16)
LYMPHOCYTES NFR BLD: 1.24 K/UL
LYMPHOCYTES RELATIVE PERCENT: 14 % (ref 24–44)
MCH RBC QN AUTO: 29.5 PG (ref 27–31)
MCHC RBC AUTO-ENTMCNC: 31.3 G/DL (ref 32–36)
MCV RBC AUTO: 94.4 FL (ref 78–100)
MONOCYTES NFR BLD: 0.7 K/UL
MONOCYTES NFR BLD: 8 % (ref 0–4)
NEUTROPHILS NFR BLD: 75 % (ref 36–66)
NEUTS SEG NFR BLD: 6.64 K/UL
PLATELET # BLD AUTO: 208 K/UL (ref 140–440)
PMV BLD AUTO: 9.5 FL (ref 7.5–11.1)
POTASSIUM SERPL-SCNC: 4.4 MMOL/L (ref 3.5–5.1)
PROT SERPL-MCNC: 5.7 G/DL (ref 6.4–8.2)
RBC # BLD AUTO: 3.96 M/UL (ref 4.2–5.4)
SODIUM SERPL-SCNC: 134 MMOL/L (ref 136–145)
WBC OTHER # BLD: 8.8 K/UL (ref 4–10.5)

## 2024-12-16 PROCEDURE — 36415 COLL VENOUS BLD VENIPUNCTURE: CPT

## 2024-12-16 PROCEDURE — 85025 COMPLETE CBC W/AUTO DIFF WBC: CPT

## 2024-12-16 PROCEDURE — 80053 COMPREHEN METABOLIC PANEL: CPT

## 2024-12-16 NOTE — PROGRESS NOTES
Patient presented to clinic for UA today 12/16/2024. Patient requesting to have labs drawn as well per . Orders placed for CBC and CMP.

## 2024-12-17 DIAGNOSIS — C17.2 ADENOCARCINOMA OF ILEUM (HCC): ICD-10-CM

## 2024-12-17 DIAGNOSIS — E03.9 ACQUIRED HYPOTHYROIDISM: Primary | ICD-10-CM

## 2024-12-17 RX ORDER — ONDANSETRON 2 MG/ML
8 INJECTION INTRAMUSCULAR; INTRAVENOUS
Status: CANCELLED | OUTPATIENT
Start: 2024-12-20

## 2024-12-17 RX ORDER — EPINEPHRINE 1 MG/ML
0.3 INJECTION, SOLUTION, CONCENTRATE INTRAVENOUS PRN
OUTPATIENT
Start: 2025-01-31

## 2024-12-17 RX ORDER — HEPARIN SODIUM (PORCINE) LOCK FLUSH IV SOLN 100 UNIT/ML 100 UNIT/ML
500 SOLUTION INTRAVENOUS PRN
OUTPATIENT
Start: 2025-01-31

## 2024-12-17 RX ORDER — EPINEPHRINE 1 MG/ML
0.3 INJECTION, SOLUTION, CONCENTRATE INTRAVENOUS PRN
Status: CANCELLED | OUTPATIENT
Start: 2024-12-20

## 2024-12-17 RX ORDER — ONDANSETRON 2 MG/ML
8 INJECTION INTRAMUSCULAR; INTRAVENOUS
OUTPATIENT
Start: 2025-01-31

## 2024-12-17 RX ORDER — SODIUM CHLORIDE 9 MG/ML
5-250 INJECTION, SOLUTION INTRAVENOUS PRN
OUTPATIENT
Start: 2025-01-31

## 2024-12-17 RX ORDER — SODIUM CHLORIDE 9 MG/ML
INJECTION, SOLUTION INTRAVENOUS CONTINUOUS
Status: CANCELLED | OUTPATIENT
Start: 2024-12-20

## 2024-12-17 RX ORDER — SODIUM CHLORIDE 9 MG/ML
INJECTION, SOLUTION INTRAVENOUS CONTINUOUS
OUTPATIENT
Start: 2025-01-31

## 2024-12-17 RX ORDER — ALBUTEROL SULFATE 90 UG/1
4 INHALANT RESPIRATORY (INHALATION) PRN
OUTPATIENT
Start: 2025-01-31

## 2024-12-17 RX ORDER — ALBUTEROL SULFATE 90 UG/1
4 INHALANT RESPIRATORY (INHALATION) PRN
Status: CANCELLED | OUTPATIENT
Start: 2024-12-20

## 2024-12-17 RX ORDER — SODIUM CHLORIDE 9 MG/ML
5-250 INJECTION, SOLUTION INTRAVENOUS PRN
Status: CANCELLED | OUTPATIENT
Start: 2024-12-20

## 2024-12-17 RX ORDER — SODIUM CHLORIDE 0.9 % (FLUSH) 0.9 %
5-40 SYRINGE (ML) INJECTION PRN
OUTPATIENT
Start: 2025-01-31

## 2024-12-17 RX ORDER — SODIUM CHLORIDE 0.9 % (FLUSH) 0.9 %
5-40 SYRINGE (ML) INJECTION PRN
Status: CANCELLED | OUTPATIENT
Start: 2024-12-20

## 2024-12-17 RX ORDER — FAMOTIDINE 10 MG/ML
20 INJECTION, SOLUTION INTRAVENOUS
OUTPATIENT
Start: 2025-01-31

## 2024-12-17 RX ORDER — HEPARIN SODIUM (PORCINE) LOCK FLUSH IV SOLN 100 UNIT/ML 100 UNIT/ML
500 SOLUTION INTRAVENOUS PRN
Status: CANCELLED | OUTPATIENT
Start: 2024-12-20

## 2024-12-17 RX ORDER — HYDROCORTISONE SODIUM SUCCINATE 100 MG/2ML
100 INJECTION INTRAMUSCULAR; INTRAVENOUS
Status: CANCELLED | OUTPATIENT
Start: 2024-12-20

## 2024-12-17 RX ORDER — ACETAMINOPHEN 325 MG/1
650 TABLET ORAL
OUTPATIENT
Start: 2025-01-31

## 2024-12-17 RX ORDER — HYDROCORTISONE SODIUM SUCCINATE 100 MG/2ML
100 INJECTION INTRAMUSCULAR; INTRAVENOUS
OUTPATIENT
Start: 2025-01-31

## 2024-12-17 RX ORDER — ACETAMINOPHEN 325 MG/1
650 TABLET ORAL
Status: CANCELLED | OUTPATIENT
Start: 2024-12-20

## 2024-12-17 RX ORDER — DIPHENHYDRAMINE HYDROCHLORIDE 50 MG/ML
50 INJECTION INTRAMUSCULAR; INTRAVENOUS
OUTPATIENT
Start: 2025-01-31

## 2024-12-17 RX ORDER — DIPHENHYDRAMINE HYDROCHLORIDE 50 MG/ML
50 INJECTION INTRAMUSCULAR; INTRAVENOUS
Status: CANCELLED | OUTPATIENT
Start: 2024-12-20

## 2024-12-17 RX ORDER — FAMOTIDINE 10 MG/ML
20 INJECTION, SOLUTION INTRAVENOUS
Status: CANCELLED | OUTPATIENT
Start: 2024-12-20

## 2024-12-20 ENCOUNTER — OFFICE VISIT (OUTPATIENT)
Dept: ONCOLOGY | Age: 88
End: 2024-12-20
Payer: MEDICARE

## 2024-12-20 ENCOUNTER — HOSPITAL ENCOUNTER (OUTPATIENT)
Dept: INFUSION THERAPY | Age: 88
Discharge: HOME | End: 2024-12-20
Payer: MEDICARE

## 2024-12-20 VITALS
DIASTOLIC BLOOD PRESSURE: 67 MMHG | HEIGHT: 60 IN | WEIGHT: 127.8 LBS | OXYGEN SATURATION: 98 % | SYSTOLIC BLOOD PRESSURE: 113 MMHG | BODY MASS INDEX: 25.09 KG/M2 | HEART RATE: 67 BPM | TEMPERATURE: 97.9 F

## 2024-12-20 VITALS
WEIGHT: 127 LBS | RESPIRATION RATE: 16 BRPM | HEIGHT: 60 IN | DIASTOLIC BLOOD PRESSURE: 67 MMHG | OXYGEN SATURATION: 98 % | TEMPERATURE: 97.9 F | HEART RATE: 67 BPM | SYSTOLIC BLOOD PRESSURE: 113 MMHG | BODY MASS INDEX: 24.94 KG/M2

## 2024-12-20 DIAGNOSIS — E03.9 ACQUIRED HYPOTHYROIDISM: Primary | ICD-10-CM

## 2024-12-20 DIAGNOSIS — C17.2 ADENOCARCINOMA OF ILEUM (HCC): ICD-10-CM

## 2024-12-20 DIAGNOSIS — C17.2 ADENOCARCINOMA OF ILEUM (HCC): Primary | ICD-10-CM

## 2024-12-20 PROCEDURE — 1159F MED LIST DOCD IN RCRD: CPT | Performed by: INTERNAL MEDICINE

## 2024-12-20 PROCEDURE — 6360000002 HC RX W HCPCS: Performed by: INTERNAL MEDICINE

## 2024-12-20 PROCEDURE — 1126F AMNT PAIN NOTED NONE PRSNT: CPT | Performed by: INTERNAL MEDICINE

## 2024-12-20 PROCEDURE — 96413 CHEMO IV INFUSION 1 HR: CPT

## 2024-12-20 PROCEDURE — 1124F ACP DISCUSS-NO DSCNMKR DOCD: CPT | Performed by: INTERNAL MEDICINE

## 2024-12-20 PROCEDURE — 99214 OFFICE O/P EST MOD 30 MIN: CPT | Performed by: INTERNAL MEDICINE

## 2024-12-20 PROCEDURE — 2580000003 HC RX 258: Performed by: INTERNAL MEDICINE

## 2024-12-20 RX ORDER — SODIUM CHLORIDE 9 MG/ML
5-250 INJECTION, SOLUTION INTRAVENOUS PRN
Status: DISCONTINUED | OUTPATIENT
Start: 2024-12-20 | End: 2024-12-21 | Stop reason: HOSPADM

## 2024-12-20 RX ORDER — SODIUM CHLORIDE 0.9 % (FLUSH) 0.9 %
5-40 SYRINGE (ML) INJECTION PRN
Status: DISCONTINUED | OUTPATIENT
Start: 2024-12-20 | End: 2024-12-21 | Stop reason: HOSPADM

## 2024-12-20 RX ADMIN — SODIUM CHLORIDE 400 MG: 9 INJECTION, SOLUTION INTRAVENOUS at 12:18

## 2024-12-20 ASSESSMENT — PATIENT HEALTH QUESTIONNAIRE - PHQ9
SUM OF ALL RESPONSES TO PHQ QUESTIONS 1-9: 0
2. FEELING DOWN, DEPRESSED OR HOPELESS: NOT AT ALL
SUM OF ALL RESPONSES TO PHQ QUESTIONS 1-9: 0
SUM OF ALL RESPONSES TO PHQ9 QUESTIONS 1 & 2: 0
1. LITTLE INTEREST OR PLEASURE IN DOING THINGS: NOT AT ALL

## 2024-12-20 NOTE — PROGRESS NOTES
MA Rooming Questions  Patient: Rylie Monaco  MRN: [id number]    Date: 12/20/2024        1. Do you have any new issues?   no         2. Do you need any refills on medications?    no    3. Have you had any imaging done since your last visit?   yes - CT    4. Have you been hospitalized or seen in the emergency room since your last visit here?   no    5. Did the patient have a depression screening completed today? Yes    No data recorded     PHQ-9 Given to (if applicable):               PHQ-9 Score (if applicable):                     [] Positive     []  Negative              Does question #9 need addressed (if applicable)                     [] Yes    []  No               Vita Pavon CMA      
02/08/2024    POCGLU 83 11/04/2022     Lab Results   Component Value Date     07/08/2022     No results found for: \"LD\"  Lab Results   Component Value Date    TSHHS 0.493 07/16/2024    T4FREE 1.5 11/08/2024    FT3 2.3 12/06/2017     Immunology:  No results found for: \"SPEP\", \"ALBUMINELP\", \"LABALPH\", \"LABBETA\", \"GAMGLOB\"    No results found for: \"KAPPAUVOL\", \"LAMBDAUVOL\", \"KLFLCR\"  No results found for: \"B2M\"  Coagulation Panel:  Lab Results   Component Value Date    PROTIME 26.8 (H) 07/05/2022    INR 2.14 07/05/2022    APTT 29.5 05/14/2021    DDIMER <200 01/24/2016     Anemia Panel:  Lab Results   Component Value Date    FBPXNAUW06 1035 (H) 07/08/2022    FOLATE 11.3 07/08/2022     Tumor Markers:  Lab Results   Component Value Date     49 (H) 11/02/2022    CEA 5.2 (H) 12/19/2023     Observations:  PHQ-9 Total Score: 0 (12/20/2024 11:11 AM)      Assessment   Stage III terminal ileum adenocarcinoma.     Plan:  I reviewed with her findings on CT scan, US pelvis, colonoscopy and labs. Cecal lesion is concerning for malignant process and right adnexa cystic lesion needs further evaluation with MRI.      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 MRI pelvis showed complex ovarian cyst without internal enhancement to suggest solid mass. Radiologist recommend f/u imaging with CT or MRI in 6 months.      She is s/p surgery on 5/27/22. Final pathology revealed that she has stage III terminal ileum adenocarcinoma (pT3, pN1c).     She has lost about 25 lbs over last 2 - 3 months before surgery. Her CEA on 5/22/22 was 3.7.    Adjuvant chemotherapy with xeloda was started on 7/26/2022 and we stopped it after 9/6/22 due to severe oral mucositis.     We changed her chemo to weekly 5Fu and leukovorin. It was started on 11/9/22.     CT abdomen and pelvis with IV contrast on 12/2/2022 showed further evaluation and growth of the pelvic mass to the right of midline which currently

## 2024-12-20 NOTE — PROGRESS NOTES
Patient arrived stable and ambulatory for D1 C5 of Keytruda infusion. Patient denied any questions or concerns. PIV started in right forearm x1 attempt. Brisk blood return noted. Labs collected and sent to lab. Line flushed with normal saline and capped.     Pt reports mild fatigue. Pt states she has occasional itching but states her rash and itching are \"uncontrol.\" No rash noted today. Pt to have OV with Dr. Cueva.     Labs reviewed - meets parameters for tx for today. Orders released to pharmacy.     Keytruda infused as ordered and without incident. PIV flushed and removed with catheter tip intact. Printed AVS given to patient. Patient Dc'd stable and ambulatory, accompanied by .

## 2025-01-30 ENCOUNTER — HOSPITAL ENCOUNTER (OUTPATIENT)
Dept: INFUSION THERAPY | Age: 89
Discharge: HOME | End: 2025-01-30
Payer: MEDICARE

## 2025-01-30 DIAGNOSIS — C17.2 ADENOCARCINOMA OF ILEUM (HCC): ICD-10-CM

## 2025-01-30 DIAGNOSIS — E03.9 ACQUIRED HYPOTHYROIDISM: ICD-10-CM

## 2025-01-30 LAB
ALBUMIN SERPL-MCNC: 3.5 G/DL (ref 3.4–5)
ALBUMIN/GLOB SERPL: 1.5 {RATIO} (ref 1.1–2.2)
ALP SERPL-CCNC: 65 U/L (ref 40–129)
ALT SERPL-CCNC: 34 U/L (ref 10–40)
ANION GAP SERPL CALCULATED.3IONS-SCNC: 10 MMOL/L (ref 9–17)
AST SERPL-CCNC: 32 U/L (ref 15–37)
BASOPHILS # BLD: 0.02 K/UL
BASOPHILS NFR BLD: 0 % (ref 0–1)
BILIRUB SERPL-MCNC: 0.3 MG/DL (ref 0–1)
BUN SERPL-MCNC: 25 MG/DL (ref 7–20)
CALCIUM SERPL-MCNC: 9.2 MG/DL (ref 8.3–10.6)
CHLORIDE SERPL-SCNC: 105 MMOL/L (ref 99–110)
CO2 SERPL-SCNC: 25 MMOL/L (ref 21–32)
CREAT SERPL-MCNC: 1.1 MG/DL (ref 0.6–1.2)
EOSINOPHIL # BLD: 0.25 K/UL
EOSINOPHILS RELATIVE PERCENT: 3 % (ref 0–3)
ERYTHROCYTE [DISTWIDTH] IN BLOOD BY AUTOMATED COUNT: 14.6 % (ref 11.7–14.9)
GFR, ESTIMATED: 47 ML/MIN/1.73M2
GLUCOSE SERPL-MCNC: 87 MG/DL (ref 74–99)
HCT VFR BLD AUTO: 39.6 % (ref 37–47)
HGB BLD-MCNC: 12.8 G/DL (ref 12.5–16)
LYMPHOCYTES NFR BLD: 1.62 K/UL
LYMPHOCYTES RELATIVE PERCENT: 21 % (ref 24–44)
MCH RBC QN AUTO: 30.2 PG (ref 27–31)
MCHC RBC AUTO-ENTMCNC: 32.3 G/DL (ref 32–36)
MCV RBC AUTO: 93.4 FL (ref 78–100)
MONOCYTES NFR BLD: 0.55 K/UL
MONOCYTES NFR BLD: 7 % (ref 0–4)
NEUTROPHILS NFR BLD: 69 % (ref 36–66)
NEUTS SEG NFR BLD: 5.36 K/UL
PLATELET # BLD AUTO: 161 K/UL (ref 140–440)
PMV BLD AUTO: 9.9 FL (ref 7.5–11.1)
POTASSIUM SERPL-SCNC: 4.3 MMOL/L (ref 3.5–5.1)
PROT SERPL-MCNC: 5.8 G/DL (ref 6.4–8.2)
RBC # BLD AUTO: 4.24 M/UL (ref 4.2–5.4)
SODIUM SERPL-SCNC: 140 MMOL/L (ref 136–145)
TSH SERPL DL<=0.05 MIU/L-ACNC: 0.43 UIU/ML (ref 0.27–4.2)
WBC OTHER # BLD: 7.8 K/UL (ref 4–10.5)

## 2025-01-30 PROCEDURE — 84443 ASSAY THYROID STIM HORMONE: CPT

## 2025-01-30 PROCEDURE — 80053 COMPREHEN METABOLIC PANEL: CPT

## 2025-01-30 PROCEDURE — 85025 COMPLETE CBC W/AUTO DIFF WBC: CPT

## 2025-01-30 PROCEDURE — 36415 COLL VENOUS BLD VENIPUNCTURE: CPT

## 2025-01-31 ENCOUNTER — HOSPITAL ENCOUNTER (OUTPATIENT)
Dept: INFUSION THERAPY | Age: 89
Discharge: HOME | End: 2025-01-31
Payer: MEDICARE

## 2025-01-31 VITALS
HEIGHT: 60 IN | RESPIRATION RATE: 16 BRPM | TEMPERATURE: 97.7 F | WEIGHT: 131.4 LBS | BODY MASS INDEX: 25.8 KG/M2 | HEART RATE: 80 BPM | DIASTOLIC BLOOD PRESSURE: 87 MMHG | SYSTOLIC BLOOD PRESSURE: 111 MMHG | OXYGEN SATURATION: 99 %

## 2025-01-31 DIAGNOSIS — C17.2 ADENOCARCINOMA OF ILEUM (HCC): ICD-10-CM

## 2025-01-31 DIAGNOSIS — E03.9 ACQUIRED HYPOTHYROIDISM: Primary | ICD-10-CM

## 2025-01-31 PROCEDURE — 6360000002 HC RX W HCPCS: Performed by: INTERNAL MEDICINE

## 2025-01-31 PROCEDURE — 2580000003 HC RX 258: Performed by: INTERNAL MEDICINE

## 2025-01-31 PROCEDURE — 96413 CHEMO IV INFUSION 1 HR: CPT

## 2025-01-31 PROCEDURE — 2500000003 HC RX 250 WO HCPCS: Performed by: INTERNAL MEDICINE

## 2025-01-31 RX ORDER — SODIUM CHLORIDE 0.9 % (FLUSH) 0.9 %
5-40 SYRINGE (ML) INJECTION PRN
Status: DISCONTINUED | OUTPATIENT
Start: 2025-01-31 | End: 2025-02-01 | Stop reason: HOSPADM

## 2025-01-31 RX ADMIN — SODIUM CHLORIDE 400 MG: 9 INJECTION, SOLUTION INTRAVENOUS at 11:44

## 2025-01-31 RX ADMIN — SODIUM CHLORIDE, PRESERVATIVE FREE 10 ML: 5 INJECTION INTRAVENOUS at 12:28

## 2025-01-31 NOTE — PROGRESS NOTES
Patient arrived to treatment suite for chemotherapy infusion.  PIV started in right arm, good blood return noted.  Patient has no questions or concerns for the doctor at this time.  Treatment approved and given.  Patient tolerated well.  Left treatment suite ambulatory.  Discharge instructions provided.     Patient's status assessed and documented appropriately.  All labs and required results were also reviewed today.  Treatment parameters have been reviewed.  Today's treatment has been approved by the provider.  Treatment orders and medication sequencing (when applicable) was verified by 2 registered nurses.  The treatment plan was confirmed with the patient prior to administration, and the patient understands the need to report any treatment-related symptoms.     Prior to administration, when applicable, the following 8 elements of medication administration were reviewed with 2nd Registered Nurse prior to dosing: drug name, drug dose, infusion volume when prepared in a syringe, rate of administration, expiration dates and/or times, appearance and integrity of drug(s), and rate of pump for infusion.  The 5 rights of medication administration have been verified.

## 2025-02-09 NOTE — PROGRESS NOTES
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      She has been treated recently for abdominal pain and suspected diverticulitis, but was not improving after getting antibiotics which prompted her to return to the ED. She denies any personal or family history of colon cancer. Her sister had breast cancer in her 40's. She has had colonoscopies in the past which were normal, but thinks her last one was more than 10 years ago. She denies any previous problems with ovarian cysts or adnexal lesions.       She underwent colonoscopy on 22 and it showed large mass extending from the ileocecal valve into the cecum mass originating at ileocecal valve appears to be more tubulovillous, mass in the cecum appears to be more firm fixed ulcerated consistent with malignancy. Biopsies were obtained. Mild to moderate sigmoid diverticulosis and grade 2 hemorrhoids and incidental lipoma

## 2025-02-11 ENCOUNTER — HOSPITAL ENCOUNTER (OUTPATIENT)
Dept: INFUSION THERAPY | Age: 89
Discharge: HOME | End: 2025-02-11
Payer: MEDICARE

## 2025-02-11 ENCOUNTER — OFFICE VISIT (OUTPATIENT)
Dept: ONCOLOGY | Age: 89
End: 2025-02-11
Payer: MEDICARE

## 2025-02-11 VITALS
DIASTOLIC BLOOD PRESSURE: 60 MMHG | SYSTOLIC BLOOD PRESSURE: 145 MMHG | HEART RATE: 65 BPM | TEMPERATURE: 97.9 F | OXYGEN SATURATION: 100 %

## 2025-02-11 DIAGNOSIS — C17.2 ADENOCARCINOMA OF ILEUM (HCC): Primary | ICD-10-CM

## 2025-02-11 PROCEDURE — 99214 OFFICE O/P EST MOD 30 MIN: CPT | Performed by: INTERNAL MEDICINE

## 2025-02-11 PROCEDURE — 1036F TOBACCO NON-USER: CPT | Performed by: INTERNAL MEDICINE

## 2025-02-11 PROCEDURE — 1090F PRES/ABSN URINE INCON ASSESS: CPT | Performed by: INTERNAL MEDICINE

## 2025-02-11 PROCEDURE — 99212 OFFICE O/P EST SF 10 MIN: CPT

## 2025-02-11 PROCEDURE — G8427 DOCREV CUR MEDS BY ELIG CLIN: HCPCS | Performed by: INTERNAL MEDICINE

## 2025-02-11 PROCEDURE — 1124F ACP DISCUSS-NO DSCNMKR DOCD: CPT | Performed by: INTERNAL MEDICINE

## 2025-02-11 PROCEDURE — 1125F AMNT PAIN NOTED PAIN PRSNT: CPT | Performed by: INTERNAL MEDICINE

## 2025-02-11 PROCEDURE — 1159F MED LIST DOCD IN RCRD: CPT | Performed by: INTERNAL MEDICINE

## 2025-02-11 PROCEDURE — G8419 CALC BMI OUT NRM PARAM NOF/U: HCPCS | Performed by: INTERNAL MEDICINE

## 2025-02-11 NOTE — PROGRESS NOTES
MA Rooming Questions  Patient: Rylie Monaco  MRN: [id number]    Date: 2/11/2025        1. Do you have any new issues?   no         2. Do you need any refills on medications?    no    3. Have you had any imaging done since your last visit?   no    4. Have you been hospitalized or seen in the emergency room since your last visit here?   no    5. Did the patient have a depression screening completed today? No    No data recorded     PHQ-9 Given to (if applicable):               PHQ-9 Score (if applicable):                     [] Positive     []  Negative              Does question #9 need addressed (if applicable)                     [] Yes    []  No               Rebekah Armstrong MA

## 2025-03-03 DIAGNOSIS — C17.2 ADENOCARCINOMA OF ILEUM (HCC): ICD-10-CM

## 2025-03-03 DIAGNOSIS — E03.9 ACQUIRED HYPOTHYROIDISM: Primary | ICD-10-CM

## 2025-03-03 RX ORDER — SODIUM CHLORIDE 9 MG/ML
INJECTION, SOLUTION INTRAVENOUS CONTINUOUS
OUTPATIENT
Start: 2025-03-14

## 2025-03-03 RX ORDER — ACETAMINOPHEN 325 MG/1
650 TABLET ORAL
OUTPATIENT
Start: 2025-03-14

## 2025-03-03 RX ORDER — ALBUTEROL SULFATE 90 UG/1
4 INHALANT RESPIRATORY (INHALATION) PRN
OUTPATIENT
Start: 2025-03-14

## 2025-03-03 RX ORDER — DIPHENHYDRAMINE HYDROCHLORIDE 50 MG/ML
50 INJECTION INTRAMUSCULAR; INTRAVENOUS
OUTPATIENT
Start: 2025-03-14

## 2025-03-03 RX ORDER — SODIUM CHLORIDE 0.9 % (FLUSH) 0.9 %
5-40 SYRINGE (ML) INJECTION PRN
OUTPATIENT
Start: 2025-03-14

## 2025-03-03 RX ORDER — HEPARIN SODIUM (PORCINE) LOCK FLUSH IV SOLN 100 UNIT/ML 100 UNIT/ML
500 SOLUTION INTRAVENOUS PRN
OUTPATIENT
Start: 2025-04-25

## 2025-03-03 RX ORDER — EPINEPHRINE 1 MG/ML
0.3 INJECTION, SOLUTION, CONCENTRATE INTRAVENOUS PRN
OUTPATIENT
Start: 2025-03-14

## 2025-03-03 RX ORDER — SODIUM CHLORIDE 9 MG/ML
5-250 INJECTION, SOLUTION INTRAVENOUS PRN
OUTPATIENT
Start: 2025-03-14

## 2025-03-03 RX ORDER — SODIUM CHLORIDE 9 MG/ML
5-250 INJECTION, SOLUTION INTRAVENOUS PRN
OUTPATIENT
Start: 2025-04-25

## 2025-03-03 RX ORDER — SODIUM CHLORIDE 9 MG/ML
INJECTION, SOLUTION INTRAVENOUS CONTINUOUS
OUTPATIENT
Start: 2025-04-25

## 2025-03-03 RX ORDER — HYDROCORTISONE SODIUM SUCCINATE 100 MG/2ML
100 INJECTION INTRAMUSCULAR; INTRAVENOUS
OUTPATIENT
Start: 2025-03-14

## 2025-03-03 RX ORDER — ONDANSETRON 2 MG/ML
8 INJECTION INTRAMUSCULAR; INTRAVENOUS
OUTPATIENT
Start: 2025-04-25

## 2025-03-03 RX ORDER — FAMOTIDINE 10 MG/ML
20 INJECTION, SOLUTION INTRAVENOUS
OUTPATIENT
Start: 2025-04-25

## 2025-03-03 RX ORDER — EPINEPHRINE 1 MG/ML
0.3 INJECTION, SOLUTION, CONCENTRATE INTRAVENOUS PRN
OUTPATIENT
Start: 2025-04-25

## 2025-03-03 RX ORDER — HEPARIN SODIUM (PORCINE) LOCK FLUSH IV SOLN 100 UNIT/ML 100 UNIT/ML
500 SOLUTION INTRAVENOUS PRN
OUTPATIENT
Start: 2025-03-14

## 2025-03-03 RX ORDER — ACETAMINOPHEN 325 MG/1
650 TABLET ORAL
OUTPATIENT
Start: 2025-04-25

## 2025-03-03 RX ORDER — HYDROCORTISONE SODIUM SUCCINATE 100 MG/2ML
100 INJECTION INTRAMUSCULAR; INTRAVENOUS
OUTPATIENT
Start: 2025-04-25

## 2025-03-03 RX ORDER — ALBUTEROL SULFATE 90 UG/1
4 INHALANT RESPIRATORY (INHALATION) PRN
OUTPATIENT
Start: 2025-04-25

## 2025-03-03 RX ORDER — FAMOTIDINE 10 MG/ML
20 INJECTION, SOLUTION INTRAVENOUS
OUTPATIENT
Start: 2025-03-14

## 2025-03-03 RX ORDER — ONDANSETRON 2 MG/ML
8 INJECTION INTRAMUSCULAR; INTRAVENOUS
OUTPATIENT
Start: 2025-03-14

## 2025-03-03 RX ORDER — DIPHENHYDRAMINE HYDROCHLORIDE 50 MG/ML
50 INJECTION INTRAMUSCULAR; INTRAVENOUS
OUTPATIENT
Start: 2025-04-25

## 2025-03-03 RX ORDER — SODIUM CHLORIDE 0.9 % (FLUSH) 0.9 %
5-40 SYRINGE (ML) INJECTION PRN
OUTPATIENT
Start: 2025-04-25

## 2025-03-10 ENCOUNTER — HOSPITAL ENCOUNTER (OUTPATIENT)
Age: 89
Setting detail: INFUSION SERIES
Discharge: HOME | End: 2025-03-10
Payer: MEDICARE

## 2025-03-10 DIAGNOSIS — C17.2 ADENOCARCINOMA OF ILEUM (HCC): ICD-10-CM

## 2025-03-10 DIAGNOSIS — E03.9 ACQUIRED HYPOTHYROIDISM: ICD-10-CM

## 2025-03-10 LAB
ALBUMIN SERPL-MCNC: 3.4 G/DL (ref 3.4–5)
ALBUMIN/GLOB SERPL: 1.2 {RATIO} (ref 1.1–2.2)
ALP SERPL-CCNC: 60 U/L (ref 40–129)
ALT SERPL-CCNC: 27 U/L (ref 10–40)
ANION GAP SERPL CALCULATED.3IONS-SCNC: 7 MMOL/L (ref 4–16)
AST SERPL-CCNC: 23 U/L (ref 15–37)
BASOPHILS # BLD: 0.03 K/UL
BASOPHILS NFR BLD: 1 % (ref 0–1)
BILIRUB SERPL-MCNC: 0.4 MG/DL (ref 0–1)
BUN SERPL-MCNC: 17 MG/DL (ref 6–23)
CALCIUM SERPL-MCNC: 8.7 MG/DL (ref 8.3–10.6)
CHLORIDE SERPL-SCNC: 104 MMOL/L (ref 99–110)
CO2 SERPL-SCNC: 29 MMOL/L (ref 21–32)
CREAT SERPL-MCNC: 1.1 MG/DL (ref 0.6–1.1)
EOSINOPHIL # BLD: 0.26 K/UL
EOSINOPHILS RELATIVE PERCENT: 4 % (ref 0–3)
ERYTHROCYTE [DISTWIDTH] IN BLOOD BY AUTOMATED COUNT: 14.6 % (ref 11.7–14.9)
GFR, ESTIMATED: 48 ML/MIN/1.73M2
GLUCOSE SERPL-MCNC: 148 MG/DL (ref 74–99)
HCT VFR BLD AUTO: 39.9 % (ref 37–47)
HGB BLD-MCNC: 12.8 G/DL (ref 12.5–16)
IMM GRANULOCYTES # BLD AUTO: 0.02 K/UL
IMM GRANULOCYTES NFR BLD: 0 %
LYMPHOCYTES NFR BLD: 1.03 K/UL
LYMPHOCYTES RELATIVE PERCENT: 16 % (ref 24–44)
MCH RBC QN AUTO: 30.3 PG (ref 27–31)
MCHC RBC AUTO-ENTMCNC: 32.1 G/DL (ref 32–36)
MCV RBC AUTO: 94.3 FL (ref 78–100)
MONOCYTES NFR BLD: 0.43 K/UL
MONOCYTES NFR BLD: 7 % (ref 0–4)
NEUTROPHILS NFR BLD: 72 % (ref 36–66)
NEUTS SEG NFR BLD: 4.64 K/UL
PLATELET # BLD AUTO: 150 K/UL (ref 140–440)
PMV BLD AUTO: 10 FL (ref 7.5–11.1)
POTASSIUM SERPL-SCNC: 3.9 MMOL/L (ref 3.5–5.1)
PROT SERPL-MCNC: 6.3 G/DL (ref 6.4–8.2)
RBC # BLD AUTO: 4.23 M/UL (ref 4.2–5.4)
SODIUM SERPL-SCNC: 140 MMOL/L (ref 135–145)
TSH SERPL DL<=0.05 MIU/L-ACNC: 3.92 UIU/ML (ref 0.27–4.2)
WBC OTHER # BLD: 6.4 K/UL (ref 4–10.5)

## 2025-03-10 PROCEDURE — 85025 COMPLETE CBC W/AUTO DIFF WBC: CPT

## 2025-03-10 PROCEDURE — 36415 COLL VENOUS BLD VENIPUNCTURE: CPT

## 2025-03-10 PROCEDURE — 84443 ASSAY THYROID STIM HORMONE: CPT

## 2025-03-10 PROCEDURE — 80053 COMPREHEN METABOLIC PANEL: CPT

## 2025-03-13 DIAGNOSIS — C17.2 ADENOCARCINOMA OF ILEUM (HCC): Primary | ICD-10-CM

## 2025-03-14 ENCOUNTER — HOSPITAL ENCOUNTER (OUTPATIENT)
Dept: INFUSION THERAPY | Age: 89
Discharge: HOME | End: 2025-03-14
Payer: MEDICARE

## 2025-03-14 VITALS
WEIGHT: 135.4 LBS | OXYGEN SATURATION: 100 % | BODY MASS INDEX: 26.58 KG/M2 | HEIGHT: 60 IN | SYSTOLIC BLOOD PRESSURE: 124 MMHG | HEART RATE: 87 BPM | TEMPERATURE: 97.5 F | DIASTOLIC BLOOD PRESSURE: 96 MMHG

## 2025-03-14 DIAGNOSIS — E03.9 ACQUIRED HYPOTHYROIDISM: Primary | ICD-10-CM

## 2025-03-14 DIAGNOSIS — C17.2 ADENOCARCINOMA OF ILEUM (HCC): ICD-10-CM

## 2025-03-14 PROCEDURE — 96413 CHEMO IV INFUSION 1 HR: CPT

## 2025-03-14 PROCEDURE — 2580000003 HC RX 258: Performed by: INTERNAL MEDICINE

## 2025-03-14 PROCEDURE — 2500000003 HC RX 250 WO HCPCS: Performed by: INTERNAL MEDICINE

## 2025-03-14 PROCEDURE — 6360000002 HC RX W HCPCS: Performed by: INTERNAL MEDICINE

## 2025-03-14 RX ORDER — SODIUM CHLORIDE 0.9 % (FLUSH) 0.9 %
5-40 SYRINGE (ML) INJECTION PRN
Status: DISCONTINUED | OUTPATIENT
Start: 2025-03-14 | End: 2025-03-15 | Stop reason: HOSPADM

## 2025-03-14 RX ADMIN — SODIUM CHLORIDE, PRESERVATIVE FREE 10 ML: 5 INJECTION INTRAVENOUS at 11:59

## 2025-03-14 RX ADMIN — SODIUM CHLORIDE 400 MG: 9 INJECTION, SOLUTION INTRAVENOUS at 11:15

## 2025-03-17 ENCOUNTER — HOSPITAL ENCOUNTER (OUTPATIENT)
Dept: INFUSION THERAPY | Age: 89
Discharge: HOME | End: 2025-03-17
Payer: MEDICARE

## 2025-03-17 ENCOUNTER — OFFICE VISIT (OUTPATIENT)
Dept: ONCOLOGY | Age: 89
End: 2025-03-17
Payer: MEDICARE

## 2025-03-17 VITALS
WEIGHT: 137.4 LBS | SYSTOLIC BLOOD PRESSURE: 146 MMHG | HEIGHT: 60 IN | OXYGEN SATURATION: 93 % | DIASTOLIC BLOOD PRESSURE: 74 MMHG | BODY MASS INDEX: 26.97 KG/M2 | TEMPERATURE: 97.9 F | HEART RATE: 81 BPM

## 2025-03-17 DIAGNOSIS — C17.2 ADENOCARCINOMA OF ILEUM (HCC): Primary | ICD-10-CM

## 2025-03-17 DIAGNOSIS — C18.2 MALIGNANT NEOPLASM OF ASCENDING COLON (HCC): ICD-10-CM

## 2025-03-17 PROCEDURE — 1090F PRES/ABSN URINE INCON ASSESS: CPT | Performed by: INTERNAL MEDICINE

## 2025-03-17 PROCEDURE — 99212 OFFICE O/P EST SF 10 MIN: CPT

## 2025-03-17 PROCEDURE — 99214 OFFICE O/P EST MOD 30 MIN: CPT | Performed by: INTERNAL MEDICINE

## 2025-03-17 PROCEDURE — 1124F ACP DISCUSS-NO DSCNMKR DOCD: CPT | Performed by: INTERNAL MEDICINE

## 2025-03-17 PROCEDURE — G8419 CALC BMI OUT NRM PARAM NOF/U: HCPCS | Performed by: INTERNAL MEDICINE

## 2025-03-17 PROCEDURE — G8427 DOCREV CUR MEDS BY ELIG CLIN: HCPCS | Performed by: INTERNAL MEDICINE

## 2025-03-17 PROCEDURE — 1159F MED LIST DOCD IN RCRD: CPT | Performed by: INTERNAL MEDICINE

## 2025-03-17 PROCEDURE — 1126F AMNT PAIN NOTED NONE PRSNT: CPT | Performed by: INTERNAL MEDICINE

## 2025-03-17 PROCEDURE — 1036F TOBACCO NON-USER: CPT | Performed by: INTERNAL MEDICINE

## 2025-03-17 NOTE — PROGRESS NOTES
MA Rooming Questions  Patient: Rylie Monaco  MRN: [id number]    Date: 3/17/2025        1. Do you have any new issues?   no         2. Do you need any refills on medications?    no    3. Have you had any imaging done since your last visit?   no    4. Have you been hospitalized or seen in the emergency room since your last visit here?   no    5. Did the patient have a depression screening completed today? No    No data recorded     PHQ-9 Given to (if applicable):               PHQ-9 Score (if applicable):                     [] Positive     []  Negative              Does question #9 need addressed (if applicable)                     [] Yes    []  No               Rebekah Armstrong MA      
with adjacent haziness. New patchy hypoenhancement of the right kidney, suspicious for pyelonephritis. Some smoothly marginated enhancement of the endothelium mid to proximal right ureter, which can be seen with infection. Recommend correlation with urinalysis.    On March 17, 2025, she presented to me for follow-up. 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      I reviewed final path report with her and her family. We also reviewed NCCN guidelines. I also let her know that we ideally recommend adjuvant chemotherapy with either CapeOx (capecitabine + oxaliplatin) for 3 months, FOLFOX for 6 months or single agent capecitabine (in frail patient) for six months.    I also discussed with her and her family all the potential side effects as well as benefits of adjuvant chemotherapy.  After long discussion with her and family, they are in agreement to start adjuvant chemotherapy with capecitabine. It was started on 7/26/22.     She developed significant oral mucositis on Day 6 of the therapy and it interfere with her eating and drinking.     She is on xeloda 1500 mg BID. I stopped it since 9/6/22. Since she couldn't tolerate xeloda, we changed it to 5Fu and leukovorin. It was started on 11/9/22. We stopped it after 2/16/23 since she has recurrent disease.     She was seen by surgical oncology at [facility]. I have discussed her case with Dr. Antunez and Dr. Helm. Dr. Antunez reviewed her MRI pelvis with radiologist. Radiologist believe that she has pelvis mass which he isn't sure coming from colon, uterus or fallopian tube. We decided to have colonoscopy first.     I spoke with Dr. Helm and requested him to do colonoscopy soon. I let her know that we are concerned about possible malignant process there since she is also noted to have rising tumor marker.     She finally had surgery and pathology was as mentioned above. Since she has recurrent cancer and it

## 2025-04-22 ENCOUNTER — HOSPITAL ENCOUNTER (OUTPATIENT)
Age: 89
Discharge: HOME | End: 2025-04-22
Payer: MEDICARE

## 2025-04-22 DIAGNOSIS — E03.9 ACQUIRED HYPOTHYROIDISM: ICD-10-CM

## 2025-04-22 DIAGNOSIS — C17.2 ADENOCARCINOMA OF ILEUM (HCC): ICD-10-CM

## 2025-04-22 DIAGNOSIS — C18.2 MALIGNANT NEOPLASM OF ASCENDING COLON (HCC): ICD-10-CM

## 2025-04-22 LAB
ALBUMIN SERPL-MCNC: 3.8 G/DL (ref 3.4–5)
ALBUMIN/GLOB SERPL: 1.4 {RATIO}
ALP SERPL-CCNC: 58 U/L (ref 40–129)
ALT SERPL-CCNC: 28 U/L (ref 10–40)
ANION GAP SERPL CALCULATED.3IONS-SCNC: 14 MMOL/L (ref 9–17)
AST SERPL-CCNC: 32 U/L (ref 15–37)
BASOPHILS # BLD: 0.05 K/UL
BASOPHILS NFR BLD: 1 % (ref 0–1)
BILIRUB SERPL-MCNC: 0.6 MG/DL (ref 0–1)
BUN SERPL-MCNC: 16 MG/DL (ref 7–20)
CALCIUM SERPL-MCNC: 9.5 MG/DL (ref 8.3–10.6)
CHLORIDE SERPL-SCNC: 101 MMOL/L (ref 99–110)
CO2 SERPL-SCNC: 22 MMOL/L (ref 21–32)
CREAT SERPL-MCNC: 1 MG/DL (ref 0.6–1.2)
EOSINOPHIL # BLD: 0.36 K/UL
EOSINOPHILS RELATIVE PERCENT: 4 % (ref 0–3)
ERYTHROCYTE [DISTWIDTH] IN BLOOD BY AUTOMATED COUNT: 13.5 % (ref 11.7–14.9)
GFR, ESTIMATED: 54 ML/MIN/1.73M2
GLUCOSE SERPL-MCNC: 107 MG/DL (ref 74–99)
HCT VFR BLD AUTO: 41.5 % (ref 37–47)
HGB BLD-MCNC: 13.4 G/DL (ref 12.5–16)
IMM GRANULOCYTES # BLD AUTO: 0.03 K/UL
IMM GRANULOCYTES NFR BLD: 0 %
LYMPHOCYTES NFR BLD: 1.56 K/UL
LYMPHOCYTES RELATIVE PERCENT: 19 % (ref 24–44)
MAGNESIUM SERPL-MCNC: 1.6 MG/DL (ref 1.8–2.4)
MCH RBC QN AUTO: 31.2 PG (ref 27–31)
MCHC RBC AUTO-ENTMCNC: 32.3 G/DL (ref 32–36)
MCV RBC AUTO: 96.7 FL (ref 78–100)
MONOCYTES NFR BLD: 0.57 K/UL
MONOCYTES NFR BLD: 7 % (ref 0–5)
NEUTROPHILS NFR BLD: 69 % (ref 36–66)
NEUTS SEG NFR BLD: 5.68 K/UL
PLATELET # BLD AUTO: 155 K/UL (ref 140–440)
PMV BLD AUTO: 10.1 FL (ref 7.5–11.1)
POTASSIUM SERPL-SCNC: 4.1 MMOL/L (ref 3.5–5.1)
PROT SERPL-MCNC: 6.5 G/DL (ref 6.4–8.2)
RBC # BLD AUTO: 4.29 M/UL (ref 4.2–5.4)
SODIUM SERPL-SCNC: 137 MMOL/L (ref 136–145)
TSH SERPL DL<=0.05 MIU/L-ACNC: 2.19 UIU/ML (ref 0.27–4.2)
WBC OTHER # BLD: 8.3 K/UL (ref 4–10.5)

## 2025-04-22 PROCEDURE — 85025 COMPLETE CBC W/AUTO DIFF WBC: CPT

## 2025-04-22 PROCEDURE — 36415 COLL VENOUS BLD VENIPUNCTURE: CPT

## 2025-04-22 PROCEDURE — 80053 COMPREHEN METABOLIC PANEL: CPT

## 2025-04-22 PROCEDURE — 84443 ASSAY THYROID STIM HORMONE: CPT

## 2025-04-22 PROCEDURE — 83735 ASSAY OF MAGNESIUM: CPT

## 2025-04-28 ENCOUNTER — HOSPITAL ENCOUNTER (EMERGENCY)
Age: 89
Discharge: HOME | End: 2025-04-28
Attending: EMERGENCY MEDICINE
Payer: MEDICARE

## 2025-04-28 ENCOUNTER — APPOINTMENT (OUTPATIENT)
Dept: GENERAL RADIOLOGY | Age: 89
End: 2025-04-28
Payer: MEDICARE

## 2025-04-28 VITALS
TEMPERATURE: 98.5 F | HEIGHT: 60 IN | HEART RATE: 93 BPM | DIASTOLIC BLOOD PRESSURE: 56 MMHG | OXYGEN SATURATION: 93 % | SYSTOLIC BLOOD PRESSURE: 170 MMHG | WEIGHT: 126 LBS | RESPIRATION RATE: 18 BRPM | BODY MASS INDEX: 24.74 KG/M2

## 2025-04-28 DIAGNOSIS — J18.9 COMMUNITY ACQUIRED PNEUMONIA, UNSPECIFIED LATERALITY: Primary | ICD-10-CM

## 2025-04-28 PROCEDURE — 94640 AIRWAY INHALATION TREATMENT: CPT

## 2025-04-28 PROCEDURE — 6370000000 HC RX 637 (ALT 250 FOR IP): Performed by: EMERGENCY MEDICINE

## 2025-04-28 PROCEDURE — 71045 X-RAY EXAM CHEST 1 VIEW: CPT

## 2025-04-28 PROCEDURE — 99283 EMERGENCY DEPT VISIT LOW MDM: CPT

## 2025-04-28 RX ORDER — AZITHROMYCIN 250 MG/1
TABLET, FILM COATED ORAL
Qty: 1 PACKET | Refills: 0 | Status: SHIPPED | OUTPATIENT
Start: 2025-04-28 | End: 2025-05-02

## 2025-04-28 RX ORDER — IPRATROPIUM BROMIDE AND ALBUTEROL SULFATE 2.5; .5 MG/3ML; MG/3ML
1 SOLUTION RESPIRATORY (INHALATION) ONCE
Status: COMPLETED | OUTPATIENT
Start: 2025-04-28 | End: 2025-04-28

## 2025-04-28 RX ORDER — MONTELUKAST SODIUM 10 MG/1
10 TABLET ORAL NIGHTLY
COMMUNITY

## 2025-04-28 RX ORDER — FERROUS SULFATE 325(65) MG
325 TABLET ORAL
COMMUNITY

## 2025-04-28 RX ADMIN — GUAIFENESIN AND DEXTROMETHORPHAN HYDROBROMIDE 1 TABLET: 600; 30 TABLET, EXTENDED RELEASE ORAL at 08:47

## 2025-04-28 RX ADMIN — IPRATROPIUM BROMIDE AND ALBUTEROL SULFATE 1 DOSE: 2.5; .5 SOLUTION RESPIRATORY (INHALATION) at 08:25

## 2025-04-28 ASSESSMENT — ENCOUNTER SYMPTOMS
SHORTNESS OF BREATH: 0
COUGH: 1
WHEEZING: 1

## 2025-04-28 ASSESSMENT — PAIN - FUNCTIONAL ASSESSMENT: PAIN_FUNCTIONAL_ASSESSMENT: NONE - DENIES PAIN

## 2025-04-28 ASSESSMENT — LIFESTYLE VARIABLES
HOW MANY STANDARD DRINKS CONTAINING ALCOHOL DO YOU HAVE ON A TYPICAL DAY: PATIENT DOES NOT DRINK
HOW OFTEN DO YOU HAVE A DRINK CONTAINING ALCOHOL: NEVER

## 2025-04-28 NOTE — ED PROVIDER NOTES
Triage Chief Complaint:   Cough (Pt arrives ambulatory with spouse, pt states she has had sinus drainage for one month and has been on medication per family doctor.  Pt states 1 week ago her nonproductive cough has been worse, Pt has nausea and diarrhea), Diarrhea, and Nausea    [employer]:  Rylie Monaco is a 89 y.o. female that presents to the ED with symptoms since Friday of a cough not bringing up any sputum she also has some nausea diarrhea.  Patient Nuys any blood or mucus in her sputum.  She has some sinus drainage.  Patient denies any fever or chills.  Her bowel moods are soft no gross watery no abdominal cramping no ill contacts she has no underlying malignancy.        Past Medical History:   Diagnosis Date    Cancer (HCC) 7/5/2022    Diabetes mellitus (HCC)     H/O cardiac catheterization 05/18/2021    no significant CAD in main vessels, has severe stenosis in small  branch diagonal vessel    H/O cardiovascular stress test 3/22/18,03/30/2017    ECG portion of the stress test is negative for ischemia EF >70% Normal stress myocardial perfusion.     H/O cardiovascular stress test 04/07/2021    abnormal stress    H/O Doppler ultrasound (Abdomimal Aorta) 03/29/2017    No evidence of abdominal aortic aneurysm.    H/O echocardiogram 07/02/2014    Normal left ventricular size, wall motion and systolic function. Mildle elevated pulmonary artery systolic pressure. Mild MR and TR and trace AR EF 55 to 60%    Hx of echocardiogram 03/29/2017    EF 55-60%. Grade I diastolic dysfunction.  Mildly dilated left atrium. No evidence of pericardial effusion.     Hyperlipidemia     Hypertension     Sleep apnea     Thyroid disease      Past Surgical History:   Procedure Laterality Date    BREAST BIOPSY Right     approx age 75, benign    CATARACT REMOVAL      CHOLECYSTECTOMY      COLONOSCOPY N/A 5/25/2022    COLONOSCOPY POLYPECTOMY SNARE/COLD BIOPSY performed by Alicja Martinez MD at [facility] ENDOSCOPY    DILATION AND CURETTAGE OF UTERUS

## 2025-05-01 ENCOUNTER — TELEPHONE (OUTPATIENT)
Dept: INFUSION THERAPY | Age: 89
End: 2025-05-01

## 2025-05-01 DIAGNOSIS — J18.9 COMMUNITY ACQUIRED PNEUMONIA, UNSPECIFIED LATERALITY: Primary | ICD-10-CM

## 2025-05-01 RX ORDER — DOXYCYCLINE HYCLATE 100 MG
100 TABLET ORAL 2 TIMES DAILY
Qty: 14 TABLET | Refills: 0 | Status: SHIPPED | OUTPATIENT
Start: 2025-05-01 | End: 2025-05-08

## 2025-05-01 NOTE — PROGRESS NOTES
Patient reports went to ED over the weekend. Reports bringing up green phlegm x1 day. Was prescribed azithromycin, but is not yet helping and is feeling worse. Has one day left on antibiotic. Denies any fever. Has decreased energy or appetite. Reports a bit more diarrhea than usual but likely due to antibiotic.     Does not want to come in tomorrow due to not feeling well. Will cancel appointment for tomorrow and reschedule.     Discussed with Dr. Cueva, patient to begin taking doxycycline 100mg BID x7 days. Patient to finish taking zithromax, but begin taking doxycycline today. If she begins to feel worse, instructed to contact office.      Patient updated and verbalized understanding.    Appointments as of end of telephone call today:  05/08 labs  05/09 OV with Dr. Cueva and scheduled treatment

## 2025-05-01 NOTE — TELEPHONE ENCOUNTER
Pt lvm to cancel today's lab visit stating she was sick. Appointment canceled. Pt requested call back to discuss symptoms.

## 2025-05-07 ENCOUNTER — HOSPITAL ENCOUNTER (OUTPATIENT)
Age: 89
Discharge: HOME | End: 2025-05-07
Payer: MEDICARE

## 2025-05-07 DIAGNOSIS — C17.2 ADENOCARCINOMA OF ILEUM (HCC): ICD-10-CM

## 2025-05-07 LAB
ALBUMIN SERPL-MCNC: 3.5 G/DL (ref 3.4–5)
ALBUMIN/GLOB SERPL: 1.2 {RATIO}
ALP SERPL-CCNC: 55 U/L (ref 40–129)
ALT SERPL-CCNC: 54 U/L (ref 10–40)
ANION GAP SERPL CALCULATED.3IONS-SCNC: 12 MMOL/L (ref 9–17)
AST SERPL-CCNC: 30 U/L (ref 15–37)
BASOPHILS # BLD: 0.05 K/UL
BASOPHILS NFR BLD: 0 % (ref 0–1)
BILIRUB SERPL-MCNC: 0.4 MG/DL (ref 0–1)
BUN SERPL-MCNC: 28 MG/DL (ref 7–20)
CALCIUM SERPL-MCNC: 9.3 MG/DL (ref 8.3–10.6)
CHLORIDE SERPL-SCNC: 101 MMOL/L (ref 99–110)
CO2 SERPL-SCNC: 22 MMOL/L (ref 21–32)
CREAT SERPL-MCNC: 1.1 MG/DL (ref 0.6–1.2)
EOSINOPHIL # BLD: 0.06 K/UL
EOSINOPHILS RELATIVE PERCENT: 0 % (ref 0–3)
ERYTHROCYTE [DISTWIDTH] IN BLOOD BY AUTOMATED COUNT: 12.8 % (ref 11.7–14.9)
GFR, ESTIMATED: 47 ML/MIN/1.73M2
GLUCOSE SERPL-MCNC: 105 MG/DL (ref 74–99)
HCT VFR BLD AUTO: 41.7 % (ref 37–47)
HGB BLD-MCNC: 13.8 G/DL (ref 12.5–16)
IMM GRANULOCYTES # BLD AUTO: 0.31 K/UL
IMM GRANULOCYTES NFR BLD: 2 %
LYMPHOCYTES NFR BLD: 1.8 K/UL
LYMPHOCYTES RELATIVE PERCENT: 13 % (ref 24–44)
MCH RBC QN AUTO: 31.4 PG (ref 27–31)
MCHC RBC AUTO-ENTMCNC: 33.1 G/DL (ref 32–36)
MCV RBC AUTO: 95 FL (ref 78–100)
MONOCYTES NFR BLD: 0.95 K/UL
MONOCYTES NFR BLD: 7 % (ref 0–5)
NEUTROPHILS NFR BLD: 77 % (ref 36–66)
NEUTS SEG NFR BLD: 10.49 K/UL
PLATELET # BLD AUTO: 295 K/UL (ref 140–440)
PMV BLD AUTO: 9.3 FL (ref 7.5–11.1)
POTASSIUM SERPL-SCNC: 4.2 MMOL/L (ref 3.5–5.1)
PROT SERPL-MCNC: 6.6 G/DL (ref 6.4–8.2)
RBC # BLD AUTO: 4.39 M/UL (ref 4.2–5.4)
SODIUM SERPL-SCNC: 135 MMOL/L (ref 136–145)
WBC OTHER # BLD: 13.7 K/UL (ref 4–10.5)

## 2025-05-07 PROCEDURE — 80053 COMPREHEN METABOLIC PANEL: CPT

## 2025-05-07 PROCEDURE — 36415 COLL VENOUS BLD VENIPUNCTURE: CPT

## 2025-05-07 PROCEDURE — 85025 COMPLETE CBC W/AUTO DIFF WBC: CPT

## 2025-05-09 ENCOUNTER — OFFICE VISIT (OUTPATIENT)
Dept: ONCOLOGY | Age: 89
End: 2025-05-09
Payer: MEDICARE

## 2025-05-09 ENCOUNTER — HOSPITAL ENCOUNTER (OUTPATIENT)
Dept: INFUSION THERAPY | Age: 89
Discharge: HOME | End: 2025-05-09
Payer: MEDICARE

## 2025-05-09 VITALS
WEIGHT: 130.29 LBS | HEART RATE: 88 BPM | SYSTOLIC BLOOD PRESSURE: 147 MMHG | TEMPERATURE: 97.4 F | DIASTOLIC BLOOD PRESSURE: 80 MMHG | HEIGHT: 60 IN | BODY MASS INDEX: 25.58 KG/M2 | OXYGEN SATURATION: 95 %

## 2025-05-09 VITALS
SYSTOLIC BLOOD PRESSURE: 147 MMHG | HEIGHT: 60 IN | OXYGEN SATURATION: 95 % | TEMPERATURE: 97.4 F | WEIGHT: 130.2 LBS | DIASTOLIC BLOOD PRESSURE: 80 MMHG | HEART RATE: 88 BPM | BODY MASS INDEX: 25.56 KG/M2

## 2025-05-09 DIAGNOSIS — E03.9 ACQUIRED HYPOTHYROIDISM: ICD-10-CM

## 2025-05-09 DIAGNOSIS — C17.2 ADENOCARCINOMA OF ILEUM (HCC): ICD-10-CM

## 2025-05-09 DIAGNOSIS — E03.9 ACQUIRED HYPOTHYROIDISM: Primary | ICD-10-CM

## 2025-05-09 DIAGNOSIS — C17.2 ADENOCARCINOMA OF ILEUM (HCC): Primary | ICD-10-CM

## 2025-05-09 PROCEDURE — 1124F ACP DISCUSS-NO DSCNMKR DOCD: CPT | Performed by: INTERNAL MEDICINE

## 2025-05-09 PROCEDURE — 1036F TOBACCO NON-USER: CPT | Performed by: INTERNAL MEDICINE

## 2025-05-09 PROCEDURE — 96375 TX/PRO/DX INJ NEW DRUG ADDON: CPT

## 2025-05-09 PROCEDURE — 6360000002 HC RX W HCPCS: Performed by: INTERNAL MEDICINE

## 2025-05-09 PROCEDURE — 99214 OFFICE O/P EST MOD 30 MIN: CPT | Performed by: INTERNAL MEDICINE

## 2025-05-09 PROCEDURE — G8427 DOCREV CUR MEDS BY ELIG CLIN: HCPCS | Performed by: INTERNAL MEDICINE

## 2025-05-09 PROCEDURE — 1126F AMNT PAIN NOTED NONE PRSNT: CPT | Performed by: INTERNAL MEDICINE

## 2025-05-09 PROCEDURE — 96361 HYDRATE IV INFUSION ADD-ON: CPT

## 2025-05-09 PROCEDURE — 96413 CHEMO IV INFUSION 1 HR: CPT

## 2025-05-09 PROCEDURE — 2580000003 HC RX 258: Performed by: INTERNAL MEDICINE

## 2025-05-09 PROCEDURE — 1159F MED LIST DOCD IN RCRD: CPT | Performed by: INTERNAL MEDICINE

## 2025-05-09 PROCEDURE — 1090F PRES/ABSN URINE INCON ASSESS: CPT | Performed by: INTERNAL MEDICINE

## 2025-05-09 PROCEDURE — G8419 CALC BMI OUT NRM PARAM NOF/U: HCPCS | Performed by: INTERNAL MEDICINE

## 2025-05-09 RX ORDER — ALBUTEROL SULFATE 0.83 MG/ML
2.5 SOLUTION RESPIRATORY (INHALATION) EVERY 6 HOURS PRN
COMMUNITY

## 2025-05-09 RX ORDER — SODIUM CHLORIDE 9 MG/ML
INJECTION, SOLUTION INTRAVENOUS CONTINUOUS
OUTPATIENT
Start: 2025-06-20

## 2025-05-09 RX ORDER — HEPARIN SODIUM (PORCINE) LOCK FLUSH IV SOLN 100 UNIT/ML 100 UNIT/ML
500 SOLUTION INTRAVENOUS PRN
Status: CANCELLED | OUTPATIENT
Start: 2025-05-09

## 2025-05-09 RX ORDER — EPINEPHRINE 1 MG/ML
0.3 INJECTION, SOLUTION, CONCENTRATE INTRAVENOUS PRN
OUTPATIENT
Start: 2025-06-20

## 2025-05-09 RX ORDER — FAMOTIDINE 10 MG/ML
20 INJECTION, SOLUTION INTRAVENOUS
Status: CANCELLED | OUTPATIENT
Start: 2025-05-09

## 2025-05-09 RX ORDER — DUPILUMAB 100 MG/.67ML
1 INJECTION, SOLUTION SUBCUTANEOUS
COMMUNITY

## 2025-05-09 RX ORDER — SODIUM CHLORIDE 9 MG/ML
5-250 INJECTION, SOLUTION INTRAVENOUS PRN
OUTPATIENT
Start: 2025-06-20

## 2025-05-09 RX ORDER — ONDANSETRON 2 MG/ML
8 INJECTION INTRAMUSCULAR; INTRAVENOUS
OUTPATIENT
Start: 2025-06-20

## 2025-05-09 RX ORDER — 0.9 % SODIUM CHLORIDE 0.9 %
500 INTRAVENOUS SOLUTION INTRAVENOUS ONCE
Status: COMPLETED | OUTPATIENT
Start: 2025-05-09 | End: 2025-05-09

## 2025-05-09 RX ORDER — ALBUTEROL SULFATE 90 UG/1
4 INHALANT RESPIRATORY (INHALATION) PRN
Status: CANCELLED | OUTPATIENT
Start: 2025-05-09

## 2025-05-09 RX ORDER — HYDROCORTISONE SODIUM SUCCINATE 100 MG/2ML
100 INJECTION INTRAMUSCULAR; INTRAVENOUS
OUTPATIENT
Start: 2025-06-20

## 2025-05-09 RX ORDER — BUDESONIDE 0.5 MG/2ML
1 INHALANT ORAL 2 TIMES DAILY
COMMUNITY

## 2025-05-09 RX ORDER — BENZONATATE 100 MG/1
100 CAPSULE ORAL 3 TIMES DAILY PRN
COMMUNITY

## 2025-05-09 RX ORDER — HYDROCORTISONE SODIUM SUCCINATE 100 MG/2ML
100 INJECTION INTRAMUSCULAR; INTRAVENOUS
Status: CANCELLED | OUTPATIENT
Start: 2025-05-09

## 2025-05-09 RX ORDER — SODIUM CHLORIDE 0.9 % (FLUSH) 0.9 %
5-40 SYRINGE (ML) INJECTION PRN
Status: DISCONTINUED | OUTPATIENT
Start: 2025-05-09 | End: 2025-05-10 | Stop reason: HOSPADM

## 2025-05-09 RX ORDER — HEPARIN SODIUM (PORCINE) LOCK FLUSH IV SOLN 100 UNIT/ML 100 UNIT/ML
500 SOLUTION INTRAVENOUS PRN
OUTPATIENT
Start: 2025-06-20

## 2025-05-09 RX ORDER — FAMOTIDINE 10 MG/ML
20 INJECTION, SOLUTION INTRAVENOUS
OUTPATIENT
Start: 2025-06-20

## 2025-05-09 RX ORDER — SODIUM CHLORIDE 9 MG/ML
INJECTION, SOLUTION INTRAVENOUS CONTINUOUS
Status: CANCELLED | OUTPATIENT
Start: 2025-05-09

## 2025-05-09 RX ORDER — ONDANSETRON 2 MG/ML
8 INJECTION INTRAMUSCULAR; INTRAVENOUS
Status: CANCELLED | OUTPATIENT
Start: 2025-05-09

## 2025-05-09 RX ORDER — EPINEPHRINE 1 MG/ML
0.3 INJECTION, SOLUTION, CONCENTRATE INTRAVENOUS PRN
Status: CANCELLED | OUTPATIENT
Start: 2025-05-09

## 2025-05-09 RX ORDER — SODIUM CHLORIDE 9 MG/ML
5-250 INJECTION, SOLUTION INTRAVENOUS PRN
Status: CANCELLED | OUTPATIENT
Start: 2025-05-09

## 2025-05-09 RX ORDER — ACETAMINOPHEN 325 MG/1
650 TABLET ORAL
OUTPATIENT
Start: 2025-06-20

## 2025-05-09 RX ORDER — ACETAMINOPHEN 325 MG/1
650 TABLET ORAL
Status: CANCELLED | OUTPATIENT
Start: 2025-05-09

## 2025-05-09 RX ORDER — SODIUM CHLORIDE 0.9 % (FLUSH) 0.9 %
5-40 SYRINGE (ML) INJECTION PRN
Status: CANCELLED | OUTPATIENT
Start: 2025-05-09

## 2025-05-09 RX ORDER — DEXAMETHASONE SODIUM PHOSPHATE 4 MG/ML
8 INJECTION, SOLUTION INTRA-ARTICULAR; INTRALESIONAL; INTRAMUSCULAR; INTRAVENOUS; SOFT TISSUE ONCE
Status: COMPLETED | OUTPATIENT
Start: 2025-05-09 | End: 2025-05-09

## 2025-05-09 RX ORDER — DIPHENHYDRAMINE HYDROCHLORIDE 50 MG/ML
50 INJECTION, SOLUTION INTRAMUSCULAR; INTRAVENOUS
Status: CANCELLED | OUTPATIENT
Start: 2025-05-09

## 2025-05-09 RX ORDER — SODIUM CHLORIDE 0.9 % (FLUSH) 0.9 %
5-40 SYRINGE (ML) INJECTION PRN
OUTPATIENT
Start: 2025-06-20

## 2025-05-09 RX ORDER — IPRATROPIUM BROMIDE AND ALBUTEROL SULFATE 2.5; .5 MG/3ML; MG/3ML
1 SOLUTION RESPIRATORY (INHALATION) EVERY 4 HOURS
COMMUNITY

## 2025-05-09 RX ORDER — GUAIFENESIN 600 MG/1
1200 TABLET, EXTENDED RELEASE ORAL 2 TIMES DAILY
COMMUNITY

## 2025-05-09 RX ORDER — DIPHENHYDRAMINE HYDROCHLORIDE 50 MG/ML
50 INJECTION, SOLUTION INTRAMUSCULAR; INTRAVENOUS
OUTPATIENT
Start: 2025-06-20

## 2025-05-09 RX ORDER — ALBUTEROL SULFATE 90 UG/1
4 INHALANT RESPIRATORY (INHALATION) PRN
OUTPATIENT
Start: 2025-06-20

## 2025-05-09 RX ADMIN — SODIUM CHLORIDE 500 ML: 0.9 INJECTION, SOLUTION INTRAVENOUS at 10:59

## 2025-05-09 RX ADMIN — SODIUM CHLORIDE 400 MG: 9 INJECTION, SOLUTION INTRAVENOUS at 11:00

## 2025-05-09 RX ADMIN — DEXAMETHASONE SODIUM PHOSPHATE 8 MG: 4 INJECTION, SOLUTION INTRAMUSCULAR; INTRAVENOUS at 11:35

## 2025-05-09 NOTE — PROGRESS NOTES
MA Rooming Questions  Patient: Rylie Monaco  MRN: [id number]    Date: 5/9/2025        1. Do you have any new issues?   yes - very tired, no appetite,          2. Do you need any refills on medications?    no    3. Have you had any imaging done since your last visit?   yes - @ [facility]     4. Have you been hospitalized or seen in the emergency room since your last visit here?   no    5. Did the patient have a depression screening completed today? No    No data recorded     PHQ-9 Given to (if applicable):               PHQ-9 Score (if applicable):                     [] Positive     []  Negative              Does question #9 need addressed (if applicable)                     [] Yes    []  No               Cristal Walter CMA      
suspicious for pyelonephritis. Some smoothly marginated enhancement of the endothelium mid to proximal right ureter, which can be seen with infection. Recommend correlation with urinalysis.    On May 9, 2025, she presented to me for follow-up. 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      I reviewed final path report with her and her family. We also reviewed NCCN guidelines. I also let her know that we ideally recommend adjuvant chemotherapy with either CapeOx (capecitabine + oxaliplatin) for 3 months, FOLFOX for 6 months or single agent capecitabine (in frail patient) for six months.    I also discussed with her and her family all the potential side effects as well as benefits of adjuvant chemotherapy.  After long discussion with her and family, they are in agreement to start adjuvant chemotherapy with capecitabine. It was started on 7/26/22.     She developed significant oral mucositis on Day 6 of the therapy and it interfere with her eating and drinking.     She is on xeloda 1500 mg BID. I stopped it since 9/6/22. Since she couldn't tolerate xeloda, we changed it to 5Fu and leukovorin. It was started on 11/9/22. We stopped it after 2/16/23 since she has recurrent disease.     She was seen by surgical oncology at [facility]. I have discussed her case with Dr. Antunez and Dr. Helm. Dr. Antunez reviewed her MRI pelvis with radiologist. Radiologist believe that she has pelvis mass which he isn't sure coming from colon, uterus or fallopian tube. We decided to have colonoscopy first.     I spoke with Dr. Helm and requested him to do colonoscopy soon. I let her know that we are concerned about possible malignant process there since she is also noted to have rising tumor marker.     She finally had surgery and pathology was as mentioned above. Since she has recurrent cancer and it is MSI high, I recommend her to start second line therapy with keytruda. It

## 2025-05-09 NOTE — PROGRESS NOTES
Patient arrived to treatment suite for immunotherapy infusion.  PIV started in left arm, good blood return noted.  Patient has no questions or concerns for the doctor at this time.  Treatment approved and given.  Patient tolerated well.  Left treatment suite ambulatory.  Discharge instructions provided.  RTC 6/19 for labs, 6/20 for treatment.  OV 6/20.     Patient's status assessed and documented appropriately.  All labs and required results were also reviewed today.  Treatment parameters have been reviewed.  Today's treatment has been approved by the provider.  Treatment orders and medication sequencing (when applicable) was verified by 2 registered nurses.  The treatment plan was confirmed with the patient prior to administration, and the patient understands the need to report any treatment-related symptoms.     Prior to administration, when applicable, the following 8 elements of medication administration were reviewed with 2nd Registered Nurse prior to dosing: drug name, drug dose, infusion volume when prepared in a syringe, rate of administration, expiration dates and/or times, appearance and integrity of drug(s), and rate of pump for infusion.  The 5 rights of medication administration have been verified.

## 2025-06-03 NOTE — PROGRESS NOTES
[place] [address]  Phone: [phone]    Fax [phone]    Denita Coker MD, [facility]  Jay Mak MD, [facility]   Aubree Paz MD, [facility] MD Mirella Ballard MD, [facility]  Ashvin Hooper MD, [facility]    Jennifer Go MD, [facility]   Natalie Keller, APRN  Babita Trent, APRN  Faith Balderas, APRN  Miki Alcala, APRN        Cardiology Progress Note      6/4/2025    RE: Rylie Monaco  (10/20/1934)                             Primary cardiologist: Dr. Isabel Genao       Subjective: Patient denies any chest pain, shortness of breath, dizziness, syncope, or palpitations.    CC:   1. Coronary artery disease involving native coronary artery of native heart without angina pectoris    2. Essential hypertension    3. Dyslipidemia    4. H/O deep venous thrombosis        HPI: Rylie Monaco, who is a  89 y.o. year old female who presents to the office for follow up on CAD, HTN, H/O DVT, and hyperlipidemia. Patient is  an active female who walks regularly. Patient is  compliant with medications.  Patient was hospitalized in May of 2025 for 3 days do to asthmatic bronchitis at [facility].       Current Outpatient Medications   Medication Sig Dispense Refill    albuterol (PROVENTIL) (2.5 MG/3ML) 0.083% nebulizer solution Take 3 mLs by nebulization every 6 hours as needed for Wheezing      benzonatate (TESSALON) 100 MG capsule Take 1 capsule by mouth 3 times daily as needed for Cough      budesonide (PULMICORT) 0.5 MG/2ML nebulizer suspension Take 2 mLs by nebulization 2 times daily      ipratropium 0.5 mg-albuterol 2.5 mg (DUONEB) 0.5-2.5 (3) MG/3ML SOLN nebulizer solution Inhale 3 mLs into the lungs every 4 hours      Dupilumab (DUPIXENT) 100 MG/0.67ML SOSY Inject 1 Syringe into the skin every 14 days      montelukast (SINGULAIR) 10 MG tablet Take 1 tablet by mouth nightly      ferrous sulfate (IRON 325) 325 (65 Fe) MG tablet Take 1 tablet by

## 2025-06-04 ENCOUNTER — OFFICE VISIT (OUTPATIENT)
Dept: CARDIOLOGY CLINIC | Age: 89
End: 2025-06-04
Payer: MEDICARE

## 2025-06-04 VITALS
WEIGHT: 138.6 LBS | BODY MASS INDEX: 27.21 KG/M2 | SYSTOLIC BLOOD PRESSURE: 134 MMHG | RESPIRATION RATE: 16 BRPM | HEIGHT: 60 IN | HEART RATE: 68 BPM | DIASTOLIC BLOOD PRESSURE: 70 MMHG

## 2025-06-04 DIAGNOSIS — E78.5 DYSLIPIDEMIA: ICD-10-CM

## 2025-06-04 DIAGNOSIS — I10 ESSENTIAL HYPERTENSION: Chronic | ICD-10-CM

## 2025-06-04 DIAGNOSIS — I25.10 CORONARY ARTERY DISEASE INVOLVING NATIVE CORONARY ARTERY OF NATIVE HEART WITHOUT ANGINA PECTORIS: Primary | ICD-10-CM

## 2025-06-04 DIAGNOSIS — Z86.718 H/O DEEP VENOUS THROMBOSIS: ICD-10-CM

## 2025-06-04 PROCEDURE — 1036F TOBACCO NON-USER: CPT | Performed by: NURSE PRACTITIONER

## 2025-06-04 PROCEDURE — 1090F PRES/ABSN URINE INCON ASSESS: CPT | Performed by: NURSE PRACTITIONER

## 2025-06-04 PROCEDURE — G8427 DOCREV CUR MEDS BY ELIG CLIN: HCPCS | Performed by: NURSE PRACTITIONER

## 2025-06-04 PROCEDURE — 99214 OFFICE O/P EST MOD 30 MIN: CPT | Performed by: NURSE PRACTITIONER

## 2025-06-04 PROCEDURE — 1124F ACP DISCUSS-NO DSCNMKR DOCD: CPT | Performed by: NURSE PRACTITIONER

## 2025-06-04 PROCEDURE — 1159F MED LIST DOCD IN RCRD: CPT | Performed by: NURSE PRACTITIONER

## 2025-06-04 PROCEDURE — G8419 CALC BMI OUT NRM PARAM NOF/U: HCPCS | Performed by: NURSE PRACTITIONER

## 2025-06-04 RX ORDER — POTASSIUM CHLORIDE 1500 MG/1
20 TABLET, EXTENDED RELEASE ORAL DAILY PRN
Qty: 90 TABLET | Refills: 3 | Status: SHIPPED | OUTPATIENT
Start: 2025-06-04

## 2025-06-04 RX ORDER — FUROSEMIDE 20 MG/1
TABLET ORAL
Qty: 90 TABLET | Refills: 3 | Status: SHIPPED | OUTPATIENT
Start: 2025-06-04

## 2025-06-04 ASSESSMENT — ENCOUNTER SYMPTOMS: SHORTNESS OF BREATH: 0

## 2025-06-12 ENCOUNTER — HOSPITAL ENCOUNTER (OUTPATIENT)
Dept: CT IMAGING | Age: 89
Discharge: HOME | End: 2025-06-12
Attending: INTERNAL MEDICINE
Payer: MEDICARE

## 2025-06-12 DIAGNOSIS — C17.2 ADENOCARCINOMA OF ILEUM (HCC): ICD-10-CM

## 2025-06-12 DIAGNOSIS — E03.9 ACQUIRED HYPOTHYROIDISM: ICD-10-CM

## 2025-06-12 PROCEDURE — 6360000004 HC RX CONTRAST MEDICATION: Performed by: INTERNAL MEDICINE

## 2025-06-12 PROCEDURE — 74177 CT ABD & PELVIS W/CONTRAST: CPT

## 2025-06-12 RX ORDER — IOPAMIDOL 755 MG/ML
20 INJECTION, SOLUTION INTRAVASCULAR
Status: COMPLETED | OUTPATIENT
Start: 2025-06-12 | End: 2025-06-12

## 2025-06-12 RX ORDER — IOPAMIDOL 755 MG/ML
100 INJECTION, SOLUTION INTRAVASCULAR
Status: COMPLETED | OUTPATIENT
Start: 2025-06-12 | End: 2025-06-12

## 2025-06-12 RX ADMIN — IOPAMIDOL 100 ML: 755 INJECTION, SOLUTION INTRAVENOUS at 11:53

## 2025-06-12 RX ADMIN — IOPAMIDOL 20 ML: 755 INJECTION, SOLUTION INTRAVENOUS at 10:45

## 2025-06-19 ENCOUNTER — HOSPITAL ENCOUNTER (OUTPATIENT)
Dept: INFUSION THERAPY | Age: 89
Discharge: HOME | End: 2025-06-19
Payer: MEDICARE

## 2025-06-19 DIAGNOSIS — C17.2 ADENOCARCINOMA OF ILEUM (HCC): ICD-10-CM

## 2025-06-19 DIAGNOSIS — E03.9 ACQUIRED HYPOTHYROIDISM: ICD-10-CM

## 2025-06-19 LAB
ALBUMIN SERPL-MCNC: 3.7 G/DL (ref 3.4–5)
ALBUMIN/GLOB SERPL: 1.3 {RATIO} (ref 1.1–2.2)
ALP SERPL-CCNC: 60 U/L (ref 40–129)
ALT SERPL-CCNC: 21 U/L (ref 10–40)
ANION GAP SERPL CALCULATED.3IONS-SCNC: 8 MMOL/L (ref 9–17)
AST SERPL-CCNC: 29 U/L (ref 15–37)
BASOPHILS # BLD: 0.02 K/UL
BASOPHILS NFR BLD: 0 % (ref 0–1)
BILIRUB SERPL-MCNC: 0.6 MG/DL (ref 0–1)
BUN SERPL-MCNC: 14 MG/DL (ref 7–20)
CALCIUM SERPL-MCNC: 9.9 MG/DL (ref 8.3–10.6)
CHLORIDE SERPL-SCNC: 102 MMOL/L (ref 99–110)
CO2 SERPL-SCNC: 27 MMOL/L (ref 21–32)
CREAT SERPL-MCNC: 1 MG/DL (ref 0.6–1.2)
EOSINOPHIL # BLD: 0.38 K/UL
EOSINOPHILS RELATIVE PERCENT: 5 % (ref 0–3)
ERYTHROCYTE [DISTWIDTH] IN BLOOD BY AUTOMATED COUNT: 13.4 % (ref 11.7–14.9)
GFR, ESTIMATED: 50 ML/MIN/1.73M2
GLUCOSE SERPL-MCNC: 106 MG/DL (ref 74–99)
HCT VFR BLD AUTO: 42.4 % (ref 37–47)
HGB BLD-MCNC: 13.6 G/DL (ref 12.5–16)
LYMPHOCYTES NFR BLD: 1.51 K/UL
LYMPHOCYTES RELATIVE PERCENT: 21 % (ref 24–44)
MAGNESIUM SERPL-MCNC: 1.9 MG/DL (ref 1.8–2.4)
MCH RBC QN AUTO: 31.6 PG (ref 27–31)
MCHC RBC AUTO-ENTMCNC: 32.1 G/DL (ref 32–36)
MCV RBC AUTO: 98.4 FL (ref 78–100)
MONOCYTES NFR BLD: 0.59 K/UL
MONOCYTES NFR BLD: 8 % (ref 0–5)
NEUTROPHILS NFR BLD: 65 % (ref 36–66)
NEUTS SEG NFR BLD: 4.58 K/UL
PLATELET # BLD AUTO: 142 K/UL (ref 140–440)
PMV BLD AUTO: 9.7 FL (ref 7.5–11.1)
POTASSIUM SERPL-SCNC: 4.7 MMOL/L (ref 3.5–5.1)
PROT SERPL-MCNC: 6.5 G/DL (ref 6.4–8.2)
RBC # BLD AUTO: 4.31 M/UL (ref 4.2–5.4)
SODIUM SERPL-SCNC: 137 MMOL/L (ref 136–145)
TSH SERPL DL<=0.05 MIU/L-ACNC: 2.72 UIU/ML (ref 0.27–4.2)
WBC OTHER # BLD: 7.1 K/UL (ref 4–10.5)

## 2025-06-19 PROCEDURE — 83735 ASSAY OF MAGNESIUM: CPT

## 2025-06-19 PROCEDURE — 80053 COMPREHEN METABOLIC PANEL: CPT

## 2025-06-19 PROCEDURE — 84443 ASSAY THYROID STIM HORMONE: CPT

## 2025-06-19 PROCEDURE — 85025 COMPLETE CBC W/AUTO DIFF WBC: CPT

## 2025-06-19 PROCEDURE — 36415 COLL VENOUS BLD VENIPUNCTURE: CPT

## 2025-06-20 ENCOUNTER — CLINICAL DOCUMENTATION (OUTPATIENT)
Dept: ONCOLOGY | Age: 89
End: 2025-06-20

## 2025-06-20 ENCOUNTER — HOSPITAL ENCOUNTER (OUTPATIENT)
Dept: INFUSION THERAPY | Age: 89
Discharge: HOME | End: 2025-06-20
Payer: MEDICARE

## 2025-06-20 ENCOUNTER — OFFICE VISIT (OUTPATIENT)
Dept: ONCOLOGY | Age: 89
End: 2025-06-20
Payer: MEDICARE

## 2025-06-20 VITALS
HEIGHT: 60 IN | SYSTOLIC BLOOD PRESSURE: 127 MMHG | BODY MASS INDEX: 26.55 KG/M2 | DIASTOLIC BLOOD PRESSURE: 48 MMHG | HEART RATE: 60 BPM | OXYGEN SATURATION: 99 % | TEMPERATURE: 97.7 F | WEIGHT: 135.2 LBS

## 2025-06-20 DIAGNOSIS — C18.2 MALIGNANT NEOPLASM OF ASCENDING COLON (HCC): ICD-10-CM

## 2025-06-20 DIAGNOSIS — C17.2 ADENOCARCINOMA OF ILEUM (HCC): ICD-10-CM

## 2025-06-20 DIAGNOSIS — C17.2 ADENOCARCINOMA OF ILEUM (HCC): Primary | ICD-10-CM

## 2025-06-20 DIAGNOSIS — E03.9 ACQUIRED HYPOTHYROIDISM: Primary | ICD-10-CM

## 2025-06-20 LAB
BILIRUB UR QL STRIP: NEGATIVE
CASTS #/AREA URNS LPF: ABNORMAL /LPF
CHARACTER UR: ABNORMAL
CLARITY UR: CLEAR
COLOR UR: YELLOW
EPI CELLS #/AREA URNS HPF: 1 /HPF
GLUCOSE UR STRIP-MCNC: NEGATIVE MG/DL
HGB UR QL STRIP.AUTO: NEGATIVE
KETONES UR STRIP-MCNC: NEGATIVE MG/DL
LEUKOCYTE ESTERASE UR QL STRIP: ABNORMAL
NITRITE UR QL STRIP: NEGATIVE
PH UR STRIP: 6.5 [PH] (ref 5–8)
PROT UR STRIP-MCNC: NEGATIVE MG/DL
RBC #/AREA URNS HPF: 0 /HPF (ref 0–2)
SP GR UR STRIP: 1.01 (ref 1–1.03)
UROBILINOGEN UR STRIP-ACNC: 0.2 EU/DL (ref 0–1)
WBC #/AREA URNS HPF: 1 /HPF (ref 0–5)

## 2025-06-20 PROCEDURE — 99214 OFFICE O/P EST MOD 30 MIN: CPT | Performed by: INTERNAL MEDICINE

## 2025-06-20 PROCEDURE — 1124F ACP DISCUSS-NO DSCNMKR DOCD: CPT | Performed by: INTERNAL MEDICINE

## 2025-06-20 PROCEDURE — G8419 CALC BMI OUT NRM PARAM NOF/U: HCPCS | Performed by: INTERNAL MEDICINE

## 2025-06-20 PROCEDURE — 2580000003 HC RX 258: Performed by: INTERNAL MEDICINE

## 2025-06-20 PROCEDURE — 1090F PRES/ABSN URINE INCON ASSESS: CPT | Performed by: INTERNAL MEDICINE

## 2025-06-20 PROCEDURE — 6360000002 HC RX W HCPCS: Performed by: INTERNAL MEDICINE

## 2025-06-20 PROCEDURE — 1159F MED LIST DOCD IN RCRD: CPT | Performed by: INTERNAL MEDICINE

## 2025-06-20 PROCEDURE — 1126F AMNT PAIN NOTED NONE PRSNT: CPT | Performed by: INTERNAL MEDICINE

## 2025-06-20 PROCEDURE — 81001 URINALYSIS AUTO W/SCOPE: CPT

## 2025-06-20 PROCEDURE — G8427 DOCREV CUR MEDS BY ELIG CLIN: HCPCS | Performed by: INTERNAL MEDICINE

## 2025-06-20 PROCEDURE — 1036F TOBACCO NON-USER: CPT | Performed by: INTERNAL MEDICINE

## 2025-06-20 PROCEDURE — 96413 CHEMO IV INFUSION 1 HR: CPT

## 2025-06-20 RX ADMIN — SODIUM CHLORIDE 400 MG: 9 INJECTION, SOLUTION INTRAVENOUS at 12:03

## 2025-06-20 NOTE — PROGRESS NOTES
Attempted to call patient @ [phone] to notify that UA is good and does not show sign of infection; however, no answer. Unable to leave VM as call continuously rang. Will try back at later time.

## 2025-06-20 NOTE — PROGRESS NOTES
Ambulated to infusion area, here today for treatment and OV with Dr. Cueva. All concerns addressed during OV at this time. PIV placed in left forearm, positive blood return noted. Labs reviewed, Labs within defined limits.  Treatment administered as ordered. Call light within reach. Tolerated infusion without incident.  Discharge instructions provided.    Appointments as of end of treatment day:  07/31 labs  08/01 treatment and OV with NICK Hercules  09/12 OV with Dr. Cueva.    Status appropriately assessed and documented. All required labs and results reviewed. Treatment approved by provider. Treatment orders and medications verified by 2 Registered Nurses where applicable. Treatment plan was confirmed with patient prior to administration, and educated the need to report any treatment-related symptoms

## 2025-06-20 NOTE — PROGRESS NOTES
MA/LPN Rooming Questions  Patient: Rylie Monaco  MRN: [id number]    Date: 6/20/2025        1. Do you have any new issues?   no         2. Do you need any refills on medications?    no    3. Have you had any imaging done since your last visit?   Yes - CT Scan 6/12    4. Have you been hospitalized or seen in the emergency room since your last visit here?   no    5. Did the patient have a depression screening completed today? No    No data recorded     PHQ-9 Given to (if applicable):               PHQ-9 Score (if applicable):                     [] Positive     []  Negative              Does question #9 need addressed (if applicable)                     [] Yes    []  No               Yaritza Meza CMA      
developed significant oral mucositis on Day 6 of the therapy and it interfere with her eating and drinking.     She is on xeloda 1500 mg BID. I stopped it since 9/6/22. Since she couldn't tolerate xeloda, we changed it to 5Fu and leukovorin. It was started on 11/9/22. We stopped it after 2/16/23 since she has recurrent disease.     She was seen by surgical oncology at [facility]. I have discussed her case with Dr. Antunez and Dr. Helm. Dr. Antunez reviewed her MRI pelvis with radiologist. Radiologist believe that she has pelvis mass which he isn't sure coming from colon, uterus or fallopian tube. We decided to have colonoscopy first.     I spoke with Dr. Helm and requested him to do colonoscopy soon. I let her know that we are concerned about possible malignant process there since she is also noted to have rising tumor marker.     She finally had surgery and pathology was as mentioned above. Since she has recurrent cancer and it is MSI high, I recommend her to start second line therapy with keytruda. It was started since 4/6/23.     Reviewed with her findings on CT scan done on 6/12/25. There is no sign of metastatic disease noted on scans. Recommend her to have repeat scans in 6 months.     She is here for close monitoring of toxicity and side effects of chemotherapy. She is tolerating it well. She doesn't encounter any major side effects from chemotherapy. I will give her immunotherapy today.     Will check her UA today. She has UTI symptoms. If UA is positive, I will start antibiotics.     Her TSH was 2.72 on 6/19/25. She is on levothyroxine 88 mcg daily now. Will continue to monitor TSH every 6 weekly.     Chemo induced skin rashes - she was seen by dermatologist and they have been managing her rash. Dermatologist also put her on claritin 3 times per day. If that is not helping, they plan to start Dupilumab (Dupixent, IL-4 inhibitor).     She does not have any other significant symptoms at today's

## 2025-06-23 ENCOUNTER — CLINICAL DOCUMENTATION (OUTPATIENT)
Dept: ONCOLOGY | Age: 89
End: 2025-06-23

## 2025-06-23 NOTE — PROGRESS NOTES
Called patient @ [phone] to notify that UA is good and no sign of infection. Patient voices understanding. Denies further needs at this time.

## 2025-07-09 ENCOUNTER — TRANSCRIBE ORDERS (OUTPATIENT)
Dept: ADMINISTRATIVE | Age: 89
End: 2025-07-09

## 2025-07-09 ENCOUNTER — HOSPITAL ENCOUNTER (OUTPATIENT)
Dept: ULTRASOUND IMAGING | Age: 89
Discharge: HOME | End: 2025-07-09
Payer: MEDICARE

## 2025-07-09 DIAGNOSIS — R22.42 LOCALIZED SWELLING, MASS AND LUMP, LOWER LIMB, LEFT: ICD-10-CM

## 2025-07-09 DIAGNOSIS — R22.42 LOCALIZED SWELLING, MASS AND LUMP, LOWER LIMB, LEFT: Primary | ICD-10-CM

## 2025-07-09 PROCEDURE — 93971 EXTREMITY STUDY: CPT

## 2025-07-27 DIAGNOSIS — C17.2 ADENOCARCINOMA OF ILEUM (HCC): ICD-10-CM

## 2025-07-27 DIAGNOSIS — E03.9 ACQUIRED HYPOTHYROIDISM: Primary | ICD-10-CM

## 2025-07-27 RX ORDER — ALBUTEROL SULFATE 90 UG/1
4 INHALANT RESPIRATORY (INHALATION) PRN
OUTPATIENT
Start: 2025-08-01

## 2025-07-27 RX ORDER — SODIUM CHLORIDE 0.9 % (FLUSH) 0.9 %
5-40 SYRINGE (ML) INJECTION PRN
OUTPATIENT
Start: 2025-08-01

## 2025-07-27 RX ORDER — SODIUM CHLORIDE 9 MG/ML
INJECTION, SOLUTION INTRAVENOUS CONTINUOUS
OUTPATIENT
Start: 2025-08-01

## 2025-07-27 RX ORDER — SODIUM CHLORIDE 9 MG/ML
5-250 INJECTION, SOLUTION INTRAVENOUS PRN
OUTPATIENT
Start: 2025-08-01

## 2025-07-27 RX ORDER — DIPHENHYDRAMINE HYDROCHLORIDE 50 MG/ML
50 INJECTION, SOLUTION INTRAMUSCULAR; INTRAVENOUS
OUTPATIENT
Start: 2025-08-01

## 2025-07-27 RX ORDER — HEPARIN SODIUM (PORCINE) LOCK FLUSH IV SOLN 100 UNIT/ML 100 UNIT/ML
500 SOLUTION INTRAVENOUS PRN
OUTPATIENT
Start: 2025-08-01

## 2025-07-27 RX ORDER — ONDANSETRON 2 MG/ML
8 INJECTION INTRAMUSCULAR; INTRAVENOUS
OUTPATIENT
Start: 2025-08-01

## 2025-07-27 RX ORDER — FAMOTIDINE 10 MG/ML
20 INJECTION, SOLUTION INTRAVENOUS
OUTPATIENT
Start: 2025-08-01

## 2025-07-27 RX ORDER — EPINEPHRINE 1 MG/ML
0.3 INJECTION, SOLUTION, CONCENTRATE INTRAVENOUS PRN
OUTPATIENT
Start: 2025-08-01

## 2025-07-27 RX ORDER — ACETAMINOPHEN 325 MG/1
650 TABLET ORAL
OUTPATIENT
Start: 2025-08-01

## 2025-07-27 RX ORDER — HYDROCORTISONE SODIUM SUCCINATE 100 MG/2ML
100 INJECTION INTRAMUSCULAR; INTRAVENOUS
OUTPATIENT
Start: 2025-08-01

## 2025-07-29 ENCOUNTER — HOSPITAL ENCOUNTER (OUTPATIENT)
Age: 89
Discharge: HOME | End: 2025-07-29
Payer: MEDICARE

## 2025-07-29 DIAGNOSIS — E03.9 ACQUIRED HYPOTHYROIDISM: ICD-10-CM

## 2025-07-29 DIAGNOSIS — C17.2 ADENOCARCINOMA OF ILEUM (HCC): ICD-10-CM

## 2025-07-29 LAB
ALBUMIN SERPL-MCNC: 3.6 G/DL (ref 3.4–5)
ALBUMIN/GLOB SERPL: 1.2 {RATIO}
ALP SERPL-CCNC: 64 U/L (ref 40–129)
ALT SERPL-CCNC: 26 U/L (ref 10–40)
ANION GAP SERPL CALCULATED.3IONS-SCNC: 11 MMOL/L (ref 9–17)
AST SERPL-CCNC: 30 U/L (ref 15–37)
BASOPHILS # BLD: 0.04 K/UL
BASOPHILS NFR BLD: 0 % (ref 0–1)
BILIRUB SERPL-MCNC: 0.3 MG/DL (ref 0–1)
BUN SERPL-MCNC: 21 MG/DL (ref 7–20)
CALCIUM SERPL-MCNC: 9.4 MG/DL (ref 8.3–10.6)
CHLORIDE SERPL-SCNC: 101 MMOL/L (ref 99–110)
CO2 SERPL-SCNC: 25 MMOL/L (ref 21–32)
CREAT SERPL-MCNC: 1 MG/DL (ref 0.6–1.2)
EOSINOPHIL # BLD: 0.26 K/UL
EOSINOPHILS RELATIVE PERCENT: 2 % (ref 0–3)
ERYTHROCYTE [DISTWIDTH] IN BLOOD BY AUTOMATED COUNT: 12.4 % (ref 11.7–14.9)
GFR, ESTIMATED: 54 ML/MIN/1.73M2
GLUCOSE SERPL-MCNC: 102 MG/DL (ref 74–99)
HCT VFR BLD AUTO: 42.1 % (ref 37–47)
HGB BLD-MCNC: 13.6 G/DL (ref 12.5–16)
IMM GRANULOCYTES # BLD AUTO: 0.03 K/UL
IMM GRANULOCYTES NFR BLD: 0 %
LYMPHOCYTES NFR BLD: 1.59 K/UL
LYMPHOCYTES RELATIVE PERCENT: 14 % (ref 24–44)
MCH RBC QN AUTO: 31.1 PG (ref 27–31)
MCHC RBC AUTO-ENTMCNC: 32.3 G/DL (ref 32–36)
MCV RBC AUTO: 96.1 FL (ref 78–100)
MONOCYTES NFR BLD: 0.69 K/UL
MONOCYTES NFR BLD: 6 % (ref 0–5)
NEUTROPHILS NFR BLD: 77 % (ref 36–66)
NEUTS SEG NFR BLD: 8.53 K/UL
PLATELET # BLD AUTO: 192 K/UL (ref 140–440)
PMV BLD AUTO: 9.8 FL (ref 7.5–11.1)
POTASSIUM SERPL-SCNC: 4.4 MMOL/L (ref 3.5–5.1)
PROT SERPL-MCNC: 6.5 G/DL (ref 6.4–8.2)
RBC # BLD AUTO: 4.38 M/UL (ref 4.2–5.4)
SODIUM SERPL-SCNC: 137 MMOL/L (ref 136–145)
TSH SERPL DL<=0.05 MIU/L-ACNC: 1.15 UIU/ML (ref 0.27–4.2)
WBC OTHER # BLD: 11.1 K/UL (ref 4–10.5)

## 2025-07-29 PROCEDURE — 36415 COLL VENOUS BLD VENIPUNCTURE: CPT

## 2025-07-29 PROCEDURE — 80053 COMPREHEN METABOLIC PANEL: CPT

## 2025-07-29 PROCEDURE — 85025 COMPLETE CBC W/AUTO DIFF WBC: CPT

## 2025-07-29 PROCEDURE — 84443 ASSAY THYROID STIM HORMONE: CPT

## 2025-08-01 ENCOUNTER — HOSPITAL ENCOUNTER (OUTPATIENT)
Dept: INFUSION THERAPY | Age: 89
Discharge: HOME | End: 2025-08-01
Payer: MEDICARE

## 2025-08-01 VITALS
OXYGEN SATURATION: 96 % | BODY MASS INDEX: 27.01 KG/M2 | HEIGHT: 60 IN | DIASTOLIC BLOOD PRESSURE: 49 MMHG | SYSTOLIC BLOOD PRESSURE: 147 MMHG | WEIGHT: 137.6 LBS | HEART RATE: 54 BPM | TEMPERATURE: 97.9 F

## 2025-08-01 DIAGNOSIS — E03.9 ACQUIRED HYPOTHYROIDISM: Primary | ICD-10-CM

## 2025-08-01 DIAGNOSIS — C17.2 ADENOCARCINOMA OF ILEUM (HCC): ICD-10-CM

## 2025-08-01 PROCEDURE — 6360000002 HC RX W HCPCS: Performed by: INTERNAL MEDICINE

## 2025-08-01 PROCEDURE — 96413 CHEMO IV INFUSION 1 HR: CPT

## 2025-08-01 PROCEDURE — 2580000003 HC RX 258: Performed by: INTERNAL MEDICINE

## 2025-08-01 PROCEDURE — 2500000003 HC RX 250 WO HCPCS: Performed by: INTERNAL MEDICINE

## 2025-08-01 PROCEDURE — 96375 TX/PRO/DX INJ NEW DRUG ADDON: CPT

## 2025-08-01 RX ORDER — ONDANSETRON 2 MG/ML
8 INJECTION INTRAMUSCULAR; INTRAVENOUS
Status: COMPLETED | OUTPATIENT
Start: 2025-08-01 | End: 2025-08-01

## 2025-08-01 RX ORDER — SODIUM CHLORIDE 9 MG/ML
5-250 INJECTION, SOLUTION INTRAVENOUS PRN
Status: DISCONTINUED | OUTPATIENT
Start: 2025-08-01 | End: 2025-08-02 | Stop reason: HOSPADM

## 2025-08-01 RX ORDER — SODIUM CHLORIDE 0.9 % (FLUSH) 0.9 %
5-40 SYRINGE (ML) INJECTION PRN
Status: DISCONTINUED | OUTPATIENT
Start: 2025-08-01 | End: 2025-08-02 | Stop reason: HOSPADM

## 2025-08-01 RX ADMIN — SODIUM CHLORIDE 20 ML/HR: 0.9 INJECTION, SOLUTION INTRAVENOUS at 11:03

## 2025-08-01 RX ADMIN — ONDANSETRON 8 MG: 2 INJECTION INTRAMUSCULAR; INTRAVENOUS at 11:03

## 2025-08-01 RX ADMIN — SODIUM CHLORIDE 400 MG: 9 INJECTION, SOLUTION INTRAVENOUS at 11:43

## 2025-08-01 RX ADMIN — SODIUM CHLORIDE, PRESERVATIVE FREE 20 ML: 5 INJECTION INTRAVENOUS at 11:04

## 2025-08-01 NOTE — PROGRESS NOTES
Patient arrived stable and ambulatory for D1 C10 of Keytruda 400mg infusion. Patient denied any questions or concerns. PIV started in right forearm x1 attempt. Brisk blood return noted and flushed with 10mL normal saline.     Pt reports mild fatigue and nausea. Pt requesting zofran today with tx.    Labs reviewed - meets parameters for tx for today. Orders released to pharmacy. VERONICA Day informed. Okay to given Zofran and all of KVO fluids today.     Keytruda infused as ordered and without incident. Pt received 250mL normal saline with tx. Pt tolerated tx well PIV flushed and removed with catheter tip intact. Printed AVS given to patient. Patient Dc'd stable and ambulatory, accompanied by . Pt to RTC 8/8/25 for OV with NICK Hercules, labs 9/11/25 for labs, 9/12/25 for Tx.

## 2025-08-11 ENCOUNTER — OFFICE VISIT (OUTPATIENT)
Dept: ONCOLOGY | Age: 89
End: 2025-08-11
Payer: MEDICARE

## 2025-08-11 VITALS
HEART RATE: 63 BPM | OXYGEN SATURATION: 98 % | DIASTOLIC BLOOD PRESSURE: 65 MMHG | WEIGHT: 137.2 LBS | TEMPERATURE: 97.8 F | BODY MASS INDEX: 26.93 KG/M2 | SYSTOLIC BLOOD PRESSURE: 132 MMHG | HEIGHT: 60 IN

## 2025-08-11 DIAGNOSIS — L29.9 PRURITUS: Primary | ICD-10-CM

## 2025-08-11 DIAGNOSIS — R11.0 NAUSEA: ICD-10-CM

## 2025-08-11 PROCEDURE — 1126F AMNT PAIN NOTED NONE PRSNT: CPT | Performed by: PHYSICIAN ASSISTANT

## 2025-08-11 PROCEDURE — 1036F TOBACCO NON-USER: CPT | Performed by: PHYSICIAN ASSISTANT

## 2025-08-11 PROCEDURE — 1124F ACP DISCUSS-NO DSCNMKR DOCD: CPT | Performed by: PHYSICIAN ASSISTANT

## 2025-08-11 PROCEDURE — G8427 DOCREV CUR MEDS BY ELIG CLIN: HCPCS | Performed by: PHYSICIAN ASSISTANT

## 2025-08-11 PROCEDURE — 99214 OFFICE O/P EST MOD 30 MIN: CPT | Performed by: PHYSICIAN ASSISTANT

## 2025-08-11 PROCEDURE — G8419 CALC BMI OUT NRM PARAM NOF/U: HCPCS | Performed by: PHYSICIAN ASSISTANT

## 2025-08-11 PROCEDURE — 1159F MED LIST DOCD IN RCRD: CPT | Performed by: PHYSICIAN ASSISTANT

## 2025-08-11 PROCEDURE — 1090F PRES/ABSN URINE INCON ASSESS: CPT | Performed by: PHYSICIAN ASSISTANT

## 2025-08-11 RX ORDER — PROCHLORPERAZINE MALEATE 10 MG
10 TABLET ORAL EVERY 6 HOURS PRN
Qty: 120 TABLET | Refills: 3 | Status: SHIPPED | OUTPATIENT
Start: 2025-08-11

## 2025-08-11 RX ORDER — HYDROXYZINE HYDROCHLORIDE 25 MG/1
25 TABLET, FILM COATED ORAL EVERY 8 HOURS PRN
Qty: 30 TABLET | Refills: 0 | Status: SHIPPED | OUTPATIENT
Start: 2025-08-11 | End: 2025-08-21

## 2025-08-11 ASSESSMENT — PATIENT HEALTH QUESTIONNAIRE - PHQ9
SUM OF ALL RESPONSES TO PHQ QUESTIONS 1-9: 0
2. FEELING DOWN, DEPRESSED OR HOPELESS: NOT AT ALL
SUM OF ALL RESPONSES TO PHQ QUESTIONS 1-9: 0
1. LITTLE INTEREST OR PLEASURE IN DOING THINGS: NOT AT ALL
SUM OF ALL RESPONSES TO PHQ QUESTIONS 1-9: 0
SUM OF ALL RESPONSES TO PHQ QUESTIONS 1-9: 0

## 2025-08-25 ENCOUNTER — TELEPHONE (OUTPATIENT)
Dept: CARDIOLOGY CLINIC | Age: 89
End: 2025-08-25

## 2025-08-25 ENCOUNTER — HOSPITAL ENCOUNTER (OUTPATIENT)
Age: 89
Setting detail: OBSERVATION
Discharge: HOME | End: 2025-08-26
Attending: STUDENT IN AN ORGANIZED HEALTH CARE EDUCATION/TRAINING PROGRAM
Payer: MEDICARE

## 2025-08-25 ENCOUNTER — APPOINTMENT (OUTPATIENT)
Dept: GENERAL RADIOLOGY | Age: 89
End: 2025-08-25
Payer: MEDICARE

## 2025-08-25 ENCOUNTER — APPOINTMENT (OUTPATIENT)
Dept: NON INVASIVE DIAGNOSTICS | Age: 89
End: 2025-08-25
Attending: STUDENT IN AN ORGANIZED HEALTH CARE EDUCATION/TRAINING PROGRAM
Payer: MEDICARE

## 2025-08-25 DIAGNOSIS — R06.02 SHORTNESS OF BREATH: Primary | ICD-10-CM

## 2025-08-25 DIAGNOSIS — R79.89 ELEVATED BRAIN NATRIURETIC PEPTIDE (BNP) LEVEL: ICD-10-CM

## 2025-08-25 DIAGNOSIS — J90 PLEURAL EFFUSION ON LEFT: ICD-10-CM

## 2025-08-25 DIAGNOSIS — R79.89 ELEVATED TROPONIN: ICD-10-CM

## 2025-08-25 PROBLEM — I50.9 NEW ONSET OF CONGESTIVE HEART FAILURE (HCC): Status: ACTIVE | Noted: 2025-08-25

## 2025-08-25 LAB
ALBUMIN SERPL-MCNC: 3.8 G/DL (ref 3.4–5)
ALBUMIN/GLOB SERPL: 1.6 {RATIO} (ref 1.1–2.2)
ALP SERPL-CCNC: 60 U/L (ref 40–129)
ALT SERPL-CCNC: 26 U/L (ref 10–40)
ANION GAP SERPL CALCULATED.3IONS-SCNC: 11 MMOL/L (ref 9–17)
AST SERPL-CCNC: 28 U/L (ref 15–37)
BASOPHILS # BLD: 0.05 K/UL
BASOPHILS NFR BLD: 1 % (ref 0–1)
BILIRUB SERPL-MCNC: 0.4 MG/DL (ref 0–1)
BNP SERPL-MCNC: 1819 PG/ML (ref 0–450)
BUN SERPL-MCNC: 16 MG/DL (ref 7–20)
CALCIUM SERPL-MCNC: 9.1 MG/DL (ref 8.3–10.6)
CHLORIDE SERPL-SCNC: 105 MMOL/L (ref 99–110)
CO2 SERPL-SCNC: 23 MMOL/L (ref 21–32)
CREAT SERPL-MCNC: 1 MG/DL (ref 0.6–1.2)
D DIMER PPP FEU-MCNC: 0.81 UG/ML FEU (ref 0–0.46)
ECHO AO ROOT DIAM: 2.2 CM
ECHO AO ROOT INDEX: 1.39 CM/M2
ECHO AV AREA PEAK VELOCITY: 2 CM2
ECHO AV AREA VTI: 2.3 CM2
ECHO AV AREA/BSA PEAK VELOCITY: 1.3 CM2/M2
ECHO AV AREA/BSA VTI: 1.5 CM2/M2
ECHO AV MEAN GRADIENT: 4 MMHG
ECHO AV MEAN VELOCITY: 1 M/S
ECHO AV PEAK GRADIENT: 6 MMHG
ECHO AV PEAK VELOCITY: 1.2 M/S
ECHO AV VELOCITY RATIO: 0.67
ECHO AV VTI: 24.9 CM
ECHO BSA: 1.62 M2
ECHO IVC PROX: 1.1 CM
ECHO LA AREA 4C: 13.5 CM2
ECHO LA DIAMETER INDEX: 2.47 CM/M2
ECHO LA DIAMETER: 3.9 CM
ECHO LA MAJOR AXIS: 5.1 CM
ECHO LA TO AORTIC ROOT RATIO: 1.77
ECHO LA VOL MOD A4C: 31 ML (ref 22–52)
ECHO LA VOLUME INDEX MOD A4C: 20 ML/M2 (ref 16–34)
ECHO LV E' LATERAL VELOCITY: 6 CM/S
ECHO LV E' SEPTAL VELOCITY: 4.4 CM/S
ECHO LV EDV A4C: 26 ML
ECHO LV EDV INDEX A4C: 16 ML/M2
ECHO LV EF PHYSICIAN: 60 %
ECHO LV EJECTION FRACTION A4C: 51 %
ECHO LV ESV A4C: 13 ML
ECHO LV ESV INDEX A4C: 8 ML/M2
ECHO LV FRACTIONAL SHORTENING: 38 % (ref 28–44)
ECHO LV INTERNAL DIMENSION DIASTOLE INDEX: 2.15 CM/M2
ECHO LV INTERNAL DIMENSION DIASTOLIC: 3.4 CM (ref 3.9–5.3)
ECHO LV INTERNAL DIMENSION SYSTOLIC INDEX: 1.33 CM/M2
ECHO LV INTERNAL DIMENSION SYSTOLIC: 2.1 CM
ECHO LV IVSD: 1 CM (ref 0.6–0.9)
ECHO LV MASS 2D: 114 G (ref 67–162)
ECHO LV MASS INDEX 2D: 72.2 G/M2 (ref 43–95)
ECHO LV POSTERIOR WALL DIASTOLIC: 1.2 CM (ref 0.6–0.9)
ECHO LV RELATIVE WALL THICKNESS RATIO: 0.71
ECHO LVOT AREA: 3.1 CM2
ECHO LVOT AV VTI INDEX: 0.74
ECHO LVOT DIAM: 2 CM
ECHO LVOT MEAN GRADIENT: 1 MMHG
ECHO LVOT PEAK GRADIENT: 2 MMHG
ECHO LVOT PEAK VELOCITY: 0.8 M/S
ECHO LVOT STROKE VOLUME INDEX: 36.6 ML/M2
ECHO LVOT SV: 57.8 ML
ECHO LVOT VTI: 18.4 CM
ECHO MV A VELOCITY: 1.1 M/S
ECHO MV E DECELERATION TIME (DT): 222 MS
ECHO MV E VELOCITY: 0.8 M/S
ECHO MV E/A RATIO: 0.73
ECHO MV E/E' LATERAL: 13.33
ECHO MV E/E' RATIO (AVERAGED): 15.76
ECHO MV E/E' SEPTAL: 18.18
ECHO RV MID DIMENSION: 3 CM
EOSINOPHIL # BLD: 0.38 K/UL
EOSINOPHILS RELATIVE PERCENT: 5 % (ref 0–3)
ERYTHROCYTE [DISTWIDTH] IN BLOOD BY AUTOMATED COUNT: 12.7 % (ref 11.7–14.9)
FERRITIN SERPL-MCNC: 156 NG/ML (ref 15–150)
GFR, ESTIMATED: 50 ML/MIN/1.73M2
GLUCOSE BLD-MCNC: 124 MG/DL (ref 74–99)
GLUCOSE BLD-MCNC: 220 MG/DL (ref 74–99)
GLUCOSE SERPL-MCNC: 130 MG/DL (ref 74–99)
HCT VFR BLD AUTO: 41.2 % (ref 37–47)
HGB BLD-MCNC: 13.2 G/DL (ref 12.5–16)
IMM GRANULOCYTES # BLD AUTO: 0.03 K/UL
IMM GRANULOCYTES NFR BLD: 0 %
IRON SATN MFR SERPL: 21 % (ref 15–50)
IRON SERPL-MCNC: 57 UG/DL (ref 37–145)
LYMPHOCYTES NFR BLD: 1.38 K/UL
LYMPHOCYTES RELATIVE PERCENT: 18 % (ref 24–44)
MCH RBC QN AUTO: 30.8 PG (ref 27–31)
MCHC RBC AUTO-ENTMCNC: 32 G/DL (ref 32–36)
MCV RBC AUTO: 96.3 FL (ref 78–100)
MONOCYTES NFR BLD: 0.6 K/UL
MONOCYTES NFR BLD: 8 % (ref 0–5)
NEUTROPHILS NFR BLD: 69 % (ref 36–66)
NEUTS SEG NFR BLD: 5.34 K/UL
PLATELET # BLD AUTO: 158 K/UL (ref 140–440)
PMV BLD AUTO: 10.3 FL (ref 7.5–11.1)
POTASSIUM SERPL-SCNC: 4.2 MMOL/L (ref 3.5–5.1)
PROT SERPL-MCNC: 6.2 G/DL (ref 6.4–8.2)
RBC # BLD AUTO: 4.28 M/UL (ref 4.2–5.4)
SODIUM SERPL-SCNC: 140 MMOL/L (ref 136–145)
TIBC SERPL-MCNC: 267 UG/DL (ref 260–445)
TROPONIN I SERPL HS-MCNC: 19 NG/L (ref 0–14)
TROPONIN I SERPL HS-MCNC: 19 NG/L (ref 0–14)
TSH SERPL DL<=0.05 MIU/L-ACNC: 1.84 UIU/ML (ref 0.27–4.2)
UNSATURATED IRON BINDING CAPACITY: 210 UG/DL (ref 110–370)
WBC OTHER # BLD: 7.8 K/UL (ref 4–10.5)

## 2025-08-25 PROCEDURE — 83880 ASSAY OF NATRIURETIC PEPTIDE: CPT

## 2025-08-25 PROCEDURE — 93306 TTE W/DOPPLER COMPLETE: CPT | Performed by: INTERNAL MEDICINE

## 2025-08-25 PROCEDURE — G0378 HOSPITAL OBSERVATION PER HR: HCPCS

## 2025-08-25 PROCEDURE — 84484 ASSAY OF TROPONIN QUANT: CPT

## 2025-08-25 PROCEDURE — 2500000003 HC RX 250 WO HCPCS: Performed by: STUDENT IN AN ORGANIZED HEALTH CARE EDUCATION/TRAINING PROGRAM

## 2025-08-25 PROCEDURE — 99285 EMERGENCY DEPT VISIT HI MDM: CPT

## 2025-08-25 PROCEDURE — 71045 X-RAY EXAM CHEST 1 VIEW: CPT

## 2025-08-25 PROCEDURE — 83550 IRON BINDING TEST: CPT

## 2025-08-25 PROCEDURE — 85379 FIBRIN DEGRADATION QUANT: CPT

## 2025-08-25 PROCEDURE — 82962 GLUCOSE BLOOD TEST: CPT

## 2025-08-25 PROCEDURE — 83540 ASSAY OF IRON: CPT

## 2025-08-25 PROCEDURE — 96374 THER/PROPH/DIAG INJ IV PUSH: CPT

## 2025-08-25 PROCEDURE — 94664 DEMO&/EVAL PT USE INHALER: CPT

## 2025-08-25 PROCEDURE — 94761 N-INVAS EAR/PLS OXIMETRY MLT: CPT

## 2025-08-25 PROCEDURE — 6360000002 HC RX W HCPCS: Performed by: STUDENT IN AN ORGANIZED HEALTH CARE EDUCATION/TRAINING PROGRAM

## 2025-08-25 PROCEDURE — 84443 ASSAY THYROID STIM HORMONE: CPT

## 2025-08-25 PROCEDURE — 96372 THER/PROPH/DIAG INJ SC/IM: CPT

## 2025-08-25 PROCEDURE — 93005 ELECTROCARDIOGRAM TRACING: CPT | Performed by: PHYSICIAN ASSISTANT

## 2025-08-25 PROCEDURE — 6370000000 HC RX 637 (ALT 250 FOR IP): Performed by: STUDENT IN AN ORGANIZED HEALTH CARE EDUCATION/TRAINING PROGRAM

## 2025-08-25 PROCEDURE — 85025 COMPLETE CBC W/AUTO DIFF WBC: CPT

## 2025-08-25 PROCEDURE — 93306 TTE W/DOPPLER COMPLETE: CPT

## 2025-08-25 PROCEDURE — 96375 TX/PRO/DX INJ NEW DRUG ADDON: CPT

## 2025-08-25 PROCEDURE — 82728 ASSAY OF FERRITIN: CPT

## 2025-08-25 PROCEDURE — 94640 AIRWAY INHALATION TREATMENT: CPT

## 2025-08-25 PROCEDURE — 93005 ELECTROCARDIOGRAM TRACING: CPT | Performed by: EMERGENCY MEDICINE

## 2025-08-25 PROCEDURE — 6360000002 HC RX W HCPCS: Performed by: PHYSICIAN ASSISTANT

## 2025-08-25 PROCEDURE — 80053 COMPREHEN METABOLIC PANEL: CPT

## 2025-08-25 RX ORDER — POLYETHYLENE GLYCOL 3350 17 G/17G
17 POWDER, FOR SOLUTION ORAL DAILY PRN
Status: DISCONTINUED | OUTPATIENT
Start: 2025-08-25 | End: 2025-08-26 | Stop reason: HOSPADM

## 2025-08-25 RX ORDER — ACETAMINOPHEN 325 MG/1
650 TABLET ORAL EVERY 6 HOURS PRN
Status: DISCONTINUED | OUTPATIENT
Start: 2025-08-25 | End: 2025-08-26 | Stop reason: HOSPADM

## 2025-08-25 RX ORDER — ACETAMINOPHEN 650 MG/1
650 SUPPOSITORY RECTAL EVERY 6 HOURS PRN
Status: DISCONTINUED | OUTPATIENT
Start: 2025-08-25 | End: 2025-08-26 | Stop reason: HOSPADM

## 2025-08-25 RX ORDER — FUROSEMIDE 10 MG/ML
40 INJECTION INTRAMUSCULAR; INTRAVENOUS ONCE
Status: COMPLETED | OUTPATIENT
Start: 2025-08-25 | End: 2025-08-25

## 2025-08-25 RX ORDER — MONTELUKAST SODIUM 10 MG/1
10 TABLET ORAL NIGHTLY
Status: DISCONTINUED | OUTPATIENT
Start: 2025-08-25 | End: 2025-08-26 | Stop reason: HOSPADM

## 2025-08-25 RX ORDER — DEXTROSE MONOHYDRATE 100 MG/ML
INJECTION, SOLUTION INTRAVENOUS CONTINUOUS PRN
Status: DISCONTINUED | OUTPATIENT
Start: 2025-08-25 | End: 2025-08-26 | Stop reason: HOSPADM

## 2025-08-25 RX ORDER — BENZONATATE 100 MG/1
100 CAPSULE ORAL 3 TIMES DAILY PRN
Status: DISCONTINUED | OUTPATIENT
Start: 2025-08-25 | End: 2025-08-26 | Stop reason: HOSPADM

## 2025-08-25 RX ORDER — GABAPENTIN 300 MG/1
300 CAPSULE ORAL DAILY
Status: DISCONTINUED | OUTPATIENT
Start: 2025-08-25 | End: 2025-08-25 | Stop reason: DRUGHIGH

## 2025-08-25 RX ORDER — SODIUM CHLORIDE 0.9 % (FLUSH) 0.9 %
5-40 SYRINGE (ML) INJECTION PRN
Status: DISCONTINUED | OUTPATIENT
Start: 2025-08-25 | End: 2025-08-26 | Stop reason: HOSPADM

## 2025-08-25 RX ORDER — ALBUTEROL SULFATE 0.83 MG/ML
2.5 SOLUTION RESPIRATORY (INHALATION) EVERY 6 HOURS PRN
Status: DISCONTINUED | OUTPATIENT
Start: 2025-08-25 | End: 2025-08-26 | Stop reason: HOSPADM

## 2025-08-25 RX ORDER — SODIUM CHLORIDE 0.9 % (FLUSH) 0.9 %
5-40 SYRINGE (ML) INJECTION EVERY 12 HOURS SCHEDULED
Status: DISCONTINUED | OUTPATIENT
Start: 2025-08-25 | End: 2025-08-26 | Stop reason: HOSPADM

## 2025-08-25 RX ORDER — ATORVASTATIN CALCIUM 10 MG/1
20 TABLET, FILM COATED ORAL NIGHTLY
Status: DISCONTINUED | OUTPATIENT
Start: 2025-08-25 | End: 2025-08-26 | Stop reason: HOSPADM

## 2025-08-25 RX ORDER — METOPROLOL TARTRATE 50 MG
50 TABLET ORAL 2 TIMES DAILY
Status: DISCONTINUED | OUTPATIENT
Start: 2025-08-25 | End: 2025-08-26 | Stop reason: HOSPADM

## 2025-08-25 RX ORDER — IPRATROPIUM BROMIDE AND ALBUTEROL SULFATE 2.5; .5 MG/3ML; MG/3ML
1 SOLUTION RESPIRATORY (INHALATION) EVERY 4 HOURS
Status: DISCONTINUED | OUTPATIENT
Start: 2025-08-25 | End: 2025-08-25

## 2025-08-25 RX ORDER — ONDANSETRON 4 MG/1
4 TABLET, ORALLY DISINTEGRATING ORAL EVERY 8 HOURS PRN
Status: DISCONTINUED | OUTPATIENT
Start: 2025-08-25 | End: 2025-08-26 | Stop reason: HOSPADM

## 2025-08-25 RX ORDER — FUROSEMIDE 10 MG/ML
40 INJECTION INTRAMUSCULAR; INTRAVENOUS 2 TIMES DAILY
Status: DISCONTINUED | OUTPATIENT
Start: 2025-08-26 | End: 2025-08-26 | Stop reason: HOSPADM

## 2025-08-25 RX ORDER — LEVOTHYROXINE SODIUM 88 UG/1
88 TABLET ORAL
Status: DISCONTINUED | OUTPATIENT
Start: 2025-08-26 | End: 2025-08-26 | Stop reason: HOSPADM

## 2025-08-25 RX ORDER — HYDROXYZINE HYDROCHLORIDE 25 MG/1
25 TABLET, FILM COATED ORAL EVERY 8 HOURS PRN
COMMUNITY

## 2025-08-25 RX ORDER — FERROUS SULFATE 325(65) MG
325 TABLET ORAL
Status: DISCONTINUED | OUTPATIENT
Start: 2025-08-26 | End: 2025-08-26 | Stop reason: HOSPADM

## 2025-08-25 RX ORDER — ROPINIROLE 1 MG/1
3 TABLET, FILM COATED ORAL NIGHTLY
Status: DISCONTINUED | OUTPATIENT
Start: 2025-08-25 | End: 2025-08-26 | Stop reason: HOSPADM

## 2025-08-25 RX ORDER — ONDANSETRON 2 MG/ML
4 INJECTION INTRAMUSCULAR; INTRAVENOUS EVERY 6 HOURS PRN
Status: DISCONTINUED | OUTPATIENT
Start: 2025-08-25 | End: 2025-08-26 | Stop reason: HOSPADM

## 2025-08-25 RX ORDER — BUDESONIDE 0.5 MG/2ML
500 INHALANT ORAL 2 TIMES DAILY
Status: DISCONTINUED | OUTPATIENT
Start: 2025-08-25 | End: 2025-08-26 | Stop reason: HOSPADM

## 2025-08-25 RX ORDER — GLUCAGON 1 MG/ML
1 KIT INJECTION PRN
Status: DISCONTINUED | OUTPATIENT
Start: 2025-08-25 | End: 2025-08-26 | Stop reason: HOSPADM

## 2025-08-25 RX ORDER — POTASSIUM CHLORIDE 1500 MG/1
40 TABLET, EXTENDED RELEASE ORAL PRN
Status: DISCONTINUED | OUTPATIENT
Start: 2025-08-25 | End: 2025-08-26 | Stop reason: HOSPADM

## 2025-08-25 RX ORDER — ENOXAPARIN SODIUM 100 MG/ML
40 INJECTION SUBCUTANEOUS DAILY
Status: DISCONTINUED | OUTPATIENT
Start: 2025-08-25 | End: 2025-08-26 | Stop reason: HOSPADM

## 2025-08-25 RX ORDER — POTASSIUM CHLORIDE 7.45 MG/ML
10 INJECTION INTRAVENOUS PRN
Status: DISCONTINUED | OUTPATIENT
Start: 2025-08-25 | End: 2025-08-26 | Stop reason: HOSPADM

## 2025-08-25 RX ORDER — INSULIN LISPRO 100 [IU]/ML
0-8 INJECTION, SOLUTION INTRAVENOUS; SUBCUTANEOUS
Status: DISCONTINUED | OUTPATIENT
Start: 2025-08-25 | End: 2025-08-26 | Stop reason: HOSPADM

## 2025-08-25 RX ORDER — SODIUM CHLORIDE 9 MG/ML
INJECTION, SOLUTION INTRAVENOUS PRN
Status: DISCONTINUED | OUTPATIENT
Start: 2025-08-25 | End: 2025-08-26 | Stop reason: HOSPADM

## 2025-08-25 RX ORDER — MAGNESIUM SULFATE IN WATER 40 MG/ML
2000 INJECTION, SOLUTION INTRAVENOUS PRN
Status: DISCONTINUED | OUTPATIENT
Start: 2025-08-25 | End: 2025-08-26 | Stop reason: HOSPADM

## 2025-08-25 RX ORDER — GABAPENTIN 100 MG/1
100 CAPSULE ORAL NIGHTLY
Status: DISCONTINUED | OUTPATIENT
Start: 2025-08-25 | End: 2025-08-26 | Stop reason: HOSPADM

## 2025-08-25 RX ADMIN — BUDESONIDE 500 MCG: 0.5 SUSPENSION RESPIRATORY (INHALATION) at 21:11

## 2025-08-25 RX ADMIN — ROPINIROLE HYDROCHLORIDE 3 MG: 1 TABLET, FILM COATED ORAL at 20:37

## 2025-08-25 RX ADMIN — INSULIN LISPRO 2 UNITS: 100 INJECTION, SOLUTION INTRAVENOUS; SUBCUTANEOUS at 20:41

## 2025-08-25 RX ADMIN — GABAPENTIN 100 MG: 100 CAPSULE ORAL at 20:37

## 2025-08-25 RX ADMIN — MONTELUKAST 10 MG: 10 TABLET, FILM COATED ORAL at 20:37

## 2025-08-25 RX ADMIN — FUROSEMIDE 40 MG: 10 INJECTION, SOLUTION INTRAMUSCULAR; INTRAVENOUS at 13:10

## 2025-08-25 RX ADMIN — ENOXAPARIN SODIUM 40 MG: 100 INJECTION SUBCUTANEOUS at 17:16

## 2025-08-25 RX ADMIN — ATORVASTATIN CALCIUM 20 MG: 10 TABLET, FILM COATED ORAL at 20:37

## 2025-08-25 RX ADMIN — METOPROLOL TARTRATE 50 MG: 50 TABLET, FILM COATED ORAL at 20:37

## 2025-08-25 RX ADMIN — SODIUM CHLORIDE, PRESERVATIVE FREE 10 ML: 5 INJECTION INTRAVENOUS at 20:37

## 2025-08-25 RX ADMIN — IPRATROPIUM BROMIDE AND ALBUTEROL SULFATE 1 DOSE: .5; 2.5 SOLUTION RESPIRATORY (INHALATION) at 21:09

## 2025-08-25 ASSESSMENT — PAIN - FUNCTIONAL ASSESSMENT: PAIN_FUNCTIONAL_ASSESSMENT: 0-10

## 2025-08-25 ASSESSMENT — ENCOUNTER SYMPTOMS
EYE DISCHARGE: 0
BACK PAIN: 0
CONSTIPATION: 0
ABDOMINAL DISTENTION: 0
WHEEZING: 0
SORE THROAT: 0
COUGH: 0
DIARRHEA: 0
SHORTNESS OF BREATH: 1

## 2025-08-25 ASSESSMENT — PAIN SCALES - GENERAL: PAINLEVEL_OUTOF10: 3

## 2025-08-25 ASSESSMENT — PAIN DESCRIPTION - ORIENTATION: ORIENTATION: MID

## 2025-08-25 ASSESSMENT — PAIN DESCRIPTION - DESCRIPTORS: DESCRIPTORS: PRESSURE

## 2025-08-25 ASSESSMENT — PAIN DESCRIPTION - PAIN TYPE: TYPE: ACUTE PAIN

## 2025-08-25 ASSESSMENT — PAIN DESCRIPTION - LOCATION: LOCATION: CHEST

## 2025-08-26 VITALS
TEMPERATURE: 98.4 F | HEART RATE: 95 BPM | DIASTOLIC BLOOD PRESSURE: 51 MMHG | RESPIRATION RATE: 22 BRPM | WEIGHT: 140.06 LBS | BODY MASS INDEX: 27.5 KG/M2 | SYSTOLIC BLOOD PRESSURE: 140 MMHG | HEIGHT: 60 IN | OXYGEN SATURATION: 97 %

## 2025-08-26 PROBLEM — R06.02 SHORTNESS OF BREATH: Status: ACTIVE | Noted: 2025-08-26

## 2025-08-26 LAB
ALBUMIN SERPL-MCNC: 3.2 G/DL (ref 3.4–5)
ALBUMIN/GLOB SERPL: 1.2 {RATIO} (ref 1.1–2.2)
ALP SERPL-CCNC: 50 U/L (ref 40–129)
ALT SERPL-CCNC: 18 U/L (ref 10–40)
ANION GAP SERPL CALCULATED.3IONS-SCNC: 11 MMOL/L (ref 9–17)
AST SERPL-CCNC: 22 U/L (ref 15–37)
BILIRUB DIRECT SERPL-MCNC: <0.2 MG/DL (ref 0–0.3)
BILIRUB INDIRECT SERPL-MCNC: ABNORMAL MG/DL (ref 0–0.7)
BILIRUB SERPL-MCNC: 0.4 MG/DL (ref 0–1)
BUN SERPL-MCNC: 16 MG/DL (ref 7–20)
CALCIUM SERPL-MCNC: 8.8 MG/DL (ref 8.3–10.6)
CHLORIDE SERPL-SCNC: 104 MMOL/L (ref 99–110)
CO2 SERPL-SCNC: 25 MMOL/L (ref 21–32)
CREAT SERPL-MCNC: 1.1 MG/DL (ref 0.6–1.2)
EKG ATRIAL RATE: 65 BPM
EKG ATRIAL RATE: 80 BPM
EKG DIAGNOSIS: NORMAL
EKG DIAGNOSIS: NORMAL
EKG P AXIS: 40 DEGREES
EKG P AXIS: 85 DEGREES
EKG P-R INTERVAL: 148 MS
EKG P-R INTERVAL: 184 MS
EKG Q-T INTERVAL: 388 MS
EKG Q-T INTERVAL: 424 MS
EKG QRS DURATION: 120 MS
EKG QRS DURATION: 72 MS
EKG QTC CALCULATION (BAZETT): 440 MS
EKG QTC CALCULATION (BAZETT): 447 MS
EKG R AXIS: -11 DEGREES
EKG R AXIS: 67 DEGREES
EKG T AXIS: 10 DEGREES
EKG T AXIS: 70 DEGREES
EKG VENTRICULAR RATE: 65 BPM
EKG VENTRICULAR RATE: 80 BPM
GFR, ESTIMATED: 49 ML/MIN/1.73M2
GLUCOSE BLD-MCNC: 119 MG/DL (ref 74–99)
GLUCOSE BLD-MCNC: 138 MG/DL (ref 74–99)
GLUCOSE SERPL-MCNC: 110 MG/DL (ref 74–99)
MAGNESIUM SERPL-MCNC: 1.3 MG/DL (ref 1.8–2.4)
POTASSIUM SERPL-SCNC: 4.1 MMOL/L (ref 3.5–5.1)
PROT SERPL-MCNC: 5.8 G/DL (ref 6.4–8.2)
SODIUM SERPL-SCNC: 140 MMOL/L (ref 136–145)

## 2025-08-26 PROCEDURE — 83735 ASSAY OF MAGNESIUM: CPT

## 2025-08-26 PROCEDURE — 99223 1ST HOSP IP/OBS HIGH 75: CPT | Performed by: INTERNAL MEDICINE

## 2025-08-26 PROCEDURE — 96366 THER/PROPH/DIAG IV INF ADDON: CPT

## 2025-08-26 PROCEDURE — 6360000002 HC RX W HCPCS: Performed by: STUDENT IN AN ORGANIZED HEALTH CARE EDUCATION/TRAINING PROGRAM

## 2025-08-26 PROCEDURE — G0378 HOSPITAL OBSERVATION PER HR: HCPCS

## 2025-08-26 PROCEDURE — 36415 COLL VENOUS BLD VENIPUNCTURE: CPT

## 2025-08-26 PROCEDURE — 94640 AIRWAY INHALATION TREATMENT: CPT

## 2025-08-26 PROCEDURE — 94761 N-INVAS EAR/PLS OXIMETRY MLT: CPT

## 2025-08-26 PROCEDURE — 6370000000 HC RX 637 (ALT 250 FOR IP): Performed by: INTERNAL MEDICINE

## 2025-08-26 PROCEDURE — 82248 BILIRUBIN DIRECT: CPT

## 2025-08-26 PROCEDURE — 6370000000 HC RX 637 (ALT 250 FOR IP): Performed by: STUDENT IN AN ORGANIZED HEALTH CARE EDUCATION/TRAINING PROGRAM

## 2025-08-26 PROCEDURE — 96365 THER/PROPH/DIAG IV INF INIT: CPT

## 2025-08-26 PROCEDURE — 2500000003 HC RX 250 WO HCPCS: Performed by: STUDENT IN AN ORGANIZED HEALTH CARE EDUCATION/TRAINING PROGRAM

## 2025-08-26 PROCEDURE — 82962 GLUCOSE BLOOD TEST: CPT

## 2025-08-26 PROCEDURE — 93010 ELECTROCARDIOGRAM REPORT: CPT | Performed by: INTERNAL MEDICINE

## 2025-08-26 PROCEDURE — 80053 COMPREHEN METABOLIC PANEL: CPT

## 2025-08-26 RX ORDER — ENALAPRIL MALEATE 5 MG/1
2.5 TABLET ORAL DAILY
Status: DISCONTINUED | OUTPATIENT
Start: 2025-08-26 | End: 2025-08-26 | Stop reason: HOSPADM

## 2025-08-26 RX ADMIN — METOPROLOL TARTRATE 50 MG: 50 TABLET, FILM COATED ORAL at 07:56

## 2025-08-26 RX ADMIN — FERROUS SULFATE TAB 325 MG (65 MG ELEMENTAL FE) 325 MG: 325 (65 FE) TAB at 07:56

## 2025-08-26 RX ADMIN — MAGNESIUM SULFATE HEPTAHYDRATE 2000 MG: 40 INJECTION, SOLUTION INTRAVENOUS at 09:23

## 2025-08-26 RX ADMIN — BUDESONIDE 500 MCG: 0.5 SUSPENSION RESPIRATORY (INHALATION) at 08:31

## 2025-08-26 RX ADMIN — ENALAPRIL MALEATE 2.5 MG: 5 TABLET ORAL at 07:56

## 2025-08-26 RX ADMIN — MAGNESIUM SULFATE HEPTAHYDRATE 2000 MG: 40 INJECTION, SOLUTION INTRAVENOUS at 10:55

## 2025-08-26 RX ADMIN — SODIUM CHLORIDE, PRESERVATIVE FREE 10 ML: 5 INJECTION INTRAVENOUS at 08:02

## 2025-08-26 RX ADMIN — LEVOTHYROXINE SODIUM 88 MCG: 0.09 TABLET ORAL at 05:54

## 2025-08-26 RX ADMIN — EMPAGLIFLOZIN 10 MG: 10 TABLET, FILM COATED ORAL at 07:56

## 2025-09-03 ENCOUNTER — OFFICE VISIT (OUTPATIENT)
Dept: CARDIOLOGY CLINIC | Age: 89
End: 2025-09-03
Payer: MEDICARE

## 2025-09-03 VITALS
DIASTOLIC BLOOD PRESSURE: 74 MMHG | SYSTOLIC BLOOD PRESSURE: 126 MMHG | HEART RATE: 68 BPM | BODY MASS INDEX: 26.9 KG/M2 | HEIGHT: 60 IN | WEIGHT: 137 LBS

## 2025-09-03 DIAGNOSIS — I25.10 CORONARY ARTERY DISEASE INVOLVING NATIVE CORONARY ARTERY OF NATIVE HEART WITHOUT ANGINA PECTORIS: ICD-10-CM

## 2025-09-03 DIAGNOSIS — I50.30 DIASTOLIC CONGESTIVE HEART FAILURE, UNSPECIFIED HF CHRONICITY (HCC): Primary | ICD-10-CM

## 2025-09-03 DIAGNOSIS — E78.5 DYSLIPIDEMIA: ICD-10-CM

## 2025-09-03 DIAGNOSIS — I10 ESSENTIAL HYPERTENSION: ICD-10-CM

## 2025-09-03 DIAGNOSIS — Z86.718 H/O DEEP VENOUS THROMBOSIS: ICD-10-CM

## 2025-09-03 PROCEDURE — G8427 DOCREV CUR MEDS BY ELIG CLIN: HCPCS | Performed by: INTERNAL MEDICINE

## 2025-09-03 PROCEDURE — G8419 CALC BMI OUT NRM PARAM NOF/U: HCPCS | Performed by: INTERNAL MEDICINE

## 2025-09-03 PROCEDURE — 1123F ACP DISCUSS/DSCN MKR DOCD: CPT | Performed by: INTERNAL MEDICINE

## 2025-09-03 PROCEDURE — 99214 OFFICE O/P EST MOD 30 MIN: CPT | Performed by: INTERNAL MEDICINE

## 2025-09-03 PROCEDURE — 1036F TOBACCO NON-USER: CPT | Performed by: INTERNAL MEDICINE

## 2025-09-03 PROCEDURE — 1090F PRES/ABSN URINE INCON ASSESS: CPT | Performed by: INTERNAL MEDICINE

## 2025-09-03 PROCEDURE — 1159F MED LIST DOCD IN RCRD: CPT | Performed by: INTERNAL MEDICINE

## (undated) DEVICE — COLUMN DRAPE

## (undated) DEVICE — GLOVE SURG SZ 7 CRM LTX FREE POLYISOPRENE POLYMER BEAD ANTI

## (undated) DEVICE — Device

## (undated) DEVICE — WOUND RETRACTOR AND PROTECTOR: Brand: ALEXIS O WOUND PROTECTOR-RETRACTOR

## (undated) DEVICE — SEAL

## (undated) DEVICE — GAUZE,SPONGE,4"X4",16PLY,XRAY,STRL,LF: Brand: MEDLINE

## (undated) DEVICE — GLOVE ORANGE PI 7 1/2   MSG9075

## (undated) DEVICE — SUTURE VCRL SZ 4-0 L18IN ABSRB UD L19MM PS-2 3/8 CIR PRIM J496H

## (undated) DEVICE — GLOVE SURG SZ 6 CRM LTX FREE POLYISOPRENE POLYMER BEAD ANTI

## (undated) DEVICE — VESSEL SEALER EXTEND: Brand: ENDOWRIST

## (undated) DEVICE — DISPOSABLE GRASPER: Brand: EPIX LAPAROSCOPIC GRASPER

## (undated) DEVICE — SUTURE VCRL SZ 3-0 L27IN ABSRB UD L36MM CT-1 1/2 CIR J258H

## (undated) DEVICE — WOUND RETRACTOR AND PROTECTOR: Brand: ALEXIS WOUND PROTECTOR-RETRACTOR

## (undated) DEVICE — DRAIN SURG 15FR SIL RND CHN W/ TRCR FULL FLUT DBL WRP TRAD

## (undated) DEVICE — REDUCER: Brand: ENDOWRIST

## (undated) DEVICE — CLIP INT L POLYMER LOK LIG HEM O LOK

## (undated) DEVICE — YANKAUER,FLEXIBLE HANDLE,REGLR CAPACITY: Brand: MEDLINE INDUSTRIES, INC.

## (undated) DEVICE — GOWN,SIRUS,POLYRNF,BRTHSLV,XLN/XL,20/CS: Brand: MEDLINE

## (undated) DEVICE — PENCIL ES CRD L10FT HND SWCHING ROCK SWCH W/ EDGE COAT BLDE

## (undated) DEVICE — SUTURE SZ 0 27IN 5/8 CIR UR-6  TAPER PT VIOLET ABSRB VICRYL J603H

## (undated) DEVICE — RESERVOIR,SUCTION,100CC,SILICONE: Brand: MEDLINE

## (undated) DEVICE — ELECTRODE ES AD CRDLSS PT RET REM POLYHESIVE

## (undated) DEVICE — ARM DRAPE

## (undated) DEVICE — SUTURE VCRL SZ 3-0 L27IN ABSRB VLT L26MM SH 1/2 CIR J316H

## (undated) DEVICE — Z DISCONTINUED NO SUB IDED TUBING ETCO2 AD L6.5FT NSL ORAL CVD PRNG NONFLARED TIP OVR

## (undated) DEVICE — TIP COVER ACCESSORY

## (undated) DEVICE — NEEDLE HYPO 20GA L1.5IN YEL POLYPR HUB S STL REG BVL STR

## (undated) DEVICE — SUTURE PERMAHAND SZ 3-0 L18IN NONABSORBABLE BLK L26MM SH C013D

## (undated) DEVICE — GLOVE ORANGE PI 7   MSG9070

## (undated) DEVICE — CANNULA SEAL

## (undated) DEVICE — SUTURE PERMAHAND SZ 2-0 L17X18IN NONABSORBABLE BLK SILK SA65H

## (undated) DEVICE — APPLICATOR MEDICATED 26 CC SOLUTION HI LT ORNG CHLORAPREP

## (undated) DEVICE — STAPLER 60: Brand: SUREFORM

## (undated) DEVICE — GOWN,SIRUS,FABRNF,RAGLAN,L,ST,30/CS: Brand: MEDLINE

## (undated) DEVICE — SPONGE DRN W4XL4IN RAYON/POLYESTER 6 PLY NONWOVEN PRECUT

## (undated) DEVICE — ADHESIVE SKIN CLSR 0.7ML TOP DERMBND ADV

## (undated) DEVICE — PAD GZ BORD ST 4INX10IN 2X8IN

## (undated) DEVICE — NEEDLE INSUF L120MM DIA2MM DISP FOR PNEUMOPERI ENDOPATH

## (undated) DEVICE — ENDOSCOPY KIT: Brand: MEDLINE INDUSTRIES, INC.

## (undated) DEVICE — STAPLER INT L55MM CUT LN L53MM STPL LN L57MM BLU B FRM 8

## (undated) DEVICE — STAPLER 60 RELOAD BLUE: Brand: SUREFORM

## (undated) DEVICE — GLOVE SURG SZ 75 L12IN THK75MIL DK GRN LTX FREE

## (undated) DEVICE — SUTURE PERMAHAND SZ 3-0 L30IN NONABSORBABLE BLK SH L26MM K832H

## (undated) DEVICE — GLOVE ORANGE PI 8   MSG9080

## (undated) DEVICE — RELOAD STPL L55MM OPN H3.8MM CLS H1.5MM WIRE DIA0.2MM REG

## (undated) DEVICE — GLOVE SURG SZ 65 L12IN FNGR THK79MIL GRN LTX FREE

## (undated) DEVICE — GLOVE SURG SZ 7 L12IN FNGR THK79MIL GRN LTX FREE

## (undated) DEVICE — GOWN,ECLIPSE,POLYRNF,BRTHSLV,L,30/CS: Brand: MEDLINE

## (undated) DEVICE — TUBING, SUCTION, 9/32" X 10', STRAIGHT: Brand: MEDLINE

## (undated) DEVICE — SPONGE LAP W18XL18IN WHT COT 4 PLY FLD STRUNG RADPQ DISP ST

## (undated) DEVICE — SUTURE PERMA-HAND SZ 2-0 L30IN NONABSORBABLE BLK L26MM SH K833H

## (undated) DEVICE — SUTURE STRATAFIX SPRL PDS + VLT 3-0 15CM DISPOSABLE/SNGLE SXPP1B420

## (undated) DEVICE — BLADELESS OBTURATOR: Brand: WECK VISTA

## (undated) DEVICE — FORCEPS BX L240CM JAW DIA2.8MM L CAP W/ NDL MIC MESH TOOTH

## (undated) DEVICE — SET TBNG DISP TIP FOR AHTO

## (undated) DEVICE — INTENDED FOR TISSUE SEPARATION, AND OTHER PROCEDURES THAT REQUIRE A SHARP SURGICAL BLADE TO PUNCTURE OR CUT.: Brand: BARD-PARKER ® STAINLESS STEEL BLADES

## (undated) DEVICE — SUTURE STRATAFIX SYMMETRIC SZ 1 L18IN ABSRB VLT CT1 L36CM SXPP1A404

## (undated) DEVICE — GOWN,ECLIPSE,POLYRNF,BRTHSLV,XL,30/CS: Brand: MEDLINE

## (undated) DEVICE — TOWEL,OR,DSP,ST,BLUE,STD,6/PK,12PK/CS: Brand: MEDLINE